# Patient Record
Sex: MALE | Race: BLACK OR AFRICAN AMERICAN | NOT HISPANIC OR LATINO | Employment: FULL TIME | URBAN - METROPOLITAN AREA
[De-identification: names, ages, dates, MRNs, and addresses within clinical notes are randomized per-mention and may not be internally consistent; named-entity substitution may affect disease eponyms.]

---

## 2018-08-30 ENCOUNTER — HOSPITAL ENCOUNTER (OUTPATIENT)
Dept: MEDSURG UNIT | Facility: HOSPITAL | Age: 63
End: 2018-09-04
Attending: SURGERY | Admitting: SURGERY

## 2018-08-30 LAB
ABS NEUT (OLG): 13.57 X10(3)/MCL (ref 2.1–9.2)
ALBUMIN SERPL-MCNC: 3.8 GM/DL (ref 3.4–5)
ALBUMIN/GLOB SERPL: 1.1 RATIO (ref 1.1–2)
ALP SERPL-CCNC: 83 UNIT/L (ref 50–136)
ALT SERPL-CCNC: 21 UNIT/L (ref 12–78)
APPEARANCE, UA: CLEAR
APTT PPP: 26.2 SECOND(S) (ref 24.8–36.9)
AST SERPL-CCNC: 17 UNIT/L (ref 15–37)
BACTERIA SPEC CULT: ABNORMAL /HPF
BASOPHILS # BLD AUTO: 0 X10(3)/MCL (ref 0–0.2)
BASOPHILS NFR BLD AUTO: 0 %
BILIRUB SERPL-MCNC: 1.1 MG/DL (ref 0.2–1)
BILIRUB UR QL STRIP: NEGATIVE
BILIRUBIN DIRECT+TOT PNL SERPL-MCNC: 0.3 MG/DL (ref 0–0.5)
BILIRUBIN DIRECT+TOT PNL SERPL-MCNC: 0.8 MG/DL (ref 0–0.8)
BUN SERPL-MCNC: 11 MG/DL (ref 7–18)
CALCIUM SERPL-MCNC: 9.1 MG/DL (ref 8.5–10.1)
CHLORIDE SERPL-SCNC: 97 MMOL/L (ref 98–107)
CO2 SERPL-SCNC: 28 MMOL/L (ref 21–32)
COLOR UR: YELLOW
CREAT SERPL-MCNC: 1.21 MG/DL (ref 0.7–1.3)
ERYTHROCYTE [DISTWIDTH] IN BLOOD BY AUTOMATED COUNT: 12.9 % (ref 11.5–17)
GLOBULIN SER-MCNC: 3.4 GM/DL (ref 2.4–3.5)
GLUCOSE (UA): ABNORMAL
GLUCOSE SERPL-MCNC: 172 MG/DL (ref 74–106)
HCT VFR BLD AUTO: 39.6 % (ref 42–52)
HGB BLD-MCNC: 13.3 GM/DL (ref 14–18)
HGB UR QL STRIP: NEGATIVE
INR PPP: 1.2 (ref 0–1.27)
KETONES UR QL STRIP: NEGATIVE
LACTATE SERPL-SCNC: 1.1 MMOL/L (ref 0.4–2)
LACTATE SERPL-SCNC: 3 MMOL/L (ref 0.4–2)
LACTATE SERPL-SCNC: 3.2 MMOL/L (ref 0.4–2)
LEUKOCYTE ESTERASE UR QL STRIP: NEGATIVE
LYMPHOCYTES # BLD AUTO: 1 X10(3)/MCL (ref 0.6–4.6)
LYMPHOCYTES NFR BLD AUTO: 6 %
MCH RBC QN AUTO: 30.6 PG (ref 27–31)
MCHC RBC AUTO-ENTMCNC: 33.6 GM/DL (ref 33–36)
MCV RBC AUTO: 91 FL (ref 80–94)
MONOCYTES # BLD AUTO: 1.5 X10(3)/MCL (ref 0.1–1.3)
MONOCYTES NFR BLD AUTO: 9 %
NEUTROPHILS # BLD AUTO: 13.57 X10(3)/MCL (ref 1.4–7.9)
NEUTROPHILS NFR BLD AUTO: 84 %
NITRITE UR QL STRIP: NEGATIVE
PH UR STRIP: 6 [PH] (ref 5–9)
PLATELET # BLD AUTO: 177 X10(3)/MCL (ref 130–400)
PMV BLD AUTO: 11.8 FL (ref 9.4–12.4)
POTASSIUM SERPL-SCNC: 3.9 MMOL/L (ref 3.5–5.1)
PROT SERPL-MCNC: 7.2 GM/DL (ref 6.4–8.2)
PROT UR QL STRIP: ABNORMAL
PROTHROMBIN TIME: 15.6 SECOND(S) (ref 12.2–14.7)
RBC # BLD AUTO: 4.35 X10(6)/MCL (ref 4.7–6.1)
RBC #/AREA URNS HPF: ABNORMAL /[HPF]
SODIUM SERPL-SCNC: 134 MMOL/L (ref 136–145)
SP GR UR STRIP: 1.02 (ref 1–1.03)
SQUAMOUS EPITHELIAL, UA: ABNORMAL
UROBILINOGEN UR STRIP-ACNC: 1
WBC # SPEC AUTO: 16.2 X10(3)/MCL (ref 4.5–11.5)
WBC #/AREA URNS HPF: ABNORMAL /HPF

## 2018-08-31 LAB
ABS NEUT (OLG): 11.22 X10(3)/MCL (ref 2.1–9.2)
BASOPHILS # BLD AUTO: 0 X10(3)/MCL (ref 0–0.2)
BASOPHILS NFR BLD AUTO: 0 %
BUN SERPL-MCNC: 8 MG/DL (ref 7–18)
CALCIUM SERPL-MCNC: 7.8 MG/DL (ref 8.5–10.1)
CHLORIDE SERPL-SCNC: 106 MMOL/L (ref 98–107)
CO2 SERPL-SCNC: 27 MMOL/L (ref 21–32)
CREAT SERPL-MCNC: 0.93 MG/DL (ref 0.7–1.3)
EOSINOPHIL # BLD AUTO: 0 X10(3)/MCL (ref 0–0.9)
EOSINOPHIL NFR BLD AUTO: 0 %
ERYTHROCYTE [DISTWIDTH] IN BLOOD BY AUTOMATED COUNT: 13 % (ref 11.5–17)
GLUCOSE SERPL-MCNC: 145 MG/DL (ref 74–106)
HCT VFR BLD AUTO: 33.8 % (ref 42–52)
HGB BLD-MCNC: 11.2 GM/DL (ref 14–18)
LYMPHOCYTES # BLD AUTO: 1.1 X10(3)/MCL (ref 0.6–4.6)
LYMPHOCYTES NFR BLD AUTO: 8 %
MCH RBC QN AUTO: 30.4 PG (ref 27–31)
MCHC RBC AUTO-ENTMCNC: 33.1 GM/DL (ref 33–36)
MCV RBC AUTO: 91.6 FL (ref 80–94)
MONOCYTES # BLD AUTO: 1.1 X10(3)/MCL (ref 0.1–1.3)
MONOCYTES NFR BLD AUTO: 8 %
NEUTROPHILS # BLD AUTO: 11.22 X10(3)/MCL (ref 1.4–7.9)
NEUTROPHILS NFR BLD AUTO: 83 %
PLATELET # BLD AUTO: 136 X10(3)/MCL (ref 130–400)
PMV BLD AUTO: 11.4 FL (ref 9.4–12.4)
POTASSIUM SERPL-SCNC: 3.6 MMOL/L (ref 3.5–5.1)
RBC # BLD AUTO: 3.69 X10(6)/MCL (ref 4.7–6.1)
SODIUM SERPL-SCNC: 140 MMOL/L (ref 136–145)
WBC # SPEC AUTO: 13.5 X10(3)/MCL (ref 4.5–11.5)

## 2018-09-01 LAB
ABS NEUT (OLG): 10.49 X10(3)/MCL (ref 2.1–9.2)
BASOPHILS # BLD AUTO: 0 X10(3)/MCL (ref 0–0.2)
BASOPHILS NFR BLD AUTO: 0 %
EOSINOPHIL # BLD AUTO: 0 X10(3)/MCL (ref 0–0.9)
EOSINOPHIL NFR BLD AUTO: 0 %
ERYTHROCYTE [DISTWIDTH] IN BLOOD BY AUTOMATED COUNT: 12.9 % (ref 11.5–17)
HCT VFR BLD AUTO: 31.3 % (ref 42–52)
HGB BLD-MCNC: 10.5 GM/DL (ref 14–18)
LYMPHOCYTES # BLD AUTO: 0.9 X10(3)/MCL (ref 0.6–4.6)
LYMPHOCYTES NFR BLD AUTO: 7 %
MCH RBC QN AUTO: 29.9 PG (ref 27–31)
MCHC RBC AUTO-ENTMCNC: 33.5 GM/DL (ref 33–36)
MCV RBC AUTO: 89.2 FL (ref 80–94)
MONOCYTES # BLD AUTO: 1 X10(3)/MCL (ref 0.1–1.3)
MONOCYTES NFR BLD AUTO: 8 %
NEUTROPHILS # BLD AUTO: 10.49 X10(3)/MCL (ref 1.4–7.9)
NEUTROPHILS NFR BLD AUTO: 84 %
PLATELET # BLD AUTO: 145 X10(3)/MCL (ref 130–400)
PMV BLD AUTO: 11 FL (ref 9.4–12.4)
RBC # BLD AUTO: 3.51 X10(6)/MCL (ref 4.7–6.1)
WBC # SPEC AUTO: 12.6 X10(3)/MCL (ref 4.5–11.5)

## 2018-09-02 LAB
ABS NEUT (OLG): 7.37 X10(3)/MCL (ref 2.1–9.2)
BASOPHILS # BLD AUTO: 0 X10(3)/MCL (ref 0–0.2)
BASOPHILS NFR BLD AUTO: 0 %
BUN SERPL-MCNC: 7 MG/DL (ref 7–18)
CALCIUM SERPL-MCNC: 8.8 MG/DL (ref 8.5–10.1)
CHLORIDE SERPL-SCNC: 105 MMOL/L (ref 98–107)
CO2 SERPL-SCNC: 29 MMOL/L (ref 21–32)
CREAT SERPL-MCNC: 0.92 MG/DL (ref 0.7–1.3)
CREAT/UREA NIT SERPL: 7.6
EOSINOPHIL # BLD AUTO: 0.1 X10(3)/MCL (ref 0–0.9)
EOSINOPHIL NFR BLD AUTO: 2 %
ERYTHROCYTE [DISTWIDTH] IN BLOOD BY AUTOMATED COUNT: 13.1 % (ref 11.5–17)
GLUCOSE SERPL-MCNC: 111 MG/DL (ref 74–106)
HCT VFR BLD AUTO: 34 % (ref 42–52)
HGB BLD-MCNC: 11.4 GM/DL (ref 14–18)
LYMPHOCYTES # BLD AUTO: 0.4 X10(3)/MCL (ref 0.6–4.6)
LYMPHOCYTES NFR BLD AUTO: 4 %
MCH RBC QN AUTO: 30.3 PG (ref 27–31)
MCHC RBC AUTO-ENTMCNC: 33.5 GM/DL (ref 33–36)
MCV RBC AUTO: 90.4 FL (ref 80–94)
MONOCYTES # BLD AUTO: 0.8 X10(3)/MCL (ref 0.1–1.3)
MONOCYTES NFR BLD AUTO: 9 %
NEUTROPHILS # BLD AUTO: 7.37 X10(3)/MCL (ref 1.4–7.9)
NEUTROPHILS NFR BLD AUTO: 84 %
PLATELET # BLD AUTO: 160 X10(3)/MCL (ref 130–400)
PMV BLD AUTO: 11.2 FL (ref 9.4–12.4)
POTASSIUM SERPL-SCNC: 3.7 MMOL/L (ref 3.5–5.1)
RBC # BLD AUTO: 3.76 X10(6)/MCL (ref 4.7–6.1)
SODIUM SERPL-SCNC: 142 MMOL/L (ref 136–145)
WBC # SPEC AUTO: 8.8 X10(3)/MCL (ref 4.5–11.5)

## 2018-09-04 LAB
ABS NEUT (OLG): 4.97 X10(3)/MCL (ref 2.1–9.2)
BASOPHILS # BLD AUTO: 0 X10(3)/MCL (ref 0–0.2)
BASOPHILS NFR BLD AUTO: 0 %
BUN SERPL-MCNC: 6 MG/DL (ref 7–18)
CALCIUM SERPL-MCNC: 8.1 MG/DL (ref 8.5–10.1)
CHLORIDE SERPL-SCNC: 105 MMOL/L (ref 98–107)
CO2 SERPL-SCNC: 29 MMOL/L (ref 21–32)
CREAT SERPL-MCNC: 1.04 MG/DL (ref 0.7–1.3)
CREAT/UREA NIT SERPL: 5.8
EOSINOPHIL # BLD AUTO: 0.2 X10(3)/MCL (ref 0–0.9)
EOSINOPHIL NFR BLD AUTO: 3 %
ERYTHROCYTE [DISTWIDTH] IN BLOOD BY AUTOMATED COUNT: 13.2 % (ref 11.5–17)
GLUCOSE SERPL-MCNC: 141 MG/DL (ref 74–106)
HCT VFR BLD AUTO: 32.2 % (ref 42–52)
HGB BLD-MCNC: 10.7 GM/DL (ref 14–18)
LYMPHOCYTES # BLD AUTO: 0.8 X10(3)/MCL (ref 0.6–4.6)
LYMPHOCYTES NFR BLD AUTO: 12 %
MCH RBC QN AUTO: 30.1 PG (ref 27–31)
MCHC RBC AUTO-ENTMCNC: 33.2 GM/DL (ref 33–36)
MCV RBC AUTO: 90.7 FL (ref 80–94)
MONOCYTES # BLD AUTO: 0.9 X10(3)/MCL (ref 0.1–1.3)
MONOCYTES NFR BLD AUTO: 13 %
NEUTROPHILS # BLD AUTO: 4.97 X10(3)/MCL (ref 1.4–7.9)
NEUTROPHILS NFR BLD AUTO: 71 %
PLATELET # BLD AUTO: 203 X10(3)/MCL (ref 130–400)
PMV BLD AUTO: 11 FL (ref 9.4–12.4)
POTASSIUM SERPL-SCNC: 3.1 MMOL/L (ref 3.5–5.1)
RBC # BLD AUTO: 3.55 X10(6)/MCL (ref 4.7–6.1)
SODIUM SERPL-SCNC: 143 MMOL/L (ref 136–145)
WBC # SPEC AUTO: 7 X10(3)/MCL (ref 4.5–11.5)

## 2018-09-05 LAB — FINAL CULTURE: NORMAL

## 2019-05-10 ENCOUNTER — HISTORICAL (OUTPATIENT)
Dept: CARDIOLOGY | Facility: HOSPITAL | Age: 64
End: 2019-05-10

## 2020-02-29 ENCOUNTER — HOSPITAL ENCOUNTER (INPATIENT)
Facility: HOSPITAL | Age: 65
LOS: 13 days | Discharge: HOME HEALTH | DRG: 268 | End: 2020-03-13
Attending: INTERNAL MEDICINE | Admitting: INTERNAL MEDICINE
Payer: COMMERCIAL

## 2020-02-29 DIAGNOSIS — R57.0 CARDIOGENIC SHOCK: ICD-10-CM

## 2020-02-29 DIAGNOSIS — E83.39 HYPERPHOSPHATEMIA: ICD-10-CM

## 2020-02-29 DIAGNOSIS — E11.9 TYPE 2 DIABETES MELLITUS WITHOUT COMPLICATION, WITHOUT LONG-TERM CURRENT USE OF INSULIN: ICD-10-CM

## 2020-02-29 DIAGNOSIS — I10 HYPERTENSION: ICD-10-CM

## 2020-02-29 DIAGNOSIS — N17.9 AKI (ACUTE KIDNEY INJURY): ICD-10-CM

## 2020-02-29 LAB
ABO + RH BLD: NORMAL
ALBUMIN SERPL BCP-MCNC: 2.5 G/DL (ref 3.5–5.2)
ALLENS TEST: ABNORMAL
ALP SERPL-CCNC: 92 U/L (ref 55–135)
ALT SERPL W/O P-5'-P-CCNC: 43 U/L (ref 10–44)
ANION GAP SERPL CALC-SCNC: 11 MMOL/L (ref 8–16)
ANISOCYTOSIS BLD QL SMEAR: SLIGHT
APTT BLDCRRT: 37.6 SEC (ref 21–32)
AST SERPL-CCNC: 117 U/L (ref 10–40)
BASO STIPL BLD QL SMEAR: ABNORMAL
BASOPHILS # BLD AUTO: 0.03 K/UL (ref 0–0.2)
BASOPHILS NFR BLD: 0.1 % (ref 0–1.9)
BILIRUB SERPL-MCNC: 1.3 MG/DL (ref 0.1–1)
BILIRUB UR QL STRIP: NEGATIVE
BLD GP AB SCN CELLS X3 SERPL QL: NORMAL
BNP SERPL-MCNC: 974 PG/ML (ref 0–99)
BUN SERPL-MCNC: 31 MG/DL (ref 8–23)
CALCIUM SERPL-MCNC: 7.6 MG/DL (ref 8.7–10.5)
CHLORIDE SERPL-SCNC: 101 MMOL/L (ref 95–110)
CLARITY UR REFRACT.AUTO: ABNORMAL
CO2 SERPL-SCNC: 25 MMOL/L (ref 23–29)
COLOR UR AUTO: YELLOW
CREAT SERPL-MCNC: 4.6 MG/DL (ref 0.5–1.4)
DELSYS: ABNORMAL
DIFFERENTIAL METHOD: ABNORMAL
DOHLE BOD BLD QL SMEAR: PRESENT
EOSINOPHIL # BLD AUTO: 0 K/UL (ref 0–0.5)
EOSINOPHIL NFR BLD: 0 % (ref 0–8)
ERYTHROCYTE [DISTWIDTH] IN BLOOD BY AUTOMATED COUNT: 14.7 % (ref 11.5–14.5)
ERYTHROCYTE [SEDIMENTATION RATE] IN BLOOD BY WESTERGREN METHOD: 12 MM/H
EST. GFR  (AFRICAN AMERICAN): 14.6 ML/MIN/1.73 M^2
EST. GFR  (NON AFRICAN AMERICAN): 12.6 ML/MIN/1.73 M^2
FIO2: 60
GLUCOSE SERPL-MCNC: 124 MG/DL (ref 70–110)
GLUCOSE UR QL STRIP: NEGATIVE
GRAN CASTS UR QL COMP ASSIST: 3 /LPF
HCO3 UR-SCNC: 26 MMOL/L (ref 24–28)
HCT VFR BLD AUTO: 33.8 % (ref 40–54)
HGB BLD-MCNC: 11.1 G/DL (ref 14–18)
HGB UR QL STRIP: ABNORMAL
HYPOCHROMIA BLD QL SMEAR: ABNORMAL
IMM GRANULOCYTES # BLD AUTO: 0.15 K/UL (ref 0–0.04)
IMM GRANULOCYTES NFR BLD AUTO: 0.6 % (ref 0–0.5)
INR PPP: 1.3 (ref 0.8–1.2)
KETONES UR QL STRIP: NEGATIVE
LACTATE SERPL-SCNC: 3.3 MMOL/L (ref 0.5–2.2)
LDH SERPL L TO P-CCNC: 1325 U/L (ref 110–260)
LEUKOCYTE ESTERASE UR QL STRIP: ABNORMAL
LYMPHOCYTES # BLD AUTO: 1.2 K/UL (ref 1–4.8)
LYMPHOCYTES NFR BLD: 5.2 % (ref 18–48)
MAGNESIUM SERPL-MCNC: 1.5 MG/DL (ref 1.6–2.6)
MCH RBC QN AUTO: 29.4 PG (ref 27–31)
MCHC RBC AUTO-ENTMCNC: 32.8 G/DL (ref 32–36)
MCV RBC AUTO: 89 FL (ref 82–98)
MICROSCOPIC COMMENT: ABNORMAL
MODE: ABNORMAL
MONOCYTES # BLD AUTO: 1 K/UL (ref 0.3–1)
MONOCYTES NFR BLD: 4 % (ref 4–15)
NEUTROPHILS # BLD AUTO: 21.3 K/UL (ref 1.8–7.7)
NEUTROPHILS NFR BLD: 90.1 % (ref 38–73)
NITRITE UR QL STRIP: NEGATIVE
NRBC BLD-RTO: 0 /100 WBC
OVALOCYTES BLD QL SMEAR: ABNORMAL
PCO2 BLDA: 35.4 MMHG (ref 35–45)
PEEP: 5
PH SMN: 7.47 [PH] (ref 7.35–7.45)
PH UR STRIP: 7 [PH] (ref 5–8)
PHOSPHATE SERPL-MCNC: 4.3 MG/DL (ref 2.7–4.5)
PLATELET # BLD AUTO: 141 K/UL (ref 150–350)
PLATELET BLD QL SMEAR: ABNORMAL
PMV BLD AUTO: 11.4 FL (ref 9.2–12.9)
PO2 BLDA: 89 MMHG (ref 80–100)
POC BE: 2 MMOL/L
POC SATURATED O2: 98 % (ref 95–100)
POC TCO2: 27 MMOL/L (ref 23–27)
POCT GLUCOSE: 104 MG/DL (ref 70–110)
POIKILOCYTOSIS BLD QL SMEAR: SLIGHT
POLYCHROMASIA BLD QL SMEAR: ABNORMAL
POTASSIUM SERPL-SCNC: 5.2 MMOL/L (ref 3.5–5.1)
PROT SERPL-MCNC: 5.4 G/DL (ref 6–8.4)
PROT UR QL STRIP: NEGATIVE
PROTHROMBIN TIME: 12.8 SEC (ref 9–12.5)
RBC # BLD AUTO: 3.78 M/UL (ref 4.6–6.2)
RBC #/AREA URNS AUTO: 4 /HPF (ref 0–4)
SAMPLE: ABNORMAL
SITE: ABNORMAL
SODIUM SERPL-SCNC: 137 MMOL/L (ref 136–145)
SP GR UR STRIP: 1.01 (ref 1–1.03)
SP02: 99
SQUAMOUS #/AREA URNS AUTO: 1 /HPF
URN SPEC COLLECT METH UR: ABNORMAL
VT: 400
WBC # BLD AUTO: 23.67 K/UL (ref 3.9–12.7)
WBC #/AREA URNS AUTO: 26 /HPF (ref 0–5)

## 2020-02-29 PROCEDURE — 83880 ASSAY OF NATRIURETIC PEPTIDE: CPT

## 2020-02-29 PROCEDURE — 84100 ASSAY OF PHOSPHORUS: CPT

## 2020-02-29 PROCEDURE — 99900035 HC TECH TIME PER 15 MIN (STAT)

## 2020-02-29 PROCEDURE — 82803 BLOOD GASES ANY COMBINATION: CPT

## 2020-02-29 PROCEDURE — 27000203 HC IMPELLA ADD'L DAY (CL)

## 2020-02-29 PROCEDURE — 85730 THROMBOPLASTIN TIME PARTIAL: CPT

## 2020-02-29 PROCEDURE — 63600175 PHARM REV CODE 636 W HCPCS: Performed by: STUDENT IN AN ORGANIZED HEALTH CARE EDUCATION/TRAINING PROGRAM

## 2020-02-29 PROCEDURE — 87040 BLOOD CULTURE FOR BACTERIA: CPT

## 2020-02-29 PROCEDURE — 99900026 HC AIRWAY MAINTENANCE (STAT)

## 2020-02-29 PROCEDURE — 83615 LACTATE (LD) (LDH) ENZYME: CPT

## 2020-02-29 PROCEDURE — 84443 ASSAY THYROID STIM HORMONE: CPT

## 2020-02-29 PROCEDURE — 81001 URINALYSIS AUTO W/SCOPE: CPT

## 2020-02-29 PROCEDURE — 94002 VENT MGMT INPAT INIT DAY: CPT

## 2020-02-29 PROCEDURE — 85025 COMPLETE CBC W/AUTO DIFF WBC: CPT

## 2020-02-29 PROCEDURE — 80053 COMPREHEN METABOLIC PANEL: CPT

## 2020-02-29 PROCEDURE — 25000003 PHARM REV CODE 250: Performed by: STUDENT IN AN ORGANIZED HEALTH CARE EDUCATION/TRAINING PROGRAM

## 2020-02-29 PROCEDURE — 20000000 HC ICU ROOM

## 2020-02-29 PROCEDURE — 82800 BLOOD PH: CPT

## 2020-02-29 PROCEDURE — 83735 ASSAY OF MAGNESIUM: CPT

## 2020-02-29 PROCEDURE — 27000426 HC IMPELLA SET UP

## 2020-02-29 PROCEDURE — 85610 PROTHROMBIN TIME: CPT

## 2020-02-29 PROCEDURE — 63600175 PHARM REV CODE 636 W HCPCS: Performed by: INTERNAL MEDICINE

## 2020-02-29 PROCEDURE — 36600 WITHDRAWAL OF ARTERIAL BLOOD: CPT

## 2020-02-29 PROCEDURE — 83605 ASSAY OF LACTIC ACID: CPT

## 2020-02-29 PROCEDURE — 86901 BLOOD TYPING SEROLOGIC RH(D): CPT

## 2020-02-29 PROCEDURE — 87086 URINE CULTURE/COLONY COUNT: CPT

## 2020-02-29 RX ORDER — NAPROXEN SODIUM 220 MG/1
81 TABLET, FILM COATED ORAL DAILY
Status: DISCONTINUED | OUTPATIENT
Start: 2020-03-01 | End: 2020-03-02

## 2020-02-29 RX ORDER — DEXMEDETOMIDINE HYDROCHLORIDE 4 UG/ML
0.2 INJECTION, SOLUTION INTRAVENOUS CONTINUOUS
Status: DISCONTINUED | OUTPATIENT
Start: 2020-02-29 | End: 2020-03-03

## 2020-02-29 RX ORDER — NOREPINEPHRINE BITARTRATE/D5W 4MG/250ML
0.02 PLASTIC BAG, INJECTION (ML) INTRAVENOUS CONTINUOUS
Status: DISCONTINUED | OUTPATIENT
Start: 2020-02-29 | End: 2020-02-29

## 2020-02-29 RX ORDER — DOBUTAMINE HYDROCHLORIDE 400 MG/100ML
5 INJECTION, SOLUTION INTRAVENOUS CONTINUOUS
Status: DISCONTINUED | OUTPATIENT
Start: 2020-02-29 | End: 2020-03-01

## 2020-02-29 RX ORDER — MAGNESIUM SULFATE HEPTAHYDRATE 40 MG/ML
2 INJECTION, SOLUTION INTRAVENOUS ONCE
Status: COMPLETED | OUTPATIENT
Start: 2020-02-29 | End: 2020-03-01

## 2020-02-29 RX ORDER — ATORVASTATIN CALCIUM 10 MG/1
10 TABLET, FILM COATED ORAL NIGHTLY
Status: DISCONTINUED | OUTPATIENT
Start: 2020-02-29 | End: 2020-03-01

## 2020-02-29 RX ORDER — HEPARIN SODIUM,PORCINE/D5W 25000/250
12 INTRAVENOUS SOLUTION INTRAVENOUS CONTINUOUS
Status: DISCONTINUED | OUTPATIENT
Start: 2020-02-29 | End: 2020-03-02

## 2020-02-29 RX ORDER — SODIUM CHLORIDE 0.9 % (FLUSH) 0.9 %
10 SYRINGE (ML) INJECTION
Status: DISCONTINUED | OUTPATIENT
Start: 2020-02-29 | End: 2020-03-13 | Stop reason: HOSPADM

## 2020-02-29 RX ADMIN — ATORVASTATIN CALCIUM 10 MG: 10 TABLET, FILM COATED ORAL at 10:02

## 2020-02-29 RX ADMIN — DEXMEDETOMIDINE HYDROCHLORIDE 1.4 MCG/KG/HR: 100 INJECTION, SOLUTION, CONCENTRATE INTRAVENOUS at 11:02

## 2020-02-29 RX ADMIN — DOBUTAMINE HYDROCHLORIDE 10 MCG/KG/MIN: 200 INJECTION INTRAVENOUS at 09:02

## 2020-02-29 RX ADMIN — MAGNESIUM SULFATE HEPTAHYDRATE 2 G: 40 INJECTION, SOLUTION INTRAVENOUS at 10:02

## 2020-02-29 RX ADMIN — DEXMEDETOMIDINE HYDROCHLORIDE 1 MCG/KG/HR: 100 INJECTION, SOLUTION, CONCENTRATE INTRAVENOUS at 09:02

## 2020-02-29 RX ADMIN — VANCOMYCIN HYDROCHLORIDE 1500 MG: 1.5 INJECTION, POWDER, LYOPHILIZED, FOR SOLUTION INTRAVENOUS at 11:02

## 2020-02-29 RX ADMIN — PIPERACILLIN AND TAZOBACTAM 4.5 G: 4; .5 INJECTION, POWDER, LYOPHILIZED, FOR SOLUTION INTRAVENOUS; PARENTERAL at 11:02

## 2020-02-29 RX ADMIN — HEPARIN SODIUM AND DEXTROSE 12 UNITS/KG/HR: 10000; 5 INJECTION INTRAVENOUS at 09:02

## 2020-02-29 RX ADMIN — NOREPINEPHRINE BITARTRATE 0.07 MCG/KG/MIN: 1 INJECTION, SOLUTION, CONCENTRATE INTRAVENOUS at 10:02

## 2020-02-29 NOTE — Clinical Note
The site was marked. Prepped: groin. Prepped with: Betadine. The site was clipped. The patient was draped.

## 2020-02-29 NOTE — Clinical Note
0 ml injected throughout the case. 100 mL total wasted during the case. 100 mL total used in the case.

## 2020-02-29 NOTE — Clinical Note
patient arrived with existing 14 fr impella sheath in right CFA and existing 7 fr sheath in right CFV

## 2020-02-29 NOTE — Clinical Note
Patient arrived with two existing  PERCLOSE SUT CLSR 6FR. Deployed after sheath removal in Right CFA.

## 2020-03-01 PROBLEM — R57.0 CARDIOGENIC SHOCK: Status: ACTIVE | Noted: 2020-03-01

## 2020-03-01 PROBLEM — I50.43 ACUTE ON CHRONIC COMBINED SYSTOLIC AND DIASTOLIC HEART FAILURE: Status: ACTIVE | Noted: 2020-03-01

## 2020-03-01 PROBLEM — I25.10 CAD (CORONARY ARTERY DISEASE): Status: ACTIVE | Noted: 2020-03-01

## 2020-03-01 PROBLEM — E11.9 DM (DIABETES MELLITUS): Status: ACTIVE | Noted: 2020-03-01

## 2020-03-01 PROBLEM — N17.9 AKI (ACUTE KIDNEY INJURY): Status: ACTIVE | Noted: 2020-03-01

## 2020-03-01 LAB
ALBUMIN SERPL BCP-MCNC: 2.2 G/DL (ref 3.5–5.2)
ALBUMIN SERPL BCP-MCNC: 2.3 G/DL (ref 3.5–5.2)
ALBUMIN SERPL BCP-MCNC: 2.3 G/DL (ref 3.5–5.2)
ALLENS TEST: ABNORMAL
ALP SERPL-CCNC: 76 U/L (ref 55–135)
ALP SERPL-CCNC: 85 U/L (ref 55–135)
ALP SERPL-CCNC: 99 U/L (ref 55–135)
ALT SERPL W/O P-5'-P-CCNC: 33 U/L (ref 10–44)
ALT SERPL W/O P-5'-P-CCNC: 35 U/L (ref 10–44)
ALT SERPL W/O P-5'-P-CCNC: 38 U/L (ref 10–44)
ANION GAP SERPL CALC-SCNC: 11 MMOL/L (ref 8–16)
ANION GAP SERPL CALC-SCNC: 12 MMOL/L (ref 8–16)
ANION GAP SERPL CALC-SCNC: 13 MMOL/L (ref 8–16)
ANISOCYTOSIS BLD QL SMEAR: SLIGHT
ANISOCYTOSIS BLD QL SMEAR: SLIGHT
APTT BLDCRRT: 39.8 SEC (ref 21–32)
APTT BLDCRRT: 63.7 SEC (ref 21–32)
APTT BLDCRRT: 63.7 SEC (ref 21–32)
APTT BLDCRRT: 73.1 SEC (ref 21–32)
AST SERPL-CCNC: 49 U/L (ref 10–40)
AST SERPL-CCNC: 67 U/L (ref 10–40)
AST SERPL-CCNC: 84 U/L (ref 10–40)
BASO STIPL BLD QL SMEAR: ABNORMAL
BASOPHILS # BLD AUTO: 0.02 K/UL (ref 0–0.2)
BASOPHILS # BLD AUTO: 0.03 K/UL (ref 0–0.2)
BASOPHILS # BLD AUTO: 0.04 K/UL (ref 0–0.2)
BASOPHILS NFR BLD: 0.1 % (ref 0–1.9)
BASOPHILS NFR BLD: 0.1 % (ref 0–1.9)
BASOPHILS NFR BLD: 0.2 % (ref 0–1.9)
BILIRUB SERPL-MCNC: 0.7 MG/DL (ref 0.1–1)
BILIRUB SERPL-MCNC: 1.1 MG/DL (ref 0.1–1)
BILIRUB SERPL-MCNC: 1.2 MG/DL (ref 0.1–1)
BUN SERPL-MCNC: 37 MG/DL (ref 8–23)
BUN SERPL-MCNC: 43 MG/DL (ref 8–23)
BUN SERPL-MCNC: 54 MG/DL (ref 8–23)
CALCIUM SERPL-MCNC: 7.2 MG/DL (ref 8.7–10.5)
CALCIUM SERPL-MCNC: 7.3 MG/DL (ref 8.7–10.5)
CALCIUM SERPL-MCNC: 7.4 MG/DL (ref 8.7–10.5)
CHLORIDE SERPL-SCNC: 100 MMOL/L (ref 95–110)
CHLORIDE SERPL-SCNC: 100 MMOL/L (ref 95–110)
CHLORIDE SERPL-SCNC: 99 MMOL/L (ref 95–110)
CHOLEST SERPL-MCNC: 131 MG/DL (ref 120–199)
CHOLEST/HDLC SERPL: 4 {RATIO} (ref 2–5)
CO2 SERPL-SCNC: 22 MMOL/L (ref 23–29)
CO2 SERPL-SCNC: 23 MMOL/L (ref 23–29)
CO2 SERPL-SCNC: 24 MMOL/L (ref 23–29)
CREAT SERPL-MCNC: 5.2 MG/DL (ref 0.5–1.4)
CREAT SERPL-MCNC: 5.9 MG/DL (ref 0.5–1.4)
CREAT SERPL-MCNC: 6.7 MG/DL (ref 0.5–1.4)
DELSYS: ABNORMAL
DIFFERENTIAL METHOD: ABNORMAL
EOSINOPHIL # BLD AUTO: 0 K/UL (ref 0–0.5)
EOSINOPHIL NFR BLD: 0 % (ref 0–8)
EOSINOPHIL NFR BLD: 0 % (ref 0–8)
EOSINOPHIL NFR BLD: 0.2 % (ref 0–8)
ERYTHROCYTE [DISTWIDTH] IN BLOOD BY AUTOMATED COUNT: 14.8 % (ref 11.5–14.5)
ERYTHROCYTE [DISTWIDTH] IN BLOOD BY AUTOMATED COUNT: 15.2 % (ref 11.5–14.5)
ERYTHROCYTE [DISTWIDTH] IN BLOOD BY AUTOMATED COUNT: 15.8 % (ref 11.5–14.5)
ERYTHROCYTE [SEDIMENTATION RATE] IN BLOOD BY WESTERGREN METHOD: 12 MM/H
ERYTHROCYTE [SEDIMENTATION RATE] IN BLOOD BY WESTERGREN METHOD: 12 MM/H
ERYTHROCYTE [SEDIMENTATION RATE] IN BLOOD BY WESTERGREN METHOD: 18 MM/H
ERYTHROCYTE [SEDIMENTATION RATE] IN BLOOD BY WESTERGREN METHOD: 18 MM/H
EST. GFR  (AFRICAN AMERICAN): 10.8 ML/MIN/1.73 M^2
EST. GFR  (AFRICAN AMERICAN): 12.6 ML/MIN/1.73 M^2
EST. GFR  (AFRICAN AMERICAN): 9.2 ML/MIN/1.73 M^2
EST. GFR  (NON AFRICAN AMERICAN): 10.9 ML/MIN/1.73 M^2
EST. GFR  (NON AFRICAN AMERICAN): 8 ML/MIN/1.73 M^2
EST. GFR  (NON AFRICAN AMERICAN): 9.3 ML/MIN/1.73 M^2
ESTIMATED AVG GLUCOSE: 154 MG/DL (ref 68–131)
FIO2: 50
FIO2: 60
FLOW: 50
GLUCOSE SERPL-MCNC: 155 MG/DL (ref 70–110)
GLUCOSE SERPL-MCNC: 159 MG/DL (ref 70–110)
GLUCOSE SERPL-MCNC: 177 MG/DL (ref 70–110)
HBA1C MFR BLD HPLC: 7 % (ref 4–5.6)
HCO3 UR-SCNC: 24.3 MMOL/L (ref 24–28)
HCO3 UR-SCNC: 24.4 MMOL/L (ref 24–28)
HCO3 UR-SCNC: 25.2 MMOL/L (ref 24–28)
HCO3 UR-SCNC: 25.3 MMOL/L (ref 24–28)
HCT VFR BLD AUTO: 25.2 % (ref 40–54)
HCT VFR BLD AUTO: 26.9 % (ref 40–54)
HCT VFR BLD AUTO: 29.3 % (ref 40–54)
HDLC SERPL-MCNC: 33 MG/DL (ref 40–75)
HDLC SERPL: 25.2 % (ref 20–50)
HGB BLD-MCNC: 8.3 G/DL (ref 14–18)
HGB BLD-MCNC: 9 G/DL (ref 14–18)
HGB BLD-MCNC: 9.6 G/DL (ref 14–18)
HYPOCHROMIA BLD QL SMEAR: ABNORMAL
HYPOCHROMIA BLD QL SMEAR: ABNORMAL
IMM GRANULOCYTES # BLD AUTO: 0.26 K/UL (ref 0–0.04)
IMM GRANULOCYTES # BLD AUTO: 0.26 K/UL (ref 0–0.04)
IMM GRANULOCYTES # BLD AUTO: 0.42 K/UL (ref 0–0.04)
IMM GRANULOCYTES NFR BLD AUTO: 1.1 % (ref 0–0.5)
IMM GRANULOCYTES NFR BLD AUTO: 1.2 % (ref 0–0.5)
IMM GRANULOCYTES NFR BLD AUTO: 1.8 % (ref 0–0.5)
INR PPP: 1.2 (ref 0.8–1.2)
LACTATE SERPL-SCNC: 1.3 MMOL/L (ref 0.5–2.2)
LACTATE SERPL-SCNC: 2.3 MMOL/L (ref 0.5–2.2)
LDLC SERPL CALC-MCNC: 78.6 MG/DL (ref 63–159)
LYMPHOCYTES # BLD AUTO: 1 K/UL (ref 1–4.8)
LYMPHOCYTES # BLD AUTO: 1.2 K/UL (ref 1–4.8)
LYMPHOCYTES # BLD AUTO: 1.3 K/UL (ref 1–4.8)
LYMPHOCYTES NFR BLD: 4.3 % (ref 18–48)
LYMPHOCYTES NFR BLD: 5.3 % (ref 18–48)
LYMPHOCYTES NFR BLD: 6.1 % (ref 18–48)
MAGNESIUM SERPL-MCNC: 2.1 MG/DL (ref 1.6–2.6)
MAGNESIUM SERPL-MCNC: 2.5 MG/DL (ref 1.6–2.6)
MCH RBC QN AUTO: 29.4 PG (ref 27–31)
MCH RBC QN AUTO: 29.8 PG (ref 27–31)
MCH RBC QN AUTO: 30.1 PG (ref 27–31)
MCHC RBC AUTO-ENTMCNC: 32.8 G/DL (ref 32–36)
MCHC RBC AUTO-ENTMCNC: 32.9 G/DL (ref 32–36)
MCHC RBC AUTO-ENTMCNC: 33.5 G/DL (ref 32–36)
MCV RBC AUTO: 89 FL (ref 82–98)
MCV RBC AUTO: 90 FL (ref 82–98)
MCV RBC AUTO: 91 FL (ref 82–98)
MIN VOL: 17.3
MIN VOL: 9
MODE: ABNORMAL
MONOCYTES # BLD AUTO: 1.1 K/UL (ref 0.3–1)
MONOCYTES # BLD AUTO: 1.2 K/UL (ref 0.3–1)
MONOCYTES # BLD AUTO: 1.4 K/UL (ref 0.3–1)
MONOCYTES NFR BLD: 5.2 % (ref 4–15)
MONOCYTES NFR BLD: 5.2 % (ref 4–15)
MONOCYTES NFR BLD: 6 % (ref 4–15)
NEUTROPHILS # BLD AUTO: 18.6 K/UL (ref 1.8–7.7)
NEUTROPHILS # BLD AUTO: 20 K/UL (ref 1.8–7.7)
NEUTROPHILS # BLD AUTO: 20.2 K/UL (ref 1.8–7.7)
NEUTROPHILS NFR BLD: 87.1 % (ref 38–73)
NEUTROPHILS NFR BLD: 87.8 % (ref 38–73)
NEUTROPHILS NFR BLD: 88.3 % (ref 38–73)
NONHDLC SERPL-MCNC: 98 MG/DL
NRBC BLD-RTO: 0 /100 WBC
OVALOCYTES BLD QL SMEAR: ABNORMAL
OVALOCYTES BLD QL SMEAR: ABNORMAL
PCO2 BLDA: 38.4 MMHG (ref 35–45)
PCO2 BLDA: 40.1 MMHG (ref 35–45)
PCO2 BLDA: 40.5 MMHG (ref 35–45)
PCO2 BLDA: 43.7 MMHG (ref 35–45)
PEEP: 5
PH SMN: 7.37 [PH] (ref 7.35–7.45)
PH SMN: 7.39 [PH] (ref 7.35–7.45)
PH SMN: 7.41 [PH] (ref 7.35–7.45)
PH SMN: 7.41 [PH] (ref 7.35–7.45)
PHOSPHATE SERPL-MCNC: 5.7 MG/DL (ref 2.7–4.5)
PIP: 16
PIP: 21
PLATELET # BLD AUTO: 115 K/UL (ref 150–350)
PLATELET # BLD AUTO: 124 K/UL (ref 150–350)
PLATELET # BLD AUTO: 95 K/UL (ref 150–350)
PLATELET BLD QL SMEAR: ABNORMAL
PLATELET BLD QL SMEAR: ABNORMAL
PMV BLD AUTO: 12 FL (ref 9.2–12.9)
PMV BLD AUTO: 12.2 FL (ref 9.2–12.9)
PMV BLD AUTO: 12.3 FL (ref 9.2–12.9)
PO2 BLDA: 36 MMHG (ref 40–60)
PO2 BLDA: 43 MMHG (ref 40–60)
PO2 BLDA: 44 MMHG (ref 40–60)
PO2 BLDA: 45 MMHG (ref 40–60)
POC BE: -1 MMOL/L
POC BE: 0 MMOL/L
POC BE: 0 MMOL/L
POC BE: 1 MMOL/L
POC SATURATED O2: 70 % (ref 95–100)
POC SATURATED O2: 77 % (ref 95–100)
POC SATURATED O2: 79 % (ref 95–100)
POC SATURATED O2: 81 % (ref 95–100)
POC TCO2: 25 MMOL/L (ref 24–29)
POC TCO2: 26 MMOL/L (ref 24–29)
POC TCO2: 26 MMOL/L (ref 24–29)
POC TCO2: 27 MMOL/L (ref 24–29)
POCT GLUCOSE: 143 MG/DL (ref 70–110)
POCT GLUCOSE: 171 MG/DL (ref 70–110)
POIKILOCYTOSIS BLD QL SMEAR: SLIGHT
POIKILOCYTOSIS BLD QL SMEAR: SLIGHT
POLYCHROMASIA BLD QL SMEAR: ABNORMAL
POLYCHROMASIA BLD QL SMEAR: ABNORMAL
POTASSIUM SERPL-SCNC: 4.3 MMOL/L (ref 3.5–5.1)
POTASSIUM SERPL-SCNC: 4.7 MMOL/L (ref 3.5–5.1)
POTASSIUM SERPL-SCNC: 5.1 MMOL/L (ref 3.5–5.1)
PROT SERPL-MCNC: 5 G/DL (ref 6–8.4)
PROT SERPL-MCNC: 5 G/DL (ref 6–8.4)
PROT SERPL-MCNC: 5.1 G/DL (ref 6–8.4)
PROTHROMBIN TIME: 12 SEC (ref 9–12.5)
PS: 15
PS: 15
RBC # BLD AUTO: 2.76 M/UL (ref 4.6–6.2)
RBC # BLD AUTO: 3.02 M/UL (ref 4.6–6.2)
RBC # BLD AUTO: 3.27 M/UL (ref 4.6–6.2)
SAMPLE: ABNORMAL
SITE: ABNORMAL
SODIUM SERPL-SCNC: 134 MMOL/L (ref 136–145)
SODIUM SERPL-SCNC: 135 MMOL/L (ref 136–145)
SODIUM SERPL-SCNC: 135 MMOL/L (ref 136–145)
SP02: 100
SP02: 100
SP02: 94
SP02: 99
TRIGL SERPL-MCNC: 97 MG/DL (ref 30–150)
TROPONIN I SERPL DL<=0.01 NG/ML-MCNC: 4 NG/ML (ref 0–0.03)
TSH SERPL DL<=0.005 MIU/L-ACNC: 0.6 UIU/ML (ref 0.4–4)
VANCOMYCIN SERPL-MCNC: 25.3 UG/ML
VT: 400
WBC # BLD AUTO: 21.29 K/UL (ref 3.9–12.7)
WBC # BLD AUTO: 22.64 K/UL (ref 3.9–12.7)
WBC # BLD AUTO: 23.01 K/UL (ref 3.9–12.7)

## 2020-03-01 PROCEDURE — 63600175 PHARM REV CODE 636 W HCPCS: Performed by: STUDENT IN AN ORGANIZED HEALTH CARE EDUCATION/TRAINING PROGRAM

## 2020-03-01 PROCEDURE — 83735 ASSAY OF MAGNESIUM: CPT

## 2020-03-01 PROCEDURE — 20000000 HC ICU ROOM

## 2020-03-01 PROCEDURE — 99223 PR INITIAL HOSPITAL CARE,LEVL III: ICD-10-PCS | Mod: ,,, | Performed by: INTERNAL MEDICINE

## 2020-03-01 PROCEDURE — 80053 COMPREHEN METABOLIC PANEL: CPT

## 2020-03-01 PROCEDURE — 85730 THROMBOPLASTIN TIME PARTIAL: CPT

## 2020-03-01 PROCEDURE — 83735 ASSAY OF MAGNESIUM: CPT | Mod: 91

## 2020-03-01 PROCEDURE — 27000203 HC IMPELLA ADD'L DAY (CL)

## 2020-03-01 PROCEDURE — 25000003 PHARM REV CODE 250: Performed by: STUDENT IN AN ORGANIZED HEALTH CARE EDUCATION/TRAINING PROGRAM

## 2020-03-01 PROCEDURE — 85730 THROMBOPLASTIN TIME PARTIAL: CPT | Mod: 91

## 2020-03-01 PROCEDURE — 80053 COMPREHEN METABOLIC PANEL: CPT | Mod: 91

## 2020-03-01 PROCEDURE — 63600175 PHARM REV CODE 636 W HCPCS: Performed by: INTERNAL MEDICINE

## 2020-03-01 PROCEDURE — 83605 ASSAY OF LACTIC ACID: CPT

## 2020-03-01 PROCEDURE — 99223 1ST HOSP IP/OBS HIGH 75: CPT | Mod: ,,, | Performed by: INTERNAL MEDICINE

## 2020-03-01 PROCEDURE — 80202 ASSAY OF VANCOMYCIN: CPT

## 2020-03-01 PROCEDURE — 84100 ASSAY OF PHOSPHORUS: CPT

## 2020-03-01 PROCEDURE — 99291 CRITICAL CARE FIRST HOUR: CPT | Mod: ,,, | Performed by: INTERNAL MEDICINE

## 2020-03-01 PROCEDURE — 82800 BLOOD PH: CPT

## 2020-03-01 PROCEDURE — 80061 LIPID PANEL: CPT

## 2020-03-01 PROCEDURE — 99291 PR CRITICAL CARE, E/M 30-74 MINUTES: ICD-10-PCS | Mod: ,,, | Performed by: INTERNAL MEDICINE

## 2020-03-01 PROCEDURE — 83605 ASSAY OF LACTIC ACID: CPT | Mod: 91

## 2020-03-01 PROCEDURE — 82803 BLOOD GASES ANY COMBINATION: CPT

## 2020-03-01 PROCEDURE — 85610 PROTHROMBIN TIME: CPT

## 2020-03-01 PROCEDURE — 85025 COMPLETE CBC W/AUTO DIFF WBC: CPT

## 2020-03-01 PROCEDURE — 99900035 HC TECH TIME PER 15 MIN (STAT)

## 2020-03-01 PROCEDURE — 99900026 HC AIRWAY MAINTENANCE (STAT)

## 2020-03-01 PROCEDURE — 84484 ASSAY OF TROPONIN QUANT: CPT

## 2020-03-01 PROCEDURE — 87040 BLOOD CULTURE FOR BACTERIA: CPT

## 2020-03-01 PROCEDURE — 83036 HEMOGLOBIN GLYCOSYLATED A1C: CPT

## 2020-03-01 PROCEDURE — 94761 N-INVAS EAR/PLS OXIMETRY MLT: CPT

## 2020-03-01 PROCEDURE — 27000221 HC OXYGEN, UP TO 24 HOURS

## 2020-03-01 PROCEDURE — 94003 VENT MGMT INPAT SUBQ DAY: CPT

## 2020-03-01 PROCEDURE — 37799 UNLISTED PX VASCULAR SURGERY: CPT

## 2020-03-01 RX ORDER — INSULIN ASPART 100 [IU]/ML
0-5 INJECTION, SOLUTION INTRAVENOUS; SUBCUTANEOUS EVERY 6 HOURS PRN
Status: DISCONTINUED | OUTPATIENT
Start: 2020-03-01 | End: 2020-03-06

## 2020-03-01 RX ORDER — PROPOFOL 10 MG/ML
5 INJECTION, EMULSION INTRAVENOUS CONTINUOUS
Status: DISCONTINUED | OUTPATIENT
Start: 2020-03-01 | End: 2020-03-05

## 2020-03-01 RX ORDER — GLUCAGON 1 MG
1 KIT INJECTION
Status: DISCONTINUED | OUTPATIENT
Start: 2020-03-01 | End: 2020-03-06

## 2020-03-01 RX ORDER — DOBUTAMINE HYDROCHLORIDE 400 MG/100ML
2.5 INJECTION, SOLUTION INTRAVENOUS CONTINUOUS
Status: DISCONTINUED | OUTPATIENT
Start: 2020-03-01 | End: 2020-03-03

## 2020-03-01 RX ORDER — ATORVASTATIN CALCIUM 20 MG/1
80 TABLET, FILM COATED ORAL DAILY
Status: DISCONTINUED | OUTPATIENT
Start: 2020-03-01 | End: 2020-03-02

## 2020-03-01 RX ADMIN — SODIUM CHLORIDE 500 ML: 0.9 INJECTION, SOLUTION INTRAVENOUS at 05:03

## 2020-03-01 RX ADMIN — HEPARIN SODIUM: 1000 INJECTION, SOLUTION INTRAVENOUS; SUBCUTANEOUS at 03:03

## 2020-03-01 RX ADMIN — DEXMEDETOMIDINE HYDROCHLORIDE 1.4 MCG/KG/HR: 100 INJECTION, SOLUTION, CONCENTRATE INTRAVENOUS at 12:03

## 2020-03-01 RX ADMIN — ASPIRIN 81 MG CHEWABLE TABLET 81 MG: 81 TABLET CHEWABLE at 08:03

## 2020-03-01 RX ADMIN — PROPOFOL 10 MCG/KG/MIN: 10 INJECTION, EMULSION INTRAVENOUS at 11:03

## 2020-03-01 RX ADMIN — DEXMEDETOMIDINE HYDROCHLORIDE 1.4 MCG/KG/HR: 100 INJECTION, SOLUTION, CONCENTRATE INTRAVENOUS at 03:03

## 2020-03-01 RX ADMIN — DOBUTAMINE HYDROCHLORIDE 5 MCG/KG/MIN: 200 INJECTION INTRAVENOUS at 01:03

## 2020-03-01 RX ADMIN — DEXMEDETOMIDINE HYDROCHLORIDE 1.4 MCG/KG/HR: 100 INJECTION, SOLUTION, CONCENTRATE INTRAVENOUS at 07:03

## 2020-03-01 RX ADMIN — DEXMEDETOMIDINE HYDROCHLORIDE 1.4 MCG/KG/HR: 100 INJECTION, SOLUTION, CONCENTRATE INTRAVENOUS at 11:03

## 2020-03-01 RX ADMIN — ATORVASTATIN CALCIUM 80 MG: 20 TABLET, FILM COATED ORAL at 03:03

## 2020-03-01 RX ADMIN — DEXMEDETOMIDINE HYDROCHLORIDE 1.4 MCG/KG/HR: 100 INJECTION, SOLUTION, CONCENTRATE INTRAVENOUS at 08:03

## 2020-03-01 RX ADMIN — PROPOFOL 20 MCG/KG/MIN: 10 INJECTION, EMULSION INTRAVENOUS at 09:03

## 2020-03-01 NOTE — EICU
Rounding (Video Assessment):  No    Intervention Initiated From:  Bedside    Sarah Communicated with Bedside Nurse regarding:  Time-Out    Nurse Notified:  Yes    Doctor Notified:  Yes    Comments: elert noted for time out for l ij tlc per Dr Mac per sterile technique. cxray ordered. Patient tolerated well

## 2020-03-01 NOTE — PROGRESS NOTES
03/01/2020  Real James    Current provider:  Adilson Darden Jr.,*      I, Real James, rounded on John aJvier to ensure all mechanical assist device settings (IABP or VAD) were appropriate and all parameters were within limits.  I was able to ensure all back up equipment was present, the staff had no issues, and the Perfusion Department daily rounding was complete.    8:24 AM

## 2020-03-01 NOTE — HPI
65 y/o M hx of CAD (50-60% LCx, OM disease on [facility] 2/29), ICM EF 10-15% s/p AICD (unknown baseline but on GDMT as OP), HTN, DM2, HLD, obesity, CKD, transferred from OSH on 2/29/2020 for higher level of care.  Patient presented to outside hospital with shortness of breath, fatigued, found to be cardiogenic shock.  Per OSH transfer record, chart and brother, patient's labs showed sCr 1.8 on 2/28/2020 19:27, increased to 2.7 on 2/29/2020 0438, troponins 12, underwent LHC underwent LHC with evidence of 50-60% LCx, OM disease; no PCI performed, and RHC with LVEDP 35. CO 3, CI 1.8, tte w LVEF 10-15%.  Patient underwent Impella placement at outside hospital and transferred to [facility] on 2/29.  Noted also in chart sCr 1.1 back in 9/2019.  His sCr at [facility] up to 5.1 on 3/1/2020.    Nephrology consulted for evaluation of Naveen.

## 2020-03-01 NOTE — RESPIRATORY THERAPY
Pt received from [employer] with 8.0 secured 23@teeth and placed on documented vent settings without incident.

## 2020-03-01 NOTE — CONSULTS
[facility]  Nephrology  Consult Note    Patient Name: John Javier  MRN: [id number]  Admission Date: 2/29/2020  Hospital Length of Stay: 1 days  Attending Provider: Adilson Darden Jr.,*   Primary Care Physician: To Obtain Unable  Principal Problem:Cardiogenic shock    Inpatient consult to Nephrology  Consult performed by: Lars Cano MD  Consult ordered by: Dk Mac MD        Subjective:     HPI: 63 y/o M hx of CAD (50-60% LCx, OM disease on [facility] 2/29), ICM EF 10-15% s/p AICD (unknown baseline but on GDMT as OP), HTN, DM2, HLD, obesity, CKD, transferred from OSH on 2/29/2020 for higher level of care.  Patient presented to outside hospital with shortness of breath, fatigued, found to be cardiogenic shock.  Per OSH transfer record, chart and brother, patient's labs showed sCr 1.8 on 2/28/2020 19:27, increased to 2.7 on 2/29/2020 0438, troponins 12, underwent LHC underwent LHC with evidence of 50-60% LCx, OM disease; no PCI performed, and RHC with LVEDP 35. CO 3, CI 1.8, tte w LVEF 10-15%.  Patient underwent Impella placement at outside hospital and transferred to [facility] on 2/29.  Noted also in chart sCr 1.1 back in 9/2019.  His sCr at [facility] up to 5.1 on 3/1/2020.    Nephrology consulted for evaluation of Naveen.       No past medical history on file.    No past surgical history on file.    Review of patient's allergies indicates:  No Known Allergies  Current Facility-Administered Medications   Medication Frequency    aspirin chewable tablet 81 mg Daily    atorvastatin tablet 80 mg Daily    dexmedetomidine (PRECEDEX) 400mcg/100mL 0.9% NaCL infusion Continuous    dextrose 10% (D10W) Bolus PRN    DOBUTamine 500mg in D5W 250mL infusion (premix) (NON-TITRATING) Continuous    glucagon (human recombinant) injection 1 mg PRN    heparin (porcine) 12,500 Units in dextrose 5 % 500 mL infusion Continuous    heparin 25,000 units in dextrose 5% (100 units/ml) IV bolus from bag -  ADDITIONAL PRN BOLUS - 30 units/kg PRN    heparin 25,000 units in dextrose 5% (100 units/ml) IV bolus from bag - ADDITIONAL PRN BOLUS - 60 units/kg PRN    heparin 25,000 units in dextrose 5% (100 units/ml) IV bolus from bag INITIAL BOLUS Once    heparin 25,000 units in dextrose 5% 250 mL (100 units/mL) infusion LOW INTENSITY nomogram - [facility] Continuous    insulin aspart U-100 pen 0-5 Units Q6H PRN    norepinephrine 16 mg in dextrose 5 % 250 mL infusion Continuous    sodium chloride 0.9% bolus 500 mL Once    sodium chloride 0.9% flush 10 mL PRN     Family History     None        Tobacco Use    Smoking status: Not on file   Substance and Sexual Activity    Alcohol use: Not on file    Drug use: Not on file    Sexual activity: Not on file     Review of Systems   Unable to perform ROS: Intubated     Objective:     Vital Signs (Most Recent):  Temp: 99 °F (37.2 °C) (03/01/20 0600)  Pulse: 97 (03/01/20 0600)  Resp: 19 (03/01/20 0600)  BP: 109/67 (03/01/20 0600)  SpO2: 96 % (03/01/20 0600)  O2 Device (Oxygen Therapy): ventilator (03/01/20 0542) Vital Signs (24h Range):  Temp:  [98.4 °F (36.9 °C)-100.6 °F (38.1 °C)] 99 °F (37.2 °C)  Pulse:  [] 97  Resp:  [13-43] 19  SpO2:  [89 %-100 %] 96 %  BP: (105-132)/(61-86) 109/67     Weight: 75.8 kg (167 lb 1.7 oz) (02/29/20 2100)  Body mass index is 26.97 kg/m².  Body surface area is 1.88 meters squared.    No intake/output data recorded.    Physical Exam   Constitutional: He is oriented to person, place, and time.   Critically ill   HENT:   Head: Normocephalic and atraumatic.   Eyes: Pupils are equal, round, and reactive to light. EOM are normal.   Neck: No JVD present.   Cardiovascular: Normal rate and regular rhythm.   impella in place, pulsatile; tachycardic, regular   Pulmonary/Chest: No respiratory distress. He has no wheezes. He has rales.   Vent transmitted breath sounds   Abdominal: Soft. Bowel sounds are normal. He exhibits no distension. There is no  tenderness.   Musculoskeletal: Normal range of motion.   Neurological: He is oriented to person, place, and time. No cranial nerve deficit.   intubated   Skin: Skin is warm and dry. Capillary refill takes less than 2 seconds.   Psychiatric: He has a normal mood and affect. His behavior is normal.   Nursing note and vitals reviewed.      Significant Labs:  CBC:   Recent Labs   Lab 03/01/20  0303   WBC 22.64*   RBC 3.27*   HGB 9.6*   HCT 29.3*   *   MCV 90   MCH 29.4   MCHC 32.8     CMP:   Recent Labs   Lab 03/01/20  0303   *   CALCIUM 7.2*   ALBUMIN 2.3*   PROT 5.0*   *   K 5.1   CO2 23   CL 99   BUN 37*   CREATININE 5.2*   ALKPHOS 85   ALT 38   AST 84*   BILITOT 1.2*     All labs within the past 24 hours have been reviewed.    Significant Imaging:  CXR personally reviewed.    Assessment/Plan:     * Cardiogenic shock  Per cardiology  On impella    ANGEL (acute kidney injury)  Likely iATN from cardiogenic shock.    Plan:  - Patient with significant urine output 50-70 mL/hr despite sCr elevated to 5.1 this morning from apparent baseline 1.1 as of 9/2019  - Electrolytes stable at this moment  - No RRt needed at this time, but low threshold if urine output falls and/or electrolyte derangements warrant clearance  - Strict I/Os and chart  - Renal function panel q 8 hrs  - Renal US  - U/A, UPCr  - Will follow closely        Thank you for your consult. I will follow-up with patient. Please contact us if you have any additional questions.    Lars Cano MD  Nephrology  [facility]

## 2020-03-01 NOTE — PROCEDURES
"John Javier is a 64 y.o. male patient.    Temp: 98.8 °F (37.1 °C) (03/01/20 0300)  Pulse: 100 (03/01/20 0300)  Resp: (!) 26 (03/01/20 0300)  BP: 108/61 (03/01/20 0300)  SpO2: 96 % (03/01/20 0300)  Weight: 75.8 kg (167 lb 1.7 oz) (02/29/20 2100)  Height: 5' 6" (167.6 cm) (02/29/20 2100)    Central Line  Date/Time: 3/1/2020 5:37 AM  Performed by: Dk Mac MD  Consent Done: Yes  Site marked: the operative site was marked  Time out: Immediately prior to procedure a "time out" was called to verify the correct patient, procedure, equipment, support staff and site/side marked as required.  Indications: diagnostic evaluation    Anesthesia:  Local Anesthetic: lidocaine 1% with epinephrine  Preparation: skin prepped with ChloraPrep  Location details: left internal jugular  Catheter type: triple lumen  Ultrasound guidance: yes  Number of attempts: 3  Assessment: placement verified by x-ray,  no pneumothorax on x-ray,  verified by fluoroscopy,  tip termination and successful placement  Complications: none  Post-procedure: line sutured,  chlorhexidine patch,  sterile dressing applied and blood return through all ports          No flowsheet data found.    Dk Mac  3/1/2020  "

## 2020-03-01 NOTE — PROGRESS NOTES
Update to H & P done this am. CVP: 6, SVO2: 70, CO: 7.93, CI: 4.22 and SVR: 766.  Impella placed at P2 due to concerns of malposition.  Interventional Cardiology re positioned impella and we increased it back to P4.  Nephrology consulted; appreciate their recommendations.  Blood cultures negative thus far.  Patient received 1.5g Vanc.  Will hold on further abx as he seemed to improve in terms of hemodynamic requirements without them.  Will continue Impella and ET tube support today and monitor response.

## 2020-03-01 NOTE — NURSING
Dr. Darden at bedside for rounds. Impella site assessed, MD to assess that impella was locked into position. Impella now at 80 cm. Placement sig/motor current showing no significant events. SvO2 drawn and resulted at 81. VO received from Dr. Darden to decrease  to 2.5 and decrease Impella to P3 from P4. TM

## 2020-03-01 NOTE — SUBJECTIVE & OBJECTIVE
No past medical history on file.    No past surgical history on file.    Review of patient's allergies indicates:  No Known Allergies  Current Facility-Administered Medications   Medication Frequency    aspirin chewable tablet 81 mg Daily    atorvastatin tablet 80 mg Daily    dexmedetomidine (PRECEDEX) 400mcg/100mL 0.9% NaCL infusion Continuous    dextrose 10% (D10W) Bolus PRN    DOBUTamine 500mg in D5W 250mL infusion (premix) (NON-TITRATING) Continuous    glucagon (human recombinant) injection 1 mg PRN    heparin (porcine) 12,500 Units in dextrose 5 % 500 mL infusion Continuous    heparin 25,000 units in dextrose 5% (100 units/ml) IV bolus from bag - ADDITIONAL PRN BOLUS - 30 units/kg PRN    heparin 25,000 units in dextrose 5% (100 units/ml) IV bolus from bag - ADDITIONAL PRN BOLUS - 60 units/kg PRN    heparin 25,000 units in dextrose 5% (100 units/ml) IV bolus from bag INITIAL BOLUS Once    heparin 25,000 units in dextrose 5% 250 mL (100 units/mL) infusion LOW INTENSITY nomogram - [facility] Continuous    insulin aspart U-100 pen 0-5 Units Q6H PRN    norepinephrine 16 mg in dextrose 5 % 250 mL infusion Continuous    sodium chloride 0.9% bolus 500 mL Once    sodium chloride 0.9% flush 10 mL PRN     Family History     None        Tobacco Use    Smoking status: Not on file   Substance and Sexual Activity    Alcohol use: Not on file    Drug use: Not on file    Sexual activity: Not on file     Review of Systems   Unable to perform ROS: Intubated     Objective:     Vital Signs (Most Recent):  Temp: 99 °F (37.2 °C) (03/01/20 0600)  Pulse: 97 (03/01/20 0600)  Resp: 19 (03/01/20 0600)  BP: 109/67 (03/01/20 0600)  SpO2: 96 % (03/01/20 0600)  O2 Device (Oxygen Therapy): ventilator (03/01/20 0542) Vital Signs (24h Range):  Temp:  [98.4 °F (36.9 °C)-100.6 °F (38.1 °C)] 99 °F (37.2 °C)  Pulse:  [] 97  Resp:  [13-43] 19  SpO2:  [89 %-100 %] 96 %  BP: (105-132)/(61-86) 109/67     Weight: 75.8 kg (167 lb 1.7 oz)  (02/29/20 2100)  Body mass index is 26.97 kg/m².  Body surface area is 1.88 meters squared.    No intake/output data recorded.    Physical Exam   Constitutional: He is oriented to person, place, and time.   Critically ill   HENT:   Head: Normocephalic and atraumatic.   Eyes: Pupils are equal, round, and reactive to light. EOM are normal.   Neck: No JVD present.   Cardiovascular: Normal rate and regular rhythm.   impella in place, pulsatile; tachycardic, regular   Pulmonary/Chest: No respiratory distress. He has no wheezes. He has rales.   Vent transmitted breath sounds   Abdominal: Soft. Bowel sounds are normal. He exhibits no distension. There is no tenderness.   Musculoskeletal: Normal range of motion.   Neurological: He is oriented to person, place, and time. No cranial nerve deficit.   intubated   Skin: Skin is warm and dry. Capillary refill takes less than 2 seconds.   Psychiatric: He has a normal mood and affect. His behavior is normal.   Nursing note and vitals reviewed.      Significant Labs:  CBC:   Recent Labs   Lab 03/01/20  0303   WBC 22.64*   RBC 3.27*   HGB 9.6*   HCT 29.3*   *   MCV 90   MCH 29.4   MCHC 32.8     CMP:   Recent Labs   Lab 03/01/20  0303   *   CALCIUM 7.2*   ALBUMIN 2.3*   PROT 5.0*   *   K 5.1   CO2 23   CL 99   BUN 37*   CREATININE 5.2*   ALKPHOS 85   ALT 38   AST 84*   BILITOT 1.2*     All labs within the past 24 hours have been reviewed.    Significant Imaging:  CXR personally reviewed.

## 2020-03-01 NOTE — PROGRESS NOTES
Pharmacokinetic Initial Assessment: IV Vancomycin    Assessment/Plan:    Initiate intravenous vancomycin with loading dose of 1500 mg once with subsequent doses when random concentrations are less than 20 mcg/mL.  Desired empiric serum trough concentration is 15 to 20 mcg/mL.  Draw vancomycin random level on 3/1/20 at 1200.  Pharmacy will continue to follow and monitor vancomycin.      Please contact pharmacy at extension [phone] with any questions regarding this assessment.     Thank you for the consult,   Morena Joya     Patient brief summary:  John Javier is a 64 y.o. male initiated on antimicrobial therapy with IV Vancomycin for treatment of suspected bacteremia.    Drug Allergies:   Review of patient's allergies indicates:  No Known Allergies    Actual Body Weight:   75.8 kg    Renal Function:   Estimated Creatinine Clearance: 14.8 mL/min (A) (based on SCr of 4.6 mg/dL (H)).,     CBC (last 72 hours):  Recent Labs   Lab Result Units 02/29/20  2122   WBC K/uL 23.67*   Hemoglobin g/dL 11.1*   Hematocrit % 33.8*   Platelets K/uL 141*   Gran% % 90.1*   Lymph% % 5.2*   Mono% % 4.0   Eosinophil% % 0.0   Basophil% % 0.1   Differential Method  Automated       Metabolic Panel (last 72 hours):  Recent Labs   Lab Result Units 02/29/20  2122   Sodium mmol/L 137   Potassium mmol/L 5.2*   Chloride mmol/L 101   CO2 mmol/L 25   Glucose mg/dL 124*   BUN, Bld mg/dL 31*   Creatinine mg/dL 4.6*   Albumin g/dL 2.5*   Total Bilirubin mg/dL 1.3*   Alkaline Phosphatase U/L 92   AST U/L 117*   ALT U/L 43   Magnesium mg/dL 1.5*   Phosphorus mg/dL 4.3       Drug levels (last 3 results):  No results for input(s): VANCOMYCINRA, VANCOMYCINPE, VANCOMYCINTR in the last 72 hours.    Microbiologic Results:  Microbiology Results (last 7 days)     Procedure Component Value Units Date/Time    Blood culture [[id number]]     Order Status:  No result Specimen:  Blood     Blood culture [[id number]]     Order Status:  No result Specimen:  Blood

## 2020-03-01 NOTE — SUBJECTIVE & OBJECTIVE
No past medical history on file.    No past surgical history on file.    Review of patient's allergies indicates:  No Known Allergies    Current Facility-Administered Medications   Medication    aspirin chewable tablet 81 mg    atorvastatin tablet 80 mg    dexmedetomidine (PRECEDEX) 400mcg/100mL 0.9% NaCL infusion    DOBUTamine 500mg in D5W 250mL infusion (premix) (NON-TITRATING)    heparin (porcine) 12,500 Units in dextrose 5 % 500 mL infusion    heparin 25,000 units in dextrose 5% (100 units/ml) IV bolus from bag - ADDITIONAL PRN BOLUS - 30 units/kg    heparin 25,000 units in dextrose 5% (100 units/ml) IV bolus from bag - ADDITIONAL PRN BOLUS - 60 units/kg    heparin 25,000 units in dextrose 5% (100 units/ml) IV bolus from bag INITIAL BOLUS    heparin 25,000 units in dextrose 5% 250 mL (100 units/mL) infusion LOW INTENSITY nomogram - [facility]    norepinephrine 16 mg in dextrose 5 % 250 mL infusion    sodium chloride 0.9% bolus 500 mL    sodium chloride 0.9% flush 10 mL     Family History     None        Tobacco Use    Smoking status: Not on file   Substance and Sexual Activity    Alcohol use: Not on file    Drug use: Not on file    Sexual activity: Not on file     Review of Systems   Unable to perform ROS: Intubated     Objective:     Vital Signs (Most Recent):  Temp: 98.8 °F (37.1 °C) (03/01/20 0300)  Pulse: 100 (03/01/20 0300)  Resp: (!) 26 (03/01/20 0300)  BP: 108/61 (03/01/20 0300)  SpO2: 96 % (03/01/20 0300) Vital Signs (24h Range):  Temp:  [98.4 °F (36.9 °C)-100.6 °F (38.1 °C)] 98.8 °F (37.1 °C)  Pulse:  [] 100  Resp:  [13-26] 26  SpO2:  [89 %-100 %] 96 %  BP: (105-132)/(61-84) 108/61     Patient Vitals for the past 72 hrs (Last 3 readings):   Weight   02/29/20 2100 75.8 kg (167 lb 1.7 oz)     Body mass index is 26.97 kg/m².      Intake/Output Summary (Last 24 hours) at 3/1/2020 0502  Last data filed at 3/1/2020 0333  Gross per 24 hour   Intake 1210.06 ml   Output 600 ml   Net 610.06 ml        Physical Exam   Constitutional: He is oriented to person, place, and time. He appears well-developed and well-nourished.   HENT:   Head: Normocephalic and atraumatic.   Eyes: Pupils are equal, round, and reactive to light. EOM are normal.   Neck: No JVD present.   Cardiovascular: Normal rate, regular rhythm, normal heart sounds and intact distal pulses. Exam reveals no gallop and no friction rub.   No murmur heard.  impella in place, pulsatile; tachycardic, regular   Pulmonary/Chest: Effort normal and breath sounds normal. No respiratory distress. He has no wheezes. He has no rales.   Abdominal: Soft. Bowel sounds are normal. He exhibits no distension. There is no tenderness.   Musculoskeletal: Normal range of motion.   Neurological: He is oriented to person, place, and time. No cranial nerve deficit.   intubated   Skin: Skin is warm and dry. Capillary refill takes less than 2 seconds.   Psychiatric: He has a normal mood and affect. His behavior is normal.       Significant Labs:  CBC:  Recent Labs   Lab 02/29/20 2122 03/01/20 0303   WBC 23.67* 22.64*   RBC 3.78* 3.27*   HGB 11.1* 9.6*   HCT 33.8* 29.3*   * 124*   MCV 89 90   MCH 29.4 29.4   MCHC 32.8 32.8     BNP:  Recent Labs   Lab 02/29/20 2122   *     CMP:  Recent Labs   Lab 02/29/20 2122 03/01/20 0303   * 177*   CALCIUM 7.6* 7.2*   ALBUMIN 2.5* 2.3*   PROT 5.4* 5.0*    135*   K 5.2* 5.1   CO2 25 23    99   BUN 31* 37*   CREATININE 4.6* 5.2*   ALKPHOS 92 85   ALT 43 38   * 84*   BILITOT 1.3* 1.2*      Coagulation:   Recent Labs   Lab 02/29/20 2122 03/01/20 0303   INR 1.3*  --    APTT 37.6* 39.8*     LDH:  Recent Labs   Lab 02/29/20 2122   LDH 1,325*     Microbiology:  Microbiology Results (last 7 days)     Procedure Component Value Units Date/Time    Blood culture [[id number]] Collected:  03/01/20 0254    Order Status:  Sent Specimen:  Blood from Peripheral, Upper Arm, Left Updated:  03/01/20 0300    Blood  culture [[id number]] Collected:  02/29/20 2305    Order Status:  Sent Specimen:  Blood from Peripheral, Forearm, Right Updated:  02/29/20 2316    Urine culture [[id number]] Collected:  02/29/20 2240    Order Status:  No result Specimen:  Urine Updated:  02/29/20 2306          ABGs:   Recent Labs   Lab 03/01/20  0244   PH 7.388   PCO2 40.5   HCO3 24.4   POCSATURATED 79*   BE -1     BMP:   Recent Labs   Lab 03/01/20  0303   *   *   K 5.1   CL 99   CO2 23   BUN 37*   CREATININE 5.2*   CALCIUM 7.2*   MG 2.1     Cardiac Markers: No results for input(s): CKMB, TROPONINT, MYOGLOBIN in the last 72 hours.  Coagulation:   Recent Labs   Lab 02/29/20 2122 03/01/20 0303   INR 1.3*  --    APTT 37.6* 39.8*     Microbiology Results (last 7 days)     Procedure Component Value Units Date/Time    Blood culture [[id number]] Collected:  03/01/20 0254    Order Status:  Sent Specimen:  Blood from Peripheral, Upper Arm, Left Updated:  03/01/20 0300    Blood culture [[id number]] Collected:  02/29/20 2305    Order Status:  Sent Specimen:  Blood from Peripheral, Forearm, Right Updated:  02/29/20 2316    Urine culture [[id number]] Collected:  02/29/20 2240    Order Status:  No result Specimen:  Urine Updated:  02/29/20 2306          Diagnostic Results:  ECG: nsr, LAD, TWI anterolateral leads    TTE pending

## 2020-03-01 NOTE — ASSESSMENT & PLAN NOTE
Likely iATN from cardiogenic shock.    Plan:  - Patient with significant urine output 50-70 mL/hr despite sCr elevated to 5.1 this morning from apparent baseline 1.1 as of 9/2019  - Electrolytes stable at this moment  - No RRt needed at this time, but low threshold if urine output falls and/or electrolyte derangements warrant clearance  - Strict I/Os and chart  - Renal function panel q 8 hrs  - Renal US  - U/A, UPCr  - Will follow closely

## 2020-03-01 NOTE — H&P
"[facility]  Heart Transplant  H&P    Patient Name: John Javier  MRN: [id number]  Admission Date: 2/29/2020  Attending Physician: Adilson Darden Jr.,*  Primary Care Provider: To Obtain Unable  Principal Problem:Cardiogenic shock    Subjective:     History of Present Illness:  Patient intubated w limited collateral from family; further OSH records pending    63 y/o M hx of CAD (50-60% LCx, OM disease on C 2/29), ICM EF 10-15% s/p AICD (unknown baseline but on GDMT as OP), HTN, DM2, HLD, obesity, CKD, presenting from OSH intubated with Impella in setting of cardiogenic shock. Per pt's brother he was taken by EMS to OSH in setting of feeling short of breath, nearly passing out. There found to be in shock, lactate to 12, ANGEL w Cr 2.5, trop 12; unclear if dynamic ECG changes, max trop 10; underwent LHC with evidence of 50-60% LCx, OM disease; no PCI performed. RHC with LVEDP 35. CO 3, CI 1.8, tte w LVEF 10-15%. Underwent Impella placement without additional central line placement (per brother there was a "complication" but no evidence of pneumothorax).    On arrival patient -having intermittent suction alarms, improved w P6->P4. MAPs stable 75-85. CVC placed in LIJ given neck position, CO 9.7, CI 5.35, svr 535. WBC 26k, patient cultured, given single dose of vanc/zosyn. UOP 60-80cc/hr off diuretics. Further collateral pending from OSH. Continued on heparin gtt for impella, weaned to dobutamine 5, gave 500cc bolus in setting of CVP 10 and suction alarms.    No past medical history on file.    No past surgical history on file.    Review of patient's allergies indicates:  No Known Allergies    Current Facility-Administered Medications   Medication    aspirin chewable tablet 81 mg    atorvastatin tablet 80 mg    dexmedetomidine (PRECEDEX) 400mcg/100mL 0.9% NaCL infusion    DOBUTamine 500mg in D5W 250mL infusion (premix) (NON-TITRATING)    heparin (porcine) 12,500 Units in dextrose 5 % 500 mL " infusion    heparin 25,000 units in dextrose 5% (100 units/ml) IV bolus from bag - ADDITIONAL PRN BOLUS - 30 units/kg    heparin 25,000 units in dextrose 5% (100 units/ml) IV bolus from bag - ADDITIONAL PRN BOLUS - 60 units/kg    heparin 25,000 units in dextrose 5% (100 units/ml) IV bolus from bag INITIAL BOLUS    heparin 25,000 units in dextrose 5% 250 mL (100 units/mL) infusion LOW INTENSITY nomogram - [facility]    norepinephrine 16 mg in dextrose 5 % 250 mL infusion    sodium chloride 0.9% bolus 500 mL    sodium chloride 0.9% flush 10 mL     Family History     None        Tobacco Use    Smoking status: Not on file   Substance and Sexual Activity    Alcohol use: Not on file    Drug use: Not on file    Sexual activity: Not on file     Review of Systems   Unable to perform ROS: Intubated     Objective:     Vital Signs (Most Recent):  Temp: 98.8 °F (37.1 °C) (03/01/20 0300)  Pulse: 100 (03/01/20 0300)  Resp: (!) 26 (03/01/20 0300)  BP: 108/61 (03/01/20 0300)  SpO2: 96 % (03/01/20 0300) Vital Signs (24h Range):  Temp:  [98.4 °F (36.9 °C)-100.6 °F (38.1 °C)] 98.8 °F (37.1 °C)  Pulse:  [] 100  Resp:  [13-26] 26  SpO2:  [89 %-100 %] 96 %  BP: (105-132)/(61-84) 108/61     Patient Vitals for the past 72 hrs (Last 3 readings):   Weight   02/29/20 2100 75.8 kg (167 lb 1.7 oz)     Body mass index is 26.97 kg/m².      Intake/Output Summary (Last 24 hours) at 3/1/2020 0502  Last data filed at 3/1/2020 0333  Gross per 24 hour   Intake 1210.06 ml   Output 600 ml   Net 610.06 ml       Physical Exam   Constitutional: He is oriented to person, place, and time. He appears well-developed and well-nourished.   HENT:   Head: Normocephalic and atraumatic.   Eyes: Pupils are equal, round, and reactive to light. EOM are normal.   Neck: No JVD present.   Cardiovascular: Normal rate, regular rhythm, normal heart sounds and intact distal pulses. Exam reveals no gallop and no friction rub.   No murmur heard.  impella in place,  pulsatile; tachycardic, regular   Pulmonary/Chest: Effort normal and breath sounds normal. No respiratory distress. He has no wheezes. He has no rales.   Abdominal: Soft. Bowel sounds are normal. He exhibits no distension. There is no tenderness.   Musculoskeletal: Normal range of motion.   Neurological: He is oriented to person, place, and time. No cranial nerve deficit.   intubated   Skin: Skin is warm and dry. Capillary refill takes less than 2 seconds.   Psychiatric: He has a normal mood and affect. His behavior is normal.       Significant Labs:  CBC:  Recent Labs   Lab 02/29/20 2122 03/01/20 0303   WBC 23.67* 22.64*   RBC 3.78* 3.27*   HGB 11.1* 9.6*   HCT 33.8* 29.3*   * 124*   MCV 89 90   MCH 29.4 29.4   MCHC 32.8 32.8     BNP:  Recent Labs   Lab 02/29/20 2122   *     CMP:  Recent Labs   Lab 02/29/20 2122 03/01/20 0303   * 177*   CALCIUM 7.6* 7.2*   ALBUMIN 2.5* 2.3*   PROT 5.4* 5.0*    135*   K 5.2* 5.1   CO2 25 23    99   BUN 31* 37*   CREATININE 4.6* 5.2*   ALKPHOS 92 85   ALT 43 38   * 84*   BILITOT 1.3* 1.2*      Coagulation:   Recent Labs   Lab 02/29/20 2122 03/01/20 0303   INR 1.3*  --    APTT 37.6* 39.8*     LDH:  Recent Labs   Lab 02/29/20 2122   LDH 1,325*     Microbiology:  Microbiology Results (last 7 days)     Procedure Component Value Units Date/Time    Blood culture [[id number]] Collected:  03/01/20 0254    Order Status:  Sent Specimen:  Blood from Peripheral, Upper Arm, Left Updated:  03/01/20 0300    Blood culture [[id number]] Collected:  02/29/20 2305    Order Status:  Sent Specimen:  Blood from Peripheral, Forearm, Right Updated:  02/29/20 2316    Urine culture [[id number]] Collected:  02/29/20 2240    Order Status:  No result Specimen:  Urine Updated:  02/29/20 2306          ABGs:   Recent Labs   Lab 03/01/20  0244   PH 7.388   PCO2 40.5   HCO3 24.4   POCSATURATED 79*   BE -1     BMP:   Recent Labs   Lab 03/01/20  0303   *   *   K  5.1   CL 99   CO2 23   BUN 37*   CREATININE 5.2*   CALCIUM 7.2*   MG 2.1     Cardiac Markers: No results for input(s): CKMB, TROPONINT, MYOGLOBIN in the last 72 hours.  Coagulation:   Recent Labs   Lab 02/29/20 2122 03/01/20  0303   INR 1.3*  --    APTT 37.6* 39.8*     Microbiology Results (last 7 days)     Procedure Component Value Units Date/Time    Blood culture [[id number]] Collected:  03/01/20 0254    Order Status:  Sent Specimen:  Blood from Peripheral, Upper Arm, Left Updated:  03/01/20 0300    Blood culture [[id number]] Collected:  02/29/20 2305    Order Status:  Sent Specimen:  Blood from Peripheral, Forearm, Right Updated:  02/29/20 2316    Urine culture [[id number]] Collected:  02/29/20 2240    Order Status:  No result Specimen:  Urine Updated:  02/29/20 2306          Diagnostic Results:  ECG: nsr, LAD, TWI anterolateral leads    TTE pending    Assessment/Plan:     * Cardiogenic shock  63 y/o M hx of CAD (50-60% LCx, OM disease on [facility] 2/29), ICM EF 10-15% s/p AICD (unknown baseline but on GDMT as OP), HTN, DM2, HLD, obesity, CKD, presenting from OSH intubated with Impella in setting of cardiogenic shock.    - Continue Impella, wean as tolerated, appreciate interventional assistance  - Bedside TTE to assess position  - Giving gentle fluids given CVP 10, suction alarms  - weaning dobutamine to 5 given CO 9.7, CI 5.4  - Lower suspicion for sepsis (afebrile no immediate source, recent procedure), but pan cultured and s/p 1 dose Vanc/zosyn, may have to redose.  - frequent VBGs, lactates, trend Cr (see ANGEL)    Acute on chronic combined systolic and diastolic heart failure  - Continued CVP monitoring w diuresis as needed  - Hold all GDMT in setting of shock (was on Entresto as OP)  - Follow up formal TTE    ANGEL (acute kidney injury)  Likely ATN in setting of shock  - Monitor Cr closely, uptrending  - consult renal for possible HD planning, lyte mgmt (impending hyperkalemia)  - strict I/O  - avoid nephrotoxic  meds    DM (diabetes mellitus)  Unclear if hx DM vs preDM  - follow up A1c   - SSI while NPO    CAD (coronary artery disease)  - continue ASA, high intensity statin  - holding on plavix while on AC for impella (Of note pt was on plavix as OP but it is unclear to me when last PCI was)        Case discussed with attending, Dr. Darden with final attestation to follow.      Dk Mac MD  Heart Transplant  [facility]

## 2020-03-01 NOTE — ASSESSMENT & PLAN NOTE
- Continued CVP monitoring w diuresis as needed  - Hold all GDMT in setting of shock (was on Entresto as OP)  - Follow up formal TTE

## 2020-03-01 NOTE — HPI
"Patient intubated w limited collateral from family; further OSH records pending    63 y/o M hx of CAD (50-60% LCx, OM disease on [facility] 2/29), ICM EF 10-15% s/p AICD (unknown baseline but on GDMT as OP), HTN, DM2, HLD, obesity, CKD, presenting from OSH intubated with Impella in setting of cardiogenic shock. Per pt's brother he was taken by EMS to OSH in setting of feeling short of breath, nearly passing out. There found to be in shock, lactate to 12, ANGEL w Cr 2.5, trop 12; unclear if dynamic ECG changes, max trop 10; underwent LHC with evidence of 50-60% LCx, OM disease; no PCI performed. RHC with LVEDP 35. CO 3, CI 1.8, tte w LVEF 10-15%. Underwent Impella placement without additional central line placement (per brother there was a "complication" but no evidence of pneumothorax).    On arrival patient -having intermittent suction alarms, improved w P6->P4. MAPs stable 75-85. CVC placed in LIJ given neck position, CO 9.7, CI 5.35, svr 535. WBC 26k, patient cultured, given single dose of vanc/zosyn. UOP 60-80cc/hr off diuretics. Further collateral pending from OSH. Continued on heparin gtt for impella, weaned to dobutamine 5, gave 500cc bolus in setting of CVP 10 and suction alarms.  "

## 2020-03-01 NOTE — ASSESSMENT & PLAN NOTE
- continue ASA, high intensity statin  - holding on plavix while on AC for impella (Of note pt was on plavix as OP but it is unclear to me when last PCI was)

## 2020-03-01 NOTE — PROGRESS NOTES
02/29/2020  Real James    Current provider:  Adilson Darden Jr.,*      I, Real James, rounded on John Javier to ensure all mechanical assist device settings (IABP or VAD) were appropriate and all parameters were within limits.  I was able to ensure all back up equipment was present, the staff had no issues, and the Perfusion Department daily rounding was complete.    9:06 PM

## 2020-03-01 NOTE — ASSESSMENT & PLAN NOTE
Likely ATN in setting of shock  - Monitor Cr closely, uptrending  - consult renal for possible HD planning, lyte mgmt (impending hyperkalemia)  - strict I/O  - avoid nephrotoxic meds

## 2020-03-01 NOTE — ASSESSMENT & PLAN NOTE
63 y/o M hx of CAD (50-60% LCx, OM disease on C 2/29), ICM EF 10-15% s/p AICD (unknown baseline but on GDMT as OP), HTN, DM2, HLD, obesity, CKD, presenting from OSH intubated with Impella in setting of cardiogenic shock.    - Continue Impella, wean as tolerated, appreciate interventional assistance  - Bedside TTE to assess position  - Giving gentle fluids given CVP 10, suction alarms  - weaning dobutamine to 5 given CO 9.7, CI 5.4  - Lower suspicion for sepsis (afebrile no immediate source, recent procedure), but pan cultured and s/p 1 dose Vanc/zosyn, may have to redose.  - frequent VBGs, lactates, trend Cr (see ANGEL)

## 2020-03-02 PROBLEM — I47.20 VT (VENTRICULAR TACHYCARDIA): Status: ACTIVE | Noted: 2020-03-02

## 2020-03-02 PROBLEM — J96.01 ACUTE HYPOXEMIC RESPIRATORY FAILURE: Status: ACTIVE | Noted: 2020-03-02

## 2020-03-02 LAB
ALBUMIN SERPL BCP-MCNC: 2.2 G/DL (ref 3.5–5.2)
ALBUMIN SERPL BCP-MCNC: 2.2 G/DL (ref 3.5–5.2)
ALLENS TEST: ABNORMAL
ALLENS TEST: ABNORMAL
ALP SERPL-CCNC: 69 U/L (ref 55–135)
ALP SERPL-CCNC: 71 U/L (ref 55–135)
ALT SERPL W/O P-5'-P-CCNC: 25 U/L (ref 10–44)
ALT SERPL W/O P-5'-P-CCNC: 31 U/L (ref 10–44)
ANION GAP SERPL CALC-SCNC: 10 MMOL/L (ref 8–16)
ANION GAP SERPL CALC-SCNC: 15 MMOL/L (ref 8–16)
APTT BLDCRRT: 48.1 SEC (ref 21–32)
APTT BLDCRRT: 61.2 SEC (ref 21–32)
ASCENDING AORTA: 2.7 CM
AST SERPL-CCNC: 30 U/L (ref 10–40)
AST SERPL-CCNC: 41 U/L (ref 10–40)
AV INDEX (PROSTH): 0.36
AV MEAN GRADIENT: 4 MMHG
AV PEAK GRADIENT: 7 MMHG
AV VALVE AREA: 1.17 CM2
AV VELOCITY RATIO: 0.45
BACTERIA UR CULT: NORMAL
BASOPHILS # BLD AUTO: 0.02 K/UL (ref 0–0.2)
BASOPHILS NFR BLD: 0.1 % (ref 0–1.9)
BILIRUB SERPL-MCNC: 0.5 MG/DL (ref 0.1–1)
BILIRUB SERPL-MCNC: 0.7 MG/DL (ref 0.1–1)
BSA FOR ECHO PROCEDURE: 1.88 M2
BUN SERPL-MCNC: 58 MG/DL (ref 8–23)
BUN SERPL-MCNC: 67 MG/DL (ref 8–23)
CALCIUM SERPL-MCNC: 7.5 MG/DL (ref 8.7–10.5)
CALCIUM SERPL-MCNC: 7.8 MG/DL (ref 8.7–10.5)
CHLORIDE SERPL-SCNC: 100 MMOL/L (ref 95–110)
CHLORIDE SERPL-SCNC: 103 MMOL/L (ref 95–110)
CO2 SERPL-SCNC: 20 MMOL/L (ref 23–29)
CO2 SERPL-SCNC: 25 MMOL/L (ref 23–29)
CREAT SERPL-MCNC: 6.9 MG/DL (ref 0.5–1.4)
CREAT SERPL-MCNC: 7 MG/DL (ref 0.5–1.4)
CREAT UR-MCNC: 38 MG/DL (ref 23–375)
CV ECHO LV RWT: 0.27 CM
DELSYS: ABNORMAL
DELSYS: ABNORMAL
DIFFERENTIAL METHOD: ABNORMAL
DOP CALC AO PEAK VEL: 1.28 M/S
DOP CALC AO VTI: 25.28 CM
DOP CALC LVOT AREA: 3.2 CM2
DOP CALC LVOT DIAMETER: 2.03 CM
DOP CALC LVOT PEAK VEL: 0.58 M/S
DOP CALC LVOT STROKE VOLUME: 29.5 CM3
DOP CALCLVOT PEAK VEL VTI: 9.12 CM
E WAVE DECELERATION TIME: 124.77 MSEC
E/A RATIO: 2.39
E/E' RATIO: 18.2 M/S
ECHO LV POSTERIOR WALL: 0.8 CM (ref 0.6–1.1)
EOSINOPHIL # BLD AUTO: 0.1 K/UL (ref 0–0.5)
EOSINOPHIL NFR BLD: 0.5 % (ref 0–8)
ERYTHROCYTE [DISTWIDTH] IN BLOOD BY AUTOMATED COUNT: 15.9 % (ref 11.5–14.5)
ERYTHROCYTE [SEDIMENTATION RATE] IN BLOOD BY WESTERGREN METHOD: 12 MM/H
ERYTHROCYTE [SEDIMENTATION RATE] IN BLOOD BY WESTERGREN METHOD: 12 MM/H
EST. GFR  (AFRICAN AMERICAN): 8.8 ML/MIN/1.73 M^2
EST. GFR  (AFRICAN AMERICAN): 8.9 ML/MIN/1.73 M^2
EST. GFR  (NON AFRICAN AMERICAN): 7.6 ML/MIN/1.73 M^2
EST. GFR  (NON AFRICAN AMERICAN): 7.7 ML/MIN/1.73 M^2
FIO2: 50
FIO2: 50
FRACTIONAL SHORTENING: 10 % (ref 28–44)
GLUCOSE SERPL-MCNC: 142 MG/DL (ref 70–110)
GLUCOSE SERPL-MCNC: 158 MG/DL (ref 70–110)
HCO3 UR-SCNC: 24.2 MMOL/L (ref 24–28)
HCO3 UR-SCNC: 24.8 MMOL/L (ref 24–28)
HCT VFR BLD AUTO: 23.9 % (ref 40–54)
HGB BLD-MCNC: 8.1 G/DL (ref 14–18)
IMM GRANULOCYTES # BLD AUTO: 0.23 K/UL (ref 0–0.04)
IMM GRANULOCYTES NFR BLD AUTO: 1.2 % (ref 0–0.5)
INTERVENTRICULAR SEPTUM: 1.12 CM (ref 0.6–1.1)
LA MAJOR: 6.58 CM
LA MINOR: 5.87 CM
LA WIDTH: 4.69 CM
LDH SERPL L TO P-CCNC: 911 U/L (ref 110–260)
LEFT ATRIUM SIZE: 4.16 CM
LEFT ATRIUM VOLUME INDEX: 55.4 ML/M2
LEFT ATRIUM VOLUME: 102.9 CM3
LEFT INTERNAL DIMENSION IN SYSTOLE: 5.4 CM (ref 2.1–4)
LEFT VENTRICLE DIASTOLIC VOLUME INDEX: 65.54 ML/M2
LEFT VENTRICLE DIASTOLIC VOLUME: 121.72 ML
LEFT VENTRICLE MASS INDEX: 126 G/M2
LEFT VENTRICLE SYSTOLIC VOLUME INDEX: 54.5 ML/M2
LEFT VENTRICLE SYSTOLIC VOLUME: 101.19 ML
LEFT VENTRICULAR INTERNAL DIMENSION IN DIASTOLE: 6 CM (ref 3.5–6)
LEFT VENTRICULAR MASS: 234.22 G
LV LATERAL E/E' RATIO: 13 M/S
LV SEPTAL E/E' RATIO: 30.33 M/S
LYMPHOCYTES # BLD AUTO: 1.6 K/UL (ref 1–4.8)
LYMPHOCYTES NFR BLD: 7.9 % (ref 18–48)
MAGNESIUM SERPL-MCNC: 2.6 MG/DL (ref 1.6–2.6)
MCH RBC QN AUTO: 30.3 PG (ref 27–31)
MCHC RBC AUTO-ENTMCNC: 33.9 G/DL (ref 32–36)
MCV RBC AUTO: 90 FL (ref 82–98)
MIN VOL: 7.4
MODE: ABNORMAL
MODE: ABNORMAL
MONOCYTES # BLD AUTO: 0.9 K/UL (ref 0.3–1)
MONOCYTES NFR BLD: 4.8 % (ref 4–15)
MV PEAK A VEL: 0.38 M/S
MV PEAK E VEL: 0.91 M/S
NEUTROPHILS # BLD AUTO: 16.7 K/UL (ref 1.8–7.7)
NEUTROPHILS NFR BLD: 85.5 % (ref 38–73)
NRBC BLD-RTO: 0 /100 WBC
PCO2 BLDA: 43.7 MMHG (ref 35–45)
PCO2 BLDA: 43.9 MMHG (ref 35–45)
PEEP: 5
PEEP: 5
PH SMN: 7.35 [PH] (ref 7.35–7.45)
PH SMN: 7.36 [PH] (ref 7.35–7.45)
PIP: 26
PISA TR MAX VEL: 3.69 M/S
PLATELET # BLD AUTO: 97 K/UL (ref 150–350)
PMV BLD AUTO: 11.6 FL (ref 9.2–12.9)
PO2 BLDA: 37 MMHG (ref 40–60)
PO2 BLDA: 42 MMHG (ref 40–60)
POC BE: -1 MMOL/L
POC BE: -1 MMOL/L
POC SATURATED O2: 68 % (ref 95–100)
POC SATURATED O2: 76 % (ref 95–100)
POC TCO2: 25 MMOL/L (ref 24–29)
POC TCO2: 26 MMOL/L (ref 24–29)
POCT GLUCOSE: 140 MG/DL (ref 70–110)
POCT GLUCOSE: 144 MG/DL (ref 70–110)
POCT GLUCOSE: 166 MG/DL (ref 70–110)
POTASSIUM SERPL-SCNC: 4.2 MMOL/L (ref 3.5–5.1)
POTASSIUM SERPL-SCNC: 4.3 MMOL/L (ref 3.5–5.1)
PROT SERPL-MCNC: 5.3 G/DL (ref 6–8.4)
PROT SERPL-MCNC: 5.5 G/DL (ref 6–8.4)
PS: 15
RA MAJOR: 5.58 CM
RA WIDTH: 4.35 CM
RBC # BLD AUTO: 2.67 M/UL (ref 4.6–6.2)
RIGHT VENTRICULAR END-DIASTOLIC DIMENSION: 4.02 CM
SAMPLE: ABNORMAL
SAMPLE: ABNORMAL
SINUS: 2.72 CM
SITE: ABNORMAL
SITE: ABNORMAL
SODIUM SERPL-SCNC: 135 MMOL/L (ref 136–145)
SODIUM SERPL-SCNC: 138 MMOL/L (ref 136–145)
SODIUM UR-SCNC: 36 MMOL/L (ref 20–250)
SP02: 97
SP02: 98
STJ: 2.49 CM
TDI LATERAL: 0.07 M/S
TDI SEPTAL: 0.03 M/S
TDI: 0.05 M/S
TR MAX PG: 54 MMHG
TRICUSPID ANNULAR PLANE SYSTOLIC EXCURSION: 1.98 CM
VT: 400
VT: 400
WBC # BLD AUTO: 19.57 K/UL (ref 3.9–12.7)

## 2020-03-02 PROCEDURE — 80053 COMPREHEN METABOLIC PANEL: CPT

## 2020-03-02 PROCEDURE — 80053 COMPREHEN METABOLIC PANEL: CPT | Mod: 91

## 2020-03-02 PROCEDURE — 85730 THROMBOPLASTIN TIME PARTIAL: CPT

## 2020-03-02 PROCEDURE — 51701 INSERT BLADDER CATHETER: CPT

## 2020-03-02 PROCEDURE — 99152 PR MOD CONSCIOUS SEDATION, SAME PHYS, 5+ YRS, FIRST 15 MIN: ICD-10-PCS | Mod: ,,, | Performed by: INTERNAL MEDICINE

## 2020-03-02 PROCEDURE — 82570 ASSAY OF URINE CREATININE: CPT

## 2020-03-02 PROCEDURE — 99291 CRITICAL CARE FIRST HOUR: CPT | Mod: ,,, | Performed by: INTERNAL MEDICINE

## 2020-03-02 PROCEDURE — 63600175 PHARM REV CODE 636 W HCPCS: Performed by: INTERNAL MEDICINE

## 2020-03-02 PROCEDURE — 63600175 PHARM REV CODE 636 W HCPCS: Performed by: STUDENT IN AN ORGANIZED HEALTH CARE EDUCATION/TRAINING PROGRAM

## 2020-03-02 PROCEDURE — 27200188 HC TRANSDUCER, ART ADULT/PEDS

## 2020-03-02 PROCEDURE — 27000221 HC OXYGEN, UP TO 24 HOURS

## 2020-03-02 PROCEDURE — 99152 MOD SED SAME PHYS/QHP 5/>YRS: CPT | Performed by: INTERNAL MEDICINE

## 2020-03-02 PROCEDURE — 99900035 HC TECH TIME PER 15 MIN (STAT)

## 2020-03-02 PROCEDURE — 82800 BLOOD PH: CPT

## 2020-03-02 PROCEDURE — 25000003 PHARM REV CODE 250: Performed by: INTERNAL MEDICINE

## 2020-03-02 PROCEDURE — 51702 INSERT TEMP BLADDER CATH: CPT

## 2020-03-02 PROCEDURE — 99152 MOD SED SAME PHYS/QHP 5/>YRS: CPT | Mod: ,,, | Performed by: INTERNAL MEDICINE

## 2020-03-02 PROCEDURE — 85730 THROMBOPLASTIN TIME PARTIAL: CPT | Mod: 91

## 2020-03-02 PROCEDURE — 99292 PR CRITICAL CARE, ADDL 30 MIN: ICD-10-PCS | Mod: ,,, | Performed by: NURSE PRACTITIONER

## 2020-03-02 PROCEDURE — 27000203 HC IMPELLA ADD'L DAY (CL)

## 2020-03-02 PROCEDURE — C1751 CATH, INF, PER/CENT/MIDLINE: HCPCS

## 2020-03-02 PROCEDURE — 82803 BLOOD GASES ANY COMBINATION: CPT

## 2020-03-02 PROCEDURE — 25000003 PHARM REV CODE 250: Performed by: STUDENT IN AN ORGANIZED HEALTH CARE EDUCATION/TRAINING PROGRAM

## 2020-03-02 PROCEDURE — 84300 ASSAY OF URINE SODIUM: CPT

## 2020-03-02 PROCEDURE — 31500 INSERT EMERGENCY AIRWAY: CPT

## 2020-03-02 PROCEDURE — 99233 SBSQ HOSP IP/OBS HIGH 50: CPT | Mod: ,,, | Performed by: INTERNAL MEDICINE

## 2020-03-02 PROCEDURE — 94761 N-INVAS EAR/PLS OXIMETRY MLT: CPT

## 2020-03-02 PROCEDURE — 83615 LACTATE (LD) (LDH) ENZYME: CPT

## 2020-03-02 PROCEDURE — 20000000 HC ICU ROOM

## 2020-03-02 PROCEDURE — 36556 INSERT NON-TUNNEL CV CATH: CPT

## 2020-03-02 PROCEDURE — 33992 PR REMOVAL, VAD, PERCUT, LT HEART, ART/VENOUS CANNUL: ICD-10-PCS | Mod: ,,, | Performed by: INTERNAL MEDICINE

## 2020-03-02 PROCEDURE — 94003 VENT MGMT INPAT SUBQ DAY: CPT

## 2020-03-02 PROCEDURE — 33992 RMVL PERQ LEFT HEART VAD: CPT | Performed by: INTERNAL MEDICINE

## 2020-03-02 PROCEDURE — 99233 PR SUBSEQUENT HOSPITAL CARE,LEVL III: ICD-10-PCS | Mod: ,,, | Performed by: INTERNAL MEDICINE

## 2020-03-02 PROCEDURE — 99292 CRITICAL CARE ADDL 30 MIN: CPT | Mod: ,,, | Performed by: NURSE PRACTITIONER

## 2020-03-02 PROCEDURE — 36620 INSERTION CATHETER ARTERY: CPT

## 2020-03-02 PROCEDURE — 43752 NASAL/OROGASTRIC W/TUBE PLMT: CPT

## 2020-03-02 PROCEDURE — 99900026 HC AIRWAY MAINTENANCE (STAT)

## 2020-03-02 PROCEDURE — 99291 PR CRITICAL CARE, E/M 30-74 MINUTES: ICD-10-PCS | Mod: ,,, | Performed by: INTERNAL MEDICINE

## 2020-03-02 PROCEDURE — C1769 GUIDE WIRE: HCPCS | Performed by: INTERNAL MEDICINE

## 2020-03-02 PROCEDURE — 33992 RMVL PERQ LEFT HEART VAD: CPT | Mod: ,,, | Performed by: INTERNAL MEDICINE

## 2020-03-02 PROCEDURE — 25000003 PHARM REV CODE 250: Performed by: NURSE PRACTITIONER

## 2020-03-02 PROCEDURE — 83735 ASSAY OF MAGNESIUM: CPT

## 2020-03-02 PROCEDURE — 85025 COMPLETE CBC W/AUTO DIFF WBC: CPT

## 2020-03-02 RX ORDER — ATORVASTATIN CALCIUM 20 MG/1
80 TABLET, FILM COATED ORAL DAILY
Status: DISCONTINUED | OUTPATIENT
Start: 2020-03-03 | End: 2020-03-02

## 2020-03-02 RX ORDER — FENTANYL CITRATE 50 UG/ML
INJECTION, SOLUTION INTRAMUSCULAR; INTRAVENOUS
Status: DISCONTINUED | OUTPATIENT
Start: 2020-03-02 | End: 2020-03-02 | Stop reason: HOSPADM

## 2020-03-02 RX ORDER — SODIUM CHLORIDE 9 MG/ML
INJECTION, SOLUTION INTRAVENOUS ONCE
Status: DISCONTINUED | OUTPATIENT
Start: 2020-03-02 | End: 2020-03-02 | Stop reason: HOSPADM

## 2020-03-02 RX ORDER — CEFAZOLIN SODIUM 1 G/3ML
INJECTION, POWDER, FOR SOLUTION INTRAMUSCULAR; INTRAVENOUS
Status: DISCONTINUED | OUTPATIENT
Start: 2020-03-02 | End: 2020-03-02 | Stop reason: HOSPADM

## 2020-03-02 RX ORDER — SODIUM CHLORIDE 0.9 % (FLUSH) 0.9 %
10 SYRINGE (ML) INJECTION
Status: DISCONTINUED | OUTPATIENT
Start: 2020-03-02 | End: 2020-03-13 | Stop reason: HOSPADM

## 2020-03-02 RX ORDER — LIDOCAINE HYDROCHLORIDE 20 MG/ML
INJECTION, SOLUTION INFILTRATION; PERINEURAL
Status: DISCONTINUED | OUTPATIENT
Start: 2020-03-02 | End: 2020-03-02 | Stop reason: HOSPADM

## 2020-03-02 RX ORDER — CHLORHEXIDINE GLUCONATE ORAL RINSE 1.2 MG/ML
15 SOLUTION DENTAL 2 TIMES DAILY
Status: DISCONTINUED | OUTPATIENT
Start: 2020-03-02 | End: 2020-03-05

## 2020-03-02 RX ORDER — NAPROXEN SODIUM 220 MG/1
81 TABLET, FILM COATED ORAL DAILY
Status: DISCONTINUED | OUTPATIENT
Start: 2020-03-03 | End: 2020-03-02

## 2020-03-02 RX ORDER — AMIODARONE HYDROCHLORIDE 200 MG/1
400 TABLET ORAL 2 TIMES DAILY
Status: DISCONTINUED | OUTPATIENT
Start: 2020-03-02 | End: 2020-03-04

## 2020-03-02 RX ORDER — HEPARIN SODIUM 200 [USP'U]/100ML
INJECTION, SOLUTION INTRAVENOUS
Status: DISCONTINUED | OUTPATIENT
Start: 2020-03-02 | End: 2020-03-03

## 2020-03-02 RX ORDER — NAPROXEN SODIUM 220 MG/1
81 TABLET, FILM COATED ORAL DAILY
Status: DISCONTINUED | OUTPATIENT
Start: 2020-03-02 | End: 2020-03-05

## 2020-03-02 RX ORDER — ATORVASTATIN CALCIUM 20 MG/1
80 TABLET, FILM COATED ORAL DAILY
Status: DISCONTINUED | OUTPATIENT
Start: 2020-03-02 | End: 2020-03-05

## 2020-03-02 RX ORDER — DIPHENHYDRAMINE HCL 25 MG
25 CAPSULE ORAL EVERY 6 HOURS PRN
Status: DISCONTINUED | OUTPATIENT
Start: 2020-03-02 | End: 2020-03-06

## 2020-03-02 RX ADMIN — HUMAN ALBUMIN MICROSPHERES AND PERFLUTREN 0.66 MG: 10; .22 INJECTION, SOLUTION INTRAVENOUS at 03:03

## 2020-03-02 RX ADMIN — ATORVASTATIN CALCIUM 80 MG: 20 TABLET, FILM COATED ORAL at 10:03

## 2020-03-02 RX ADMIN — DIPHENHYDRAMINE HYDROCHLORIDE 25 MG: 25 CAPSULE ORAL at 01:03

## 2020-03-02 RX ADMIN — PROPOFOL 30 MCG/KG/MIN: 10 INJECTION, EMULSION INTRAVENOUS at 09:03

## 2020-03-02 RX ADMIN — AMIODARONE HYDROCHLORIDE 400 MG: 200 TABLET ORAL at 11:03

## 2020-03-02 RX ADMIN — PROPOFOL 20 MCG/KG/MIN: 10 INJECTION, EMULSION INTRAVENOUS at 04:03

## 2020-03-02 RX ADMIN — ASPIRIN 81 MG CHEWABLE TABLET 81 MG: 81 TABLET CHEWABLE at 10:03

## 2020-03-02 RX ADMIN — HEPARIN SODIUM: 1000 INJECTION, SOLUTION INTRAVENOUS; SUBCUTANEOUS at 04:03

## 2020-03-02 RX ADMIN — PROPOFOL 35 MCG/KG/MIN: 10 INJECTION, EMULSION INTRAVENOUS at 11:03

## 2020-03-02 RX ADMIN — HEPARIN SODIUM AND DEXTROSE 10 UNITS/KG/HR: 10000; 5 INJECTION INTRAVENOUS at 04:03

## 2020-03-02 RX ADMIN — DEXMEDETOMIDINE HYDROCHLORIDE 1.4 MCG/KG/HR: 100 INJECTION, SOLUTION, CONCENTRATE INTRAVENOUS at 11:03

## 2020-03-02 RX ADMIN — PROPOFOL 40 MCG/KG/MIN: 10 INJECTION, EMULSION INTRAVENOUS at 04:03

## 2020-03-02 RX ADMIN — DEXMEDETOMIDINE HYDROCHLORIDE 1.3 MCG/KG/HR: 100 INJECTION, SOLUTION, CONCENTRATE INTRAVENOUS at 07:03

## 2020-03-02 RX ADMIN — CHLORHEXIDINE GLUCONATE 0.12% ORAL RINSE 15 ML: 1.2 LIQUID ORAL at 10:03

## 2020-03-02 RX ADMIN — AMIODARONE HYDROCHLORIDE 400 MG: 200 TABLET ORAL at 10:03

## 2020-03-02 RX ADMIN — DEXMEDETOMIDINE HYDROCHLORIDE 1.4 MCG/KG/HR: 100 INJECTION, SOLUTION, CONCENTRATE INTRAVENOUS at 03:03

## 2020-03-02 NOTE — PROGRESS NOTES
03/02/2020  Dk Cota    Current provider:  Shannon Fuller MD      I, Dk Cota, rounded on Edcooper Javier to ensure all mechanical assist device settings (IABP or VAD) were appropriate and all parameters were within limits.  I was able to ensure all back up equipment was present, the staff had no issues, and the Perfusion Department daily rounding was complete.    9:53 AM

## 2020-03-02 NOTE — SUBJECTIVE & OBJECTIVE
Interval History: Patient seen and examined at bedside. Scr on increasing trend. Adequate UOP reported in the past 24 hrs ~1.4L.     Review of patient's allergies indicates:  No Known Allergies  Current Facility-Administered Medications   Medication Frequency    0.9%  NaCl infusion Once    amiodarone tablet 400 mg BID    aspirin chewable tablet 81 mg Daily    atorvastatin tablet 80 mg Daily    dexmedetomidine (PRECEDEX) 400mcg/100mL 0.9% NaCL infusion Continuous    dextrose 10% (D10W) Bolus PRN    diphenhydrAMINE capsule 25 mg Q6H PRN    DOBUTamine 500mg in D5W 250mL infusion (premix) (NON-TITRATING) Continuous    glucagon (human recombinant) injection 1 mg PRN    heparin (porcine) 12,500 Units in dextrose 5 % 500 mL infusion Continuous    heparin 25,000 units in dextrose 5% (100 units/ml) IV bolus from bag - ADDITIONAL PRN BOLUS - 30 units/kg PRN    heparin 25,000 units in dextrose 5% (100 units/ml) IV bolus from bag - ADDITIONAL PRN BOLUS - 60 units/kg PRN    heparin 25,000 units in dextrose 5% (100 units/ml) IV bolus from bag INITIAL BOLUS Once    heparin 25,000 units in dextrose 5% 250 mL (100 units/mL) infusion LOW INTENSITY nomogram - [facility] Continuous    insulin aspart U-100 pen 0-5 Units Q6H PRN    propofol (DIPRIVAN) 10 mg/mL infusion Continuous    sodium chloride 0.9% flush 10 mL PRN    sodium chloride 0.9% flush 10 mL PRN       Objective:     Vital Signs (Most Recent):  Temp: 98.9 °F (37.2 °C) (03/02/20 1100)  Pulse: 86 (03/02/20 1330)  Resp: 10 (03/02/20 1330)  BP: 125/77 (03/02/20 1300)  SpO2: 97 % (03/02/20 1330)  O2 Device (Oxygen Therapy): ventilator (03/02/20 1138) Vital Signs (24h Range):  Temp:  [97.5 °F (36.4 °C)-98.9 °F (37.2 °C)] 98.9 °F (37.2 °C)  Pulse:  [81-94] 86  Resp:  [9-23] 10  SpO2:  [95 %-100 %] 97 %  BP: (100-131)/(62-81) 125/77     Weight: 76.5 kg (168 lb 10.4 oz) (03/02/20 1300)  Body mass index is 27.22 kg/m².  Body surface area is 1.89 meters squared.    I/O last 3  completed shifts:  In: 3471.7 [I.V.:2571.7; NG/GT:50; IV Piggyback:850]  Out: 2181 [Urine:2181]    Physical Exam   Constitutional: He appears well-developed and well-nourished. He is sedated and intubated.   Cardiovascular:   Murmur heard.  Pulmonary/Chest: Effort normal. He is intubated. He has no wheezes.   Transmitted ventilator sounds   Abdominal: Soft. Bowel sounds are normal.   Musculoskeletal: He exhibits no edema.   R fem impella   Skin: Skin is warm.       Significant Labs:  CBC:   Recent Labs   Lab 03/02/20  0259   WBC 19.57*   RBC 2.67*   HGB 8.1*   HCT 23.9*   PLT 97*   MCV 90   MCH 30.3   MCHC 33.9     CMP:   Recent Labs   Lab 03/02/20  0259   *   CALCIUM 7.5*   ALBUMIN 2.2*   PROT 5.3*   *   K 4.3   CO2 25      BUN 58*   CREATININE 6.9*   ALKPHOS 71   ALT 31   AST 41*   BILITOT 0.7     All labs within the past 24 hours have been reviewed.

## 2020-03-02 NOTE — HPI
65 y/o M hx of CAD (50-60% LCx, OM disease on LHC 2/29), ICM EF 10-15% s/p AICD (unknown baseline but on GDMT as OP), HTN, DM2, HLD, obesity, CKD, presenting from OSH intubated with Impella in setting of cardiogenic shock. Per pt's brother he was taken by EMS to OSH in setting of feeling short of breath, nearly passing out. There found to be in shock, lactate to 12, ANGEL w Cr 2.5, trop 12; unclear if dynamic ECG changes, max trop 10; underwent LHC with evidence of 50-60% LCx, OM disease; no PCI performed. RHC with LVEDP 35. CO 3, CI 1.8, tte w LVEF 10-15%.  On arrival patient -having intermittent suction alarms, improved w P6->P4. MAPs stable 75-85. CVC placed in LIJ given neck position, CO 9.7, CI 5.35, svr 535. WBC 26k, patient cultured, given single dose of vanc/zosyn. UOP 60-80cc/hr off diuretics.

## 2020-03-02 NOTE — PLAN OF CARE
No acute events overnight. Impella to right groin measuring at 80cm on P3. Site is clean, dry and intact. Bilateral DP and PT pulses are dopplerable. CVP 9,9,11. SVO2-68. UOP ~50cc/hr. Patient opens eyes to voice, moves all extremities, and follows commands. Plan to wean Impella in the morning. VSS and NADN. Will continue to monitor patient.

## 2020-03-02 NOTE — SUBJECTIVE & OBJECTIVE
Interval History: Intubated but follows commands.     Continuous Infusions:   dexmedetomidine (PRECEDEX) infusion 1.3 mcg/kg/hr (03/02/20 0738)    DOBUTamine 2.5 mcg/kg/min (03/02/20 0605)    heparin (porcine) in dextrose 5% 500 mL (IMPELLA)      heparin (porcine) in D5W 10 Units/kg/hr (03/02/20 0605)    propofol 35 mcg/kg/min (03/02/20 0957)     Scheduled Meds:   aspirin  81 mg Per NG tube Daily    atorvastatin  80 mg Per NG tube Daily    heparin (PORCINE)  60 Units/kg (Adjusted) Intravenous Once     PRN Meds:Dextrose 10% Bolus, glucagon (human recombinant), heparin (PORCINE), heparin (PORCINE), insulin aspart U-100, sodium chloride 0.9%    Review of patient's allergies indicates:  No Known Allergies  Objective:     Vital Signs (Most Recent):  Temp: 98.5 °F (36.9 °C) (03/02/20 0700)  Pulse: 86 (03/02/20 0920)  Resp: 19 (03/02/20 0920)  BP: 124/81 (03/02/20 0900)  SpO2: 98 % (03/02/20 0920) Vital Signs (24h Range):  Temp:  [97.2 °F (36.2 °C)-98.5 °F (36.9 °C)] 98.5 °F (36.9 °C)  Pulse:  [] 86  Resp:  [12-24] 19  SpO2:  [96 %-100 %] 98 %  BP: (100-124)/(58-81) 124/81     Patient Vitals for the past 72 hrs (Last 3 readings):   Weight   03/02/20 0305 76.5 kg (168 lb 10.4 oz)   02/29/20 2100 75.8 kg (167 lb 1.7 oz)     Body mass index is 27.22 kg/m².      Intake/Output Summary (Last 24 hours) at 3/2/2020 1001  Last data filed at 3/2/2020 0900  Gross per 24 hour   Intake 1561.34 ml   Output 1306 ml   Net 255.34 ml        Telemetry: NSR 80s    Physical Exam   Constitutional: He appears well-developed and well-nourished. He is sedated and intubated.   HENT:   Head: Normocephalic.   Eyes: Pupils are equal, round, and reactive to light.   Neck: Normal range of motion. Neck supple.   RIJ TLC placed 3/1/20.   Cardiovascular: Normal rate and regular rhythm.   Murmur heard.  Pulmonary/Chest: Effort normal and breath sounds normal. He is intubated.   Abdominal: Soft. Bowel sounds are normal.   Musculoskeletal:  Normal range of motion.   R fem Impella   Skin: Skin is warm and dry.   Nursing note and vitals reviewed.    Significant Labs:  CBC:  Recent Labs   Lab 03/01/20 1046 03/01/20 2055 03/02/20 0259   WBC 23.01* 21.29* 19.57*   RBC 3.02* 2.76* 2.67*   HGB 9.0* 8.3* 8.1*   HCT 26.9* 25.2* 23.9*   * 95* 97*   MCV 89 91 90   MCH 29.8 30.1 30.3   MCHC 33.5 32.9 33.9     BNP:  Recent Labs   Lab 02/29/20 2122   *     CMP:  Recent Labs   Lab 03/01/20 1046 03/01/20 2132 03/02/20 0259   * 155* 158*   CALCIUM 7.3* 7.4* 7.5*   ALBUMIN 2.3* 2.2* 2.2*   PROT 5.0* 5.1* 5.3*   * 135* 135*   K 4.7 4.3 4.3   CO2 22* 24 25    100 100   BUN 43* 54* 58*   CREATININE 5.9* 6.7* 6.9*   ALKPHOS 76 99 71   ALT 35 33 31   AST 67* 49* 41*   BILITOT 1.1* 0.7 0.7      Coagulation:   Recent Labs   Lab 02/29/20 2122 03/01/20 2132 03/02/20 0259 03/02/20  0804   INR 1.3*  --  1.2  --   --    APTT 37.6*   < > 63.7*  63.7* 61.2* 48.1*    < > = values in this interval not displayed.     LDH:  Recent Labs   Lab 02/29/20 2122 03/02/20  0003   LDH 1,325* 911*     Microbiology:  Microbiology Results (last 7 days)     Procedure Component Value Units Date/Time    Urine culture [[id number]] Collected:  02/29/20 2240    Order Status:  Completed Specimen:  Urine Updated:  03/02/20 0739     Urine Culture, Routine No significant growth    Narrative:       Preferred Collection Type->Urine, Clean Catch    Blood culture [[id number]] Collected:  03/01/20 0254    Order Status:  Completed Specimen:  Blood from Peripheral, Upper Arm, Left Updated:  03/02/20 0613     Blood Culture, Routine No Growth to date      No Growth to date    Blood culture [[id number]] Collected:  02/29/20 2305    Order Status:  Completed Specimen:  Blood from Peripheral, Forearm, Right Updated:  03/02/20 0613     Blood Culture, Routine No Growth to date      No Growth to date        I have reviewed all pertinent labs within the past 24 hours.    Estimated  Creatinine Clearance: 9.9 mL/min (A) (based on SCr of 6.9 mg/dL (H)).    Diagnostic Results:  I have reviewed all pertinent imaging results/findings within the past 24 hours.

## 2020-03-02 NOTE — ASSESSMENT & PLAN NOTE
-Likely ATN in setting of shock, Hemolysis, plus IV contrast at OSH.  - Monitor Cr closely, uptrending.  - Appreciate renal consult.  - strict I/O.  - avoid nephrotoxic meds.

## 2020-03-02 NOTE — ASSESSMENT & PLAN NOTE
- [facility] 2/29: 50-60% LCx, OM disease.  - continue ASA, high intensity statin.  - holding on plavix while on AC for impella.

## 2020-03-02 NOTE — PROCEDURES
"John Javier is a 64 y.o. male patient.    Temp: 97.7 °F (36.5 °C) (03/02/20 1513)  Pulse: 80 (03/02/20 1652)  Resp: 16 (03/02/20 1645)  BP: 125/88 (03/02/20 1645)  SpO2: 96 % (03/02/20 1652)  Weight: 76.2 kg (168 lb) (03/02/20 1515)  Height: 5' 6" (167.6 cm) (03/02/20 1515)    Arterial Line  Date/Time: 3/2/2020 4:53 PM  Performed by: Anant Hammer MD  Authorized by: Anant Hammer MD   Consent Done: Yes  Risks and benefits: risks, benefits and alternatives were discussed  Consent given by: spouse  Site marked: the operative site was marked  Time out: Immediately prior to procedure a "time out" was called to verify the correct patient, procedure, equipment, support staff and site/side marked as required.  Indications: hemodynamic monitoring  Location: left radial    Anesthesia:  Local anesthesia used: yes  Number of attempts: 2  Post-procedure CMS: normal          No flowsheet data found.    Anant Hammer  3/2/2020    "

## 2020-03-02 NOTE — SUBJECTIVE & OBJECTIVE
No past medical history on file.    No past surgical history on file.    Review of patient's allergies indicates:  No Known Allergies    No medications prior to admission.     Family History     None        Tobacco Use    Smoking status: Not on file   Substance and Sexual Activity    Alcohol use: Not on file    Drug use: Not on file    Sexual activity: Not on file     Review of Systems   Unable to perform ROS: intubated     Objective:     Vital Signs (Most Recent):  Temp: 97.6 °F (36.4 °C) (03/01/20 1905)  Pulse: 86 (03/01/20 2205)  Resp: 19 (03/01/20 2205)  BP: 107/68 (03/01/20 2205)  SpO2: 100 % (03/01/20 2205) Vital Signs (24h Range):  Temp:  [97.2 °F (36.2 °C)-99 °F (37.2 °C)] 97.6 °F (36.4 °C)  Pulse:  [] 86  Resp:  [12-43] 19  SpO2:  [92 %-100 %] 100 %  BP: (100-121)/(58-86) 107/68     Weight: 75.8 kg (167 lb 1.7 oz)  Body mass index is 26.97 kg/m².    SpO2: 100 %  O2 Device (Oxygen Therapy): ventilator      Intake/Output Summary (Last 24 hours) at 3/1/2020 2245  Last data filed at 3/1/2020 2205  Gross per 24 hour   Intake 2843.99 ml   Output 1361 ml   Net 1482.99 ml       Lines/Drains/Airways     Central Venous Catheter Line            Percutaneous Central Line Insertion/Assessment - Triple Lumen  03/01/20 0140 left internal jugular less than 1 day          Drain                 Sheath Right -- days         NG/OG Tube 02/29/20 2055 nasogastric 18 Fr. Right nostril 1 day         Urethral Catheter 02/29/20 2056 16 Fr. 1 day          Airway                 Airway - Non-Surgical 02/28/20 2056 Endotracheal Tube 2 days          Peripheral Intravenous Line                 Peripheral IV - Single Lumen 02/29/20 2053 20 G Left Antecubital 1 day         Peripheral IV - Single Lumen 02/29/20 2055 20 G Right Antecubital 1 day                Physical Exam   Constitutional: He is oriented to person, place, and time. He appears well-developed and well-nourished. No distress.   Eyes: Pupils are equal, round, and  reactive to light. Conjunctivae are normal.   Neck: No tracheal deviation present. No thyromegaly present.   Cardiovascular: Normal rate, regular rhythm, normal heart sounds and intact distal pulses. Exam reveals no gallop and no friction rub.   No murmur heard.  Pulses:       Radial pulses are 2+ on the right side, and 2+ on the left side.        Femoral pulses are 2+ on the right side, and 2+ on the left side.  Impella in R CFA   Pulmonary/Chest: Effort normal and breath sounds normal. No respiratory distress. He has no wheezes. He has no rales.   Abdominal: Soft. Bowel sounds are normal. He exhibits no distension. There is no tenderness.   Musculoskeletal: He exhibits no edema or deformity.   Neurological: He is alert and oriented to person, place, and time. No cranial nerve deficit. Coordination normal.   Skin: Skin is warm and dry. He is not diaphoretic.   Psychiatric: He has a normal mood and affect. His behavior is normal.       Significant Labs:   BMP:   Recent Labs   Lab 02/29/20  2122 03/01/20  0303 03/01/20  1046 03/01/20 2055 03/01/20  2132   * 177* 159*  --  155*    135* 134*  --  135*   K 5.2* 5.1 4.7  --  4.3    99 100  --  100   CO2 25 23 22*  --  24   BUN 31* 37* 43*  --  54*   CREATININE 4.6* 5.2* 5.9*  --  6.7*   CALCIUM 7.6* 7.2* 7.3*  --  7.4*   MG 1.5* 2.1  --  2.5  --        Significant Imaging: Echocardiogram: 2D echo with color flow doppler: No results found for this or any previous visit.

## 2020-03-02 NOTE — ASSESSMENT & PLAN NOTE
* Non-oliguric iATN from cardiogenic shock.     Plan/ Rec:  - Patient with an adequate UOP, which may be sign of the diuretic phase of iATN. Nevertheless, his sCR and BUN has risen which will tend to decrease if this phase is really happening, so repeat BMP 12 hrs for monitoring trend. Needs stable hemodynamics (MAP >65) during this phase and close monitoring of UOP because it can generate an excessive amount of UOP that may lead to volume depletion and hypoperfusion to the kidneys.   - Urine microscopy done today: abundant muddy brown casts (leaning towards ATN)   - No need for RRT   - Renal panel q 12 hrs  - Strict I/O and chart  - Avoid nephrotoxic medications, NSAIDs, IV contrast, etc.  - Medication doses adjusted to GFR  - Hb > 7 gm/dL  - Will follow closely

## 2020-03-02 NOTE — EICU
Rounding (Video Assessment):  Yes    Intervention Initiated From:  Bedside    Sarah Communicated with Bedside Nurse regarding:  Time-Out    Comments: elert received for time out prior to arterial line placement.  Dr Hammer @ bs for insertion.  L radial arterial line inserted.    Procedure end:  1630

## 2020-03-02 NOTE — PROCEDURES
POST CATH NOTE  Procedure: Impella Removal  Referring MD:  LISA Fuller   Indication:  Cardiogenic shock   Access:  R CFA    Findings:  N/A    Intervention:  Perclose    Post Cath Exam:  Vitals:    03/02/20 1400   BP:    Pulse: 88   Resp: 20   Temp:        Patient tolerated the procedure well, no complications  No unusual pain, hematoma or thrill at vascular access site.  Distal pulse present without signs of ischemia.  Post-procedure orders as per [facility]    Recommendation:  - Resume care by primary team   - Q1 Doppler check bilateral DP PT    Jeramie Green MD (Edy)  Cardiology Fellow  PGY 5  [facility] -

## 2020-03-02 NOTE — CONSULTS
"  [facility]  Adult Nutrition  Consult Note    SUMMARY     Recommendations    1. TF recommendations: Novasource @ 40 mL/hr to provide 1920 kcals, 87 g of protein, 688 mL fluid.   2. RD to monitor & follow-up.    Goals: Meet % EEN, EPN by RD f/u date  Nutrition Goal Status: new  Communication of RD Recs: reviewed with RN    Reason for Assessment    Reason For Assessment: consult  Diagnosis: other (see comments)(Cardiogenic shock)  Relevant Medical History: HF, HLD, DM  Interdisciplinary Rounds: did not attend    General Information Comments: Pt intubated, no family at bedside. Impella present. Unsure of PO intake PTA/UBW. No weight history in chart. TFs scheduled to start today. NFPE complete - pt w/ mild muscle & fat wasting, however unable to fully assess for malnutrition. Will monitor.  Nutrition Discharge Planning: Unable to determine    Nutrition/Diet History    Factors Affecting Nutritional Intake: NPO, on mechanical ventilation    Anthropometrics    Temp: 98.9 °F (37.2 °C)  Height Method: Estimated  Height: 5' 6" (167.6 cm)  Height (inches): 66 in  Weight Method: Bed Scale  Weight: 76.5 kg (168 lb 10.4 oz)  Weight (lb): 168.65 lb  Ideal Body Weight (IBW), Male: 142 lb  % Ideal Body Weight, Male (lb): 118.77 %  BMI (Calculated): 27.2  BMI Grade: 25 - 29.9 - overweight  Usual Body Weight (UBW), kg: (DONNA)    Lab/Procedures/Meds    Pertinent Labs Reviewed: reviewed  Pertinent Labs Comments: Na 135, BUN 58, Creat 6.9, GFR 8.9, A1C 7  Pertinent Medications Reviewed: reviewed  Pertinent Medications Comments: Precedex, Dobutamine, Heparin    Estimated/Assessed Needs    Weight Used For Calorie Calculations: 76.5 kg (168 lb 10.4 oz)     Energy Calorie Requirements (kcal): 1953 kcal/d  Energy Need Method: [facility]     Protein Requirements:  g/d (1.2-1.5 g/kg)  Weight Used For Protein Calculations: 76.5 kg (168 lb 10.4 oz)     Estimated Fluid Requirement Method: other (see comments)(Per " MD or 1 mL/kcal)    Nutrition Prescription Ordered    Current Diet Order: NPO  Current Nutrition Support Formula Ordered: Novasource Renal    Evaluation of Received Nutrient/Fluid Intake    Comments: LBM: 2/29    Nutrition Risk    Level of Risk/Frequency of Follow-up: (2x/week)     Assessment and Plan    Nutrition Problem  Inadequate energy intake    Related to (etiology):   Inability to consume sufficient energy    Signs and Symptoms (as evidenced by):   NPO    Interventions(treatment strategy):  Collaboration of nutrition care w/ other providers    Nutrition Diagnosis Status:   New     Monitor and Evaluation    Food and Nutrient Intake: energy intake, food and beverage intake, enteral nutrition intake  Food and Nutrient Adminstration: diet order, enteral and parenteral nutrition administration  Physical Activity and Function: nutrition-related ADLs and IADLs  Anthropometric Measurements: weight, weight change  Biochemical Data, Medical Tests and Procedures: inflammatory profile, lipid profile, glucose/endocrine profile, gastrointestinal profile, electrolyte and renal panel  Nutrition-Focused Physical Findings: overall appearance     Malnutrition Assessment    Weight Loss (Malnutrition): other (see comments)(DONNA)  Energy Intake (Malnutrition): other (see comments)(DONNA)  Subcutaneous Fat (Malnutrition): mild depletion  Muscle Mass (Malnutrition): mild depletion   Orbital Region (Subcutaneous Fat Loss): mild depletion  Upper Arm Region (Subcutaneous Fat Loss): mild depletion   [place]aines Region (Muscle Loss): mild depletion  Clavicle Bone Region (Muscle Loss): mild depletion  Anterior Thigh Region (Muscle Loss): (DONNA)  Posterior Calf Region (Muscle Loss): (DONNA)     Nutrition Follow-Up    RD Follow-up?: Yes

## 2020-03-02 NOTE — PLAN OF CARE
CMICU DAILY GOALS       A: Awake    RASS: Goal -    Actual - RASS (Horton Agitation-Sedation Scale): -1-->drowsy   Restraint necessity: Clinical Justification: Removing medical devices  B: Breath   SBT: Pass   C: Coordinate A & B, analgesics/sedatives   Pain: managed    SAT: Pass  D: Delirium   CAM-ICU: Overall CAM-ICU: Negative  E: Early Mobility   MOVE Screen: Fail   Activity: Activity Management: bedrest maintained per order  FAS: Feeding/Nutrition   Diet order: Diet/Nutrition Received: NPO,   Fluid restriction:    T: Thrombus   DVT prophylaxis: VTE Required Core Measure: Pharmacological prophylaxis initiated/maintained  H: HOB Elevation   Head of Bed (HOB): HOB at 15 degrees  U: Ulcer Prophylaxis   GI: no  G: Glucose control   managed Glycemic Management: blood glucose monitoring  S: Skin   Bundle compliance: yes   Bathing/Skin Care: bath, complete, incontinence care, linen changed Date: 03/01/20 PM  B: Bowel Function   no issues   I: Indwelling Catheters   Lamb necessity:      Urethral Catheter 02/29/20 2056 16 Fr.-Reason for Continuing Urinary Catheterization: Critically ill in ICU requiring intensive monitoring   CVC necessity: Yes   IPAD offered: No  D: De-escalation Antibx   No  Plan for the day   CVPs 6/5/7, Impella remains at 80cm at P3, latest SvO2 resulted 77. Dobutamine weaned to 2.5. Pt remains at 1.4 mcg/kg/hr on Precedex, still restless while on vent, propofol added and running at 15 mcg/kg/min. Pt super therapeutic while on heparin, currently at 10U/kg/hr, adjusting per heparin nomogram. Plan for extubation and Impella removal tomorrow.     Family/Goals of care/Code Status   Code Status: Full Code     No acute events throughout day, VS and assessment per flow sheet, patient progressing towards goals as tolerated, plan of care reviewed with John Javier and family, all concerns addressed, will continue to monitor.

## 2020-03-02 NOTE — ASSESSMENT & PLAN NOTE
- Patient seen and examined  - Interventional cardiology consulted for Impella CP management  - Recommend full TTE study and confirm correct impella position  - Patient currently on P3, 2.1 L of Impella flow,  1.6, CO 10  - By Ficks similar numbers: 9.5/5.0/638 CVP 9  -  2.5, UOP 50 cc/hr  - Seems like cardiogenic shock without new acute coronary obstruction on cath review  - Will discuss with staff and decide optimal time for impella removal

## 2020-03-02 NOTE — PROGRESS NOTES
1615: keep heparin gtt off post Impella removal per Edy Coronel MD. readback x2  1800: No Novasource renal TF to start post Impella removal, no answer from formula room; Charge RN updated, Alexa, House supervisor updated

## 2020-03-02 NOTE — PROGRESS NOTES
[facility]  Nephrology  Progress Note    Patient Name: John Javier  MRN: [id number]  Admission Date: 2/29/2020  Hospital Length of Stay: 2 days  Attending Provider: Shannon Fuller MD   Primary Care Physician: To Obtain Unable  Principal Problem:Cardiogenic shock    Subjective:     HPI: 65 y/o M hx of CAD (50-60% LCx, OM disease on [facility] 2/29), ICM EF 10-15% s/p AICD (unknown baseline but on GDMT as OP), HTN, DM2, HLD, obesity, CKD, transferred from OSH on 2/29/2020 for higher level of care.  Patient presented to outside hospital with shortness of breath, fatigued, found to be cardiogenic shock.  Per OSH transfer record, chart and brother, patient's labs showed sCr 1.8 on 2/28/2020 19:27, increased to 2.7 on 2/29/2020 0438, troponins 12, underwent LHC underwent LHC with evidence of 50-60% LCx, OM disease; no PCI performed, and RHC with LVEDP 35. CO 3, CI 1.8, tte w LVEF 10-15%.  Patient underwent Impella placement at outside hospital and transferred to [facility] on 2/29.  Noted also in chart sCr 1.1 back in 9/2019.  His sCr at [facility] up to 5.1 on 3/1/2020.    Nephrology consulted for evaluation of Naveen.       Interval History: Patient seen and examined at bedside. Scr on increasing trend. Adequate UOP reported in the past 24 hrs ~1.4L.     Review of patient's allergies indicates:  No Known Allergies  Current Facility-Administered Medications   Medication Frequency    0.9%  NaCl infusion Once    amiodarone tablet 400 mg BID    aspirin chewable tablet 81 mg Daily    atorvastatin tablet 80 mg Daily    dexmedetomidine (PRECEDEX) 400mcg/100mL 0.9% NaCL infusion Continuous    dextrose 10% (D10W) Bolus PRN    diphenhydrAMINE capsule 25 mg Q6H PRN    DOBUTamine 500mg in D5W 250mL infusion (premix) (NON-TITRATING) Continuous    glucagon (human recombinant) injection 1 mg PRN    heparin (porcine) 12,500 Units in dextrose 5 % 500 mL infusion Continuous    heparin 25,000 units in dextrose 5% (100 units/ml)  IV bolus from bag - ADDITIONAL PRN BOLUS - 30 units/kg PRN    heparin 25,000 units in dextrose 5% (100 units/ml) IV bolus from bag - ADDITIONAL PRN BOLUS - 60 units/kg PRN    heparin 25,000 units in dextrose 5% (100 units/ml) IV bolus from bag INITIAL BOLUS Once    heparin 25,000 units in dextrose 5% 250 mL (100 units/mL) infusion LOW INTENSITY nomogram - [facility] Continuous    insulin aspart U-100 pen 0-5 Units Q6H PRN    propofol (DIPRIVAN) 10 mg/mL infusion Continuous    sodium chloride 0.9% flush 10 mL PRN    sodium chloride 0.9% flush 10 mL PRN       Objective:     Vital Signs (Most Recent):  Temp: 98.9 °F (37.2 °C) (03/02/20 1100)  Pulse: 86 (03/02/20 1330)  Resp: 10 (03/02/20 1330)  BP: 125/77 (03/02/20 1300)  SpO2: 97 % (03/02/20 1330)  O2 Device (Oxygen Therapy): ventilator (03/02/20 1138) Vital Signs (24h Range):  Temp:  [97.5 °F (36.4 °C)-98.9 °F (37.2 °C)] 98.9 °F (37.2 °C)  Pulse:  [81-94] 86  Resp:  [9-23] 10  SpO2:  [95 %-100 %] 97 %  BP: (100-131)/(62-81) 125/77     Weight: 76.5 kg (168 lb 10.4 oz) (03/02/20 1300)  Body mass index is 27.22 kg/m².  Body surface area is 1.89 meters squared.    I/O last 3 completed shifts:  In: 3471.7 [I.V.:2571.7; NG/GT:50; IV Piggyback:850]  Out: 2181 [Urine:2181]    Physical Exam   Constitutional: He appears well-developed and well-nourished. He is sedated and intubated.   Cardiovascular:   Murmur heard.  Pulmonary/Chest: Effort normal. He is intubated. He has no wheezes.   Transmitted ventilator sounds   Abdominal: Soft. Bowel sounds are normal.   Musculoskeletal: He exhibits no edema.   R fem impella   Skin: Skin is warm.       Significant Labs:  CBC:   Recent Labs   Lab 03/02/20  0259   WBC 19.57*   RBC 2.67*   HGB 8.1*   HCT 23.9*   PLT 97*   MCV 90   MCH 30.3   MCHC 33.9     CMP:   Recent Labs   Lab 03/02/20 0259   *   CALCIUM 7.5*   ALBUMIN 2.2*   PROT 5.3*   *   K 4.3   CO2 25      BUN 58*   CREATININE 6.9*   ALKPHOS 71   ALT 31   AST 41*    BILITOT 0.7     All labs within the past 24 hours have been reviewed.         Assessment/Plan:     * Cardiogenic shock  - Managed by primary.   - On Impella, weaning off as tolerated. Probable removal today.     ANGEL (acute kidney injury)  * Non-oliguric iATN from cardiogenic shock.     Plan/ Rec:  - Patient with an adequate UOP, which may be sign of the diuretic phase of iATN. Nevertheless, his sCR and BUN has risen which will tend to decrease if this phase is really happening, so repeat BMP 12 hrs for monitoring trend. Needs stable hemodynamics (MAP >65) during this phase and close monitoring of UOP because it can generate an excessive amount of UOP that may lead to volume depletion and hypoperfusion to the kidneys.   - Urine microscopy done today: abundant muddy brown casts (leaning towards ATN)   - No need for RRT   - Renal panel q 12 hrs  - Strict I/O and chart  - Avoid nephrotoxic medications, NSAIDs, IV contrast, etc.  - Medication doses adjusted to GFR  - Hb > 7 gm/dL  - Will follow closely        Thank you for your consult. I will follow-up with patient. Please contact us if you have any additional questions.    Denny Pichardo MD  Nephrology  [facility]

## 2020-03-02 NOTE — PROGRESS NOTES
[facility]  Heart Transplant  Progress Note    Patient Name: John Javier  MRN: [id number]  Admission Date: 2/29/2020  Hospital Length of Stay: 2 days  Attending Physician: Shannon Fuller MD  Primary Care Provider: To Obtain Unable  Principal Problem:Cardiogenic shock    Subjective:     Interval History: Intubated but follows commands.     Continuous Infusions:   dexmedetomidine (PRECEDEX) infusion 1.3 mcg/kg/hr (03/02/20 0738)    DOBUTamine 2.5 mcg/kg/min (03/02/20 0605)    heparin (porcine) in dextrose 5% 500 mL (IMPELLA)      heparin (porcine) in D5W 10 Units/kg/hr (03/02/20 0605)    propofol 35 mcg/kg/min (03/02/20 0957)     Scheduled Meds:   aspirin  81 mg Per NG tube Daily    atorvastatin  80 mg Per NG tube Daily    heparin (PORCINE)  60 Units/kg (Adjusted) Intravenous Once     PRN Meds:Dextrose 10% Bolus, glucagon (human recombinant), heparin (PORCINE), heparin (PORCINE), insulin aspart U-100, sodium chloride 0.9%    Review of patient's allergies indicates:  No Known Allergies  Objective:     Vital Signs (Most Recent):  Temp: 98.5 °F (36.9 °C) (03/02/20 0700)  Pulse: 86 (03/02/20 0920)  Resp: 19 (03/02/20 0920)  BP: 124/81 (03/02/20 0900)  SpO2: 98 % (03/02/20 0920) Vital Signs (24h Range):  Temp:  [97.2 °F (36.2 °C)-98.5 °F (36.9 °C)] 98.5 °F (36.9 °C)  Pulse:  [] 86  Resp:  [12-24] 19  SpO2:  [96 %-100 %] 98 %  BP: (100-124)/(58-81) 124/81     Patient Vitals for the past 72 hrs (Last 3 readings):   Weight   03/02/20 0305 76.5 kg (168 lb 10.4 oz)   02/29/20 2100 75.8 kg (167 lb 1.7 oz)     Body mass index is 27.22 kg/m².      Intake/Output Summary (Last 24 hours) at 3/2/2020 1001  Last data filed at 3/2/2020 0900  Gross per 24 hour   Intake 1561.34 ml   Output 1306 ml   Net 255.34 ml        Telemetry: NSR 80s    Physical Exam   Constitutional: He appears well-developed and well-nourished. He is sedated and intubated.   HENT:   Head: Normocephalic.   Eyes: Pupils are  equal, round, and reactive to light.   Neck: Normal range of motion. Neck supple.   RIJ TLC placed 3/1/20.   Cardiovascular: Normal rate and regular rhythm.   Murmur heard.  Pulmonary/Chest: Effort normal and breath sounds normal. He is intubated.   Abdominal: Soft. Bowel sounds are normal.   Musculoskeletal: Normal range of motion.   R fem Impella   Skin: Skin is warm and dry.   Nursing note and vitals reviewed.    Significant Labs:  CBC:  Recent Labs   Lab 03/01/20 1046 03/01/20 2055 03/02/20 0259   WBC 23.01* 21.29* 19.57*   RBC 3.02* 2.76* 2.67*   HGB 9.0* 8.3* 8.1*   HCT 26.9* 25.2* 23.9*   * 95* 97*   MCV 89 91 90   MCH 29.8 30.1 30.3   MCHC 33.5 32.9 33.9     BNP:  Recent Labs   Lab 02/29/20 2122   *     CMP:  Recent Labs   Lab 03/01/20 1046 03/01/20 2132 03/02/20 0259   * 155* 158*   CALCIUM 7.3* 7.4* 7.5*   ALBUMIN 2.3* 2.2* 2.2*   PROT 5.0* 5.1* 5.3*   * 135* 135*   K 4.7 4.3 4.3   CO2 22* 24 25    100 100   BUN 43* 54* 58*   CREATININE 5.9* 6.7* 6.9*   ALKPHOS 76 99 71   ALT 35 33 31   AST 67* 49* 41*   BILITOT 1.1* 0.7 0.7      Coagulation:   Recent Labs   Lab 02/29/20 2122 03/01/20 2132 03/02/20 0259 03/02/20  0804   INR 1.3*  --  1.2  --   --    APTT 37.6*   < > 63.7*  63.7* 61.2* 48.1*    < > = values in this interval not displayed.     LDH:  Recent Labs   Lab 02/29/20 2122 03/02/20  0003   LDH 1,325* 911*     Microbiology:  Microbiology Results (last 7 days)     Procedure Component Value Units Date/Time    Urine culture [[id number]] Collected:  02/29/20 2240    Order Status:  Completed Specimen:  Urine Updated:  03/02/20 0739     Urine Culture, Routine No significant growth    Narrative:       Preferred Collection Type->Urine, Clean Catch    Blood culture [[id number]] Collected:  03/01/20 0254    Order Status:  Completed Specimen:  Blood from Peripheral, Upper Arm, Left Updated:  03/02/20 0613     Blood Culture, Routine No Growth to date      No Growth to  "date    Blood culture [[id number]] Collected:  02/29/20 4168    Order Status:  Completed Specimen:  Blood from Peripheral, Forearm, Right Updated:  03/02/20 0641     Blood Culture, Routine No Growth to date      No Growth to date        I have reviewed all pertinent labs within the past 24 hours.    Estimated Creatinine Clearance: 9.9 mL/min (A) (based on SCr of 6.9 mg/dL (H)).    Diagnostic Results:  I have reviewed all pertinent imaging results/findings within the past 24 hours.    Assessment and Plan:     Patient intubated w limited collateral from family; further OSH records pending    65 y/o M hx of CAD (50-60% LCx, OM disease on [facility] 2/29), ICM EF 10-15% s/p AICD (unknown baseline but on GDMT as OP), HTN, DM2, HLD, obesity, CKD, presenting from OSH intubated with Impella in setting of cardiogenic shock. Per pt's brother he was taken by EMS to OSH in setting of feeling short of breath, nearly passing out. There found to be in shock, lactate to 12, ANGEL w Cr 2.5, trop 12; unclear if dynamic ECG changes, max trop 10; underwent LHC with evidence of 50-60% LCx, OM disease; no PCI performed. RHC with LVEDP 35. CO 3, CI 1.8, tte w LVEF 10-15%. Underwent Impella placement without additional central line placement (per brother there was a "complication" but no evidence of pneumothorax).    On arrival patient -having intermittent suction alarms, improved w P6->P4. MAPs stable 75-85. CVC placed in LIJ given neck position, CO 9.7, CI 5.35, svr 535. WBC 26k, patient cultured, given single dose of vanc/zosyn. UOP 60-80cc/hr off diuretics. Further collateral pending from OSH. Continued on heparin gtt for impella, weaned to dobutamine 5, gave 500cc bolus in setting of CVP 10 and suction alarms.    * Cardiogenic shock  65 y/o M hx of CAD (50-60% LCx, OM disease on [facility] 2/29), ICM EF 10-15% s/p AICD (unknown baseline but on GDMT as OP), HTN, DM2, HLD, obesity, CKD, presenting from OSH intubated with Impella in setting of " cardiogenic shock.    -CVP 5, ScVO2 68. Calculated CO/CI 7/3.7. SVR ~950.  -Continue  2.5mcg/kg/mn.  -Impella @ P3, will wean to P2 as tolerated, appreciate interventional assistance. Will likely remove today.   - Bedside TTE to assess position  - Lower suspicion for sepsis (afebrile no immediate source, recent procedure), but pan cultured and s/p 1 dose Vanc/zosyn, may have to redose.    ANGEL (acute kidney injury)  -Likely ATN in setting of shock, Hemolysis, plus IV contrast at OSH.  - Monitor Cr closely, uptrending.  - Appreciate renal consult.  - strict I/O.  - avoid nephrotoxic meds.    Acute hypoxemic respiratory failure  -Continue current vent setting.  -Will plan for SBT once impella removed/bedrest complete.     VT (ventricular tachycardia)  -S/P ICD shocks x 5 per OSH.  -Continue PO amio.     Acute on chronic combined systolic and diastolic heart failure  - See Cardiogenic shock.    DM (diabetes mellitus)  Unclear if hx DM vs preDM  - follow up A1c   - SSI while NPO    CAD (coronary artery disease)  - [facility] 2/29: 50-60% LCx, OM disease.  - continue ASA, high intensity statin.  - holding on plavix while on AC for impella.    Uninterrupted Critical Care/Counseling Time (not including procedures): 60mn  Alejo Frankel, NP  Heart Transplant  [facility]

## 2020-03-02 NOTE — PLAN OF CARE
Recommendations     1. TF recommendations: Novasource @ 40 mL/hr to provide 1920 kcals, 87 g of protein, 688 mL fluid.   2. RD to monitor & follow-up.     Goals: Meet % EEN, EPN by RD f/u date  Nutrition Goal Status: new  Communication of RD Recs: reviewed with RN

## 2020-03-02 NOTE — PROGRESS NOTES
Per jyotsna guerrero rdrebecca, optison given ivp via left a/c sl for imaging. Denies transfusion rxn. Tolerated well. Sl flushed before and after with 10 cc ns.

## 2020-03-02 NOTE — PROGRESS NOTES
to see pt in order to assess needs given admission. Pt is not able to communicated with  at this time due to current medical status. Currently no family is in attendance.  Worker spoke with the pt's nurse who reports family visited over the weekend and are keeping in contact via phone.    Transplant  will continue to follow.

## 2020-03-02 NOTE — ASSESSMENT & PLAN NOTE
63 y/o M hx of CAD (50-60% LCx, OM disease on [facility] 2/29), ICM EF 10-15% s/p AICD (unknown baseline but on GDMT as OP), HTN, DM2, HLD, obesity, CKD, presenting from OSH intubated with Impella in setting of cardiogenic shock.    -CVP 5, ScVO2 68. Calculated CO/CI 7/3.7. SVR ~950.  -Continue  2.5mcg/kg/mn.  -Impella @ P3, will wean to P2 as tolerated, appreciate interventional assistance. Will likely remove today.   - Bedside TTE to assess position  - Lower suspicion for sepsis (afebrile no immediate source, recent procedure), but pan cultured and s/p 1 dose Vanc/zosyn, may have to redose.

## 2020-03-02 NOTE — CONSULTS
[facility]  Interventional Cardiology  Consult Note    Patient Name: John Javier  MRN: [id number]  Admission Date: 2/29/2020  Hospital Length of Stay: 1 days  Code Status: Full Code   Attending Provider: Adilson Darden Jr.,*  Consulting Provider: Jeramie Coronel MD  Primary Care Physician: To Obtain Unable  Principal Problem:Cardiogenic shock    Patient information was obtained from patient and ER records.     Inpatient consult to Interventional Cardiology  Consult performed by: Jeramie Coronel MD  Consult ordered by: Dk Mac MD        Subjective:     Chief Complaint:  Cardiogenic Shock     HPI:  65 y/o M hx of CAD (50-60% LCx, OM disease on [facility] 2/29), ICM EF 10-15% s/p AICD (unknown baseline but on GDMT as OP), HTN, DM2, HLD, obesity, CKD, presenting from OSH intubated with Impella in setting of cardiogenic shock. Per pt's brother he was taken by EMS to OSH in setting of feeling short of breath, nearly passing out. There found to be in shock, lactate to 12, ANGEL w Cr 2.5, trop 12; unclear if dynamic ECG changes, max trop 10; underwent LHC with evidence of 50-60% LCx, OM disease; no PCI performed. RHC with LVEDP 35. CO 3, CI 1.8, tte w LVEF 10-15%.  On arrival patient -having intermittent suction alarms, improved w P6->P4. MAPs stable 75-85. CVC placed in LIJ given neck position, CO 9.7, CI 5.35, svr 535. WBC 26k, patient cultured, given single dose of vanc/zosyn. UOP 60-80cc/hr off diuretics.      No past medical history on file.    No past surgical history on file.    Review of patient's allergies indicates:  No Known Allergies    No medications prior to admission.     Family History     None        Tobacco Use    Smoking status: Not on file   Substance and Sexual Activity    Alcohol use: Not on file    Drug use: Not on file    Sexual activity: Not on file     Review of Systems   Unable to perform ROS: intubated     Objective:     Vital Signs (Most  Recent):  Temp: 97.6 °F (36.4 °C) (03/01/20 1905)  Pulse: 86 (03/01/20 2205)  Resp: 19 (03/01/20 2205)  BP: 107/68 (03/01/20 2205)  SpO2: 100 % (03/01/20 2205) Vital Signs (24h Range):  Temp:  [97.2 °F (36.2 °C)-99 °F (37.2 °C)] 97.6 °F (36.4 °C)  Pulse:  [] 86  Resp:  [12-43] 19  SpO2:  [92 %-100 %] 100 %  BP: (100-121)/(58-86) 107/68     Weight: 75.8 kg (167 lb 1.7 oz)  Body mass index is 26.97 kg/m².    SpO2: 100 %  O2 Device (Oxygen Therapy): ventilator      Intake/Output Summary (Last 24 hours) at 3/1/2020 2245  Last data filed at 3/1/2020 2205  Gross per 24 hour   Intake 2843.99 ml   Output 1361 ml   Net 1482.99 ml       Lines/Drains/Airways     Central Venous Catheter Line            Percutaneous Central Line Insertion/Assessment - Triple Lumen  03/01/20 0140 left internal jugular less than 1 day          Drain                 Sheath Right -- days         NG/OG Tube 02/29/20 2055 nasogastric 18 Fr. Right nostril 1 day         Urethral Catheter 02/29/20 2056 16 Fr. 1 day          Airway                 Airway - Non-Surgical 02/28/20 2056 Endotracheal Tube 2 days          Peripheral Intravenous Line                 Peripheral IV - Single Lumen 02/29/20 2053 20 G Left Antecubital 1 day         Peripheral IV - Single Lumen 02/29/20 2055 20 G Right Antecubital 1 day                Physical Exam   Constitutional: He is oriented to person, place, and time. He appears well-developed and well-nourished. No distress.   Eyes: Pupils are equal, round, and reactive to light. Conjunctivae are normal.   Neck: No tracheal deviation present. No thyromegaly present.   Cardiovascular: Normal rate, regular rhythm, normal heart sounds and intact distal pulses. Exam reveals no gallop and no friction rub.   No murmur heard.  Pulses:       Radial pulses are 2+ on the right side, and 2+ on the left side.        Femoral pulses are 2+ on the right side, and 2+ on the left side.  Impella in R CFA   Pulmonary/Chest: Effort normal  and breath sounds normal. No respiratory distress. He has no wheezes. He has no rales.   Abdominal: Soft. Bowel sounds are normal. He exhibits no distension. There is no tenderness.   Musculoskeletal: He exhibits no edema or deformity.   Neurological: He is alert and oriented to person, place, and time. No cranial nerve deficit. Coordination normal.   Skin: Skin is warm and dry. He is not diaphoretic.   Psychiatric: He has a normal mood and affect. His behavior is normal.       Significant Labs:   BMP:   Recent Labs   Lab 02/29/20 2122 03/01/20  0303 03/01/20  1046 03/01/20 2055 03/01/20  2132   * 177* 159*  --  155*    135* 134*  --  135*   K 5.2* 5.1 4.7  --  4.3    99 100  --  100   CO2 25 23 22*  --  24   BUN 31* 37* 43*  --  54*   CREATININE 4.6* 5.2* 5.9*  --  6.7*   CALCIUM 7.6* 7.2* 7.3*  --  7.4*   MG 1.5* 2.1  --  2.5  --        Significant Imaging: Echocardiogram: 2D echo with color flow doppler: No results found for this or any previous visit.    Assessment and Plan:     * Cardiogenic shock  - Patient seen and examined  - Interventional cardiology consulted for Impella CP management  - Recommend full TTE study and confirm correct impella position  - Patient currently on P3, 2.1 L of Impella flow,  1.6, CO 10  - By Ficks similar numbers: 9.5/5.0/638 CVP 9  -  2.5, UOP 50 cc/hr  - Seems like cardiogenic shock without new acute coronary obstruction on cath review  - Will discuss with staff and decide optimal time for impella removal            VTE Risk Mitigation (From admission, onward)         Ordered     heparin (porcine) 12,500 Units in dextrose 5 % 500 mL infusion  Continuous      03/01/20 0315     heparin 25,000 units in dextrose 5% (100 units/ml) IV bolus from bag INITIAL BOLUS  Once     Question:  Heparin Infusion Adjustment (DO NOT MODIFY ANSWER)  Answer:  [email or web address]\Images\Pharmacy\HeparinInfusions\heparin LOW INTENSITY nomogram for [facility] [id number]    02/29/20  2122     heparin 25,000 units in dextrose 5% 250 mL (100 units/mL) infusion LOW INTENSITY nomogram - [facility]  Continuous     Question:  Heparin Infusion Adjustment (DO NOT MODIFY ANSWER)  Answer:  [email or web address]\Images\Pharmacy\HeparinInfusions\heparin LOW INTENSITY nomogram for [facility] [id number]    02/29/20 2122     heparin 25,000 units in dextrose 5% (100 units/ml) IV bolus from bag - ADDITIONAL PRN BOLUS - 60 units/kg  As needed (PRN)     Question:  Heparin Infusion Adjustment (DO NOT MODIFY ANSWER)  Answer:  [email or web address]\Images\Pharmacy\HeparinInfusions\heparin LOW INTENSITY nomogram for [facility] [id number]    02/29/20 2122     heparin 25,000 units in dextrose 5% (100 units/ml) IV bolus from bag - ADDITIONAL PRN BOLUS - 30 units/kg  As needed (PRN)     Question:  Heparin Infusion Adjustment (DO NOT MODIFY ANSWER)  Answer:  [email or web address]\Images\Pharmacy\HeparinInfusions\heparin LOW INTENSITY nomogram for [facility] [id number]    02/29/20 2122     IP VTE LOW RISK PATIENT  Once      02/29/20 2122                Thank you for your consult. I will follow-up with patient. Please contact us if you have any additional questions.    Jeramie Coronel MD  Interventional Cardiology   [facility]

## 2020-03-03 PROBLEM — E44.0 MALNUTRITION OF MODERATE DEGREE: Status: ACTIVE | Noted: 2020-03-03

## 2020-03-03 PROBLEM — I48.0 PAF (PAROXYSMAL ATRIAL FIBRILLATION): Status: ACTIVE | Noted: 2020-03-03

## 2020-03-03 LAB
ALBUMIN SERPL BCP-MCNC: 2.3 G/DL (ref 3.5–5.2)
ALBUMIN SERPL BCP-MCNC: 2.3 G/DL (ref 3.5–5.2)
ALBUMIN SERPL BCP-MCNC: 2.4 G/DL (ref 3.5–5.2)
ALLENS TEST: ABNORMAL
ALP SERPL-CCNC: 74 U/L (ref 55–135)
ALP SERPL-CCNC: 81 U/L (ref 55–135)
ALP SERPL-CCNC: 81 U/L (ref 55–135)
ALT SERPL W/O P-5'-P-CCNC: 20 U/L (ref 10–44)
ALT SERPL W/O P-5'-P-CCNC: 21 U/L (ref 10–44)
ALT SERPL W/O P-5'-P-CCNC: 22 U/L (ref 10–44)
ANION GAP SERPL CALC-SCNC: 13 MMOL/L (ref 8–16)
ANION GAP SERPL CALC-SCNC: 16 MMOL/L (ref 8–16)
ANION GAP SERPL CALC-SCNC: 17 MMOL/L (ref 8–16)
ANION GAP SERPL CALC-SCNC: 18 MMOL/L (ref 8–16)
APTT BLDCRRT: 27.9 SEC (ref 21–32)
APTT BLDCRRT: 28.8 SEC (ref 21–32)
APTT BLDCRRT: 36.1 SEC (ref 21–32)
AST SERPL-CCNC: 28 U/L (ref 10–40)
AST SERPL-CCNC: 30 U/L (ref 10–40)
AST SERPL-CCNC: 31 U/L (ref 10–40)
BASOPHILS # BLD AUTO: 0.02 K/UL (ref 0–0.2)
BASOPHILS # BLD AUTO: 0.02 K/UL (ref 0–0.2)
BASOPHILS NFR BLD: 0.1 % (ref 0–1.9)
BASOPHILS NFR BLD: 0.2 % (ref 0–1.9)
BILIRUB SERPL-MCNC: 0.6 MG/DL (ref 0.1–1)
BILIRUB SERPL-MCNC: 0.7 MG/DL (ref 0.1–1)
BILIRUB SERPL-MCNC: 0.7 MG/DL (ref 0.1–1)
BUN SERPL-MCNC: 69 MG/DL (ref 8–23)
BUN SERPL-MCNC: 72 MG/DL (ref 8–23)
BUN SERPL-MCNC: 76 MG/DL (ref 8–23)
BUN SERPL-MCNC: 78 MG/DL (ref 8–23)
CALCIUM SERPL-MCNC: 7.9 MG/DL (ref 8.7–10.5)
CALCIUM SERPL-MCNC: 7.9 MG/DL (ref 8.7–10.5)
CALCIUM SERPL-MCNC: 8 MG/DL (ref 8.7–10.5)
CALCIUM SERPL-MCNC: 8.1 MG/DL (ref 8.7–10.5)
CHLORIDE SERPL-SCNC: 104 MMOL/L (ref 95–110)
CO2 SERPL-SCNC: 19 MMOL/L (ref 23–29)
CO2 SERPL-SCNC: 20 MMOL/L (ref 23–29)
CO2 SERPL-SCNC: 20 MMOL/L (ref 23–29)
CO2 SERPL-SCNC: 21 MMOL/L (ref 23–29)
CREAT SERPL-MCNC: 7.2 MG/DL (ref 0.5–1.4)
CREAT SERPL-MCNC: 7.3 MG/DL (ref 0.5–1.4)
CREAT SERPL-MCNC: 7.6 MG/DL (ref 0.5–1.4)
CREAT SERPL-MCNC: 7.7 MG/DL (ref 0.5–1.4)
DELSYS: ABNORMAL
DIFFERENTIAL METHOD: ABNORMAL
DIFFERENTIAL METHOD: ABNORMAL
EOSINOPHIL # BLD AUTO: 0.1 K/UL (ref 0–0.5)
EOSINOPHIL # BLD AUTO: 0.1 K/UL (ref 0–0.5)
EOSINOPHIL NFR BLD: 0.9 % (ref 0–8)
EOSINOPHIL NFR BLD: 1.1 % (ref 0–8)
ERYTHROCYTE [DISTWIDTH] IN BLOOD BY AUTOMATED COUNT: 15.9 % (ref 11.5–14.5)
ERYTHROCYTE [DISTWIDTH] IN BLOOD BY AUTOMATED COUNT: 16 % (ref 11.5–14.5)
ERYTHROCYTE [SEDIMENTATION RATE] IN BLOOD BY WESTERGREN METHOD: 12 MM/H
EST. GFR  (AFRICAN AMERICAN): 7.8 ML/MIN/1.73 M^2
EST. GFR  (AFRICAN AMERICAN): 7.9 ML/MIN/1.73 M^2
EST. GFR  (AFRICAN AMERICAN): 8.3 ML/MIN/1.73 M^2
EST. GFR  (AFRICAN AMERICAN): 8.5 ML/MIN/1.73 M^2
EST. GFR  (NON AFRICAN AMERICAN): 6.8 ML/MIN/1.73 M^2
EST. GFR  (NON AFRICAN AMERICAN): 6.9 ML/MIN/1.73 M^2
EST. GFR  (NON AFRICAN AMERICAN): 7.2 ML/MIN/1.73 M^2
EST. GFR  (NON AFRICAN AMERICAN): 7.3 ML/MIN/1.73 M^2
FIO2: 40
GLUCOSE SERPL-MCNC: 109 MG/DL (ref 70–110)
GLUCOSE SERPL-MCNC: 111 MG/DL (ref 70–110)
GLUCOSE SERPL-MCNC: 116 MG/DL (ref 70–110)
GLUCOSE SERPL-MCNC: 122 MG/DL (ref 70–110)
HCO3 UR-SCNC: 17.5 MMOL/L (ref 24–28)
HCO3 UR-SCNC: 21.8 MMOL/L (ref 24–28)
HCO3 UR-SCNC: 21.9 MMOL/L (ref 24–28)
HCT VFR BLD AUTO: 24.1 % (ref 40–54)
HCT VFR BLD AUTO: 26.1 % (ref 40–54)
HGB BLD-MCNC: 7.8 G/DL (ref 14–18)
HGB BLD-MCNC: 8.2 G/DL (ref 14–18)
IMM GRANULOCYTES # BLD AUTO: 0.08 K/UL (ref 0–0.04)
IMM GRANULOCYTES # BLD AUTO: 0.1 K/UL (ref 0–0.04)
IMM GRANULOCYTES NFR BLD AUTO: 0.6 % (ref 0–0.5)
IMM GRANULOCYTES NFR BLD AUTO: 0.8 % (ref 0–0.5)
LDH SERPL L TO P-CCNC: 584 U/L (ref 110–260)
LYMPHOCYTES # BLD AUTO: 0.8 K/UL (ref 1–4.8)
LYMPHOCYTES # BLD AUTO: 0.8 K/UL (ref 1–4.8)
LYMPHOCYTES NFR BLD: 5.9 % (ref 18–48)
LYMPHOCYTES NFR BLD: 6.9 % (ref 18–48)
MAGNESIUM SERPL-MCNC: 2.9 MG/DL (ref 1.6–2.6)
MAGNESIUM SERPL-MCNC: 2.9 MG/DL (ref 1.6–2.6)
MCH RBC QN AUTO: 29.4 PG (ref 27–31)
MCH RBC QN AUTO: 29.8 PG (ref 27–31)
MCHC RBC AUTO-ENTMCNC: 31.4 G/DL (ref 32–36)
MCHC RBC AUTO-ENTMCNC: 32.4 G/DL (ref 32–36)
MCV RBC AUTO: 92 FL (ref 82–98)
MCV RBC AUTO: 94 FL (ref 82–98)
MIN VOL: 8.04
MODE: ABNORMAL
MONOCYTES # BLD AUTO: 0.8 K/UL (ref 0.3–1)
MONOCYTES # BLD AUTO: 0.9 K/UL (ref 0.3–1)
MONOCYTES NFR BLD: 6.3 % (ref 4–15)
MONOCYTES NFR BLD: 6.4 % (ref 4–15)
NEUTROPHILS # BLD AUTO: 10.3 K/UL (ref 1.8–7.7)
NEUTROPHILS # BLD AUTO: 11.9 K/UL (ref 1.8–7.7)
NEUTROPHILS NFR BLD: 84.7 % (ref 38–73)
NEUTROPHILS NFR BLD: 86.1 % (ref 38–73)
NRBC BLD-RTO: 0 /100 WBC
NRBC BLD-RTO: 0 /100 WBC
PCO2 BLDA: 31.1 MMHG (ref 35–45)
PCO2 BLDA: 37.3 MMHG (ref 35–45)
PCO2 BLDA: 42.2 MMHG (ref 35–45)
PEEP: 5
PH SMN: 7.32 [PH] (ref 7.35–7.45)
PH SMN: 7.36 [PH] (ref 7.35–7.45)
PH SMN: 7.37 [PH] (ref 7.35–7.45)
PIP: 18
PLATELET # BLD AUTO: 106 K/UL (ref 150–350)
PLATELET # BLD AUTO: 91 K/UL (ref 150–350)
PMV BLD AUTO: 12.4 FL (ref 9.2–12.9)
PMV BLD AUTO: 12.5 FL (ref 9.2–12.9)
PO2 BLDA: 45 MMHG (ref 40–60)
PO2 BLDA: 86 MMHG (ref 40–60)
PO2 BLDA: 88 MMHG (ref 40–60)
POC BE: -3 MMOL/L
POC BE: -4 MMOL/L
POC BE: -8 MMOL/L
POC SATURATED O2: 77 % (ref 95–100)
POC SATURATED O2: 96 % (ref 95–100)
POC SATURATED O2: 97 % (ref 95–100)
POC TCO2: 18 MMOL/L (ref 24–29)
POC TCO2: 23 MMOL/L (ref 24–29)
POC TCO2: 23 MMOL/L (ref 24–29)
POCT GLUCOSE: 101 MG/DL (ref 70–110)
POCT GLUCOSE: 111 MG/DL (ref 70–110)
POCT GLUCOSE: 92 MG/DL (ref 70–110)
POTASSIUM SERPL-SCNC: 4.1 MMOL/L (ref 3.5–5.1)
POTASSIUM SERPL-SCNC: 4.2 MMOL/L (ref 3.5–5.1)
POTASSIUM SERPL-SCNC: 4.2 MMOL/L (ref 3.5–5.1)
POTASSIUM SERPL-SCNC: 4.3 MMOL/L (ref 3.5–5.1)
PROT SERPL-MCNC: 6 G/DL (ref 6–8.4)
PROT SERPL-MCNC: 6 G/DL (ref 6–8.4)
PROT SERPL-MCNC: 6.1 G/DL (ref 6–8.4)
RBC # BLD AUTO: 2.62 M/UL (ref 4.6–6.2)
RBC # BLD AUTO: 2.79 M/UL (ref 4.6–6.2)
SAMPLE: ABNORMAL
SITE: ABNORMAL
SODIUM SERPL-SCNC: 138 MMOL/L (ref 136–145)
SODIUM SERPL-SCNC: 140 MMOL/L (ref 136–145)
SODIUM SERPL-SCNC: 140 MMOL/L (ref 136–145)
SODIUM SERPL-SCNC: 142 MMOL/L (ref 136–145)
SP02: 97
VT: 400
WBC # BLD AUTO: 12.14 K/UL (ref 3.9–12.7)
WBC # BLD AUTO: 13.8 K/UL (ref 3.9–12.7)

## 2020-03-03 PROCEDURE — 99291 CRITICAL CARE FIRST HOUR: CPT | Mod: ,,, | Performed by: INTERNAL MEDICINE

## 2020-03-03 PROCEDURE — 99900026 HC AIRWAY MAINTENANCE (STAT)

## 2020-03-03 PROCEDURE — 80048 BASIC METABOLIC PNL TOTAL CA: CPT

## 2020-03-03 PROCEDURE — 20000000 HC ICU ROOM

## 2020-03-03 PROCEDURE — 83615 LACTATE (LD) (LDH) ENZYME: CPT

## 2020-03-03 PROCEDURE — 99291 PR CRITICAL CARE, E/M 30-74 MINUTES: ICD-10-PCS | Mod: ,,, | Performed by: INTERNAL MEDICINE

## 2020-03-03 PROCEDURE — 80053 COMPREHEN METABOLIC PANEL: CPT | Mod: 91

## 2020-03-03 PROCEDURE — 27000221 HC OXYGEN, UP TO 24 HOURS

## 2020-03-03 PROCEDURE — 82803 BLOOD GASES ANY COMBINATION: CPT

## 2020-03-03 PROCEDURE — 85730 THROMBOPLASTIN TIME PARTIAL: CPT

## 2020-03-03 PROCEDURE — 94003 VENT MGMT INPAT SUBQ DAY: CPT

## 2020-03-03 PROCEDURE — S0028 INJECTION, FAMOTIDINE, 20 MG: HCPCS | Performed by: NURSE PRACTITIONER

## 2020-03-03 PROCEDURE — 63600175 PHARM REV CODE 636 W HCPCS: Performed by: STUDENT IN AN ORGANIZED HEALTH CARE EDUCATION/TRAINING PROGRAM

## 2020-03-03 PROCEDURE — 94761 N-INVAS EAR/PLS OXIMETRY MLT: CPT

## 2020-03-03 PROCEDURE — 37799 UNLISTED PX VASCULAR SURGERY: CPT

## 2020-03-03 PROCEDURE — 25000003 PHARM REV CODE 250: Performed by: NURSE PRACTITIONER

## 2020-03-03 PROCEDURE — 85730 THROMBOPLASTIN TIME PARTIAL: CPT | Mod: 91

## 2020-03-03 PROCEDURE — 80053 COMPREHEN METABOLIC PANEL: CPT

## 2020-03-03 PROCEDURE — 85025 COMPLETE CBC W/AUTO DIFF WBC: CPT | Mod: 91

## 2020-03-03 PROCEDURE — 99233 SBSQ HOSP IP/OBS HIGH 50: CPT | Mod: ,,, | Performed by: INTERNAL MEDICINE

## 2020-03-03 PROCEDURE — 99900035 HC TECH TIME PER 15 MIN (STAT)

## 2020-03-03 PROCEDURE — 63600175 PHARM REV CODE 636 W HCPCS: Performed by: NURSE PRACTITIONER

## 2020-03-03 PROCEDURE — 25000003 PHARM REV CODE 250: Performed by: STUDENT IN AN ORGANIZED HEALTH CARE EDUCATION/TRAINING PROGRAM

## 2020-03-03 PROCEDURE — 83735 ASSAY OF MAGNESIUM: CPT

## 2020-03-03 PROCEDURE — 99233 PR SUBSEQUENT HOSPITAL CARE,LEVL III: ICD-10-PCS | Mod: ,,, | Performed by: INTERNAL MEDICINE

## 2020-03-03 PROCEDURE — 83735 ASSAY OF MAGNESIUM: CPT | Mod: 91

## 2020-03-03 RX ORDER — HEPARIN SODIUM 5000 [USP'U]/ML
5000 INJECTION, SOLUTION INTRAVENOUS; SUBCUTANEOUS EVERY 12 HOURS
Status: DISCONTINUED | OUTPATIENT
Start: 2020-03-03 | End: 2020-03-03

## 2020-03-03 RX ORDER — HEPARIN SODIUM,PORCINE/D5W 25000/250
12 INTRAVENOUS SOLUTION INTRAVENOUS CONTINUOUS
Status: DISCONTINUED | OUTPATIENT
Start: 2020-03-03 | End: 2020-03-06

## 2020-03-03 RX ORDER — FENTANYL CITRATE-0.9 % NACL/PF 10 MCG/ML
PLASTIC BAG, INJECTION (ML) INTRAVENOUS CONTINUOUS
Status: DISCONTINUED | OUTPATIENT
Start: 2020-03-03 | End: 2020-03-05

## 2020-03-03 RX ORDER — FUROSEMIDE 10 MG/ML
80 INJECTION INTRAMUSCULAR; INTRAVENOUS ONCE
Status: COMPLETED | OUTPATIENT
Start: 2020-03-03 | End: 2020-03-03

## 2020-03-03 RX ORDER — FUROSEMIDE 10 MG/ML
80 INJECTION INTRAMUSCULAR; INTRAVENOUS
Status: DISCONTINUED | OUTPATIENT
Start: 2020-03-03 | End: 2020-03-03

## 2020-03-03 RX ORDER — PANTOPRAZOLE SODIUM 40 MG/10ML
40 INJECTION, POWDER, LYOPHILIZED, FOR SOLUTION INTRAVENOUS DAILY
Status: DISCONTINUED | OUTPATIENT
Start: 2020-03-03 | End: 2020-03-03

## 2020-03-03 RX ORDER — FUROSEMIDE 10 MG/ML
40 INJECTION INTRAMUSCULAR; INTRAVENOUS
Status: DISCONTINUED | OUTPATIENT
Start: 2020-03-03 | End: 2020-03-04

## 2020-03-03 RX ORDER — ISOSORBIDE DINITRATE 10 MG/1
10 TABLET ORAL EVERY 8 HOURS
Status: DISCONTINUED | OUTPATIENT
Start: 2020-03-03 | End: 2020-03-04

## 2020-03-03 RX ORDER — FUROSEMIDE 10 MG/ML
80 INJECTION INTRAMUSCULAR; INTRAVENOUS 3 TIMES DAILY
Status: DISCONTINUED | OUTPATIENT
Start: 2020-03-03 | End: 2020-03-03

## 2020-03-03 RX ORDER — FENTANYL CITRATE 50 UG/ML
INJECTION, SOLUTION INTRAMUSCULAR; INTRAVENOUS
Status: COMPLETED
Start: 2020-03-03 | End: 2020-03-03

## 2020-03-03 RX ORDER — FAMOTIDINE 10 MG/ML
20 INJECTION INTRAVENOUS DAILY
Status: DISCONTINUED | OUTPATIENT
Start: 2020-03-03 | End: 2020-03-05

## 2020-03-03 RX ORDER — FENTANYL CITRATE 50 UG/ML
25 INJECTION, SOLUTION INTRAMUSCULAR; INTRAVENOUS ONCE
Status: DISCONTINUED | OUTPATIENT
Start: 2020-03-03 | End: 2020-03-05

## 2020-03-03 RX ORDER — HYDRALAZINE HYDROCHLORIDE 10 MG/1
10 TABLET, FILM COATED ORAL EVERY 8 HOURS
Status: DISCONTINUED | OUTPATIENT
Start: 2020-03-03 | End: 2020-03-04

## 2020-03-03 RX ORDER — FENTANYL CITRATE 50 UG/ML
25 INJECTION, SOLUTION INTRAMUSCULAR; INTRAVENOUS
Status: DISCONTINUED | OUTPATIENT
Start: 2020-03-03 | End: 2020-03-06

## 2020-03-03 RX ORDER — HYDRALAZINE HYDROCHLORIDE 20 MG/ML
20 INJECTION INTRAMUSCULAR; INTRAVENOUS ONCE
Status: COMPLETED | OUTPATIENT
Start: 2020-03-03 | End: 2020-03-03

## 2020-03-03 RX ADMIN — HYDRALAZINE HYDROCHLORIDE 20 MG: 20 INJECTION INTRAMUSCULAR; INTRAVENOUS at 08:03

## 2020-03-03 RX ADMIN — FUROSEMIDE 40 MG: 10 INJECTION, SOLUTION INTRAMUSCULAR; INTRAVENOUS at 09:03

## 2020-03-03 RX ADMIN — HEPARIN SODIUM AND DEXTROSE 12 UNITS/KG/HR: 10000; 5 INJECTION INTRAVENOUS at 11:03

## 2020-03-03 RX ADMIN — FENTANYL CITRATE 50 MCG: 50 INJECTION INTRAMUSCULAR; INTRAVENOUS at 08:03

## 2020-03-03 RX ADMIN — FAMOTIDINE 20 MG: 10 INJECTION INTRAVENOUS at 12:03

## 2020-03-03 RX ADMIN — CHLORHEXIDINE GLUCONATE 0.12% ORAL RINSE 15 ML: 1.2 LIQUID ORAL at 09:03

## 2020-03-03 RX ADMIN — ASPIRIN 81 MG CHEWABLE TABLET 81 MG: 81 TABLET CHEWABLE at 03:03

## 2020-03-03 RX ADMIN — PROPOFOL 50 MCG/KG/MIN: 10 INJECTION, EMULSION INTRAVENOUS at 09:03

## 2020-03-03 RX ADMIN — CHLORHEXIDINE GLUCONATE 0.12% ORAL RINSE 15 ML: 1.2 LIQUID ORAL at 12:03

## 2020-03-03 RX ADMIN — HYDRALAZINE HYDROCHLORIDE 10 MG: 10 TABLET, FILM COATED ORAL at 09:03

## 2020-03-03 RX ADMIN — DEXMEDETOMIDINE HYDROCHLORIDE 1 MCG/KG/HR: 100 INJECTION, SOLUTION, CONCENTRATE INTRAVENOUS at 05:03

## 2020-03-03 RX ADMIN — AMIODARONE HYDROCHLORIDE 400 MG: 200 TABLET ORAL at 03:03

## 2020-03-03 RX ADMIN — Medication 50 MCG/HR: at 09:03

## 2020-03-03 RX ADMIN — PROPOFOL 50 MCG/KG/MIN: 10 INJECTION, EMULSION INTRAVENOUS at 11:03

## 2020-03-03 RX ADMIN — ATORVASTATIN CALCIUM 80 MG: 20 TABLET, FILM COATED ORAL at 03:03

## 2020-03-03 RX ADMIN — PROPOFOL 50 MCG/KG/MIN: 10 INJECTION, EMULSION INTRAVENOUS at 06:03

## 2020-03-03 RX ADMIN — ISOSORBIDE DINITRATE 10 MG: 10 TABLET ORAL at 09:03

## 2020-03-03 RX ADMIN — FUROSEMIDE 80 MG: 10 INJECTION, SOLUTION INTRAMUSCULAR; INTRAVENOUS at 08:03

## 2020-03-03 RX ADMIN — PROPOFOL 50 MCG/KG/MIN: 10 INJECTION, EMULSION INTRAVENOUS at 03:03

## 2020-03-03 RX ADMIN — AMIODARONE HYDROCHLORIDE 400 MG: 200 TABLET ORAL at 09:03

## 2020-03-03 NOTE — PROGRESS NOTES
03/03/20 0755 03/03/20 0800 03/03/20 0805   Vital Signs   Pulse (!) 122 (!) 112 103   Resp Rate Total 34 br/min 31 br/min 31 br/min   SpO2 99 % 100 % 96 %   Pulse Oximetry Type  --   --   --    Oxygen Concentration (%) 40 40 40   O2 Device (Oxygen Therapy)  --   --   --    BP  --  (!) 179/92  --    MAP (mmHg)  --  127  --    Art Line   Arterial Line /94 216/91 156/69   Arterial Line MAP (mmHg) 128 mmHg 125 mmHg 94 mmHg      03/03/20 0809 03/03/20 0810 03/03/20 0815   Vital Signs   Pulse 102 101 100   Resp Rate Total 27 br/min 28 br/min 22 br/min   SpO2 97 % 97 % 100 %   Pulse Oximetry Type Continuous  --   --    Oxygen Concentration (%) 40 40 40   O2 Device (Oxygen Therapy) ventilator  --   --    BP  --  127/67  --    MAP (mmHg)  --  89  --    Art Line   Arterial Line BP  --  139/58 144/61   Arterial Line MAP (mmHg)  --  88 mmHg 84 mmHg      03/03/20 0820 03/03/20 0825   Vital Signs   Pulse (!) 114 (!) 155   Resp Rate Total 40 br/min 42 br/min   SpO2 97 % 98 %   Pulse Oximetry Type  --   --    Oxygen Concentration (%) 40 40   O2 Device (Oxygen Therapy)  --   --    BP  --   --    MAP (mmHg)  --   --    Art Line   Arterial Line /70 203/82   Arterial Line MAP (mmHg) 102 mmHg 117 mmHg   RASS +3, agitated, HR up to 150's, SBP up to 200s. Patient bucking mechanical ventilator, Jostin, NP at bedside verbal order 50mcg Fentanyl IVP, Propofol & Precedex gtt increased to max (see MAR)

## 2020-03-03 NOTE — ASSESSMENT & PLAN NOTE
63 y/o M hx of CAD (50-60% LCx, OM disease on [facility] 2/29), ICM EF 10-15% s/p AICD (unknown baseline but on GDMT as OP), HTN, DM2, HLD, obesity, CKD, presenting from OSH intubated with Impella in setting of cardiogenic shock/VT(Pt stopped his medications).    -Impella removed 3/2/20.   -CVP 9, ScVO2 80. Calculated CO/CI 12/6.3(In setting of severe agitation this morning). Will re assess throughout the day.   - 2.5mcg/kg/mn. Will stop in light of elevated output and hypertension/afib.  -GDMT- Will start hydralazine/isosorbide today for afterload reduction.   -TTE 3/2 EF 10%, LVIDD 6, TAPSE 1.98, Mod MR, Mod TR.  -Not working up for advanced options due to medical instability/renal failure at this time. Will re assess if condition improves.

## 2020-03-03 NOTE — ASSESSMENT & PLAN NOTE
-Episode of afib RVR this morning during severe agitation.  -Converted to NSR.  -Will start heparin for anticoag.

## 2020-03-03 NOTE — PROGRESS NOTES
[facility]  Nephrology  Progress Note    Patient Name: John Javier  MRN: [id number]  Admission Date: 2/29/2020  Hospital Length of Stay: 3 days  Attending Provider: Shannon Fuller MD   Primary Care Physician: To Obtain Unable  Principal Problem:Cardiogenic shock    Subjective:     Interval History:   Kidney function continues to worsen, Scr p to 7.2 from 6.9, other electrolytes stable.  UOP of 2.2L/24h, net negative ~ 891 ml.  Impella removed yesterday.  CXR with pulmonary edema.    Review of patient's allergies indicates:  No Known Allergies  Current Facility-Administered Medications   Medication Frequency    amiodarone tablet 400 mg BID    aspirin chewable tablet 81 mg Daily    atorvastatin tablet 80 mg Daily    chlorhexidine 0.12 % solution 15 mL BID    dextrose 10% (D10W) Bolus PRN    diphenhydrAMINE capsule 25 mg Q6H PRN    famotidine (PF) injection 20 mg BID    fentaNYL 2500 mcg in 0.9% sodium chloride 250 mL infusion premix (titrating) Continuous    fentaNYL injection 25 mcg Q2H PRN    fentaNYL injection 25 mcg Once    glucagon (human recombinant) injection 1 mg PRN    heparin (porcine) injection 5,000 Units Q12H    insulin aspart U-100 pen 0-5 Units Q6H PRN    propofol (DIPRIVAN) 10 mg/mL infusion Continuous    sodium chloride 0.9% flush 10 mL PRN    sodium chloride 0.9% flush 10 mL PRN       Objective:     Vital Signs (Most Recent):  Temp: 97.6 °F (36.4 °C) (03/03/20 0700)  Pulse: 99 (03/03/20 0950)  Resp: 16 (03/02/20 1645)  BP: (!) 137/57 (03/03/20 0845)  SpO2: 95 % (03/03/20 0950)  O2 Device (Oxygen Therapy): ventilator (03/03/20 0929) Vital Signs (24h Range):  Temp:  [97.5 °F (36.4 °C)-98.9 °F (37.2 °C)] 97.6 °F (36.4 °C)  Pulse:  [] 99  Resp:  [9-22] 16  SpO2:  [93 %-100 %] 95 %  BP: (116-179)/(57-92) 137/57  Arterial Line BP: (106-223)/(49-94) 124/55     Weight: 76.2 kg (168 lb) (03/02/20 1515)  Body mass index is 27.12 kg/m².  Body surface area is 1.88  meters squared.    I/O last 3 completed shifts:  In: 2159 [I.V.:2089; NG/GT:70]  Out: 2995 [Urine:2995]    Physical Exam   Constitutional: He appears well-developed and well-nourished. He is sedated and intubated.   Cardiovascular:   Murmur heard.  Pulmonary/Chest: Effort normal. He is intubated. He has no wheezes. He has rales.   Abdominal: Soft. Bowel sounds are normal.   Musculoskeletal: He exhibits no edema.   Skin: Skin is warm.       Significant Labs:  ABGs:   Recent Labs   Lab 03/03/20  0552   PH 7.360   PCO2 31.1*   HCO3 17.5*   POCSATURATED 96   BE -8     CBC:   Recent Labs   Lab 03/03/20  0407   WBC 13.80*   RBC 2.79*   HGB 8.2*   HCT 26.1*   *   MCV 94   MCH 29.4   MCHC 31.4*     CMP:   Recent Labs   Lab 03/03/20  0809      CALCIUM 7.9*   ALBUMIN 2.3*   PROT 6.0      K 4.1   CO2 21*      BUN 72*   CREATININE 7.6*   ALKPHOS 74   ALT 22   AST 28   BILITOT 0.7            Assessment/Plan:     ANGEL (acute kidney injury)  * Non-oliguric iATN from cardiogenic shock.     3/2: -urine microscopy:  abundant muddy brown casts (leaning towards ATN)     Plan/ Rec:  -UOP decreased overnight, administered 80 mg of IV lasix per primary team with improvement in UOP.  -based on CXR findings, agree with diuresis, can schedule BID if needed based on UOP.  -recommend a goal of net negative 1-2L/24h.   - No need for RRT   - Renal panel q 12 hrs  - Strict I/O and chart  - Will follow closely  -will discuss plan with Dr. Deniz Ramsay, NP  Nephrology  [facility]

## 2020-03-03 NOTE — PROGRESS NOTES
Admit Note     Spoke with daughter via phone to assess patients needs due to pt being intubated/sedated. Pt's daughter is at home, working and caring for her children.  Patient is a 64 y.o.  male, admitted for heart transplant. Per report pt stopped taking two of his medications two weeks ago due to the cost, dtr stated pt told her it was around $500.    Patient admitted from an outside hospital on 2020 .  At this time, patient presents as intubated and sedated.  At this time, patients caregiver presents as alert and oriented x 4 and communicative.      Household/Family Systems (as reported by patients caregiver)     Patient resides with patient's daughter, at:     [address] [place] [address].      Pt cell: [phone]  nitesh Delcid dtr, c: [phone]  nitesh Cortez, c: [phone]    Support system includes daughter and brother. Pt's parents are , he has a sister in [place].  Patient does not have dependents that are need of being cared for.     Patients primary caregiver is self. Confirmed patients contact information is [phone] (home);   No relevant phone numbers on file.     During admission, patient's caregiver plans to stay at home and visiting on the weekends.  Confirmed patient and patients caregivers do have access to reliable transportation.    Cognitive Status/Learning     Patients caregiver reports patients reading ability as college and states patient does not have difficulty with N/A, pt's daughter believes he does have some hearing loss. Patients caregiver reports patient learns best by multiple forms.   Needed: No.   Highest education level: Associate/Bachelor Degree    Vocation/Disability (as reported by patients caregiver)    Working for Income: yes  If yes, working activity level: Working Full Time  Patient is employed by [employer], per report he has been there for over 30yrs.    Adherence     Patients caregiver reports  patient has a high level of adherence to patients health care regimen, until stopped his meds two weeks ago.  Adherence counseling and education provided.  Patient's caregiver verbalizes understanding.    Substance Use    Patients caregiver reports patients substance usage as the following:    Tobacco: none, patient denies any use.  Alcohol: none, patient denies any use.  Illicit Drugs/Non-prescribed Medications: none, patient denies any use.  Patients caregiver states clear understanding of the potential impact of substance use.  Substance abstinence/cessation counseling, education and resources provided and reviewed.     Services Utilizing/ADLS (as reported by patients caregiver)    Infusion Service: Prior to admission, patient utilizing? no  Home Health: Prior to admission, patient utilizing? no  DME: Prior to admission, no  Pulmonary/Cardiac Rehab: Prior to admission, no  Dialysis:  Prior to admission, no  Transplant Specialty Pharmacy:  Prior to admission, no.    Prior to admission, patients caregiver reports patient was independent with ADLS and was driving.  Patients caregiver reports patient is not able to care for self at this time due to compromised medical condition (as documented in medical record) and physical weakness.  Patients caregiver reports patient indicates a willingness to care for self once medically cleared to do so.    Insurance/Medications    Insured by   Payor/Plan Subscr  Sex Relation Sub. Ins. ID Effective Group Num   1. [facility]* GIFTY LAWSON 1955 Male  [id number] 20 [id number]                                   [address] [address]      Primary Insurance (for UNOS reporting): Private Insurance  Secondary Insurance (for UNOS reporting): None    Patients caregiver reports patient is not able to obtain and afford medications at this time and at time of discharge.    Living Will/Healthcare Power of     Patients caregiver reports patient does not have a LW  and/or HCPA.   provided education regarding LW and HCPA and the completion of forms.    Coping/Mental Health (as reported by patients caregiver)    Patient is coping adequately with the aid of  family members.  Patients caregiver is coping adequately with the aid of  family members and friends.      Discharge Planning (as reported by patients caregiver)    At time of discharge, patient plans to return to patient's home under the care of self and family.  Patients daughter or brother will transport patient.  Per rounds today, expected discharge date has not been medically determined at this time. Patients caretaker verbalizes understanding and is involved in treatment planning and discharge process.    Additional Concerns    Patient's caretaker denies additional needs and/or concerns at this time. Patient is being followed for needs, education, resources, information, emotional support, supportive counseling, and for supportive and skilled discharge plan of care.  providing ongoing psychosocial support, education, resources and d/c planning as needed.  SW remains available.  remains available.

## 2020-03-03 NOTE — PROGRESS NOTES
[facility]  Heart Transplant  Progress Note    Patient Name: John Javier  MRN: [id number]  Admission Date: 2/29/2020  Hospital Length of Stay: 3 days  Attending Physician: Shannon Fuller MD  Primary Care Provider: To Obtain Unable  Principal Problem:Cardiogenic shock    Subjective:     Interval History: Intubated but follows commands. Became very agitated this morning after coughing spell/suctioning and BP jodi to ~220s systolic with HR(sinus rhythmn) increasing to ~110 from 80s. Pt then converted to afib RVR with HR reaching 160s at times. Sedation increased and hydralazine 20mg IVP given which improved BP to 120s systolic. HR came down to 120s(afib) and he eventually converted to NSR with rate ~90.     Continuous Infusions:   dexmedetomidine (PRECEDEX) infusion 0.7 mcg/kg/hr (03/03/20 0616)    DOBUTamine 2.5 mcg/kg/min (03/03/20 0600)    heparin (porcine)      propofol 25 mcg/kg/min (03/03/20 0727)     Scheduled Meds:   amiodarone  400 mg Per NG tube BID    aspirin  81 mg Per NG tube Daily    atorvastatin  80 mg Per NG tube Daily    chlorhexidine  15 mL Mouth/Throat BID    furosemide (LASIX) IV  80 mg Intravenous Once     PRN Meds:Dextrose 10% Bolus, diphenhydrAMINE, glucagon (human recombinant), heparin (porcine), insulin aspart U-100, sodium chloride 0.9%, sodium chloride 0.9%    Review of patient's allergies indicates:  No Known Allergies  Objective:     Vital Signs (Most Recent):  Temp: 97.6 °F (36.4 °C) (03/03/20 0700)  Pulse: 102 (03/03/20 0809)  Resp: 16 (03/02/20 1645)  BP: (!) 179/92 (03/03/20 0800)  SpO2: 97 % (03/03/20 0809) Vital Signs (24h Range):  Temp:  [97.5 °F (36.4 °C)-98.9 °F (37.2 °C)] 97.6 °F (36.4 °C)  Pulse:  [] 102  Resp:  [9-23] 16  SpO2:  [94 %-100 %] 97 %  BP: (116-179)/(72-92) 179/92  Arterial Line BP: (106-223)/(61-94) 156/69     Patient Vitals for the past 72 hrs (Last 3 readings):   Weight   03/02/20 1515 76.2 kg (168 lb)   03/02/20 1300 76.5 kg  (168 lb 10.4 oz)   03/02/20 0305 76.5 kg (168 lb 10.4 oz)     Body mass index is 27.12 kg/m².      Intake/Output Summary (Last 24 hours) at 3/3/2020 0814  Last data filed at 3/3/2020 0800  Gross per 24 hour   Intake 1254.62 ml   Output 2220 ml   Net -965.38 ml        Telemetry: NSR 80s    Physical Exam   Constitutional: He appears well-developed and well-nourished. He is sedated and intubated.   HENT:   Head: Normocephalic.   Eyes: Pupils are equal, round, and reactive to light.   Neck: Normal range of motion. Neck supple.   RIJ TLC placed 3/1/20.   Cardiovascular: Normal rate and regular rhythm.   Murmur heard.  Pulmonary/Chest: Effort normal and breath sounds normal. He is intubated.   Abdominal: Soft. Bowel sounds are normal.   Musculoskeletal: Normal range of motion.   Skin: Skin is warm and dry.   Nursing note and vitals reviewed.    Significant Labs:  CBC:  Recent Labs   Lab 03/02/20  0259 03/03/20  0040 03/03/20  0407   WBC 19.57* 12.14 13.80*   RBC 2.67* 2.62* 2.79*   HGB 8.1* 7.8* 8.2*   HCT 23.9* 24.1* 26.1*   PLT 97* 91* 106*   MCV 90 92 94   MCH 30.3 29.8 29.4   MCHC 33.9 32.4 31.4*     BNP:  Recent Labs   Lab 02/29/20  2122   *     CMP:  Recent Labs   Lab 03/01/20  2132 03/02/20  0259 03/02/20  1701 03/03/20  0436   * 158* 142* 111*   CALCIUM 7.4* 7.5* 7.8* 8.0*   ALBUMIN 2.2* 2.2* 2.2*  --    PROT 5.1* 5.3* 5.5*  --    * 135* 138 140   K 4.3 4.3 4.2 4.2   CO2 24 25 20* 19*    100 103 104   BUN 54* 58* 67* 69*   CREATININE 6.7* 6.9* 7.0* 7.2*   ALKPHOS 99 71 69  --    ALT 33 31 25  --    AST 49* 41* 30  --    BILITOT 0.7 0.7 0.5  --       Coagulation:   Recent Labs   Lab 02/29/20 2122 03/01/20 2132 03/02/20 0259 03/02/20 0804 03/03/20 0407   INR 1.3*  --  1.2  --   --   --    APTT 37.6*   < > 63.7*  63.7* 61.2* 48.1* 27.9    < > = values in this interval not displayed.     LDH:  Recent Labs   Lab 02/29/20 2122 03/02/20  0003 03/03/20  0407   LDH 1,325* 911* 584*  "    Microbiology:  Microbiology Results (last 7 days)     Procedure Component Value Units Date/Time    Blood culture [[id number]] Collected:  02/29/20 2305    Order Status:  Completed Specimen:  Blood from Peripheral, Forearm, Right Updated:  03/03/20 0612     Blood Culture, Routine No Growth to date      No Growth to date      No Growth to date    Blood culture [[id number]] Collected:  03/01/20 0254    Order Status:  Completed Specimen:  Blood from Peripheral, Upper Arm, Left Updated:  03/03/20 0612     Blood Culture, Routine No Growth to date      No Growth to date      No Growth to date    Urine culture [[id number]] Collected:  02/29/20 2240    Order Status:  Completed Specimen:  Urine Updated:  03/02/20 0739     Urine Culture, Routine No significant growth    Narrative:       Preferred Collection Type->Urine, Clean Catch        I have reviewed all pertinent labs within the past 24 hours.    Estimated Creatinine Clearance: 9.5 mL/min (A) (based on SCr of 7.2 mg/dL (H)).    Diagnostic Results:  I have reviewed all pertinent imaging results/findings within the past 24 hours.    Assessment and Plan:     Patient intubated w limited collateral from family; further OSH records pending    65 y/o M hx of CAD (50-60% LCx, OM disease on [facility] 2/29), ICM EF 10-15% s/p AICD (unknown baseline but on GDMT as OP), HTN, DM2, HLD, obesity, CKD, presenting from OSH intubated with Impella in setting of cardiogenic shock. Per pt's brother he was taken by EMS to OSH in setting of feeling short of breath, nearly passing out. There found to be in shock, lactate to 12, ANGEL w Cr 2.5, trop 12; unclear if dynamic ECG changes, max trop 10; underwent LHC with evidence of 50-60% LCx, OM disease; no PCI performed. RHC with LVEDP 35. CO 3, CI 1.8, tte w LVEF 10-15%. Underwent Impella placement without additional central line placement (per brother there was a "complication" but no evidence of pneumothorax).    On arrival patient -having intermittent " suction alarms, improved w P6->P4. MAPs stable 75-85. CVC placed in LIJ given neck position, CO 9.7, CI 5.35, svr 535. WBC 26k, patient cultured, given single dose of vanc/zosyn. UOP 60-80cc/hr off diuretics. Further collateral pending from OSH. Continued on heparin gtt for impella, weaned to dobutamine 5, gave 500cc bolus in setting of CVP 10 and suction alarms.    * Cardiogenic shock  63 y/o M hx of CAD (50-60% LCx, OM disease on [facility] 2/29), ICM EF 10-15% s/p AICD (unknown baseline but on GDMT as OP), HTN, DM2, HLD, obesity, CKD, presenting from OSH intubated with Impella in setting of cardiogenic shock/VT(Pt stopped his medications).    -Impella removed 3/2/20.   -CVP 9, ScVO2 80. Calculated CO/CI 12/6.3(In setting of severe agitation this morning). Will re assess throughout the day.   - 2.5mcg/kg/mn. Will stop in light of elevated output and hypertension/afib.  -GDMT- Will start hydralazine/isosorbide today for afterload reduction.   -TTE 3/2 EF 10%, LVIDD 6, TAPSE 1.98, Mod MR, Mod TR.  -Not working up for advanced options due to medical instability/renal failure at this time. Will re assess if condition improves.     ANGEL (acute kidney injury)  - Likely ATN in setting of shock, Hemolysis, plus IV contrast at OSH.  - Monitor Cr closely, uptrending.  - Appreciate renal consult.  - strict I/O.  - avoid nephrotoxic meds.  -Will give dose of IVP Lasix this am and follow as CXR is worsening.     Acute hypoxemic respiratory failure  -Continue current vent setting(changed to AC this am).  -Stopped precedex and started Fentanyl gtt in addition to propofol.  -CXR worsening today. Will push diuresis as above.  -SBT once CXR improved and pt calmer.     VT (ventricular tachycardia)  -S/P ICD shocks x 5 per OSH.  -Continue PO amio.     Malnutrition of moderate degree  -Dietary Cx.  -Stiven lstart TFs.     PAF (paroxysmal atrial fibrillation)  -Episode of afib RVR this morning during severe agitation.  -Converted to  NSR.  -Will start heparin for anticoag.     Acute on chronic combined systolic and diastolic heart failure  - See Cardiogenic shock.    DM (diabetes mellitus)  Unclear if hx DM vs preDM  - follow up A1c   - SSI while NPO    CAD (coronary artery disease)  -Hx of RCA stent in 2013.   -[facility] 2/29: 50-60% LCx, OM disease.  -continue ASA, high intensity statin.  -Starting heparin as above.       Uninterrupted Critical Care/Counseling Time (not including procedures): 60mn  Alejo Frankel, NP  Heart Transplant  [facility]

## 2020-03-03 NOTE — SUBJECTIVE & OBJECTIVE
Interval History:   Kidney function continues to worsen, Scr p to 7.2 from 6.9, other electrolytes stable.  UOP of 2.2L/24h, net negative ~ 891 ml.  Impella removed yesterday.  CXR with pulmonary edema.    Review of patient's allergies indicates:  No Known Allergies  Current Facility-Administered Medications   Medication Frequency    amiodarone tablet 400 mg BID    aspirin chewable tablet 81 mg Daily    atorvastatin tablet 80 mg Daily    chlorhexidine 0.12 % solution 15 mL BID    dextrose 10% (D10W) Bolus PRN    diphenhydrAMINE capsule 25 mg Q6H PRN    famotidine (PF) injection 20 mg BID    fentaNYL 2500 mcg in 0.9% sodium chloride 250 mL infusion premix (titrating) Continuous    fentaNYL injection 25 mcg Q2H PRN    fentaNYL injection 25 mcg Once    glucagon (human recombinant) injection 1 mg PRN    heparin (porcine) injection 5,000 Units Q12H    insulin aspart U-100 pen 0-5 Units Q6H PRN    propofol (DIPRIVAN) 10 mg/mL infusion Continuous    sodium chloride 0.9% flush 10 mL PRN    sodium chloride 0.9% flush 10 mL PRN       Objective:     Vital Signs (Most Recent):  Temp: 97.6 °F (36.4 °C) (03/03/20 0700)  Pulse: 99 (03/03/20 0950)  Resp: 16 (03/02/20 1645)  BP: (!) 137/57 (03/03/20 0845)  SpO2: 95 % (03/03/20 0950)  O2 Device (Oxygen Therapy): ventilator (03/03/20 0929) Vital Signs (24h Range):  Temp:  [97.5 °F (36.4 °C)-98.9 °F (37.2 °C)] 97.6 °F (36.4 °C)  Pulse:  [] 99  Resp:  [9-22] 16  SpO2:  [93 %-100 %] 95 %  BP: (116-179)/(57-92) 137/57  Arterial Line BP: (106-223)/(49-94) 124/55     Weight: 76.2 kg (168 lb) (03/02/20 1515)  Body mass index is 27.12 kg/m².  Body surface area is 1.88 meters squared.    I/O last 3 completed shifts:  In: 2159 [I.V.:2089; NG/GT:70]  Out: 2995 [Urine:2995]    Physical Exam   Constitutional: He appears well-developed and well-nourished. He is sedated and intubated.   Cardiovascular:   Murmur heard.  Pulmonary/Chest: Effort normal. He is intubated. He has no  wheezes. He has rales.   Abdominal: Soft. Bowel sounds are normal.   Musculoskeletal: He exhibits no edema.   Skin: Skin is warm.       Significant Labs:  ABGs:   Recent Labs   Lab 03/03/20  0552   PH 7.360   PCO2 31.1*   HCO3 17.5*   POCSATURATED 96   BE -8     CBC:   Recent Labs   Lab 03/03/20  0407   WBC 13.80*   RBC 2.79*   HGB 8.2*   HCT 26.1*   *   MCV 94   MCH 29.4   MCHC 31.4*     CMP:   Recent Labs   Lab 03/03/20  0809      CALCIUM 7.9*   ALBUMIN 2.3*   PROT 6.0      K 4.1   CO2 21*      BUN 72*   CREATININE 7.6*   ALKPHOS 74   ALT 22   AST 28   BILITOT 0.7

## 2020-03-03 NOTE — PLAN OF CARE
CMICU DAILY GOALS       A: Awake    RASS: Goal - RASS Goal: -1-->drowsy  Actual - RASS (Horton Agitation-Sedation Scale): -1-->drowsy   Restraint necessity: Clinical Justification: Treatment Interference, Removing medical devices  B: Breath   SBT: Fail   C: Coordinate A & B, analgesics/sedatives   Pain: managed    SAT: Fail  D: Delirium   CAM-ICU: Overall CAM-ICU: Positive  E: Early Mobility   MOVE Screen: Fail   Activity: Activity Management: activity adjusted per tolerance  FAS: Feeding/Nutrition   Diet order: Diet/Nutrition Received: NPO,   Fluid restriction:    T: Thrombus   DVT prophylaxis: VTE Required Core Measure: Pharmacological prophylaxis initiated/maintained  H: HOB Elevation   Head of Bed (HOB): HOB at 60 degrees  U: Ulcer Prophylaxis   GI: yes  G: Glucose control   managed Glycemic Management: blood glucose monitoring  S: Skin   Bundle compliance: yes   Bathing/Skin Care: bath, complete, linen changed Date: 03/02 per pm RN  B: Bowel Function   no issues   I: Indwelling Catheters   Lamb necessity:      Urethral Catheter 02/29/20 2056 16 Fr.-Reason for Continuing Urinary Catheterization: Critically ill in ICU requiring intensive monitoring   CVC necessity: Yes   IPAD offered: Not appropriate  D: De-escalation Antibx   N/a   Plan for the day   SBT 03/03/20  Family/Goals of care/Code Status   Code Status: Full Code     No acute events throughout day, VS and assessment per flow sheet, patient progressing towards goals as tolerated, plan of care reviewed with John Javier and family, all concerns addressed, will continue to monitor.

## 2020-03-03 NOTE — ASSESSMENT & PLAN NOTE
* Non-oliguric iATN from cardiogenic shock.     3/2: -urine microscopy:  abundant muddy brown casts (leaning towards ATN)     Plan/ Rec:  -UOP decreased overnight, administered 80 mg of IV lasix per primary team with improvement in UOP.  -based on CXR findings, agree with diuresis, can schedule BID if needed based on UOP.  -recommend a goal of net negative 1-2L/24h.   - No need for RRT   - Renal panel q 12 hrs  - Strict I/O and chart  - Will follow closely  -will discuss plan with Dr. Guaman

## 2020-03-03 NOTE — SUBJECTIVE & OBJECTIVE
Interval History: Intubated but follows commands. Became very agitated this morning after coughing spell and BP jodi to ~200s systolic with HR(sinus rhythmn) increasing to ~110 from 80s. Pt then converted to afib RVR with rates initially 160s but decreased to ~130 with increasing sedation. Pt then converted back to NSR with HR in 90s. BP improved to 130s systolic after increasing sedation and giving 20mg Hydralazine IVP.    Continuous Infusions:   dexmedetomidine (PRECEDEX) infusion 0.7 mcg/kg/hr (03/03/20 0616)    DOBUTamine 2.5 mcg/kg/min (03/03/20 0600)    heparin (porcine)      propofol 25 mcg/kg/min (03/03/20 0727)     Scheduled Meds:   amiodarone  400 mg Per NG tube BID    aspirin  81 mg Per NG tube Daily    atorvastatin  80 mg Per NG tube Daily    chlorhexidine  15 mL Mouth/Throat BID    furosemide (LASIX) IV  80 mg Intravenous Once     PRN Meds:Dextrose 10% Bolus, diphenhydrAMINE, glucagon (human recombinant), heparin (porcine), insulin aspart U-100, sodium chloride 0.9%, sodium chloride 0.9%    Review of patient's allergies indicates:  No Known Allergies  Objective:     Vital Signs (Most Recent):  Temp: 97.6 °F (36.4 °C) (03/03/20 0700)  Pulse: 102 (03/03/20 0809)  Resp: 16 (03/02/20 1645)  BP: (!) 179/92 (03/03/20 0800)  SpO2: 97 % (03/03/20 0809) Vital Signs (24h Range):  Temp:  [97.5 °F (36.4 °C)-98.9 °F (37.2 °C)] 97.6 °F (36.4 °C)  Pulse:  [] 102  Resp:  [9-23] 16  SpO2:  [94 %-100 %] 97 %  BP: (116-179)/(72-92) 179/92  Arterial Line BP: (106-223)/(61-94) 156/69     Patient Vitals for the past 72 hrs (Last 3 readings):   Weight   03/02/20 1515 76.2 kg (168 lb)   03/02/20 1300 76.5 kg (168 lb 10.4 oz)   03/02/20 0305 76.5 kg (168 lb 10.4 oz)     Body mass index is 27.12 kg/m².      Intake/Output Summary (Last 24 hours) at 3/3/2020 0814  Last data filed at 3/3/2020 0800  Gross per 24 hour   Intake 1254.62 ml   Output 2220 ml   Net -965.38 ml        Telemetry: NSR 80s    Physical Exam    Constitutional: He appears well-developed and well-nourished. He is sedated and intubated.   HENT:   Head: Normocephalic.   Eyes: Pupils are equal, round, and reactive to light.   Neck: Normal range of motion. Neck supple.   LIJ TLC placed 3/1/20.   Cardiovascular: Normal rate and regular rhythm.   Murmur heard.  Pulmonary/Chest: Effort normal. He is intubated. He has rales.   Abdominal: Soft. Bowel sounds are normal.   Musculoskeletal: Normal range of motion.   Skin: Skin is warm and dry.   Nursing note and vitals reviewed.    Significant Labs:  CBC:  Recent Labs   Lab 03/02/20 0259 03/03/20  0040 03/03/20  0407   WBC 19.57* 12.14 13.80*   RBC 2.67* 2.62* 2.79*   HGB 8.1* 7.8* 8.2*   HCT 23.9* 24.1* 26.1*   PLT 97* 91* 106*   MCV 90 92 94   MCH 30.3 29.8 29.4   MCHC 33.9 32.4 31.4*     BNP:  Recent Labs   Lab 02/29/20 2122   *     CMP:  Recent Labs   Lab 03/01/20 2132 03/02/20 0259 03/02/20  1701 03/03/20  0436   * 158* 142* 111*   CALCIUM 7.4* 7.5* 7.8* 8.0*   ALBUMIN 2.2* 2.2* 2.2*  --    PROT 5.1* 5.3* 5.5*  --    * 135* 138 140   K 4.3 4.3 4.2 4.2   CO2 24 25 20* 19*    100 103 104   BUN 54* 58* 67* 69*   CREATININE 6.7* 6.9* 7.0* 7.2*   ALKPHOS 99 71 69  --    ALT 33 31 25  --    AST 49* 41* 30  --    BILITOT 0.7 0.7 0.5  --       Coagulation:   Recent Labs   Lab 02/29/20 2122 03/01/20 2132 03/02/20 0259 03/02/20  0804 03/03/20  0407   INR 1.3*  --  1.2  --   --   --    APTT 37.6*   < > 63.7*  63.7* 61.2* 48.1* 27.9    < > = values in this interval not displayed.     LDH:  Recent Labs   Lab 02/29/20 2122 03/02/20  0003 03/03/20  0407   LDH 1,325* 911* 584*     Microbiology:  Microbiology Results (last 7 days)     Procedure Component Value Units Date/Time    Blood culture [[id number]] Collected:  02/29/20 2305    Order Status:  Completed Specimen:  Blood from Peripheral, Forearm, Right Updated:  03/03/20 0612     Blood Culture, Routine No Growth to date      No Growth to  date      No Growth to date    Blood culture [[id number]] Collected:  03/01/20 0254    Order Status:  Completed Specimen:  Blood from Peripheral, Upper Arm, Left Updated:  03/03/20 0612     Blood Culture, Routine No Growth to date      No Growth to date      No Growth to date    Urine culture [[id number]] Collected:  02/29/20 2240    Order Status:  Completed Specimen:  Urine Updated:  03/02/20 0739     Urine Culture, Routine No significant growth    Narrative:       Preferred Collection Type->Urine, Clean Catch        I have reviewed all pertinent labs within the past 24 hours.    Estimated Creatinine Clearance: 9.5 mL/min (A) (based on SCr of 7.2 mg/dL (H)).    Diagnostic Results:  I have reviewed all pertinent imaging results/findings within the past 24 hours.

## 2020-03-03 NOTE — ASSESSMENT & PLAN NOTE
- Likely ATN in setting of shock, Hemolysis, plus IV contrast at OSH.  - Monitor Cr closely, uptrending.  - Appreciate renal consult.  - strict I/O.  - avoid nephrotoxic meds.  -Will give dose of IVP Lasix this am and follow as CXR is worsening.

## 2020-03-03 NOTE — ASSESSMENT & PLAN NOTE
-Hx of RCA stent in 2013.   -[facility] 2/29: 50-60% LCx, OM disease.  -continue ASA, high intensity statin.  -Starting heparin as above.

## 2020-03-03 NOTE — ASSESSMENT & PLAN NOTE
-Continue current vent setting(changed to AC this am).  -Stopped precedex and started Fentanyl gtt in addition to propofol.  -CXR worsening today. Will push diuresis as above.  -SBT once CXR improved and pt calmer.

## 2020-03-04 ENCOUNTER — TELEPHONE (OUTPATIENT)
Dept: TRANSPLANT | Facility: CLINIC | Age: 65
End: 2020-03-04

## 2020-03-04 LAB
ALBUMIN SERPL BCP-MCNC: 2.6 G/DL (ref 3.5–5.2)
ALBUMIN SERPL BCP-MCNC: 2.6 G/DL (ref 3.5–5.2)
ALLENS TEST: ABNORMAL
ALP SERPL-CCNC: 86 U/L (ref 55–135)
ALP SERPL-CCNC: 86 U/L (ref 55–135)
ALT SERPL W/O P-5'-P-CCNC: 15 U/L (ref 10–44)
ALT SERPL W/O P-5'-P-CCNC: 16 U/L (ref 10–44)
ANION GAP SERPL CALC-SCNC: 16 MMOL/L (ref 8–16)
APTT BLDCRRT: 49.7 SEC (ref 21–32)
APTT BLDCRRT: 55.2 SEC (ref 21–32)
AST SERPL-CCNC: 29 U/L (ref 10–40)
AST SERPL-CCNC: 30 U/L (ref 10–40)
BASOPHILS # BLD AUTO: 0.01 K/UL (ref 0–0.2)
BASOPHILS NFR BLD: 0.1 % (ref 0–1.9)
BILIRUB SERPL-MCNC: 0.4 MG/DL (ref 0.1–1)
BILIRUB SERPL-MCNC: 0.4 MG/DL (ref 0.1–1)
BUN SERPL-MCNC: 84 MG/DL (ref 8–23)
BUN SERPL-MCNC: 88 MG/DL (ref 8–23)
BUN SERPL-MCNC: 89 MG/DL (ref 8–23)
CALCIUM SERPL-MCNC: 8 MG/DL (ref 8.7–10.5)
CALCIUM SERPL-MCNC: 8.1 MG/DL (ref 8.7–10.5)
CALCIUM SERPL-MCNC: 8.4 MG/DL (ref 8.7–10.5)
CHLORIDE SERPL-SCNC: 102 MMOL/L (ref 95–110)
CHLORIDE SERPL-SCNC: 104 MMOL/L (ref 95–110)
CHLORIDE SERPL-SCNC: 104 MMOL/L (ref 95–110)
CO2 SERPL-SCNC: 22 MMOL/L (ref 23–29)
CO2 SERPL-SCNC: 23 MMOL/L (ref 23–29)
CO2 SERPL-SCNC: 23 MMOL/L (ref 23–29)
CREAT SERPL-MCNC: 7.4 MG/DL (ref 0.5–1.4)
CREAT SERPL-MCNC: 7.5 MG/DL (ref 0.5–1.4)
CREAT SERPL-MCNC: 7.8 MG/DL (ref 0.5–1.4)
DIFFERENTIAL METHOD: ABNORMAL
EOSINOPHIL # BLD AUTO: 0.1 K/UL (ref 0–0.5)
EOSINOPHIL NFR BLD: 0.4 % (ref 0–8)
ERYTHROCYTE [DISTWIDTH] IN BLOOD BY AUTOMATED COUNT: 16.2 % (ref 11.5–14.5)
EST. GFR  (AFRICAN AMERICAN): 7.7 ML/MIN/1.73 M^2
EST. GFR  (AFRICAN AMERICAN): 8.1 ML/MIN/1.73 M^2
EST. GFR  (AFRICAN AMERICAN): 8.2 ML/MIN/1.73 M^2
EST. GFR  (NON AFRICAN AMERICAN): 6.7 ML/MIN/1.73 M^2
EST. GFR  (NON AFRICAN AMERICAN): 7 ML/MIN/1.73 M^2
EST. GFR  (NON AFRICAN AMERICAN): 7.1 ML/MIN/1.73 M^2
GLUCOSE SERPL-MCNC: 150 MG/DL (ref 70–110)
GLUCOSE SERPL-MCNC: 170 MG/DL (ref 70–110)
GLUCOSE SERPL-MCNC: 203 MG/DL (ref 70–110)
HCO3 UR-SCNC: 24.4 MMOL/L (ref 24–28)
HCO3 UR-SCNC: 24.6 MMOL/L (ref 24–28)
HCO3 UR-SCNC: 25.4 MMOL/L (ref 24–28)
HCT VFR BLD AUTO: 24.2 % (ref 40–54)
HGB BLD-MCNC: 7.8 G/DL (ref 14–18)
IMM GRANULOCYTES # BLD AUTO: 0.07 K/UL (ref 0–0.04)
IMM GRANULOCYTES NFR BLD AUTO: 0.6 % (ref 0–0.5)
LYMPHOCYTES # BLD AUTO: 0.8 K/UL (ref 1–4.8)
LYMPHOCYTES NFR BLD: 7.1 % (ref 18–48)
MAGNESIUM SERPL-MCNC: 3.2 MG/DL (ref 1.6–2.6)
MCH RBC QN AUTO: 29.7 PG (ref 27–31)
MCHC RBC AUTO-ENTMCNC: 32.2 G/DL (ref 32–36)
MCV RBC AUTO: 92 FL (ref 82–98)
MONOCYTES # BLD AUTO: 1 K/UL (ref 0.3–1)
MONOCYTES NFR BLD: 8.3 % (ref 4–15)
NEUTROPHILS # BLD AUTO: 9.6 K/UL (ref 1.8–7.7)
NEUTROPHILS NFR BLD: 83.5 % (ref 38–73)
NRBC BLD-RTO: 0 /100 WBC
PCO2 BLDA: 45.8 MMHG (ref 35–45)
PCO2 BLDA: 46.9 MMHG (ref 35–45)
PCO2 BLDA: 50 MMHG (ref 35–45)
PH SMN: 7.31 [PH] (ref 7.35–7.45)
PH SMN: 7.32 [PH] (ref 7.35–7.45)
PH SMN: 7.34 [PH] (ref 7.35–7.45)
PHOSPHATE SERPL-MCNC: 8 MG/DL (ref 2.7–4.5)
PLATELET # BLD AUTO: 115 K/UL (ref 150–350)
PMV BLD AUTO: 12.1 FL (ref 9.2–12.9)
PO2 BLDA: 109 MMHG (ref 80–100)
PO2 BLDA: 50 MMHG (ref 40–60)
PO2 BLDA: 51 MMHG (ref 40–60)
POC BE: -1 MMOL/L
POC BE: -1 MMOL/L
POC BE: -2 MMOL/L
POC SATURATED O2: 82 % (ref 95–100)
POC SATURATED O2: 82 % (ref 95–100)
POC SATURATED O2: 98 % (ref 95–100)
POC TCO2: 26 MMOL/L (ref 23–27)
POC TCO2: 26 MMOL/L (ref 24–29)
POC TCO2: 27 MMOL/L (ref 24–29)
POCT GLUCOSE: 179 MG/DL (ref 70–110)
POCT GLUCOSE: 192 MG/DL (ref 70–110)
POCT GLUCOSE: 194 MG/DL (ref 70–110)
POCT GLUCOSE: 207 MG/DL (ref 70–110)
POTASSIUM SERPL-SCNC: 4.3 MMOL/L (ref 3.5–5.1)
POTASSIUM SERPL-SCNC: 4.5 MMOL/L (ref 3.5–5.1)
POTASSIUM SERPL-SCNC: 4.5 MMOL/L (ref 3.5–5.1)
PREALB SERPL-MCNC: <2.5 MG/DL (ref 20–43)
PROT SERPL-MCNC: 6.5 G/DL (ref 6–8.4)
PROT SERPL-MCNC: 6.5 G/DL (ref 6–8.4)
RBC # BLD AUTO: 2.63 M/UL (ref 4.6–6.2)
SAMPLE: ABNORMAL
SITE: ABNORMAL
SODIUM SERPL-SCNC: 141 MMOL/L (ref 136–145)
SODIUM SERPL-SCNC: 142 MMOL/L (ref 136–145)
SODIUM SERPL-SCNC: 143 MMOL/L (ref 136–145)
WBC # BLD AUTO: 11.5 K/UL (ref 3.9–12.7)

## 2020-03-04 PROCEDURE — 94003 VENT MGMT INPAT SUBQ DAY: CPT

## 2020-03-04 PROCEDURE — 94761 N-INVAS EAR/PLS OXIMETRY MLT: CPT

## 2020-03-04 PROCEDURE — 99291 CRITICAL CARE FIRST HOUR: CPT | Mod: ,,, | Performed by: PHYSICIAN ASSISTANT

## 2020-03-04 PROCEDURE — 63600175 PHARM REV CODE 636 W HCPCS: Performed by: STUDENT IN AN ORGANIZED HEALTH CARE EDUCATION/TRAINING PROGRAM

## 2020-03-04 PROCEDURE — 25000003 PHARM REV CODE 250: Performed by: NURSE PRACTITIONER

## 2020-03-04 PROCEDURE — 25000003 PHARM REV CODE 250: Performed by: STUDENT IN AN ORGANIZED HEALTH CARE EDUCATION/TRAINING PROGRAM

## 2020-03-04 PROCEDURE — 99233 PR SUBSEQUENT HOSPITAL CARE,LEVL III: ICD-10-PCS | Mod: ,,, | Performed by: NURSE PRACTITIONER

## 2020-03-04 PROCEDURE — 27000221 HC OXYGEN, UP TO 24 HOURS

## 2020-03-04 PROCEDURE — 84134 ASSAY OF PREALBUMIN: CPT

## 2020-03-04 PROCEDURE — 63600175 PHARM REV CODE 636 W HCPCS: Performed by: NURSE PRACTITIONER

## 2020-03-04 PROCEDURE — 83735 ASSAY OF MAGNESIUM: CPT | Mod: 91

## 2020-03-04 PROCEDURE — 99900035 HC TECH TIME PER 15 MIN (STAT)

## 2020-03-04 PROCEDURE — 85730 THROMBOPLASTIN TIME PARTIAL: CPT

## 2020-03-04 PROCEDURE — 94150 VITAL CAPACITY TEST: CPT

## 2020-03-04 PROCEDURE — 80048 BASIC METABOLIC PNL TOTAL CA: CPT

## 2020-03-04 PROCEDURE — 99233 SBSQ HOSP IP/OBS HIGH 50: CPT | Mod: ,,, | Performed by: NURSE PRACTITIONER

## 2020-03-04 PROCEDURE — 20000000 HC ICU ROOM

## 2020-03-04 PROCEDURE — 94010 BREATHING CAPACITY TEST: CPT

## 2020-03-04 PROCEDURE — 25000003 PHARM REV CODE 250: Performed by: PHYSICIAN ASSISTANT

## 2020-03-04 PROCEDURE — S0028 INJECTION, FAMOTIDINE, 20 MG: HCPCS | Performed by: NURSE PRACTITIONER

## 2020-03-04 PROCEDURE — 85025 COMPLETE CBC W/AUTO DIFF WBC: CPT

## 2020-03-04 PROCEDURE — 99291 PR CRITICAL CARE, E/M 30-74 MINUTES: ICD-10-PCS | Mod: ,,, | Performed by: PHYSICIAN ASSISTANT

## 2020-03-04 PROCEDURE — 80053 COMPREHEN METABOLIC PANEL: CPT | Mod: 91

## 2020-03-04 PROCEDURE — 37799 UNLISTED PX VASCULAR SURGERY: CPT

## 2020-03-04 PROCEDURE — 80053 COMPREHEN METABOLIC PANEL: CPT

## 2020-03-04 PROCEDURE — 63600175 PHARM REV CODE 636 W HCPCS: Performed by: PHYSICIAN ASSISTANT

## 2020-03-04 PROCEDURE — 82803 BLOOD GASES ANY COMBINATION: CPT

## 2020-03-04 PROCEDURE — 99900026 HC AIRWAY MAINTENANCE (STAT)

## 2020-03-04 PROCEDURE — 84100 ASSAY OF PHOSPHORUS: CPT

## 2020-03-04 PROCEDURE — 83735 ASSAY OF MAGNESIUM: CPT

## 2020-03-04 RX ORDER — CLOPIDOGREL BISULFATE 75 MG/1
75 TABLET ORAL DAILY
Status: ON HOLD | COMMUNITY
End: 2020-03-12 | Stop reason: HOSPADM

## 2020-03-04 RX ORDER — HYDRALAZINE HYDROCHLORIDE 20 MG/ML
10 INJECTION INTRAMUSCULAR; INTRAVENOUS ONCE
Status: COMPLETED | OUTPATIENT
Start: 2020-03-04 | End: 2020-03-04

## 2020-03-04 RX ORDER — NITROGLYCERIN 20 MG/100ML
5 INJECTION INTRAVENOUS CONTINUOUS
Status: DISCONTINUED | OUTPATIENT
Start: 2020-03-04 | End: 2020-03-04

## 2020-03-04 RX ORDER — NITROGLYCERIN 20 MG/100ML
5 INJECTION INTRAVENOUS CONTINUOUS
Status: DISCONTINUED | OUTPATIENT
Start: 2020-03-04 | End: 2020-03-06

## 2020-03-04 RX ORDER — METFORMIN HYDROCHLORIDE 500 MG/1
500 TABLET ORAL 2 TIMES DAILY WITH MEALS
Status: ON HOLD | COMMUNITY
End: 2020-03-12 | Stop reason: HOSPADM

## 2020-03-04 RX ORDER — SPIRONOLACTONE 25 MG/1
12.5 TABLET ORAL DAILY
Status: ON HOLD | COMMUNITY
End: 2020-03-12 | Stop reason: HOSPADM

## 2020-03-04 RX ORDER — FUROSEMIDE 20 MG/1
20 TABLET ORAL DAILY PRN
Status: ON HOLD | COMMUNITY
End: 2020-03-12 | Stop reason: SDUPTHER

## 2020-03-04 RX ORDER — METOPROLOL TARTRATE 1 MG/ML
5 INJECTION, SOLUTION INTRAVENOUS ONCE AS NEEDED
Status: COMPLETED | OUTPATIENT
Start: 2020-03-04 | End: 2020-03-04

## 2020-03-04 RX ORDER — FUROSEMIDE 10 MG/ML
40 INJECTION INTRAMUSCULAR; INTRAVENOUS EVERY 24 HOURS
Status: DISCONTINUED | OUTPATIENT
Start: 2020-03-05 | End: 2020-03-05

## 2020-03-04 RX ORDER — METOPROLOL TARTRATE 1 MG/ML
5 INJECTION, SOLUTION INTRAVENOUS ONCE
Status: DISCONTINUED | OUTPATIENT
Start: 2020-03-04 | End: 2020-03-05

## 2020-03-04 RX ORDER — SEVELAMER CARBONATE FOR ORAL SUSPENSION 800 MG/1
1.6 POWDER, FOR SUSPENSION ORAL
Status: DISCONTINUED | OUTPATIENT
Start: 2020-03-04 | End: 2020-03-06

## 2020-03-04 RX ORDER — PANTOPRAZOLE SODIUM 40 MG/1
40 TABLET, DELAYED RELEASE ORAL DAILY
COMMUNITY
End: 2020-07-01 | Stop reason: SDUPTHER

## 2020-03-04 RX ORDER — AMOXICILLIN 250 MG
1 CAPSULE ORAL 2 TIMES DAILY PRN
Status: DISCONTINUED | OUTPATIENT
Start: 2020-03-04 | End: 2020-03-13 | Stop reason: HOSPADM

## 2020-03-04 RX ORDER — METOPROLOL SUCCINATE 100 MG/1
150 TABLET, EXTENDED RELEASE ORAL DAILY
Status: ON HOLD | COMMUNITY
End: 2020-03-12 | Stop reason: HOSPADM

## 2020-03-04 RX ADMIN — HEPARIN SODIUM AND DEXTROSE 14 UNITS/KG/HR: 10000; 5 INJECTION INTRAVENOUS at 10:03

## 2020-03-04 RX ADMIN — SENNOSIDES AND DOCUSATE SODIUM 1 TABLET: 8.6; 5 TABLET ORAL at 06:03

## 2020-03-04 RX ADMIN — SEVELAMER CARBONATE 1.6 G: 0.8 POWDER, FOR SUSPENSION ORAL at 06:03

## 2020-03-04 RX ADMIN — HYDRALAZINE HYDROCHLORIDE 10 MG: 10 TABLET, FILM COATED ORAL at 05:03

## 2020-03-04 RX ADMIN — ASPIRIN 81 MG CHEWABLE TABLET 81 MG: 81 TABLET CHEWABLE at 10:03

## 2020-03-04 RX ADMIN — ISOSORBIDE DINITRATE 10 MG: 10 TABLET ORAL at 05:03

## 2020-03-04 RX ADMIN — AMIODARONE HYDROCHLORIDE 150 MG: 1.5 INJECTION, SOLUTION INTRAVENOUS at 09:03

## 2020-03-04 RX ADMIN — AMIODARONE HYDROCHLORIDE 400 MG: 200 TABLET ORAL at 10:03

## 2020-03-04 RX ADMIN — FAMOTIDINE 20 MG: 10 INJECTION INTRAVENOUS at 10:03

## 2020-03-04 RX ADMIN — ATORVASTATIN CALCIUM 80 MG: 20 TABLET, FILM COATED ORAL at 10:03

## 2020-03-04 RX ADMIN — METOPROLOL TARTRATE 5 MG: 5 INJECTION INTRAVENOUS at 09:03

## 2020-03-04 RX ADMIN — NITROGLYCERIN 50 MCG/MIN: 20 INJECTION INTRAVENOUS at 01:03

## 2020-03-04 RX ADMIN — AMIODARONE HYDROCHLORIDE 1 MG/MIN: 1.8 INJECTION, SOLUTION INTRAVENOUS at 09:03

## 2020-03-04 RX ADMIN — CHLORHEXIDINE GLUCONATE 0.12% ORAL RINSE 15 ML: 1.2 LIQUID ORAL at 10:03

## 2020-03-04 RX ADMIN — HYDRALAZINE HYDROCHLORIDE 10 MG: 20 INJECTION INTRAMUSCULAR; INTRAVENOUS at 02:03

## 2020-03-04 RX ADMIN — PROPOFOL 50 MCG/KG/MIN: 10 INJECTION, EMULSION INTRAVENOUS at 02:03

## 2020-03-04 RX ADMIN — PROPOFOL 50 MCG/KG/MIN: 10 INJECTION, EMULSION INTRAVENOUS at 05:03

## 2020-03-04 RX ADMIN — NITROGLYCERIN 5 MCG/MIN: 20 INJECTION INTRAVENOUS at 10:03

## 2020-03-04 NOTE — TELEPHONE ENCOUNTER
Case reviewed in HTS rounds.  Per team, pt to be followed by pre heart transplant at this time, although it is expected that he will transfer to CHF section at discharge as he is currently not a candidate for advanced options secondary to creat and compliance.

## 2020-03-04 NOTE — ASSESSMENT & PLAN NOTE
-Hx of RCA stent in 2013.   -[facility] 2/29: 50-60% LCx, OM disease.  -continue ASA, high intensity statin.  -Continue heparin

## 2020-03-04 NOTE — ASSESSMENT & PLAN NOTE
-Episode of afib RVR 3/3/2020 in the setting of severe agitation with SBT  -Converted to NSR  -Started on heparin gtt for anticoagulation

## 2020-03-04 NOTE — SUBJECTIVE & OBJECTIVE
Interval History:   Responded well to IV lasix, 4.5L/24h, net negative 3.2L/24h.  CXR improved.  Slight improvement in SCr to 7.4 from 7.7, BUN increased to 84.     Review of patient's allergies indicates:  No Known Allergies  Current Facility-Administered Medications   Medication Frequency    amiodarone tablet 400 mg BID    aspirin chewable tablet 81 mg Daily    atorvastatin tablet 80 mg Daily    chlorhexidine 0.12 % solution 15 mL BID    dextrose 10% (D10W) Bolus PRN    diphenhydrAMINE capsule 25 mg Q6H PRN    famotidine (PF) injection 20 mg Daily    fentaNYL 2500 mcg in 0.9% sodium chloride 250 mL infusion premix (titrating) Continuous    fentaNYL injection 25 mcg Q2H PRN    fentaNYL injection 25 mcg Once    [START ON 3/5/2020] furosemide injection 40 mg Daily    glucagon (human recombinant) injection 1 mg PRN    heparin 25,000 units in dextrose 5% 250 mL (100 units/mL) infusion LOW INTENSITY nomogram - [facility] Continuous    hydrALAZINE tablet 10 mg Q8H    insulin aspart U-100 pen 0-5 Units Q6H PRN    nitroGLYCERIN 50 mg in dextrose 5 % 250 mL infusion (TITRATING) Continuous    propofol (DIPRIVAN) 10 mg/mL infusion Continuous    sodium chloride 0.9% flush 10 mL PRN    sodium chloride 0.9% flush 10 mL PRN       Objective:     Vital Signs (Most Recent):  Temp: 98.5 °F (36.9 °C) (03/04/20 0700)  Pulse: 109 (03/04/20 1226)  Resp: 16 (03/03/20 1507)  BP: (!) 158/72 (03/04/20 0700)  SpO2: 98 % (03/04/20 1226)  O2 Device (Oxygen Therapy): ventilator (03/04/20 1159) Vital Signs (24h Range):  Temp:  [98.2 °F (36.8 °C)-99.2 °F (37.3 °C)] 98.5 °F (36.9 °C)  Pulse:  [] 109  Resp:  [16] 16  SpO2:  [95 %-99 %] 98 %  BP: (113-158)/(57-72) 158/72  Arterial Line BP: (109-155)/(52-70) 155/68     Weight: 76.2 kg (168 lb) (03/02/20 1515)  Body mass index is 27.12 kg/m².  Body surface area is 1.88 meters squared.    I/O last 3 completed shifts:  In: 1829.2 [I.V.:1489.2; NG/GT:340]  Out: 6377  [Urine:5485]    Physical Exam   Constitutional: He appears well-developed and well-nourished. He is sedated and intubated.   Cardiovascular:   Murmur heard.  Pulmonary/Chest: Effort normal. He is intubated. He has no wheezes. He has rhonchi. He has no rales.   Abdominal: Soft. Bowel sounds are normal.   Musculoskeletal: He exhibits no edema.   Neurological: He is alert.   Skin: Skin is warm.       Significant Labs:  CBC:   Recent Labs   Lab 03/04/20  0303   WBC 11.50   RBC 2.63*   HGB 7.8*   HCT 24.2*   *   MCV 92   MCH 29.7   MCHC 32.2     CMP:   Recent Labs   Lab 03/04/20  1103   *   CALCIUM 8.1*   ALBUMIN 2.6*   PROT 6.5      K 4.5   CO2 23      BUN 88*   CREATININE 7.5*   ALKPHOS 86   ALT 16   AST 30   BILITOT 0.4

## 2020-03-04 NOTE — PROGRESS NOTES
0730: TF turned off for SBT  - team updated last BM charted 02/29/20. Stated will place bowel regimen orders.  1220: SBT started  1330: extubated to 4L NC, VSS, no c/o SOB, no increase WOB, family at bedside. wctm  1900: patient vomiting at bedside post 2 bites of dinner (passed JAVID). Qtc> 500. MD Kenna updated, stated will place orders.

## 2020-03-04 NOTE — ASSESSMENT & PLAN NOTE
-Stopped precedex and started Fentanyl gtt in addition to propofol.  -CXR improved with diuresis  -SBT failed yesterday due to acute hypertension/agitation  -Will start nitroglycerin gtt per staff for HTN in effort to extubate

## 2020-03-04 NOTE — ASSESSMENT & PLAN NOTE
- Likely ATN in setting of shock, Hemolysis, plus IV contrast at OSH.  - Monitor Cr closely, downtrending today  - Appreciate renal consult.  -Strict I/O.  -Avoid nephrotoxic meds.

## 2020-03-04 NOTE — PROGRESS NOTES
[facility]  Heart Transplant  Progress Note    Patient Name: John Javier  MRN: [id number]  Admission Date: 2/29/2020  Hospital Length of Stay: 4 days  Attending Physician: Shannon Fuller MD  Primary Care Provider: To Obtain Unable  Principal Problem:Cardiogenic shock    Subjective:     Interval History: Intubated and sedated this morning but able to follow commands and nod his head to questions. Appears comfortable. Net neg 3 L over last 24 hours.    Brother at bedside with his wife, who better informed me of history prior to presentation. 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 He was transported to hospital and impella was placed. Prior to this he had been working without complaints to his family. Recently moved in his with daughter and her  and 1 1/2 year old child. 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 Brother believes due to patient's prideful nature, he did not want to ask for additional money to obtain his medications (as he was already depending on his family for a lot financially as his job change was a decrease in pay), and that he likely has not had them since January. Answered all their questions and discussed the plan for sedation wean + SBT + antihypertensives in effort to successfully extubate.    Continuous Infusions:   fentanyl 5 mL/hr at 03/04/20 0600    heparin (porcine) in D5W 14 Units/kg/hr (03/04/20 1027)    nitroGLYCERIN 10 mcg/min (03/04/20 1100)    propofol 20 mcg/kg/min (03/04/20 0955)     Scheduled Meds:   amiodarone  400 mg Per NG tube BID    aspirin  81 mg Per NG tube Daily    atorvastatin  80 mg Per NG tube Daily    chlorhexidine  15 mL Mouth/Throat BID    famotidine (PF)  20 mg Intravenous Daily    fentaNYL  25 mcg Intravenous Once    [START  ON 3/5/2020] furosemide (LASIX) IV  40 mg Intravenous Daily    hydrALAZINE  10 mg Oral Q8H     PRN Meds:Dextrose 10% Bolus, diphenhydrAMINE, fentaNYL, glucagon (human recombinant), insulin aspart U-100, sodium chloride 0.9%, sodium chloride 0.9%    Review of patient's allergies indicates:  No Known Allergies  Objective:     Vital Signs (Most Recent):  Temp: 98.5 °F (36.9 °C) (03/04/20 0700)  Pulse: 106 (03/04/20 0950)  Resp: 16 (03/03/20 1507)  BP: (!) 158/72 (03/04/20 0700)  SpO2: 97 % (03/04/20 0950) Vital Signs (24h Range):  Temp:  [98.2 °F (36.8 °C)-99.2 °F (37.3 °C)] 98.5 °F (36.9 °C)  Pulse:  [] 106  Resp:  [16] 16  SpO2:  [95 %-100 %] 97 %  BP: (103-158)/(57-72) 158/72  Arterial Line BP: (102-138)/(50-61) 131/61     Patient Vitals for the past 72 hrs (Last 3 readings):   Weight   03/02/20 1515 76.2 kg (168 lb)   03/02/20 1300 76.5 kg (168 lb 10.4 oz)   03/02/20 0305 76.5 kg (168 lb 10.4 oz)     Body mass index is 27.12 kg/m².      Intake/Output Summary (Last 24 hours) at 3/4/2020 1101  Last data filed at 3/4/2020 1000  Gross per 24 hour   Intake 1174.09 ml   Output 4255 ml   Net -3080.91 ml        Telemetry: reviewed, NAEO    Physical Exam   Constitutional: He appears well-developed and well-nourished. He is sedated and intubated.   HENT:   Head: Normocephalic and atraumatic.   Eyes: Pupils are equal, round, and reactive to light.   Neck: Normal range of motion. Neck supple.   LIJ TLC placed 3/1/20. Difficult to assess JVD while on vent   Cardiovascular: Normal rate and regular rhythm.   Murmur heard.  Pulmonary/Chest: Effort normal. He is intubated. No respiratory distress. He has no rales.   ventilated   Abdominal: Soft. He exhibits no distension. Bowel sounds are decreased. There is no tenderness.   Musculoskeletal: Normal range of motion. He exhibits no edema.   Neurological:   arousable   Skin: Skin is warm and dry. Capillary refill takes 2 to 3 seconds.   Psychiatric:   sedated   Nursing note and  vitals reviewed.    Significant Labs:  CBC:  Recent Labs   Lab 03/03/20  0040 03/03/20  0407 03/04/20  0303   WBC 12.14 13.80* 11.50   RBC 2.62* 2.79* 2.63*   HGB 7.8* 8.2* 7.8*   HCT 24.1* 26.1* 24.2*   PLT 91* 106* 115*   MCV 92 94 92   MCH 29.8 29.4 29.7   MCHC 32.4 31.4* 32.2     BNP:  Recent Labs   Lab 02/29/20 2122   *     CMP:  Recent Labs   Lab 03/03/20  0809 03/03/20  1532 03/03/20  1714 03/04/20  0331    116* 122* 170*   CALCIUM 7.9* 7.9* 8.1* 8.0*   ALBUMIN 2.3* 2.3* 2.4*  --    PROT 6.0 6.0 6.1  --     142 140 141   K 4.1 4.3 4.2 4.3   CO2 21* 20* 20* 23    104 104 102   BUN 72* 78* 76* 84*   CREATININE 7.6* 7.3* 7.7* 7.4*   ALKPHOS 74 81 81  --    ALT 22 21 20  --    AST 28 30 31  --    BILITOT 0.7 0.6 0.7  --       Coagulation:   Recent Labs   Lab 02/29/20 2122 03/01/20  2132  03/03/20  1714 03/03/20  2356 03/04/20  0549   INR 1.3*  --  1.2  --   --   --   --    APTT 37.6*   < > 63.7*  63.7*   < > 36.1* 55.2* 49.7*    < > = values in this interval not displayed.     LDH:  Recent Labs   Lab 03/02/20  0003 03/03/20  0407   * 584*     Microbiology:  Microbiology Results (last 7 days)     Procedure Component Value Units Date/Time    Blood culture [[id number]] Collected:  03/01/20 0254    Order Status:  Completed Specimen:  Blood from Peripheral, Upper Arm, Left Updated:  03/04/20 0612     Blood Culture, Routine No Growth to date      No Growth to date      No Growth to date      No Growth to date    Blood culture [[id number]] Collected:  02/29/20 2305    Order Status:  Completed Specimen:  Blood from Peripheral, Forearm, Right Updated:  03/04/20 0612     Blood Culture, Routine No Growth to date      No Growth to date      No Growth to date      No Growth to date    Urine culture [[id number]] Collected:  02/29/20 2240    Order Status:  Completed Specimen:  Urine Updated:  03/02/20 0739     Urine Culture, Routine No significant growth    Narrative:       Preferred Collection  "Type->Urine, Clean Catch        I have reviewed all pertinent labs within the past 24 hours.    Estimated Creatinine Clearance: 9.2 mL/min (A) (based on SCr of 7.4 mg/dL (H)).    Diagnostic Results:  I have reviewed all pertinent imaging results/findings within the past 24 hours.    Assessment and Plan:     Patient intubated w limited collateral from family; further OSH records pending    63 y/o M hx of CAD (50-60% LCx, OM disease on LHC 2/29), ICM EF 10-15% s/p AICD (unknown baseline but on GDMT as OP), HTN, DM2, HLD, obesity, CKD, presenting from OSH intubated with Impella in setting of cardiogenic shock. Per pt's brother he was taken by EMS to OSH in setting of feeling short of breath, nearly passing out. There found to be in shock, lactate to 12, ANGEL w Cr 2.5, trop 12; unclear if dynamic ECG changes, max trop 10; underwent LHC with evidence of 50-60% LCx, OM disease; no PCI performed. RHC with LVEDP 35. CO 3, CI 1.8, tte w LVEF 10-15%. Underwent Impella placement without additional central line placement (per brother there was a "complication" but no evidence of pneumothorax).    On arrival patient -having intermittent suction alarms, improved w P6->P4. MAPs stable 75-85. CVC placed in LIJ given neck position, CO 9.7, CI 5.35, svr 535. WBC 26k, patient cultured, given single dose of vanc/zosyn. UOP 60-80cc/hr off diuretics. Further collateral pending from OSH. Continued on heparin gtt for impella, weaned to dobutamine 5, gave 500cc bolus in setting of CVP 10 and suction alarms.    * Cardiogenic shock  -Presented with impella placed at OSH with position issues and LD>1000  -Impella removed 3/2/20.   -CVP 6-7 this am, SVO2 82% with a calculated CO 15, CI 7.97,   -Diuresed well with 2 doses of IVP lasix, net neg 3 L over last 24 hours. CXR improved  -Discussed with nephrology, will pull back today and monitor with 1 dose of IVP lasix this am  - d/c'ed due to high output and hypertension/afib episode on " 3/3  -TTE 3/2 EF 10%, LVIDD 6, TAPSE 1.98, Mod MR, Mod TR.  -Started on hydralazine/isordil, will hold isordil until nitro gtt d/c'ed   -Not working up for advanced options due to medical instability/renal failure at this time. Will continue to reassess candidacy if condition improves.     ANGEL (acute kidney injury)  - Likely ATN in setting of shock, Hemolysis, plus IV contrast at OSH.  - Monitor Cr closely, downtrending today  - Appreciate renal consult.  -Strict I/O.  -Avoid nephrotoxic meds.    Malnutrition of moderate degree  -Dietary Cx.  -Tube feeds started but on hold this am in anticipation of SBT    PAF (paroxysmal atrial fibrillation)  -Episode of afib RVR 3/3/2020 in the setting of severe agitation with SBT  -Converted to NSR  -Started on heparin gtt for anticoagulation    VT (ventricular tachycardia)  -S/P ICD shocks x 5 per OSH.  -Continue PO amio load    Acute hypoxemic respiratory failure  -Stopped precedex and started Fentanyl gtt in addition to propofol.  -CXR improved with diuresis  -SBT failed yesterday due to acute hypertension/agitation  -Will start nitroglycerin gtt per staff for HTN in effort to extubate        Acute on chronic combined systolic and diastolic heart failure  - See Cardiogenic shock.    DM (diabetes mellitus)  -A1C 7.0 on admit  -SSI, accuchecks    CAD (coronary artery disease)  -Hx of RCA stent in 2013.   -[facility] 2/29: 50-60% LCx, OM disease.  -continue ASA, high intensity statin.  -Continue heparin        Uninterrupted Critical Care/Counseling Time (not including procedures): 45 minutes    Patricia Simon PA-C  Heart Transplant  [facility]

## 2020-03-04 NOTE — PROGRESS NOTES
[facility]  Nephrology  Progress Note    Patient Name: John Javier  MRN: [id number]  Admission Date: 2/29/2020  Hospital Length of Stay: 4 days  Attending Provider: Shannon Fuller MD   Primary Care Physician: To Obtain Unable  Principal Problem:Cardiogenic shock    Subjective:     Interval History:   Responded well to IV lasix, 4.5L/24h, net negative 3.2L/24h.  CXR improved.  Slight improvement in SCr to 7.4 from 7.7, BUN increased to 84.     Review of patient's allergies indicates:  No Known Allergies  Current Facility-Administered Medications   Medication Frequency    amiodarone tablet 400 mg BID    aspirin chewable tablet 81 mg Daily    atorvastatin tablet 80 mg Daily    chlorhexidine 0.12 % solution 15 mL BID    dextrose 10% (D10W) Bolus PRN    diphenhydrAMINE capsule 25 mg Q6H PRN    famotidine (PF) injection 20 mg Daily    fentaNYL 2500 mcg in 0.9% sodium chloride 250 mL infusion premix (titrating) Continuous    fentaNYL injection 25 mcg Q2H PRN    fentaNYL injection 25 mcg Once    [START ON 3/5/2020] furosemide injection 40 mg Daily    glucagon (human recombinant) injection 1 mg PRN    heparin 25,000 units in dextrose 5% 250 mL (100 units/mL) infusion LOW INTENSITY nomogram - [facility] Continuous    hydrALAZINE tablet 10 mg Q8H    insulin aspart U-100 pen 0-5 Units Q6H PRN    nitroGLYCERIN 50 mg in dextrose 5 % 250 mL infusion (TITRATING) Continuous    propofol (DIPRIVAN) 10 mg/mL infusion Continuous    sodium chloride 0.9% flush 10 mL PRN    sodium chloride 0.9% flush 10 mL PRN       Objective:     Vital Signs (Most Recent):  Temp: 98.5 °F (36.9 °C) (03/04/20 0700)  Pulse: 109 (03/04/20 1226)  Resp: 16 (03/03/20 1507)  BP: (!) 158/72 (03/04/20 0700)  SpO2: 98 % (03/04/20 1226)  O2 Device (Oxygen Therapy): ventilator (03/04/20 1159) Vital Signs (24h Range):  Temp:  [98.2 °F (36.8 °C)-99.2 °F (37.3 °C)] 98.5 °F (36.9 °C)  Pulse:  [] 109  Resp:  [16] 16  SpO2:  [95  %-99 %] 98 %  BP: (113-158)/(57-72) 158/72  Arterial Line BP: (109-155)/(52-70) 155/68     Weight: 76.2 kg (168 lb) (03/02/20 1515)  Body mass index is 27.12 kg/m².  Body surface area is 1.88 meters squared.    I/O last 3 completed shifts:  In: 1829.2 [I.V.:1489.2; NG/GT:340]  Out: 5485 [Urine:5485]    Physical Exam   Constitutional: He appears well-developed and well-nourished. He is sedated and intubated.   Cardiovascular:   Murmur heard.  Pulmonary/Chest: Effort normal. He is intubated. He has no wheezes. He has rhonchi. He has no rales.   Abdominal: Soft. Bowel sounds are normal.   Musculoskeletal: He exhibits no edema.   Neurological: He is alert.   Skin: Skin is warm.       Significant Labs:  CBC:   Recent Labs   Lab 03/04/20  0303   WBC 11.50   RBC 2.63*   HGB 7.8*   HCT 24.2*   *   MCV 92   MCH 29.7   MCHC 32.2     CMP:   Recent Labs   Lab 03/04/20  1103   *   CALCIUM 8.1*   ALBUMIN 2.6*   PROT 6.5      K 4.5   CO2 23      BUN 88*   CREATININE 7.5*   ALKPHOS 86   ALT 16   AST 30   BILITOT 0.4            Assessment/Plan:     ANGEL (acute kidney injury)  * Non-oliguric iATN from cardiogenic shock.     3/2: -urine microscopy:  abundant muddy brown casts (leaning towards ATN)     Plan/ Rec:  -responded well to IV lasix, currently averaging between 150-200 cc/hr  -improvement in CXR  -recommend holding further diuretics today  -close monitoring on UOP.  - No need for RRT   - Renal panel q 12 hrs  -phos elevated, currently on renal tube feeds.  -will start Renvela 1.6g via OG  - Strict I/O and chart  - Will follow closely  -will discuss plan with Dr. Courtney Ramsay, NP  Nephrology  [facility]

## 2020-03-04 NOTE — SUBJECTIVE & OBJECTIVE
Interval History: Intubated and sedated this morning but able to follow commands and nod his head to questions. Appears comfortable. Net neg 3 L over last 24 hours.    Brother at bedside with his wife, who better informed me of history prior to presentation. 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 He was transported to hospital and impella was placed. Prior to this he had been working without complaints to his family. Recently moved in his with daughter and her  and 1 1/2 year old child. 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 Brother believes due to patient's prideful nature, he did not want to ask for additional money to obtain his medications (as he was already depending on his family for a lot financially as his job change was a decrease in pay), and that he likely has not had them since January. Answered all their questions and discussed the plan for sedation wean + SBT + antihypertensives in effort to successfully extubate.    Continuous Infusions:   fentanyl 5 mL/hr at 03/04/20 0600    heparin (porcine) in D5W 14 Units/kg/hr (03/04/20 1027)    nitroGLYCERIN 10 mcg/min (03/04/20 1100)    propofol 20 mcg/kg/min (03/04/20 0955)     Scheduled Meds:   amiodarone  400 mg Per NG tube BID    aspirin  81 mg Per NG tube Daily    atorvastatin  80 mg Per NG tube Daily    chlorhexidine  15 mL Mouth/Throat BID    famotidine (PF)  20 mg Intravenous Daily    fentaNYL  25 mcg Intravenous Once    [START ON 3/5/2020] furosemide (LASIX) IV  40 mg Intravenous Daily    hydrALAZINE  10 mg Oral Q8H     PRN Meds:Dextrose 10% Bolus, diphenhydrAMINE, fentaNYL, glucagon (human recombinant), insulin aspart U-100, sodium chloride 0.9%, sodium chloride 0.9%    Review of patient's allergies indicates:  No Known  Allergies  Objective:     Vital Signs (Most Recent):  Temp: 98.5 °F (36.9 °C) (03/04/20 0700)  Pulse: 106 (03/04/20 0950)  Resp: 16 (03/03/20 1507)  BP: (!) 158/72 (03/04/20 0700)  SpO2: 97 % (03/04/20 0950) Vital Signs (24h Range):  Temp:  [98.2 °F (36.8 °C)-99.2 °F (37.3 °C)] 98.5 °F (36.9 °C)  Pulse:  [] 106  Resp:  [16] 16  SpO2:  [95 %-100 %] 97 %  BP: (103-158)/(57-72) 158/72  Arterial Line BP: (102-138)/(50-61) 131/61     Patient Vitals for the past 72 hrs (Last 3 readings):   Weight   03/02/20 1515 76.2 kg (168 lb)   03/02/20 1300 76.5 kg (168 lb 10.4 oz)   03/02/20 0305 76.5 kg (168 lb 10.4 oz)     Body mass index is 27.12 kg/m².      Intake/Output Summary (Last 24 hours) at 3/4/2020 1101  Last data filed at 3/4/2020 1000  Gross per 24 hour   Intake 1174.09 ml   Output 4255 ml   Net -3080.91 ml        Telemetry: reviewed, NAEO    Physical Exam   Constitutional: He appears well-developed and well-nourished. He is sedated and intubated.   HENT:   Head: Normocephalic and atraumatic.   Eyes: Pupils are equal, round, and reactive to light.   Neck: Normal range of motion. Neck supple.   LIJ TLC placed 3/1/20. Difficult to assess JVD while on vent   Cardiovascular: Normal rate and regular rhythm.   Murmur heard.  Pulmonary/Chest: Effort normal. He is intubated. No respiratory distress. He has no rales.   ventilated   Abdominal: Soft. He exhibits no distension. Bowel sounds are decreased. There is no tenderness.   Musculoskeletal: Normal range of motion. He exhibits no edema.   Neurological:   arousable   Skin: Skin is warm and dry. Capillary refill takes 2 to 3 seconds.   Psychiatric:   sedated   Nursing note and vitals reviewed.    Significant Labs:  CBC:  Recent Labs   Lab 03/03/20  0040 03/03/20  0407 03/04/20  0303   WBC 12.14 13.80* 11.50   RBC 2.62* 2.79* 2.63*   HGB 7.8* 8.2* 7.8*   HCT 24.1* 26.1* 24.2*   PLT 91* 106* 115*   MCV 92 94 92   MCH 29.8 29.4 29.7   MCHC 32.4 31.4* 32.2     BNP:  Recent  Labs   Lab 02/29/20 2122   *     CMP:  Recent Labs   Lab 03/03/20  0809 03/03/20  1532 03/03/20  1714 03/04/20  0331    116* 122* 170*   CALCIUM 7.9* 7.9* 8.1* 8.0*   ALBUMIN 2.3* 2.3* 2.4*  --    PROT 6.0 6.0 6.1  --     142 140 141   K 4.1 4.3 4.2 4.3   CO2 21* 20* 20* 23    104 104 102   BUN 72* 78* 76* 84*   CREATININE 7.6* 7.3* 7.7* 7.4*   ALKPHOS 74 81 81  --    ALT 22 21 20  --    AST 28 30 31  --    BILITOT 0.7 0.6 0.7  --       Coagulation:   Recent Labs   Lab 02/29/20 2122 03/01/20  2132  03/03/20  1714 03/03/20  2356 03/04/20  0549   INR 1.3*  --  1.2  --   --   --   --    APTT 37.6*   < > 63.7*  63.7*   < > 36.1* 55.2* 49.7*    < > = values in this interval not displayed.     LDH:  Recent Labs   Lab 03/02/20  0003 03/03/20  0407   * 584*     Microbiology:  Microbiology Results (last 7 days)     Procedure Component Value Units Date/Time    Blood culture [[id number]] Collected:  03/01/20 0254    Order Status:  Completed Specimen:  Blood from Peripheral, Upper Arm, Left Updated:  03/04/20 0612     Blood Culture, Routine No Growth to date      No Growth to date      No Growth to date      No Growth to date    Blood culture [[id number]] Collected:  02/29/20 2305    Order Status:  Completed Specimen:  Blood from Peripheral, Forearm, Right Updated:  03/04/20 0612     Blood Culture, Routine No Growth to date      No Growth to date      No Growth to date      No Growth to date    Urine culture [[id number]] Collected:  02/29/20 2240    Order Status:  Completed Specimen:  Urine Updated:  03/02/20 0739     Urine Culture, Routine No significant growth    Narrative:       Preferred Collection Type->Urine, Clean Catch        I have reviewed all pertinent labs within the past 24 hours.    Estimated Creatinine Clearance: 9.2 mL/min (A) (based on SCr of 7.4 mg/dL (H)).    Diagnostic Results:  I have reviewed all pertinent imaging results/findings within the past 24 hours.

## 2020-03-04 NOTE — ASSESSMENT & PLAN NOTE
-Presented with impella placed at OSH with position issues and LD>1000  -Impella removed 3/2/20.   -CVP 6-7 this am, SVO2 82% with a calculated CO 15, CI 7.97,   -Diuresed well with 2 doses of IVP lasix, net neg 3 L over last 24 hours. CXR improved  -Discussed with nephrology, will pull back today and monitor with 1 dose of IVP lasix this am  - d/c'ed due to high output and hypertension/afib episode on 3/3  -TTE 3/2 EF 10%, LVIDD 6, TAPSE 1.98, Mod MR, Mod TR.  -Started on hydralazine/isordil, will hold isordil until nitro gtt d/c'ed   -Not working up for advanced options due to medical instability/renal failure at this time. Will continue to reassess candidacy if condition improves.

## 2020-03-04 NOTE — ASSESSMENT & PLAN NOTE
* Non-oliguric iATN from cardiogenic shock.     3/2: -urine microscopy:  abundant muddy brown casts (leaning towards ATN)     Plan/ Rec:  -responded well to IV lasix, currently averaging between 150-200 cc/hr  -improvement in CXR  -recommend holding further diuretics today  -close monitoring on UOP.  - No need for RRT   - Renal panel q 12 hrs  - Strict I/O and chart  - Will follow closely  -will discuss plan with Dr. Valdez

## 2020-03-04 NOTE — PROGRESS NOTES
1800: MD Paul updated on ptt results & no prn bolus orders (per nommogram ptt indicates ptt), stated will assess orders. readback x2

## 2020-03-04 NOTE — PLAN OF CARE
CMICU DAILY GOALS       A: Awake    RASS: Goal - RASS Goal: -1-->drowsy  Actual - RASS (Horton Agitation-Sedation Scale): -1-->drowsy   Restraint necessity: Clinical Justification: Treatment Interference, Removing medical devices  B: Breath   SBT: Not attempted   C: Coordinate A & B, analgesics/sedatives   Pain: managed    SAT: Not attempted  D: Delirium   CAM-ICU: Overall CAM-ICU: Positive  E: Early Mobility   MOVE Screen: Fail   Activity: Activity Management: activity clustered for rest period, activity adjusted per tolerance  FAS: Feeding/Nutrition   Diet order: Diet/Nutrition Received: tube feeding,   Fluid restriction:    T: Thrombus   DVT prophylaxis: VTE Required Core Measure: Pharmacological prophylaxis initiated/maintained  H: HOB Elevation   Head of Bed (HOB): HOB at 30-45 degrees  U: Ulcer Prophylaxis   GI: yes  G: Glucose control   managed Glycemic Management: blood glucose monitoring  S: Skin   Bundle compliance: yes   Bathing/Skin Care: bath, chlorhexidine, bath, complete, dressed/undressed, linen changed Date: [unfilled]  B: Bowel Function   constipation   I: Indwelling Catheters   Lamb necessity:      Urethral Catheter 02/29/20 2056 16 Fr.-Reason for Continuing Urinary Catheterization: Critically ill in ICU requiring intensive monitoring   CVC necessity: Yes   IPAD offered: No  D: De-escalation Antibx   Yes  Plan for the day   SBT. Continue to monitor CVPs  Family/Goals of care/Code Status   Code Status: Full Code     No acute events throughout day, VS and assessment per flow sheet, patient progressing towards goals as tolerated, plan of care reviewed with John Javier and family, all concerns addressed, will continue to monitor.

## 2020-03-05 PROBLEM — D50.9 IRON DEFICIENCY ANEMIA: Status: ACTIVE | Noted: 2020-03-05

## 2020-03-05 LAB
ABO + RH BLD: NORMAL
ALBUMIN SERPL BCP-MCNC: 2.4 G/DL (ref 3.5–5.2)
ALBUMIN SERPL BCP-MCNC: 2.6 G/DL (ref 3.5–5.2)
ALBUMIN SERPL BCP-MCNC: 2.6 G/DL (ref 3.5–5.2)
ALBUMIN SERPL BCP-MCNC: 2.9 G/DL (ref 3.5–5.2)
ALLENS TEST: ABNORMAL
ALP SERPL-CCNC: 80 U/L (ref 55–135)
ALP SERPL-CCNC: 84 U/L (ref 55–135)
ALP SERPL-CCNC: 93 U/L (ref 55–135)
ALT SERPL W/O P-5'-P-CCNC: 14 U/L (ref 10–44)
ALT SERPL W/O P-5'-P-CCNC: 16 U/L (ref 10–44)
ALT SERPL W/O P-5'-P-CCNC: 17 U/L (ref 10–44)
ANION GAP SERPL CALC-SCNC: 14 MMOL/L (ref 8–16)
ANION GAP SERPL CALC-SCNC: 15 MMOL/L (ref 8–16)
ANION GAP SERPL CALC-SCNC: 18 MMOL/L (ref 8–16)
ANION GAP SERPL CALC-SCNC: 18 MMOL/L (ref 8–16)
APTT BLDCRRT: 49.3 SEC (ref 21–32)
AST SERPL-CCNC: 29 U/L (ref 10–40)
AST SERPL-CCNC: 34 U/L (ref 10–40)
AST SERPL-CCNC: 35 U/L (ref 10–40)
BASOPHILS # BLD AUTO: 0.01 K/UL (ref 0–0.2)
BASOPHILS # BLD AUTO: 0.01 K/UL (ref 0–0.2)
BASOPHILS # BLD AUTO: 0.02 K/UL (ref 0–0.2)
BASOPHILS NFR BLD: 0.1 % (ref 0–1.9)
BASOPHILS NFR BLD: 0.1 % (ref 0–1.9)
BASOPHILS NFR BLD: 0.2 % (ref 0–1.9)
BILIRUB SERPL-MCNC: 0.4 MG/DL (ref 0.1–1)
BILIRUB SERPL-MCNC: 0.5 MG/DL (ref 0.1–1)
BILIRUB SERPL-MCNC: 0.5 MG/DL (ref 0.1–1)
BLD GP AB SCN CELLS X3 SERPL QL: NORMAL
BLD PROD TYP BPU: NORMAL
BLD PROD TYP BPU: NORMAL
BLOOD UNIT EXPIRATION DATE: NORMAL
BLOOD UNIT EXPIRATION DATE: NORMAL
BLOOD UNIT TYPE CODE: 5100
BLOOD UNIT TYPE CODE: 5100
BLOOD UNIT TYPE: NORMAL
BLOOD UNIT TYPE: NORMAL
BUN SERPL-MCNC: 88 MG/DL (ref 8–23)
BUN SERPL-MCNC: 90 MG/DL (ref 8–23)
BUN SERPL-MCNC: 91 MG/DL (ref 8–23)
BUN SERPL-MCNC: 91 MG/DL (ref 8–23)
CALCIUM SERPL-MCNC: 8.1 MG/DL (ref 8.7–10.5)
CALCIUM SERPL-MCNC: 8.3 MG/DL (ref 8.7–10.5)
CALCIUM SERPL-MCNC: 8.3 MG/DL (ref 8.7–10.5)
CALCIUM SERPL-MCNC: 8.5 MG/DL (ref 8.7–10.5)
CHLORIDE SERPL-SCNC: 102 MMOL/L (ref 95–110)
CHLORIDE SERPL-SCNC: 103 MMOL/L (ref 95–110)
CHLORIDE SERPL-SCNC: 103 MMOL/L (ref 95–110)
CHLORIDE SERPL-SCNC: 98 MMOL/L (ref 95–110)
CO2 SERPL-SCNC: 22 MMOL/L (ref 23–29)
CO2 SERPL-SCNC: 22 MMOL/L (ref 23–29)
CO2 SERPL-SCNC: 24 MMOL/L (ref 23–29)
CO2 SERPL-SCNC: 25 MMOL/L (ref 23–29)
CODING SYSTEM: NORMAL
CODING SYSTEM: NORMAL
CREAT SERPL-MCNC: 7.2 MG/DL (ref 0.5–1.4)
CREAT SERPL-MCNC: 7.4 MG/DL (ref 0.5–1.4)
CREAT SERPL-MCNC: 7.4 MG/DL (ref 0.5–1.4)
CREAT SERPL-MCNC: 7.5 MG/DL (ref 0.5–1.4)
DELSYS: ABNORMAL
DIFFERENTIAL METHOD: ABNORMAL
DISPENSE STATUS: NORMAL
DISPENSE STATUS: NORMAL
EOSINOPHIL # BLD AUTO: 0 K/UL (ref 0–0.5)
EOSINOPHIL NFR BLD: 0.1 % (ref 0–8)
EOSINOPHIL NFR BLD: 0.2 % (ref 0–8)
EOSINOPHIL NFR BLD: 0.2 % (ref 0–8)
ERYTHROCYTE [DISTWIDTH] IN BLOOD BY AUTOMATED COUNT: 15.3 % (ref 11.5–14.5)
ERYTHROCYTE [DISTWIDTH] IN BLOOD BY AUTOMATED COUNT: 15.8 % (ref 11.5–14.5)
ERYTHROCYTE [DISTWIDTH] IN BLOOD BY AUTOMATED COUNT: 15.8 % (ref 11.5–14.5)
EST. GFR  (AFRICAN AMERICAN): 8.1 ML/MIN/1.73 M^2
EST. GFR  (AFRICAN AMERICAN): 8.2 ML/MIN/1.73 M^2
EST. GFR  (AFRICAN AMERICAN): 8.2 ML/MIN/1.73 M^2
EST. GFR  (AFRICAN AMERICAN): 8.5 ML/MIN/1.73 M^2
EST. GFR  (NON AFRICAN AMERICAN): 7 ML/MIN/1.73 M^2
EST. GFR  (NON AFRICAN AMERICAN): 7.1 ML/MIN/1.73 M^2
EST. GFR  (NON AFRICAN AMERICAN): 7.1 ML/MIN/1.73 M^2
EST. GFR  (NON AFRICAN AMERICAN): 7.3 ML/MIN/1.73 M^2
GLUCOSE SERPL-MCNC: 148 MG/DL (ref 70–110)
GLUCOSE SERPL-MCNC: 148 MG/DL (ref 70–110)
GLUCOSE SERPL-MCNC: 195 MG/DL (ref 70–110)
GLUCOSE SERPL-MCNC: 226 MG/DL (ref 70–110)
HCO3 UR-SCNC: 24.7 MMOL/L (ref 24–28)
HCT VFR BLD AUTO: 21.6 % (ref 40–54)
HCT VFR BLD AUTO: 21.8 % (ref 40–54)
HCT VFR BLD AUTO: 28.8 % (ref 40–54)
HGB BLD-MCNC: 6.9 G/DL (ref 14–18)
HGB BLD-MCNC: 7 G/DL (ref 14–18)
HGB BLD-MCNC: 9.6 G/DL (ref 14–18)
IMM GRANULOCYTES # BLD AUTO: 0.14 K/UL (ref 0–0.04)
IMM GRANULOCYTES # BLD AUTO: 0.15 K/UL (ref 0–0.04)
IMM GRANULOCYTES # BLD AUTO: 0.26 K/UL (ref 0–0.04)
IMM GRANULOCYTES NFR BLD AUTO: 1 % (ref 0–0.5)
IMM GRANULOCYTES NFR BLD AUTO: 1.1 % (ref 0–0.5)
IMM GRANULOCYTES NFR BLD AUTO: 1.5 % (ref 0–0.5)
LYMPHOCYTES # BLD AUTO: 0.9 K/UL (ref 1–4.8)
LYMPHOCYTES # BLD AUTO: 1.1 K/UL (ref 1–4.8)
LYMPHOCYTES # BLD AUTO: 1.2 K/UL (ref 1–4.8)
LYMPHOCYTES NFR BLD: 5.5 % (ref 18–48)
LYMPHOCYTES NFR BLD: 7.8 % (ref 18–48)
LYMPHOCYTES NFR BLD: 9.1 % (ref 18–48)
MAGNESIUM SERPL-MCNC: 3.1 MG/DL (ref 1.6–2.6)
MAGNESIUM SERPL-MCNC: 3.1 MG/DL (ref 1.6–2.6)
MCH RBC QN AUTO: 29 PG (ref 27–31)
MCH RBC QN AUTO: 29.2 PG (ref 27–31)
MCH RBC QN AUTO: 30 PG (ref 27–31)
MCHC RBC AUTO-ENTMCNC: 31.9 G/DL (ref 32–36)
MCHC RBC AUTO-ENTMCNC: 32.1 G/DL (ref 32–36)
MCHC RBC AUTO-ENTMCNC: 33.3 G/DL (ref 32–36)
MCV RBC AUTO: 90 FL (ref 82–98)
MCV RBC AUTO: 91 FL (ref 82–98)
MCV RBC AUTO: 92 FL (ref 82–98)
MONOCYTES # BLD AUTO: 1.3 K/UL (ref 0.3–1)
MONOCYTES # BLD AUTO: 1.3 K/UL (ref 0.3–1)
MONOCYTES # BLD AUTO: 1.4 K/UL (ref 0.3–1)
MONOCYTES NFR BLD: 10.2 % (ref 4–15)
MONOCYTES NFR BLD: 7.7 % (ref 4–15)
MONOCYTES NFR BLD: 9.9 % (ref 4–15)
NEUTROPHILS # BLD AUTO: 10.5 K/UL (ref 1.8–7.7)
NEUTROPHILS # BLD AUTO: 10.9 K/UL (ref 1.8–7.7)
NEUTROPHILS # BLD AUTO: 14.4 K/UL (ref 1.8–7.7)
NEUTROPHILS NFR BLD: 79.2 % (ref 38–73)
NEUTROPHILS NFR BLD: 81.1 % (ref 38–73)
NEUTROPHILS NFR BLD: 85 % (ref 38–73)
NRBC BLD-RTO: 0 /100 WBC
PCO2 BLDA: 44.1 MMHG (ref 35–45)
PH SMN: 7.36 [PH] (ref 7.35–7.45)
PHOSPHATE SERPL-MCNC: 8.2 MG/DL (ref 2.7–4.5)
PHOSPHATE SERPL-MCNC: 8.9 MG/DL (ref 2.7–4.5)
PLATELET # BLD AUTO: 152 K/UL (ref 150–350)
PLATELET # BLD AUTO: 162 K/UL (ref 150–350)
PLATELET # BLD AUTO: 186 K/UL (ref 150–350)
PMV BLD AUTO: 11.9 FL (ref 9.2–12.9)
PMV BLD AUTO: 12 FL (ref 9.2–12.9)
PMV BLD AUTO: 12.1 FL (ref 9.2–12.9)
PO2 BLDA: 34 MMHG (ref 40–60)
POC BE: -1 MMOL/L
POC SATURATED O2: 62 % (ref 95–100)
POC TCO2: 26 MMOL/L (ref 24–29)
POCT GLUCOSE: 147 MG/DL (ref 70–110)
POCT GLUCOSE: 187 MG/DL (ref 70–110)
POCT GLUCOSE: 235 MG/DL (ref 70–110)
POTASSIUM SERPL-SCNC: 4.1 MMOL/L (ref 3.5–5.1)
POTASSIUM SERPL-SCNC: 4.3 MMOL/L (ref 3.5–5.1)
POTASSIUM SERPL-SCNC: 4.5 MMOL/L (ref 3.5–5.1)
POTASSIUM SERPL-SCNC: 4.5 MMOL/L (ref 3.5–5.1)
PROT SERPL-MCNC: 6.1 G/DL (ref 6–8.4)
PROT SERPL-MCNC: 6.5 G/DL (ref 6–8.4)
PROT SERPL-MCNC: 7.1 G/DL (ref 6–8.4)
RBC # BLD AUTO: 2.36 M/UL (ref 4.6–6.2)
RBC # BLD AUTO: 2.41 M/UL (ref 4.6–6.2)
RBC # BLD AUTO: 3.2 M/UL (ref 4.6–6.2)
SAMPLE: ABNORMAL
SITE: ABNORMAL
SODIUM SERPL-SCNC: 138 MMOL/L (ref 136–145)
SODIUM SERPL-SCNC: 140 MMOL/L (ref 136–145)
SODIUM SERPL-SCNC: 143 MMOL/L (ref 136–145)
SODIUM SERPL-SCNC: 143 MMOL/L (ref 136–145)
TRANS ERYTHROCYTES VOL PATIENT: NORMAL ML
TRANS ERYTHROCYTES VOL PATIENT: NORMAL ML
WBC # BLD AUTO: 13.3 K/UL (ref 3.9–12.7)
WBC # BLD AUTO: 13.47 K/UL (ref 3.9–12.7)
WBC # BLD AUTO: 16.86 K/UL (ref 3.9–12.7)

## 2020-03-05 PROCEDURE — 63600175 PHARM REV CODE 636 W HCPCS: Performed by: STUDENT IN AN ORGANIZED HEALTH CARE EDUCATION/TRAINING PROGRAM

## 2020-03-05 PROCEDURE — 86901 BLOOD TYPING SEROLOGIC RH(D): CPT

## 2020-03-05 PROCEDURE — 25000003 PHARM REV CODE 250: Performed by: PHYSICIAN ASSISTANT

## 2020-03-05 PROCEDURE — P9021 RED BLOOD CELLS UNIT: HCPCS

## 2020-03-05 PROCEDURE — 99222 PR INITIAL HOSPITAL CARE,LEVL II: ICD-10-PCS | Mod: ,,, | Performed by: INTERNAL MEDICINE

## 2020-03-05 PROCEDURE — 94761 N-INVAS EAR/PLS OXIMETRY MLT: CPT

## 2020-03-05 PROCEDURE — 99291 CRITICAL CARE FIRST HOUR: CPT | Mod: ,,, | Performed by: NURSE PRACTITIONER

## 2020-03-05 PROCEDURE — 85730 THROMBOPLASTIN TIME PARTIAL: CPT

## 2020-03-05 PROCEDURE — 83735 ASSAY OF MAGNESIUM: CPT

## 2020-03-05 PROCEDURE — 20000000 HC ICU ROOM

## 2020-03-05 PROCEDURE — 84100 ASSAY OF PHOSPHORUS: CPT

## 2020-03-05 PROCEDURE — 25000003 PHARM REV CODE 250: Performed by: NURSE PRACTITIONER

## 2020-03-05 PROCEDURE — 99232 PR SUBSEQUENT HOSPITAL CARE,LEVL II: ICD-10-PCS | Mod: ,,, | Performed by: NURSE PRACTITIONER

## 2020-03-05 PROCEDURE — 86644 CMV ANTIBODY: CPT

## 2020-03-05 PROCEDURE — 99291 PR CRITICAL CARE, E/M 30-74 MINUTES: ICD-10-PCS | Mod: ,,, | Performed by: NURSE PRACTITIONER

## 2020-03-05 PROCEDURE — 86920 COMPATIBILITY TEST SPIN: CPT

## 2020-03-05 PROCEDURE — 85025 COMPLETE CBC W/AUTO DIFF WBC: CPT

## 2020-03-05 PROCEDURE — 63600175 PHARM REV CODE 636 W HCPCS: Performed by: NURSE PRACTITIONER

## 2020-03-05 PROCEDURE — 82803 BLOOD GASES ANY COMBINATION: CPT

## 2020-03-05 PROCEDURE — 99900035 HC TECH TIME PER 15 MIN (STAT)

## 2020-03-05 PROCEDURE — S0028 INJECTION, FAMOTIDINE, 20 MG: HCPCS | Performed by: NURSE PRACTITIONER

## 2020-03-05 PROCEDURE — 36430 TRANSFUSION BLD/BLD COMPNT: CPT

## 2020-03-05 PROCEDURE — 27000221 HC OXYGEN, UP TO 24 HOURS

## 2020-03-05 PROCEDURE — 99232 SBSQ HOSP IP/OBS MODERATE 35: CPT | Mod: ,,, | Performed by: NURSE PRACTITIONER

## 2020-03-05 PROCEDURE — 99222 1ST HOSP IP/OBS MODERATE 55: CPT | Mod: ,,, | Performed by: INTERNAL MEDICINE

## 2020-03-05 PROCEDURE — 80053 COMPREHEN METABOLIC PANEL: CPT

## 2020-03-05 RX ORDER — HYDRALAZINE HYDROCHLORIDE 50 MG/1
50 TABLET, FILM COATED ORAL EVERY 8 HOURS
Status: DISCONTINUED | OUTPATIENT
Start: 2020-03-05 | End: 2020-03-06

## 2020-03-05 RX ORDER — FUROSEMIDE 10 MG/ML
80 INJECTION INTRAMUSCULAR; INTRAVENOUS EVERY 24 HOURS
Status: DISCONTINUED | OUTPATIENT
Start: 2020-03-05 | End: 2020-03-05

## 2020-03-05 RX ORDER — NAPROXEN SODIUM 220 MG/1
81 TABLET, FILM COATED ORAL DAILY
Status: DISCONTINUED | OUTPATIENT
Start: 2020-03-05 | End: 2020-03-13 | Stop reason: HOSPADM

## 2020-03-05 RX ORDER — HYDRALAZINE HYDROCHLORIDE 20 MG/ML
20 INJECTION INTRAMUSCULAR; INTRAVENOUS ONCE
Status: COMPLETED | OUTPATIENT
Start: 2020-03-05 | End: 2020-03-05

## 2020-03-05 RX ORDER — FAMOTIDINE 20 MG/1
20 TABLET, FILM COATED ORAL DAILY
Status: DISCONTINUED | OUTPATIENT
Start: 2020-03-06 | End: 2020-03-05

## 2020-03-05 RX ORDER — CARVEDILOL 6.25 MG/1
6.25 TABLET ORAL 2 TIMES DAILY
Status: DISCONTINUED | OUTPATIENT
Start: 2020-03-05 | End: 2020-03-06

## 2020-03-05 RX ORDER — FUROSEMIDE 10 MG/ML
80 INJECTION INTRAMUSCULAR; INTRAVENOUS EVERY 8 HOURS
Status: DISCONTINUED | OUTPATIENT
Start: 2020-03-05 | End: 2020-03-07

## 2020-03-05 RX ORDER — ATORVASTATIN CALCIUM 20 MG/1
80 TABLET, FILM COATED ORAL DAILY
Status: DISCONTINUED | OUTPATIENT
Start: 2020-03-05 | End: 2020-03-13 | Stop reason: HOSPADM

## 2020-03-05 RX ORDER — HYDROCODONE BITARTRATE AND ACETAMINOPHEN 500; 5 MG/1; MG/1
TABLET ORAL
Status: DISCONTINUED | OUTPATIENT
Start: 2020-03-05 | End: 2020-03-13 | Stop reason: HOSPADM

## 2020-03-05 RX ORDER — HYDRALAZINE HYDROCHLORIDE 25 MG/1
25 TABLET, FILM COATED ORAL EVERY 8 HOURS
Status: DISCONTINUED | OUTPATIENT
Start: 2020-03-05 | End: 2020-03-05

## 2020-03-05 RX ORDER — NICARDIPINE HYDROCHLORIDE 0.2 MG/ML
5 INJECTION INTRAVENOUS CONTINUOUS
Status: DISCONTINUED | OUTPATIENT
Start: 2020-03-05 | End: 2020-03-10

## 2020-03-05 RX ADMIN — ASPIRIN 81 MG CHEWABLE TABLET 81 MG: 81 TABLET CHEWABLE at 08:03

## 2020-03-05 RX ADMIN — ATORVASTATIN CALCIUM 80 MG: 20 TABLET, FILM COATED ORAL at 08:03

## 2020-03-05 RX ADMIN — AMIODARONE HYDROCHLORIDE 0.5 MG/MIN: 1.8 INJECTION, SOLUTION INTRAVENOUS at 03:03

## 2020-03-05 RX ADMIN — NICARDIPINE HYDROCHLORIDE 2.5 MG/HR: 0.2 INJECTION, SOLUTION INTRAVENOUS at 04:03

## 2020-03-05 RX ADMIN — AMIODARONE HYDROCHLORIDE 0.5 MG/MIN: 1.8 INJECTION, SOLUTION INTRAVENOUS at 01:03

## 2020-03-05 RX ADMIN — INSULIN ASPART 2 UNITS: 100 INJECTION, SOLUTION INTRAVENOUS; SUBCUTANEOUS at 06:03

## 2020-03-05 RX ADMIN — HYDRALAZINE HYDROCHLORIDE 25 MG: 25 TABLET ORAL at 08:03

## 2020-03-05 RX ADMIN — CARVEDILOL 6.25 MG: 6.25 TABLET, FILM COATED ORAL at 08:03

## 2020-03-05 RX ADMIN — SEVELAMER CARBONATE 1.6 G: 0.8 POWDER, FOR SUSPENSION ORAL at 12:03

## 2020-03-05 RX ADMIN — FUROSEMIDE 80 MG: 10 INJECTION, SOLUTION INTRAMUSCULAR; INTRAVENOUS at 02:03

## 2020-03-05 RX ADMIN — CARVEDILOL 6.25 MG: 6.25 TABLET, FILM COATED ORAL at 09:03

## 2020-03-05 RX ADMIN — HYDRALAZINE HYDROCHLORIDE 50 MG: 50 TABLET, FILM COATED ORAL at 10:03

## 2020-03-05 RX ADMIN — FENTANYL CITRATE 25 MCG: 50 INJECTION INTRAMUSCULAR; INTRAVENOUS at 10:03

## 2020-03-05 RX ADMIN — FUROSEMIDE 80 MG: 10 INJECTION, SOLUTION INTRAMUSCULAR; INTRAVENOUS at 08:03

## 2020-03-05 RX ADMIN — FUROSEMIDE 80 MG: 10 INJECTION, SOLUTION INTRAMUSCULAR; INTRAVENOUS at 10:03

## 2020-03-05 RX ADMIN — HYDRALAZINE HYDROCHLORIDE 20 MG: 20 INJECTION INTRAMUSCULAR; INTRAVENOUS at 11:03

## 2020-03-05 RX ADMIN — SEVELAMER CARBONATE 1.6 G: 0.8 POWDER, FOR SUSPENSION ORAL at 04:03

## 2020-03-05 RX ADMIN — SEVELAMER CARBONATE 1.6 G: 0.8 POWDER, FOR SUSPENSION ORAL at 08:03

## 2020-03-05 RX ADMIN — FAMOTIDINE 20 MG: 10 INJECTION INTRAVENOUS at 08:03

## 2020-03-05 RX ADMIN — HEPARIN SODIUM AND DEXTROSE 14 UNITS/KG/HR: 10000; 5 INJECTION INTRAVENOUS at 05:03

## 2020-03-05 RX ADMIN — NITROGLYCERIN 100 MCG/MIN: 20 INJECTION INTRAVENOUS at 02:03

## 2020-03-05 RX ADMIN — HYDRALAZINE HYDROCHLORIDE 50 MG: 50 TABLET, FILM COATED ORAL at 01:03

## 2020-03-05 RX ADMIN — NITROGLYCERIN 130 MCG/MIN: 20 INJECTION INTRAVENOUS at 05:03

## 2020-03-05 NOTE — PROGRESS NOTES
[facility]  Nephrology  Progress Note    Patient Name: John Javier  MRN: [id number]  Admission Date: 2/29/2020  Hospital Length of Stay: 5 days  Attending Provider: Shannon Fuller MD   Primary Care Physician: To Obtain Unable  Principal Problem:Cardiogenic shock      Interval History: Patient seen and examined at bedside. On lasix IV 80mg q day. 2.2L UOP/past 24hrs. Slight improvement in sCr, down to 7.4 from 7.5. On 2L nasal canula, resting comfortably and in no acute distress.     Review of patient's allergies indicates:  No Known Allergies  Current Facility-Administered Medications   Medication Frequency    0.9%  NaCl infusion (for blood administration) Q24H PRN    amiodarone 360 mg/200 mL (1.8 mg/mL) infusion Continuous    aspirin chewable tablet 81 mg Daily    atorvastatin tablet 80 mg Daily    carvediloL tablet 6.25 mg BID    dextrose 10% (D10W) Bolus PRN    diphenhydrAMINE capsule 25 mg Q6H PRN    fentaNYL injection 25 mcg Q2H PRN    furosemide injection 80 mg Daily    glucagon (human recombinant) injection 1 mg PRN    heparin 25,000 units in dextrose 5% 250 mL (100 units/mL) infusion LOW INTENSITY nomogram - [facility] Continuous    hydrALAZINE tablet 50 mg Q8H    insulin aspart U-100 pen 0-5 Units Q6H PRN    nitroGLYCERIN 50 mg in dextrose 5 % 250 mL infusion (TITRATING) Continuous    senna-docusate 8.6-50 mg per tablet 1 tablet BID PRN    sevelamer carbonate pwpk 1.6 g TID WM    sodium chloride 0.9% flush 10 mL PRN    sodium chloride 0.9% flush 10 mL PRN       Objective:     Vital Signs (Most Recent):  Temp: 98.5 °F (36.9 °C) (03/05/20 1232)  Pulse: 110 (03/05/20 1232)  Resp: 18 (03/05/20 1232)  BP: 128/68 (03/05/20 1232)  SpO2: (!) 92 % (03/05/20 1232)  O2 Device (Oxygen Therapy): nasal cannula (03/05/20 1200) Vital Signs (24h Range):  Temp:  [97.9 °F (36.6 °C)-99.3 °F (37.4 °C)] 98.5 °F (36.9 °C)  Pulse:  [] 110  Resp:  [12-27] 18  SpO2:  [88 %-97 %] 92 %  BP:  (105-184)/(55-92) 128/68  Arterial Line BP: (123-187)/(58-88) 163/77     Weight: 76.2 kg (168 lb) (03/02/20 1515)  Body mass index is 27.12 kg/m².  Body surface area is 1.88 meters squared.    I/O last 3 completed shifts:  In: 2630.4 [P.O.:480; I.V.:1740.4; NG/GT:310; IV Piggyback:100]  Out: 4675 [Urine:4675]    Physical Exam   Constitutional: He appears well-developed and well-nourished. He is sedated and intubated.   Cardiovascular:   Murmur heard.  Pulmonary/Chest: Effort normal. He is intubated. He has no wheezes. He has rhonchi. He has no rales.   Abdominal: Soft. Bowel sounds are normal.   Musculoskeletal: He exhibits no edema.   Neurological: He is alert.   Skin: Skin is warm.       Significant Labs:  CBC:   Recent Labs   Lab 03/05/20  0534   WBC 13.30*   RBC 2.41*   HGB 7.0*   HCT 21.8*      MCV 91   MCH 29.0   MCHC 32.1     CMP:   Recent Labs   Lab 03/05/20  0856   *  148*   CALCIUM 8.3*  8.3*   ALBUMIN 2.6*  2.6*   PROT 6.5     143   K 4.5  4.5   CO2 22*  22*     103   BUN 91*  91*   CREATININE 7.4*  7.4*   ALKPHOS 84   ALT 16   AST 34   BILITOT 0.5            Assessment/Plan:     ANGEL (acute kidney injury)  * Non-oliguric iATN from cardiogenic shock.     3/2: -urine microscopy:  abundant muddy brown casts (leaning towards ATN)     Plan/ Rec:    -No urgent indication for RRT  -Can continue current dose of lasix   -Continue renvela  -Renal diet, if not NPO   -Renal function panels q 12hr   -Strict I/O and chart  -Will follow closely  -Plan discussed with Dr. Valdez        Thank you for your consult. I will follow-up with patient. Please contact us if you have any additional questions.    Kash Menjivar NP  Nephrology  [facility]

## 2020-03-05 NOTE — NURSING
"2121: Patients HR now 150s-160s, 12-lead EKG reading "SVT." BP stable. Dr. Valdivia notified, RN to give 5mg metoprolol IV push and IV Amiodarone (Loading Dose & Continuous). Patient denies any new symptoms, reports he "feels fine, just tired"    2155: Metoprolol given, continuous Amio infusing. 12-lead EKG now reading "Aflutter," rate 110s-120s. Dr. Pierson aware, placing order to make patient NPO at midnight for possible cardioversion tomorrow. No other new orders at this time. WCTM.    2230: Patient now in SR, rate 80s-90s.    2318: Patient experienced 19-beat run of monomorphic Vtach. Patient endorses feeling of chest fluttering during episode that quickly went away. Dr. Pierson notified, RN to draw CMP, Mg, Phos. WCTM.   "

## 2020-03-05 NOTE — SUBJECTIVE & OBJECTIVE
No past medical history on file.    No past surgical history on file.    Review of patient's allergies indicates:  No Known Allergies    No current facility-administered medications on file prior to encounter.      Current Outpatient Medications on File Prior to Encounter   Medication Sig    clopidogreL (PLAVIX) 75 mg tablet Take 75 mg by mouth once daily.    furosemide (LASIX) 20 MG tablet Take 20 mg by mouth daily as needed.    metFORMIN (GLUCOPHAGE) 500 MG tablet Take 500 mg by mouth 2 (two) times daily with meals.    metoprolol succinate (TOPROL-XL) 100 MG 24 hr tablet Take 150 mg by mouth once daily.    pantoprazole (PROTONIX) 40 MG tablet Take 40 mg by mouth once daily.    sacubitril-valsartan (ENTRESTO) 49-51 mg per tablet Take 1 tablet by mouth 2 (two) times daily.    spironolactone (ALDACTONE) 25 MG tablet Take 12.5 mg by mouth once daily.     Family History     None        Tobacco Use    Smoking status: Not on file   Substance and Sexual Activity    Alcohol use: Not on file    Drug use: Not on file    Sexual activity: Not on file     Review of Systems   Constitution: Negative for chills and fever.   Cardiovascular: Negative for chest pain, dyspnea on exertion, irregular heartbeat, near-syncope and syncope.   Respiratory: Positive for cough and shortness of breath (at rest).    Gastrointestinal: Negative for nausea.   Neurological: Negative for headaches and weakness.   Psychiatric/Behavioral: Negative for altered mental status.     Objective:     Vital Signs (Most Recent):  Temp: 98.8 °F (37.1 °C) (03/05/20 1520)  Pulse: (!) 115 (03/05/20 1520)  Resp: 19 (03/05/20 1520)  BP: 130/76 (03/05/20 1500)  SpO2: (!) 94 % (03/05/20 1520) Vital Signs (24h Range):  Temp:  [97.9 °F (36.6 °C)-99.3 °F (37.4 °C)] 98.8 °F (37.1 °C)  Pulse:  [] 115  Resp:  [12-27] 19  SpO2:  [88 %-96 %] 94 %  BP: (105-184)/(55-92) 130/76  Arterial Line BP: (123-192)/(58-88) 158/71       Weight: 76.2 kg (168 lb)  Body mass  index is 27.12 kg/m².    SpO2: (!) 94 %  O2 Device (Oxygen Therapy): nasal cannula    Physical Exam   Constitutional: He is oriented to person, place, and time. He appears well-developed and well-nourished.   HENT:   Head: Normocephalic and atraumatic.   Eyes: Pupils are equal, round, and reactive to light.   Neck: No JVD present.   Cardiovascular: Normal rate and regular rhythm.   No murmur heard.  Pulmonary/Chest: Effort normal. No respiratory distress. He has rales.   Abdominal: Soft. Bowel sounds are normal. He exhibits no distension. There is no tenderness.   Musculoskeletal: He exhibits no edema.   Neurological: He is alert and oriented to person, place, and time.   Skin: Skin is warm and dry. No erythema.   Psychiatric: His behavior is normal. Judgment and thought content normal.   Vitals reviewed.      Significant Labs:   CMP:   Recent Labs   Lab 03/04/20  1832 03/04/20  2332 03/05/20  0856    140 143  143   K 4.5 4.3 4.5  4.5    102 103  103   CO2 22* 24 22*  22*   * 195* 148*  148*   BUN 89* 88* 91*  91*   CREATININE 7.8* 7.5* 7.4*  7.4*   CALCIUM 8.4* 8.1* 8.3*  8.3*   PROT 6.5 6.1 6.5   ALBUMIN 2.6* 2.4* 2.6*  2.6*   BILITOT 0.4 0.4 0.5   ALKPHOS 86 80 84   AST 29 29 34   ALT 15 14 16   ANIONGAP 16 14 18*  18*   ESTGFRAFRICA 7.7* 8.1* 8.2*  8.2*   EGFRNONAA 6.7* 7.0* 7.1*  7.1*    and CBC:   Recent Labs   Lab 03/04/20  0303 03/05/20  0410 03/05/20  0534   WBC 11.50 13.47* 13.30*   HGB 7.8* 6.9* 7.0*   HCT 24.2* 21.6* 21.8*   * 152 162       Significant Imaging: All pertinent imaging reviewed in the last 24 hours.

## 2020-03-05 NOTE — PROGRESS NOTES
03/04/20 1800   OTHER   Was oral hygiene performed prior to JAVID Screen? Yes   Indirect Swallowing Test/Preliminary Investigation   Vigilance 1   Cough and/or Throat Clearing 1   Saliva Swallowing Successful 1   Saliva Drooling 1   Saliva Voice Change 1   Score 5   Direct Swallowing Test Semisolid   Deglutition 2   Cough (involuntary) 1   Drooling 1   Voice Change 1   Score 5   Direct Swallowing Test Liquid   Deglutition 2   Cough (involuntary) 1   Drooling 1   Voice Change 1   Score 5   Direct Swallowing Test Solid   Deglutition 2   Cough (involuntary) 1   Drooling 1   Voice Change 1   Score 5   JAVID Summary   Indirect Swallowing Test Total Score 5   Direct Swallowing Test Total Score 15   JAVID Total Score 20

## 2020-03-05 NOTE — SUBJECTIVE & OBJECTIVE
Interval History: AAO x 3. Feels hungry this am. Went into afib/flutter last night and converted to NSR after metoprolol and IV amio. He went back into afib early this am at ~345am and continues in this rhythm with HR ~120s. Only complaint is being hungry otherwise feels well.     Continuous Infusions:   amiodarone in dextrose 5% 0.5 mg/min (03/05/20 0800)    heparin (porcine) in D5W 14.042 Units/kg/hr (03/05/20 0800)    nitroGLYCERIN 85 mcg/min (03/05/20 0820)     Scheduled Meds:   aspirin  81 mg Oral Daily    atorvastatin  80 mg Oral Daily    famotidine (PF)  20 mg Intravenous Daily    fentaNYL  25 mcg Intravenous Once    furosemide (LASIX) IV  80 mg Intravenous Daily    hydrALAZINE  25 mg Oral Q8H    metoprolol  5 mg Intravenous Once    sevelamer carbonate  1.6 g Oral TID WM     PRN Meds:sodium chloride, Dextrose 10% Bolus, diphenhydrAMINE, fentaNYL, glucagon (human recombinant), insulin aspart U-100, senna-docusate 8.6-50 mg, sodium chloride 0.9%, sodium chloride 0.9%    Review of patient's allergies indicates:  No Known Allergies  Objective:     Vital Signs (Most Recent):  Temp: 98.5 °F (36.9 °C) (03/05/20 0701)  Pulse: (!) 114 (03/05/20 0800)  Resp: 19 (03/05/20 0800)  BP: 130/81 (03/05/20 0800)  SpO2: (!) 94 % (03/05/20 0800) Vital Signs (24h Range):  Temp:  [98.4 °F (36.9 °C)-99.3 °F (37.4 °C)] 98.5 °F (36.9 °C)  Pulse:  [] 114  Resp:  [12-22] 19  SpO2:  [93 %-98 %] 94 %  BP: (105-178)/(55-82) 130/81  Arterial Line BP: (123-176)/(58-76) 145/71     Patient Vitals for the past 72 hrs (Last 3 readings):   Weight   03/02/20 1515 76.2 kg (168 lb)   03/02/20 1300 76.5 kg (168 lb 10.4 oz)     Body mass index is 27.12 kg/m².      Intake/Output Summary (Last 24 hours) at 3/5/2020 0830  Last data filed at 3/5/2020 0800  Gross per 24 hour   Intake 1863.09 ml   Output 2300 ml   Net -436.91 ml        Telemetry: NSR 80s    Physical Exam   Constitutional: He is oriented to person, place, and time. He  appears well-developed and well-nourished.   HENT:   Head: Normocephalic.   Eyes: Pupils are equal, round, and reactive to light.   Neck: Normal range of motion. Neck supple.   LIJ TLC placed 3/1/20.   Cardiovascular: Normal rate and regular rhythm.   Murmur heard.  Pulmonary/Chest: Effort normal.   Abdominal: Soft. Bowel sounds are normal.   Musculoskeletal: Normal range of motion.   Neurological: He is alert and oriented to person, place, and time.   Skin: Skin is warm and dry.   Nursing note and vitals reviewed.    Significant Labs:  CBC:  Recent Labs   Lab 03/04/20  0303 03/05/20  0410 03/05/20  0534   WBC 11.50 13.47* 13.30*   RBC 2.63* 2.36* 2.41*   HGB 7.8* 6.9* 7.0*   HCT 24.2* 21.6* 21.8*   * 152 162   MCV 92 92 91   MCH 29.7 29.2 29.0   MCHC 32.2 31.9* 32.1     BNP:  Recent Labs   Lab 02/29/20 2122   *     CMP:  Recent Labs   Lab 03/04/20  1103 03/04/20  1832 03/04/20  2332   * 203* 195*   CALCIUM 8.1* 8.4* 8.1*   ALBUMIN 2.6* 2.6* 2.4*   PROT 6.5 6.5 6.1    142 140   K 4.5 4.5 4.3   CO2 23 22* 24    104 102   BUN 88* 89* 88*   CREATININE 7.5* 7.8* 7.5*   ALKPHOS 86 86 80   ALT 16 15 14   AST 30 29 29   BILITOT 0.4 0.4 0.4      Coagulation:   Recent Labs   Lab 02/29/20 2122 03/01/20  2132  03/03/20  2356 03/04/20  0549 03/05/20  0410   INR 1.3*  --  1.2  --   --   --   --    APTT 37.6*   < > 63.7*  63.7*   < > 55.2* 49.7* 49.3*    < > = values in this interval not displayed.     LDH:  Recent Labs   Lab 03/03/20  0407   *     Microbiology:  Microbiology Results (last 7 days)     Procedure Component Value Units Date/Time    Blood culture [[id number]] Collected:  03/01/20 0254    Order Status:  Completed Specimen:  Blood from Peripheral, Upper Arm, Left Updated:  03/05/20 0612     Blood Culture, Routine No Growth to date      No Growth to date      No Growth to date      No Growth to date      No Growth to date    Blood culture [[id number]] Collected:  02/29/20 8484     Order Status:  Completed Specimen:  Blood from Peripheral, Forearm, Right Updated:  03/05/20 0612     Blood Culture, Routine No Growth to date      No Growth to date      No Growth to date      No Growth to date      No Growth to date    Urine culture [[id number]] Collected:  02/29/20 2240    Order Status:  Completed Specimen:  Urine Updated:  03/02/20 0739     Urine Culture, Routine No significant growth    Narrative:       Preferred Collection Type->Urine, Clean Catch        I have reviewed all pertinent labs within the past 24 hours.    Estimated Creatinine Clearance: 9.1 mL/min (A) (based on SCr of 7.5 mg/dL (H)).    Diagnostic Results:  I have reviewed all pertinent imaging results/findings within the past 24 hours.

## 2020-03-05 NOTE — ASSESSMENT & PLAN NOTE
-No obvious bleeding.   -R groin impella site stable/no abdominal or back pain.  -Will transfuse 2u prbc today.

## 2020-03-05 NOTE — ASSESSMENT & PLAN NOTE
-Presented with impella placed at OSH with position issues and LD>1000.  -Impella removed 3/2/20.   -CVP 10 this am, SVO2 62% with a calculated CO 7.36, CI 3.91,   -Continue IVP Lasix. Will increase as he is getting transfused this am.   - d/c'ed due to high output and hypertension/afib.  -TTE 3/2 EF 10%, LVIDD 6, TAPSE 1.98, Mod MR, Mod TR.  -Started on nitro gtt for BP. Will restart PO hydralazine and add low dose carvedilol. Will wean nitro as tolerated.   -Not working up for advanced options due to medical instability/renal failure at this time. Will continue to reassess candidacy if condition improves.

## 2020-03-05 NOTE — NURSING
Notified Dr. Pierson of H/H 7/21.8, type and screen collected, defer decision to infuse to day team. [facility].

## 2020-03-05 NOTE — CONSULTS
[facility]  Cardiac Electrophysiology  Consult Note    Admission Date: 2/29/2020  Code Status: Full Code   Attending Provider: Shannon Fuller MD  Consulting Provider: Kelsy Man MD  Principal Problem:Cardiogenic shock    Inpatient consult to Electrophysiology  Consult performed by: Kelsy Man MD  Consult ordered by: Alejo Frankel NP        Subjective:     Chief Complaint:  Paroxysmal atrial fibrillation     HPI:   John Javier is a 65 y/o M hx of ICM (EF 10-15%), ICD in place, CAD (50-60% LCx, OM disease on [facility] 2/29), HTN, DM2, HLD, obesity, CKD who was transferred from OSH in cardiogenic shock with Impella in place. Impella was removed 3/2 and patient was extubated 3/4. Since 3/3 patient has been having intermittent episode of atrial fibrillation. EP has been consulted for further evaluation.     Of note, Hgb 7.0 this AM. Though pt is getting 1U RBC, no source of active bleeding identified. Pt reports history of prior atrial fibrillation but is unable to clarify if he has had an ablation or cardioversion in the past. He does have a single lead ICD in place for secondary prevention VT. He had 6 shocks from it on Feb 28th prior to admission at OSH.     Bedside interrogation:   [employer] Secura VR ICD  Implanted 10/11/2013 by Dr. Gaitan for Sustained VT, EF 30%  Followed by Dr. García at [facility]  RV lead sensitivity: 0.3 mV, threshold: 0.6 V, impedance 589 ohms,   Mode: VVI (bipolar)  Lower rate: 40 bpm    Tachytherapy  VT Detect 183 bpm; Therapy: burst, 35J, 35J, 35J, 35J  FVT Detect OFF  VF Detect 222 bpm; Therapy: 35J, 35J, 35J, 35J, 35J, 35J     The patient had 2 treated episodes since Feb 28th, 2020    Episodes #1 2/28/20 17:33:48 duration 21 min. VT Defib x6: Defsuccessful.   Episode #2 2/28/20 18:01:15 duration 1.4 min. VT Rx1: Defib successful  EGMs will be uploaded into Media    No past medical history on file.    No past surgical history on file.    Review of  patient's allergies indicates:  No Known Allergies    No current facility-administered medications on file prior to encounter.      Current Outpatient Medications on File Prior to Encounter   Medication Sig    clopidogreL (PLAVIX) 75 mg tablet Take 75 mg by mouth once daily.    furosemide (LASIX) 20 MG tablet Take 20 mg by mouth daily as needed.    metFORMIN (GLUCOPHAGE) 500 MG tablet Take 500 mg by mouth 2 (two) times daily with meals.    metoprolol succinate (TOPROL-XL) 100 MG 24 hr tablet Take 150 mg by mouth once daily.    pantoprazole (PROTONIX) 40 MG tablet Take 40 mg by mouth once daily.    sacubitril-valsartan (ENTRESTO) 49-51 mg per tablet Take 1 tablet by mouth 2 (two) times daily.    spironolactone (ALDACTONE) 25 MG tablet Take 12.5 mg by mouth once daily.     Family History     None        Tobacco Use    Smoking status: Not on file   Substance and Sexual Activity    Alcohol use: Not on file    Drug use: Not on file    Sexual activity: Not on file     Review of Systems   Constitution: Negative for chills and fever.   Cardiovascular: Negative for chest pain, dyspnea on exertion, irregular heartbeat, near-syncope and syncope.   Respiratory: Positive for cough and shortness of breath (at rest).    Gastrointestinal: Negative for nausea.   Neurological: Negative for headaches and weakness.   Psychiatric/Behavioral: Negative for altered mental status.     Objective:     Vital Signs (Most Recent):  Temp: 98.8 °F (37.1 °C) (03/05/20 1520)  Pulse: (!) 115 (03/05/20 1520)  Resp: 19 (03/05/20 1520)  BP: 130/76 (03/05/20 1500)  SpO2: (!) 94 % (03/05/20 1520) Vital Signs (24h Range):  Temp:  [97.9 °F (36.6 °C)-99.3 °F (37.4 °C)] 98.8 °F (37.1 °C)  Pulse:  [] 115  Resp:  [12-27] 19  SpO2:  [88 %-96 %] 94 %  BP: (105-184)/(55-92) 130/76  Arterial Line BP: (123-192)/(58-88) 158/71       Weight: 76.2 kg (168 lb)  Body mass index is 27.12 kg/m².    SpO2: (!) 94 %  O2 Device (Oxygen Therapy): nasal  cannula    Physical Exam   Constitutional: He is oriented to person, place, and time. He appears well-developed and well-nourished.   HENT:   Head: Normocephalic and atraumatic.   Eyes: Pupils are equal, round, and reactive to light.   Neck: No JVD present.   Cardiovascular: Normal rate and regular rhythm.   No murmur heard.  Pulmonary/Chest: Effort normal. No respiratory distress. He has rales.   Abdominal: Soft. Bowel sounds are normal. He exhibits no distension. There is no tenderness.   Musculoskeletal: He exhibits no edema.   Neurological: He is alert and oriented to person, place, and time.   Skin: Skin is warm and dry. No erythema.   Psychiatric: His behavior is normal. Judgment and thought content normal.   Vitals reviewed.      Significant Labs:   CMP:   Recent Labs   Lab 03/04/20  1832 03/04/20  2332 03/05/20  0856    140 143  143   K 4.5 4.3 4.5  4.5    102 103  103   CO2 22* 24 22*  22*   * 195* 148*  148*   BUN 89* 88* 91*  91*   CREATININE 7.8* 7.5* 7.4*  7.4*   CALCIUM 8.4* 8.1* 8.3*  8.3*   PROT 6.5 6.1 6.5   ALBUMIN 2.6* 2.4* 2.6*  2.6*   BILITOT 0.4 0.4 0.5   ALKPHOS 86 80 84   AST 29 29 34   ALT 15 14 16   ANIONGAP 16 14 18*  18*   ESTGFRAFRICA 7.7* 8.1* 8.2*  8.2*   EGFRNONAA 6.7* 7.0* 7.1*  7.1*    and CBC:   Recent Labs   Lab 03/04/20  0303 03/05/20  0410 03/05/20  0534   WBC 11.50 13.47* 13.30*   HGB 7.8* 6.9* 7.0*   HCT 24.2* 21.6* 21.8*   * 152 162       Significant Imaging: All pertinent imaging reviewed in the last 24 hours.              Assessment and Plan:     PAF (paroxysmal atrial fibrillation)  John Javier is a 63 y/o M hx of ICM (EF 10-15%), ICD in place, CAD (50-60% LCx, OM disease on [facility] 2/29), HTN, DM2, HLD, obesity, CKD who was transferred from OSH in cardiogenic shock with Impella in place. Impella was removed 3/2 and patient was extubated 3/4. Since 3/3 patient has been having intermittent episode of atrial fibrillation. EP has been  consulted for further evaluation.   Pt reports history of prior atrial fibrillation but is unable to clarify if he has had an ablation or cardioversion in the past. He does have a single lead ICD in place for secondary prevention VT. He had 6 shocks from it on Feb 28th prior to admission at OSH.     - noted intermittently since 3/4/20; on tele mostly atrial fib but also intermittent atrial flutter  - CHADsVASC 4 suggestive of 4.8% annual stoke risk; recommend anticoagulation with heparin  - s/p amio load on 3/5. Leave on IV amiodarone while still on IV diuretics. Once he is off IV diuretics, will plan to continue load PO. Ultimately, his maintenance dose will need to be PO 400mg daily given history of VT  - continue rate control with carvedilol 6.25mg BID  - continue diuresis per primary team until patient is euvolemic or unable to diurese further. Replace electrolytes to goal K > 4, Mg > 2  - will tentatively plan for RAISA + DCCV on Monday 3/9/20  - continue tele monitoring    Thank you for your consult. Will be available as needed. Please let us know when ready for cardioversion.     Patient seen and plan of care discussed with Dr. Leyva.      Kelsy Man MD  Cardiac Electrophysiology  [facility]

## 2020-03-05 NOTE — PROGRESS NOTES
[facility]  Heart Transplant  Progress Note    Patient Name: John Javier  MRN: [id number]  Admission Date: 2/29/2020  Hospital Length of Stay: 5 days  Attending Physician: Shannon Fuller MD  Primary Care Provider: To Obtain Unable  Principal Problem:Cardiogenic shock    Subjective:     Interval History: AAO x 3. Feels hungry this am. Went into afib/flutter last night and converted to NSR after metoprolol and IV amio. He went back into afib early this am at ~345am and continues in this rhythm with HR ~120s. Only complaint is being hungry otherwise feels well.     Continuous Infusions:   amiodarone in dextrose 5% 0.5 mg/min (03/05/20 0800)    heparin (porcine) in D5W 14.042 Units/kg/hr (03/05/20 0800)    nitroGLYCERIN 85 mcg/min (03/05/20 0820)     Scheduled Meds:   aspirin  81 mg Oral Daily    atorvastatin  80 mg Oral Daily    famotidine (PF)  20 mg Intravenous Daily    fentaNYL  25 mcg Intravenous Once    furosemide (LASIX) IV  80 mg Intravenous Daily    hydrALAZINE  25 mg Oral Q8H    metoprolol  5 mg Intravenous Once    sevelamer carbonate  1.6 g Oral TID WM     PRN Meds:sodium chloride, Dextrose 10% Bolus, diphenhydrAMINE, fentaNYL, glucagon (human recombinant), insulin aspart U-100, senna-docusate 8.6-50 mg, sodium chloride 0.9%, sodium chloride 0.9%    Review of patient's allergies indicates:  No Known Allergies  Objective:     Vital Signs (Most Recent):  Temp: 98.5 °F (36.9 °C) (03/05/20 0701)  Pulse: (!) 114 (03/05/20 0800)  Resp: 19 (03/05/20 0800)  BP: 130/81 (03/05/20 0800)  SpO2: (!) 94 % (03/05/20 0800) Vital Signs (24h Range):  Temp:  [98.4 °F (36.9 °C)-99.3 °F (37.4 °C)] 98.5 °F (36.9 °C)  Pulse:  [] 114  Resp:  [12-22] 19  SpO2:  [93 %-98 %] 94 %  BP: (105-178)/(55-82) 130/81  Arterial Line BP: (123-176)/(58-76) 145/71     Patient Vitals for the past 72 hrs (Last 3 readings):   Weight   03/02/20 1515 76.2 kg (168 lb)   03/02/20 1300 76.5 kg (168 lb 10.4 oz)      Body mass index is 27.12 kg/m².      Intake/Output Summary (Last 24 hours) at 3/5/2020 0830  Last data filed at 3/5/2020 0800  Gross per 24 hour   Intake 1863.09 ml   Output 2300 ml   Net -436.91 ml        Telemetry: NSR 80s    Physical Exam   Constitutional: He is oriented to person, place, and time. He appears well-developed and well-nourished.   HENT:   Head: Normocephalic.   Eyes: Pupils are equal, round, and reactive to light.   Neck: Normal range of motion. Neck supple.   LIJ TLC placed 3/1/20.   Cardiovascular: Normal rate and regular rhythm.   Murmur heard.  Pulmonary/Chest: Effort normal.   Abdominal: Soft. Bowel sounds are normal.   Musculoskeletal: Normal range of motion.   Neurological: He is alert and oriented to person, place, and time.   Skin: Skin is warm and dry.   Nursing note and vitals reviewed.    Significant Labs:  CBC:  Recent Labs   Lab 03/04/20  0303 03/05/20  0410 03/05/20  0534   WBC 11.50 13.47* 13.30*   RBC 2.63* 2.36* 2.41*   HGB 7.8* 6.9* 7.0*   HCT 24.2* 21.6* 21.8*   * 152 162   MCV 92 92 91   MCH 29.7 29.2 29.0   MCHC 32.2 31.9* 32.1     BNP:  Recent Labs   Lab 02/29/20 2122   *     CMP:  Recent Labs   Lab 03/04/20  1103 03/04/20  1832 03/04/20  2332   * 203* 195*   CALCIUM 8.1* 8.4* 8.1*   ALBUMIN 2.6* 2.6* 2.4*   PROT 6.5 6.5 6.1    142 140   K 4.5 4.5 4.3   CO2 23 22* 24    104 102   BUN 88* 89* 88*   CREATININE 7.5* 7.8* 7.5*   ALKPHOS 86 86 80   ALT 16 15 14   AST 30 29 29   BILITOT 0.4 0.4 0.4      Coagulation:   Recent Labs   Lab 02/29/20 2122  03/01/20  2132  03/03/20  2356 03/04/20  0549 03/05/20  0410   INR 1.3*  --  1.2  --   --   --   --    APTT 37.6*   < > 63.7*  63.7*   < > 55.2* 49.7* 49.3*    < > = values in this interval not displayed.     LDH:  Recent Labs   Lab 03/03/20  0407   *     Microbiology:  Microbiology Results (last 7 days)     Procedure Component Value Units Date/Time    Blood culture [[id number]] Collected:   "03/01/20 0254    Order Status:  Completed Specimen:  Blood from Peripheral, Upper Arm, Left Updated:  03/05/20 0612     Blood Culture, Routine No Growth to date      No Growth to date      No Growth to date      No Growth to date      No Growth to date    Blood culture [[id number]] Collected:  02/29/20 2305    Order Status:  Completed Specimen:  Blood from Peripheral, Forearm, Right Updated:  03/05/20 0612     Blood Culture, Routine No Growth to date      No Growth to date      No Growth to date      No Growth to date      No Growth to date    Urine culture [[id number]] Collected:  02/29/20 2240    Order Status:  Completed Specimen:  Urine Updated:  03/02/20 0739     Urine Culture, Routine No significant growth    Narrative:       Preferred Collection Type->Urine, Clean Catch        I have reviewed all pertinent labs within the past 24 hours.    Estimated Creatinine Clearance: 9.1 mL/min (A) (based on SCr of 7.5 mg/dL (H)).    Diagnostic Results:  I have reviewed all pertinent imaging results/findings within the past 24 hours.    Assessment and Plan:     Patient intubated w limited collateral from family; further OSH records pending    65 y/o M hx of CAD (50-60% LCx, OM disease on LHC 2/29), ICM EF 10-15% s/p AICD (unknown baseline but on GDMT as OP), HTN, DM2, HLD, obesity, CKD, presenting from OSH intubated with Impella in setting of cardiogenic shock. Per pt's brother he was taken by EMS to OSH in setting of feeling short of breath, nearly passing out. There found to be in shock, lactate to 12, ANGEL w Cr 2.5, trop 12; unclear if dynamic ECG changes, max trop 10; underwent LHC with evidence of 50-60% LCx, OM disease; no PCI performed. RHC with LVEDP 35. CO 3, CI 1.8, tte w LVEF 10-15%. Underwent Impella placement without additional central line placement (per brother there was a "complication" but no evidence of pneumothorax).    On arrival patient -having intermittent suction alarms, improved w P6->P4. MAPs stable " 75-85. CVC placed in LIJ given neck position, CO 9.7, CI 5.35, svr 535. WBC 26k, patient cultured, given single dose of vanc/zosyn. UOP 60-80cc/hr off diuretics. Further collateral pending from OSH. Continued on heparin gtt for impella, weaned to dobutamine 5, gave 500cc bolus in setting of CVP 10 and suction alarms.    * Cardiogenic shock  -Presented with impella placed at OSH with position issues and LD>1000.  -Impella removed 3/2/20.   -CVP 10 this am, SVO2 62% with a calculated CO 7.36, CI 3.91,   -Continue IVP Lasix. Will increase as he is getting transfused this am.   - d/c'ed due to high output and hypertension/afib.  -TTE 3/2 EF 10%, LVIDD 6, TAPSE 1.98, Mod MR, Mod TR.  -Started on nitro gtt for BP. Will restart PO hydralazine and add low dose carvedilol. Will wean nitro as tolerated.   -Not working up for advanced options due to medical instability/renal failure at this time. Will continue to reassess candidacy if condition improves.     ANGEL (acute kidney injury)  - Likely ATN in setting of shock, Hemolysis, plus IV contrast at OSH.  - Monitor Cr closely, downtrending today  - Appreciate renal consult.  -Strict I/O.  -Avoid nephrotoxic meds.    Acute hypoxemic respiratory failure  -Extubated 3/4.        PAF (paroxysmal atrial fibrillation)  -Episode of afib RVR 3/3/2020 in the setting of severe agitation with SBT  -Converted to NSR  -Continue heparin gtt for anticoagulation  -Went back into afib last night. Started on IV Amio.   -Will get EP cx to see if DCCV an option    VT (ventricular tachycardia)  -S/P ICD shocks x 5 per OSH.  -Continue PO amio load    Malnutrition of moderate degree  -Appreciate Dietary Cx.  -Will advance diet today.    Acute on chronic combined systolic and diastolic heart failure  - See Cardiogenic shock.    DM (diabetes mellitus)  -A1C 7.0 on admit  -SSI, accuchecks    CAD (coronary artery disease)  -Hx of RCA stent in 2013.   -C 2/29: 50-60% LCx, OM disease.  -continue  ASA, high intensity statin.  -Continue heparin    Anemia       -No obvious bleeding.        -R groin impella site stable/no abdominal or back pain.       -Will transfuse 2u prbc today.     Uninterrupted Critical Care/Counseling Time (not including procedures): 60mn  Alejo Frankel, NP  Heart Transplant  [facility]

## 2020-03-05 NOTE — NURSING
"1910: Patient attempted to eat dinner and began vomiting after 2 spoonfuls. HR 150s, SBP 170s. 12-lead obtained to determine qTc, which is 515. Dr. Valdivia notified, MD to order anti-emetic    1935: Patient now in Afib, HR 110s-120s. 12-lead obtained. BP stable. When asked if patient was experiencing any chest fluttering, pain, or shortness of breath, he stated "I felt some fluttering earlier, but now its gone." Dr. Valdivia notified. No new orders at this time, WCTM.     2100: Patient's HR now 130s-low 140s. BP stable. Notified Dr. Valdivia, who advised to give patient's pm Amio dosage, and see if that offers any relief before prescribing other medications. WCTM.   "

## 2020-03-05 NOTE — SUBJECTIVE & OBJECTIVE
Interval History: Patient seen and examined at bedside. On lasix IV 80mg q day. 2.2L UOP/past 24hrs. Slight improvement in sCr, down to 7.4 from 7.5. On 2L nasal canula, resting comfortably and in no acute distress.     Review of patient's allergies indicates:  No Known Allergies  Current Facility-Administered Medications   Medication Frequency    0.9%  NaCl infusion (for blood administration) Q24H PRN    amiodarone 360 mg/200 mL (1.8 mg/mL) infusion Continuous    aspirin chewable tablet 81 mg Daily    atorvastatin tablet 80 mg Daily    carvediloL tablet 6.25 mg BID    dextrose 10% (D10W) Bolus PRN    diphenhydrAMINE capsule 25 mg Q6H PRN    fentaNYL injection 25 mcg Q2H PRN    furosemide injection 80 mg Daily    glucagon (human recombinant) injection 1 mg PRN    heparin 25,000 units in dextrose 5% 250 mL (100 units/mL) infusion LOW INTENSITY nomogram - [facility] Continuous    hydrALAZINE tablet 50 mg Q8H    insulin aspart U-100 pen 0-5 Units Q6H PRN    nitroGLYCERIN 50 mg in dextrose 5 % 250 mL infusion (TITRATING) Continuous    senna-docusate 8.6-50 mg per tablet 1 tablet BID PRN    sevelamer carbonate pwpk 1.6 g TID WM    sodium chloride 0.9% flush 10 mL PRN    sodium chloride 0.9% flush 10 mL PRN       Objective:     Vital Signs (Most Recent):  Temp: 98.5 °F (36.9 °C) (03/05/20 1232)  Pulse: 110 (03/05/20 1232)  Resp: 18 (03/05/20 1232)  BP: 128/68 (03/05/20 1232)  SpO2: (!) 92 % (03/05/20 1232)  O2 Device (Oxygen Therapy): nasal cannula (03/05/20 1200) Vital Signs (24h Range):  Temp:  [97.9 °F (36.6 °C)-99.3 °F (37.4 °C)] 98.5 °F (36.9 °C)  Pulse:  [] 110  Resp:  [12-27] 18  SpO2:  [88 %-97 %] 92 %  BP: (105-184)/(55-92) 128/68  Arterial Line BP: (123-187)/(58-88) 163/77     Weight: 76.2 kg (168 lb) (03/02/20 1515)  Body mass index is 27.12 kg/m².  Body surface area is 1.88 meters squared.    I/O last 3 completed shifts:  In: 2630.4 [P.O.:480; I.V.:1740.4; NG/GT:310; IV Piggyback:100]  Out:  4675 [Urine:4675]    Physical Exam   Constitutional: He appears well-developed and well-nourished. He is sedated and intubated.   Cardiovascular:   Murmur heard.  Pulmonary/Chest: Effort normal. He is intubated. He has no wheezes. He has rhonchi. He has no rales.   Abdominal: Soft. Bowel sounds are normal.   Musculoskeletal: He exhibits no edema.   Neurological: He is alert.   Skin: Skin is warm.       Significant Labs:  CBC:   Recent Labs   Lab 03/05/20  0534   WBC 13.30*   RBC 2.41*   HGB 7.0*   HCT 21.8*      MCV 91   MCH 29.0   MCHC 32.1     CMP:   Recent Labs   Lab 03/05/20  0856   *  148*   CALCIUM 8.3*  8.3*   ALBUMIN 2.6*  2.6*   PROT 6.5     143   K 4.5  4.5   CO2 22*  22*     103   BUN 91*  91*   CREATININE 7.4*  7.4*   ALKPHOS 84   ALT 16   AST 34   BILITOT 0.5

## 2020-03-05 NOTE — PROGRESS NOTES
03/04/20 1800        Incision/Site 03/02/20 1800 Right Other (see comments) anterior Laparoscopic Puncture   Date First Assessed/Time First Assessed: 03/02/20 1800   Side: Right  Location: (c) Other (see comments)  Orientation: anterior  Incision Type: Laparoscopic Puncture   Incision WDL ex   Dressing Appearance Dried drainage   Drainage Amount Scant   Drainage Characteristics/Odor   (dried blood)   Dressing Changed   MD Kenna updated. priscila sized hematoma noted to right groin where Impella was removed. no new orders, WCTM

## 2020-03-05 NOTE — ASSESSMENT & PLAN NOTE
John Javier is a 65 y/o M hx of ICM (EF 10-15%), ICD in place, CAD (50-60% LCx, OM disease on [facility] 2/29), HTN, DM2, HLD, obesity, CKD who was transferred from OSH in cardiogenic shock with Impella in place. Impella was removed 3/2 and patient was extubated 3/4. Since 3/3 patient has been having intermittent episode of atrial fibrillation. EP has been consulted for further evaluation.   Pt reports history of prior atrial fibrillation but is unable to clarify if he has had an ablation or cardioversion in the past. He does have a single lead ICD in place for secondary prevention VT. He had 6 shocks from it on Feb 28th prior to admission at OSH.

## 2020-03-05 NOTE — HPI
John Javier is a 63 y/o M hx of ICM (EF 10-15%), ICD in place, CAD (50-60% LCx, OM disease on [facility] 2/29), HTN, DM2, HLD, obesity, CKD who was transferred from OSH in cardiogenic shock with Impella in place. Impella was removed 3/2 and patient was extubated 3/4. Since 3/3 patient has been having intermittent episode of atrial fibrillation. EP has been consulted for further evaluation.   Pt reports history of prior atrial fibrillation but is unable to clarify if he has had an ablation or cardioversion in the past. He does have a single lead ICD in place for secondary prevention VT. He had 6 shocks from it on Feb 28th prior to admission at OSH.

## 2020-03-05 NOTE — ASSESSMENT & PLAN NOTE
-Episode of afib RVR 3/3/2020 in the setting of severe agitation with SBT  -Converted to NSR  -Continue heparin gtt for anticoagulation  -Went back into afib last night. Started on IV Amio.   -Will get EP cx to see if DCCV an option

## 2020-03-05 NOTE — CARE UPDATE
HTS Update:    Notified of patient in afib. HR 140s at one point. Appears transitioned to aflutter. Was already on amiodarone and have reloaded and converted to IV and made NPO @ midnight in case of possible cardioversion. Already on heparin gtt from previous episode of paroxysmal afib. Given metoprolol 5 IV x 1 for rate control with improvement to low 100s. BP remained stable throughout.     Discussed with Dr. Fuller.

## 2020-03-05 NOTE — NURSING
Patient becoming SOB, increased 02 from 2 to 3L, raised Hob. CVP elevated to 12, crackles ausculted in lower lung fields. Blood stopped. Alejo w/ HTS notified. Order to stop RBCs, administer lasix 80 mg IVP. No further improvement with patient's BP. Awaiting lila gtt order as well. WCTM.

## 2020-03-05 NOTE — PLAN OF CARE
CMICU DAILY GOALS       A: Awake    RASS: Goal - RASS Goal: 0-->alert and calm  Actual - RASS (Horton Agitation-Sedation Scale): 0-->alert and calm   Restraint necessity: Clinical Justification: Treatment Interference, Removing medical devices  B: Breath   SBT: Not intubated   C: Coordinate A & B, analgesics/sedatives   Pain: managed    SAT: Not intubated  D: Delirium   CAM-ICU: Overall CAM-ICU: Negative  E: Early Mobility   MOVE Screen: Pass   Activity: Activity Management: activity clustered for rest period, activity adjusted per tolerance  FAS: Feeding/Nutrition   Diet order: Diet/Nutrition Received: regular,   Fluid restriction:    T: Thrombus   DVT prophylaxis: VTE Required Core Measure: Pharmacological prophylaxis initiated/maintained  H: HOB Elevation   Head of Bed (HOB): HOB at 30-45 degrees  U: Ulcer Prophylaxis   GI: yes  G: Glucose control   managed Glycemic Management: blood glucose monitoring  S: Skin   Bundle compliance: yes   Bathing/Skin Care: bath, chlorhexidine, bath, complete, dressed/undressed, linen changed Date: [unfilled]  B: Bowel Function   constipation   I: Indwelling Catheters   Lamb necessity:      Urethral Catheter 02/29/20 2056 16 Fr.-Reason for Continuing Urinary Catheterization: Critically ill in ICU requiring intensive monitoring   CVC necessity: Yes   IPAD offered: No  D: De-escalation Antibx   Yes  Plan for the day   Wean NTG gtt, restart po anti-hypertensives. Possible RAISA  Family/Goals of care/Code Status   Code Status: Full Code      VS and assessment per flow sheet, patient progressing towards goals as tolerated, plan of care reviewed with John Javier and family, all concerns addressed, will continue to monitor.

## 2020-03-05 NOTE — ASSESSMENT & PLAN NOTE
* Non-oliguric iATN from cardiogenic shock.     3/2: -urine microscopy:  abundant muddy brown casts (leaning towards ATN)     Plan/ Rec:    -No urgent indication for RRT  -Can continue current dose of lasix   -Continue renvela  -Renal diet, if not NPO   -Renal function panels q 12hr   -Strict I/O and chart  -Will follow closely  -Plan discussed with Dr. Valdez

## 2020-03-06 PROBLEM — R14.0 ABDOMINAL DISTENTION: Status: ACTIVE | Noted: 2020-03-06

## 2020-03-06 LAB
ALBUMIN SERPL BCP-MCNC: 2.9 G/DL (ref 3.5–5.2)
ALBUMIN SERPL BCP-MCNC: 2.9 G/DL (ref 3.5–5.2)
ALLENS TEST: ABNORMAL
ALP SERPL-CCNC: 94 U/L (ref 55–135)
ALP SERPL-CCNC: 98 U/L (ref 55–135)
ALT SERPL W/O P-5'-P-CCNC: 15 U/L (ref 10–44)
ALT SERPL W/O P-5'-P-CCNC: 17 U/L (ref 10–44)
ANION GAP SERPL CALC-SCNC: 16 MMOL/L (ref 8–16)
ANION GAP SERPL CALC-SCNC: 18 MMOL/L (ref 8–16)
APTT BLDCRRT: 38.7 SEC (ref 21–32)
APTT BLDCRRT: 43.5 SEC (ref 21–32)
APTT BLDCRRT: 45 SEC (ref 21–32)
AST SERPL-CCNC: 28 U/L (ref 10–40)
AST SERPL-CCNC: 30 U/L (ref 10–40)
BACTERIA #/AREA URNS AUTO: ABNORMAL /HPF
BACTERIA BLD CULT: NORMAL
BACTERIA BLD CULT: NORMAL
BASOPHILS # BLD AUTO: 0.02 K/UL (ref 0–0.2)
BASOPHILS NFR BLD: 0.1 % (ref 0–1.9)
BILIRUB SERPL-MCNC: 0.5 MG/DL (ref 0.1–1)
BILIRUB SERPL-MCNC: 0.7 MG/DL (ref 0.1–1)
BILIRUB UR QL STRIP: NEGATIVE
BUN SERPL-MCNC: 108 MG/DL (ref 8–23)
BUN SERPL-MCNC: 97 MG/DL (ref 8–23)
CALCIUM SERPL-MCNC: 8.8 MG/DL (ref 8.7–10.5)
CALCIUM SERPL-MCNC: 9.1 MG/DL (ref 8.7–10.5)
CHLORIDE SERPL-SCNC: 97 MMOL/L (ref 95–110)
CHLORIDE SERPL-SCNC: 97 MMOL/L (ref 95–110)
CLARITY UR REFRACT.AUTO: ABNORMAL
CO2 SERPL-SCNC: 27 MMOL/L (ref 23–29)
CO2 SERPL-SCNC: 27 MMOL/L (ref 23–29)
COLOR UR AUTO: YELLOW
CREAT SERPL-MCNC: 6.6 MG/DL (ref 0.5–1.4)
CREAT SERPL-MCNC: 7.1 MG/DL (ref 0.5–1.4)
DELSYS: ABNORMAL
DELSYS: ABNORMAL
DIFFERENTIAL METHOD: ABNORMAL
EOSINOPHIL # BLD AUTO: 0.1 K/UL (ref 0–0.5)
EOSINOPHIL NFR BLD: 0.4 % (ref 0–8)
ERYTHROCYTE [DISTWIDTH] IN BLOOD BY AUTOMATED COUNT: 14.9 % (ref 11.5–14.5)
EST. GFR  (AFRICAN AMERICAN): 8.6 ML/MIN/1.73 M^2
EST. GFR  (AFRICAN AMERICAN): 9.4 ML/MIN/1.73 M^2
EST. GFR  (NON AFRICAN AMERICAN): 7.5 ML/MIN/1.73 M^2
EST. GFR  (NON AFRICAN AMERICAN): 8.1 ML/MIN/1.73 M^2
FLOW: 3
FLOW: 3
GLUCOSE SERPL-MCNC: 195 MG/DL (ref 70–110)
GLUCOSE SERPL-MCNC: 266 MG/DL (ref 70–110)
GLUCOSE UR QL STRIP: NEGATIVE
HCO3 UR-SCNC: 27.6 MMOL/L (ref 24–28)
HCO3 UR-SCNC: 29.7 MMOL/L (ref 24–28)
HCO3 UR-SCNC: 29.9 MMOL/L (ref 24–28)
HCT VFR BLD AUTO: 28.4 % (ref 40–54)
HGB BLD-MCNC: 9.6 G/DL (ref 14–18)
HGB UR QL STRIP: ABNORMAL
HYALINE CASTS UR QL AUTO: 0 /LPF
IMM GRANULOCYTES # BLD AUTO: 0.25 K/UL (ref 0–0.04)
IMM GRANULOCYTES NFR BLD AUTO: 1.6 % (ref 0–0.5)
KETONES UR QL STRIP: NEGATIVE
LEUKOCYTE ESTERASE UR QL STRIP: ABNORMAL
LYMPHOCYTES # BLD AUTO: 1 K/UL (ref 1–4.8)
LYMPHOCYTES NFR BLD: 6.4 % (ref 18–48)
MAGNESIUM SERPL-MCNC: 2.7 MG/DL (ref 1.6–2.6)
MCH RBC QN AUTO: 30.2 PG (ref 27–31)
MCHC RBC AUTO-ENTMCNC: 33.8 G/DL (ref 32–36)
MCV RBC AUTO: 89 FL (ref 82–98)
MICROSCOPIC COMMENT: ABNORMAL
MODE: ABNORMAL
MODE: ABNORMAL
MONOCYTES # BLD AUTO: 1.3 K/UL (ref 0.3–1)
MONOCYTES NFR BLD: 8 % (ref 4–15)
NEUTROPHILS # BLD AUTO: 13.4 K/UL (ref 1.8–7.7)
NEUTROPHILS NFR BLD: 83.5 % (ref 38–73)
NITRITE UR QL STRIP: NEGATIVE
NRBC BLD-RTO: 0 /100 WBC
PCO2 BLDA: 37.5 MMHG (ref 35–45)
PCO2 BLDA: 38.9 MMHG (ref 35–45)
PCO2 BLDA: 42.5 MMHG (ref 35–45)
PH SMN: 7.46 [PH] (ref 7.35–7.45)
PH SMN: 7.46 [PH] (ref 7.35–7.45)
PH SMN: 7.51 [PH] (ref 7.35–7.45)
PH UR STRIP: 6 [PH] (ref 5–8)
PLATELET # BLD AUTO: 190 K/UL (ref 150–350)
PMV BLD AUTO: 11.4 FL (ref 9.2–12.9)
PO2 BLDA: 38 MMHG (ref 40–60)
PO2 BLDA: 38 MMHG (ref 40–60)
PO2 BLDA: 67 MMHG (ref 80–100)
POC BE: 4 MMOL/L
POC BE: 6 MMOL/L
POC BE: 7 MMOL/L
POC SATURATED O2: 75 % (ref 95–100)
POC SATURATED O2: 75 % (ref 95–100)
POC SATURATED O2: 95 % (ref 95–100)
POC TCO2: 29 MMOL/L (ref 24–29)
POC TCO2: 31 MMOL/L (ref 23–27)
POC TCO2: 31 MMOL/L (ref 24–29)
POCT GLUCOSE: 183 MG/DL (ref 70–110)
POCT GLUCOSE: 228 MG/DL (ref 70–110)
POCT GLUCOSE: 230 MG/DL (ref 70–110)
POCT GLUCOSE: 252 MG/DL (ref 70–110)
POTASSIUM SERPL-SCNC: 3.6 MMOL/L (ref 3.5–5.1)
POTASSIUM SERPL-SCNC: 3.6 MMOL/L (ref 3.5–5.1)
PROCALCITONIN SERPL IA-MCNC: 1.92 NG/ML
PROT SERPL-MCNC: 7.3 G/DL (ref 6–8.4)
PROT SERPL-MCNC: 7.3 G/DL (ref 6–8.4)
PROT UR QL STRIP: ABNORMAL
RBC # BLD AUTO: 3.18 M/UL (ref 4.6–6.2)
RBC #/AREA URNS AUTO: 43 /HPF (ref 0–4)
SAMPLE: ABNORMAL
SITE: ABNORMAL
SODIUM SERPL-SCNC: 140 MMOL/L (ref 136–145)
SODIUM SERPL-SCNC: 142 MMOL/L (ref 136–145)
SP GR UR STRIP: 1.01 (ref 1–1.03)
SQUAMOUS #/AREA URNS AUTO: 0 /HPF
URN SPEC COLLECT METH UR: ABNORMAL
WBC # BLD AUTO: 16.06 K/UL (ref 3.9–12.7)
WBC #/AREA URNS AUTO: 9 /HPF (ref 0–5)

## 2020-03-06 PROCEDURE — 37799 UNLISTED PX VASCULAR SURGERY: CPT

## 2020-03-06 PROCEDURE — 87040 BLOOD CULTURE FOR BACTERIA: CPT | Mod: 59

## 2020-03-06 PROCEDURE — 99291 CRITICAL CARE FIRST HOUR: CPT | Mod: ,,, | Performed by: PHYSICIAN ASSISTANT

## 2020-03-06 PROCEDURE — 99900035 HC TECH TIME PER 15 MIN (STAT)

## 2020-03-06 PROCEDURE — 97165 OT EVAL LOW COMPLEX 30 MIN: CPT

## 2020-03-06 PROCEDURE — 81001 URINALYSIS AUTO W/SCOPE: CPT

## 2020-03-06 PROCEDURE — 25000003 PHARM REV CODE 250: Performed by: STUDENT IN AN ORGANIZED HEALTH CARE EDUCATION/TRAINING PROGRAM

## 2020-03-06 PROCEDURE — 99291 PR CRITICAL CARE, E/M 30-74 MINUTES: ICD-10-PCS | Mod: ,,, | Performed by: PHYSICIAN ASSISTANT

## 2020-03-06 PROCEDURE — 25000003 PHARM REV CODE 250: Performed by: NURSE PRACTITIONER

## 2020-03-06 PROCEDURE — 94761 N-INVAS EAR/PLS OXIMETRY MLT: CPT

## 2020-03-06 PROCEDURE — 25000003 PHARM REV CODE 250: Performed by: PHYSICIAN ASSISTANT

## 2020-03-06 PROCEDURE — 83735 ASSAY OF MAGNESIUM: CPT

## 2020-03-06 PROCEDURE — 43752 NASAL/OROGASTRIC W/TUBE PLMT: CPT

## 2020-03-06 PROCEDURE — 99222 PR INITIAL HOSPITAL CARE,LEVL II: ICD-10-PCS | Mod: ,,, | Performed by: NURSE PRACTITIONER

## 2020-03-06 PROCEDURE — 63600175 PHARM REV CODE 636 W HCPCS: Performed by: NURSE PRACTITIONER

## 2020-03-06 PROCEDURE — 99233 SBSQ HOSP IP/OBS HIGH 50: CPT | Mod: ,,, | Performed by: NURSE PRACTITIONER

## 2020-03-06 PROCEDURE — 87070 CULTURE OTHR SPECIMN AEROBIC: CPT

## 2020-03-06 PROCEDURE — 63600175 PHARM REV CODE 636 W HCPCS: Performed by: STUDENT IN AN ORGANIZED HEALTH CARE EDUCATION/TRAINING PROGRAM

## 2020-03-06 PROCEDURE — 99222 1ST HOSP IP/OBS MODERATE 55: CPT | Mod: ,,, | Performed by: NURSE PRACTITIONER

## 2020-03-06 PROCEDURE — 97110 THERAPEUTIC EXERCISES: CPT

## 2020-03-06 PROCEDURE — 85025 COMPLETE CBC W/AUTO DIFF WBC: CPT

## 2020-03-06 PROCEDURE — 85730 THROMBOPLASTIN TIME PARTIAL: CPT

## 2020-03-06 PROCEDURE — 97530 THERAPEUTIC ACTIVITIES: CPT

## 2020-03-06 PROCEDURE — 97163 PT EVAL HIGH COMPLEX 45 MIN: CPT

## 2020-03-06 PROCEDURE — 82803 BLOOD GASES ANY COMBINATION: CPT

## 2020-03-06 PROCEDURE — 94660 CPAP INITIATION&MGMT: CPT

## 2020-03-06 PROCEDURE — 80053 COMPREHEN METABOLIC PANEL: CPT | Mod: 91

## 2020-03-06 PROCEDURE — 99233 PR SUBSEQUENT HOSPITAL CARE,LEVL III: ICD-10-PCS | Mod: ,,, | Performed by: NURSE PRACTITIONER

## 2020-03-06 PROCEDURE — 87205 SMEAR GRAM STAIN: CPT

## 2020-03-06 PROCEDURE — 85730 THROMBOPLASTIN TIME PARTIAL: CPT | Mod: 91

## 2020-03-06 PROCEDURE — 84145 PROCALCITONIN (PCT): CPT

## 2020-03-06 PROCEDURE — 20000000 HC ICU ROOM

## 2020-03-06 PROCEDURE — 27000221 HC OXYGEN, UP TO 24 HOURS

## 2020-03-06 RX ORDER — INSULIN ASPART 100 [IU]/ML
0-4 INJECTION, SOLUTION INTRAVENOUS; SUBCUTANEOUS
Status: DISCONTINUED | OUTPATIENT
Start: 2020-03-06 | End: 2020-03-12

## 2020-03-06 RX ORDER — IBUPROFEN 200 MG
24 TABLET ORAL
Status: DISCONTINUED | OUTPATIENT
Start: 2020-03-06 | End: 2020-03-12

## 2020-03-06 RX ORDER — METOPROLOL TARTRATE 1 MG/ML
5 INJECTION, SOLUTION INTRAVENOUS ONCE
Status: COMPLETED | OUTPATIENT
Start: 2020-03-06 | End: 2020-03-06

## 2020-03-06 RX ORDER — GLUCAGON 1 MG
1 KIT INJECTION
Status: DISCONTINUED | OUTPATIENT
Start: 2020-03-06 | End: 2020-03-12

## 2020-03-06 RX ORDER — IBUPROFEN 200 MG
16 TABLET ORAL
Status: DISCONTINUED | OUTPATIENT
Start: 2020-03-06 | End: 2020-03-12

## 2020-03-06 RX ORDER — ONDANSETRON 2 MG/ML
4 INJECTION INTRAMUSCULAR; INTRAVENOUS EVERY 8 HOURS PRN
Status: DISCONTINUED | OUTPATIENT
Start: 2020-03-06 | End: 2020-03-13 | Stop reason: HOSPADM

## 2020-03-06 RX ORDER — GLYCERIN 1 G/1
1 SUPPOSITORY RECTAL ONCE
Status: COMPLETED | OUTPATIENT
Start: 2020-03-06 | End: 2020-03-06

## 2020-03-06 RX ORDER — HYDRALAZINE HYDROCHLORIDE 20 MG/ML
10 INJECTION INTRAMUSCULAR; INTRAVENOUS EVERY 8 HOURS
Status: DISCONTINUED | OUTPATIENT
Start: 2020-03-06 | End: 2020-03-09

## 2020-03-06 RX ORDER — HEPARIN SODIUM,PORCINE/D5W 25000/250
12 INTRAVENOUS SOLUTION INTRAVENOUS CONTINUOUS
Status: DISCONTINUED | OUTPATIENT
Start: 2020-03-06 | End: 2020-03-12

## 2020-03-06 RX ADMIN — INSULIN ASPART 1 UNITS: 100 INJECTION, SOLUTION INTRAVENOUS; SUBCUTANEOUS at 06:03

## 2020-03-06 RX ADMIN — ASPIRIN 81 MG CHEWABLE TABLET 81 MG: 81 TABLET CHEWABLE at 08:03

## 2020-03-06 RX ADMIN — AMIODARONE HYDROCHLORIDE 0.5 MG/MIN: 1.8 INJECTION, SOLUTION INTRAVENOUS at 09:03

## 2020-03-06 RX ADMIN — SEVELAMER CARBONATE 1.6 G: 0.8 POWDER, FOR SUSPENSION ORAL at 07:03

## 2020-03-06 RX ADMIN — FUROSEMIDE 80 MG: 10 INJECTION, SOLUTION INTRAMUSCULAR; INTRAVENOUS at 01:03

## 2020-03-06 RX ADMIN — ATORVASTATIN CALCIUM 80 MG: 20 TABLET, FILM COATED ORAL at 08:03

## 2020-03-06 RX ADMIN — HEPARIN SODIUM 16 UNITS/KG/HR: 10000 INJECTION, SOLUTION INTRAVENOUS at 08:03

## 2020-03-06 RX ADMIN — INSULIN ASPART 2 UNITS: 100 INJECTION, SOLUTION INTRAVENOUS; SUBCUTANEOUS at 01:03

## 2020-03-06 RX ADMIN — FUROSEMIDE 80 MG: 10 INJECTION, SOLUTION INTRAMUSCULAR; INTRAVENOUS at 10:03

## 2020-03-06 RX ADMIN — HEPARIN SODIUM 16 UNITS/KG/HR: 10000 INJECTION, SOLUTION INTRAVENOUS at 06:03

## 2020-03-06 RX ADMIN — CARVEDILOL 6.25 MG: 6.25 TABLET, FILM COATED ORAL at 08:03

## 2020-03-06 RX ADMIN — HYDRALAZINE HYDROCHLORIDE 50 MG: 50 TABLET, FILM COATED ORAL at 05:03

## 2020-03-06 RX ADMIN — INSULIN ASPART 3 UNITS: 100 INJECTION, SOLUTION INTRAVENOUS; SUBCUTANEOUS at 05:03

## 2020-03-06 RX ADMIN — SODIUM CHLORIDE 0.4 UNITS/HR: 9 INJECTION, SOLUTION INTRAVENOUS at 07:03

## 2020-03-06 RX ADMIN — NICARDIPINE HYDROCHLORIDE 5 MG/HR: 0.2 INJECTION, SOLUTION INTRAVENOUS at 07:03

## 2020-03-06 RX ADMIN — AMIODARONE HYDROCHLORIDE 0.5 MG/MIN: 1.8 INJECTION, SOLUTION INTRAVENOUS at 12:03

## 2020-03-06 RX ADMIN — NICARDIPINE HYDROCHLORIDE 5 MG/HR: 0.2 INJECTION, SOLUTION INTRAVENOUS at 04:03

## 2020-03-06 RX ADMIN — NICARDIPINE HYDROCHLORIDE 5 MG/HR: 0.2 INJECTION, SOLUTION INTRAVENOUS at 12:03

## 2020-03-06 RX ADMIN — SODIUM CHLORIDE 0.4 UNITS/HR: 9 INJECTION, SOLUTION INTRAVENOUS at 06:03

## 2020-03-06 RX ADMIN — AMIODARONE HYDROCHLORIDE 0.5 MG/MIN: 1.8 INJECTION, SOLUTION INTRAVENOUS at 07:03

## 2020-03-06 RX ADMIN — FUROSEMIDE 80 MG: 10 INJECTION, SOLUTION INTRAMUSCULAR; INTRAVENOUS at 05:03

## 2020-03-06 RX ADMIN — HYDRALAZINE HYDROCHLORIDE 10 MG: 20 INJECTION INTRAMUSCULAR; INTRAVENOUS at 10:03

## 2020-03-06 RX ADMIN — HYDRALAZINE HYDROCHLORIDE 50 MG: 50 TABLET, FILM COATED ORAL at 01:03

## 2020-03-06 RX ADMIN — NICARDIPINE HYDROCHLORIDE 7.5 MG/HR: 0.2 INJECTION, SOLUTION INTRAVENOUS at 09:03

## 2020-03-06 RX ADMIN — INSULIN ASPART 1 UNITS: 100 INJECTION, SOLUTION INTRAVENOUS; SUBCUTANEOUS at 01:03

## 2020-03-06 RX ADMIN — GLYCERIN 1 SUPPOSITORY: 2 SUPPOSITORY RECTAL at 08:03

## 2020-03-06 RX ADMIN — METOPROLOL TARTRATE 5 MG: 5 INJECTION INTRAVENOUS at 11:03

## 2020-03-06 RX ADMIN — SEVELAMER CARBONATE 1.6 G: 0.8 POWDER, FOR SUSPENSION ORAL at 12:03

## 2020-03-06 NOTE — CONSULTS
[facility]  Endocrinology  Diabetes Consult Note    Consult Requested by: Shannon Fuller MD   Reason for admit: Cardiogenic shock    HISTORY OF PRESENT ILLNESS:  Reason for Consult: Management of T2DM, Hyperglycemia     Surgical Procedure and Date: N/A    Diabetes diagnosis year: 2019    Lab Results   Component Value Date    HGBA1C 7.0 (H) 03/01/2020       Home Diabetes Medications:  Metformin 500 mg BID    How often checking glucose at home? Once daily  BG readings on regimen:   Hypoglycemia on the regimen? No  Missed doses on regimen? No    Diabetes Complications include:     none    Complicating diabetes co morbidities:   CHF and CKD      HPI:   Patient is a 64 y.o. male with a diagnosis of DM2, ANGEL, afib, CAD, CHF, and cardiogenic shock.  Patient admitted to [facility] from outside facility in cardiogenic shock, intubated, and with an impella in place.  Diagnosed with DM2 last year and place on Metformin, managed per his PCP.  Endocrinology consulted for DM/BG management.        Interval HPI:   Overnight events: Remains in CMICU.  BG above goal on correction scale insulin only.  Noted creatinine of 6.6.    Eating:   NPO  Nausea: Yes  Hypoglycemia and intervention: No  Fever: No  TPN and/or TF: No    PMH, PSH, FH, SH reviewed     Review of Systems    Constitutional: Negative for weight changes.  Eyes: Negative for visual disturbance.  Respiratory: Negative for cough.   Cardiovascular: Negative for chest pain.  Gastrointestinal: Negative for nausea.  Endocrine: Negative for polyuria, polydipsia.  Musculoskeletal: Negative for back pain.  Skin: Negative for rash.  Neurological: Negative for syncope.  Psychiatric/Behavioral: Negative for depression.    Current Medications and/or Treatments Impacting Glycemic Control  Immunotherapy:    Immunosuppressants     None        Steroids:   Hormones (From admission, onward)    None        Pressors:    Autonomic Drugs (From admission, onward)    None         Hyperglycemia/Diabetes Medications:   Antihyperglycemics (From admission, onward)    Start     Stop Route Frequency Ordered    03/01/20 0611  insulin aspart U-100 pen 0-5 Units      -- SubQ Every 6 hours PRN 03/01/20 0511             PHYSICAL EXAMINATION:  Vitals:    03/06/20 1515   BP:    Pulse: 88   Resp: (!) 27   Temp:      Body mass index is 27.11 kg/m².    Physical Exam     Constitutional: Well developed, well nourished, NAD.  ENT: External ears no masses with nose patent; normal hearing.  NGT in place to suction.  Neck: Supple; trachea midline.  Cardiovascular: Muffled heart sounds, no LE edema. DP +1 bilaterally.  Lungs: Normal effort; lungs anterior bilaterally clear to auscultation.  Abdomen: Soft, no masses, no hernias.  MS: No clubbing or cyanosis of nails noted; unable to assess gait.  Skin: No rashes, lesions, or ulcers; no nodules. Injection sites are ok. No lipo hypertropthy or atrophy.  Psychiatric: Good judgement and insight; normal mood and affect.  Neurological: Cranial nerves are grossly intact. Normal vibration sense in the bilateral lower extremities.  Foot: Nails in good condition, no amputations noted.      Labs Reviewed and Include   Recent Labs   Lab 03/06/20  1510   *   CALCIUM 8.8   ALBUMIN 2.9*   PROT 7.3      K 3.6   CO2 27   CL 97   *   CREATININE 6.6*   ALKPHOS 94   ALT 15   AST 28   BILITOT 0.7     Lab Results   Component Value Date    WBC 16.06 (H) 03/06/2020    HGB 9.6 (L) 03/06/2020    HCT 28.4 (L) 03/06/2020    MCV 89 03/06/2020     03/06/2020     Recent Labs   Lab 02/29/20  2122   TSH 0.604     Lab Results   Component Value Date    HGBA1C 7.0 (H) 03/01/2020       Nutritional status:   Body mass index is 27.11 kg/m².  Lab Results   Component Value Date    ALBUMIN 2.9 (L) 03/06/2020    ALBUMIN 2.9 (L) 03/06/2020    ALBUMIN 2.9 (L) 03/05/2020     Lab Results   Component Value Date    PREALBUMIN <2.5 (L) 03/04/2020       Estimated Creatinine Clearance:  10.3 mL/min (A) (based on SCr of 6.6 mg/dL (H)).    Accu-Checks  Recent Labs     03/04/20  0008 03/04/20  0548 03/04/20  1837 03/04/20  2328 03/05/20  0542 03/05/20  1233 03/05/20  1827 03/06/20  0147 03/06/20  0544 03/06/20  1335   POCTGLUCOSE 179* 194* 207* 192* 147* 187* 235* 230* 252* 228*        ASSESSMENT and PLAN    * Cardiogenic shock  Managed per primary team  Avoid hypoglycemia        Type 2 diabetes mellitus, without long-term current use of insulin  BG goal 140 - 180     Start transition IV insulin infusion at 0.4 u/hr  Low dose correction scale - due to decreased renal function  BG monitoring every 4 hours while NPO    Discharge planning: TBD        ANGLE (acute kidney injury)  Titrate insulin slowly to avoid hypoglycemia as the risk of hypoglycemia increases with decreased creatinine clearance.  Caution with insulin stacking  Estimated Creatinine Clearance: 10.3 mL/min (A) (based on SCr of 6.6 mg/dL (H)).            Plan discussed with patient and RN at bedside.     Barry Chawla, IVY  Endocrinology  [facility]

## 2020-03-06 NOTE — PLAN OF CARE
Goals and POC established this date.   Problem: Occupational Therapy Goal  Goal: Occupational Therapy Goal  Description  Goals to be met by: 7 days 3/13/2020     Patient will increase functional independence with ADLs by performing:    Pt to complete standing g/h skills with SBA  Pt to complete toileting with supervision.  Pt to complete UE dressing with set-up  Pt to complete LE dressing with SBA  Pt to complete BSC with SBA  Pt to tolerated OOB to chair x 3-4 hours daily to increase endurance for functional activity.      Outcome: Ongoing, Progressing

## 2020-03-06 NOTE — ASSESSMENT & PLAN NOTE
Titrate insulin slowly to avoid hypoglycemia as the risk of hypoglycemia increases with decreased creatinine clearance.  Caution with insulin stacking  Estimated Creatinine Clearance: 10.3 mL/min (A) (based on SCr of 6.6 mg/dL (H)).

## 2020-03-06 NOTE — PLAN OF CARE
Problem: Physical Therapy Goal  Goal: Physical Therapy Goal  Description  Goals to be met by: 3/16/2020     Patient will increase functional independence with mobility by performin. Supine to sit with Contact Guard Assistance  2. Sit to supine with Contact Guard Assistance  3. Sit to stand transfer with Stand By Assistance  4. Gait  x 150 feet with Contact Guard Assistance using no or LRAD.   5. Ascend/descend 5 stair with left Handrails Contact Guard Assistance using no or LRAD.   6. Lower extremity exercise program x 20 reps per handout, with independence     Outcome: Ongoing, Progressing    PT evaluation completed. See note for details. Goals established and POC initiated.     GUERLINE Warren  3/6/2020

## 2020-03-06 NOTE — SUBJECTIVE & OBJECTIVE
Interval HPI:   Overnight events: Remains in CMICU.  BG above goal on correction scale insulin only.  Noted creatinine of 6.6.    Eating:   NPO  Nausea: Yes  Hypoglycemia and intervention: No  Fever: No  TPN and/or TF: No    PMH, PSH, FH, SH reviewed     Review of Systems    Constitutional: Negative for weight changes.  Eyes: Negative for visual disturbance.  Respiratory: Negative for cough.   Cardiovascular: Negative for chest pain.  Gastrointestinal: Negative for nausea.  Endocrine: Negative for polyuria, polydipsia.  Musculoskeletal: Negative for back pain.  Skin: Negative for rash.  Neurological: Negative for syncope.  Psychiatric/Behavioral: Negative for depression.    Current Medications and/or Treatments Impacting Glycemic Control  Immunotherapy:    Immunosuppressants     None        Steroids:   Hormones (From admission, onward)    None        Pressors:    Autonomic Drugs (From admission, onward)    None        Hyperglycemia/Diabetes Medications:   Antihyperglycemics (From admission, onward)    Start     Stop Route Frequency Ordered    03/01/20 0611  insulin aspart U-100 pen 0-5 Units      -- SubQ Every 6 hours PRN 03/01/20 0511             PHYSICAL EXAMINATION:  Vitals:    03/06/20 1515   BP:    Pulse: 88   Resp: (!) 27   Temp:      Body mass index is 27.11 kg/m².    Physical Exam     Constitutional: Well developed, well nourished, NAD.  ENT: External ears no masses with nose patent; normal hearing.  NGT in place to suction.  Neck: Supple; trachea midline.  Cardiovascular: Muffled heart sounds, no LE edema. DP +1 bilaterally.  Lungs: Normal effort; lungs anterior bilaterally clear to auscultation.  Abdomen: Soft, no masses, no hernias.  MS: No clubbing or cyanosis of nails noted; unable to assess gait.  Skin: No rashes, lesions, or ulcers; no nodules. Injection sites are ok. No lipo hypertropthy or atrophy.  Psychiatric: Good judgement and insight; normal mood and affect.  Neurological: Cranial nerves are  grossly intact. Normal vibration sense in the bilateral lower extremities.  Foot: Nails in good condition, no amputations noted.

## 2020-03-06 NOTE — PT/OT/SLP EVAL
"Physical Therapy Evaluation    Patient Name:  John Javier  MRN: [id number]    Recommendations:     Discharge Recommendations: Skilled Nursing Facility   Equipment recommendations: TBD (pending pt progress)  Barriers to discharge: Increased level of skilled assistance required and Fall risk     Assessment:     John Javier is a 64 y.o. male admitted to [facility] on 2020 with medical diagnosis of Cardiogenic shock. Pt presents with weakness, impaired balance, decreased endurance, impaired cardiopulmonary response to activity, altered gait mechanics, impaired functional mobility  and gait instability. John Javier would benefit from acute PT intervention to address listed functional deficits, provide patient/caregiver education, reduce fall risk, and maximize (I) and safety with functional mobility. Once medically stable, recommending pt discharge skilled nursing facility.     Rehab Prognosis: Good; patient would benefit from acute skilled PT services to address these deficits and reach maximum level of function.      Plan:     During this hospitalization, patient to be seen 3 x/week to address the identified rehab impairments via gait training, therapeutic activities, therapeutic exercises, neuromuscular re-education and progress towards stated goals.     Plan of Care Expires:  20  Plan of Care reviewed with: patient    This plan of care has been discussed with the patient/caregiver, who was included in its development and is in agreement with the identified goals and treatment plan.     Subjective     Communicated with RN prior to session.  Patient agreeable to participate. No family/caregiver support present at bedside upon PT entrance into room.    Patient comments/goals: "I don't have pain, but I feel really bloated today."    Pain/Comfort:  · Location: n/a  · Pain Ratin/10   · Pain Rating Post-Intervention: 0/10     Patients cultural, spiritual, Druze conflicts given the current situation: " No cultural, spiritual, or educational needs identified.    Patient History: information obtained from patient     Living Environment: Pt lives with his daughter and son-in-law in [facility] with 3 SANIYA. Bathroom set-up: walk-in shower and step over tub  Prior Level of Function: independent with mobility and ADLs  DME owned: none  Caregiver Assistance: Pt will have assistance family upon discharge when they are home.    Objective:     Patient found supine with HOB with: telemetry, pulse ox (continuous), peripheral IV, central line, arterial line, blood pressure cuff, medina catheter, oxygen    Recent Surgery: Procedure(s) (LRB):  Impella, Removal (Left) 4 Days Post-Op  General Precautions: Standard, fall   Orthopedic Precautions:N/A   Braces: N/A   Body mass index is 27.11 kg/m².  Oxygen Device: nasal cannula      Exams:     Cognition:  o Pt is alert and oriented   o Pt's ability to follow single step commands: Pt was able to follow commands and appropriately complete tasks     Sensation:   o Light touch sensation: grossly intact BLEs     Postural examination/scapula alignment: Rounded shoulder and Head forward   o Required cueing to attain upright posture     Lower Extremity Range of Motion:  o Right Lower Extremity: WFL  o Left Lower Extremity: WFL     Lower Extremity Strength:  o Right Lower Extremity: WFL  o Left Lower Extremity: WFL    Functional Mobility:    Bed Mobility:  · Supine > Sit: Moderate Assistance   · Assistance required at trunk  · Sit > Supine: Moderate Assistance   · Pt requires assistance to bring LE onto bed    Transfers:   · Sit <> Stand Transfer: Minimal Assistance x 1 trial and Moderate Assistance x 1 trial from EOB with no AD   · ~4 lateral steps to reposition in bed with Minimal Assistance   · Differed bed to chair transfer due to pt fatigue and increased SOB and RR. SOB and RR decreased when pt was returned to supine     Balance:  · Static Sit: Contact-Guard Assist at EOB x ~5  minutes  · Static Stand: Min Assist with no AD    Outcome Measure: AM-PAC 6 CLICK MOBILITY  Total Score:9     Patient/Caregiver Education:     Therapist educated pt/caregiver regarding:    PT POC and goals for therapy    Safety with mobility and fall risk    Instruction on use of call button and importance of calling nursing staff for assistance with mobility     Patient/caregiver able to verbalize understanding; will follow-up with pt/caregiver during current admit for additional questions/concerns within scope of practice.     White board updated.     Patient left supine with HOB elevated with all lines intact, call button in reach, bed alarm on and RN notified.    GOALS:   Multidisciplinary Problems     Physical Therapy Goals        Problem: Physical Therapy Goal    Goal Priority Disciplines Outcome Goal Variances Interventions   Physical Therapy Goal     PT, PT/OT Ongoing, Progressing     Description:  Goals to be met by: 3/16/2020     Patient will increase functional independence with mobility by performin. Supine to sit with Contact Guard Assistance  2. Sit to supine with Contact Guard Assistance  3. Sit to stand transfer with Stand By Assistance  4. Gait  x 150 feet with Contact Guard Assistance using no or LRAD.   5. Ascend/descend 5 stair with left Handrails Contact Guard Assistance using no or LRAD.   6. Lower extremity exercise program x 20 reps per handout, with independence                        History:     No past medical history on file.    No past surgical history on file.    Time Tracking:     PT Received On: 20  PT Start Time: 856     PT Stop Time: 923  PT Total Time (min): 27 min     Billable Minutes: Evaluation 10 minutes and Therapeutic Exercise 17 minutes    GUERLINE Warren  2020

## 2020-03-06 NOTE — SUBJECTIVE & OBJECTIVE
"Interval History:   Slowly improving kidney function daily, SCr down to 7.1 from 7.4 on AM labs. IN the past 24 hours, patient developed acute episode of SOB with transfusion of PRBC.  Lasix dose increased to TID with UOP of 5.3L/24h, net negative 2.8L for the day.  He reports feeling "bloated" on exam today, CXR with evidence of pulmonary edema.    Review of patient's allergies indicates:  No Known Allergies  Current Facility-Administered Medications   Medication Frequency    0.9%  NaCl infusion (for blood administration) Q24H PRN    amiodarone 360 mg/200 mL (1.8 mg/mL) infusion Continuous    aspirin chewable tablet 81 mg Daily    atorvastatin tablet 80 mg Daily    carvediloL tablet 6.25 mg BID    dextrose 10% (D10W) Bolus PRN    furosemide injection 80 mg Q8H    glucagon (human recombinant) injection 1 mg PRN    heparin 25,000 units in dextrose 5% (100 units/ml) IV bolus from bag - ADDITIONAL PRN BOLUS - 30 units/kg PRN    heparin 25,000 units in dextrose 5% (100 units/ml) IV bolus from bag - ADDITIONAL PRN BOLUS - 60 units/kg PRN    heparin 25,000 units in dextrose 5% 250 mL (100 units/mL) infusion LOW INTENSITY nomogram - [facility] Continuous    hydrALAZINE tablet 50 mg Q8H    insulin aspart U-100 pen 0-5 Units Q6H PRN    niCARdipine 40 mg/200 mL infusion Continuous    senna-docusate 8.6-50 mg per tablet 1 tablet BID PRN    sevelamer carbonate pwpk 1.6 g TID WM    sodium chloride 0.9% flush 10 mL PRN    sodium chloride 0.9% flush 10 mL PRN       Objective:     Vital Signs (Most Recent):  Temp: 98.7 °F (37.1 °C) (03/06/20 1100)  Pulse: 96 (03/06/20 1300)  Resp: 11 (03/06/20 1300)  BP: 121/61 (03/06/20 1300)  SpO2: (!) 91 % (03/06/20 1300)  O2 Device (Oxygen Therapy): nasal cannula (03/06/20 1300) Vital Signs (24h Range):  Temp:  [97.8 °F (36.6 °C)-98.8 °F (37.1 °C)] 98.7 °F (37.1 °C)  Pulse:  [] 96  Resp:  [11-38] 11  SpO2:  [90 %-95 %] 91 %  BP: (101-149)/(56-78) 121/61  Arterial Line BP: " (122-166)/(56-77) 149/65     Weight: 76.2 kg (167 lb 15.9 oz) (03/06/20 1000)  Body mass index is 27.11 kg/m².  Body surface area is 1.88 meters squared.    I/O last 3 completed shifts:  In: 3630.1 [P.O.:690; I.V.:2302.1; Blood:538; IV Piggyback:100]  Out: 6350 [Urine:6350]    Physical Exam   Constitutional: He is oriented to person, place, and time. He appears well-developed and well-nourished.   HENT:   Head: Normocephalic and atraumatic.   Eyes: Pupils are equal, round, and reactive to light. EOM are normal.   Neck: Normal range of motion. Neck supple.   Cardiovascular: Normal rate and regular rhythm.   Murmur heard.  Pulmonary/Chest: Effort normal. No stridor. No respiratory distress. He has rales.   Abdominal: Soft. Bowel sounds are normal. He exhibits distension. There is no tenderness.   Musculoskeletal: Normal range of motion. He exhibits no edema.   Neurological: He is alert and oriented to person, place, and time.   Skin: Skin is warm and dry.   Nursing note and vitals reviewed.      Significant Labs:  CBC:   Recent Labs   Lab 03/06/20  0237   WBC 16.06*   RBC 3.18*   HGB 9.6*   HCT 28.4*      MCV 89   MCH 30.2   MCHC 33.8     CMP:   Recent Labs   Lab 03/06/20  0745   *   CALCIUM 9.1   ALBUMIN 2.9*   PROT 7.3      K 3.6   CO2 27   CL 97   BUN 97*   CREATININE 7.1*   ALKPHOS 98   ALT 17   AST 30   BILITOT 0.5

## 2020-03-06 NOTE — NURSING
Dr Pierson was made aware that heparin bolus cant be given due to missing PRN heparin bolus order. Awaiting for order to be placed before the administration.

## 2020-03-06 NOTE — PLAN OF CARE
No acute events during the day. AAOx4. Generalized weakness. A-flutter 100s-110. Home meds and cardene gtt.added for BP control. On 3L NC. Poor appetite. No Bm. Lamb intact. Good UO. 2 Units RBCs given. Lasix IVP givenx2. Gtts; Nitro. Heparin. Cardene. Amio. POC for possible EP intervention discussed with patient. All concerns addressed. WCTM.         CMICU DAILY GOALS       A: Awake    RASS: Goal - RASS Goal: 0-->alert and calm  Actual - RASS (Horton Agitation-Sedation Scale): 0-->alert and calm   Restraint necessity: Clinical Justification: Treatment Interference, Removing medical devices  B: Breath   SBT: Not intubated   C: Coordinate A & B, analgesics/sedatives   Pain: managed    SAT: Not intubated  D: Delirium   CAM-ICU: Overall CAM-ICU: Negative  E: Early Mobility   MOVE Screen: Pass   Activity: Activity Management: activity clustered for rest period  FAS: Feeding/Nutrition   Diet order: Diet/Nutrition Received: regular, Specialty Diet/Nutrition Received: renal diet Fluid restriction:    T: Thrombus   DVT prophylaxis: VTE Required Core Measure: Pharmacological prophylaxis initiated/maintained  H: HOB Elevation   Head of Bed (HOB): HOB at 45 degrees  U: Ulcer Prophylaxis   GI: yes  G: Glucose control   managed Glycemic Management: blood glucose monitoring  S: Skin   Bundle compliance: yes   Bathing/Skin Care: bath, chlorhexidine, bath, complete, dressed/undressed, linen changed Date:   B: Bowel Function   constipation   I: Indwelling Catheters   Lamb necessity:      Urethral Catheter 02/29/20 2056 16 Fr.-Reason for Continuing Urinary Catheterization: Critically ill in ICU requiring intensive monitoring   CVC necessity: Yes   IPAD offered: Yes  D: De-escalation Antibx     Plan for the day  EP consult/intervention  Family/Goals of care/Code Status   Code Status: Full Code     No acute events throughout day, VS and assessment per flow sheet, patient progressing towards goals as tolerated, plan of care reviewed  with John Javier and family, all concerns addressed, will continue to monitor.

## 2020-03-06 NOTE — PT/OT/SLP EVAL
"Occupational Therapy   Evaluation    Name: John Javier  MRN: [id number]  Admitting Diagnosis:  Cardiogenic shock 4 Days Post-Op  Pt with ICM with EF 10-15 %. Pt was t/f to [facility] with cardiogenic shock with Impella. Impella removed 3/2/2020 and pt extubated 3/4/2020    Recommendations:     Discharge Recommendations:  Home with family     Assessment:     John Javier is a 64 y.o. male with a medical diagnosis of Cardiogenic shock. Performance deficits affecting function: weakness, impaired balance, impaired endurance, impaired self care skills, impaired functional mobilty, gait instability, impaired cardiopulmonary response to activity.  Pt tolerated session fairly well with limited endurance for functional activity. OT anticipates pt will progress enough to return home but OT will continued to assess d/c needs and further therapy services.     Rehab Prognosis: Good; patient would benefit from acute skilled OT services to address these deficits and reach maximum level of function.       Plan:     Patient to be seen 3 x/week to address the above listed problems via self-care/home management, therapeutic activities, therapeutic exercises  · Plan of Care Expires:    · Plan of Care Reviewed with: patient    Subjective     Pt agreeable to therapy   Pt reports distended abdomen and feeling "bloated"    Occupational Profile:  Pt lives with daughter and son-n-law in one story house with 3 SANIYA with L HR. Pt reports he was independent PTA including driving and working for [employer] store. Pt reports his daughter and son-n-law work out of town so he is home alone daily.     Pain/Comfort:  · Pain Rating 1: 0/10    Patients cultural, spiritual, Mandaeism conflicts given the current situation: no    Objective:     Communicated with:  Pt found supine in bed.   Pt with 3 LPM oxygen in place with saturation 90% with activity. Pt reports SOB with activity.     General Precautions: Standard, fall     Occupational " Performance:    Bed Mobility:    Supine>sit with MOD A   Sit>supine with MOD A     Functional Mobility/Transfers:  Sit>stand MOD A for first trial and MIN A for second trial     Activities of Daily Living:  G/H: MAX A supported sitting basic g/h  UE/LE dressing: TOTAL A for all dressing.    Cognitive/Visual Perceptual  Pt is awake, alert and following commands. Pt with flat affect with slow response at times.     Physical Exam:  Pt is right hand dominant. Pt demo poor functional use of B UE. Pt with poor B , 3/5 elbow and shoulder strength. Minimal edema noted with left more swollen than right.     AMPAC 6 Click ADL:  AMPAC Total Score: 7    Treatment & Education:  Pt tolerated sitting EOB x 10 min with Poor+ to Fair sitting balance. Pt with increased WOB and saturation remaining 90% during activity. Poor functional use of UE's for ADL's noted.   Pt able to stand and take few small side steps to HOB.   Education provided re: OT POC and safety with functional mobility/ADL skills.   Education:    Patient left supine with all lines intact, call button in reach and nsg notified    GOALS:   Multidisciplinary Problems     Occupational Therapy Goals        Problem: Occupational Therapy Goal    Goal Priority Disciplines Outcome Interventions   Occupational Therapy Goal     OT, PT/OT Ongoing, Progressing    Description:  Goals to be met by: 7 days 3/13/2020     Patient will increase functional independence with ADLs by performing:    Pt to complete standing g/h skills with SBA  Pt to complete toileting with supervision.  Pt to complete UE dressing with set-up  Pt to complete LE dressing with SBA  Pt to complete BSC with SBA  Pt to tolerated OOB to chair x 3-4 hours daily to increase endurance for functional activity.                       History:     No past medical history on file.    No past surgical history on file.    Time Tracking:     OT Date of Treatment: 03/06/20  OT Start Time: 0858  OT Stop Time: 0923  OT  Total Time (min): 25 min    Billable Minutes:Evaluation 15  Therapeutic Activity 10    MONY Lipscomb  3/6/2020

## 2020-03-06 NOTE — SUBJECTIVE & OBJECTIVE
Interval History: Patient converted into NSR this morning. Patient not tolerating food reports nausea. He is also complaining of bloating. CVP 8. SVO2 75%. Net neg 2.8 L    Continuous Infusions:   amiodarone in dextrose 5% 0.5 mg/min (03/06/20 1300)    heparin (porcine) in D5W 15.988 Units/kg/hr (03/06/20 1300)    nicardipine 2.5 mg/hr (03/06/20 1300)     Scheduled Meds:   aspirin  81 mg Oral Daily    atorvastatin  80 mg Oral Daily    carvediloL  6.25 mg Oral BID    furosemide (LASIX) IV  80 mg Intravenous Q8H    hydrALAZINE  50 mg Oral Q8H    sevelamer carbonate  1.6 g Oral TID WM     PRN Meds:sodium chloride, Dextrose 10% Bolus, glucagon (human recombinant), heparin (PORCINE), heparin (PORCINE), insulin aspart U-100, senna-docusate 8.6-50 mg, sodium chloride 0.9%, sodium chloride 0.9%    Review of patient's allergies indicates:  No Known Allergies  Objective:     Vital Signs (Most Recent):  Temp: 98.7 °F (37.1 °C) (03/06/20 1100)  Pulse: 96 (03/06/20 1300)  Resp: 11 (03/06/20 1300)  BP: 121/61 (03/06/20 1300)  SpO2: (!) 91 % (03/06/20 1300) Vital Signs (24h Range):  Temp:  [97.8 °F (36.6 °C)-98.8 °F (37.1 °C)] 98.7 °F (37.1 °C)  Pulse:  [] 96  Resp:  [11-38] 11  SpO2:  [90 %-95 %] 91 %  BP: (101-149)/(56-78) 121/61  Arterial Line BP: (122-166)/(56-77) 149/65     Patient Vitals for the past 72 hrs (Last 3 readings):   Weight   03/06/20 1000 76.2 kg (167 lb 15.9 oz)     Body mass index is 27.11 kg/m².      Intake/Output Summary (Last 24 hours) at 3/6/2020 1325  Last data filed at 3/6/2020 1300  Gross per 24 hour   Intake 2217.96 ml   Output 5120 ml   Net -2902.04 ml        Telemetry: NSR 90s    Physical Exam   Constitutional: He is oriented to person, place, and time. He appears well-developed and well-nourished.   HENT:   Head: Normocephalic and atraumatic.   Eyes: Pupils are equal, round, and reactive to light. EOM are normal.   Neck: Normal range of motion. Neck supple.   LIJ TLC placed 3/1/20.    Cardiovascular: Normal rate and regular rhythm.   Murmur heard.  Pulmonary/Chest: Effort normal and breath sounds normal. No stridor. No respiratory distress.   Abdominal: Soft. Bowel sounds are normal. He exhibits distension (bloating, tympanic). There is no tenderness.   Musculoskeletal: Normal range of motion. He exhibits no edema.   Neurological: He is alert and oriented to person, place, and time.   Skin: Skin is warm and dry. Capillary refill takes 2 to 3 seconds.   Nursing note and vitals reviewed.    Significant Labs:  CBC:  Recent Labs   Lab 03/05/20  0534 03/05/20  1830 03/06/20  0237   WBC 13.30* 16.86* 16.06*   RBC 2.41* 3.20* 3.18*   HGB 7.0* 9.6* 9.6*   HCT 21.8* 28.8* 28.4*    186 190   MCV 91 90 89   MCH 29.0 30.0 30.2   MCHC 32.1 33.3 33.8     BNP:  Recent Labs   Lab 02/29/20 2122   *     CMP:  Recent Labs   Lab 03/05/20  0856 03/05/20  1800 03/06/20  0745   *  148* 226* 266*   CALCIUM 8.3*  8.3* 8.5* 9.1   ALBUMIN 2.6*  2.6* 2.9* 2.9*   PROT 6.5 7.1 7.3     143 138 140   K 4.5  4.5 4.1 3.6   CO2 22*  22* 25 27     103 98 97   BUN 91*  91* 90* 97*   CREATININE 7.4*  7.4* 7.2* 7.1*   ALKPHOS 84 93 98   ALT 16 17 17   AST 34 35 30   BILITOT 0.5 0.5 0.5      Coagulation:   Recent Labs   Lab 02/29/20 2122 03/01/20  2132  03/04/20  0549 03/05/20  0410 03/06/20  0237   INR 1.3*  --  1.2  --   --   --   --    APTT 37.6*   < > 63.7*  63.7*   < > 49.7* 49.3* 38.7*    < > = values in this interval not displayed.     LDH:  No results for input(s): LDH in the last 72 hours.  Microbiology:  Microbiology Results (last 7 days)     Procedure Component Value Units Date/Time    Culture, Respiratory with Gram Stain [[id number]] Collected:  03/06/20 0846    Order Status:  Completed Specimen:  Respiratory from Sputum, Expectorated Updated:  03/06/20 1140     Gram Stain (Respiratory) <10 epithelial cells per low power field.     Gram Stain (Respiratory) Few WBC's     Gram  Stain (Respiratory) Few Gram positive cocci     Gram Stain (Respiratory) Few Gram positive rods     Gram Stain (Respiratory) Few Gram negative rods    Blood culture [[id number]] Collected:  03/01/20 0254    Order Status:  Completed Specimen:  Blood from Peripheral, Upper Arm, Left Updated:  03/06/20 0612     Blood Culture, Routine No growth after 5 days.    Blood culture [[id number]] Collected:  02/29/20 2305    Order Status:  Completed Specimen:  Blood from Peripheral, Forearm, Right Updated:  03/06/20 0612     Blood Culture, Routine No growth after 5 days.    Urine culture [[id number]] Collected:  02/29/20 2240    Order Status:  Completed Specimen:  Urine Updated:  03/02/20 0739     Urine Culture, Routine No significant growth    Narrative:       Preferred Collection Type->Urine, Clean Catch        I have reviewed all pertinent labs within the past 24 hours.    Estimated Creatinine Clearance: 9.6 mL/min (A) (based on SCr of 7.1 mg/dL (H)).    Diagnostic Results:  I have reviewed all pertinent imaging results/findings within the past 24 hours.

## 2020-03-06 NOTE — PLAN OF CARE
CMICU DAILY GOALS       A: Awake    RASS: Goal - RASS Goal: 0-->alert and calm  Actual - RASS ([place]orton Agitation-Sedation Scale): 0-->alert and calm   Restraint necessity: NA  B: Breath   SBT: Not intubated   C: Coordinate A & B, analgesics/sedatives   Pain: managed    SAT: Not intubated  D: Delirium   CAM-ICU: Overall CAM-ICU: Negative  E: Early Mobility   MOVE Screen: Pass   Activity: Activity Management: activity clustered for rest period  FAS: Feeding/Nutrition   Diet order: Diet/Nutrition Received: regular, Specialty Diet/Nutrition Received: renal diet Fluid restriction:    T: Thrombus   DVT prophylaxis: VTE Required Core Measure: Pharmacological prophylaxis initiated/maintained  H: HOB Elevation   Head of Bed (HOB): HOB at 30-45 degrees  U: Ulcer Prophylaxis   GI: no  G: Glucose control   managed Glycemic Management: blood glucose monitoring  S: Skin   Bundle compliance: yes   Bathing/Skin Care: bath, chlorhexidine, bath, complete, linen changed, dressed/undressed   B: Bowel Function   constipation   I: Indwelling Catheters   Lamb necessity:      Urethral Catheter 02/29/20 2056 16 Fr.-Reason for Continuing Urinary Catheterization: Critically ill in ICU requiring intensive monitoring   CVC necessity: Yes   IPAD offered: No  D: De-escalation Antibx   No  Plan for the day   Continue present management. Watch out for recurrence of blood pressure increase.  Family/Goals of care/Code Status   Code Status: Full Code     No acute events throughout day, VS and assessment per flow sheet, patient progressing towards goals as tolerated, plan of care reviewed with John Javier and family, all concerns addressed, will continue to monitor.

## 2020-03-06 NOTE — ASSESSMENT & PLAN NOTE
-Presented with impella placed at OSH with position issues and LD>1000.  -Impella removed 3/2/20.   -CVP 10 this am, SVO2 75% with a calculated CO 7.36, CI 3.91,   -Continue IVP Lasix 80 IV Q8H   - d/c'ed due to high output and hypertension/afib.  -TTE 3/2 EF 10%, LVIDD 6, TAPSE 1.98, Mod MR, Mod TR.  -Started on nitro gtt for BP and weaned off. Now on cardene gtt. Started on PO hydralazine/coreg but now not tolerating PO.    -Not working up for advanced options due to medical instability/renal failure at this time. Wtill continue to reassess candidacy if condition improves.

## 2020-03-06 NOTE — PROGRESS NOTES
"[facility]  Nephrology  Progress Note    Patient Name: John Javier  MRN: [id number]  Admission Date: 2/29/2020  Hospital Length of Stay: 6 days  Attending Provider: Shannon Fuller MD   Primary Care Physician: To Obtain Unable  Principal Problem:Cardiogenic shock    Subjective:     Interval History:   Slowly improving kidney function daily, SCr down to 7.1 from 7.4 on AM labs. IN the past 24 hours, patient developed acute episode of SOB with transfusion of PRBC.  Lasix dose increased to TID with UOP of 5.3L/24h, net negative 2.8L for the day.  He reports feeling "bloated" on exam today, CXR with evidence of pulmonary edema.    Review of patient's allergies indicates:  No Known Allergies  Current Facility-Administered Medications   Medication Frequency    0.9%  NaCl infusion (for blood administration) Q24H PRN    amiodarone 360 mg/200 mL (1.8 mg/mL) infusion Continuous    aspirin chewable tablet 81 mg Daily    atorvastatin tablet 80 mg Daily    carvediloL tablet 6.25 mg BID    dextrose 10% (D10W) Bolus PRN    furosemide injection 80 mg Q8H    glucagon (human recombinant) injection 1 mg PRN    heparin 25,000 units in dextrose 5% (100 units/ml) IV bolus from bag - ADDITIONAL PRN BOLUS - 30 units/kg PRN    heparin 25,000 units in dextrose 5% (100 units/ml) IV bolus from bag - ADDITIONAL PRN BOLUS - 60 units/kg PRN    heparin 25,000 units in dextrose 5% 250 mL (100 units/mL) infusion LOW INTENSITY nomogram - [facility] Continuous    hydrALAZINE tablet 50 mg Q8H    insulin aspart U-100 pen 0-5 Units Q6H PRN    niCARdipine 40 mg/200 mL infusion Continuous    senna-docusate 8.6-50 mg per tablet 1 tablet BID PRN    sevelamer carbonate pwpk 1.6 g TID WM    sodium chloride 0.9% flush 10 mL PRN    sodium chloride 0.9% flush 10 mL PRN       Objective:     Vital Signs (Most Recent):  Temp: 98.7 °F (37.1 °C) (03/06/20 1100)  Pulse: 96 (03/06/20 1300)  Resp: 11 (03/06/20 1300)  BP: 121/61 (03/06/20 " 1300)  SpO2: (!) 91 % (03/06/20 1300)  O2 Device (Oxygen Therapy): nasal cannula (03/06/20 1300) Vital Signs (24h Range):  Temp:  [97.8 °F (36.6 °C)-98.8 °F (37.1 °C)] 98.7 °F (37.1 °C)  Pulse:  [] 96  Resp:  [11-38] 11  SpO2:  [90 %-95 %] 91 %  BP: (101-149)/(56-78) 121/61  Arterial Line BP: (122-166)/(56-77) 149/65     Weight: 76.2 kg (167 lb 15.9 oz) (03/06/20 1000)  Body mass index is 27.11 kg/m².  Body surface area is 1.88 meters squared.    I/O last 3 completed shifts:  In: 3630.1 [P.O.:690; I.V.:2302.1; Blood:538; IV Piggyback:100]  Out: 6350 [Urine:6350]    Physical Exam   Constitutional: He is oriented to person, place, and time. He appears well-developed and well-nourished.   HENT:   Head: Normocephalic and atraumatic.   Eyes: Pupils are equal, round, and reactive to light. EOM are normal.   Neck: Normal range of motion. Neck supple.   Cardiovascular: Normal rate and regular rhythm.   Murmur heard.  Pulmonary/Chest: Effort normal. No stridor. No respiratory distress. He has rales.   Abdominal: Soft. Bowel sounds are normal. He exhibits distension. There is no tenderness.   Musculoskeletal: Normal range of motion. He exhibits no edema.   Neurological: He is alert and oriented to person, place, and time.   Skin: Skin is warm and dry.   Nursing note and vitals reviewed.      Significant Labs:  CBC:   Recent Labs   Lab 03/06/20  0237   WBC 16.06*   RBC 3.18*   HGB 9.6*   HCT 28.4*      MCV 89   MCH 30.2   MCHC 33.8     CMP:   Recent Labs   Lab 03/06/20  0745   *   CALCIUM 9.1   ALBUMIN 2.9*   PROT 7.3      K 3.6   CO2 27   CL 97   BUN 97*   CREATININE 7.1*   ALKPHOS 98   ALT 17   AST 30   BILITOT 0.5            Assessment/Plan:     ANGEL (acute kidney injury)  * Non-oliguric iATN from cardiogenic shock.     3/2: -urine microscopy:  abundant muddy brown casts (leaning towards ATN)     Plan/ Rec:  -responded well to IV lasix, 5.3L/24h.  -Would aim for goal to be net negative ~ 2L/24h.   Recommend decreasing lasix dose if UOP exceeds 200 cc/hr.    -check phos level daily  -currently NPO, will discontinue Sevelamer.  -Renal function panels q 12hr   -Strict I/O and chart  -Will follow closely  -Plan discussed with Dr. Courtney Ramsay, NP  Nephrology  [facility]

## 2020-03-06 NOTE — ASSESSMENT & PLAN NOTE
BG goal 140 - 180     Start transition IV insulin infusion at 0.4 u/hr  Low dose correction scale - due to decreased renal function  BG monitoring every 4 hours while NPO    Discharge planning: RAMO

## 2020-03-06 NOTE — ASSESSMENT & PLAN NOTE
-Episode of afib RVR 3/3/2020 in the setting of severe agitation with SBT  -Converted to NSR  -Continue heparin gtt for anticoagulation  -Went back into afib 3/4/20. Started on IV Amio.   -EP consulted, continue IV amio while on IV diuretics. Appreciate their input.

## 2020-03-06 NOTE — ASSESSMENT & PLAN NOTE
-Hx of RCA stent in 2013.   -[facility] 2/29: 50-60% LCx, OM disease.  -continue ASA, high intensity statin.  -Continue heparin

## 2020-03-06 NOTE — PROGRESS NOTES
[facility]  Heart Transplant  Progress Note    Patient Name: John Javier  MRN: [id number]  Admission Date: 2/29/2020  Hospital Length of Stay: 6 days  Attending Physician: Shannon Fuller MD  Primary Care Provider: To Obtain Unable  Principal Problem:Cardiogenic shock    Subjective:     Interval History: Patient converted into NSR this morning. Patient not tolerating food reports nausea. He is also complaining of bloating. CVP 8. SVO2 75%. Net neg 2.8 L    Continuous Infusions:   amiodarone in dextrose 5% 0.5 mg/min (03/06/20 1300)    heparin (porcine) in D5W 15.988 Units/kg/hr (03/06/20 1300)    nicardipine 2.5 mg/hr (03/06/20 1300)     Scheduled Meds:   aspirin  81 mg Oral Daily    atorvastatin  80 mg Oral Daily    carvediloL  6.25 mg Oral BID    furosemide (LASIX) IV  80 mg Intravenous Q8H    hydrALAZINE  50 mg Oral Q8H    sevelamer carbonate  1.6 g Oral TID WM     PRN Meds:sodium chloride, Dextrose 10% Bolus, glucagon (human recombinant), heparin (PORCINE), heparin (PORCINE), insulin aspart U-100, senna-docusate 8.6-50 mg, sodium chloride 0.9%, sodium chloride 0.9%    Review of patient's allergies indicates:  No Known Allergies  Objective:     Vital Signs (Most Recent):  Temp: 98.7 °F (37.1 °C) (03/06/20 1100)  Pulse: 96 (03/06/20 1300)  Resp: 11 (03/06/20 1300)  BP: 121/61 (03/06/20 1300)  SpO2: (!) 91 % (03/06/20 1300) Vital Signs (24h Range):  Temp:  [97.8 °F (36.6 °C)-98.8 °F (37.1 °C)] 98.7 °F (37.1 °C)  Pulse:  [] 96  Resp:  [11-38] 11  SpO2:  [90 %-95 %] 91 %  BP: (101-149)/(56-78) 121/61  Arterial Line BP: (122-166)/(56-77) 149/65     Patient Vitals for the past 72 hrs (Last 3 readings):   Weight   03/06/20 1000 76.2 kg (167 lb 15.9 oz)     Body mass index is 27.11 kg/m².      Intake/Output Summary (Last 24 hours) at 3/6/2020 1325  Last data filed at 3/6/2020 1300  Gross per 24 hour   Intake 2217.96 ml   Output 5120 ml   Net -2902.04 ml        Telemetry: NSR  90s    Physical Exam   Constitutional: He is oriented to person, place, and time. He appears well-developed and well-nourished.   HENT:   Head: Normocephalic and atraumatic.   Eyes: Pupils are equal, round, and reactive to light. EOM are normal.   Neck: Normal range of motion. Neck supple.   LIJ TLC placed 3/1/20.   Cardiovascular: Normal rate and regular rhythm.   Murmur heard.  Pulmonary/Chest: Effort normal and breath sounds normal. No stridor. No respiratory distress.   Abdominal: Soft. Bowel sounds are normal. He exhibits distension (bloating, tympanic). There is no tenderness.   Musculoskeletal: Normal range of motion. He exhibits no edema.   Neurological: He is alert and oriented to person, place, and time.   Skin: Skin is warm and dry. Capillary refill takes 2 to 3 seconds.   Nursing note and vitals reviewed.    Significant Labs:  CBC:  Recent Labs   Lab 03/05/20  0534 03/05/20  1830 03/06/20  0237   WBC 13.30* 16.86* 16.06*   RBC 2.41* 3.20* 3.18*   HGB 7.0* 9.6* 9.6*   HCT 21.8* 28.8* 28.4*    186 190   MCV 91 90 89   MCH 29.0 30.0 30.2   MCHC 32.1 33.3 33.8     BNP:  Recent Labs   Lab 02/29/20 2122   *     CMP:  Recent Labs   Lab 03/05/20  0856 03/05/20  1800 03/06/20  0745   *  148* 226* 266*   CALCIUM 8.3*  8.3* 8.5* 9.1   ALBUMIN 2.6*  2.6* 2.9* 2.9*   PROT 6.5 7.1 7.3     143 138 140   K 4.5  4.5 4.1 3.6   CO2 22*  22* 25 27     103 98 97   BUN 91*  91* 90* 97*   CREATININE 7.4*  7.4* 7.2* 7.1*   ALKPHOS 84 93 98   ALT 16 17 17   AST 34 35 30   BILITOT 0.5 0.5 0.5      Coagulation:   Recent Labs   Lab 02/29/20 2122 03/01/20 2132 03/04/20  0549 03/05/20  0410 03/06/20  0237   INR 1.3*  --  1.2  --   --   --   --    APTT 37.6*   < > 63.7*  63.7*   < > 49.7* 49.3* 38.7*    < > = values in this interval not displayed.     LDH:  No results for input(s): LDH in the last 72 hours.  Microbiology:  Microbiology Results (last 7 days)     Procedure Component Value  Units Date/Time    Culture, Respiratory with Gram Stain [[id number]] Collected:  03/06/20 0846    Order Status:  Completed Specimen:  Respiratory from Sputum, Expectorated Updated:  03/06/20 1140     Gram Stain (Respiratory) <10 epithelial cells per low power field.     Gram Stain (Respiratory) Few WBC's     Gram Stain (Respiratory) Few Gram positive cocci     Gram Stain (Respiratory) Few Gram positive rods     Gram Stain (Respiratory) Few Gram negative rods    Blood culture [[id number]] Collected:  03/01/20 0254    Order Status:  Completed Specimen:  Blood from Peripheral, Upper Arm, Left Updated:  03/06/20 0612     Blood Culture, Routine No growth after 5 days.    Blood culture [[id number]] Collected:  02/29/20 2305    Order Status:  Completed Specimen:  Blood from Peripheral, Forearm, Right Updated:  03/06/20 0612     Blood Culture, Routine No growth after 5 days.    Urine culture [[id number]] Collected:  02/29/20 2240    Order Status:  Completed Specimen:  Urine Updated:  03/02/20 0739     Urine Culture, Routine No significant growth    Narrative:       Preferred Collection Type->Urine, Clean Catch        I have reviewed all pertinent labs within the past 24 hours.    Estimated Creatinine Clearance: 9.6 mL/min (A) (based on SCr of 7.1 mg/dL (H)).    Diagnostic Results:  I have reviewed all pertinent imaging results/findings within the past 24 hours.    Assessment and Plan:     Patient intubated w limited collateral from family; further OSH records pending    63 y/o M hx of CAD (50-60% LCx, OM disease on [facility] 2/29), ICM EF 10-15% s/p AICD (unknown baseline but on GDMT as OP), HTN, DM2, HLD, obesity, CKD, presenting from OSH intubated with Impella in setting of cardiogenic shock. Per pt's brother he was taken by EMS to OSH in setting of feeling short of breath, nearly passing out. There found to be in shock, lactate to 12, ANGEL w Cr 2.5, trop 12; unclear if dynamic ECG changes, max trop 10; underwent LHC with evidence  "of 50-60% LCx, OM disease; no PCI performed. RHC with LVEDP 35. CO 3, CI 1.8, tte w LVEF 10-15%. Underwent Impella placement without additional central line placement (per brother there was a "complication" but no evidence of pneumothorax).    On arrival patient -having intermittent suction alarms, improved w P6->P4. MAPs stable 75-85. CVC placed in LIJ given neck position, CO 9.7, CI 5.35, svr 535. WBC 26k, patient cultured, given single dose of vanc/zosyn. UOP 60-80cc/hr off diuretics. Further collateral pending from OSH. Continued on heparin gtt for impella, weaned to dobutamine 5, gave 500cc bolus in setting of CVP 10 and suction alarms.    * Cardiogenic shock  -Presented with impella placed at OSH with position issues and LD>1000.  -Impella removed 3/2/20.   -CVP 10 this am, SVO2 75% with a calculated CO 7.36, CI 3.91,   -Continue IVP Lasix 80 IV Q8H   - d/c'ed due to high output and hypertension/afib.  -TTE 3/2 EF 10%, LVIDD 6, TAPSE 1.98, Mod MR, Mod TR.  -Started on nitro gtt for BP and weaned off. Now on cardene gtt. Started on PO hydralazine/coreg but now not tolerating PO.    -Not working up for advanced options due to medical instability/renal failure at this time. Wtill continue to reassess candidacy if condition improves.     ANGEL (acute kidney injury)  - Likely ATN in setting of shock, Hemolysis, plus IV contrast at OSH.  - Monitor Cr closely  - Appreciate renal consult.  - Strict I/O.  - Avoid nephrotoxic meds.  - Continues to make good urine    Abdominal distention  -Will place NGT given concern for ileus  -Lots of stool in colon on KUB- will order enema     Iron deficiency anemia  -No obvious bleeding.   -R groin impella site stable/no abdominal or back pain.  -Will transfuse 2u prbc today.     Malnutrition of moderate degree  -Appreciate Dietary Cx.    PAF (paroxysmal atrial fibrillation)  -Episode of afib RVR 3/3/2020 in the setting of severe agitation with SBT  -Converted to " NSR  -Continue heparin gtt for anticoagulation  -Went back into afib 3/4/20. Started on IV Amio.   -EP consulted, continue IV amio while on IV diuretics. Appreciate their input.     VT (ventricular tachycardia)  -S/P ICD shocks x 5 per OSH.  -Continue IV amio per EP recs for afib    Acute hypoxemic respiratory failure  -Extubated 3/4.        Acute on chronic combined systolic and diastolic heart failure  - See Cardiogenic shock.    DM (diabetes mellitus)  -A1C 7.0 on admit  -SSI, accuchecks    CAD (coronary artery disease)  -Hx of RCA stent in 2013.   -[facility] 2/29: 50-60% LCx, OM disease.  -continue ASA, high intensity statin.  -Continue heparin        Uninterrupted Critical Care/Counseling Time (not including procedures): 45 minutes      Patricia Simon PA-C  Heart Transplant  [facility]

## 2020-03-06 NOTE — ASSESSMENT & PLAN NOTE
* Non-oliguric iATN from cardiogenic shock.     3/2: -urine microscopy:  abundant muddy brown casts (leaning towards ATN)     Plan/ Rec:  -responded well to IV lasix, 5.3L/24h.  -Would aim for goal to be net negative ~ 2L/24h.  Recommend decreasing lasix dose if UOP exceeds 200 cc/hr.    -check phos level daily  -currently NPO, will discontinue Sevelamer.  -Renal function panels q 12hr   -Strict I/O and chart  -Will follow closely  -Plan discussed with Dr. Valdez

## 2020-03-06 NOTE — HPI
Reason for Consult: Management of T2DM, Hyperglycemia     Surgical Procedure and Date: N/A    Diabetes diagnosis year: 2019    Lab Results   Component Value Date    HGBA1C 7.0 (H) 03/01/2020       Home Diabetes Medications:  Metformin 500 mg BID    How often checking glucose at home? Once daily  BG readings on regimen:   Hypoglycemia on the regimen? No  Missed doses on regimen? No    Diabetes Complications include:     none    Complicating diabetes co morbidities:   CHF and CKD      HPI:   Patient is a 64 y.o. male with a diagnosis of DM2, ANGEL, afib, CAD, CHF, and cardiogenic shock.  Patient admitted to [facility] from outside facility in cardiogenic shock, intubated, and with an impella in place.  Diagnosed with DM2 last year and place on Metformin, managed per his PCP.  Endocrinology consulted for DM/BG management.

## 2020-03-06 NOTE — ASSESSMENT & PLAN NOTE
- Likely ATN in setting of shock, Hemolysis, plus IV contrast at OSH.  - Monitor Cr closely  - Appreciate renal consult.  - Strict I/O.  - Avoid nephrotoxic meds.  - Continues to make good urine

## 2020-03-06 NOTE — PROGRESS NOTES
"  [facility]  Adult Nutrition  Consult Note    SUMMARY     Recommendations    1. When medically feasible, resume Renal diet (fluid restriction per MD). Add Novasource ONS to aid in caloric intake.   2. RD to monitor & follow-up.    Goals: Meet % EEN, EPN by RD f/u date  Nutrition Goal Status: progressing towards goal  Communication of RD Recs: reviewed with RN    Reason for Assessment    Reason For Assessment: RD follow-up  Diagnosis: other (see comments)(Cardiogenic shock)  Relevant Medical History: HF, HLD, DM  Interdisciplinary Rounds: did not attend    General Information Comments: Pt extubated. Pt was on a renal diet at time of visit this AM w/ poor PO intake (2/2 nausea), diet now changed to NPO. Pt willing to try ONS. Reports good appetite, stable wt PTA (UBW: 165#). NFPE complete 3/2 - pt doesn't meet malnutrition criteria. TFs discontinued.  Nutrition Discharge Planning: Adequate PO intake    Nutrition/Diet History    Patient Reported Diet/Restrictions/Preferences: general  Factors Affecting Nutritional Intake: NPO, decreased appetite, nausea/vomiting    Anthropometrics    Temp: 98.6 °F (37 °C)  Height Method: Estimated  Height: 5' 6" (167.6 cm)  Height (inches): 66 in  Weight Method: Bed Scale  Weight: 76.2 kg (167 lb 15.9 oz)  Weight (lb): 167.99 lb  Ideal Body Weight (IBW), Male: 142 lb  % Ideal Body Weight, Male (lb): 118.3 %  BMI (Calculated): 27.1  BMI Grade: 25 - 29.9 - overweight  Usual Body Weight (UBW), kg: (DONNA)    Lab/Procedures/Meds    Pertinent Labs Reviewed: reviewed  Pertinent Labs Comments: BUN 90, Creat 7.2, GFR 7.3, Gluc 226  Pertinent Medications Reviewed: reviewed  Pertinent Medications Comments: Amiodarone, Heparin, Nicardipine    Estimated/Assessed Needs    Weight Used For Calorie Calculations: 76.2 kg (167 lb 15.9 oz)     Energy Calorie Requirements (kcal): 1875 kcal/d  Energy Need Method: [place]-St Jeor(1.25 PAL)     Protein Requirements: 76-92 g/d (1-1.2 " g/kg)  Weight Used For Protein Calculations: 76.2 kg (167 lb 15.9 oz)     Estimated Fluid Requirement Method: other (see comments)(Per MD or 1 mL/kcal)    Nutrition Prescription Ordered    Current Diet Order: NPO; was on Renal diet  Current Nutrition Support Formula Ordered: Other (Comment)(Discontinued)    Evaluation of Received Nutrient/Fluid Intake    Comments: LBM: 2/29    Nutrition Risk    Level of Risk/Frequency of Follow-up: (2x/week)     Assessment and Plan    Nutrition Problem  Inadequate energy intake     Related to (etiology):   Inability to consume sufficient energy     Signs and Symptoms (as evidenced by):   NPO     Interventions(treatment strategy):  Collaboration of nutrition care w/ other providers     Nutrition Diagnosis Status:   Continues     Monitor and Evaluation    Food and Nutrient Intake: energy intake, food and beverage intake, enteral nutrition intake  Food and Nutrient Adminstration: diet order, enteral and parenteral nutrition administration  Physical Activity and Function: nutrition-related ADLs and IADLs  Anthropometric Measurements: weight, weight change  Biochemical Data, Medical Tests and Procedures: inflammatory profile, lipid profile, glucose/endocrine profile, gastrointestinal profile, electrolyte and renal panel  Nutrition-Focused Physical Findings: overall appearance     Malnutrition Assessment    Subcutaneous Fat (Malnutrition): mild depletion  Muscle Mass (Malnutrition): mild depletion   Orbital Region (Subcutaneous Fat Loss): mild depletion  Upper Arm Region (Subcutaneous Fat Loss): mild depletion   [place] Region (Muscle Loss): mild depletion  Clavicle Bone Region (Muscle Loss): mild depletion  Anterior Thigh Region (Muscle Loss): (DONNA)  Posterior Calf Region (Muscle Loss): (DONNA)     Nutrition Follow-Up    RD Follow-up?: Yes

## 2020-03-07 LAB
ALBUMIN SERPL BCP-MCNC: 3.1 G/DL (ref 3.5–5.2)
ALLENS TEST: ABNORMAL
ALP SERPL-CCNC: 92 U/L (ref 55–135)
ALT SERPL W/O P-5'-P-CCNC: 15 U/L (ref 10–44)
ANION GAP SERPL CALC-SCNC: 20 MMOL/L (ref 8–16)
ANION GAP SERPL CALC-SCNC: 20 MMOL/L (ref 8–16)
ANION GAP SERPL CALC-SCNC: 21 MMOL/L (ref 8–16)
APTT BLDCRRT: 40.2 SEC (ref 21–32)
AST SERPL-CCNC: 24 U/L (ref 10–40)
BASOPHILS # BLD AUTO: 0.01 K/UL (ref 0–0.2)
BASOPHILS NFR BLD: 0.1 % (ref 0–1.9)
BILIRUB SERPL-MCNC: 0.8 MG/DL (ref 0.1–1)
BUN SERPL-MCNC: 113 MG/DL (ref 8–23)
BUN SERPL-MCNC: 117 MG/DL (ref 8–23)
BUN SERPL-MCNC: 118 MG/DL (ref 8–23)
CALCIUM SERPL-MCNC: 10 MG/DL (ref 8.7–10.5)
CALCIUM SERPL-MCNC: 9.4 MG/DL (ref 8.7–10.5)
CALCIUM SERPL-MCNC: 9.6 MG/DL (ref 8.7–10.5)
CHLORIDE SERPL-SCNC: 93 MMOL/L (ref 95–110)
CHLORIDE SERPL-SCNC: 96 MMOL/L (ref 95–110)
CHLORIDE SERPL-SCNC: 96 MMOL/L (ref 95–110)
CO2 SERPL-SCNC: 30 MMOL/L (ref 23–29)
CO2 SERPL-SCNC: 34 MMOL/L (ref 23–29)
CO2 SERPL-SCNC: 34 MMOL/L (ref 23–29)
CREAT SERPL-MCNC: 6.7 MG/DL (ref 0.5–1.4)
CREAT SERPL-MCNC: 6.8 MG/DL (ref 0.5–1.4)
CREAT SERPL-MCNC: 6.8 MG/DL (ref 0.5–1.4)
DELSYS: ABNORMAL
DIFFERENTIAL METHOD: ABNORMAL
EOSINOPHIL # BLD AUTO: 0.1 K/UL (ref 0–0.5)
EOSINOPHIL NFR BLD: 0.4 % (ref 0–8)
ERYTHROCYTE [DISTWIDTH] IN BLOOD BY AUTOMATED COUNT: 14.8 % (ref 11.5–14.5)
EST. GFR  (AFRICAN AMERICAN): 9.1 ML/MIN/1.73 M^2
EST. GFR  (AFRICAN AMERICAN): 9.1 ML/MIN/1.73 M^2
EST. GFR  (AFRICAN AMERICAN): 9.2 ML/MIN/1.73 M^2
EST. GFR  (NON AFRICAN AMERICAN): 7.9 ML/MIN/1.73 M^2
EST. GFR  (NON AFRICAN AMERICAN): 7.9 ML/MIN/1.73 M^2
EST. GFR  (NON AFRICAN AMERICAN): 8 ML/MIN/1.73 M^2
FLOW: 3
GLUCOSE SERPL-MCNC: 181 MG/DL (ref 70–110)
GLUCOSE SERPL-MCNC: 204 MG/DL (ref 70–110)
GLUCOSE SERPL-MCNC: 208 MG/DL (ref 70–110)
HCO3 UR-SCNC: 33.1 MMOL/L (ref 24–28)
HCT VFR BLD AUTO: 31.6 % (ref 40–54)
HGB BLD-MCNC: 10.5 G/DL (ref 14–18)
IMM GRANULOCYTES # BLD AUTO: 0.2 K/UL (ref 0–0.04)
IMM GRANULOCYTES NFR BLD AUTO: 1.5 % (ref 0–0.5)
LYMPHOCYTES # BLD AUTO: 1.1 K/UL (ref 1–4.8)
LYMPHOCYTES NFR BLD: 8.2 % (ref 18–48)
MAGNESIUM SERPL-MCNC: 2.4 MG/DL (ref 1.6–2.6)
MAGNESIUM SERPL-MCNC: 2.4 MG/DL (ref 1.6–2.6)
MAGNESIUM SERPL-MCNC: 2.5 MG/DL (ref 1.6–2.6)
MAGNESIUM SERPL-MCNC: 2.5 MG/DL (ref 1.6–2.6)
MCH RBC QN AUTO: 29.4 PG (ref 27–31)
MCHC RBC AUTO-ENTMCNC: 33.2 G/DL (ref 32–36)
MCV RBC AUTO: 89 FL (ref 82–98)
MODE: ABNORMAL
MONOCYTES # BLD AUTO: 1.7 K/UL (ref 0.3–1)
MONOCYTES NFR BLD: 12.3 % (ref 4–15)
NEUTROPHILS # BLD AUTO: 10.6 K/UL (ref 1.8–7.7)
NEUTROPHILS NFR BLD: 77.5 % (ref 38–73)
NRBC BLD-RTO: 0 /100 WBC
PCO2 BLDA: 38.6 MMHG (ref 35–45)
PH SMN: 7.54 [PH] (ref 7.35–7.45)
PLATELET # BLD AUTO: 276 K/UL (ref 150–350)
PMV BLD AUTO: 12 FL (ref 9.2–12.9)
PO2 BLDA: 36 MMHG (ref 40–60)
POC BE: 11 MMOL/L
POC SATURATED O2: 77 % (ref 95–100)
POC TCO2: 34 MMOL/L (ref 24–29)
POCT GLUCOSE: 168 MG/DL (ref 70–110)
POCT GLUCOSE: 170 MG/DL (ref 70–110)
POCT GLUCOSE: 172 MG/DL (ref 70–110)
POCT GLUCOSE: 202 MG/DL (ref 70–110)
POCT GLUCOSE: 218 MG/DL (ref 70–110)
POTASSIUM SERPL-SCNC: 3.4 MMOL/L (ref 3.5–5.1)
POTASSIUM SERPL-SCNC: 3.5 MMOL/L (ref 3.5–5.1)
POTASSIUM SERPL-SCNC: 3.5 MMOL/L (ref 3.5–5.1)
PROT SERPL-MCNC: 7.5 G/DL (ref 6–8.4)
RBC # BLD AUTO: 3.57 M/UL (ref 4.6–6.2)
SAMPLE: ABNORMAL
SITE: ABNORMAL
SODIUM SERPL-SCNC: 146 MMOL/L (ref 136–145)
SODIUM SERPL-SCNC: 147 MMOL/L (ref 136–145)
SODIUM SERPL-SCNC: 151 MMOL/L (ref 136–145)
SP02: 100
WBC # BLD AUTO: 13.71 K/UL (ref 3.9–12.7)

## 2020-03-07 PROCEDURE — 85025 COMPLETE CBC W/AUTO DIFF WBC: CPT

## 2020-03-07 PROCEDURE — 99232 PR SUBSEQUENT HOSPITAL CARE,LEVL II: ICD-10-PCS | Mod: ,,, | Performed by: INTERNAL MEDICINE

## 2020-03-07 PROCEDURE — 83735 ASSAY OF MAGNESIUM: CPT | Mod: 91

## 2020-03-07 PROCEDURE — 80053 COMPREHEN METABOLIC PANEL: CPT

## 2020-03-07 PROCEDURE — 63600175 PHARM REV CODE 636 W HCPCS: Performed by: STUDENT IN AN ORGANIZED HEALTH CARE EDUCATION/TRAINING PROGRAM

## 2020-03-07 PROCEDURE — 99291 PR CRITICAL CARE, E/M 30-74 MINUTES: ICD-10-PCS | Mod: ,,, | Performed by: PHYSICIAN ASSISTANT

## 2020-03-07 PROCEDURE — 25000003 PHARM REV CODE 250: Performed by: NURSE PRACTITIONER

## 2020-03-07 PROCEDURE — 99232 PR SUBSEQUENT HOSPITAL CARE,LEVL II: ICD-10-PCS | Mod: ,,, | Performed by: NURSE PRACTITIONER

## 2020-03-07 PROCEDURE — 80048 BASIC METABOLIC PNL TOTAL CA: CPT

## 2020-03-07 PROCEDURE — 83735 ASSAY OF MAGNESIUM: CPT

## 2020-03-07 PROCEDURE — 99291 CRITICAL CARE FIRST HOUR: CPT | Mod: ,,, | Performed by: PHYSICIAN ASSISTANT

## 2020-03-07 PROCEDURE — 63600175 PHARM REV CODE 636 W HCPCS: Performed by: NURSE PRACTITIONER

## 2020-03-07 PROCEDURE — 85730 THROMBOPLASTIN TIME PARTIAL: CPT

## 2020-03-07 PROCEDURE — 99232 SBSQ HOSP IP/OBS MODERATE 35: CPT | Mod: ,,, | Performed by: INTERNAL MEDICINE

## 2020-03-07 PROCEDURE — 94761 N-INVAS EAR/PLS OXIMETRY MLT: CPT

## 2020-03-07 PROCEDURE — 99232 SBSQ HOSP IP/OBS MODERATE 35: CPT | Mod: ,,, | Performed by: NURSE PRACTITIONER

## 2020-03-07 PROCEDURE — 99900035 HC TECH TIME PER 15 MIN (STAT)

## 2020-03-07 PROCEDURE — 63600175 PHARM REV CODE 636 W HCPCS: Performed by: PHYSICIAN ASSISTANT

## 2020-03-07 PROCEDURE — 20000000 HC ICU ROOM

## 2020-03-07 PROCEDURE — 25000003 PHARM REV CODE 250: Performed by: PHYSICIAN ASSISTANT

## 2020-03-07 RX ORDER — POTASSIUM CHLORIDE 14.9 MG/ML
20 INJECTION INTRAVENOUS ONCE
Status: COMPLETED | OUTPATIENT
Start: 2020-03-07 | End: 2020-03-07

## 2020-03-07 RX ORDER — FUROSEMIDE 10 MG/ML
80 INJECTION INTRAMUSCULAR; INTRAVENOUS
Status: DISCONTINUED | OUTPATIENT
Start: 2020-03-07 | End: 2020-03-07

## 2020-03-07 RX ORDER — POTASSIUM CHLORIDE 29.8 MG/ML
20 INJECTION INTRAVENOUS ONCE
Status: DISCONTINUED | OUTPATIENT
Start: 2020-03-07 | End: 2020-03-07

## 2020-03-07 RX ORDER — DOCUSATE SODIUM 283 MG/5ML
1 LIQUID RECTAL DAILY
Status: DISCONTINUED | OUTPATIENT
Start: 2020-03-07 | End: 2020-03-07

## 2020-03-07 RX ADMIN — ASPIRIN 81 MG CHEWABLE TABLET 81 MG: 81 TABLET CHEWABLE at 08:03

## 2020-03-07 RX ADMIN — AMIODARONE HYDROCHLORIDE 0.5 MG/MIN: 1.8 INJECTION, SOLUTION INTRAVENOUS at 10:03

## 2020-03-07 RX ADMIN — AMIODARONE HYDROCHLORIDE 0.5 MG/MIN: 1.8 INJECTION, SOLUTION INTRAVENOUS at 08:03

## 2020-03-07 RX ADMIN — POTASSIUM CHLORIDE 20 MEQ: 200 INJECTION, SOLUTION INTRAVENOUS at 08:03

## 2020-03-07 RX ADMIN — NICARDIPINE HYDROCHLORIDE 10 MG/HR: 0.2 INJECTION, SOLUTION INTRAVENOUS at 10:03

## 2020-03-07 RX ADMIN — NICARDIPINE HYDROCHLORIDE 10 MG/HR: 0.2 INJECTION, SOLUTION INTRAVENOUS at 02:03

## 2020-03-07 RX ADMIN — HEPARIN SODIUM 16 UNITS/KG/HR: 10000 INJECTION, SOLUTION INTRAVENOUS at 03:03

## 2020-03-07 RX ADMIN — HYDRALAZINE HYDROCHLORIDE 10 MG: 20 INJECTION INTRAMUSCULAR; INTRAVENOUS at 10:03

## 2020-03-07 RX ADMIN — HYDRALAZINE HYDROCHLORIDE 10 MG: 20 INJECTION INTRAMUSCULAR; INTRAVENOUS at 01:03

## 2020-03-07 RX ADMIN — POTASSIUM CHLORIDE 20 MEQ: 200 INJECTION, SOLUTION INTRAVENOUS at 01:03

## 2020-03-07 RX ADMIN — DOCUSATE SODIUM 1 ENEMA: 283 LIQUID RECTAL at 11:03

## 2020-03-07 RX ADMIN — SODIUM CHLORIDE 250 ML: 0.9 INJECTION, SOLUTION INTRAVENOUS at 07:03

## 2020-03-07 RX ADMIN — INSULIN ASPART 1 UNITS: 100 INJECTION, SOLUTION INTRAVENOUS; SUBCUTANEOUS at 04:03

## 2020-03-07 RX ADMIN — ATORVASTATIN CALCIUM 80 MG: 20 TABLET, FILM COATED ORAL at 08:03

## 2020-03-07 RX ADMIN — POTASSIUM CHLORIDE 20 MEQ: 200 INJECTION, SOLUTION INTRAVENOUS at 06:03

## 2020-03-07 RX ADMIN — INSULIN ASPART 1 UNITS: 100 INJECTION, SOLUTION INTRAVENOUS; SUBCUTANEOUS at 11:03

## 2020-03-07 RX ADMIN — HYDRALAZINE HYDROCHLORIDE 10 MG: 20 INJECTION INTRAMUSCULAR; INTRAVENOUS at 06:03

## 2020-03-07 RX ADMIN — NICARDIPINE HYDROCHLORIDE 5 MG/HR: 0.2 INJECTION, SOLUTION INTRAVENOUS at 03:03

## 2020-03-07 RX ADMIN — FUROSEMIDE 80 MG: 10 INJECTION, SOLUTION INTRAMUSCULAR; INTRAVENOUS at 06:03

## 2020-03-07 NOTE — PLAN OF CARE
See flowsheets for today's progress and updates. Increasingly confused over the day. Disoriented to place and situation. Hallucinating. Team aware. NSR 90-A- flutter 100s. Last CVP 12. SVO2 75%. Pan cultured. Nausea present. Bilateral mitts intact. Pt pulled out Ngt. Replaced currently @ WS. Ileus noted on abd xray. No Bm. Lamb intact. Good UO. Gtts; Heparin. Cardene. Amio. Insulin. KvO. POC discussed with patient. All concerns addressed. WCTM.            CMICU DAILY GOALS         A: Awake               RASS: Goal - RASS Goal: 0-->alert and calm  Actual - RASS (Horton Agitation-Sedation Scale): 0-->alert and calm              Restraint necessity:  yes Clinical Justification: Treatment Interference, Removing medical devices  B: Breath              SBT: Not intubated   C: Coordinate A & B, analgesics/sedatives              Pain: managed               SAT: Not intubated  D: Delirium              CAM-ICU: Overall CAM-ICU: Negative  E: Early Mobility              MOVE Screen: Pass              Activity: Activity Management: activity clustered for rest period  FAS: Feeding/Nutrition              Diet order: NPO    T: Thrombus              DVT prophylaxis: VTE Required Core Measure: Pharmacological prophylaxis initiated/maintained  H: HOB Elevation              Head of Bed (HOB): HOB at 45 degrees  U: Ulcer Prophylaxis              GI: yes  G: Glucose control              managed Glycemic Management: blood glucose monitoring  S: Skin              Bundle compliance: yes   Bathing/Skin Care: bath, chlorhexidine, bath, complete, dressed/undressed, linen changed Date:   B: Bowel Function              constipation   I: Indwelling Catheters              Lamb necessity:      Urethral Catheter 03/06/2020 16 Fr.-Reason for Continuing Urinary Catheterization: Critically ill in ICU requiring intensive monitoring              CVC necessity: Yes              IPAD offered: Yes  D: De-escalation Antibx                Plan for the  day    Family/Goals of care/Code Status              Code Status: Full Code                No acute events throughout day, VS and assessment per flow sheet, patient progressing towards goals as tolerated, plan of care reviewed with John Javier and family, all concerns addressed, will continue to monitor.

## 2020-03-07 NOTE — PROGRESS NOTES
"[facility]  Endocrinology  Progress Note    Admit Date: 2/29/2020     Reason for Consult: Management of T2DM, Hyperglycemia     Surgical Procedure and Date: N/A    Diabetes diagnosis year: 2019    Lab Results   Component Value Date    HGBA1C 7.0 (H) 03/01/2020       Home Diabetes Medications:  Metformin 500 mg BID    How often checking glucose at home? Once daily  BG readings on regimen:   Hypoglycemia on the regimen? No  Missed doses on regimen? No    Diabetes Complications include:     none    Complicating diabetes co morbidities:   CHF and CKD      HPI:   Patient is a 64 y.o. male with a diagnosis of DM2, ANGEL, afib, CAD, CHF, and cardiogenic shock.  Patient admitted to [facility] from outside facility in cardiogenic shock, intubated, and with an impella in place.  Diagnosed with DM2 last year and place on Metformin, managed per his PCP.  Endocrinology consulted for DM/BG management.        Interval HPI:   Overnight events: Remains in CMICU, a flutter noted overnight.  BG  well controlled overnight on transition IV insulin infusion.  Noted creatinine of 6.7.    Eating:   NPO  Nausea: No  Hypoglycemia and intervention: No  Fever: No  TPN and/or TF: No    /65 (BP Location: Right arm, Patient Position: Lying)   Pulse 99   Temp 98.9 °F (37.2 °C) (Axillary)   Resp (!) 28   Ht 5' 6" (1.676 m)   Wt 67.8 kg (149 lb 7.6 oz)   SpO2 100%   BMI 24.13 kg/m²      Labs Reviewed and Include    Recent Labs   Lab 03/07/20  0252   *   CALCIUM 9.4   ALBUMIN 3.1*   PROT 7.5   *   K 3.4*   CO2 30*   CL 96   *   CREATININE 6.7*   ALKPHOS 92   ALT 15   AST 24   BILITOT 0.8     Lab Results   Component Value Date    WBC 13.71 (H) 03/07/2020    HGB 10.5 (L) 03/07/2020    HCT 31.6 (L) 03/07/2020    MCV 89 03/07/2020     03/07/2020     Recent Labs   Lab 02/29/20  2122   TSH 0.604     Lab Results   Component Value Date    HGBA1C 7.0 (H) 03/01/2020       Nutritional status:   Body mass " index is 24.13 kg/m².  Lab Results   Component Value Date    ALBUMIN 3.1 (L) 03/07/2020    ALBUMIN 2.9 (L) 03/06/2020    ALBUMIN 2.9 (L) 03/06/2020     Lab Results   Component Value Date    PREALBUMIN <2.5 (L) 03/04/2020       Estimated Creatinine Clearance: 10.2 mL/min (A) (based on SCr of 6.7 mg/dL (H)).    Accu-Checks  Recent Labs     03/05/20  0542 03/05/20  1233 03/05/20  1827 03/06/20  0147 03/06/20  0544 03/06/20  1335 03/06/20  1826 03/07/20  0030 03/07/20  0411 03/07/20  0838   POCTGLUCOSE 147* 187* 235* 230* 252* 228* 183* 172* 168* 170*       Current Medications and/or Treatments Impacting Glycemic Control  Immunotherapy:    Immunosuppressants     None        Steroids:   Hormones (From admission, onward)    None        Pressors:    Autonomic Drugs (From admission, onward)    None        Hyperglycemia/Diabetes Medications:   Antihyperglycemics (From admission, onward)    Start     Stop Route Frequency Ordered    03/06/20 1815  insulin regular 100 Units in sodium chloride 0.9% 100 mL infusion      -- IV Continuous 03/06/20 1701    03/06/20 1801  insulin aspart U-100 pen 0-4 Units      -- SubQ As needed (PRN) 03/06/20 1701          ASSESSMENT and PLAN    * Cardiogenic shock  Managed per primary team  Avoid hypoglycemia        Type 2 diabetes mellitus, without long-term current use of insulin  BG goal 140 - 180     Continue transition IV insulin infusion at 0.4 u/hr with stepdown parameters  Low dose correction scale - due to decreased renal function  BG monitoring every 4 hours while NPO    Discharge planning: TBD        ANGEL (acute kidney injury)  Titrate insulin slowly to avoid hypoglycemia as the risk of hypoglycemia increases with decreased creatinine clearance.  Caution with insulin stacking  Estimated Creatinine Clearance: 10.2 mL/min (A) (based on SCr of 6.7 mg/dL (H)).            Barry Chawla, NP  Endocrinology  [facility]

## 2020-03-07 NOTE — ASSESSMENT & PLAN NOTE
BG goal 140 - 180     Continue transition IV insulin infusion at 0.4 u/hr with stepdown parameters  Low dose correction scale - due to decreased renal function  BG monitoring every 4 hours while NPO    Discharge planning: RAMO

## 2020-03-07 NOTE — ASSESSMENT & PLAN NOTE
* Non-oliguric iATN from cardiogenic shock.     3/2: -urine microscopy:  abundant muddy brown casts (leaning towards ATN)     Plan/ Rec:  -responded well to IV lasix 80 mg TID, 5.3L/24h 2 days ago, and UO ~3L today.  - Renal function improving overall, today Cr stable at 6.7.   - Na today up to 146, Sbicarb 30, and VBG pH 7.54. Consider decreasing lasix dose in setting of mild hypernatremia and alkalosis. CXR today being repeated. Pt reports SOB improving  -check phos level daily  -currently NPO, will discontinue Sevelamer.  -Renal function panels q 12hr   -Strict I/O and chart  -Will follow closely  -Plan discussed with Dr. Valdez

## 2020-03-07 NOTE — PLAN OF CARE
EP Plan of Care Note    John Javier is a 63 y/o M hx of ICM (EF 10-15%), ICD in place, CAD (50-60% LCx, OM disease on [facility] 2/29), HTN, DM2, HLD, obesity, CKD who was transferred from OSH in cardiogenic shock with Impella in place. Impella was removed 3/2 and patient was extubated 3/4. Since 3/3 patient has been having intermittent episode of atrial fibrillation. EP was consulted for further evaluation.      - s/p amio load on 3/5. Now in sinus rhythm  - Leave on IV amiodarone while still on IV diuretics. Once he is euvolemic on PO diuretics and anticoagulation, can transition frmo IV amiodarone to PO amiodarone 400mg BID x 14 days followed by 400 mg daily given history of VT  - continue rate control with carvedilol 6.25mg BID  - goal K > 4, Mg > 2  - continue tele monitoring    Thank you for the consult. Please don't hesitate to call with questions.     Patient seen and plan of care discussed with Dr. Ram.    Kelsy Man MD  Cardiology, PGY IV  Pager: [phone]

## 2020-03-07 NOTE — ASSESSMENT & PLAN NOTE
Titrate insulin slowly to avoid hypoglycemia as the risk of hypoglycemia increases with decreased creatinine clearance.  Caution with insulin stacking  Estimated Creatinine Clearance: 10.2 mL/min (A) (based on SCr of 6.7 mg/dL (H)).

## 2020-03-07 NOTE — NURSING
Discussed with  FRANCISCO J Simon MD and Dr. Delgado regarding patient's mental status and possible ICU delirium. Pt pulled out NGT. Replaced and patient starting vomited blood tinged content. Placed on LIWS. Order to give suppository or enama. Continue heparin for now and monitor for signs of bleeding. Patient family @ bedside. TM

## 2020-03-07 NOTE — NURSING
Dr. Pierson was mad aware that patient's HR is currently 140-160's. Atrial Flutter noted on EKG. Patient is asymptomatic. Current SBP: 130's. Awaiting for orders. Will continue to monitor.

## 2020-03-07 NOTE — NURSING
Pt heart rate 150s sustained, SBP 140s pt asymptomatic. EKG shows Sinus tach. HTS MD Alfred called. No new orders at this time. WCTM

## 2020-03-07 NOTE — ASSESSMENT & PLAN NOTE
-Episode of afib RVR 3/3/2020 in the setting of severe agitation with SBT  -Converted to NSR  -Continue heparin gtt for anticoagulation  -Went back into afib 3/4/20. Started on IV Amio.   -EP consulted, continue IV amio while on IV diuretics. Appreciate their input.   -paroxysmal flutter ntoed also

## 2020-03-07 NOTE — ASSESSMENT & PLAN NOTE
-Presented with impella placed at OSH with position issues and LD>1000.  -Impella removed 3/2/20.   -CVP 6 this am, 6-8 overnight, SVO2 77%   -Hold lasix today given low CVP and high output from NGT  - d/c'ed due to high cardiac output and hypertension/afib.  -TTE 3/2 EF 10%, LVIDD 6, TAPSE 1.98, Mod MR, Mod TR.  -Started on nitro gtt for BP and weaned off. Now on cardene gtt. Started on PO hydralazine/coreg but now not tolerating PO.    -Not working up for advanced options due to medical instability/renal failure at this time. Wtill continue to reassess candidacy if condition improves.

## 2020-03-07 NOTE — NURSING
Atrial flutter conversion to sinus rhythm noted. Dr. Pierson was made aware. Will continue to monitor.

## 2020-03-07 NOTE — SUBJECTIVE & OBJECTIVE
"Interval HPI:   Overnight events: Remains in CMICU, a flutter noted overnight.  BG well controlled overnight on transition IV insulin infusion.  Noted creatinine of 6.7.    Eating:   NPO  Nausea: No  Hypoglycemia and intervention: No  Fever: No  TPN and/or TF: No    /65 (BP Location: Right arm, Patient Position: Lying)   Pulse 99   Temp 98.9 °F (37.2 °C) (Axillary)   Resp (!) 28   Ht 5' 6" (1.676 m)   Wt 67.8 kg (149 lb 7.6 oz)   SpO2 100%   BMI 24.13 kg/m²     Labs Reviewed and Include    Recent Labs   Lab 03/07/20  0252   *   CALCIUM 9.4   ALBUMIN 3.1*   PROT 7.5   *   K 3.4*   CO2 30*   CL 96   *   CREATININE 6.7*   ALKPHOS 92   ALT 15   AST 24   BILITOT 0.8     Lab Results   Component Value Date    WBC 13.71 (H) 03/07/2020    HGB 10.5 (L) 03/07/2020    HCT 31.6 (L) 03/07/2020    MCV 89 03/07/2020     03/07/2020     Recent Labs   Lab 02/29/20  2122   TSH 0.604     Lab Results   Component Value Date    HGBA1C 7.0 (H) 03/01/2020       Nutritional status:   Body mass index is 24.13 kg/m².  Lab Results   Component Value Date    ALBUMIN 3.1 (L) 03/07/2020    ALBUMIN 2.9 (L) 03/06/2020    ALBUMIN 2.9 (L) 03/06/2020     Lab Results   Component Value Date    PREALBUMIN <2.5 (L) 03/04/2020       Estimated Creatinine Clearance: 10.2 mL/min (A) (based on SCr of 6.7 mg/dL (H)).    Accu-Checks  Recent Labs     03/05/20  0542 03/05/20  1233 03/05/20  1827 03/06/20  0147 03/06/20  0544 03/06/20  1335 03/06/20  1826 03/07/20  0030 03/07/20  0411 03/07/20  0838   POCTGLUCOSE 147* 187* 235* 230* 252* 228* 183* 172* 168* 170*       Current Medications and/or Treatments Impacting Glycemic Control  Immunotherapy:    Immunosuppressants     None        Steroids:   Hormones (From admission, onward)    None        Pressors:    Autonomic Drugs (From admission, onward)    None        Hyperglycemia/Diabetes Medications:   Antihyperglycemics (From admission, onward)    Start     Stop Route Frequency " Ordered    03/06/20 1815  insulin regular 100 Units in sodium chloride 0.9% 100 mL infusion      -- IV Continuous 03/06/20 1701    03/06/20 1801  insulin aspart U-100 pen 0-4 Units      -- SubQ As needed (PRN) 03/06/20 1701

## 2020-03-07 NOTE — ASSESSMENT & PLAN NOTE
-NGT placed for decompression with 900 cc over first 24 hours of placement, now 900 cc more this shift.   -Very hypoactive bowel sounds this am. Suppository given yesterday with no BM.   -Consult general surgery for additional recs  -Sodium trending up, will monitor closely as patient may require maintenance fluids for high NGT output. Stop lasix

## 2020-03-07 NOTE — PROGRESS NOTES
[facility]  Heart Transplant  Progress Note    Patient Name: John Javier  MRN: [id number]  Admission Date: 2/29/2020  Hospital Length of Stay: 7 days  Attending Physician: Shannon Fuller MD  Primary Care Provider: To Obtain Unable  Principal Problem:Cardiogenic shock    Subjective:     Interval History: Patient intermittently in a. Flutter overnight with conversion to NSR spontaneously x 1 and given metoprolol x 1. Suppository x 1 given with no BM. Patient denies flatus. Large amount of NGT output over last 24 hours.     Continuous Infusions:   amiodarone in dextrose 5% 0.5 mg/min (03/07/20 1016)    heparin (porcine) in D5W 16 Units/kg/hr (03/07/20 1000)    insulin (HUMAN R) infusion (adults) 0.4 Units/hr (03/07/20 1000)    nicardipine 10 mg/hr (03/07/20 1016)     Scheduled Meds:   aspirin  81 mg Oral Daily    atorvastatin  80 mg Oral Daily    docusate sodium  1 enema Rectal Daily    furosemide (LASIX) IV  80 mg Intravenous Q12H    hydrALAZINE  10 mg Intravenous Q8H     PRN Meds:sodium chloride, Dextrose 10% Bolus, Dextrose 10% Bolus, glucagon (human recombinant), glucose, glucose, heparin (PORCINE), heparin (PORCINE), insulin aspart U-100, ondansetron, senna-docusate 8.6-50 mg, sodium chloride 0.9%, sodium chloride 0.9%    Review of patient's allergies indicates:  No Known Allergies  Objective:     Vital Signs (Most Recent):  Temp: 98.6 °F (37 °C) (03/07/20 1100)  Pulse: 97 (03/07/20 1000)  Resp: (!) 29 (03/07/20 1000)  BP: 118/67 (03/07/20 1000)  SpO2: 95 % (03/07/20 1000) Vital Signs (24h Range):  Temp:  [98.2 °F (36.8 °C)-98.9 °F (37.2 °C)] 98.6 °F (37 °C)  Pulse:  [] 97  Resp:  [11-30] 29  SpO2:  [90 %-100 %] 95 %  BP: (111-141)/(55-78) 118/67  Arterial Line BP: (108-167)/(53-72) 140/61     Patient Vitals for the past 72 hrs (Last 3 readings):   Weight   03/07/20 0701 67.8 kg (149 lb 7.6 oz)   03/07/20 0641 67.9 kg (149 lb 11.1 oz)   03/06/20 1000 76.2 kg (167 lb 15.9 oz)      Body mass index is 24.13 kg/m².      Intake/Output Summary (Last 24 hours) at 3/7/2020 1111  Last data filed at 3/7/2020 1100  Gross per 24 hour   Intake 1423.29 ml   Output 5405 ml   Net -3981.71 ml        Telemetry: reviewed    Physical Exam   Constitutional: He is oriented to person, place, and time. He appears well-developed and well-nourished.   HENT:   Head: Normocephalic and atraumatic.   Eyes: Pupils are equal, round, and reactive to light. EOM are normal.   Neck: Normal range of motion. Neck supple.   LIJ TLC placed 3/1/20.   Cardiovascular: Normal rate and regular rhythm.   Murmur heard.  Pulmonary/Chest: Effort normal and breath sounds normal. No stridor. No respiratory distress.   Abdominal: Soft. He exhibits no distension. Bowel sounds are decreased. There is no tenderness.   NGT   Musculoskeletal: Normal range of motion. He exhibits no edema.   Neurological: He is alert and oriented to person, place, and time.   Skin: Skin is warm and dry. Capillary refill takes 2 to 3 seconds.   Nursing note and vitals reviewed.    Significant Labs:  CBC:  Recent Labs   Lab 03/05/20  1830 03/06/20  0237 03/07/20  0252   WBC 16.86* 16.06* 13.71*   RBC 3.20* 3.18* 3.57*   HGB 9.6* 9.6* 10.5*   HCT 28.8* 28.4* 31.6*    190 276   MCV 90 89 89   MCH 30.0 30.2 29.4   MCHC 33.3 33.8 33.2     BNP:  Recent Labs   Lab 02/29/20  2122   *     CMP:  Recent Labs   Lab 03/06/20  0745 03/06/20  1510 03/07/20  0252   * 195* 181*   CALCIUM 9.1 8.8 9.4   ALBUMIN 2.9* 2.9* 3.1*   PROT 7.3 7.3 7.5    142 146*   K 3.6 3.6 3.4*   CO2 27 27 30*   CL 97 97 96   BUN 97* 108* 113*   CREATININE 7.1* 6.6* 6.7*   ALKPHOS 98 94 92   ALT 17 15 15   AST 30 28 24   BILITOT 0.5 0.7 0.8      Coagulation:   Recent Labs   Lab 02/29/20 2122 03/01/20  2132  03/06/20  1327 03/06/20  1827 03/07/20  0252   INR 1.3*  --  1.2  --   --   --   --    APTT 37.6*   < > 63.7*  63.7*   < > 43.5* 45.0* 40.2*    < > = values in this  interval not displayed.     LDH:  No results for input(s): LDH in the last 72 hours.  Microbiology:  Microbiology Results (last 7 days)     Procedure Component Value Units Date/Time    Culture, Respiratory with Gram Stain [[id number]] Collected:  03/06/20 0846    Order Status:  Completed Specimen:  Respiratory from Sputum, Expectorated Updated:  03/07/20 0810     Respiratory Culture Normal respiratory ana maría     Gram Stain (Respiratory) <10 epithelial cells per low power field.     Gram Stain (Respiratory) Few WBC's     Gram Stain (Respiratory) Few Gram positive cocci     Gram Stain (Respiratory) Few Gram positive rods     Gram Stain (Respiratory) Few Gram negative rods    Blood culture [[id number]] Collected:  03/06/20 1604    Order Status:  Completed Specimen:  Blood from Peripheral, Forearm, Right Updated:  03/07/20 0115     Blood Culture, Routine No Growth to date    Blood culture [[id number]] Collected:  03/06/20 1618    Order Status:  Completed Specimen:  Blood from Wrist, Right Updated:  03/07/20 0115     Blood Culture, Routine No Growth to date    Blood culture [[id number]] Collected:  03/01/20 0254    Order Status:  Completed Specimen:  Blood from Peripheral, Upper Arm, Left Updated:  03/06/20 0612     Blood Culture, Routine No growth after 5 days.    Blood culture [[id number]] Collected:  02/29/20 2305    Order Status:  Completed Specimen:  Blood from Peripheral, Forearm, Right Updated:  03/06/20 0612     Blood Culture, Routine No growth after 5 days.    Urine culture [[id number]] Collected:  02/29/20 2240    Order Status:  Completed Specimen:  Urine Updated:  03/02/20 0739     Urine Culture, Routine No significant growth    Narrative:       Preferred Collection Type->Urine, Clean Catch        I have reviewed all pertinent labs within the past 24 hours.    Estimated Creatinine Clearance: 10.2 mL/min (A) (based on SCr of 6.7 mg/dL (H)).    Diagnostic Results:  I have reviewed all pertinent imaging results/findings  "within the past 24 hours.    Assessment and Plan:     Patient intubated w limited collateral from family; further OSH records pending    65 y/o M hx of CAD (50-60% LCx, OM disease on [facility] 2/29), ICM EF 10-15% s/p AICD (unknown baseline but on GDMT as OP), HTN, DM2, HLD, obesity, CKD, presenting from OSH intubated with Impella in setting of cardiogenic shock. Per pt's brother he was taken by EMS to OSH in setting of feeling short of breath, nearly passing out. There found to be in shock, lactate to 12, ANGEL w Cr 2.5, trop 12; unclear if dynamic ECG changes, max trop 10; underwent LHC with evidence of 50-60% LCx, OM disease; no PCI performed. RHC with LVEDP 35. CO 3, CI 1.8, tte w LVEF 10-15%. Underwent Impella placement without additional central line placement (per brother there was a "complication" but no evidence of pneumothorax).    On arrival patient -having intermittent suction alarms, improved w P6->P4. MAPs stable 75-85. CVC placed in LIJ given neck position, CO 9.7, CI 5.35, svr 535. WBC 26k, patient cultured, given single dose of vanc/zosyn. UOP 60-80cc/hr off diuretics. Further collateral pending from OSH. Continued on heparin gtt for impella, weaned to dobutamine 5, gave 500cc bolus in setting of CVP 10 and suction alarms.    * Cardiogenic shock  -Presented with impella placed at OSH with position issues and LD>1000.  -Impella removed 3/2/20.   -CVP 6 this am, 6-8 overnight, SVO2 77%   -Hold lasix today given low CVP and high output from NGT  - d/c'ed due to high cardiac output and hypertension/afib.  -TTE 3/2 EF 10%, LVIDD 6, TAPSE 1.98, Mod MR, Mod TR.  -Started on nitro gtt for BP and weaned off. Now on cardene gtt. Started on PO hydralazine/coreg but now not tolerating PO.    -Not working up for advanced options due to medical instability/renal failure at this time. Wtill continue to reassess candidacy if condition improves.     ANGEL (acute kidney injury)  - Likely ATN in setting of shock, Hemolysis, " plus IV contrast at OSH.  - Monitor Cr closely  - Appreciate renal consult.  - Strict I/O.  - Avoid nephrotoxic meds.  - Continues to make good urine    Abdominal distention  -NGT placed for decompression with 900 cc over first 24 hours of placement, now 900 cc more this shift.   -Very hypoactive bowel sounds this am. Suppository given yesterday with no BM.   -Consult general surgery for additional recs  -Sodium trending up, will monitor closely as patient may require maintenance fluids for high NGT output. Stop lasix    Iron deficiency anemia  -No obvious bleeding.   -R groin impella site stable/no abdominal or back pain.  -s/p 2 U PRBC 3/5/20    Malnutrition of moderate degree  -Appreciate Dietary Cx.    PAF (paroxysmal atrial fibrillation)  -Episode of afib RVR 3/3/2020 in the setting of severe agitation with SBT  -Converted to NSR  -Continue heparin gtt for anticoagulation  -Went back into afib 3/4/20. Started on IV Amio.   -EP consulted, continue IV amio while on IV diuretics. Appreciate their input.   -paroxysmal flutter ntoed also    VT (ventricular tachycardia)  -S/P ICD shocks x 5 per OSH.  -Continue IV amio per EP recs for afib    Acute hypoxemic respiratory failure  -Extubated 3/4.        Acute on chronic combined systolic and diastolic heart failure  - See Cardiogenic shock.    Type 2 diabetes mellitus, without long-term current use of insulin  -A1C 7.0 on admit  -SSI, accuchecks    CAD (coronary artery disease)  -Hx of RCA stent in 2013.   -[facility] 2/29: 50-60% LCx, OM disease.  -continue ASA, high intensity statin.  -Continue heparin        Uninterrupted Critical Care/Counseling Time (not including procedures): 60 minutes    Patricia Simon PA-C  Heart Transplant  [facility]

## 2020-03-07 NOTE — PLAN OF CARE
CMICU DAILY GOALS       A: Awake    RASS: Goal - RASS Goal: 0-->alert and calm  Actual - RASS (Horton Agitation-Sedation Scale): 0-->alert and calm   Restraint necessity: Clinical Justification: Removing medical devices  B: Breath   SBT: Not intubated   C: Coordinate A & B, analgesics/sedatives   Pain: managed    SAT: Not intubated  D: Delirium   CAM-ICU: Overall CAM-ICU: Negative  E: Early Mobility   MOVE Screen: Pass   Activity: Activity Management: bedrest maintained per order  FAS: Feeding/Nutrition   Diet order: Diet/Nutrition Received: NPO, Specialty Diet/Nutrition Received: renal diet Fluid restriction:    T: Thrombus   DVT prophylaxis: VTE Required Core Measure: Pharmacological prophylaxis initiated/maintained  H: HOB Elevation   Head of Bed (HOB): HOB at 30-45 degrees  U: Ulcer Prophylaxis   GI: no  G: Glucose control   managed Glycemic Management: blood glucose monitoring  S: Skin   Bundle compliance: yes   Bathing/Skin Care: bath, chlorhexidine, bath, complete, linen changed, dressed/undressed   B: Bowel Function   constipation   I: Indwelling Catheters   Lamb necessity: [REMOVED]      Urethral Catheter 02/29/20 2056 16 Fr.-Reason for Continuing Urinary Catheterization: Critically ill in ICU requiring intensive monitoring   CVC necessity: Yes   IPAD offered: No  D: De-escalation Antibx   No  Plan for the day   Continue Nicardipine, heparin, and insulin drips. Keep NGT on low intermittent suction. Watch out for recurrence of tachycardia and hypertension.  Family/Goals of care/Code Status   Code Status: Full Code     VS and assessment per flow sheet, plan of care reviewed with John Javier and family, all concerns addressed, will continue to monitor.

## 2020-03-07 NOTE — SUBJECTIVE & OBJECTIVE
Interval History:   Renal function improving overall, today Cr stable at 6.7. Patient with 3 L of UO recorded yesterday, and 5 L UO the day before. Yesterday pt has SOB and  Lasix dose was increased to TID. Na today up to 146, Sbicarb 30, and VBG pH 7.54. CXR today being repeated.     Review of patient's allergies indicates:  No Known Allergies  Current Facility-Administered Medications   Medication Frequency    0.9%  NaCl infusion (for blood administration) Q24H PRN    amiodarone 360 mg/200 mL (1.8 mg/mL) infusion Continuous    aspirin chewable tablet 81 mg Daily    atorvastatin tablet 80 mg Daily    dextrose 10% (D10W) Bolus PRN    dextrose 10% (D10W) Bolus PRN    furosemide injection 80 mg Q8H    glucagon (human recombinant) injection 1 mg PRN    glucose chewable tablet 16 g PRN    glucose chewable tablet 24 g PRN    heparin 25,000 units in dextrose 5% (100 units/ml) IV bolus from bag - ADDITIONAL PRN BOLUS - 30 units/kg PRN    heparin 25,000 units in dextrose 5% (100 units/ml) IV bolus from bag - ADDITIONAL PRN BOLUS - 60 units/kg PRN    heparin 25,000 units in dextrose 5% 250 mL (100 units/mL) infusion LOW INTENSITY nomogram - [facility] Continuous    hydrALAZINE injection 10 mg Q8H    insulin aspart U-100 pen 0-4 Units PRN    insulin regular 100 Units in sodium chloride 0.9% 100 mL infusion Continuous    niCARdipine 40 mg/200 mL infusion Continuous    ondansetron injection 4 mg Q8H PRN    senna-docusate 8.6-50 mg per tablet 1 tablet BID PRN    sodium chloride 0.9% flush 10 mL PRN    sodium chloride 0.9% flush 10 mL PRN       Objective:     Vital Signs (Most Recent):  Temp: 98.9 °F (37.2 °C) (03/07/20 0701)  Pulse: (!) 152 (03/07/20 0920)  Resp: (!) 30 (03/07/20 0900)  BP: 124/61 (03/07/20 0900)  SpO2: 100 % (03/07/20 0900)  O2 Device (Oxygen Therapy): nasal cannula (03/07/20 0900) Vital Signs (24h Range):  Temp:  [98.2 °F (36.8 °C)-98.9 °F (37.2 °C)] 98.9 °F (37.2 °C)  Pulse:  [] 152  Resp:   [11-30] 30  SpO2:  [90 %-100 %] 100 %  BP: (111-149)/(55-78) 124/61  Arterial Line BP: (108-167)/(53-72) 145/61     Weight: 67.8 kg (149 lb 7.6 oz) (03/07/20 0701)  Body mass index is 24.13 kg/m².  Body surface area is 1.78 meters squared.    I/O last 3 completed shifts:  In: 2097.1 [P.O.:180; I.V.:1917.1]  Out: 6440 [Urine:5540; Drains:900]    Physical Exam   Constitutional: He is oriented to person, place, and time. He appears well-developed and well-nourished.   HENT:   Head: Normocephalic and atraumatic.   Eyes: Pupils are equal, round, and reactive to light. EOM are normal.   Neck: Normal range of motion. Neck supple.   Cardiovascular: Normal rate and regular rhythm.   Murmur heard.  Pulmonary/Chest: Effort normal. No stridor. No respiratory distress. He has rales.   Abdominal: Soft. Bowel sounds are normal. He exhibits distension. There is no tenderness.   Musculoskeletal: Normal range of motion. He exhibits no edema.   Neurological: He is alert and oriented to person, place, and time.   Skin: Skin is warm and dry.   Nursing note and vitals reviewed.      Significant Labs:  CBC:   Recent Labs   Lab 03/07/20 0252   WBC 13.71*   RBC 3.57*   HGB 10.5*   HCT 31.6*      MCV 89   MCH 29.4   MCHC 33.2     CMP:   Recent Labs   Lab 03/07/20 0252   *   CALCIUM 9.4   ALBUMIN 3.1*   PROT 7.5   *   K 3.4*   CO2 30*   CL 96   *   CREATININE 6.7*   ALKPHOS 92   ALT 15   AST 24   BILITOT 0.8

## 2020-03-07 NOTE — PLAN OF CARE
[facility] DAILY GOALS       A: Awake    RASS: Goal - RASS Goal: 0-->alert and calm  Actual - RASS (Horton Agitation-Sedation Scale): 0-->alert and calm   Restraint necessity: Clinical Justification: Removing medical devices  B: Breath   SBT: NA   C: Coordinate A & B, analgesics/sedatives   Pain: managed    SAT: Not intubated  D: Delirium   CAM-ICU: Overall CAM-ICU: Negative  E: Early Mobility   MOVE Screen: Pass   Activity: Activity Management: activity adjusted per tolerance, activity clustered for rest period  FAS: Feeding/Nutrition   Diet order: Diet/Nutrition Received: NPO, Specialty Diet/Nutrition Received: renal diet Fluid restriction:    T: Thrombus   DVT prophylaxis: VTE Required Core Measure: Pharmacological prophylaxis initiated/maintained  H: HOB Elevation   Head of Bed (HOB): HOB at 30-45 degrees  U: Ulcer Prophylaxis   GI: yes  G: Glucose control   managed Glycemic Management: blood glucose monitoring  S: Skin   Bundle compliance: yes   Bathing/Skin Care: bath, chlorhexidine, bath, complete, linen changed, dressed/undressed Date: 3/6/2020  B: Bowel Function   constipation   I: Indwelling Catheters   Lamb necessity:      Urethral Catheter 03/06/20 1642-Reason for Continuing Urinary Catheterization: Critically ill in ICU requiring intensive monitoring  [REMOVED]      Urethral Catheter 02/29/20 2056 16 Fr.-Reason for Continuing Urinary Catheterization: Critically ill in ICU requiring intensive monitoring   CVC necessity: Yes   IPAD offered: No  D: De-escalation Antibx   No  Plan for the day   CVP 7,6,4. Monitor NGT output. CT abdomen pelvis completed. Pt had small mucoid BM this shift. Monitor HR&BP. Cardene, Amio, Heparin, Insulin gtts. Potassium replaced per MAR.   Family/Goals of care/Code Status   Code Status: Full Code     No acute events throughout day, VS and assessment per flow sheet, patient progressing towards goals as tolerated, plan of care reviewed with John Javier and family, all concerns  addressed, will continue to monitor.

## 2020-03-07 NOTE — SUBJECTIVE & OBJECTIVE
Interval History: Patient intermittently in a. Flutter overnight with conversion to NSR spontaneously x 1 and given metoprolol x 1. Suppository x 1 given with no BM. Patient denies flatus. Large amount of NGT output over last 24 hours.     Continuous Infusions:   amiodarone in dextrose 5% 0.5 mg/min (03/07/20 1016)    heparin (porcine) in D5W 16 Units/kg/hr (03/07/20 1000)    insulin (HUMAN R) infusion (adults) 0.4 Units/hr (03/07/20 1000)    nicardipine 10 mg/hr (03/07/20 1016)     Scheduled Meds:   aspirin  81 mg Oral Daily    atorvastatin  80 mg Oral Daily    docusate sodium  1 enema Rectal Daily    furosemide (LASIX) IV  80 mg Intravenous Q12H    hydrALAZINE  10 mg Intravenous Q8H     PRN Meds:sodium chloride, Dextrose 10% Bolus, Dextrose 10% Bolus, glucagon (human recombinant), glucose, glucose, heparin (PORCINE), heparin (PORCINE), insulin aspart U-100, ondansetron, senna-docusate 8.6-50 mg, sodium chloride 0.9%, sodium chloride 0.9%    Review of patient's allergies indicates:  No Known Allergies  Objective:     Vital Signs (Most Recent):  Temp: 98.6 °F (37 °C) (03/07/20 1100)  Pulse: 97 (03/07/20 1000)  Resp: (!) 29 (03/07/20 1000)  BP: 118/67 (03/07/20 1000)  SpO2: 95 % (03/07/20 1000) Vital Signs (24h Range):  Temp:  [98.2 °F (36.8 °C)-98.9 °F (37.2 °C)] 98.6 °F (37 °C)  Pulse:  [] 97  Resp:  [11-30] 29  SpO2:  [90 %-100 %] 95 %  BP: (111-141)/(55-78) 118/67  Arterial Line BP: (108-167)/(53-72) 140/61     Patient Vitals for the past 72 hrs (Last 3 readings):   Weight   03/07/20 0701 67.8 kg (149 lb 7.6 oz)   03/07/20 0641 67.9 kg (149 lb 11.1 oz)   03/06/20 1000 76.2 kg (167 lb 15.9 oz)     Body mass index is 24.13 kg/m².      Intake/Output Summary (Last 24 hours) at 3/7/2020 1111  Last data filed at 3/7/2020 1100  Gross per 24 hour   Intake 1423.29 ml   Output 5405 ml   Net -3981.71 ml        Telemetry: reviewed    Physical Exam   Constitutional: He is oriented to person, place, and time. He  appears well-developed and well-nourished.   HENT:   Head: Normocephalic and atraumatic.   Eyes: Pupils are equal, round, and reactive to light. EOM are normal.   Neck: Normal range of motion. Neck supple.   LIJ TLC placed 3/1/20.   Cardiovascular: Normal rate and regular rhythm.   Murmur heard.  Pulmonary/Chest: Effort normal and breath sounds normal. No stridor. No respiratory distress.   Abdominal: Soft. He exhibits no distension. Bowel sounds are decreased. There is no tenderness.   NGT   Musculoskeletal: Normal range of motion. He exhibits no edema.   Neurological: He is alert and oriented to person, place, and time.   Skin: Skin is warm and dry. Capillary refill takes 2 to 3 seconds.   Nursing note and vitals reviewed.    Significant Labs:  CBC:  Recent Labs   Lab 03/05/20  1830 03/06/20  0237 03/07/20  0252   WBC 16.86* 16.06* 13.71*   RBC 3.20* 3.18* 3.57*   HGB 9.6* 9.6* 10.5*   HCT 28.8* 28.4* 31.6*    190 276   MCV 90 89 89   MCH 30.0 30.2 29.4   MCHC 33.3 33.8 33.2     BNP:  Recent Labs   Lab 02/29/20 2122   *     CMP:  Recent Labs   Lab 03/06/20  0745 03/06/20  1510 03/07/20  0252   * 195* 181*   CALCIUM 9.1 8.8 9.4   ALBUMIN 2.9* 2.9* 3.1*   PROT 7.3 7.3 7.5    142 146*   K 3.6 3.6 3.4*   CO2 27 27 30*   CL 97 97 96   BUN 97* 108* 113*   CREATININE 7.1* 6.6* 6.7*   ALKPHOS 98 94 92   ALT 17 15 15   AST 30 28 24   BILITOT 0.5 0.7 0.8      Coagulation:   Recent Labs   Lab 02/29/20 2122 03/01/20 2132 03/06/20  1327 03/06/20  1827 03/07/20  0252   INR 1.3*  --  1.2  --   --   --   --    APTT 37.6*   < > 63.7*  63.7*   < > 43.5* 45.0* 40.2*    < > = values in this interval not displayed.     LDH:  No results for input(s): LDH in the last 72 hours.  Microbiology:  Microbiology Results (last 7 days)     Procedure Component Value Units Date/Time    Culture, Respiratory with Gram Stain [[id number]] Collected:  03/06/20 0846    Order Status:  Completed Specimen:  Respiratory from  Sputum, Expectorated Updated:  03/07/20 0810     Respiratory Culture Normal respiratory ana maría     Gram Stain (Respiratory) <10 epithelial cells per low power field.     Gram Stain (Respiratory) Few WBC's     Gram Stain (Respiratory) Few Gram positive cocci     Gram Stain (Respiratory) Few Gram positive rods     Gram Stain (Respiratory) Few Gram negative rods    Blood culture [[id number]] Collected:  03/06/20 1604    Order Status:  Completed Specimen:  Blood from Peripheral, Forearm, Right Updated:  03/07/20 0115     Blood Culture, Routine No Growth to date    Blood culture [[id number]] Collected:  03/06/20 1618    Order Status:  Completed Specimen:  Blood from Wrist, Right Updated:  03/07/20 0115     Blood Culture, Routine No Growth to date    Blood culture [[id number]] Collected:  03/01/20 0254    Order Status:  Completed Specimen:  Blood from Peripheral, Upper Arm, Left Updated:  03/06/20 0612     Blood Culture, Routine No growth after 5 days.    Blood culture [[id number]] Collected:  02/29/20 2305    Order Status:  Completed Specimen:  Blood from Peripheral, Forearm, Right Updated:  03/06/20 0612     Blood Culture, Routine No growth after 5 days.    Urine culture [[id number]] Collected:  02/29/20 2240    Order Status:  Completed Specimen:  Urine Updated:  03/02/20 0739     Urine Culture, Routine No significant growth    Narrative:       Preferred Collection Type->Urine, Clean Catch        I have reviewed all pertinent labs within the past 24 hours.    Estimated Creatinine Clearance: 10.2 mL/min (A) (based on SCr of 6.7 mg/dL (H)).    Diagnostic Results:  I have reviewed all pertinent imaging results/findings within the past 24 hours.

## 2020-03-07 NOTE — PROGRESS NOTES
[facility]  Nephrology  Progress Note    Patient Name: John Javier  MRN: [id number]  Admission Date: 2/29/2020  Hospital Length of Stay: 7 days  Attending Provider: Shannon Fuller MD   Primary Care Physician: To Obtain Unable  Principal Problem:Cardiogenic shock    Subjective:     HPI: 63 y/o M hx of CAD (50-60% LCx, OM disease on [facility] 2/29), ICM EF 10-15% s/p AICD (unknown baseline but on GDMT as OP), HTN, DM2, HLD, obesity, CKD, transferred from OSH on 2/29/2020 for higher level of care.  Patient presented to outside hospital with shortness of breath, fatigued, found to be cardiogenic shock.  Per OSH transfer record, chart and brother, patient's labs showed sCr 1.8 on 2/28/2020 19:27, increased to 2.7 on 2/29/2020 0438, troponins 12, underwent LHC underwent LHC with evidence of 50-60% LCx, OM disease; no PCI performed, and RHC with LVEDP 35. CO 3, CI 1.8, tte w LVEF 10-15%.  Patient underwent Impella placement at outside hospital and transferred to [facility] on 2/29.  Noted also in chart sCr 1.1 back in 9/2019.  His sCr at [facility] [place] up to 5.1 on 3/1/2020.    Nephrology consulted for evaluation of Naveen.       Interval History:   Renal function improving overall, today Cr stable at 6.7. Patient with 3 L of UO recorded yesterday, and 5 L UO the day before. Yesterday pt has SOB and  Lasix dose was increased to TID. Na today up to 146, Sbicarb 30, and VBG pH 7.54. CXR today being repeated.     Review of patient's allergies indicates:  No Known Allergies  Current Facility-Administered Medications   Medication Frequency    0.9%  NaCl infusion (for blood administration) Q24H PRN    amiodarone 360 mg/200 mL (1.8 mg/mL) infusion Continuous    aspirin chewable tablet 81 mg Daily    atorvastatin tablet 80 mg Daily    dextrose 10% (D10W) Bolus PRN    dextrose 10% (D10W) Bolus PRN    furosemide injection 80 mg Q8H    glucagon (human recombinant) injection 1 mg PRN    glucose chewable tablet 16 g PRN     glucose chewable tablet 24 g PRN    heparin 25,000 units in dextrose 5% (100 units/ml) IV bolus from bag - ADDITIONAL PRN BOLUS - 30 units/kg PRN    heparin 25,000 units in dextrose 5% (100 units/ml) IV bolus from bag - ADDITIONAL PRN BOLUS - 60 units/kg PRN    heparin 25,000 units in dextrose 5% 250 mL (100 units/mL) infusion LOW INTENSITY nomogram - [facility] Continuous    hydrALAZINE injection 10 mg Q8H    insulin aspart U-100 pen 0-4 Units PRN    insulin regular 100 Units in sodium chloride 0.9% 100 mL infusion Continuous    niCARdipine 40 mg/200 mL infusion Continuous    ondansetron injection 4 mg Q8H PRN    senna-docusate 8.6-50 mg per tablet 1 tablet BID PRN    sodium chloride 0.9% flush 10 mL PRN    sodium chloride 0.9% flush 10 mL PRN       Objective:     Vital Signs (Most Recent):  Temp: 98.9 °F (37.2 °C) (03/07/20 0701)  Pulse: (!) 152 (03/07/20 0920)  Resp: (!) 30 (03/07/20 0900)  BP: 124/61 (03/07/20 0900)  SpO2: 100 % (03/07/20 0900)  O2 Device (Oxygen Therapy): nasal cannula (03/07/20 0900) Vital Signs (24h Range):  Temp:  [98.2 °F (36.8 °C)-98.9 °F (37.2 °C)] 98.9 °F (37.2 °C)  Pulse:  [] 152  Resp:  [11-30] 30  SpO2:  [90 %-100 %] 100 %  BP: (111-149)/(55-78) 124/61  Arterial Line BP: (108-167)/(53-72) 145/61     Weight: 67.8 kg (149 lb 7.6 oz) (03/07/20 0701)  Body mass index is 24.13 kg/m².  Body surface area is 1.78 meters squared.    I/O last 3 completed shifts:  In: 2097.1 [P.O.:180; I.V.:1917.1]  Out: 6440 [Urine:5540; Drains:900]    Physical Exam   Constitutional: He is oriented to person, place, and time. He appears well-developed and well-nourished.   HENT:   Head: Normocephalic and atraumatic.   Eyes: Pupils are equal, round, and reactive to light. EOM are normal.   Neck: Normal range of motion. Neck supple.   Cardiovascular: Normal rate and regular rhythm.   Murmur heard.  Pulmonary/Chest: Effort normal. No stridor. No respiratory distress. He has rales.   Abdominal: Soft.  Bowel sounds are normal. He exhibits distension. There is no tenderness.   Musculoskeletal: Normal range of motion. He exhibits no edema.   Neurological: He is alert and oriented to person, place, and time.   Skin: Skin is warm and dry.   Nursing note and vitals reviewed.      Significant Labs:  CBC:   Recent Labs   Lab 03/07/20  0252   WBC 13.71*   RBC 3.57*   HGB 10.5*   HCT 31.6*      MCV 89   MCH 29.4   MCHC 33.2     CMP:   Recent Labs   Lab 03/07/20 0252   *   CALCIUM 9.4   ALBUMIN 3.1*   PROT 7.5   *   K 3.4*   CO2 30*   CL 96   *   CREATININE 6.7*   ALKPHOS 92   ALT 15   AST 24   BILITOT 0.8            Assessment/Plan:     ANGEL (acute kidney injury)  * Non-oliguric iATN from cardiogenic shock.     3/2: -urine microscopy:  abundant muddy brown casts (leaning towards ATN)     Plan/ Rec:  -responded well to IV lasix 80 mg TID, 5.3L/24h 2 days ago, and UO ~3L today.  - Renal function improving overall, today Cr stable at 6.7.   - Na today up to 146, Sbicarb 30, and VBG pH 7.54. CXR today being repeated. Pt reports SOB improving. Recommend holding lasix today, and consider restarting lasix tomorrow at 60 mg IV BID  -check phos level daily  -currently NPO, will discontinue Sevelamer.  -Renal function panels q 12hr   -Strict I/O and chart  -Will follow closely  -Plan discussed with Dr. Valdez        Thank you for your consult. I will follow-up with patient. Please contact us if you have any additional questions.    Kasie Crawford MD  Nephrology  [facility]    ATTENDING PHYSICIAN ATTESTATION  I have personally verified the history and examined the patient. I thoroughly reviewed the demographic, clinical, laboratorial and imaging information available in medical records. I agree with the assessment and recommendations provided by the subspecialty resident who was under my supervision.

## 2020-03-07 NOTE — NURSING
Dr. Pierson was made aware that HR was noted consistently on 140 to 160's. Dr Pierson ordered to give a dose of metoprolol IV. Will continue to monitor.

## 2020-03-07 NOTE — ASSESSMENT & PLAN NOTE
-No obvious bleeding.   -R groin impella site stable/no abdominal or back pain.  -s/p 2 U PRBC 3/5/20

## 2020-03-08 PROBLEM — T14.8XXA HEMATOMA: Status: ACTIVE | Noted: 2020-03-08

## 2020-03-08 LAB
ALBUMIN SERPL BCP-MCNC: 3.1 G/DL (ref 3.5–5.2)
ALP SERPL-CCNC: 91 U/L (ref 55–135)
ALT SERPL W/O P-5'-P-CCNC: 15 U/L (ref 10–44)
ANION GAP SERPL CALC-SCNC: 17 MMOL/L (ref 8–16)
ANION GAP SERPL CALC-SCNC: 18 MMOL/L (ref 8–16)
ANION GAP SERPL CALC-SCNC: 18 MMOL/L (ref 8–16)
ANION GAP SERPL CALC-SCNC: 19 MMOL/L (ref 8–16)
ANION GAP SERPL CALC-SCNC: 21 MMOL/L (ref 8–16)
APTT BLDCRRT: 37 SEC (ref 21–32)
APTT BLDCRRT: 45.5 SEC (ref 21–32)
APTT BLDCRRT: 49.6 SEC (ref 21–32)
AST SERPL-CCNC: 21 U/L (ref 10–40)
BASOPHILS # BLD AUTO: 0.02 K/UL (ref 0–0.2)
BASOPHILS NFR BLD: 0.2 % (ref 0–1.9)
BILIRUB SERPL-MCNC: 0.7 MG/DL (ref 0.1–1)
BUN SERPL-MCNC: 115 MG/DL (ref 8–23)
BUN SERPL-MCNC: 116 MG/DL (ref 8–23)
BUN SERPL-MCNC: 116 MG/DL (ref 8–23)
BUN SERPL-MCNC: 118 MG/DL (ref 8–23)
BUN SERPL-MCNC: 119 MG/DL (ref 8–23)
CALCIUM SERPL-MCNC: 10 MG/DL (ref 8.7–10.5)
CALCIUM SERPL-MCNC: 10 MG/DL (ref 8.7–10.5)
CALCIUM SERPL-MCNC: 9.6 MG/DL (ref 8.7–10.5)
CALCIUM SERPL-MCNC: 9.7 MG/DL (ref 8.7–10.5)
CALCIUM SERPL-MCNC: 9.8 MG/DL (ref 8.7–10.5)
CHLORIDE SERPL-SCNC: 100 MMOL/L (ref 95–110)
CHLORIDE SERPL-SCNC: 102 MMOL/L (ref 95–110)
CHLORIDE SERPL-SCNC: 98 MMOL/L (ref 95–110)
CHLORIDE SERPL-SCNC: 98 MMOL/L (ref 95–110)
CHLORIDE SERPL-SCNC: 99 MMOL/L (ref 95–110)
CO2 SERPL-SCNC: 33 MMOL/L (ref 23–29)
CO2 SERPL-SCNC: 33 MMOL/L (ref 23–29)
CO2 SERPL-SCNC: 34 MMOL/L (ref 23–29)
CO2 SERPL-SCNC: 35 MMOL/L (ref 23–29)
CO2 SERPL-SCNC: 35 MMOL/L (ref 23–29)
CREAT SERPL-MCNC: 5.9 MG/DL (ref 0.5–1.4)
CREAT SERPL-MCNC: 6 MG/DL (ref 0.5–1.4)
CREAT SERPL-MCNC: 6 MG/DL (ref 0.5–1.4)
CREAT SERPL-MCNC: 6.4 MG/DL (ref 0.5–1.4)
CREAT SERPL-MCNC: 6.5 MG/DL (ref 0.5–1.4)
DIFFERENTIAL METHOD: ABNORMAL
EOSINOPHIL # BLD AUTO: 0 K/UL (ref 0–0.5)
EOSINOPHIL NFR BLD: 0.3 % (ref 0–8)
ERYTHROCYTE [DISTWIDTH] IN BLOOD BY AUTOMATED COUNT: 14.6 % (ref 11.5–14.5)
EST. GFR  (AFRICAN AMERICAN): 10.6 ML/MIN/1.73 M^2
EST. GFR  (AFRICAN AMERICAN): 10.6 ML/MIN/1.73 M^2
EST. GFR  (AFRICAN AMERICAN): 10.8 ML/MIN/1.73 M^2
EST. GFR  (AFRICAN AMERICAN): 9.6 ML/MIN/1.73 M^2
EST. GFR  (AFRICAN AMERICAN): 9.8 ML/MIN/1.73 M^2
EST. GFR  (NON AFRICAN AMERICAN): 8.3 ML/MIN/1.73 M^2
EST. GFR  (NON AFRICAN AMERICAN): 8.5 ML/MIN/1.73 M^2
EST. GFR  (NON AFRICAN AMERICAN): 9.1 ML/MIN/1.73 M^2
EST. GFR  (NON AFRICAN AMERICAN): 9.1 ML/MIN/1.73 M^2
EST. GFR  (NON AFRICAN AMERICAN): 9.3 ML/MIN/1.73 M^2
GLUCOSE SERPL-MCNC: 170 MG/DL (ref 70–110)
GLUCOSE SERPL-MCNC: 183 MG/DL (ref 70–110)
GLUCOSE SERPL-MCNC: 188 MG/DL (ref 70–110)
GLUCOSE SERPL-MCNC: 188 MG/DL (ref 70–110)
GLUCOSE SERPL-MCNC: 196 MG/DL (ref 70–110)
HCT VFR BLD AUTO: 33.7 % (ref 40–54)
HGB BLD-MCNC: 10.7 G/DL (ref 14–18)
IMM GRANULOCYTES # BLD AUTO: 0.25 K/UL (ref 0–0.04)
IMM GRANULOCYTES NFR BLD AUTO: 2.2 % (ref 0–0.5)
LIPASE SERPL-CCNC: >1000 U/L (ref 4–60)
LYMPHOCYTES # BLD AUTO: 1.2 K/UL (ref 1–4.8)
LYMPHOCYTES NFR BLD: 10.2 % (ref 18–48)
MAGNESIUM SERPL-MCNC: 2.4 MG/DL (ref 1.6–2.6)
MAGNESIUM SERPL-MCNC: 2.5 MG/DL (ref 1.6–2.6)
MAGNESIUM SERPL-MCNC: 2.6 MG/DL (ref 1.6–2.6)
MCH RBC QN AUTO: 28.7 PG (ref 27–31)
MCHC RBC AUTO-ENTMCNC: 31.8 G/DL (ref 32–36)
MCV RBC AUTO: 90 FL (ref 82–98)
MONOCYTES # BLD AUTO: 1.7 K/UL (ref 0.3–1)
MONOCYTES NFR BLD: 14.9 % (ref 4–15)
NEUTROPHILS # BLD AUTO: 8.4 K/UL (ref 1.8–7.7)
NEUTROPHILS NFR BLD: 72.2 % (ref 38–73)
NRBC BLD-RTO: 0 /100 WBC
PLATELET # BLD AUTO: 356 K/UL (ref 150–350)
PMV BLD AUTO: 11.5 FL (ref 9.2–12.9)
POCT GLUCOSE: 152 MG/DL (ref 70–110)
POCT GLUCOSE: 166 MG/DL (ref 70–110)
POCT GLUCOSE: 180 MG/DL (ref 70–110)
POCT GLUCOSE: 185 MG/DL (ref 70–110)
POTASSIUM SERPL-SCNC: 3.6 MMOL/L (ref 3.5–5.1)
POTASSIUM SERPL-SCNC: 3.7 MMOL/L (ref 3.5–5.1)
POTASSIUM SERPL-SCNC: 3.7 MMOL/L (ref 3.5–5.1)
POTASSIUM SERPL-SCNC: 3.8 MMOL/L (ref 3.5–5.1)
POTASSIUM SERPL-SCNC: 3.9 MMOL/L (ref 3.5–5.1)
PROT SERPL-MCNC: 7.7 G/DL (ref 6–8.4)
RBC # BLD AUTO: 3.73 M/UL (ref 4.6–6.2)
SODIUM SERPL-SCNC: 149 MMOL/L (ref 136–145)
SODIUM SERPL-SCNC: 149 MMOL/L (ref 136–145)
SODIUM SERPL-SCNC: 153 MMOL/L (ref 136–145)
SODIUM SERPL-SCNC: 154 MMOL/L (ref 136–145)
SODIUM SERPL-SCNC: 155 MMOL/L (ref 136–145)
WBC # BLD AUTO: 11.59 K/UL (ref 3.9–12.7)

## 2020-03-08 PROCEDURE — 80048 BASIC METABOLIC PNL TOTAL CA: CPT | Mod: 91

## 2020-03-08 PROCEDURE — 85730 THROMBOPLASTIN TIME PARTIAL: CPT

## 2020-03-08 PROCEDURE — 25000003 PHARM REV CODE 250: Performed by: PHYSICIAN ASSISTANT

## 2020-03-08 PROCEDURE — 63600175 PHARM REV CODE 636 W HCPCS: Performed by: PHYSICIAN ASSISTANT

## 2020-03-08 PROCEDURE — 99223 PR INITIAL HOSPITAL CARE,LEVL III: ICD-10-PCS | Mod: ,,, | Performed by: SURGERY

## 2020-03-08 PROCEDURE — 99900035 HC TECH TIME PER 15 MIN (STAT)

## 2020-03-08 PROCEDURE — 27000221 HC OXYGEN, UP TO 24 HOURS

## 2020-03-08 PROCEDURE — 85025 COMPLETE CBC W/AUTO DIFF WBC: CPT

## 2020-03-08 PROCEDURE — 63600175 PHARM REV CODE 636 W HCPCS: Performed by: STUDENT IN AN ORGANIZED HEALTH CARE EDUCATION/TRAINING PROGRAM

## 2020-03-08 PROCEDURE — 83690 ASSAY OF LIPASE: CPT

## 2020-03-08 PROCEDURE — 25000003 PHARM REV CODE 250: Performed by: NURSE PRACTITIONER

## 2020-03-08 PROCEDURE — 63600175 PHARM REV CODE 636 W HCPCS: Performed by: INTERNAL MEDICINE

## 2020-03-08 PROCEDURE — 20000000 HC ICU ROOM

## 2020-03-08 PROCEDURE — S0166 INJ OLANZAPINE 2.5MG: HCPCS | Performed by: INTERNAL MEDICINE

## 2020-03-08 PROCEDURE — 25000003 PHARM REV CODE 250: Performed by: INTERNAL MEDICINE

## 2020-03-08 PROCEDURE — 99232 SBSQ HOSP IP/OBS MODERATE 35: CPT | Mod: ,,, | Performed by: NURSE PRACTITIONER

## 2020-03-08 PROCEDURE — 85730 THROMBOPLASTIN TIME PARTIAL: CPT | Mod: 91

## 2020-03-08 PROCEDURE — S0166 INJ OLANZAPINE 2.5MG: HCPCS | Performed by: STUDENT IN AN ORGANIZED HEALTH CARE EDUCATION/TRAINING PROGRAM

## 2020-03-08 PROCEDURE — 83735 ASSAY OF MAGNESIUM: CPT

## 2020-03-08 PROCEDURE — 99232 PR SUBSEQUENT HOSPITAL CARE,LEVL II: ICD-10-PCS | Mod: ,,, | Performed by: NURSE PRACTITIONER

## 2020-03-08 PROCEDURE — 99223 1ST HOSP IP/OBS HIGH 75: CPT | Mod: ,,, | Performed by: SURGERY

## 2020-03-08 PROCEDURE — 25000003 PHARM REV CODE 250: Performed by: STUDENT IN AN ORGANIZED HEALTH CARE EDUCATION/TRAINING PROGRAM

## 2020-03-08 PROCEDURE — 80053 COMPREHEN METABOLIC PANEL: CPT

## 2020-03-08 RX ORDER — POTASSIUM CHLORIDE 14.9 MG/ML
20 INJECTION INTRAVENOUS ONCE
Status: COMPLETED | OUTPATIENT
Start: 2020-03-08 | End: 2020-03-08

## 2020-03-08 RX ORDER — OLANZAPINE 10 MG/2ML
5 INJECTION, POWDER, FOR SOLUTION INTRAMUSCULAR ONCE
Status: COMPLETED | OUTPATIENT
Start: 2020-03-08 | End: 2020-03-08

## 2020-03-08 RX ORDER — TALC
6 POWDER (GRAM) TOPICAL NIGHTLY PRN
Status: DISCONTINUED | OUTPATIENT
Start: 2020-03-08 | End: 2020-03-08

## 2020-03-08 RX ORDER — PSEUDOEPHEDRINE/ACETAMINOPHEN 30MG-500MG
100 TABLET ORAL
Status: COMPLETED | OUTPATIENT
Start: 2020-03-08 | End: 2020-03-08

## 2020-03-08 RX ORDER — SYRING-NEEDL,DISP,INSUL,0.3 ML 29 G X1/2"
296 SYRINGE, EMPTY DISPOSABLE MISCELLANEOUS
Status: COMPLETED | OUTPATIENT
Start: 2020-03-08 | End: 2020-03-08

## 2020-03-08 RX ORDER — POTASSIUM CHLORIDE 29.8 MG/ML
40 INJECTION INTRAVENOUS ONCE
Status: COMPLETED | OUTPATIENT
Start: 2020-03-08 | End: 2020-03-08

## 2020-03-08 RX ORDER — SODIUM CHLORIDE 450 MG/100ML
INJECTION, SOLUTION INTRAVENOUS CONTINUOUS
Status: DISCONTINUED | OUTPATIENT
Start: 2020-03-08 | End: 2020-03-09

## 2020-03-08 RX ADMIN — NICARDIPINE HYDROCHLORIDE 5 MG/HR: 0.2 INJECTION, SOLUTION INTRAVENOUS at 10:03

## 2020-03-08 RX ADMIN — POTASSIUM CHLORIDE 20 MEQ: 200 INJECTION, SOLUTION INTRAVENOUS at 03:03

## 2020-03-08 RX ADMIN — HYDRALAZINE HYDROCHLORIDE 10 MG: 20 INJECTION INTRAMUSCULAR; INTRAVENOUS at 05:03

## 2020-03-08 RX ADMIN — Medication 100 ML: at 11:03

## 2020-03-08 RX ADMIN — HYDRALAZINE HYDROCHLORIDE 10 MG: 20 INJECTION INTRAMUSCULAR; INTRAVENOUS at 03:03

## 2020-03-08 RX ADMIN — OLANZAPINE 5 MG: 10 INJECTION, POWDER, LYOPHILIZED, FOR SOLUTION INTRAMUSCULAR at 04:03

## 2020-03-08 RX ADMIN — AMIODARONE HYDROCHLORIDE 0.5 MG/MIN: 1.8 INJECTION, SOLUTION INTRAVENOUS at 08:03

## 2020-03-08 RX ADMIN — SODIUM CHLORIDE 500 ML: 0.9 INJECTION, SOLUTION INTRAVENOUS at 10:03

## 2020-03-08 RX ADMIN — HYDRALAZINE HYDROCHLORIDE 10 MG: 20 INJECTION INTRAMUSCULAR; INTRAVENOUS at 09:03

## 2020-03-08 RX ADMIN — OLANZAPINE 5 MG: 10 INJECTION, POWDER, FOR SOLUTION INTRAMUSCULAR at 11:03

## 2020-03-08 RX ADMIN — NICARDIPINE HYDROCHLORIDE 5 MG/HR: 0.2 INJECTION, SOLUTION INTRAVENOUS at 02:03

## 2020-03-08 RX ADMIN — POTASSIUM CHLORIDE 40 MEQ: 29.8 INJECTION, SOLUTION INTRAVENOUS at 03:03

## 2020-03-08 RX ADMIN — SODIUM CHLORIDE: 0.45 INJECTION, SOLUTION INTRAVENOUS at 06:03

## 2020-03-08 RX ADMIN — SODIUM CHLORIDE: 0.45 INJECTION, SOLUTION INTRAVENOUS at 08:03

## 2020-03-08 RX ADMIN — INSULIN ASPART 1 UNITS: 100 INJECTION, SOLUTION INTRAVENOUS; SUBCUTANEOUS at 12:03

## 2020-03-08 RX ADMIN — MAGNESIUM CITRATE 296 ML: 1.75 LIQUID ORAL at 11:03

## 2020-03-08 RX ADMIN — AMIODARONE HYDROCHLORIDE 0.5 MG/MIN: 1.8 INJECTION, SOLUTION INTRAVENOUS at 06:03

## 2020-03-08 RX ADMIN — HEPARIN SODIUM 18 UNITS/KG/HR: 10000 INJECTION, SOLUTION INTRAVENOUS at 10:03

## 2020-03-08 RX ADMIN — INSULIN ASPART 1 UNITS: 100 INJECTION, SOLUTION INTRAVENOUS; SUBCUTANEOUS at 03:03

## 2020-03-08 RX ADMIN — HEPARIN SODIUM 16 UNITS/KG/HR: 10000 INJECTION, SOLUTION INTRAVENOUS at 12:03

## 2020-03-08 NOTE — ASSESSMENT & PLAN NOTE
BG goal 140 - 180     Increase transition IV insulin infusion to 0.5 u/hr with stepdown parameters  Low dose correction scale - due to decreased renal function  BG monitoring every 4 hours while NPO    Discharge planning: [facility]

## 2020-03-08 NOTE — CARE UPDATE
Care Update     Worsening Hypernatremia and very low CVP in the setting of high NG output and NPO state. Will provide him with low bolus NaCl and monitor his CVP and repeat BMP.

## 2020-03-08 NOTE — PROGRESS NOTES
[facility]  Heart Transplant  Progress Note    Patient Name: John Javier  MRN: [id number]  Admission Date: 2/29/2020  Hospital Length of Stay: 8 days  Attending Physician: Shannon Fuller MD  Primary Care Provider: To Obtain Unable  Principal Problem:Cardiogenic shock    Subjective:     Interval History: Passing flatus. No BM, will give brown bomb today. 2.5 L from NGT over last 24 hours. Sodium trending up. Surgery following.     Continuous Infusions:   sodium chloride 0.45% 100 mL/hr at 03/08/20 0847    amiodarone in dextrose 5% 0.5 mg/min (03/08/20 0828)    heparin (porcine) in D5W 18.023 Units/kg/hr (03/08/20 0600)    insulin (HUMAN R) infusion (adults) 0.4 Units/hr (03/08/20 0600)    nicardipine 5 mg/hr (03/08/20 0743)     Scheduled Meds:   aspirin  81 mg Oral Daily    atorvastatin  80 mg Oral Daily    hydrALAZINE  10 mg Intravenous Q8H     PRN Meds:sodium chloride, Dextrose 10% Bolus, Dextrose 10% Bolus, glucagon (human recombinant), glucose, glucose, heparin (PORCINE), heparin (PORCINE), insulin aspart U-100, ondansetron, senna-docusate 8.6-50 mg, sodium chloride 0.9%, sodium chloride 0.9%    Review of patient's allergies indicates:  No Known Allergies  Objective:     Vital Signs (Most Recent):  Temp: 98.9 °F (37.2 °C) (03/08/20 0700)  Pulse: 96 (03/08/20 0915)  Resp: (!) 28 (03/08/20 0915)  BP: 121/60 (03/08/20 0700)  SpO2: (!) 94 % (03/08/20 0915) Vital Signs (24h Range):  Temp:  [97.8 °F (36.6 °C)-98.9 °F (37.2 °C)] 98.9 °F (37.2 °C)  Pulse:  [] 96  Resp:  [14-65] 28  SpO2:  [90 %-99 %] 94 %  BP: (105-128)/(54-76) 121/60  Arterial Line BP: (111-147)/(46-67) 129/57     Patient Vitals for the past 72 hrs (Last 3 readings):   Weight   03/07/20 0701 67.8 kg (149 lb 7.6 oz)   03/07/20 0641 67.9 kg (149 lb 11.1 oz)   03/06/20 1000 76.2 kg (167 lb 15.9 oz)     Body mass index is 24.13 kg/m².      Intake/Output Summary (Last 24 hours) at 3/8/2020 0998  Last data filed at  3/8/2020 0900  Gross per 24 hour   Intake 2173.36 ml   Output 4285 ml   Net -2111.64 ml        Telemetry: reviewed    Physical Exam   Constitutional: He is oriented to person, place, and time. He appears well-developed and well-nourished.   HENT:   Head: Normocephalic and atraumatic.   Eyes: Pupils are equal, round, and reactive to light. EOM are normal.   Neck: Normal range of motion. Neck supple.   LIJ TLC placed 3/1/20.   Cardiovascular: Normal rate and regular rhythm.   Murmur heard.  Pulmonary/Chest: Effort normal and breath sounds normal. No stridor. No respiratory distress.   Abdominal: Soft. Normal appearance. He exhibits no distension. Bowel sounds are increased. There is no tenderness.   NGT   Musculoskeletal: Normal range of motion. He exhibits no edema.   Neurological: He is alert and oriented to person, place, and time.   Skin: Skin is warm and dry. Capillary refill takes 2 to 3 seconds.   Nursing note and vitals reviewed.    Significant Labs:  CBC:  Recent Labs   Lab 03/06/20  0237 03/07/20  0252 03/08/20  0304   WBC 16.06* 13.71* 11.59   RBC 3.18* 3.57* 3.73*   HGB 9.6* 10.5* 10.7*   HCT 28.4* 31.6* 33.7*    276 356*   MCV 89 89 90   MCH 30.2 29.4 28.7   MCHC 33.8 33.2 31.8*     BNP:  No results for input(s): BNP in the last 168 hours.    Invalid input(s): BNPTRIAGELBLO  CMP:  Recent Labs   Lab 03/06/20  1510 03/07/20  0252  03/07/20  2358 03/08/20  0304 03/08/20  0613   * 181*   < > 196* 183* 188*   CALCIUM 8.8 9.4   < > 9.7 10.0 9.8   ALBUMIN 2.9* 3.1*  --   --  3.1*  --    PROT 7.3 7.5  --   --  7.7  --     146*   < > 149* 153* 149*   K 3.6 3.4*   < > 3.6 3.8 3.7   CO2 27 30*   < > 33* 33* 34*   CL 97 96   < > 98 99 98   * 113*   < > 116* 118* 119*   CREATININE 6.6* 6.7*   < > 6.5* 6.4* 6.0*   ALKPHOS 94 92  --   --  91  --    ALT 15 15  --   --  15  --    AST 28 24  --   --  21  --    BILITOT 0.7 0.8  --   --  0.7  --     < > = values in this interval not displayed.       Coagulation:   Recent Labs   Lab 03/01/20  2132  03/06/20  1827 03/07/20  0252 03/08/20  0304   INR 1.2  --   --   --   --    APTT 63.7*  63.7*   < > 45.0* 40.2* 37.0*    < > = values in this interval not displayed.     LDH:  No results for input(s): LDH in the last 72 hours.  Microbiology:  Microbiology Results (last 7 days)     Procedure Component Value Units Date/Time    Blood culture [[id number]] Collected:  03/06/20 1604    Order Status:  Completed Specimen:  Blood from Peripheral, Forearm, Right Updated:  03/07/20 2012     Blood Culture, Routine No Growth to date      No Growth to date    Blood culture [[id number]] Collected:  03/06/20 1618    Order Status:  Completed Specimen:  Blood from Wrist, Right Updated:  03/07/20 2012     Blood Culture, Routine No Growth to date      No Growth to date    Culture, Respiratory with Gram Stain [[id number]] Collected:  03/06/20 0846    Order Status:  Completed Specimen:  Respiratory from Sputum, Expectorated Updated:  03/07/20 0810     Respiratory Culture Normal respiratory ana maría     Gram Stain (Respiratory) <10 epithelial cells per low power field.     Gram Stain (Respiratory) Few WBC's     Gram Stain (Respiratory) Few Gram positive cocci     Gram Stain (Respiratory) Few Gram positive rods     Gram Stain (Respiratory) Few Gram negative rods    Blood culture [[id number]] Collected:  03/01/20 0254    Order Status:  Completed Specimen:  Blood from Peripheral, Upper Arm, Left Updated:  03/06/20 0612     Blood Culture, Routine No growth after 5 days.    Blood culture [[id number]] Collected:  02/29/20 2305    Order Status:  Completed Specimen:  Blood from Peripheral, Forearm, Right Updated:  03/06/20 0612     Blood Culture, Routine No growth after 5 days.    Urine culture [[id number]] Collected:  02/29/20 2240    Order Status:  Completed Specimen:  Urine Updated:  03/02/20 0739     Urine Culture, Routine No significant growth    Narrative:       Preferred Collection Type->Urine,  "Clean Catch        I have reviewed all pertinent labs within the past 24 hours.    Estimated Creatinine Clearance: 11.4 mL/min (A) (based on SCr of 6 mg/dL (H)).    Diagnostic Results:  I have reviewed all pertinent imaging results/findings within the past 24 hours.    Assessment and Plan:     Patient intubated w limited collateral from family; further OSH records pending    63 y/o M hx of CAD (50-60% LCx, OM disease on [facility] 2/29), ICM EF 10-15% s/p AICD (unknown baseline but on GDMT as OP), HTN, DM2, HLD, obesity, CKD, presenting from OSH intubated with Impella in setting of cardiogenic shock. Per pt's brother he was taken by EMS to OSH in setting of feeling short of breath, nearly passing out. There found to be in shock, lactate to 12, ANGEL w Cr 2.5, trop 12; unclear if dynamic ECG changes, max trop 10; underwent LHC with evidence of 50-60% LCx, OM disease; no PCI performed. RHC with LVEDP 35. CO 3, CI 1.8, tte w LVEF 10-15%. Underwent Impella placement without additional central line placement (per brother there was a "complication" but no evidence of pneumothorax).    On arrival patient -having intermittent suction alarms, improved w P6->P4. MAPs stable 75-85. CVC placed in LIJ given neck position, CO 9.7, CI 5.35, svr 535. WBC 26k, patient cultured, given single dose of vanc/zosyn. UOP 60-80cc/hr off diuretics. Further collateral pending from OSH. Continued on heparin gtt for impella, weaned to dobutamine 5, gave 500cc bolus in setting of CVP 10 and suction alarms.    * Cardiogenic shock  -Presented with impella placed at OSH with position issues and LD>1000.  -Impella removed 3/2/20.   -CVP 4 this am, 4-6 overnight, SVO2 not drawn this am but has been running high  -Continue to hold lasix due to low CVP and high output from NGT. Free water deficit ~3.8 L. Will start maintenance fluids 1/2 NS and trend BMP and CVP. If CVP>10, stop IVF and call CTS.  - d/c'ed due to high cardiac output and " hypertension/afib.  -TTE 3/2 EF 10%, LVIDD 6, TAPSE 1.98, Mod MR, Mod TR.  -Started on nitro gtt for BP and weaned off. Now on cardene gtt. Started on PO hydralazine/coreg but now not tolerating PO.    -Not working up for advanced options due to medical instability/renal failure at this time. Wtill continue to reassess candidacy if condition improves.     ANGEL (acute kidney injury)  - Likely ATN in setting of shock, Hemolysis, plus IV contrast at OSH.  - Monitor Cr closely, trending down. BUN trending up/stable  - Appreciate renal consult.  - Strict I/O.  - Avoid nephrotoxic meds.  - Continues to make good urine    Hematoma  -CT 3/7/20: Right anterior abdominal wall hematoma, abuts the distal most aspect of the rectus sheath.   -Bedside nurse to trace hematoma today with marker for monitoring  -Secondary to impella which was removed  -Continue to monitor closely    Abdominal distention  -NGT placed for decompression with high output over last 24 hours  -General surgery consulted who recommended CT and brown bomb.  -CT with concern for possible pancreatitis.  -Brown bomb ordered today for high stool burden  -Bowel sounds improved today, +flatus overnight  -Sodium trending up, start maintenance fluids  -Lipase ordered per radiology recommendations, >1000. Spoke with surgery. As patient is not having pain, they would not . If patient starts having abdominal pain, repeat CT abdomen/pelvis    Iron deficiency anemia  -No obvious bleeding.   -R groin impella site with hematoma that is stable/no abdominal or back pain.  -s/p 2 U PRBC 3/5/20    Malnutrition of moderate degree  -Appreciate Dietary Cx.    PAF (paroxysmal atrial fibrillation)  -Episode of afib RVR 3/3/2020 in the setting of severe agitation with SBT  -Converted to NSR  -Continue heparin gtt for anticoagulation  -Went back into afib 3/4/20. Started on IV Amio.   -EP consulted, continue IV amio while on IV diuretics. Appreciate their input.    -paroxysmal flutter ntoed also    VT (ventricular tachycardia)  -S/P ICD shocks x 5 per OSH.  -Continue IV amio per EP recs for afib    Acute hypoxemic respiratory failure  -Extubated 3/4.  -Monitor O2 sats closely today        Acute on chronic combined systolic and diastolic heart failure  - See Cardiogenic shock.    Type 2 diabetes mellitus, without long-term current use of insulin  -A1C 7.0 on admit  -Insulin gtt management per endo. Greatly appreciate their assistance    CAD (coronary artery disease)  -Hx of RCA stent in 2013.   -[facility] 2/29: 50-60% LCx, OM disease.  -continue ASA, high intensity statin.  -Continue heparin        Uninterrupted Critical Care/Counseling Time (not including procedures): 50 minutes    Patricia Simon PA-C  Heart Transplant  [facility]

## 2020-03-08 NOTE — ASSESSMENT & PLAN NOTE
-Hx of RCA stent in 2013.   -[facility] 2/29: 50-60% LCx, OM disease.  -continue ASA, high intensity statin.  -Continue heparin

## 2020-03-08 NOTE — PLAN OF CARE
EP Plan of Care Note     John Javier is a 65 y/o M hx of ICM (EF 10-15%), ICD in place, CAD (50-60% LCx, OM disease on [facility] 2/29), HTN, DM2, HLD, obesity, CKD who was transferred from OSH in cardiogenic shock with Impella in place. Impella was removed 3/2 and patient was extubated 3/4. Since 3/3 patient has been having intermittent episode of atrial fibrillation. EP was consulted for further evaluation.       - s/p amio load on 3/5. Now in sinus rhythm  - Leave on IV amiodarone while still on IV diuretics. Once he is euvolemic on PO diuretics and anticoagulation, can transition frmo IV amiodarone to PO amiodarone 400mg BID x 14 days followed by 400 mg daily given history of VT  - continue rate control with carvedilol 6.25mg BID  - goal K > 4, Mg > 2  - continue tele monitoring     Thank you for the consult. We will sign off now. Please don't hesitate to call with questions.      Patient seen and plan of care discussed with Dr. Ram.    Kelsy Man MD  Cardiology, PGY IV  Pager: [phone]

## 2020-03-08 NOTE — PLAN OF CARE
CMICU DAILY GOALS       A: Awake    RASS: Goal - RASS Goal: 0-->alert and calm  Actual - RASS (Horton Agitation-Sedation Scale): 0-->alert and calm   Restraint necessity: Clinical Justification: Removing medical devices  B: Breath   SBT: Not intubated   C: Coordinate A & B, analgesics/sedatives   Pain: no c/o pain     SAT: Not attempted  D: Delirium   CAM-ICU: Overall CAM-ICU: Negative  E: Early Mobility   MOVE Screen: Pass   Activity: Activity Management: activity encouraged  FAS: Feeding/Nutrition   Diet order: Diet/Nutrition Received: NPO, Specialty Diet/Nutrition Received: renal diet Fluid restriction:    T: Thrombus   DVT prophylaxis: VTE Required Core Measure: Pharmacological prophylaxis initiated/maintained  H: HOB Elevation   Head of Bed (HOB): HOB at 45 degrees  U: Ulcer Prophylaxis   GI: no  G: Glucose control   managed Glycemic Management: blood glucose monitoring  S: Skin   Bundle compliance: yes   Bathing/Skin Care: bath, chlorhexidine, bath, complete, dressed/undressed, incontinence care, linen changed Date:03/08/20  B: Bowel Function  One large BM today, abdomen no longer distended, bowl movements more active, patient stating feels relief  I: Indwelling Catheters   Lamb necessity:      Urethral Catheter 03/06/20 1642-Reason for Continuing Urinary Catheterization: Critically ill in ICU requiring intensive monitoring  [REMOVED]      Urethral Catheter 02/29/20 2056 16 Fr.-Reason for Continuing Urinary Catheterization: Critically ill in ICU requiring intensive monitoring   CVC necessity: Yes   IPAD offered: No  D: De-escalation Antibx   N/a   Plan for the day   Continue to monitor  Family/Goals of care/Code Status   Code Status: Full Code     No acute events throughout day, VS and assessment per flow sheet, patient progressing towards goals as tolerated, plan of care reviewed with John Javier and family, all concerns addressed, will continue to monitor.

## 2020-03-08 NOTE — ASSESSMENT & PLAN NOTE
Titrate insulin slowly to avoid hypoglycemia as the risk of hypoglycemia increases with decreased creatinine clearance.  Caution with insulin stacking  Estimated Creatinine Clearance: 11.4 mL/min (A) (based on SCr of 6 mg/dL (H)).

## 2020-03-08 NOTE — SUBJECTIVE & OBJECTIVE
Interval History: Passing flatus. No BM, will give brown bomb today. 2.5 L from NGT over last 24 hours. Sodium trending up. Surgery following.     Continuous Infusions:   sodium chloride 0.45% 100 mL/hr at 03/08/20 0847    amiodarone in dextrose 5% 0.5 mg/min (03/08/20 0828)    heparin (porcine) in D5W 18.023 Units/kg/hr (03/08/20 0600)    insulin (HUMAN R) infusion (adults) 0.4 Units/hr (03/08/20 0600)    nicardipine 5 mg/hr (03/08/20 0743)     Scheduled Meds:   aspirin  81 mg Oral Daily    atorvastatin  80 mg Oral Daily    hydrALAZINE  10 mg Intravenous Q8H     PRN Meds:sodium chloride, Dextrose 10% Bolus, Dextrose 10% Bolus, glucagon (human recombinant), glucose, glucose, heparin (PORCINE), heparin (PORCINE), insulin aspart U-100, ondansetron, senna-docusate 8.6-50 mg, sodium chloride 0.9%, sodium chloride 0.9%    Review of patient's allergies indicates:  No Known Allergies  Objective:     Vital Signs (Most Recent):  Temp: 98.9 °F (37.2 °C) (03/08/20 0700)  Pulse: 96 (03/08/20 0915)  Resp: (!) 28 (03/08/20 0915)  BP: 121/60 (03/08/20 0700)  SpO2: (!) 94 % (03/08/20 0915) Vital Signs (24h Range):  Temp:  [97.8 °F (36.6 °C)-98.9 °F (37.2 °C)] 98.9 °F (37.2 °C)  Pulse:  [] 96  Resp:  [14-65] 28  SpO2:  [90 %-99 %] 94 %  BP: (105-128)/(54-76) 121/60  Arterial Line BP: (111-147)/(46-67) 129/57     Patient Vitals for the past 72 hrs (Last 3 readings):   Weight   03/07/20 0701 67.8 kg (149 lb 7.6 oz)   03/07/20 0641 67.9 kg (149 lb 11.1 oz)   03/06/20 1000 76.2 kg (167 lb 15.9 oz)     Body mass index is 24.13 kg/m².      Intake/Output Summary (Last 24 hours) at 3/8/2020 0935  Last data filed at 3/8/2020 0900  Gross per 24 hour   Intake 2173.36 ml   Output 4285 ml   Net -2111.64 ml        Telemetry: reviewed    Physical Exam   Constitutional: He is oriented to person, place, and time. He appears well-developed and well-nourished.   HENT:   Head: Normocephalic and atraumatic.   Eyes: Pupils are equal, round,  and reactive to light. EOM are normal.   Neck: Normal range of motion. Neck supple.   LIJ TLC placed 3/1/20.   Cardiovascular: Normal rate and regular rhythm.   Murmur heard.  Pulmonary/Chest: Effort normal and breath sounds normal. No stridor. No respiratory distress.   Abdominal: Soft. Normal appearance. He exhibits no distension. Bowel sounds are increased. There is no tenderness.   NGT   Musculoskeletal: Normal range of motion. He exhibits no edema.   Neurological: He is alert and oriented to person, place, and time.   Skin: Skin is warm and dry. Capillary refill takes 2 to 3 seconds.   Nursing note and vitals reviewed.    Significant Labs:  CBC:  Recent Labs   Lab 03/06/20  0237 03/07/20  0252 03/08/20  0304   WBC 16.06* 13.71* 11.59   RBC 3.18* 3.57* 3.73*   HGB 9.6* 10.5* 10.7*   HCT 28.4* 31.6* 33.7*    276 356*   MCV 89 89 90   MCH 30.2 29.4 28.7   MCHC 33.8 33.2 31.8*     BNP:  No results for input(s): BNP in the last 168 hours.    Invalid input(s): BNPTRIAGELBLO  CMP:  Recent Labs   Lab 03/06/20  1510 03/07/20  0252  03/07/20  2358 03/08/20  0304 03/08/20  0613   * 181*   < > 196* 183* 188*   CALCIUM 8.8 9.4   < > 9.7 10.0 9.8   ALBUMIN 2.9* 3.1*  --   --  3.1*  --    PROT 7.3 7.5  --   --  7.7  --     146*   < > 149* 153* 149*   K 3.6 3.4*   < > 3.6 3.8 3.7   CO2 27 30*   < > 33* 33* 34*   CL 97 96   < > 98 99 98   * 113*   < > 116* 118* 119*   CREATININE 6.6* 6.7*   < > 6.5* 6.4* 6.0*   ALKPHOS 94 92  --   --  91  --    ALT 15 15  --   --  15  --    AST 28 24  --   --  21  --    BILITOT 0.7 0.8  --   --  0.7  --     < > = values in this interval not displayed.      Coagulation:   Recent Labs   Lab 03/01/20  2132  03/06/20  1827 03/07/20  0252 03/08/20  0304   INR 1.2  --   --   --   --    APTT 63.7*  63.7*   < > 45.0* 40.2* 37.0*    < > = values in this interval not displayed.     LDH:  No results for input(s): LDH in the last 72 hours.  Microbiology:  Microbiology Results  (last 7 days)     Procedure Component Value Units Date/Time    Blood culture [[id number]] Collected:  03/06/20 1604    Order Status:  Completed Specimen:  Blood from Peripheral, Forearm, Right Updated:  03/07/20 2012     Blood Culture, Routine No Growth to date      No Growth to date    Blood culture [[id number]] Collected:  03/06/20 1618    Order Status:  Completed Specimen:  Blood from Wrist, Right Updated:  03/07/20 2012     Blood Culture, Routine No Growth to date      No Growth to date    Culture, Respiratory with Gram Stain [[id number]] Collected:  03/06/20 0846    Order Status:  Completed Specimen:  Respiratory from Sputum, Expectorated Updated:  03/07/20 0810     Respiratory Culture Normal respiratory ana maría     Gram Stain (Respiratory) <10 epithelial cells per low power field.     Gram Stain (Respiratory) Few WBC's     Gram Stain (Respiratory) Few Gram positive cocci     Gram Stain (Respiratory) Few Gram positive rods     Gram Stain (Respiratory) Few Gram negative rods    Blood culture [[id number]] Collected:  03/01/20 0254    Order Status:  Completed Specimen:  Blood from Peripheral, Upper Arm, Left Updated:  03/06/20 0612     Blood Culture, Routine No growth after 5 days.    Blood culture [[id number]] Collected:  02/29/20 2305    Order Status:  Completed Specimen:  Blood from Peripheral, Forearm, Right Updated:  03/06/20 0612     Blood Culture, Routine No growth after 5 days.    Urine culture [[id number]] Collected:  02/29/20 2240    Order Status:  Completed Specimen:  Urine Updated:  03/02/20 0739     Urine Culture, Routine No significant growth    Narrative:       Preferred Collection Type->Urine, Clean Catch        I have reviewed all pertinent labs within the past 24 hours.    Estimated Creatinine Clearance: 11.4 mL/min (A) (based on SCr of 6 mg/dL (H)).    Diagnostic Results:  I have reviewed all pertinent imaging results/findings within the past 24 hours.

## 2020-03-08 NOTE — ASSESSMENT & PLAN NOTE
-CT 3/7/20: Right anterior abdominal wall hematoma, abuts the distal most aspect of the rectus sheath.   -Bedside nurse to trace hematoma today with marker for monitoring  -Secondary to impella which was removed  -Continue to monitor closely

## 2020-03-08 NOTE — ASSESSMENT & PLAN NOTE
- Likely ATN in setting of shock, Hemolysis, plus IV contrast at OSH.  - Monitor Cr closely, trending down. BUN trending up/stable  - Appreciate renal consult.  - Strict I/O.  - Avoid nephrotoxic meds.  - Continues to make good urine

## 2020-03-08 NOTE — ASSESSMENT & PLAN NOTE
-No obvious bleeding.   -R groin impella site with hematoma that is stable/no abdominal or back pain.  -s/p 2 U PRBC 3/5/20

## 2020-03-08 NOTE — PROGRESS NOTES
[facility]  General Surgery  Progress Note    Subjective:     History of Present Illness:  No notes on file    Post-Op Info:  Procedure(s) (LRB):  Impella, Removal (Left)   6 Days Post-Op     Interval History: No acute events overnight, afebrile, vital signs stable. Denies abdominal pain today. NGT placed yesterday with 2.4L bilious output since placement. Patient reports + flatus and BM but none recorded in chart.     Medications:  Continuous Infusions:   amiodarone in dextrose 5% 0.5 mg/min (03/08/20 0600)    heparin (porcine) in D5W 18.023 Units/kg/hr (03/08/20 0600)    insulin (HUMAN R) infusion (adults) 0.4 Units/hr (03/08/20 0600)    nicardipine 5 mg/hr (03/08/20 0600)     Scheduled Meds:   aspirin  81 mg Oral Daily    atorvastatin  80 mg Oral Daily    hydrALAZINE  10 mg Intravenous Q8H     PRN Meds:sodium chloride, Dextrose 10% Bolus, Dextrose 10% Bolus, glucagon (human recombinant), glucose, glucose, heparin (PORCINE), heparin (PORCINE), insulin aspart U-100, ondansetron, senna-docusate 8.6-50 mg, sodium chloride 0.9%, sodium chloride 0.9%     Review of patient's allergies indicates:  No Known Allergies  Objective:     Vital Signs (Most Recent):  Temp: 97.8 °F (36.6 °C) (03/08/20 0300)  Pulse: 93 (03/08/20 0600)  Resp: (!) 27 (03/08/20 0600)  BP: 115/61 (03/08/20 0300)  SpO2: (!) 90 % (03/08/20 0600) Vital Signs (24h Range):  Temp:  [97.8 °F (36.6 °C)-98.7 °F (37.1 °C)] 97.8 °F (36.6 °C)  Pulse:  [] 93  Resp:  [14-65] 27  SpO2:  [90 %-100 %] 90 %  BP: (105-128)/(54-76) 115/61  Arterial Line BP: (111-159)/(46-67) 131/62     Weight: 67.8 kg (149 lb 7.6 oz)  Body mass index is 24.13 kg/m².    Intake/Output - Last 3 Shifts       03/06 0700 - 03/07 0659 03/07 0700 - 03/08 0659 03/08 0700 - 03/09 0659    P.O. 180 240     I.V. (mL/kg) 1186.8 (17.5) 1625.8 (24)     Blood       IV Piggyback  550     Total Intake(mL/kg) 1366.8 (20.1) 2415.8 (35.6)     Urine (mL/kg/hr) 3630 (2.2) 2750  (1.7)     Emesis/NG output 0      Drains 900 2450     Total Output 4530 5200     Net -3163.2 -2784.2            Emesis Occurrence 1 x            Physical Exam   Constitutional: He is oriented to person, place, and time. He appears well-developed and well-nourished.   HENT:   Head: Normocephalic and atraumatic.   Eyes: Pupils are equal, round, and reactive to light. EOM are normal.   Neck: Normal range of motion. Neck supple.   LIJ TLC placed 3/1/20.   Cardiovascular: Normal rate and regular rhythm.   Murmur heard.  Pulmonary/Chest: Effort normal and breath sounds normal. No stridor. No respiratory distress.   Abdominal: Soft. He exhibits no distension. There is no tenderness.   NGT   Musculoskeletal: Normal range of motion. He exhibits no edema.   Neurological: He is alert and oriented to person, place, and time.   Skin: Skin is warm and dry. Capillary refill takes 2 to 3 seconds.   Nursing note and vitals reviewed.      Significant Labs:  CBC:   Recent Labs   Lab 03/08/20  0304   WBC 11.59   RBC 3.73*   HGB 10.7*   HCT 33.7*   *   MCV 90   MCH 28.7   MCHC 31.8*     BMP:   Recent Labs   Lab 03/08/20  0613   *   *   K 3.7   CL 98   CO2 34*   *   CREATININE 6.0*   CALCIUM 9.8   MG 2.4       Significant Diagnostics:  I have reviewed all pertinent imaging results/findings within the past 24 hours.    Assessment/Plan:     Abdominal distention  65 yo male with acute on chronic heart failure with constipation and high NG tube output     - Low concern for SBO given CT scan and reported bowel function but he continues to have high NG output  - Replace high NG output based on hemodynamic status in setting of heart failure  - Aggressive electrolyte replacement  - Continue NG tube for now  - Terrible surgical candidate although I doubt he has a surgical issue, will follow along peripherally           Keri Anderson MD  General Surgery  [facility]

## 2020-03-08 NOTE — SUBJECTIVE & OBJECTIVE
"Interval HPI:   Overnight events: Remains in CMICU, NAEON.  BG at or slightly above goal overnight on transition IV insulin infusion.  Noted creatinine of 6.0.    Eating:   NPO  Nausea: No  Hypoglycemia and intervention: No  Fever: No  TPN and/or TF: No    /60 (BP Location: Right arm, Patient Position: Lying)   Pulse 93   Temp 98.9 °F (37.2 °C) (Oral)   Resp (!) 26   Ht 5' 6" (1.676 m)   Wt 67.8 kg (149 lb 7.6 oz)   SpO2 (!) 92%   BMI 24.13 kg/m²     Labs Reviewed and Include    Recent Labs   Lab 03/08/20  0304 03/08/20  0613   * 188*   CALCIUM 10.0 9.8   ALBUMIN 3.1*  --    PROT 7.7  --    * 149*   K 3.8 3.7   CO2 33* 34*   CL 99 98   * 119*   CREATININE 6.4* 6.0*   ALKPHOS 91  --    ALT 15  --    AST 21  --    BILITOT 0.7  --      Lab Results   Component Value Date    WBC 11.59 03/08/2020    HGB 10.7 (L) 03/08/2020    HCT 33.7 (L) 03/08/2020    MCV 90 03/08/2020     (H) 03/08/2020     No results for input(s): TSH, FREET4 in the last 168 hours.  Lab Results   Component Value Date    HGBA1C 7.0 (H) 03/01/2020       Nutritional status:   Body mass index is 24.13 kg/m².  Lab Results   Component Value Date    ALBUMIN 3.1 (L) 03/08/2020    ALBUMIN 3.1 (L) 03/07/2020    ALBUMIN 2.9 (L) 03/06/2020     Lab Results   Component Value Date    PREALBUMIN <2.5 (L) 03/04/2020       Estimated Creatinine Clearance: 11.4 mL/min (A) (based on SCr of 6 mg/dL (H)).    Accu-Checks  Recent Labs     03/06/20  0147 03/06/20  0544 03/06/20  1335 03/06/20  1826 03/07/20  0030 03/07/20  0411 03/07/20  0838 03/07/20  1133 03/07/20  1606 03/08/20  0620   POCTGLUCOSE 230* 252* 228* 183* 172* 168* 170* 218* 202* 166*       Current Medications and/or Treatments Impacting Glycemic Control  Immunotherapy:    Immunosuppressants     None        Steroids:   Hormones (From admission, onward)    None        Pressors:    Autonomic Drugs (From admission, onward)    None        Hyperglycemia/Diabetes Medications: "   Antihyperglycemics (From admission, onward)    Start     Stop Route Frequency Ordered    03/06/20 1815  insulin regular 100 Units in sodium chloride 0.9% 100 mL infusion      -- IV Continuous 03/06/20 1701    03/06/20 1801  insulin aspart U-100 pen 0-4 Units      -- SubQ As needed (PRN) 03/06/20 1701

## 2020-03-08 NOTE — PLAN OF CARE
CMICU DAILY GOALS       A: Awake    RASS: Goal - RASS Goal: 0-->alert and calm  Actual - RASS (Horton Agitation-Sedation Scale): 0-->alert and calm   Restraint necessity: N/A  B: Breath   SBT: Not intubated   C: Coordinate A & B, analgesics/sedatives   Pain: managed    SAT: Not intubated  D: Delirium   CAM-ICU: Overall CAM-ICU: Negative  E: Early Mobility   MOVE Screen: Fail   Activity: Activity Management: activity adjusted per tolerance, activity clustered for rest period  FAS: Feeding/Nutrition   Diet order: Diet/Nutrition Received: NPO, Specialty Diet/Nutrition Received: renal diet Fluid restriction:    T: Thrombus   DVT prophylaxis: VTE Required Core Measure: Pharmacological prophylaxis initiated/maintained  H: HOB Elevation   Head of Bed (HOB): HOB at 20-30 degrees  U: Ulcer Prophylaxis   GI: yes  G: Glucose control   managed Glycemic Management: blood glucose monitoring  S: Skin   Bundle compliance: yes   Bathing/Skin Care: bath, chlorhexidine, bath, complete, linen changed Date: [unfilled]  B: Bowel Function   constipation   I: Indwelling Catheters   Lamb necessity:      Urethral Catheter 03/06/20 1642-Reason for Continuing Urinary Catheterization: Critically ill in ICU requiring intensive monitoring  [REMOVED]      Urethral Catheter 02/29/20 2056 16 Fr.-Reason for Continuing Urinary Catheterization: Critically ill in ICU requiring intensive monitoring   CVC necessity: Yes   IPAD offered: Not appropriate  D: De-escalation Antibx   Yes  Plan for the day   Trend CVPs. Cardene for pressure control. Wean insulin.  Family/Goals of care/Code Status   Code Status: Full Code     No acute events throughout day, VS and assessment per flow sheet, patient progressing towards goals as tolerated, plan of care reviewed with John Javier and family, all concerns addressed, will continue to monitor.

## 2020-03-08 NOTE — CONSULTS
"[facility]  General Surgery  Consult Note    Inpatient consult to General Surgery  Consult performed by: Kacie Pierce MD  Consult ordered by: Patricia Simon PA-C  Reason for consult: possible SBO  Assessment/Recommendations: 63 yo male with acute on chronic heart failure with constipation and high NG tube output.    Low concern for SBO but he continues to have high NG output  Replace high NG output based on hemodynamic status in setting of heart failure  Aggressive electrolyte replacement  Continue NG tube for now  Terrible surgical candidate although I doubt he has a surgical issue  May try gastrograffin down the NG tube with serial KUBs starting tomorrow as he is now decompressed to rule out a more distal obstruction not obvious on CT, this will likely also hopefully have a therapeutic effect   Recommend brown bomb enema  If pt physically able, get him to bedside commode        Subjective:     Chief Complaint/Reason for Admission:     History of Present Illness: Mr. Javier is a 63 yo male with hx of CAD, CHF EF 10-15% s/p AICD, HTN, DM2, HLD, obesity, CKD, presenting from OSH intubated with Impella in setting of cardiogenic shock. He was extubated a few days ago and remains critically ill in the MICU. Gen surg consulted for "possible SBO" as pt reportedly has a distended abdomen and hasn't had a BM since admission. NG tube was placed yesterday and since that time has drained about 2.5L brown output with coffee grounds (was described as feculent appearing.)     A CT today showed decompression of the entire small bowel with no sign of obstruction as well as significant stool burden throughout the colon.    I met the patient in his room today and he had several family members in the room. He states that he needs to have a bowel movement and feels like he can, but he is unable to get up to sit on the toilet and he doesn't want to use the bedpan or a bedside commode because the " "camera in the room is "looking at him." Per bedside nurse, the patient is "too weak to get on the commode and PT isn't here today."    On amio and cardene infusions currently.    No current facility-administered medications on file prior to encounter.      Current Outpatient Medications on File Prior to Encounter   Medication Sig    clopidogreL (PLAVIX) 75 mg tablet Take 75 mg by mouth once daily.    furosemide (LASIX) 20 MG tablet Take 20 mg by mouth daily as needed.    metFORMIN (GLUCOPHAGE) 500 MG tablet Take 500 mg by mouth 2 (two) times daily with meals.    metoprolol succinate (TOPROL-XL) 100 MG 24 hr tablet Take 150 mg by mouth once daily.    pantoprazole (PROTONIX) 40 MG tablet Take 40 mg by mouth once daily.    sacubitril-valsartan (ENTRESTO) 49-51 mg per tablet Take 1 tablet by mouth 2 (two) times daily.    spironolactone (ALDACTONE) 25 MG tablet Take 12.5 mg by mouth once daily.       Review of patient's allergies indicates:  No Known Allergies    No past medical history on file.  No past surgical history on file.  Family History     None        Tobacco Use    Smoking status: Not on file   Substance and Sexual Activity    Alcohol use: Not on file    Drug use: Not on file    Sexual activity: Not on file     Review of Systems   Constitutional: Positive for activity change and appetite change. Negative for chills, diaphoresis, fatigue, fever and unexpected weight change.   Respiratory: Positive for shortness of breath.    Cardiovascular: Positive for chest pain and palpitations. Negative for leg swelling.   Gastrointestinal: Positive for abdominal distention and constipation. Negative for abdominal pain, blood in stool, diarrhea and nausea.   Genitourinary: Negative.    Musculoskeletal: Positive for arthralgias.   Skin: Negative.    Neurological: Positive for weakness.   Hematological: Negative.    Psychiatric/Behavioral: Negative.      Objective:     Vital Signs (Most Recent):  Temp: 98.7 °F " (37.1 °C) (03/07/20 1500)  Pulse: 105 (03/07/20 1800)  Resp: (!) 27 (03/07/20 1800)  BP: 120/71 (03/07/20 1800)  SpO2: (!) 91 % (03/07/20 1800) Vital Signs (24h Range):  Temp:  [98.2 °F (36.8 °C)-98.9 °F (37.2 °C)] 98.7 °F (37.1 °C)  Pulse:  [] 105  Resp:  [14-31] 27  SpO2:  [90 %-100 %] 91 %  BP: (105-133)/(54-71) 120/71  Arterial Line BP: (108-159)/(46-72) 111/46     Weight: 67.8 kg (149 lb 7.6 oz)  Body mass index is 24.13 kg/m².      Intake/Output Summary (Last 24 hours) at 3/7/2020 1937  Last data filed at 3/7/2020 1800  Gross per 24 hour   Intake 1816.49 ml   Output 5175 ml   Net -3358.51 ml       Physical Exam   Constitutional: He is oriented to person, place, and time. He appears well-developed.   Very weak appearing   HENT:   Head: Atraumatic.   NG tube in place with brown output with coffee ground   Cardiovascular: Normal rate.   Pulmonary/Chest: Effort normal.   Abdominal: Soft. He exhibits no distension. There is no tenderness. There is no guarding.   Neurological: He is alert and oriented to person, place, and time.   Skin: Skin is warm.   Psychiatric: He has a normal mood and affect.   Nursing note and vitals reviewed.      Significant Labs:  CBC:   Recent Labs   Lab 03/07/20 0252   WBC 13.71*   RBC 3.57*   HGB 10.5*   HCT 31.6*      MCV 89   MCH 29.4   MCHC 33.2     CMP:   Recent Labs   Lab 03/07/20 0252 03/07/20  1813   *   < > 204*   CALCIUM 9.4   < > 10.0   ALBUMIN 3.1*  --   --    PROT 7.5  --   --    *   < > 151*   K 3.4*   < > 3.5   CO2 30*   < > 34*   CL 96   < > 96   *   < > 118*   CREATININE 6.7*   < > 6.8*   ALKPHOS 92  --   --    ALT 15  --   --    AST 24  --   --    BILITOT 0.8  --   --     < > = values in this interval not displayed.       Significant Diagnostics:  CT: I have reviewed all pertinent results/findings within the past 24 hours. constipation    Assessment/Plan:     Active Diagnoses:    Diagnosis Date Noted POA    PRINCIPAL PROBLEM:   Cardiogenic shock [R57.0] 03/01/2020 Yes    Abdominal distention [R14.0] 03/06/2020 No    Iron deficiency anemia [D50.9] 03/05/2020 Yes    Hyperphosphatemia [E83.39]  No    PAF (paroxysmal atrial fibrillation) [I48.0] 03/03/2020 Yes    Malnutrition of moderate degree [E44.0] 03/03/2020 Yes    Acute hypoxemic respiratory failure [J96.01] 03/02/2020 Yes    VT (ventricular tachycardia) [I47.2] 03/02/2020 Yes    ANGEL (acute kidney injury) [N17.9] 03/01/2020 Yes    CAD (coronary artery disease) [I25.10] 03/01/2020 Yes    Type 2 diabetes mellitus, without long-term current use of insulin [E11.9] 03/01/2020 Yes    Acute on chronic combined systolic and diastolic heart failure [I50.43] 03/01/2020 Yes      Problems Resolved During this Admission:       Jhoana Pierce MD  General Surgery  [facility]        I have personally performed a detailed history and physical examination on this patient. My findings are summarized in the resident's note included in the record.  I dont see an SBO on CT  Would focus GI care on relieving constipation

## 2020-03-08 NOTE — ASSESSMENT & PLAN NOTE
63 yo male with acute on chronic heart failure with constipation and high NG tube output     - Low concern for SBO given CT scan and reported bowel function but he continues to have high NG output  - Replace high NG output based on hemodynamic status in setting of heart failure  - Aggressive electrolyte replacement  - Continue NG tube for now  - Terrible surgical candidate although I doubt he has a surgical issue, will follow along peripherally

## 2020-03-08 NOTE — NURSING
Pt K 3.5. CVP 4. Called [facility] with [facility]. Verbal orders for 20 mEq potassium IV and 250cc NaCl bolus. WCTM.

## 2020-03-08 NOTE — SUBJECTIVE & OBJECTIVE
Interval History: No acute events overnight, afebrile, vital signs stable. Denies abdominal pain today. NGT placed yesterday with 2.4L bilious output since placement. Patient reports + flatus and BM but none recorded in chart.     Medications:  Continuous Infusions:   amiodarone in dextrose 5% 0.5 mg/min (03/08/20 0600)    heparin (porcine) in D5W 18.023 Units/kg/hr (03/08/20 0600)    insulin (HUMAN R) infusion (adults) 0.4 Units/hr (03/08/20 0600)    nicardipine 5 mg/hr (03/08/20 0600)     Scheduled Meds:   aspirin  81 mg Oral Daily    atorvastatin  80 mg Oral Daily    hydrALAZINE  10 mg Intravenous Q8H     PRN Meds:sodium chloride, Dextrose 10% Bolus, Dextrose 10% Bolus, glucagon (human recombinant), glucose, glucose, heparin (PORCINE), heparin (PORCINE), insulin aspart U-100, ondansetron, senna-docusate 8.6-50 mg, sodium chloride 0.9%, sodium chloride 0.9%     Review of patient's allergies indicates:  No Known Allergies  Objective:     Vital Signs (Most Recent):  Temp: 97.8 °F (36.6 °C) (03/08/20 0300)  Pulse: 93 (03/08/20 0600)  Resp: (!) 27 (03/08/20 0600)  BP: 115/61 (03/08/20 0300)  SpO2: (!) 90 % (03/08/20 0600) Vital Signs (24h Range):  Temp:  [97.8 °F (36.6 °C)-98.7 °F (37.1 °C)] 97.8 °F (36.6 °C)  Pulse:  [] 93  Resp:  [14-65] 27  SpO2:  [90 %-100 %] 90 %  BP: (105-128)/(54-76) 115/61  Arterial Line BP: (111-159)/(46-67) 131/62     Weight: 67.8 kg (149 lb 7.6 oz)  Body mass index is 24.13 kg/m².    Intake/Output - Last 3 Shifts       03/06 0700 - 03/07 0659 03/07 0700 - 03/08 0659 03/08 0700 - 03/09 0659    P.O. 180 240     I.V. (mL/kg) 1186.8 (17.5) 1625.8 (24)     Blood       IV Piggyback  550     Total Intake(mL/kg) 1366.8 (20.1) 2415.8 (35.6)     Urine (mL/kg/hr) 3630 (2.2) 2750 (1.7)     Emesis/NG output 0      Drains 900 2450     Total Output 4530 5200     Net -3163.2 -2784.2            Emesis Occurrence 1 x            Physical Exam   Constitutional: He is oriented to person, place, and  time. He appears well-developed and well-nourished.   HENT:   Head: Normocephalic and atraumatic.   Eyes: Pupils are equal, round, and reactive to light. EOM are normal.   Neck: Normal range of motion. Neck supple.   LIJ TLC placed 3/1/20.   Cardiovascular: Normal rate and regular rhythm.   Murmur heard.  Pulmonary/Chest: Effort normal and breath sounds normal. No stridor. No respiratory distress.   Abdominal: Soft. He exhibits no distension. There is no tenderness.   NGT   Musculoskeletal: Normal range of motion. He exhibits no edema.   Neurological: He is alert and oriented to person, place, and time.   Skin: Skin is warm and dry. Capillary refill takes 2 to 3 seconds.   Nursing note and vitals reviewed.      Significant Labs:  CBC:   Recent Labs   Lab 03/08/20  0304   WBC 11.59   RBC 3.73*   HGB 10.7*   HCT 33.7*   *   MCV 90   MCH 28.7   MCHC 31.8*     BMP:   Recent Labs   Lab 03/08/20  0613   *   *   K 3.7   CL 98   CO2 34*   *   CREATININE 6.0*   CALCIUM 9.8   MG 2.4       Significant Diagnostics:  I have reviewed all pertinent imaging results/findings within the past 24 hours.

## 2020-03-08 NOTE — ASSESSMENT & PLAN NOTE
-NGT placed for decompression with high output over last 24 hours  -General surgery consulted who recommended CT and brown bomb.  -CT with concern for possible pancreatitis.  -Brown bomb ordered today for high stool burden  -Bowel sounds improved today, +flatus overnight  -Sodium trending up, start maintenance fluids  -Lipase ordered per radiology recommendations, >1000. Spoke with surgery. As patient is not having pain, they would not . If patient starts having abdominal pain, repeat CT abdomen/pelvis

## 2020-03-08 NOTE — ASSESSMENT & PLAN NOTE
-Presented with impella placed at OSH with position issues and LD>1000.  -Impella removed 3/2/20.   -CVP 4 this am, 4-6 overnight, SVO2 not drawn this am but has been running high  -Continue to hold lasix due to low CVP and high output from NGT. Free water deficit ~3.8 L. Will start maintenance fluids 1/2 NS and trend BMP and CVP. If CVP>10, stop IVF and call CTS.  - d/c'ed due to high cardiac output and hypertension/afib.  -TTE 3/2 EF 10%, LVIDD 6, TAPSE 1.98, Mod MR, Mod TR.  -Started on nitro gtt for BP and weaned off. Now on cardene gtt. Started on PO hydralazine/coreg but now not tolerating PO.    -Not working up for advanced options due to medical instability/renal failure at this time. Wtill continue to reassess candidacy if condition improves.

## 2020-03-09 PROBLEM — R78.81 POSITIVE BLOOD CULTURES: Status: ACTIVE | Noted: 2020-03-09

## 2020-03-09 PROBLEM — J96.01 ACUTE HYPOXEMIC RESPIRATORY FAILURE: Status: RESOLVED | Noted: 2020-03-02 | Resolved: 2020-03-09

## 2020-03-09 LAB
ALBUMIN SERPL BCP-MCNC: 3 G/DL (ref 3.5–5.2)
ALLENS TEST: ABNORMAL
ALP SERPL-CCNC: 86 U/L (ref 55–135)
ALT SERPL W/O P-5'-P-CCNC: 16 U/L (ref 10–44)
ANION GAP SERPL CALC-SCNC: 13 MMOL/L (ref 8–16)
ANION GAP SERPL CALC-SCNC: 14 MMOL/L (ref 8–16)
ANION GAP SERPL CALC-SCNC: 15 MMOL/L (ref 8–16)
APTT BLDCRRT: 52.1 SEC (ref 21–32)
AST SERPL-CCNC: 18 U/L (ref 10–40)
BACTERIA SPEC AEROBE CULT: NORMAL
BACTERIA SPEC AEROBE CULT: NORMAL
BASOPHILS # BLD AUTO: 0.04 K/UL (ref 0–0.2)
BASOPHILS NFR BLD: 0.3 % (ref 0–1.9)
BILIRUB SERPL-MCNC: 0.5 MG/DL (ref 0.1–1)
BUN SERPL-MCNC: 105 MG/DL (ref 8–23)
BUN SERPL-MCNC: 109 MG/DL (ref 8–23)
BUN SERPL-MCNC: 109 MG/DL (ref 8–23)
BUN SERPL-MCNC: 88 MG/DL (ref 8–23)
BUN SERPL-MCNC: 98 MG/DL (ref 8–23)
CALCIUM SERPL-MCNC: 8.5 MG/DL (ref 8.7–10.5)
CALCIUM SERPL-MCNC: 8.7 MG/DL (ref 8.7–10.5)
CALCIUM SERPL-MCNC: 9.2 MG/DL (ref 8.7–10.5)
CHLORIDE SERPL-SCNC: 104 MMOL/L (ref 95–110)
CHLORIDE SERPL-SCNC: 105 MMOL/L (ref 95–110)
CHLORIDE SERPL-SCNC: 105 MMOL/L (ref 95–110)
CHLORIDE SERPL-SCNC: 106 MMOL/L (ref 95–110)
CHLORIDE SERPL-SCNC: 108 MMOL/L (ref 95–110)
CO2 SERPL-SCNC: 27 MMOL/L (ref 23–29)
CO2 SERPL-SCNC: 29 MMOL/L (ref 23–29)
CO2 SERPL-SCNC: 31 MMOL/L (ref 23–29)
CO2 SERPL-SCNC: 32 MMOL/L (ref 23–29)
CO2 SERPL-SCNC: 32 MMOL/L (ref 23–29)
CREAT SERPL-MCNC: 4.2 MG/DL (ref 0.5–1.4)
CREAT SERPL-MCNC: 4.5 MG/DL (ref 0.5–1.4)
CREAT SERPL-MCNC: 5 MG/DL (ref 0.5–1.4)
CREAT SERPL-MCNC: 5.1 MG/DL (ref 0.5–1.4)
CREAT SERPL-MCNC: 5.1 MG/DL (ref 0.5–1.4)
DELSYS: ABNORMAL
DIFFERENTIAL METHOD: ABNORMAL
EOSINOPHIL # BLD AUTO: 0.1 K/UL (ref 0–0.5)
EOSINOPHIL NFR BLD: 0.6 % (ref 0–8)
ERYTHROCYTE [DISTWIDTH] IN BLOOD BY AUTOMATED COUNT: 14.7 % (ref 11.5–14.5)
EST. GFR  (AFRICAN AMERICAN): 12.9 ML/MIN/1.73 M^2
EST. GFR  (AFRICAN AMERICAN): 12.9 ML/MIN/1.73 M^2
EST. GFR  (AFRICAN AMERICAN): 13.2 ML/MIN/1.73 M^2
EST. GFR  (AFRICAN AMERICAN): 15 ML/MIN/1.73 M^2
EST. GFR  (AFRICAN AMERICAN): 16.3 ML/MIN/1.73 M^2
EST. GFR  (NON AFRICAN AMERICAN): 11.1 ML/MIN/1.73 M^2
EST. GFR  (NON AFRICAN AMERICAN): 11.1 ML/MIN/1.73 M^2
EST. GFR  (NON AFRICAN AMERICAN): 11.4 ML/MIN/1.73 M^2
EST. GFR  (NON AFRICAN AMERICAN): 12.9 ML/MIN/1.73 M^2
EST. GFR  (NON AFRICAN AMERICAN): 14.1 ML/MIN/1.73 M^2
GLUCOSE SERPL-MCNC: 132 MG/DL (ref 70–110)
GLUCOSE SERPL-MCNC: 148 MG/DL (ref 70–110)
GLUCOSE SERPL-MCNC: 161 MG/DL (ref 70–110)
GLUCOSE SERPL-MCNC: 161 MG/DL (ref 70–110)
GLUCOSE SERPL-MCNC: 249 MG/DL (ref 70–110)
GRAM STN SPEC: NORMAL
HCO3 UR-SCNC: 33.4 MMOL/L (ref 24–28)
HCT VFR BLD AUTO: 35 % (ref 40–54)
HGB BLD-MCNC: 10.8 G/DL (ref 14–18)
IMM GRANULOCYTES # BLD AUTO: 0.18 K/UL (ref 0–0.04)
IMM GRANULOCYTES NFR BLD AUTO: 1.3 % (ref 0–0.5)
LIPASE SERPL-CCNC: 968 U/L (ref 4–60)
LYMPHOCYTES # BLD AUTO: 1.6 K/UL (ref 1–4.8)
LYMPHOCYTES NFR BLD: 11.3 % (ref 18–48)
MAGNESIUM SERPL-MCNC: 1.8 MG/DL (ref 1.6–2.6)
MAGNESIUM SERPL-MCNC: 2.1 MG/DL (ref 1.6–2.6)
MAGNESIUM SERPL-MCNC: 2.3 MG/DL (ref 1.6–2.6)
MAGNESIUM SERPL-MCNC: 2.4 MG/DL (ref 1.6–2.6)
MCH RBC QN AUTO: 28.8 PG (ref 27–31)
MCHC RBC AUTO-ENTMCNC: 30.9 G/DL (ref 32–36)
MCV RBC AUTO: 93 FL (ref 82–98)
MONOCYTES # BLD AUTO: 1.7 K/UL (ref 0.3–1)
MONOCYTES NFR BLD: 12.2 % (ref 4–15)
NEUTROPHILS # BLD AUTO: 10.5 K/UL (ref 1.8–7.7)
NEUTROPHILS NFR BLD: 74.3 % (ref 38–73)
NRBC BLD-RTO: 0 /100 WBC
PCO2 BLDA: 42.4 MMHG (ref 35–45)
PH SMN: 7.5 [PH] (ref 7.35–7.45)
PLATELET # BLD AUTO: 466 K/UL (ref 150–350)
PMV BLD AUTO: 11.2 FL (ref 9.2–12.9)
PO2 BLDA: 52 MMHG (ref 40–60)
POC BE: 10 MMOL/L
POC SATURATED O2: 89 % (ref 95–100)
POC TCO2: 35 MMOL/L (ref 24–29)
POCT GLUCOSE: 160 MG/DL (ref 70–110)
POCT GLUCOSE: 227 MG/DL (ref 70–110)
POTASSIUM SERPL-SCNC: 3.7 MMOL/L (ref 3.5–5.1)
POTASSIUM SERPL-SCNC: 3.9 MMOL/L (ref 3.5–5.1)
POTASSIUM SERPL-SCNC: 3.9 MMOL/L (ref 3.5–5.1)
POTASSIUM SERPL-SCNC: 4.1 MMOL/L (ref 3.5–5.1)
POTASSIUM SERPL-SCNC: 4.1 MMOL/L (ref 3.5–5.1)
PROT SERPL-MCNC: 7.3 G/DL (ref 6–8.4)
RBC # BLD AUTO: 3.75 M/UL (ref 4.6–6.2)
SAMPLE: ABNORMAL
SITE: ABNORMAL
SODIUM SERPL-SCNC: 145 MMOL/L (ref 136–145)
SODIUM SERPL-SCNC: 148 MMOL/L (ref 136–145)
SODIUM SERPL-SCNC: 151 MMOL/L (ref 136–145)
SODIUM SERPL-SCNC: 151 MMOL/L (ref 136–145)
SODIUM SERPL-SCNC: 154 MMOL/L (ref 136–145)
WBC # BLD AUTO: 14.12 K/UL (ref 3.9–12.7)

## 2020-03-09 PROCEDURE — 97110 THERAPEUTIC EXERCISES: CPT

## 2020-03-09 PROCEDURE — 97116 GAIT TRAINING THERAPY: CPT

## 2020-03-09 PROCEDURE — 80053 COMPREHEN METABOLIC PANEL: CPT

## 2020-03-09 PROCEDURE — 99292 CRITICAL CARE ADDL 30 MIN: CPT | Mod: ,,, | Performed by: INTERNAL MEDICINE

## 2020-03-09 PROCEDURE — 85730 THROMBOPLASTIN TIME PARTIAL: CPT

## 2020-03-09 PROCEDURE — 83690 ASSAY OF LIPASE: CPT

## 2020-03-09 PROCEDURE — 82803 BLOOD GASES ANY COMBINATION: CPT

## 2020-03-09 PROCEDURE — 87040 BLOOD CULTURE FOR BACTERIA: CPT | Mod: 59

## 2020-03-09 PROCEDURE — 99292 PR CRITICAL CARE, ADDL 30 MIN: ICD-10-PCS | Mod: ,,, | Performed by: INTERNAL MEDICINE

## 2020-03-09 PROCEDURE — 20000000 HC ICU ROOM

## 2020-03-09 PROCEDURE — 83735 ASSAY OF MAGNESIUM: CPT | Mod: 91

## 2020-03-09 PROCEDURE — 63600175 PHARM REV CODE 636 W HCPCS: Performed by: STUDENT IN AN ORGANIZED HEALTH CARE EDUCATION/TRAINING PROGRAM

## 2020-03-09 PROCEDURE — 27000221 HC OXYGEN, UP TO 24 HOURS

## 2020-03-09 PROCEDURE — 85025 COMPLETE CBC W/AUTO DIFF WBC: CPT

## 2020-03-09 PROCEDURE — 25000003 PHARM REV CODE 250: Performed by: NURSE PRACTITIONER

## 2020-03-09 PROCEDURE — 99232 PR SUBSEQUENT HOSPITAL CARE,LEVL II: ICD-10-PCS | Mod: ,,, | Performed by: NURSE PRACTITIONER

## 2020-03-09 PROCEDURE — 99233 PR SUBSEQUENT HOSPITAL CARE,LEVL III: ICD-10-PCS | Mod: ,,, | Performed by: NURSE PRACTITIONER

## 2020-03-09 PROCEDURE — 99900035 HC TECH TIME PER 15 MIN (STAT)

## 2020-03-09 PROCEDURE — 80048 BASIC METABOLIC PNL TOTAL CA: CPT

## 2020-03-09 PROCEDURE — 99233 SBSQ HOSP IP/OBS HIGH 50: CPT | Mod: ,,, | Performed by: NURSE PRACTITIONER

## 2020-03-09 PROCEDURE — S0028 INJECTION, FAMOTIDINE, 20 MG: HCPCS | Performed by: NURSE PRACTITIONER

## 2020-03-09 PROCEDURE — 99291 CRITICAL CARE FIRST HOUR: CPT | Mod: ,,, | Performed by: NURSE PRACTITIONER

## 2020-03-09 PROCEDURE — 63600175 PHARM REV CODE 636 W HCPCS: Performed by: NURSE PRACTITIONER

## 2020-03-09 PROCEDURE — 99232 SBSQ HOSP IP/OBS MODERATE 35: CPT | Mod: ,,, | Performed by: NURSE PRACTITIONER

## 2020-03-09 PROCEDURE — 83735 ASSAY OF MAGNESIUM: CPT

## 2020-03-09 PROCEDURE — 97535 SELF CARE MNGMENT TRAINING: CPT

## 2020-03-09 PROCEDURE — 99291 PR CRITICAL CARE, E/M 30-74 MINUTES: ICD-10-PCS | Mod: ,,, | Performed by: NURSE PRACTITIONER

## 2020-03-09 RX ORDER — HYDRALAZINE HYDROCHLORIDE 25 MG/1
25 TABLET, FILM COATED ORAL EVERY 8 HOURS
Status: DISCONTINUED | OUTPATIENT
Start: 2020-03-09 | End: 2020-03-10

## 2020-03-09 RX ORDER — FAMOTIDINE 10 MG/ML
20 INJECTION INTRAVENOUS DAILY
Status: DISCONTINUED | OUTPATIENT
Start: 2020-03-09 | End: 2020-03-10

## 2020-03-09 RX ORDER — ISOSORBIDE DINITRATE 10 MG/1
20 TABLET ORAL EVERY 8 HOURS
Status: DISCONTINUED | OUTPATIENT
Start: 2020-03-09 | End: 2020-03-10

## 2020-03-09 RX ORDER — AMOXICILLIN 250 MG
2 CAPSULE ORAL 2 TIMES DAILY
Status: DISCONTINUED | OUTPATIENT
Start: 2020-03-09 | End: 2020-03-13 | Stop reason: HOSPADM

## 2020-03-09 RX ORDER — DEXTROSE MONOHYDRATE 50 MG/ML
INJECTION, SOLUTION INTRAVENOUS CONTINUOUS
Status: DISCONTINUED | OUTPATIENT
Start: 2020-03-09 | End: 2020-03-10

## 2020-03-09 RX ORDER — HYDRALAZINE HYDROCHLORIDE 20 MG/ML
20 INJECTION INTRAMUSCULAR; INTRAVENOUS EVERY 8 HOURS
Status: DISCONTINUED | OUTPATIENT
Start: 2020-03-09 | End: 2020-03-09

## 2020-03-09 RX ADMIN — NICARDIPINE HYDROCHLORIDE 7.5 MG/HR: 0.2 INJECTION, SOLUTION INTRAVENOUS at 10:03

## 2020-03-09 RX ADMIN — HYDRALAZINE HYDROCHLORIDE 25 MG: 25 TABLET, FILM COATED ORAL at 01:03

## 2020-03-09 RX ADMIN — ISOSORBIDE DINITRATE 20 MG: 10 TABLET ORAL at 09:03

## 2020-03-09 RX ADMIN — AMIODARONE HYDROCHLORIDE 0.5 MG/MIN: 1.8 INJECTION, SOLUTION INTRAVENOUS at 11:03

## 2020-03-09 RX ADMIN — NICARDIPINE HYDROCHLORIDE 5 MG/HR: 0.2 INJECTION, SOLUTION INTRAVENOUS at 10:03

## 2020-03-09 RX ADMIN — DEXTROSE: 5 SOLUTION INTRAVENOUS at 03:03

## 2020-03-09 RX ADMIN — HYDRALAZINE HYDROCHLORIDE 25 MG: 25 TABLET, FILM COATED ORAL at 09:03

## 2020-03-09 RX ADMIN — HEPARIN SODIUM 18 UNITS/KG/HR: 10000 INJECTION, SOLUTION INTRAVENOUS at 09:03

## 2020-03-09 RX ADMIN — INSULIN ASPART 1 UNITS: 100 INJECTION, SOLUTION INTRAVENOUS; SUBCUTANEOUS at 11:03

## 2020-03-09 RX ADMIN — ASPIRIN 81 MG CHEWABLE TABLET 81 MG: 81 TABLET CHEWABLE at 10:03

## 2020-03-09 RX ADMIN — DEXTROSE: 5 SOLUTION INTRAVENOUS at 11:03

## 2020-03-09 RX ADMIN — NICARDIPINE HYDROCHLORIDE 7.5 MG/HR: 0.2 INJECTION, SOLUTION INTRAVENOUS at 08:03

## 2020-03-09 RX ADMIN — ISOSORBIDE DINITRATE 20 MG: 10 TABLET ORAL at 01:03

## 2020-03-09 RX ADMIN — VANCOMYCIN HYDROCHLORIDE 1250 MG: 1.25 INJECTION, POWDER, LYOPHILIZED, FOR SOLUTION INTRAVENOUS at 01:03

## 2020-03-09 RX ADMIN — FAMOTIDINE 20 MG: 10 INJECTION INTRAVENOUS at 09:03

## 2020-03-09 RX ADMIN — HYDRALAZINE HYDROCHLORIDE 10 MG: 20 INJECTION INTRAMUSCULAR; INTRAVENOUS at 06:03

## 2020-03-09 RX ADMIN — ATORVASTATIN CALCIUM 80 MG: 20 TABLET, FILM COATED ORAL at 10:03

## 2020-03-09 RX ADMIN — DOCUSATE SODIUM - SENNOSIDES 2 TABLET: 50; 8.6 TABLET, FILM COATED ORAL at 09:03

## 2020-03-09 RX ADMIN — AMIODARONE HYDROCHLORIDE 0.5 MG/MIN: 1.8 INJECTION, SOLUTION INTRAVENOUS at 10:03

## 2020-03-09 NOTE — PROGRESS NOTES
[facility]  Nephrology  Progress Note    Patient Name: John Javier  MRN: [id number]  Admission Date: 2/29/2020  Hospital Length of Stay: 9 days  Attending Provider: Lizzy Cavanaugh MD   Primary Care Physician: To Obtain Unable  Principal Problem:Cardiogenic shock    Subjective:     Interval History: NAEON. Patient with 3.1 L of urine output over the last 24 hrs. sCr improved to 5.0 from 6.4. On 2 L via NC, no distress on AM assessment. Patient on Cardene drip.   Na 154 this AM, on 1/2NS at 100 ml/hr since 3/7  CVP 3  PH 7.50, CO2 31, patient increased GI losses over the last 2 days.   CXR w/ no acute changes     Review of patient's allergies indicates:  No Known Allergies  Current Facility-Administered Medications   Medication Frequency    0.9%  NaCl infusion (for blood administration) Q24H PRN    amiodarone 360 mg/200 mL (1.8 mg/mL) infusion Continuous    aspirin chewable tablet 81 mg Daily    atorvastatin tablet 80 mg Daily    dextrose 10% (D10W) Bolus PRN    dextrose 10% (D10W) Bolus PRN    dextrose 5 % infusion Continuous    famotidine (PF) injection 20 mg Daily    glucagon (human recombinant) injection 1 mg PRN    glucose chewable tablet 16 g PRN    glucose chewable tablet 24 g PRN    heparin 25,000 units in dextrose 5% (100 units/ml) IV bolus from bag - ADDITIONAL PRN BOLUS - 30 units/kg PRN    heparin 25,000 units in dextrose 5% (100 units/ml) IV bolus from bag - ADDITIONAL PRN BOLUS - 60 units/kg PRN    heparin 25,000 units in dextrose 5% 250 mL (100 units/mL) infusion LOW INTENSITY nomogram - [facility] Continuous    hydrALAZINE tablet 25 mg Q8H    insulin aspart U-100 pen 0-4 Units PRN    insulin regular 100 Units in sodium chloride 0.9% 100 mL infusion Continuous    isosorbide dinitrate tablet 20 mg Q8H    niCARdipine 40 mg/200 mL infusion Continuous    ondansetron injection 4 mg Q8H PRN    senna-docusate 8.6-50 mg per tablet 1 tablet BID PRN    senna-docusate 8.6-50  mg per tablet 2 tablet BID    sodium chloride 0.9% flush 10 mL PRN    sodium chloride 0.9% flush 10 mL PRN    vancomycin 1.25 g in dextrose 5% 250 mL IVPB (ready to mix) Q24H       Objective:     Vital Signs (Most Recent):  Temp: 98.9 °F (37.2 °C) (03/09/20 0700)  Pulse: 89 (03/09/20 1100)  Resp: (!) 23 (03/09/20 1100)  BP: 128/60 (03/09/20 1030)  SpO2: 98 % (03/09/20 1100)  O2 Device (Oxygen Therapy): nasal cannula (03/09/20 1100) Vital Signs (24h Range):  Temp:  [97.6 °F (36.4 °C)-99.3 °F (37.4 °C)] 98.9 °F (37.2 °C)  Pulse:  [] 89  Resp:  [16-35] 23  SpO2:  [92 %-100 %] 98 %  BP: (128-134)/(60-69) 128/60  Arterial Line BP: (117-155)/(52-72) 149/72     Weight: 67.8 kg (149 lb 7.6 oz) (03/07/20 0701)  Body mass index is 24.13 kg/m².  Body surface area is 1.78 meters squared.    I/O last 3 completed shifts:  In: 5233.4 [I.V.:4183.4; IV Piggyback:1050]  Out: 5405 [Urine:4055; Drains:1350]    Physical Exam   Constitutional: He is oriented to person, place, and time. He appears well-developed and well-nourished. He is cooperative. No distress. Nasal cannula in place.   HENT:   Head: Normocephalic and atraumatic.   Eyes: Right eye exhibits no discharge. Left eye exhibits no discharge.   Neck: Normal range of motion. Neck supple.   Cardiovascular: Normal rate and regular rhythm.   Murmur heard.  Pulmonary/Chest: Effort normal. No stridor. No respiratory distress. He has rales.   Abdominal: Soft. Bowel sounds are normal. He exhibits distension. There is no tenderness.   Musculoskeletal: Normal range of motion. He exhibits no edema.   Neurological: He is alert and oriented to person, place, and time.   Skin: Skin is warm and dry.   Psychiatric: He has a normal mood and affect. His behavior is normal.   Nursing note and vitals reviewed.      Significant Labs:  CBC:   Recent Labs   Lab 03/09/20  0229   WBC 14.12*   RBC 3.75*   HGB 10.8*   HCT 35.0*   *   MCV 93   MCH 28.8   MCHC 30.9*     CMP:   Recent Labs    Lab 03/09/20  0229 03/09/20  0608   *  161* 148*   CALCIUM 9.2  9.2 9.2   ALBUMIN 3.0*  --    PROT 7.3  --    *  151* 154*   K 4.1  4.1 3.9   CO2 32*  32* 31*     105 108   *  109* 105*   CREATININE 5.1*  5.1* 5.0*   ALKPHOS 86  --    ALT 16  --    AST 18  --    BILITOT 0.5  --      All labs within the past 24 hours have been reviewed.         Assessment/Plan:     * Cardiogenic shock  - Managed by primary.     ANGEL (acute kidney injury)  * Non-oliguric iATN from cardiogenic shock.     3/2: -urine microscopy:  abundant muddy brown casts (leaning towards ATN)     Plan/ Rec:  -no urgent need for RRT. sCr continues to improve, 5.0 today from 6.0 on yesterday. UOP >3 L/24 hrs. Lasix last given 3/7.  - Renal function improving overall  - Na today up to 154, recommend discontinuing 1/5NS at 100 ml/hr  - Recommend starting D5W at 125 ml/hr, patient with a 4.1 L free water deficit.   - Patient with a metabolic alkalosis (VBG 7.50, CO2 31), patient increased GI losses over the last 2 days NGT removed this AM, will follow    - check phos level daily  - Renal function panels q 12hr   - Strict I/O and chart  - Will follow closely  - Plan discussed with Dr. Valdez        Thank you for your consult. I will follow-up with patient. Please contact us if you have any additional questions.    Marsha Rahman DNP, FNP-C  Nephrology  [facility]

## 2020-03-09 NOTE — ASSESSMENT & PLAN NOTE
BG goal 140 - 180     Continue transition IV insulin infusion at 0.5 u/hr with stepdown parameters  Low dose correction scale - due to decreased renal function  BG monitoring every 4 hours while NPO    ADDENDUM: Change BG monitoring to /HS/0200 since diet advanced    Discharge planning: TBD

## 2020-03-09 NOTE — SUBJECTIVE & OBJECTIVE
Interval History: NAEON. Patient with 3.1 L of urine output over the last 24 hrs. sCr improved to 5.0 from 6.4. On 2 L via NC, no distress on AM assessment. Patient on Cardene drip.   Na 154 this AM, on 1/2NS at 100 ml/hr since 3/7  CVP 3  PH 7.50, CO2 31, patient increased GI losses over the last 2 days.   CXR w/ no acute changes     Review of patient's allergies indicates:  No Known Allergies  Current Facility-Administered Medications   Medication Frequency    0.9%  NaCl infusion (for blood administration) Q24H PRN    amiodarone 360 mg/200 mL (1.8 mg/mL) infusion Continuous    aspirin chewable tablet 81 mg Daily    atorvastatin tablet 80 mg Daily    dextrose 10% (D10W) Bolus PRN    dextrose 10% (D10W) Bolus PRN    dextrose 5 % infusion Continuous    famotidine (PF) injection 20 mg Daily    glucagon (human recombinant) injection 1 mg PRN    glucose chewable tablet 16 g PRN    glucose chewable tablet 24 g PRN    heparin 25,000 units in dextrose 5% (100 units/ml) IV bolus from bag - ADDITIONAL PRN BOLUS - 30 units/kg PRN    heparin 25,000 units in dextrose 5% (100 units/ml) IV bolus from bag - ADDITIONAL PRN BOLUS - 60 units/kg PRN    heparin 25,000 units in dextrose 5% 250 mL (100 units/mL) infusion LOW INTENSITY nomogram - [facility] Continuous    hydrALAZINE tablet 25 mg Q8H    insulin aspart U-100 pen 0-4 Units PRN    insulin regular 100 Units in sodium chloride 0.9% 100 mL infusion Continuous    isosorbide dinitrate tablet 20 mg Q8H    niCARdipine 40 mg/200 mL infusion Continuous    ondansetron injection 4 mg Q8H PRN    senna-docusate 8.6-50 mg per tablet 1 tablet BID PRN    senna-docusate 8.6-50 mg per tablet 2 tablet BID    sodium chloride 0.9% flush 10 mL PRN    sodium chloride 0.9% flush 10 mL PRN    vancomycin 1.25 g in dextrose 5% 250 mL IVPB (ready to mix) Q24H       Objective:     Vital Signs (Most Recent):  Temp: 98.9 °F (37.2 °C) (03/09/20 0700)  Pulse: 89 (03/09/20 1100)  Resp: (!)  23 (03/09/20 1100)  BP: 128/60 (03/09/20 1030)  SpO2: 98 % (03/09/20 1100)  O2 Device (Oxygen Therapy): nasal cannula (03/09/20 1100) Vital Signs (24h Range):  Temp:  [97.6 °F (36.4 °C)-99.3 °F (37.4 °C)] 98.9 °F (37.2 °C)  Pulse:  [] 89  Resp:  [16-35] 23  SpO2:  [92 %-100 %] 98 %  BP: (128-134)/(60-69) 128/60  Arterial Line BP: (117-155)/(52-72) 149/72     Weight: 67.8 kg (149 lb 7.6 oz) (03/07/20 0701)  Body mass index is 24.13 kg/m².  Body surface area is 1.78 meters squared.    I/O last 3 completed shifts:  In: 5233.4 [I.V.:4183.4; IV Piggyback:1050]  Out: 5405 [Urine:4055; Drains:1350]    Physical Exam   Constitutional: He is oriented to person, place, and time. He appears well-developed and well-nourished. He is cooperative. No distress. Nasal cannula in place.   HENT:   Head: Normocephalic and atraumatic.   Eyes: Right eye exhibits no discharge. Left eye exhibits no discharge.   Neck: Normal range of motion. Neck supple.   Cardiovascular: Normal rate and regular rhythm.   Murmur heard.  Pulmonary/Chest: Effort normal. No stridor. No respiratory distress. He has rales.   Abdominal: Soft. Bowel sounds are normal. He exhibits distension. There is no tenderness.   Musculoskeletal: Normal range of motion. He exhibits no edema.   Neurological: He is alert and oriented to person, place, and time.   Skin: Skin is warm and dry.   Psychiatric: He has a normal mood and affect. His behavior is normal.   Nursing note and vitals reviewed.      Significant Labs:  CBC:   Recent Labs   Lab 03/09/20 0229   WBC 14.12*   RBC 3.75*   HGB 10.8*   HCT 35.0*   *   MCV 93   MCH 28.8   MCHC 30.9*     CMP:   Recent Labs   Lab 03/09/20 0229 03/09/20  0608   *  161* 148*   CALCIUM 9.2  9.2 9.2   ALBUMIN 3.0*  --    PROT 7.3  --    *  151* 154*   K 4.1  4.1 3.9   CO2 32*  32* 31*     105 108   *  109* 105*   CREATININE 5.1*  5.1* 5.0*   ALKPHOS 86  --    ALT 16  --    AST 18  --    BILITOT  0.5  --      All labs within the past 24 hours have been reviewed.

## 2020-03-09 NOTE — SUBJECTIVE & OBJECTIVE
Interval History: No issues overnight. NGT removed this am.     Continuous Infusions:   sodium chloride 0.45% 100 mL/hr at 03/09/20 0600    amiodarone in dextrose 5% 0.5 mg/min (03/09/20 0600)    heparin (porcine) in D5W 18.023 Units/kg/hr (03/09/20 0600)    insulin (HUMAN R) infusion (adults) 0.5 Units/hr (03/09/20 0600)    nicardipine 5 mg/hr (03/09/20 1031)     Scheduled Meds:   aspirin  81 mg Oral Daily    atorvastatin  80 mg Oral Daily    famotidine (PF)  20 mg Intravenous Daily    hydrALAZINE  25 mg Oral Q8H    isosorbide dinitrate  20 mg Oral Q8H    senna-docusate 8.6-50 mg  2 tablet Oral BID     PRN Meds:sodium chloride, Dextrose 10% Bolus, Dextrose 10% Bolus, glucagon (human recombinant), glucose, glucose, heparin (PORCINE), heparin (PORCINE), insulin aspart U-100, ondansetron, senna-docusate 8.6-50 mg, sodium chloride 0.9%, sodium chloride 0.9%    Review of patient's allergies indicates:  No Known Allergies  Objective:     Vital Signs (Most Recent):  Temp: 98.9 °F (37.2 °C) (03/09/20 0700)  Pulse: 87 (03/09/20 1055)  Resp: (!) 32 (03/09/20 1055)  BP: 128/60 (03/09/20 1030)  SpO2: 97 % (03/09/20 1055) Vital Signs (24h Range):  Temp:  [97.6 °F (36.4 °C)-99.3 °F (37.4 °C)] 98.9 °F (37.2 °C)  Pulse:  [] 87  Resp:  [16-35] 32  SpO2:  [92 %-100 %] 97 %  BP: (128-134)/(60-69) 128/60  Arterial Line BP: (117-155)/(52-72) 146/67     Patient Vitals for the past 72 hrs (Last 3 readings):   Weight   03/07/20 0701 67.8 kg (149 lb 7.6 oz)   03/07/20 0641 67.9 kg (149 lb 11.1 oz)     Body mass index is 24.13 kg/m².      Intake/Output Summary (Last 24 hours) at 3/9/2020 1104  Last data filed at 3/9/2020 1000  Gross per 24 hour   Intake 3580.8 ml   Output 3020 ml   Net 560.8 ml        Telemetry: reviewed    Physical Exam   Constitutional: He is oriented to person, place, and time. He appears well-developed and well-nourished.   HENT:   Head: Normocephalic and atraumatic.   Eyes: Pupils are equal, round, and  reactive to light. EOM are normal.   Neck: Normal range of motion. Neck supple.   LIJ TLC placed 3/1/20.   Cardiovascular: Normal rate and regular rhythm.   Murmur heard.  Pulmonary/Chest: Effort normal and breath sounds normal. No stridor. No respiratory distress.   Abdominal: Soft. He exhibits no distension. Bowel sounds are decreased. There is no tenderness.   Musculoskeletal: Normal range of motion. He exhibits no edema.   Neurological: He is alert and oriented to person, place, and time.   Skin: Skin is warm and dry. Capillary refill takes 2 to 3 seconds.   Nursing note and vitals reviewed.    Significant Labs:  CBC:  Recent Labs   Lab 03/07/20  0252 03/08/20  0304 03/09/20  0229   WBC 13.71* 11.59 14.12*   RBC 3.57* 3.73* 3.75*   HGB 10.5* 10.7* 10.8*   HCT 31.6* 33.7* 35.0*    356* 466*   MCV 89 90 93   MCH 29.4 28.7 28.8   MCHC 33.2 31.8* 30.9*     BNP:  No results for input(s): BNP in the last 168 hours.    Invalid input(s): BNPTRIAGELBLO  CMP:  Recent Labs   Lab 03/07/20  0252  03/08/20  0304  03/08/20  1723 03/09/20  0229 03/09/20  0608   *   < > 183*   < > 170* 161*  161* 148*   CALCIUM 9.4   < > 10.0   < > 10.0 9.2  9.2 9.2   ALBUMIN 3.1*  --  3.1*  --   --  3.0*  --    PROT 7.5  --  7.7  --   --  7.3  --    *   < > 153*   < > 155* 151*  151* 154*   K 3.4*   < > 3.8   < > 3.9 4.1  4.1 3.9   CO2 30*   < > 33*   < > 35* 32*  32* 31*   CL 96   < > 99   < > 102 105  105 108   *   < > 118*   < > 115* 109*  109* 105*   CREATININE 6.7*   < > 6.4*   < > 5.9* 5.1*  5.1* 5.0*   ALKPHOS 92  --  91  --   --  86  --    ALT 15  --  15  --   --  16  --    AST 24  --  21  --   --  18  --    BILITOT 0.8  --  0.7  --   --  0.5  --     < > = values in this interval not displayed.      Coagulation:   Recent Labs   Lab 03/08/20  1105 03/08/20  1723 03/09/20  0229   APTT 45.5* 49.6* 52.1*     LDH:  No results for input(s): LDH in the last 72 hours.  Microbiology:  Microbiology Results (last  7 days)     Procedure Component Value Units Date/Time    Culture, Respiratory with Gram Stain [[id number]] Collected:  03/06/20 0846    Order Status:  Completed Specimen:  Respiratory from Sputum, Expectorated Updated:  03/09/20 0842     Respiratory Culture Normal respiratory ana maría      No S aureus or Pseudomonas isolated.     Gram Stain (Respiratory) <10 epithelial cells per low power field.     Gram Stain (Respiratory) Few WBC's     Gram Stain (Respiratory) Few Gram positive cocci     Gram Stain (Respiratory) Few Gram positive rods     Gram Stain (Respiratory) Few Gram negative rods    Blood culture [[id number]]     Order Status:  No result Specimen:  Blood     Blood culture [[id number]]     Order Status:  No result Specimen:  Blood     Blood culture [[id number]] Collected:  03/06/20 1618    Order Status:  Completed Specimen:  Blood from Wrist, Right Updated:  03/09/20 0829     Blood Culture, Routine Gram stain peds bottle: Gram positive cocci in clusters resembling Staph       Results called to and read back by:Leonel Patel RN  03/09/2020  08:28    Blood culture [[id number]] Collected:  03/06/20 1604    Order Status:  Completed Specimen:  Blood from Peripheral, Forearm, Right Updated:  03/08/20 2012     Blood Culture, Routine No Growth to date      No Growth to date      No Growth to date    Blood culture [[id number]] Collected:  03/01/20 0254    Order Status:  Completed Specimen:  Blood from Peripheral, Upper Arm, Left Updated:  03/06/20 0612     Blood Culture, Routine No growth after 5 days.    Blood culture [[id number]] Collected:  02/29/20 2305    Order Status:  Completed Specimen:  Blood from Peripheral, Forearm, Right Updated:  03/06/20 0612     Blood Culture, Routine No growth after 5 days.        I have reviewed all pertinent labs within the past 24 hours.    Estimated Creatinine Clearance: 13.6 mL/min (A) (based on SCr of 5 mg/dL (H)).    Diagnostic Results:  I have reviewed all pertinent imaging  results/findings within the past 24 hours.

## 2020-03-09 NOTE — CONSULTS
Wound care consulted for a skin tear to the posterior right knee  PMH:   CAD, ICM EF 10-15%, HTN, DM2, HLD, obesity, CKD.   Assessment:  The patient has a aquacel foam dressing in place over the wound.  According to unit nurse Leonel, the patient has partial thickness shearing to the back of the right knee.    Recommendations:  Follow standing orders for skin tears  Nursing to continue care, wound care signing off  B. Karen Lombardo RN, CN  [id number]

## 2020-03-09 NOTE — PROCEDURES
"John Javier is a 64 y.o. male patient.    Temp: 98.6 °F (37 °C) (03/09/20 1500)  Pulse: 81 (03/09/20 1700)  Resp: (!) 24 (03/09/20 1700)  BP: 128/60 (03/09/20 1030)  SpO2: 97 % (03/09/20 1700)  Weight: 67.8 kg (149 lb 7.6 oz) (03/07/20 0701)  Height: 5' 6" (167.6 cm) (03/07/20 0701)    Central Line  Date/Time: 3/9/2020 6:22 PM  Performed by: Stuart Viera MD  Consent Done: Yes  Site marked: the operative site was marked  Time out: Immediately prior to procedure a "time out" was called to verify the correct patient, procedure, equipment, support staff and site/side marked as required.  Indications: diagnostic evaluation and med administration  Anesthesia: local infiltration    Anesthesia:  Local anesthesia used: yes  Local Anesthetic: lidocaine 1% without epinephrine  Preparation: skin prepped with ChloraPrep  Location details: right internal jugular  Catheter size: 7.5 Fr  Ultrasound guidance: yes  Vessel Caliber: patent, compressibility normal  Needle advanced into vessel with real time Ultrasound guidance.  Guidewire confirmed in vessel.  Image recorded and saved.  Sterile sheath used.  Manometry: Yes  Number of attempts: 1  Assessment: placement verified by x-ray  Complications: none  Estimated blood loss (mL): 1  Post-procedure: line sutured,  chlorhexidine patch,  sterile dressing applied and blood return through all ports          No flowsheet data found.    Stuart Viera  3/9/2020  "

## 2020-03-09 NOTE — SUBJECTIVE & OBJECTIVE
"Interval HPI:   Overnight events: Remains in CMICU, NAEON.  BG within goal overnight on transition IV insulin infusion.  Noted creatinine of 5.0.  Diet advanced per primary team.   Eatin% - clears  Nausea: No  Hypoglycemia and intervention: No  Fever: No  TPN and/or TF: No    /69 (BP Location: Right arm, Patient Position: Lying)   Pulse 89   Temp 98.9 °F (37.2 °C) (Oral)   Resp (!) 24   Ht 5' 6" (1.676 m)   Wt 67.8 kg (149 lb 7.6 oz)   SpO2 98%   BMI 24.13 kg/m²     Labs Reviewed and Include    Recent Labs   Lab 20  0229 20  0608   *  161* 148*   CALCIUM 9.2  9.2 9.2   ALBUMIN 3.0*  --    PROT 7.3  --    *  151* 154*   K 4.1  4.1 3.9   CO2 32*  32* 31*     105 108   *  109* 105*   CREATININE 5.1*  5.1* 5.0*   ALKPHOS 86  --    ALT 16  --    AST 18  --    BILITOT 0.5  --      Lab Results   Component Value Date    WBC 14.12 (H) 2020    HGB 10.8 (L) 2020    HCT 35.0 (L) 2020    MCV 93 2020     (H) 2020     No results for input(s): TSH, FREET4 in the last 168 hours.  Lab Results   Component Value Date    HGBA1C 7.0 (H) 2020       Nutritional status:   Body mass index is 24.13 kg/m².  Lab Results   Component Value Date    ALBUMIN 3.0 (L) 2020    ALBUMIN 3.1 (L) 2020    ALBUMIN 3.1 (L) 2020     Lab Results   Component Value Date    PREALBUMIN <2.5 (L) 2020       Estimated Creatinine Clearance: 13.6 mL/min (A) (based on SCr of 5 mg/dL (H)).    Accu-Checks  Recent Labs     20  1826 20  0030 20  0411 20  0838 20  1133 20  1606 20  0620 20  1026 20  1509 20  1946   POCTGLUCOSE 183* 172* 168* 170* 218* 202* 166* 180* 185* 152*       Current Medications and/or Treatments Impacting Glycemic Control  Immunotherapy:    Immunosuppressants     None        Steroids:   Hormones (From admission, onward)    None        Pressors:    Autonomic Drugs " (From admission, onward)    None        Hyperglycemia/Diabetes Medications:   Antihyperglycemics (From admission, onward)    Start     Stop Route Frequency Ordered    03/06/20 1815  insulin regular 100 Units in sodium chloride 0.9% 100 mL infusion      -- IV Continuous 03/06/20 1701    03/06/20 1801  insulin aspart U-100 pen 0-4 Units      -- SubQ As needed (PRN) 03/06/20 1701

## 2020-03-09 NOTE — PROGRESS NOTES
"[facility]  Endocrinology  Progress Note    Admit Date: 2020     Reason for Consult: Management of T2DM, Hyperglycemia     Surgical Procedure and Date: N/A    Diabetes diagnosis year:     Lab Results   Component Value Date    HGBA1C 7.0 (H) 2020       Home Diabetes Medications:  Metformin 500 mg BID    How often checking glucose at home? Once daily  BG readings on regimen:   Hypoglycemia on the regimen? No  Missed doses on regimen? No    Diabetes Complications include:     none    Complicating diabetes co morbidities:   CHF and CKD      HPI:   Patient is a 64 y.o. male with a diagnosis of DM2, ANGEL, afib, CAD, CHF, and cardiogenic shock.  Patient admitted to [facility] from outside facility in cardiogenic shock, intubated, and with an impella in place.  Diagnosed with DM2 last year and place on Metformin, managed per his PCP.  Endocrinology consulted for DM/BG management.        Interval HPI:   Overnight events: Remains in CMICU, NAEON.  BG within goal overnight on transition IV insulin infusion.  Noted creatinine of 5.0.  Diet advanced per primary team.   Eatin% - clears  Nausea: No  Hypoglycemia and intervention: No  Fever: No  TPN and/or TF: No    /69 (BP Location: Right arm, Patient Position: Lying)   Pulse 89   Temp 98.9 °F (37.2 °C) (Oral)   Resp (!) 24   Ht 5' 6" (1.676 m)   Wt 67.8 kg (149 lb 7.6 oz)   SpO2 98%   BMI 24.13 kg/m²      Labs Reviewed and Include    Recent Labs   Lab 20  0229 20  0608   *  161* 148*   CALCIUM 9.2  9.2 9.2   ALBUMIN 3.0*  --    PROT 7.3  --    *  151* 154*   K 4.1  4.1 3.9   CO2 32*  32* 31*     105 108   *  109* 105*   CREATININE 5.1*  5.1* 5.0*   ALKPHOS 86  --    ALT 16  --    AST 18  --    BILITOT 0.5  --      Lab Results   Component Value Date    WBC 14.12 (H) 2020    HGB 10.8 (L) 2020    HCT 35.0 (L) 2020    MCV 93 2020     (H) 2020     No " results for input(s): TSH, FREET4 in the last 168 hours.  Lab Results   Component Value Date    HGBA1C 7.0 (H) 03/01/2020       Nutritional status:   Body mass index is 24.13 kg/m².  Lab Results   Component Value Date    ALBUMIN 3.0 (L) 03/09/2020    ALBUMIN 3.1 (L) 03/08/2020    ALBUMIN 3.1 (L) 03/07/2020     Lab Results   Component Value Date    PREALBUMIN <2.5 (L) 03/04/2020       Estimated Creatinine Clearance: 13.6 mL/min (A) (based on SCr of 5 mg/dL (H)).    Accu-Checks  Recent Labs     03/06/20  1826 03/07/20  0030 03/07/20  0411 03/07/20  0838 03/07/20  1133 03/07/20  1606 03/08/20  0620 03/08/20  1026 03/08/20  1509 03/08/20  1946   POCTGLUCOSE 183* 172* 168* 170* 218* 202* 166* 180* 185* 152*       Current Medications and/or Treatments Impacting Glycemic Control  Immunotherapy:    Immunosuppressants     None        Steroids:   Hormones (From admission, onward)    None        Pressors:    Autonomic Drugs (From admission, onward)    None        Hyperglycemia/Diabetes Medications:   Antihyperglycemics (From admission, onward)    Start     Stop Route Frequency Ordered    03/06/20 1815  insulin regular 100 Units in sodium chloride 0.9% 100 mL infusion      -- IV Continuous 03/06/20 1701    03/06/20 1801  insulin aspart U-100 pen 0-4 Units      -- SubQ As needed (PRN) 03/06/20 1701          ASSESSMENT and PLAN    * Cardiogenic shock  Managed per primary team  Avoid hypoglycemia        Type 2 diabetes mellitus, without long-term current use of insulin  BG goal 140 - 180     Continue transition IV insulin infusion at 0.5 u/hr with stepdown parameters  Low dose correction scale - due to decreased renal function  BG monitoring every 4 hours while NPO    ADDENDUM: Change BG monitoring to AC/HS/0200 since diet advanced    Discharge planning: TBD        ANGEL (acute kidney injury)  Titrate insulin slowly to avoid hypoglycemia as the risk of hypoglycemia increases with decreased creatinine clearance.  Caution with insulin  stacking  Estimated Creatinine Clearance: 15.2 mL/min (A) (based on SCr of 4.5 mg/dL (H)).            Barry Chawla NP  Endocrinology  [facility]

## 2020-03-09 NOTE — ASSESSMENT & PLAN NOTE
* Non-oliguric iATN from cardiogenic shock.     3/2: -urine microscopy:  abundant muddy brown casts (leaning towards ATN)     Plan/ Rec:  -no urgent need for RRT. sCr continues to improve, 5.0 today from 6.0 on yesterday. UOP >3 L/24 hrs. Lasix last given 3/7.  - Renal function improving overall  - Na today up to 154, recommend discontinuing 1/5NS at 100 ml/hr  - Recommend starting D5W at 125 ml/hr, patient with a 4.1 L free water deficit.   - Patient with a metabolic alkalosis (VBG 7.50, CO2 31), patient increased GI losses over the last 2 days NGT removed this AM, will follow    - check phos level daily  - Renal function panels q 12hr   - Strict I/O and chart  - Will follow closely  - Plan discussed with Dr. Valdez

## 2020-03-09 NOTE — PT/OT/SLP PROGRESS
Occupational Therapy   Treatment    Name: John Javier  MRN: [id number]  Admitting Diagnosis:  Cardiogenic shock  7 Days Post-Op    Recommendations:     Discharge Recommendations: home with home health      Assessment:     John Javier is a 64 y.o. male with a medical diagnosis of Cardiogenic shock.  . Performance deficits affecting function are weakness, impaired self care skills, impaired balance, impaired functional mobilty, impaired endurance, gait instability.   Pt tolerated session well with good effort and performance. Pt progressing well at this time and continues to benefit from further inpatient therapy services. Pt also demo improved functional use of UE's compared to eval.     Rehab Prognosis:  Good; patient would benefit from acute skilled OT services to address these deficits and reach maximum level of function.       Plan:     Patient to be seen 3 x/week to address the above listed problems via self-care/home management, therapeutic activities, therapeutic exercises  · Plan of Care Expires:    · Plan of Care Reviewed with: patient    Subjective     Pain/Comfort:  · Pain Rating 1: 0/10    Objective:     Communicated with: nsg prior to session.  Pt found supine in bed.     General Precautions: Standard, fall     Occupational Performance:     Bed Mobility:    Supine>sit with MIN A     Functional Mobility/Transfers:  · Sit>stand with CGA from bed  · Sit>stand with SBA from chair.     Activities of Daily Living:  · Feeding: NPO  · G/H: seated and standing with SBA  UE dressing: MIN A   · LE dressing: CGA nabeel footwear seated EOB.       [facility] 6 Click ADL: 16    Treatment & Education:  Pt demo Fair to Fair+ sitting balance. Pt able to t/f to chair with CGA.   Pt performed standing g/h skills approx 3 min in stand with SBA.  Education provided re: OT POC and safety with functional mobility/ADl skills.     Patient left up in chair with all lines intact, call button in reach and nsg  notifiedEducation:      GOALS:   Multidisciplinary Problems     Occupational Therapy Goals        Problem: Occupational Therapy Goal    Goal Priority Disciplines Outcome Interventions   Occupational Therapy Goal     OT, PT/OT Ongoing, Progressing    Description:  Goals to be met by: 7 days 3/13/2020     Patient will increase functional independence with ADLs by performing:    Pt to complete standing g/h skills with SBA  Pt to complete toileting with supervision.  Pt to complete UE dressing with set-up  Pt to complete LE dressing with SBA  Pt to complete BSC with SBA  Pt to tolerated OOB to chair x 3-4 hours daily to increase endurance for functional activity.                       Time Tracking:     OT Date of Treatment: 03/09/20  OT Start Time: 0816  OT Stop Time: 0850  OT Total Time (min): 34 min    Billable Minutes:Self Care/Home Management 30    MONY Lipscomb  3/9/2020

## 2020-03-09 NOTE — PLAN OF CARE
Problem: Physical Therapy Goal  Goal: Physical Therapy Goal  Description  Goals to be met by: 3/16/2020     Patient will increase functional independence with mobility by performin. Supine to sit with Contact Guard Assistance  2. Sit to supine with Contact Guard Assistance  3. Sit to stand transfer with Stand By Assistance- met 3/9/2020  Sit to stand transfer with supervision   4. Gait  x 150 feet with Contact Guard Assistance using no or LRAD.   5. Ascend/descend 5 stair with left Handrails Contact Guard Assistance using no or LRAD.   6. Lower extremity exercise program x 20 reps per handout, with independence      Outcome: Ongoing, Progressing     Pt is progressing toward goals. All goals remain appropriate.    Karma Moreno, PT, DPT  3/9/2020  [phone],s

## 2020-03-09 NOTE — ASSESSMENT & PLAN NOTE
-NGT removed today.   -Appreciate GEN JAYCEE Cx.   -CT with concern for possible pancreatitis. No pain. WCTM.   -Lipase ~1000. Spoke with surgery. As patient is not having pain, they would not . If patient starts having abdominal pain, repeat CT abdomen/pelvis.

## 2020-03-09 NOTE — PROGRESS NOTES
Patient seen and examined. Only 500 out of NG overnight. Very large BM after enema yesterday with improvement of distention. Hungry.     PE:  WD. WN  NG removed at bedside  On NC, tachypneic.   Abdomen: soft, ND. No abdominal pain      Xray this Am: shows no small bowel dilatation     A/p: NG placed for ileus and constipation, asymptomatic pancreatitis on CT scan   - removed this morning  Recommend bowel regimen  - okay to advance diet  - some fat stranding in pancreatic tail with elevated lipase but patient asymptomatic.   - if patient develops abdominal pain, would get repeat lipase and call us back. Do not recommend trending lipases or getting repeat CT scans if patient continues to be asymptomatic.   - care per primary   - surgery will sign off. Please call with questions.

## 2020-03-09 NOTE — ASSESSMENT & PLAN NOTE
-Presented with impella placed at OSH with position issues and LD>1000.  -Impella removed 3/2/20.  -CVP 3 this am. ScVO2 89.   -Free water deficit ~4 L. Will  change to D5W @125/hr per neph recs.   - d/c'ed due to high cardiac output and hypertension/afib.  -TTE 3/2 EF 10%, LVIDD 6, TAPSE 1.98, Mod MR, Mod TR.  -Continue cardene gtt. Will restart PO hydralazine/isordil now that he is tolerating PO.    -Not working up for advanced options due to medical instability/renal failure at this time. Will continue to reassess candidacy if condition improves.

## 2020-03-09 NOTE — PLAN OF CARE
CMICU DAILY GOALS       A: Awake    RASS: Goal - RASS Goal: 0-->alert and calm  Actual - RASS (Horton Agitation-Sedation Scale): 0-->alert and calm   Restraint necessity: N/A  B: Breath   SBT: Not intubated   C: Coordinate A & B, analgesics/sedatives   Pain: managed    SAT: Not intubated  D: Delirium   CAM-ICU: Overall CAM-ICU: Negative  E: Early Mobility   MOVE Screen: Fail   Activity: Activity Management: activity adjusted per tolerance, activity clustered for rest period  FAS: Feeding/Nutrition   Diet order: Diet/Nutrition Received: NPO, Specialty Diet/Nutrition Received: renal diet Fluid restriction:    T: Thrombus   DVT prophylaxis: VTE Required Core Measure: Pharmacological prophylaxis initiated/maintained  H: HOB Elevation   Head of Bed (HOB): HOB at 20-30 degrees  U: Ulcer Prophylaxis   GI: yes  G: Glucose control   managed Glycemic Management: blood glucose monitoring  S: Skin   Bundle compliance: yes   Bathing/Skin Care: bath, chlorhexidine, bath, complete, dressed/undressed, incontinence care, linen changed Date: [unfilled]  B: Bowel Function   no issues   I: Indwelling Catheters   Lamb necessity:      Urethral Catheter 03/06/20 1642-Reason for Continuing Urinary Catheterization: Critically ill in ICU requiring intensive monitoring  [REMOVED]      Urethral Catheter 02/29/20 2056 16 Fr.-Reason for Continuing Urinary Catheterization: Critically ill in ICU requiring intensive monitoring   CVC necessity: Yes   IPAD offered: Not appropriate  D: De-escalation Antibx   Yes  Plan for the day   Wean cardene.  Family/Goals of care/Code Status   Code Status: Full Code     No acute events throughout day, VS and assessment per flow sheet, patient progressing towards goals as tolerated, plan of care reviewed with John Javier and family, all concerns addressed, will continue to monitor.

## 2020-03-09 NOTE — ASSESSMENT & PLAN NOTE
Titrate insulin slowly to avoid hypoglycemia as the risk of hypoglycemia increases with decreased creatinine clearance.  Caution with insulin stacking  Estimated Creatinine Clearance: 15.2 mL/min (A) (based on SCr of 4.5 mg/dL (H)).

## 2020-03-09 NOTE — PROGRESS NOTES
[facility]  Heart Transplant  Progress Note  Patient Name: John Javier  MRN: [id number]  Admission Date: 2/29/2020  Hospital Length of Stay: 9 days  Attending Physician: Lizzy Cavanaugh MD  Primary Care Provider: To Obtain Unable  Principal Problem:Cardiogenic shock    Subjective:     Interval History: No issues overnight. NGT removed this am.     Continuous Infusions:   sodium chloride 0.45% 100 mL/hr at 03/09/20 0600    amiodarone in dextrose 5% 0.5 mg/min (03/09/20 0600)    heparin (porcine) in D5W 18.023 Units/kg/hr (03/09/20 0600)    insulin (HUMAN R) infusion (adults) 0.5 Units/hr (03/09/20 0600)    nicardipine 5 mg/hr (03/09/20 1031)     Scheduled Meds:   aspirin  81 mg Oral Daily    atorvastatin  80 mg Oral Daily    famotidine (PF)  20 mg Intravenous Daily    hydrALAZINE  25 mg Oral Q8H    isosorbide dinitrate  20 mg Oral Q8H    senna-docusate 8.6-50 mg  2 tablet Oral BID     PRN Meds:sodium chloride, Dextrose 10% Bolus, Dextrose 10% Bolus, glucagon (human recombinant), glucose, glucose, heparin (PORCINE), heparin (PORCINE), insulin aspart U-100, ondansetron, senna-docusate 8.6-50 mg, sodium chloride 0.9%, sodium chloride 0.9%    Review of patient's allergies indicates:  No Known Allergies  Objective:     Vital Signs (Most Recent):  Temp: 98.9 °F (37.2 °C) (03/09/20 0700)  Pulse: 87 (03/09/20 1055)  Resp: (!) 32 (03/09/20 1055)  BP: 128/60 (03/09/20 1030)  SpO2: 97 % (03/09/20 1055) Vital Signs (24h Range):  Temp:  [97.6 °F (36.4 °C)-99.3 °F (37.4 °C)] 98.9 °F (37.2 °C)  Pulse:  [] 87  Resp:  [16-35] 32  SpO2:  [92 %-100 %] 97 %  BP: (128-134)/(60-69) 128/60  Arterial Line BP: (117-155)/(52-72) 146/67     Patient Vitals for the past 72 hrs (Last 3 readings):   Weight   03/07/20 0701 67.8 kg (149 lb 7.6 oz)   03/07/20 0641 67.9 kg (149 lb 11.1 oz)     Body mass index is 24.13 kg/m².      Intake/Output Summary (Last 24 hours) at 3/9/2020 1104  Last data filed at 3/9/2020  1000  Gross per 24 hour   Intake 3580.8 ml   Output 3020 ml   Net 560.8 ml        Telemetry: reviewed    Physical Exam   Constitutional: He is oriented to person, place, and time. He appears well-developed and well-nourished.   HENT:   Head: Normocephalic and atraumatic.   Eyes: Pupils are equal, round, and reactive to light. EOM are normal.   Neck: Normal range of motion. Neck supple.   LIJ TLC placed 3/1/20.   Cardiovascular: Normal rate and regular rhythm.   Murmur heard.  Pulmonary/Chest: Effort normal and breath sounds normal. No stridor. No respiratory distress.   Abdominal: Soft. He exhibits no distension. Bowel sounds are decreased. There is no tenderness.   Musculoskeletal: Normal range of motion. He exhibits no edema.   Neurological: He is alert and oriented to person, place, and time.   Skin: Skin is warm and dry. Capillary refill takes 2 to 3 seconds.   Nursing note and vitals reviewed.    Significant Labs:  CBC:  Recent Labs   Lab 03/07/20  0252 03/08/20 0304 03/09/20 0229   WBC 13.71* 11.59 14.12*   RBC 3.57* 3.73* 3.75*   HGB 10.5* 10.7* 10.8*   HCT 31.6* 33.7* 35.0*    356* 466*   MCV 89 90 93   MCH 29.4 28.7 28.8   MCHC 33.2 31.8* 30.9*     BNP:  No results for input(s): BNP in the last 168 hours.    Invalid input(s): BNPTRIAGELBLO  CMP:  Recent Labs   Lab 03/07/20  0252  03/08/20  0304  03/08/20  1723 03/09/20  0229 03/09/20  0608   *   < > 183*   < > 170* 161*  161* 148*   CALCIUM 9.4   < > 10.0   < > 10.0 9.2  9.2 9.2   ALBUMIN 3.1*  --  3.1*  --   --  3.0*  --    PROT 7.5  --  7.7  --   --  7.3  --    *   < > 153*   < > 155* 151*  151* 154*   K 3.4*   < > 3.8   < > 3.9 4.1  4.1 3.9   CO2 30*   < > 33*   < > 35* 32*  32* 31*   CL 96   < > 99   < > 102 105  105 108   *   < > 118*   < > 115* 109*  109* 105*   CREATININE 6.7*   < > 6.4*   < > 5.9* 5.1*  5.1* 5.0*   ALKPHOS 92  --  91  --   --  86  --    ALT 15  --  15  --   --  16  --    AST 24  --  21  --   --   18  --    BILITOT 0.8  --  0.7  --   --  0.5  --     < > = values in this interval not displayed.      Coagulation:   Recent Labs   Lab 03/08/20  1105 03/08/20  1723 03/09/20  0229   APTT 45.5* 49.6* 52.1*     LDH:  No results for input(s): LDH in the last 72 hours.  Microbiology:  Microbiology Results (last 7 days)     Procedure Component Value Units Date/Time    Culture, Respiratory with Gram Stain [[id number]] Collected:  03/06/20 0846    Order Status:  Completed Specimen:  Respiratory from Sputum, Expectorated Updated:  03/09/20 0842     Respiratory Culture Normal respiratory ana maría      No S aureus or Pseudomonas isolated.     Gram Stain (Respiratory) <10 epithelial cells per low power field.     Gram Stain (Respiratory) Few WBC's     Gram Stain (Respiratory) Few Gram positive cocci     Gram Stain (Respiratory) Few Gram positive rods     Gram Stain (Respiratory) Few Gram negative rods    Blood culture [[id number]]     Order Status:  No result Specimen:  Blood     Blood culture [[id number]]     Order Status:  No result Specimen:  Blood     Blood culture [[id number]] Collected:  03/06/20 1618    Order Status:  Completed Specimen:  Blood from Wrist, Right Updated:  03/09/20 0829     Blood Culture, Routine Gram stain peds bottle: Gram positive cocci in clusters resembling Staph       Results called to and read back by:Leonel Patel RN  03/09/2020  08:28    Blood culture [[id number]] Collected:  03/06/20 1604    Order Status:  Completed Specimen:  Blood from Peripheral, Forearm, Right Updated:  03/08/20 2012     Blood Culture, Routine No Growth to date      No Growth to date      No Growth to date    Blood culture [[id number]] Collected:  03/01/20 0254    Order Status:  Completed Specimen:  Blood from Peripheral, Upper Arm, Left Updated:  03/06/20 0612     Blood Culture, Routine No growth after 5 days.    Blood culture [[id number]] Collected:  02/29/20 2305    Order Status:  Completed Specimen:  Blood from Peripheral,  "Forearm, Right Updated:  03/06/20 0612     Blood Culture, Routine No growth after 5 days.        I have reviewed all pertinent labs within the past 24 hours.    Estimated Creatinine Clearance: 13.6 mL/min (A) (based on SCr of 5 mg/dL (H)).    Diagnostic Results:  I have reviewed all pertinent imaging results/findings within the past 24 hours.    Assessment and Plan:     Patient intubated w limited collateral from family; further OSH records pending    63 y/o M hx of CAD (50-60% LCx, OM disease on LHC 2/29), ICM EF 10-15% s/p AICD (unknown baseline but on GDMT as OP), HTN, DM2, HLD, obesity, CKD, presenting from OSH intubated with Impella in setting of cardiogenic shock. Per pt's brother he was taken by EMS to OSH in setting of feeling short of breath, nearly passing out. There found to be in shock, lactate to 12, ANGEL w Cr 2.5, trop 12; unclear if dynamic ECG changes, max trop 10; underwent LHC with evidence of 50-60% LCx, OM disease; no PCI performed. RHC with LVEDP 35. CO 3, CI 1.8, tte w LVEF 10-15%. Underwent Impella placement without additional central line placement (per brother there was a "complication" but no evidence of pneumothorax).    On arrival patient -having intermittent suction alarms, improved w P6->P4. MAPs stable 75-85. CVC placed in LIJ given neck position, CO 9.7, CI 5.35, svr 535. WBC 26k, patient cultured, given single dose of vanc/zosyn. UOP 60-80cc/hr off diuretics. Further collateral pending from OSH. Continued on heparin gtt for impella, weaned to dobutamine 5, gave 500cc bolus in setting of CVP 10 and suction alarms.    * Cardiogenic shock  -Presented with impella placed at OSH with position issues and LD>1000.  -Impella removed 3/2/20.  -CVP 3 this am. ScVO2 89.   -Free water deficit ~4 L. Will  change to D5W @125/hr per neph recs.   - d/c'ed due to high cardiac output and hypertension/afib.  -TTE 3/2 EF 10%, LVIDD 6, TAPSE 1.98, Mod MR, Mod TR.  -Continue cardene gtt. Will restart PO " hydralazine/isordil now that he is tolerating PO.    -Not working up for advanced options due to medical instability/renal failure at this time. Will continue to reassess candidacy if condition improves.    Abdominal distention  -NGT removed today.   -Appreciate GEN JAYCEE Cx.   -CT with concern for possible pancreatitis. No pain. WCTM.   -Lipase ~1000. Spoke with surgery. As patient is not having pain, they would not . If patient starts having abdominal pain, repeat CT abdomen/pelvis.    Positive blood cultures  -Blood cultures from 3/6 with 1/2 bottles growing Gram positive cocci in clusters resembling Staph.  -Will recheck cultures today and start Vanc/replace line.    PAF (paroxysmal atrial fibrillation)  -Episode of afib RVR 3/3/2020 in the setting of severe agitation with SBT  -Converted to NSR  -Continue heparin gtt for anticoagulation  -Went back into afib 3/4/20. Started on IV Amio.   -EP consulted, continue IV amio while on IV diuretics. Appreciate their input.   -paroxysmal flutter ntoed also    VT (ventricular tachycardia)  -S/P ICD shocks x 5 per OSH.  -Continue IV amio per EP recs for afib    ANGEL (acute kidney injury)  - Likely ATN in setting of shock, Hemolysis, plus IV contrast at OSH.  - Monitor Cr/BUN closely, trending down.  - Appreciate renal consult.  - Strict I/O.  - Avoid nephrotoxic meds.  - Continues to make good urine.    Hematoma  -CT 3/7/20: Right anterior abdominal wall hematoma, abuts the distal most aspect of the rectus sheath.   -Bedside nurse to trace hematoma today with marker for monitoring  -Secondary to impella which was removed  -Continue to monitor closely    Iron deficiency anemia  -No obvious bleeding.   -R groin impella site with hematoma that is stable/no abdominal or back pain.  -s/p 2 U PRBC 3/5/20    Malnutrition of moderate degree  -Appreciate Dietary Cx.    Acute on chronic combined systolic and diastolic heart failure  - See Cardiogenic shock.    Type 2  diabetes mellitus, without long-term current use of insulin  -A1C 7.0 on admit  -Insulin gtt management per endo. Greatly appreciate their assistance    CAD (coronary artery disease)  -Hx of RCA stent in 2013.   -[facility] 2/29: 50-60% LCx, OM disease.  -continue ASA, high intensity statin.  -Continue heparin    Uninterrupted Critical Care/Counseling Time (not including procedures): 60mn  Alejo Frankel, NP  Heart Transplant  [facility]

## 2020-03-09 NOTE — PT/OT/SLP PROGRESS
Physical Therapy Treatment    Patient Name:  John Javier   MRN:  [id number]    *co-treatment with OT   Recommendations:     Discharge Recommendations:  nursing facility, skilled   Discharge Equipment Recommendations: none   Barriers to discharge: Decreased caregiver support    Assessment:     John Javier is a 64 y.o. male admitted with a medical diagnosis of Cardiogenic shock.  He presents with the following impairments/functional limitations:  weakness, impaired endurance, impaired functional mobilty, gait instability, impaired balance, impaired cognition, impaired cardiopulmonary response to activity. Pt tolerated activity with improved mobility reflected by decreased assistance required for bed mobility, transfer to chair, and sit <> stand transfer. Pt quick to fatigue, limiting activity tolerance. Pt is progressing well toward goals. Upon discharge, pt would benefit from continued skilled therapy intervention at [facility] nursing facility to progress toward more independent mobility. At this time, pt would continue to benefit from acute skilled therapy intervention to address deficits and progress toward prior level of function.       Rehab Prognosis: Good; patient would benefit from acute skilled PT services to address these deficits and reach maximum level of function.    Recent Surgery: Procedure(s) (LRB):  Impella, Removal (Left) 7 Days Post-Op    Plan:     During this hospitalization, patient to be seen 4 x/week to address the identified rehab impairments via gait training, therapeutic activities, therapeutic exercises, neuromuscular re-education and progress toward the following goals:    · Plan of Care Expires:  04/05/20    Subjective     Chief Complaint: Pt c/o fatigue, reports disappointment that family did not visit very long this weekend (RN reports family was present all weekend)   Patient/Family Comments/goals: to get better and return home  Pain/Comfort:  · Pain Rating 1: 0/10  · Pain Rating  Post-Intervention 1: 0/10      Objective:     Communicated with RN prior to session.  Patient found HOB elevated with blood pressure cuff, pulse ox (continuous), telemetry, peripheral IV, central line, arterial line, medina catheter, NG tube upon PT entry to room.     General Precautions: Standard, fall   Orthopedic Precautions:N/A   Braces: N/A     Functional Mobility:  · Bed Mobility:     · Supine to Sit: minimum assistance  · Transfers:     · Sit to Stand: 1x from EOB with contact guard assistance with hand-held assist; 2x from bedside chair with stand by assistance with use of bilateral arm rests   · Bed to Chair: contact guard assistance with  hand-held assist  using  Step Transfer  · Gait: Pt ambulated 6 feet from bed to chair with contact guard assistance and HHA. Pt demo'd small step size, decreased foot clearance, no LOB, no SOB, no dizziness.       AM-PAC 6 CLICK MOBILITY  Turning over in bed (including adjusting bedclothes, sheets and blankets)?: 3  Sitting down on and standing up from a chair with arms (e.g., wheelchair, bedside commode, etc.): 3  Moving from lying on back to sitting on the side of the bed?: 3  Moving to and from a bed to a chair (including a wheelchair)?: 3  Need to walk in hospital room?: 3  Climbing 3-5 steps with a railing?: 2  Basic Mobility Total Score: 17       Therapeutic Activities and Exercises:   Pt participated in balance and gait exercises while standing in front of chair. Pt performed 10x B UE alternating targeted reaching in multiple directions. Pt performed targeted reaching within and outside of ALYSSA with stand by assistance. Pt then performed 10x B alternating initial step with return to neutral stance with CGA and HHA. Pt required seated rest between bouts.   Pt stood at bedside table and performed brushing teeth with stand by assistance. Pt with occasional posterior lean, able to correct.   Pt educated on role of PT/POC. Pt verbalized understanding.   Pt encouraged to  only perform OOB mobility with assistance from nursing/therapy. Pt agreeable.   Pt encouraged to ambulate daily with assistance/supervision from nursing/therapy. Pt agreeable.  Pt educated on seated exercises to perform 10x, 3x/day. Exercises included bilateral LAQ, marching, ankle DF/PF, B UE punch up, B elbow flexion/extension       Patient left up in chair with all lines intact, call button in reach and RN notified..    GOALS:   Multidisciplinary Problems     Physical Therapy Goals        Problem: Physical Therapy Goal    Goal Priority Disciplines Outcome Goal Variances Interventions   Physical Therapy Goal     PT, PT/OT Ongoing, Progressing     Description:  Goals to be met by: 3/16/2020     Patient will increase functional independence with mobility by performin. Supine to sit with Contact Guard Assistance  2. Sit to supine with Contact Guard Assistance  3. Sit to stand transfer with Stand By Assistance- met 3/9/2020  Sit to stand transfer with supervision   4. Gait  x 150 feet with Contact Guard Assistance using no or LRAD.   5. Ascend/descend 5 stair with left Handrails Contact Guard Assistance using no or LRAD.   6. Lower extremity exercise program x 20 reps per handout, with independence                       Time Tracking:     PT Received On: 20  PT Start Time: 08     PT Stop Time: 0846  PT Total Time (min): 30 min     Billable Minutes: Gait Training 15 mins  and Therapeutic Exercise 15 mins     Treatment Type: Treatment  PT/PTA: PT     PTA Visit Number: 0     Karma Moreno, PT  2020

## 2020-03-09 NOTE — ASSESSMENT & PLAN NOTE
-Blood cultures from 3/6 with 1/2 bottles growing Gram positive cocci in clusters resembling Staph.  -Will replace line.  -Will recheck cultures today.

## 2020-03-09 NOTE — ASSESSMENT & PLAN NOTE
- Likely ATN in setting of shock, Hemolysis, plus IV contrast at OSH.  - Monitor Cr/BUN closely, trending down.  - Appreciate renal consult.  - Strict I/O.  - Avoid nephrotoxic meds.  - Continues to make good urine.

## 2020-03-09 NOTE — PLAN OF CARE
Goals and POC remain appropriate.   Problem: Occupational Therapy Goal  Goal: Occupational Therapy Goal  Description  Goals to be met by: 7 days 3/13/2020     Patient will increase functional independence with ADLs by performing:    Pt to complete standing g/h skills with SBA  Pt to complete toileting with supervision.  Pt to complete UE dressing with set-up  Pt to complete LE dressing with SBA  Pt to complete BSC with SBA  Pt to tolerated OOB to chair x 3-4 hours daily to increase endurance for functional activity.      Outcome: Ongoing, Progressing

## 2020-03-10 LAB
ALBUMIN SERPL BCP-MCNC: 2.8 G/DL (ref 3.5–5.2)
ALBUMIN SERPL BCP-MCNC: 2.9 G/DL (ref 3.5–5.2)
ALLENS TEST: ABNORMAL
ALP SERPL-CCNC: 74 U/L (ref 55–135)
ALP SERPL-CCNC: 78 U/L (ref 55–135)
ALT SERPL W/O P-5'-P-CCNC: 22 U/L (ref 10–44)
ALT SERPL W/O P-5'-P-CCNC: 23 U/L (ref 10–44)
ANION GAP SERPL CALC-SCNC: 11 MMOL/L (ref 8–16)
ANION GAP SERPL CALC-SCNC: 12 MMOL/L (ref 8–16)
ANION GAP SERPL CALC-SCNC: 13 MMOL/L (ref 8–16)
ANION GAP SERPL CALC-SCNC: 9 MMOL/L (ref 8–16)
APTT BLDCRRT: 57.1 SEC (ref 21–32)
APTT BLDCRRT: 58.5 SEC (ref 21–32)
AST SERPL-CCNC: 25 U/L (ref 10–40)
AST SERPL-CCNC: 28 U/L (ref 10–40)
BASOPHILS # BLD AUTO: 0.02 K/UL (ref 0–0.2)
BASOPHILS NFR BLD: 0.2 % (ref 0–1.9)
BILIRUB SERPL-MCNC: 0.5 MG/DL (ref 0.1–1)
BILIRUB SERPL-MCNC: 0.5 MG/DL (ref 0.1–1)
BUN SERPL-MCNC: 70 MG/DL (ref 8–23)
BUN SERPL-MCNC: 72 MG/DL (ref 8–23)
BUN SERPL-MCNC: 79 MG/DL (ref 8–23)
BUN SERPL-MCNC: 82 MG/DL (ref 8–23)
CALCIUM SERPL-MCNC: 8.2 MG/DL (ref 8.7–10.5)
CALCIUM SERPL-MCNC: 8.5 MG/DL (ref 8.7–10.5)
CALCIUM SERPL-MCNC: 8.6 MG/DL (ref 8.7–10.5)
CALCIUM SERPL-MCNC: 9.3 MG/DL (ref 8.7–10.5)
CHLORIDE SERPL-SCNC: 101 MMOL/L (ref 95–110)
CHLORIDE SERPL-SCNC: 101 MMOL/L (ref 95–110)
CHLORIDE SERPL-SCNC: 103 MMOL/L (ref 95–110)
CHLORIDE SERPL-SCNC: 104 MMOL/L (ref 95–110)
CO2 SERPL-SCNC: 25 MMOL/L (ref 23–29)
CO2 SERPL-SCNC: 25 MMOL/L (ref 23–29)
CO2 SERPL-SCNC: 28 MMOL/L (ref 23–29)
CO2 SERPL-SCNC: 30 MMOL/L (ref 23–29)
CREAT SERPL-MCNC: 3.7 MG/DL (ref 0.5–1.4)
CREAT SERPL-MCNC: 4 MG/DL (ref 0.5–1.4)
CREAT SERPL-MCNC: 4 MG/DL (ref 0.5–1.4)
CREAT SERPL-MCNC: 4.1 MG/DL (ref 0.5–1.4)
DELSYS: ABNORMAL
DIFFERENTIAL METHOD: ABNORMAL
EOSINOPHIL # BLD AUTO: 0.4 K/UL (ref 0–0.5)
EOSINOPHIL NFR BLD: 2.9 % (ref 0–8)
ERYTHROCYTE [DISTWIDTH] IN BLOOD BY AUTOMATED COUNT: 14.5 % (ref 11.5–14.5)
EST. GFR  (AFRICAN AMERICAN): 16.7 ML/MIN/1.73 M^2
EST. GFR  (AFRICAN AMERICAN): 17.2 ML/MIN/1.73 M^2
EST. GFR  (AFRICAN AMERICAN): 17.2 ML/MIN/1.73 M^2
EST. GFR  (AFRICAN AMERICAN): 19 ML/MIN/1.73 M^2
EST. GFR  (NON AFRICAN AMERICAN): 14.5 ML/MIN/1.73 M^2
EST. GFR  (NON AFRICAN AMERICAN): 14.9 ML/MIN/1.73 M^2
EST. GFR  (NON AFRICAN AMERICAN): 14.9 ML/MIN/1.73 M^2
EST. GFR  (NON AFRICAN AMERICAN): 16.4 ML/MIN/1.73 M^2
GLUCOSE SERPL-MCNC: 130 MG/DL (ref 70–110)
GLUCOSE SERPL-MCNC: 143 MG/DL (ref 70–110)
GLUCOSE SERPL-MCNC: 187 MG/DL (ref 70–110)
GLUCOSE SERPL-MCNC: 193 MG/DL (ref 70–110)
HCO3 UR-SCNC: 27.4 MMOL/L (ref 24–28)
HCT VFR BLD AUTO: 28.8 % (ref 40–54)
HGB BLD-MCNC: 9.1 G/DL (ref 14–18)
IMM GRANULOCYTES # BLD AUTO: 0.11 K/UL (ref 0–0.04)
IMM GRANULOCYTES NFR BLD AUTO: 0.9 % (ref 0–0.5)
LYMPHOCYTES # BLD AUTO: 2.2 K/UL (ref 1–4.8)
LYMPHOCYTES NFR BLD: 17.2 % (ref 18–48)
MAGNESIUM SERPL-MCNC: 1.7 MG/DL (ref 1.6–2.6)
MAGNESIUM SERPL-MCNC: 2 MG/DL (ref 1.6–2.6)
MAGNESIUM SERPL-MCNC: 2.2 MG/DL (ref 1.6–2.6)
MCH RBC QN AUTO: 29.4 PG (ref 27–31)
MCHC RBC AUTO-ENTMCNC: 31.6 G/DL (ref 32–36)
MCV RBC AUTO: 93 FL (ref 82–98)
MODE: ABNORMAL
MONOCYTES # BLD AUTO: 1.2 K/UL (ref 0.3–1)
MONOCYTES NFR BLD: 9.8 % (ref 4–15)
NEUTROPHILS # BLD AUTO: 8.7 K/UL (ref 1.8–7.7)
NEUTROPHILS NFR BLD: 69 % (ref 38–73)
NRBC BLD-RTO: 0 /100 WBC
PCO2 BLDA: 41.2 MMHG (ref 35–45)
PH SMN: 7.43 [PH] (ref 7.35–7.45)
PLATELET # BLD AUTO: 417 K/UL (ref 150–350)
PMV BLD AUTO: 11.3 FL (ref 9.2–12.9)
PO2 BLDA: 32 MMHG (ref 40–60)
POC BE: 3 MMOL/L
POC SATURATED O2: 63 % (ref 95–100)
POC TCO2: 29 MMOL/L (ref 24–29)
POCT GLUCOSE: 107 MG/DL (ref 70–110)
POCT GLUCOSE: 123 MG/DL (ref 70–110)
POCT GLUCOSE: 159 MG/DL (ref 70–110)
POCT GLUCOSE: 175 MG/DL (ref 70–110)
POCT GLUCOSE: 199 MG/DL (ref 70–110)
POCT GLUCOSE: 235 MG/DL (ref 70–110)
POTASSIUM SERPL-SCNC: 3.4 MMOL/L (ref 3.5–5.1)
POTASSIUM SERPL-SCNC: 3.5 MMOL/L (ref 3.5–5.1)
POTASSIUM SERPL-SCNC: 3.7 MMOL/L (ref 3.5–5.1)
POTASSIUM SERPL-SCNC: 3.8 MMOL/L (ref 3.5–5.1)
PROT SERPL-MCNC: 6.4 G/DL (ref 6–8.4)
PROT SERPL-MCNC: 6.8 G/DL (ref 6–8.4)
RBC # BLD AUTO: 3.1 M/UL (ref 4.6–6.2)
SAMPLE: ABNORMAL
SITE: ABNORMAL
SODIUM SERPL-SCNC: 140 MMOL/L (ref 136–145)
SODIUM SERPL-SCNC: 142 MMOL/L (ref 136–145)
VANCOMYCIN TROUGH SERPL-MCNC: 13.7 UG/ML (ref 10–22)
WBC # BLD AUTO: 12.55 K/UL (ref 3.9–12.7)

## 2020-03-10 PROCEDURE — 94761 N-INVAS EAR/PLS OXIMETRY MLT: CPT

## 2020-03-10 PROCEDURE — 99292 PR CRITICAL CARE, ADDL 30 MIN: ICD-10-PCS | Mod: ,,, | Performed by: INTERNAL MEDICINE

## 2020-03-10 PROCEDURE — 99291 CRITICAL CARE FIRST HOUR: CPT | Mod: ,,, | Performed by: NURSE PRACTITIONER

## 2020-03-10 PROCEDURE — 99232 SBSQ HOSP IP/OBS MODERATE 35: CPT | Mod: ,,, | Performed by: NURSE PRACTITIONER

## 2020-03-10 PROCEDURE — 99232 PR SUBSEQUENT HOSPITAL CARE,LEVL II: ICD-10-PCS | Mod: ,,, | Performed by: NURSE PRACTITIONER

## 2020-03-10 PROCEDURE — 25000003 PHARM REV CODE 250: Performed by: NURSE PRACTITIONER

## 2020-03-10 PROCEDURE — 80053 COMPREHEN METABOLIC PANEL: CPT | Mod: 91

## 2020-03-10 PROCEDURE — 85730 THROMBOPLASTIN TIME PARTIAL: CPT

## 2020-03-10 PROCEDURE — 83735 ASSAY OF MAGNESIUM: CPT | Mod: 91

## 2020-03-10 PROCEDURE — 99291 PR CRITICAL CARE, E/M 30-74 MINUTES: ICD-10-PCS | Mod: ,,, | Performed by: NURSE PRACTITIONER

## 2020-03-10 PROCEDURE — 80048 BASIC METABOLIC PNL TOTAL CA: CPT

## 2020-03-10 PROCEDURE — 99900035 HC TECH TIME PER 15 MIN (STAT)

## 2020-03-10 PROCEDURE — 20600001 HC STEP DOWN PRIVATE ROOM

## 2020-03-10 PROCEDURE — 63600175 PHARM REV CODE 636 W HCPCS: Performed by: STUDENT IN AN ORGANIZED HEALTH CARE EDUCATION/TRAINING PROGRAM

## 2020-03-10 PROCEDURE — 63600175 PHARM REV CODE 636 W HCPCS: Performed by: INTERNAL MEDICINE

## 2020-03-10 PROCEDURE — 80053 COMPREHEN METABOLIC PANEL: CPT

## 2020-03-10 PROCEDURE — 99292 CRITICAL CARE ADDL 30 MIN: CPT | Mod: ,,, | Performed by: INTERNAL MEDICINE

## 2020-03-10 PROCEDURE — 63600175 PHARM REV CODE 636 W HCPCS: Performed by: NURSE PRACTITIONER

## 2020-03-10 PROCEDURE — 85025 COMPLETE CBC W/AUTO DIFF WBC: CPT

## 2020-03-10 PROCEDURE — 82803 BLOOD GASES ANY COMBINATION: CPT

## 2020-03-10 PROCEDURE — 80048 BASIC METABOLIC PNL TOTAL CA: CPT | Mod: 91

## 2020-03-10 PROCEDURE — 85730 THROMBOPLASTIN TIME PARTIAL: CPT | Mod: 91

## 2020-03-10 PROCEDURE — S0028 INJECTION, FAMOTIDINE, 20 MG: HCPCS | Performed by: NURSE PRACTITIONER

## 2020-03-10 PROCEDURE — 83735 ASSAY OF MAGNESIUM: CPT

## 2020-03-10 PROCEDURE — 94660 CPAP INITIATION&MGMT: CPT

## 2020-03-10 PROCEDURE — 80202 ASSAY OF VANCOMYCIN: CPT

## 2020-03-10 PROCEDURE — 25000003 PHARM REV CODE 250: Performed by: INTERNAL MEDICINE

## 2020-03-10 RX ORDER — AMIODARONE HYDROCHLORIDE 200 MG/1
400 TABLET ORAL 2 TIMES DAILY
Status: DISCONTINUED | OUTPATIENT
Start: 2020-03-10 | End: 2020-03-13 | Stop reason: HOSPADM

## 2020-03-10 RX ORDER — POTASSIUM CHLORIDE 20 MEQ/1
40 TABLET, EXTENDED RELEASE ORAL ONCE
Status: COMPLETED | OUTPATIENT
Start: 2020-03-10 | End: 2020-03-10

## 2020-03-10 RX ORDER — FAMOTIDINE 40 MG/5ML
20 POWDER, FOR SUSPENSION ORAL DAILY
Status: DISCONTINUED | OUTPATIENT
Start: 2020-03-11 | End: 2020-03-13 | Stop reason: HOSPADM

## 2020-03-10 RX ORDER — HYDRALAZINE HYDROCHLORIDE 25 MG/1
75 TABLET, FILM COATED ORAL EVERY 8 HOURS
Status: DISCONTINUED | OUTPATIENT
Start: 2020-03-10 | End: 2020-03-11

## 2020-03-10 RX ORDER — HYDRALAZINE HYDROCHLORIDE 50 MG/1
50 TABLET, FILM COATED ORAL EVERY 8 HOURS
Status: DISCONTINUED | OUTPATIENT
Start: 2020-03-10 | End: 2020-03-10

## 2020-03-10 RX ORDER — ISOSORBIDE DINITRATE 10 MG/1
40 TABLET ORAL EVERY 8 HOURS
Status: DISCONTINUED | OUTPATIENT
Start: 2020-03-10 | End: 2020-03-13 | Stop reason: HOSPADM

## 2020-03-10 RX ORDER — MAGNESIUM SULFATE HEPTAHYDRATE 40 MG/ML
2 INJECTION, SOLUTION INTRAVENOUS ONCE
Status: COMPLETED | OUTPATIENT
Start: 2020-03-10 | End: 2020-03-10

## 2020-03-10 RX ADMIN — AMIODARONE HYDROCHLORIDE 0.5 MG/MIN: 1.8 INJECTION, SOLUTION INTRAVENOUS at 05:03

## 2020-03-10 RX ADMIN — ASPIRIN 81 MG CHEWABLE TABLET 81 MG: 81 TABLET CHEWABLE at 10:03

## 2020-03-10 RX ADMIN — ATORVASTATIN CALCIUM 80 MG: 20 TABLET, FILM COATED ORAL at 10:03

## 2020-03-10 RX ADMIN — HYDRALAZINE HYDROCHLORIDE 25 MG: 25 TABLET, FILM COATED ORAL at 05:03

## 2020-03-10 RX ADMIN — HEPARIN SODIUM 18 UNITS/KG/HR: 10000 INJECTION, SOLUTION INTRAVENOUS at 05:03

## 2020-03-10 RX ADMIN — AMIODARONE HYDROCHLORIDE 400 MG: 200 TABLET ORAL at 09:03

## 2020-03-10 RX ADMIN — INSULIN ASPART 2 UNITS: 100 INJECTION, SOLUTION INTRAVENOUS; SUBCUTANEOUS at 12:03

## 2020-03-10 RX ADMIN — ISOSORBIDE DINITRATE 20 MG: 10 TABLET ORAL at 05:03

## 2020-03-10 RX ADMIN — HYDRALAZINE HYDROCHLORIDE 75 MG: 25 TABLET, FILM COATED ORAL at 09:03

## 2020-03-10 RX ADMIN — AMIODARONE HYDROCHLORIDE 400 MG: 200 TABLET ORAL at 10:03

## 2020-03-10 RX ADMIN — ISOSORBIDE DINITRATE 40 MG: 10 TABLET ORAL at 02:03

## 2020-03-10 RX ADMIN — POTASSIUM CHLORIDE 40 MEQ: 1500 TABLET, EXTENDED RELEASE ORAL at 03:03

## 2020-03-10 RX ADMIN — MAGNESIUM SULFATE HEPTAHYDRATE 2 G: 40 INJECTION, SOLUTION INTRAVENOUS at 03:03

## 2020-03-10 RX ADMIN — SODIUM CHLORIDE 0.4 UNITS/HR: 9 INJECTION, SOLUTION INTRAVENOUS at 05:03

## 2020-03-10 RX ADMIN — ISOSORBIDE DINITRATE 40 MG: 10 TABLET ORAL at 09:03

## 2020-03-10 RX ADMIN — FAMOTIDINE 20 MG: 10 INJECTION INTRAVENOUS at 10:03

## 2020-03-10 RX ADMIN — INSULIN ASPART 1 UNITS: 100 INJECTION, SOLUTION INTRAVENOUS; SUBCUTANEOUS at 09:03

## 2020-03-10 NOTE — SUBJECTIVE & OBJECTIVE
Interval History: renal function continues to improve, sCr down to 4.1 from 5.0. Patient with 1.9 L of urine over the last 24 hrs. No distress on AM assessment. The patient seems to be in good spirits this AM.   Na 142, much improved with D5W infusion.    Review of patient's allergies indicates:  No Known Allergies  Current Facility-Administered Medications   Medication Frequency    0.9%  NaCl infusion (for blood administration) Q24H PRN    amiodarone tablet 400 mg BID    aspirin chewable tablet 81 mg Daily    atorvastatin tablet 80 mg Daily    dextrose 10% (D10W) Bolus PRN    dextrose 10% (D10W) Bolus PRN    famotidine (PF) injection 20 mg Daily    glucagon (human recombinant) injection 1 mg PRN    glucose chewable tablet 16 g PRN    glucose chewable tablet 24 g PRN    heparin 25,000 units in dextrose 5% (100 units/ml) IV bolus from bag - ADDITIONAL PRN BOLUS - 30 units/kg PRN    heparin 25,000 units in dextrose 5% (100 units/ml) IV bolus from bag - ADDITIONAL PRN BOLUS - 60 units/kg PRN    heparin 25,000 units in dextrose 5% 250 mL (100 units/mL) infusion LOW INTENSITY nomogram - O[facility] Continuous    hydrALAZINE tablet 75 mg Q8H    insulin aspart U-100 pen 0-4 Units PRN    insulin regular 100 Units in sodium chloride 0.9% 100 mL infusion Continuous    isosorbide dinitrate tablet 40 mg Q8H    ondansetron injection 4 mg Q8H PRN    senna-docusate 8.6-50 mg per tablet 1 tablet BID PRN    senna-docusate 8.6-50 mg per tablet 2 tablet BID    sodium chloride 0.9% flush 10 mL PRN    sodium chloride 0.9% flush 10 mL PRN    [START ON 3/11/2020] vancomycin 750 mg in dextrose 5 % 250 mL IVPB (ready to mix system) Q48H       Objective:     Vital Signs (Most Recent):  Temp: 98.4 °F (36.9 °C) (03/10/20 0300)  Pulse: 88 (03/10/20 0600)  Resp: (!) 33 (03/10/20 0600)  BP: (!) 142/73 (03/10/20 0600)  SpO2: 97 % (03/10/20 1038)  O2 Device (Oxygen Therapy): room air (03/10/20 1038) Vital Signs (24h Range):  Temp:   [98.4 °F (36.9 °C)-98.6 °F (37 °C)] 98.4 °F (36.9 °C)  Pulse:  [74-94] 88  Resp:  [22-41] 33  SpO2:  [90 %-98 %] 97 %  BP: (128-147)/(60-74) 142/73  Arterial Line BP: (118-159)/(54-75) 142/66     Weight: 67.5 kg (148 lb 13 oz) (03/10/20 0300)  Body mass index is 24.02 kg/m².  Body surface area is 1.77 meters squared.    I/O last 3 completed shifts:  In: 6190.9 [P.O.:730; I.V.:5210.9; IV Piggyback:250]  Out: 3510 [Urine:3410; Drains:100]    Physical Exam   Constitutional: He is oriented to person, place, and time. He appears well-developed and well-nourished. He is cooperative. No distress. Nasal cannula in place.   HENT:   Head: Normocephalic and atraumatic.   Eyes: Right eye exhibits no discharge. Left eye exhibits no discharge.   Neck: Normal range of motion. Neck supple.   Cardiovascular: Normal rate and regular rhythm.   Murmur heard.  Pulmonary/Chest: Effort normal. No stridor. No respiratory distress. He has rales.   Abdominal: Soft. Bowel sounds are normal. He exhibits distension. There is no tenderness.   Musculoskeletal: Normal range of motion. He exhibits no edema.   Neurological: He is alert and oriented to person, place, and time.   Skin: Skin is warm and dry.   Psychiatric: He has a normal mood and affect. His behavior is normal.   Nursing note and vitals reviewed.      Significant Labs:  CBC:   Recent Labs   Lab 03/10/20  0435   WBC 12.55   RBC 3.10*   HGB 9.1*   HCT 28.8*   *   MCV 93   MCH 29.4   MCHC 31.6*     CMP:   Recent Labs   Lab 03/10/20  0945   *   CALCIUM 9.3   ALBUMIN 2.9*   PROT 6.8      K 3.8   CO2 25      BUN 72*   CREATININE 4.0*   ALKPHOS 78   ALT 23   AST 28   BILITOT 0.5     All labs within the past 24 hours have been reviewed.

## 2020-03-10 NOTE — ASSESSMENT & PLAN NOTE
-PAF noted several times since admit.   -Converted to NSR.  -Continue heparin gtt for anticoagulation.  -Transitioned to PO amio.

## 2020-03-10 NOTE — ASSESSMENT & PLAN NOTE
-Presented with impella placed at OSH with position issues and LD>1000.  -Impella removed 3/2/20.  -CVP 7 this am. ScVO2 63.   -Will stop D5W @125/hr.   - d/c'ed due to high cardiac output and hypertension/afib.  -TTE 3/2 EF 10%, LVIDD 6, TAPSE 1.98, Mod MR, Mod TR.  -Cardene weaned off. Continue hydralazine/isordil. Will up titrate as tolerated.    -Not working up for advanced options due to medical instability/renal failure at this time. Will continue to reassess candidacy if condition improves.  -PT/OT.

## 2020-03-10 NOTE — ASSESSMENT & PLAN NOTE
BG goal 140 - 180   BG is reasonably controlled on current IV/SQ insulin regimen.     rewrite transition IV insulin infusion at 0.4 u/hr with stepdown parameters  Low dose correction scale - due to decreased renal function  BG Monitoring AC/HS/0200    ** Please call Endocrine for any BG related issues **  ** Please notify Endocrine for any change and/or advance in diet**    Discharge planning:   TBD

## 2020-03-10 NOTE — PROGRESS NOTES
[facility]  Heart Transplant  Progress Note    Patient Name: John Javier  MRN: [id number]  Admission Date: 2/29/2020  Hospital Length of Stay: 10 days  Attending Physician: Lizzy Cavanaugh MD  Primary Care Provider: To Obtain Unable  Principal Problem:Cardiogenic shock    Subjective:     Interval History: No issues overnight. Feeling much better today.     Continuous Infusions:   heparin (porcine) in D5W 18.023 Units/kg/hr (03/10/20 0600)    insulin (HUMAN R) infusion (adults) 0.4 Units/hr (03/10/20 0600)     Scheduled Meds:   amiodarone  400 mg Oral BID    aspirin  81 mg Oral Daily    atorvastatin  80 mg Oral Daily    famotidine (PF)  20 mg Intravenous Daily    hydrALAZINE  50 mg Oral Q8H    isosorbide dinitrate  20 mg Oral Q8H    senna-docusate 8.6-50 mg  2 tablet Oral BID    [START ON 3/11/2020] vancomycin (VANCOCIN) IVPB  750 mg Intravenous Q48H     PRN Meds:sodium chloride, Dextrose 10% Bolus, Dextrose 10% Bolus, glucagon (human recombinant), glucose, glucose, heparin (PORCINE), heparin (PORCINE), insulin aspart U-100, ondansetron, senna-docusate 8.6-50 mg, sodium chloride 0.9%, sodium chloride 0.9%    Review of patient's allergies indicates:  No Known Allergies  Objective:     Vital Signs (Most Recent):  Temp: 98.4 °F (36.9 °C) (03/10/20 0300)  Pulse: 88 (03/10/20 0600)  Resp: (!) 33 (03/10/20 0600)  BP: (!) 142/73 (03/10/20 0600)  SpO2: 97 % (03/10/20 1038) Vital Signs (24h Range):  Temp:  [98.4 °F (36.9 °C)-98.6 °F (37 °C)] 98.4 °F (36.9 °C)  Pulse:  [74-94] 88  Resp:  [22-41] 33  SpO2:  [90 %-98 %] 97 %  BP: (128-147)/(60-74) 142/73  Arterial Line BP: (118-159)/(54-75) 142/66     Patient Vitals for the past 72 hrs (Last 3 readings):   Weight   03/10/20 0300 67.5 kg (148 lb 13 oz)     Body mass index is 24.02 kg/m².      Intake/Output Summary (Last 24 hours) at 3/10/2020 1055  Last data filed at 3/10/2020 0600  Gross per 24 hour   Intake 3673.59 ml   Output 1555 ml   Net  2118.59 ml        Telemetry: reviewed    Physical Exam   Constitutional: He is oriented to person, place, and time. He appears well-developed and well-nourished.   HENT:   Head: Normocephalic and atraumatic.   Eyes: Pupils are equal, round, and reactive to light. EOM are normal.   Neck: Normal range of motion. Neck supple.   LIJ TLC placed 3/1/20.   Cardiovascular: Normal rate and regular rhythm.   Murmur heard.  Pulmonary/Chest: Effort normal and breath sounds normal. No stridor. No respiratory distress.   Abdominal: Soft. He exhibits no distension. Bowel sounds are decreased. There is no tenderness.   Musculoskeletal: Normal range of motion. He exhibits no edema.   Neurological: He is alert and oriented to person, place, and time.   Skin: Skin is warm and dry. Capillary refill takes 2 to 3 seconds.   Nursing note and vitals reviewed.    Significant Labs:  CBC:  Recent Labs   Lab 03/08/20  0304 03/09/20  0229 03/10/20  0435   WBC 11.59 14.12* 12.55   RBC 3.73* 3.75* 3.10*   HGB 10.7* 10.8* 9.1*   HCT 33.7* 35.0* 28.8*   * 466* 417*   MCV 90 93 93   MCH 28.7 28.8 29.4   MCHC 31.8* 30.9* 31.6*     BNP:  No results for input(s): BNP in the last 168 hours.    Invalid input(s): BNPTRIAGELBLO  CMP:  Recent Labs   Lab 03/08/20  0304  03/09/20 0229 03/09/20  1758 03/10/20  0005 03/10/20  0435   *   < > 161*  161*   < > 132* 193* 130*   CALCIUM 10.0   < > 9.2  9.2   < > 8.5* 8.2* 8.6*   ALBUMIN 3.1*  --  3.0*  --   --   --  2.8*   PROT 7.7  --  7.3  --   --   --  6.4   *   < > 151*  151*   < > 145 140 142   K 3.8   < > 4.1  4.1   < > 3.7 3.5 3.4*   CO2 33*   < > 32*  32*   < > 27 30* 28   CL 99   < > 105  105   < > 104 101 101   *   < > 109*  109*   < > 88* 82* 79*   CREATININE 6.4*   < > 5.1*  5.1*   < > 4.2* 4.0* 4.1*   ALKPHOS 91  --  86  --   --   --  74   ALT 15  --  16  --   --   --  22   AST 21  --  18  --   --   --  25   BILITOT 0.7  --  0.5  --   --   --  0.5    < > = values in  this interval not displayed.      Coagulation:   Recent Labs   Lab 03/09/20  0229 03/10/20  0005 03/10/20  0435   APTT 52.1* 58.5* 57.1*     LDH:  No results for input(s): LDH in the last 72 hours.  Microbiology:  Microbiology Results (last 7 days)     Procedure Component Value Units Date/Time    Blood culture [[id number]]  (Abnormal) Collected:  03/06/20 1618    Order Status:  Completed Specimen:  Blood from Wrist, Right Updated:  03/10/20 0906     Blood Culture, Routine Gram stain peds bottle: Gram positive cocci in clusters resembling Staph       Results called to and read back by:Leonel Patel RN  03/09/2020  08:28      COAGULASE-NEGATIVE STAPHYLOCOCCUS SPECIES  Organism is a probable contaminant      Blood culture [[id number]] Collected:  03/09/20 1200    Order Status:  Completed Specimen:  Blood from Peripheral, Upper Arm, Right Updated:  03/09/20 2115     Blood Culture, Routine No Growth to date    Blood culture [[id number]] Collected:  03/09/20 1052    Order Status:  Completed Specimen:  Blood from Peripheral, Antecubital, Right Updated:  03/09/20 2115     Blood Culture, Routine No Growth to date    Blood culture [[id number]] Collected:  03/06/20 1604    Order Status:  Completed Specimen:  Blood from Peripheral, Forearm, Right Updated:  03/09/20 2012     Blood Culture, Routine No Growth to date      No Growth to date      No Growth to date      No Growth to date    Culture, Respiratory with Gram Stain [[id number]] Collected:  03/06/20 0846    Order Status:  Completed Specimen:  Respiratory from Sputum, Expectorated Updated:  03/09/20 0842     Respiratory Culture Normal respiratory ana maría      No S aureus or Pseudomonas isolated.     Gram Stain (Respiratory) <10 epithelial cells per low power field.     Gram Stain (Respiratory) Few WBC's     Gram Stain (Respiratory) Few Gram positive cocci     Gram Stain (Respiratory) Few Gram positive rods     Gram Stain (Respiratory) Few Gram negative rods    Blood culture  "[[id number]] Collected:  03/01/20 0254    Order Status:  Completed Specimen:  Blood from Peripheral, Upper Arm, Left Updated:  03/06/20 0612     Blood Culture, Routine No growth after 5 days.    Blood culture [[id number]] Collected:  02/29/20 2305    Order Status:  Completed Specimen:  Blood from Peripheral, Forearm, Right Updated:  03/06/20 0612     Blood Culture, Routine No growth after 5 days.        I have reviewed all pertinent labs within the past 24 hours.    Estimated Creatinine Clearance: 16.6 mL/min (A) (based on SCr of 4.1 mg/dL (H)).    Diagnostic Results:  I have reviewed all pertinent imaging results/findings within the past 24 hours.    Assessment and Plan:     Patient intubated w limited collateral from family; further OSH records pending    65 y/o M hx of CAD (50-60% LCx, OM disease on C 2/29), ICM EF 10-15% s/p AICD (unknown baseline but on GDMT as OP), HTN, DM2, HLD, obesity, CKD, presenting from OSH intubated with Impella in setting of cardiogenic shock. Per pt's brother he was taken by EMS to OSH in setting of feeling short of breath, nearly passing out. There found to be in shock, lactate to 12, ANGEL w Cr 2.5, trop 12; unclear if dynamic ECG changes, max trop 10; underwent LHC with evidence of 50-60% LCx, OM disease; no PCI performed. RHC with LVEDP 35. CO 3, CI 1.8, tte w LVEF 10-15%. Underwent Impella placement without additional central line placement (per brother there was a "complication" but no evidence of pneumothorax).    On arrival patient -having intermittent suction alarms, improved w P6->P4. MAPs stable 75-85. CVC placed in LIJ given neck position, CO 9.7, CI 5.35, svr 535. WBC 26k, patient cultured, given single dose of vanc/zosyn. UOP 60-80cc/hr off diuretics. Further collateral pending from OSH. Continued on heparin gtt for impella, weaned to dobutamine 5, gave 500cc bolus in setting of CVP 10 and suction alarms.    * Cardiogenic shock  -Presented with impella placed at OSH with " position issues and LD>1000.  -Impella removed 3/2/20.  -CVP 7 this am. ScVO2 63.   -Will stop D5W @125/hr.   - d/c'ed due to high cardiac output and hypertension/afib.  -TTE 3/2 EF 10%, LVIDD 6, TAPSE 1.98, Mod MR, Mod TR.  -Cardene weaned off. Continue hydralazine/isordil. Will up titrate as tolerated.    -Not working up for advanced options due to medical instability/renal failure at this time. Will continue to reassess candidacy if condition improves.  -PT/OT.     Abdominal distention  -NGT removed 3/9.  -Appreciate GEN JAYCEE Cx.   -CT with concern for possible pancreatitis. No pain. WCTM.   -Lipase ~1000. Spoke with surgery. As patient is not having pain, they would not . If patient starts having abdominal pain, repeat CT abdomen/pelvis.  -Will advance to renal diet today.     Positive blood cultures  -Blood cultures from 3/6 with 1/2 bottles growing Gram positive cocci in clusters resembling Staph.  -Repeat cultures NGTD.   -TLC replaced 3/9. Ivonne/Lamb removed.  -Continue Vanc per pharmacy dosing.     PAF (paroxysmal atrial fibrillation)  -PAF noted several times since admit.   -Converted to NSR.  -Continue heparin gtt for anticoagulation.  -Transitioned to PO amio.     VT (ventricular tachycardia)  -S/P ICD shocks x 5 per OSH.  -Continue PO amio.     ANGEL (acute kidney injury)  - Likely ATN in setting of shock, Hemolysis, plus IV contrast at OSH.  - Monitor Cr/BUN closely, trending down.  - Appreciate renal consult.  - Strict I/O.  - Avoid nephrotoxic meds.  - Continues to make good urine.    Hematoma  -CT 3/7/20: Right anterior abdominal wall hematoma, abuts the distal most aspect of the rectus sheath.   -Bedside nurse to trace hematoma today with marker for monitoring  -Secondary to impella which was removed  -Continue to monitor closely    Iron deficiency anemia  -No obvious bleeding.   -R groin impella site with hematoma that is stable/no abdominal or back pain.  -s/p 2 U PRBC  3/5/20    Malnutrition of moderate degree  -Appreciate Dietary Cx.    Acute on chronic combined systolic and diastolic heart failure  - See Cardiogenic shock.    Type 2 diabetes mellitus, without long-term current use of insulin  -A1C 7.0 on admit  -Insulin gtt management per endo. Greatly appreciate their assistance    CAD (coronary artery disease)  -Hx of RCA stent in 2013.   -[facility] 2/29: 50-60% LCx, OM disease.  -continue ASA, high intensity statin.  -Continue heparin    Uninterrupted Critical Care/Counseling Time (not including procedures): 45mn    Alejo Frankel, NP  Heart Transplant  [facility]

## 2020-03-10 NOTE — NURSING TRANSFER
Nursing Transfer Note      3/10/2020     [facility]    Transfer via bed    Transfer with cardiac monitoring    Transported by VAN Kumar    Medicines sent: Yes    Chart send with patient: Yes    Notified: daughter    Patient reassessed at: 1741 03/10/2020    Upon arrival to floor: cardiac monitor applied, patient oriented to room, call bell in reach and bed in lowest position, wheels locked, Melani RN at bedside to assume care of patient

## 2020-03-10 NOTE — ASSESSMENT & PLAN NOTE
-Blood cultures from 3/6 with 1/2 bottles growing Gram positive cocci in clusters resembling Staph.  -Repeat cultures NGTD.   -TLC replaced 3/9. Ivonne/Zainab removed.  -Continue Vanc per pharmacy dosing.

## 2020-03-10 NOTE — ASSESSMENT & PLAN NOTE
Titrate insulin slowly to avoid hypoglycemia as the risk of hypoglycemia increases with decreased creatinine clearance.  Caution with insulin stacking  Estimated Creatinine Clearance: 18.4 mL/min (A) (based on SCr of 3.7 mg/dL (H)).

## 2020-03-10 NOTE — ASSESSMENT & PLAN NOTE
-NGT removed 3/9.  -Appreciate GEN JAYCEE Cx.   -CT with concern for possible pancreatitis. No pain. WCTM.   -Lipase ~1000. Spoke with surgery. As patient is not having pain, they would not . If patient starts having abdominal pain, repeat CT abdomen/pelvis.  -Will advance to renal diet today.

## 2020-03-10 NOTE — PLAN OF CARE
Plan of care discussed with patient. Pt stepped down from ICU this pm. Patient ambulating independently, fall precautions in place. Patient has no complaints of pain. Discussed medications and care. Heparin gtt still running at 18 units/kg/hr. Insulin gtt stopped d/t BS of 107. Patient has no questions at this time. Will continue to monitor.

## 2020-03-10 NOTE — PROGRESS NOTES
"[facility]  Endocrinology  Progress Note    Admit Date: 2020     Reason for Consult: Management of T2DM, Hyperglycemia     Surgical Procedure and Date: N/A    Diabetes diagnosis year:     Lab Results   Component Value Date    HGBA1C 7.0 (H) 2020       Home Diabetes Medications:  Metformin 500 mg BID    How often checking glucose at home? Once daily  BG readings on regimen:   Hypoglycemia on the regimen? No  Missed doses on regimen? No    Diabetes Complications include:     none    Complicating diabetes co morbidities:   CHF and CKD      HPI:   Patient is a 64 y.o. male with a diagnosis of DM2, ANGEL, afib, CAD, CHF, and cardiogenic shock.  Patient admitted to [facility] from outside facility in cardiogenic shock, intubated, and with an impella in place.  Diagnosed with DM2 last year and place on Metformin, managed per his PCP.  Endocrinology consulted for DM/BG management.        Interval HPI:   Overnight events:    BG is within goal on current IV/SQ insulin regimen. Cr. Trending downward. Expect increase in insulin needs.   Diet renal Fluid - 2000mL  8 Days Post-Op    Eatin% - 100%  Nausea: No  Hypoglycemia and intervention: No  Fever: No  TPN and/or TF: No  If yes, type of TF/TPN and rate: None    /83 (BP Location: Left arm, Patient Position: Sitting)   Pulse 80   Temp 98.6 °F (37 °C) (Oral)   Resp (!) 22   Ht 5' 6" (1.676 m)   Wt 67.5 kg (148 lb 13 oz)   SpO2 98%   BMI 24.02 kg/m²      Labs Reviewed and Include    Recent Labs   Lab 03/10/20  0945 03/10/20  1202   * 143*   CALCIUM 9.3 8.5*   ALBUMIN 2.9*  --    PROT 6.8  --     140   K 3.8 3.7   CO2 25 25    104   BUN 72* 70*   CREATININE 4.0* 3.7*   ALKPHOS 78  --    ALT 23  --    AST 28  --    BILITOT 0.5  --      Lab Results   Component Value Date    WBC 12.55 03/10/2020    HGB 9.1 (L) 03/10/2020    HCT 28.8 (L) 03/10/2020    MCV 93 03/10/2020     (H) 03/10/2020     No results for " input(s): TSH, FREET4 in the last 168 hours.  Lab Results   Component Value Date    HGBA1C 7.0 (H) 03/01/2020       Nutritional status:   Body mass index is 24.02 kg/m².  Lab Results   Component Value Date    ALBUMIN 2.9 (L) 03/10/2020    ALBUMIN 2.8 (L) 03/10/2020    ALBUMIN 3.0 (L) 03/09/2020     Lab Results   Component Value Date    PREALBUMIN <2.5 (L) 03/04/2020       Estimated Creatinine Clearance: 18.4 mL/min (A) (based on SCr of 3.7 mg/dL (H)).    Accu-Checks  Recent Labs     03/08/20  0620 03/08/20  1026 03/08/20  1509 03/08/20  1946 03/09/20  0908 03/09/20  1147 03/09/20  2106 03/10/20  0012 03/10/20  0438 03/10/20  1220   POCTGLUCOSE 166* 180* 185* 152* 160* 227* 175* 235* 123* 159*       Current Medications and/or Treatments Impacting Glycemic Control  Immunotherapy:    Immunosuppressants     None        Steroids:   Hormones (From admission, onward)    None        Pressors:    Autonomic Drugs (From admission, onward)    None        Hyperglycemia/Diabetes Medications:   Antihyperglycemics (From admission, onward)    Start     Stop Route Frequency Ordered    03/06/20 1815  insulin regular 100 Units in sodium chloride 0.9% 100 mL infusion      -- IV Continuous 03/06/20 1701    03/06/20 1801  insulin aspart U-100 pen 0-4 Units      -- SubQ As needed (PRN) 03/06/20 1701          ASSESSMENT and PLAN    * Cardiogenic shock  Managed per primary team  Avoid hypoglycemia        Type 2 diabetes mellitus, without long-term current use of insulin  BG goal 140 - 180   BG is reasonably controlled on current IV/SQ insulin regimen.     rewrite transition IV insulin infusion at 0.4 u/hr with stepdown parameters  Low dose correction scale - due to decreased renal function  BG Monitoring AC/HS/0200    ** Please call Endocrine for any BG related issues **  ** Please notify Endocrine for any change and/or advance in diet**    Discharge planning:   TBD        ANGEL (acute kidney injury)  Titrate insulin slowly to avoid  hypoglycemia as the risk of hypoglycemia increases with decreased creatinine clearance.  Caution with insulin stacking  Estimated Creatinine Clearance: 18.4 mL/min (A) (based on SCr of 3.7 mg/dL (H)).            Gian Newton NP  Endocrinology  [facility]

## 2020-03-10 NOTE — PROGRESS NOTES
[facility]  Nephrology  Progress Note    Patient Name: John Javier  MRN: [id number]  Admission Date: 2/29/2020  Hospital Length of Stay: 10 days  Attending Provider: Lizzy Cavanaugh MD   Primary Care Physician: To Obtain Unable  Principal Problem:Cardiogenic shock    Subjective:     Interval History: renal function continues to improve, sCr down to 4.1 from 5.0. Patient with 1.9 L of urine over the last 24 hrs. No distress on AM assessment. The patient seems to be in good spirits this AM.   Na 142, much improved with D5W infusion.    Review of patient's allergies indicates:  No Known Allergies  Current Facility-Administered Medications   Medication Frequency    0.9%  NaCl infusion (for blood administration) Q24H PRN    amiodarone tablet 400 mg BID    aspirin chewable tablet 81 mg Daily    atorvastatin tablet 80 mg Daily    dextrose 10% (D10W) Bolus PRN    dextrose 10% (D10W) Bolus PRN    famotidine (PF) injection 20 mg Daily    glucagon (human recombinant) injection 1 mg PRN    glucose chewable tablet 16 g PRN    glucose chewable tablet 24 g PRN    heparin 25,000 units in dextrose 5% (100 units/ml) IV bolus from bag - ADDITIONAL PRN BOLUS - 30 units/kg PRN    heparin 25,000 units in dextrose 5% (100 units/ml) IV bolus from bag - ADDITIONAL PRN BOLUS - 60 units/kg PRN    heparin 25,000 units in dextrose 5% 250 mL (100 units/mL) infusion LOW INTENSITY nomogram - [facility] Continuous    hydrALAZINE tablet 75 mg Q8H    insulin aspart U-100 pen 0-4 Units PRN    insulin regular 100 Units in sodium chloride 0.9% 100 mL infusion Continuous    isosorbide dinitrate tablet 40 mg Q8H    ondansetron injection 4 mg Q8H PRN    senna-docusate 8.6-50 mg per tablet 1 tablet BID PRN    senna-docusate 8.6-50 mg per tablet 2 tablet BID    sodium chloride 0.9% flush 10 mL PRN    sodium chloride 0.9% flush 10 mL PRN    [START ON 3/11/2020] vancomycin 750 mg in dextrose 5 % 250 mL IVPB (ready to mix  system) Q48H       Objective:     Vital Signs (Most Recent):  Temp: 98.4 °F (36.9 °C) (03/10/20 0300)  Pulse: 88 (03/10/20 0600)  Resp: (!) 33 (03/10/20 0600)  BP: (!) 142/73 (03/10/20 0600)  SpO2: 97 % (03/10/20 1038)  O2 Device (Oxygen Therapy): room air (03/10/20 1038) Vital Signs (24h Range):  Temp:  [98.4 °F (36.9 °C)-98.6 °F (37 °C)] 98.4 °F (36.9 °C)  Pulse:  [74-94] 88  Resp:  [22-41] 33  SpO2:  [90 %-98 %] 97 %  BP: (128-147)/(60-74) 142/73  Arterial Line BP: (118-159)/(54-75) 142/66     Weight: 67.5 kg (148 lb 13 oz) (03/10/20 0300)  Body mass index is 24.02 kg/m².  Body surface area is 1.77 meters squared.    I/O last 3 completed shifts:  In: 6190.9 [P.O.:730; I.V.:5210.9; IV Piggyback:250]  Out: 3510 [Urine:3410; Drains:100]    Physical Exam   Constitutional: He is oriented to person, place, and time. He appears well-developed and well-nourished. He is cooperative. No distress. Nasal cannula in place.   HENT:   Head: Normocephalic and atraumatic.   Eyes: Right eye exhibits no discharge. Left eye exhibits no discharge.   Neck: Normal range of motion. Neck supple.   Cardiovascular: Normal rate and regular rhythm.   Murmur heard.  Pulmonary/Chest: Effort normal. No stridor. No respiratory distress. He has rales.   Abdominal: Soft. Bowel sounds are normal. He exhibits distension. There is no tenderness.   Musculoskeletal: Normal range of motion. He exhibits no edema.   Neurological: He is alert and oriented to person, place, and time.   Skin: Skin is warm and dry.   Psychiatric: He has a normal mood and affect. His behavior is normal.   Nursing note and vitals reviewed.      Significant Labs:  CBC:   Recent Labs   Lab 03/10/20  0435   WBC 12.55   RBC 3.10*   HGB 9.1*   HCT 28.8*   *   MCV 93   MCH 29.4   MCHC 31.6*     CMP:   Recent Labs   Lab 03/10/20  0945   *   CALCIUM 9.3   ALBUMIN 2.9*   PROT 6.8      K 3.8   CO2 25      BUN 72*   CREATININE 4.0*   ALKPHOS 78   ALT 23   AST 28    BILITOT 0.5     All labs within the past 24 hours have been reviewed.         Assessment/Plan:     * Cardiogenic shock  - Managed by primary.     ANGEL (acute kidney injury)  * Non-oliguric iATN from cardiogenic shock.     3/2: -urine microscopy:  abundant muddy brown casts (leaning towards ATN)     Plan/ Rec:  - Renal function improving overall, sCr down to 4.0 with good urine output  - Na improved to 142 w/ D5W infusion overnight, agree with discontinuing drip   - check phos level daily  - Renal function panels q 12hr   - Strict I/O and chart  - Will follow closely  - Plan discussed with Dr. Valdez        Thank you for your consult. I will follow-up with patient. Please contact us if you have any additional questions.    Marsha Rahman DNP, FNP-C  Nephrology  [facility]

## 2020-03-10 NOTE — ASSESSMENT & PLAN NOTE
* Non-oliguric iATN from cardiogenic shock.     3/2: -urine microscopy:  abundant muddy brown casts (leaning towards ATN)     Plan/ Rec:  - Renal function improving overall, sCr down to 4.0 with good urine output  - Na improved to 142 w/ D5W infusion overnight, agree with discontinuing drip   - check phos level daily  - Renal function panels q 12hr   - Strict I/O and chart  - Will follow closely  - Plan discussed with Dr. Valdez

## 2020-03-10 NOTE — SUBJECTIVE & OBJECTIVE
Interval History: No issues overnight. Feeling much better today.     Continuous Infusions:   heparin (porcine) in D5W 18.023 Units/kg/hr (03/10/20 0600)    insulin (HUMAN R) infusion (adults) 0.4 Units/hr (03/10/20 0600)     Scheduled Meds:   amiodarone  400 mg Oral BID    aspirin  81 mg Oral Daily    atorvastatin  80 mg Oral Daily    famotidine (PF)  20 mg Intravenous Daily    hydrALAZINE  50 mg Oral Q8H    isosorbide dinitrate  20 mg Oral Q8H    senna-docusate 8.6-50 mg  2 tablet Oral BID    [START ON 3/11/2020] vancomycin (VANCOCIN) IVPB  750 mg Intravenous Q48H     PRN Meds:sodium chloride, Dextrose 10% Bolus, Dextrose 10% Bolus, glucagon (human recombinant), glucose, glucose, heparin (PORCINE), heparin (PORCINE), insulin aspart U-100, ondansetron, senna-docusate 8.6-50 mg, sodium chloride 0.9%, sodium chloride 0.9%    Review of patient's allergies indicates:  No Known Allergies  Objective:     Vital Signs (Most Recent):  Temp: 98.4 °F (36.9 °C) (03/10/20 0300)  Pulse: 88 (03/10/20 0600)  Resp: (!) 33 (03/10/20 0600)  BP: (!) 142/73 (03/10/20 0600)  SpO2: 97 % (03/10/20 1038) Vital Signs (24h Range):  Temp:  [98.4 °F (36.9 °C)-98.6 °F (37 °C)] 98.4 °F (36.9 °C)  Pulse:  [74-94] 88  Resp:  [22-41] 33  SpO2:  [90 %-98 %] 97 %  BP: (128-147)/(60-74) 142/73  Arterial Line BP: (118-159)/(54-75) 142/66     Patient Vitals for the past 72 hrs (Last 3 readings):   Weight   03/10/20 0300 67.5 kg (148 lb 13 oz)     Body mass index is 24.02 kg/m².      Intake/Output Summary (Last 24 hours) at 3/10/2020 1055  Last data filed at 3/10/2020 0600  Gross per 24 hour   Intake 3673.59 ml   Output 1555 ml   Net 2118.59 ml        Telemetry: reviewed    Physical Exam   Constitutional: He is oriented to person, place, and time. He appears well-developed and well-nourished.   HENT:   Head: Normocephalic and atraumatic.   Eyes: Pupils are equal, round, and reactive to light. EOM are normal.   Neck: Normal range of motion. Neck  supple.   LIJ TLC placed 3/1/20.   Cardiovascular: Normal rate and regular rhythm.   Murmur heard.  Pulmonary/Chest: Effort normal and breath sounds normal. No stridor. No respiratory distress.   Abdominal: Soft. He exhibits no distension. Bowel sounds are decreased. There is no tenderness.   Musculoskeletal: Normal range of motion. He exhibits no edema.   Neurological: He is alert and oriented to person, place, and time.   Skin: Skin is warm and dry. Capillary refill takes 2 to 3 seconds.   Nursing note and vitals reviewed.    Significant Labs:  CBC:  Recent Labs   Lab 03/08/20  0304 03/09/20  0229 03/10/20  0435   WBC 11.59 14.12* 12.55   RBC 3.73* 3.75* 3.10*   HGB 10.7* 10.8* 9.1*   HCT 33.7* 35.0* 28.8*   * 466* 417*   MCV 90 93 93   MCH 28.7 28.8 29.4   MCHC 31.8* 30.9* 31.6*     BNP:  No results for input(s): BNP in the last 168 hours.    Invalid input(s): BNPTRIAGELBLO  CMP:  Recent Labs   Lab 03/08/20  0304  03/09/20  0229  03/09/20  1758 03/10/20  0005 03/10/20  0435   *   < > 161*  161*   < > 132* 193* 130*   CALCIUM 10.0   < > 9.2  9.2   < > 8.5* 8.2* 8.6*   ALBUMIN 3.1*  --  3.0*  --   --   --  2.8*   PROT 7.7  --  7.3  --   --   --  6.4   *   < > 151*  151*   < > 145 140 142   K 3.8   < > 4.1  4.1   < > 3.7 3.5 3.4*   CO2 33*   < > 32*  32*   < > 27 30* 28   CL 99   < > 105  105   < > 104 101 101   *   < > 109*  109*   < > 88* 82* 79*   CREATININE 6.4*   < > 5.1*  5.1*   < > 4.2* 4.0* 4.1*   ALKPHOS 91  --  86  --   --   --  74   ALT 15  --  16  --   --   --  22   AST 21  --  18  --   --   --  25   BILITOT 0.7  --  0.5  --   --   --  0.5    < > = values in this interval not displayed.      Coagulation:   Recent Labs   Lab 03/09/20  0229 03/10/20  0005 03/10/20  0435   APTT 52.1* 58.5* 57.1*     LDH:  No results for input(s): LDH in the last 72 hours.  Microbiology:  Microbiology Results (last 7 days)     Procedure Component Value Units Date/Time    Blood culture  [[id number]]  (Abnormal) Collected:  03/06/20 1618    Order Status:  Completed Specimen:  Blood from Wrist, Right Updated:  03/10/20 0906     Blood Culture, Routine Gram stain peds bottle: Gram positive cocci in clusters resembling Staph       Results called to and read back by:Leonel Patel RN  03/09/2020  08:28      COAGULASE-NEGATIVE STAPHYLOCOCCUS SPECIES  Organism is a probable contaminant      Blood culture [[id number]] Collected:  03/09/20 1200    Order Status:  Completed Specimen:  Blood from Peripheral, Upper Arm, Right Updated:  03/09/20 2115     Blood Culture, Routine No Growth to date    Blood culture [[id number]] Collected:  03/09/20 1052    Order Status:  Completed Specimen:  Blood from Peripheral, Antecubital, Right Updated:  03/09/20 2115     Blood Culture, Routine No Growth to date    Blood culture [[id number]] Collected:  03/06/20 1604    Order Status:  Completed Specimen:  Blood from Peripheral, Forearm, Right Updated:  03/09/20 2012     Blood Culture, Routine No Growth to date      No Growth to date      No Growth to date      No Growth to date    Culture, Respiratory with Gram Stain [[id number]] Collected:  03/06/20 0846    Order Status:  Completed Specimen:  Respiratory from Sputum, Expectorated Updated:  03/09/20 0842     Respiratory Culture Normal respiratory ana maría      No S aureus or Pseudomonas isolated.     Gram Stain (Respiratory) <10 epithelial cells per low power field.     Gram Stain (Respiratory) Few WBC's     Gram Stain (Respiratory) Few Gram positive cocci     Gram Stain (Respiratory) Few Gram positive rods     Gram Stain (Respiratory) Few Gram negative rods    Blood culture [[id number]] Collected:  03/01/20 0254    Order Status:  Completed Specimen:  Blood from Peripheral, Upper Arm, Left Updated:  03/06/20 0612     Blood Culture, Routine No growth after 5 days.    Blood culture [[id number]] Collected:  02/29/20 2305    Order Status:  Completed Specimen:  Blood from Peripheral, Forearm,  Right Updated:  03/06/20 0612     Blood Culture, Routine No growth after 5 days.        I have reviewed all pertinent labs within the past 24 hours.    Estimated Creatinine Clearance: 16.6 mL/min (A) (based on SCr of 4.1 mg/dL (H)).    Diagnostic Results:  I have reviewed all pertinent imaging results/findings within the past 24 hours.

## 2020-03-10 NOTE — SUBJECTIVE & OBJECTIVE
"Interval HPI:   Overnight events:    BG is within goal on current IV/SQ insulin regimen. Cr. Trending downward. Expect increase in insulin needs.   Diet renal Fluid - 2000mL  8 Days Post-Op    Eatin% - 100%  Nausea: No  Hypoglycemia and intervention: No  Fever: No  TPN and/or TF: No  If yes, type of TF/TPN and rate: None    /83 (BP Location: Left arm, Patient Position: Sitting)   Pulse 80   Temp 98.6 °F (37 °C) (Oral)   Resp (!) 22   Ht 5' 6" (1.676 m)   Wt 67.5 kg (148 lb 13 oz)   SpO2 98%   BMI 24.02 kg/m²     Labs Reviewed and Include    Recent Labs   Lab 03/10/20  0945 03/10/20  1202   * 143*   CALCIUM 9.3 8.5*   ALBUMIN 2.9*  --    PROT 6.8  --     140   K 3.8 3.7   CO2 25 25    104   BUN 72* 70*   CREATININE 4.0* 3.7*   ALKPHOS 78  --    ALT 23  --    AST 28  --    BILITOT 0.5  --      Lab Results   Component Value Date    WBC 12.55 03/10/2020    HGB 9.1 (L) 03/10/2020    HCT 28.8 (L) 03/10/2020    MCV 93 03/10/2020     (H) 03/10/2020     No results for input(s): TSH, FREET4 in the last 168 hours.  Lab Results   Component Value Date    HGBA1C 7.0 (H) 2020       Nutritional status:   Body mass index is 24.02 kg/m².  Lab Results   Component Value Date    ALBUMIN 2.9 (L) 03/10/2020    ALBUMIN 2.8 (L) 03/10/2020    ALBUMIN 3.0 (L) 2020     Lab Results   Component Value Date    PREALBUMIN <2.5 (L) 2020       Estimated Creatinine Clearance: 18.4 mL/min (A) (based on SCr of 3.7 mg/dL (H)).    Accu-Checks  Recent Labs     20  0620 20  1026 20  1509 20  1946 20  0908 20  1147 20  2106 03/10/20  0012 03/10/20  0438 03/10/20  1220   POCTGLUCOSE 166* 180* 185* 152* 160* 227* 175* 235* 123* 159*       Current Medications and/or Treatments Impacting Glycemic Control  Immunotherapy:    Immunosuppressants     None        Steroids:   Hormones (From admission, onward)    None        Pressors:    Autonomic Drugs (From " admission, onward)    None        Hyperglycemia/Diabetes Medications:   Antihyperglycemics (From admission, onward)    Start     Stop Route Frequency Ordered    03/06/20 1815  insulin regular 100 Units in sodium chloride 0.9% 100 mL infusion      -- IV Continuous 03/06/20 1701    03/06/20 1801  insulin aspart U-100 pen 0-4 Units      -- SubQ As needed (PRN) 03/06/20 1701

## 2020-03-11 LAB
ALBUMIN SERPL BCP-MCNC: 2.7 G/DL (ref 3.5–5.2)
ALLENS TEST: ABNORMAL
ALP SERPL-CCNC: 70 U/L (ref 55–135)
ALT SERPL W/O P-5'-P-CCNC: 25 U/L (ref 10–44)
ANION GAP SERPL CALC-SCNC: 13 MMOL/L (ref 8–16)
APTT BLDCRRT: 52.5 SEC (ref 21–32)
AST SERPL-CCNC: 28 U/L (ref 10–40)
BACTERIA BLD CULT: ABNORMAL
BACTERIA BLD CULT: NORMAL
BASOPHILS # BLD AUTO: 0.03 K/UL (ref 0–0.2)
BASOPHILS NFR BLD: 0.3 % (ref 0–1.9)
BILIRUB SERPL-MCNC: 0.5 MG/DL (ref 0.1–1)
BUN SERPL-MCNC: 61 MG/DL (ref 8–23)
CALCIUM SERPL-MCNC: 8.6 MG/DL (ref 8.7–10.5)
CHLORIDE SERPL-SCNC: 106 MMOL/L (ref 95–110)
CO2 SERPL-SCNC: 24 MMOL/L (ref 23–29)
CREAT SERPL-MCNC: 3.2 MG/DL (ref 0.5–1.4)
DELSYS: ABNORMAL
DIFFERENTIAL METHOD: ABNORMAL
EOSINOPHIL # BLD AUTO: 0.5 K/UL (ref 0–0.5)
EOSINOPHIL NFR BLD: 4.2 % (ref 0–8)
ERYTHROCYTE [DISTWIDTH] IN BLOOD BY AUTOMATED COUNT: 13.9 % (ref 11.5–14.5)
EST. GFR  (AFRICAN AMERICAN): 22.6 ML/MIN/1.73 M^2
EST. GFR  (NON AFRICAN AMERICAN): 19.5 ML/MIN/1.73 M^2
GLUCOSE SERPL-MCNC: 112 MG/DL (ref 70–110)
HCO3 UR-SCNC: 26.8 MMOL/L (ref 24–28)
HCT VFR BLD AUTO: 25.7 % (ref 40–54)
HGB BLD-MCNC: 8.2 G/DL (ref 14–18)
IMM GRANULOCYTES # BLD AUTO: 0.1 K/UL (ref 0–0.04)
IMM GRANULOCYTES NFR BLD AUTO: 0.9 % (ref 0–0.5)
LYMPHOCYTES # BLD AUTO: 2.8 K/UL (ref 1–4.8)
LYMPHOCYTES NFR BLD: 24.5 % (ref 18–48)
MAGNESIUM SERPL-MCNC: 1.9 MG/DL (ref 1.6–2.6)
MCH RBC QN AUTO: 29.1 PG (ref 27–31)
MCHC RBC AUTO-ENTMCNC: 31.9 G/DL (ref 32–36)
MCV RBC AUTO: 91 FL (ref 82–98)
MONOCYTES # BLD AUTO: 1 K/UL (ref 0.3–1)
MONOCYTES NFR BLD: 8.7 % (ref 4–15)
NEUTROPHILS # BLD AUTO: 7 K/UL (ref 1.8–7.7)
NEUTROPHILS NFR BLD: 61.4 % (ref 38–73)
NRBC BLD-RTO: 0 /100 WBC
PCO2 BLDA: 41.2 MMHG (ref 35–45)
PH SMN: 7.42 [PH] (ref 7.35–7.45)
PLATELET # BLD AUTO: 401 K/UL (ref 150–350)
PMV BLD AUTO: 11 FL (ref 9.2–12.9)
PO2 BLDA: 33 MMHG (ref 40–60)
POC BE: 2 MMOL/L
POC SATURATED O2: 66 % (ref 95–100)
POC TCO2: 28 MMOL/L (ref 24–29)
POCT GLUCOSE: 114 MG/DL (ref 70–110)
POCT GLUCOSE: 164 MG/DL (ref 70–110)
POCT GLUCOSE: 187 MG/DL (ref 70–110)
POCT GLUCOSE: 212 MG/DL (ref 70–110)
POTASSIUM SERPL-SCNC: 3.5 MMOL/L (ref 3.5–5.1)
PROT SERPL-MCNC: 6.1 G/DL (ref 6–8.4)
RBC # BLD AUTO: 2.82 M/UL (ref 4.6–6.2)
SAMPLE: ABNORMAL
SITE: ABNORMAL
SODIUM SERPL-SCNC: 143 MMOL/L (ref 136–145)
WBC # BLD AUTO: 11.34 K/UL (ref 3.9–12.7)

## 2020-03-11 PROCEDURE — 97530 THERAPEUTIC ACTIVITIES: CPT

## 2020-03-11 PROCEDURE — 82803 BLOOD GASES ANY COMBINATION: CPT

## 2020-03-11 PROCEDURE — 97110 THERAPEUTIC EXERCISES: CPT

## 2020-03-11 PROCEDURE — 63600175 PHARM REV CODE 636 W HCPCS: Performed by: INTERNAL MEDICINE

## 2020-03-11 PROCEDURE — 99233 SBSQ HOSP IP/OBS HIGH 50: CPT | Mod: ,,, | Performed by: NURSE PRACTITIONER

## 2020-03-11 PROCEDURE — 20600001 HC STEP DOWN PRIVATE ROOM

## 2020-03-11 PROCEDURE — 85730 THROMBOPLASTIN TIME PARTIAL: CPT

## 2020-03-11 PROCEDURE — 63600175 PHARM REV CODE 636 W HCPCS: Performed by: STUDENT IN AN ORGANIZED HEALTH CARE EDUCATION/TRAINING PROGRAM

## 2020-03-11 PROCEDURE — 83735 ASSAY OF MAGNESIUM: CPT

## 2020-03-11 PROCEDURE — 25000003 PHARM REV CODE 250: Performed by: NURSE PRACTITIONER

## 2020-03-11 PROCEDURE — 94660 CPAP INITIATION&MGMT: CPT

## 2020-03-11 PROCEDURE — 85025 COMPLETE CBC W/AUTO DIFF WBC: CPT

## 2020-03-11 PROCEDURE — 97535 SELF CARE MNGMENT TRAINING: CPT

## 2020-03-11 PROCEDURE — 80053 COMPREHEN METABOLIC PANEL: CPT

## 2020-03-11 PROCEDURE — 99900035 HC TECH TIME PER 15 MIN (STAT)

## 2020-03-11 PROCEDURE — 99233 PR SUBSEQUENT HOSPITAL CARE,LEVL III: ICD-10-PCS | Mod: ,,, | Performed by: NURSE PRACTITIONER

## 2020-03-11 RX ORDER — HYDRALAZINE HYDROCHLORIDE 25 MG/1
100 TABLET, FILM COATED ORAL EVERY 8 HOURS
Status: DISCONTINUED | OUTPATIENT
Start: 2020-03-11 | End: 2020-03-13 | Stop reason: HOSPADM

## 2020-03-11 RX ORDER — POTASSIUM CHLORIDE 20 MEQ/1
40 TABLET, EXTENDED RELEASE ORAL ONCE
Status: COMPLETED | OUTPATIENT
Start: 2020-03-11 | End: 2020-03-11

## 2020-03-11 RX ADMIN — INSULIN ASPART 1 UNITS: 100 INJECTION, SOLUTION INTRAVENOUS; SUBCUTANEOUS at 11:03

## 2020-03-11 RX ADMIN — HEPARIN SODIUM 18 UNITS/KG/HR: 10000 INJECTION, SOLUTION INTRAVENOUS at 02:03

## 2020-03-11 RX ADMIN — DOCUSATE SODIUM - SENNOSIDES 2 TABLET: 50; 8.6 TABLET, FILM COATED ORAL at 09:03

## 2020-03-11 RX ADMIN — ISOSORBIDE DINITRATE 40 MG: 10 TABLET ORAL at 06:03

## 2020-03-11 RX ADMIN — ASPIRIN 81 MG CHEWABLE TABLET 81 MG: 81 TABLET CHEWABLE at 09:03

## 2020-03-11 RX ADMIN — ISOSORBIDE DINITRATE 40 MG: 10 TABLET ORAL at 02:03

## 2020-03-11 RX ADMIN — HYDRALAZINE HYDROCHLORIDE 100 MG: 25 TABLET ORAL at 02:03

## 2020-03-11 RX ADMIN — HYDRALAZINE HYDROCHLORIDE 75 MG: 25 TABLET, FILM COATED ORAL at 06:03

## 2020-03-11 RX ADMIN — ATORVASTATIN CALCIUM 80 MG: 20 TABLET, FILM COATED ORAL at 09:03

## 2020-03-11 RX ADMIN — POTASSIUM CHLORIDE 40 MEQ: 20 TABLET, EXTENDED RELEASE ORAL at 09:03

## 2020-03-11 RX ADMIN — AMIODARONE HYDROCHLORIDE 400 MG: 200 TABLET ORAL at 09:03

## 2020-03-11 RX ADMIN — VANCOMYCIN HYDROCHLORIDE 750 MG: 750 INJECTION, POWDER, LYOPHILIZED, FOR SOLUTION INTRAVENOUS at 10:03

## 2020-03-11 RX ADMIN — HYDRALAZINE HYDROCHLORIDE 100 MG: 25 TABLET ORAL at 09:03

## 2020-03-11 RX ADMIN — ISOSORBIDE DINITRATE 40 MG: 10 TABLET ORAL at 09:03

## 2020-03-11 NOTE — PLAN OF CARE
Problem: Occupational Therapy Goal  Goal: Occupational Therapy Goal  Description  Goals to be met by: 7 days 3/13/2020     Patient will increase functional independence with ADLs by performing:    Pt to complete standing g/h skills with SBA  Pt to complete toileting with supervision.  Pt to complete UE dressing with set-up  Pt to complete LE dressing with SBA  Pt to complete BSC with SBA  Pt to tolerated OOB to chair x 3-4 hours daily to increase endurance for functional activity.      Outcome: Ongoing, Progressing

## 2020-03-11 NOTE — CARE UPDATE
BG goal 140 - 180   BG is reasonably controlled on current SQ insulin regimen.     Low dose correction scale - due to decreased renal function  BG Monitoring AC/HS/0200  ADA 1500 russell in conjunction with 2000 ml fluid restrictions    ** Please call Endocrine for any BG related issues **  ** Please notify Endocrine for any change and/or advance in diet**    Discharge planning:   TBD

## 2020-03-11 NOTE — PLAN OF CARE
Plan of care discussed with patient. Patient is free of fall/trauma/injury. Denies CP, SOB, or pain/discomfort. VSS and pt remains on tele. Pt worked with PT/OT. K 3.5; replaced. Heparin gtt maintained and IV abx administered per order. Pt is ACHS; BG protocol maintained. All questions addressed. Will continue to monitor

## 2020-03-11 NOTE — PT/OT/SLP PROGRESS
Occupational Therapy   Treatment    Name: John Javier  MRN: [id number]  Admitting Diagnosis:  Cardiogenic shock      9 Days Post-Op  Pre-op Diagnosis: Cardiogenic shock [R57.0]    Procedure(s):  Impella, Removal     Recommendations:     Discharge Recommendations: home with home health  Discharge Equipment Recommendations:  none  Barriers to discharge:  None    Assessment:     John Javier is a 64 y.o. male with a medical diagnosis of Cardiogenic shock. He presents with the following performance deficits affecting function: weakness, impaired endurance, impaired self care skills, impaired cardiopulmonary response to activity, impaired functional mobilty. Patient tolerated session well and was able to ambulate ~150 feet with SBA. At this time, patient will continue to benefit from acute skilled therapy intervention to address deficits/underlying impairments and progress towards prior level of function. After discharge, patient will benefit from receiving home health therapy services to ensure safety and independence in the home environment.    Rehab Prognosis:  Good; patient would benefit from acute skilled OT services to address these deficits and reach maximum level of function.       Plan:     Patient to be seen 3 x/week to address the above listed problems via self-care/home management, therapeutic activities, therapeutic exercises  · Plan of Care Expires:    · Plan of Care Reviewed with: patient    Subjective     Pain/Comfort:  · Pain Rating 1: 0/10    Objective:     Communicated with: RN prior to session. Patient found up in chair with central line, telemetry upon OT entry to room.    General Precautions: Standard, fall   Orthopedic Precautions:N/A   Braces: N/A     Occupational Performance:     Bed Mobility:    · Not assessed; patient up in chair at start of session and returned to chair at end of session.    Functional Mobility/Transfers:  · Patient completed Sit <> Stand Transfer 3x with supervision/SBA  with no assistive device   · 1x from bedside chair, 2x from bedside sofa  · Functional Mobility: Patient ambulated in bedroom, bathroom, and ~150 feet in hallway using no AD with SBA.    Activities of Daily Living:  · Grooming: stand by assistance standing at sink 6 min 35 sec  · Lower Body Dressing: supervision doffing/donning socks  · Toileting: independence using urinal while sitting    AMPAC 6 Click ADL: 19    Treatment & Education:   Therapist provided facilitation and instruction of proper body mechanics, energy conservation, and fall prevention strategies during tasks listed above.   Instructed patient to sit in bedside chair daily to increase OOB/activity tolerance.   Instructed patient to use call light to have nursing staff assist with transfers.    Educated patient on OT POC and answered all questions within OT scope of practice.   Whiteboard updated     Patient left up in chair with all lines intact, call button in reach and RN notifiedEducation:      GOALS:   Multidisciplinary Problems     Occupational Therapy Goals        Problem: Occupational Therapy Goal    Goal Priority Disciplines Outcome Interventions   Occupational Therapy Goal     OT, PT/OT Ongoing, Progressing    Description:  Goals to be met by: 7 days 3/13/2020     Patient will increase functional independence with ADLs by performing:    Pt to complete standing g/h skills with SBA  Pt to complete toileting with supervision.  Pt to complete UE dressing with set-up  Pt to complete LE dressing with SBA  Pt to complete BSC with SBA  Pt to tolerated OOB to chair x 3-4 hours daily to increase endurance for functional activity.                       Time Tracking:     OT Date of Treatment: 03/11/20  OT Start Time: 1245  OT Stop Time: 1316  OT Total Time (min): 31 min    Billable Minutes:Self Care/Home Management 15  Therapeutic Activity 16    Dian Barnard, RICKY  3/11/2020

## 2020-03-11 NOTE — PLAN OF CARE
Patient remained free of falls, trauma, injury, and skin breakdown. VSS; no patient complaints at this time.  Glucose monitored; insulin administered per sliding scale.  Heparin gtt maintained at therapeutic rate; next aptt due with am labs.  Fall precautions maintained; education provided.  Plan of care reviewed; patient verbalized understanding.  All questions and concerns addressed; will continue to monitor.

## 2020-03-11 NOTE — PROGRESS NOTES
RN contacted MD Miller regarding pt's BP medications.  MD states to administer all medications as ordered. No new orders; will continue to monitor.

## 2020-03-11 NOTE — ASSESSMENT & PLAN NOTE
-NGT removed 3/9.  -Appreciate GEN JAYCEE Cx.   -CT with concern for possible pancreatitis. No pain. WCTM.   -Lipase ~1000. Spoke with surgery. As patient is not having pain, they would not . If patient starts having abdominal pain, repeat CT abdomen/pelvis.  -Diet advanced.

## 2020-03-11 NOTE — ASSESSMENT & PLAN NOTE
Titrate insulin slowly to avoid hypoglycemia as the risk of hypoglycemia increases with decreased creatinine clearance.  Caution with insulin stacking  Estimated Creatinine Clearance: 21.3 mL/min (A) (based on SCr of 3.2 mg/dL (H)).

## 2020-03-11 NOTE — ASSESSMENT & PLAN NOTE
BG goal 140 - 180     Low dose correction scale - due to decreased renal function  BG Monitoring AC/HS  ADA 1500 russell in conjunction with 2000 ml fluid restrictions    ** Please call Endocrine for any BG related issues **  ** Please notify Endocrine for any change and/or advance in diet**    Discharge planning:  Discontinue Metformin.  Recommend patient start Tradjenta 5 mg daily. (denies hx of pancreatitis or medullary thyroid cancer)

## 2020-03-11 NOTE — ASSESSMENT & PLAN NOTE
-CT 3/7/20: Right anterior abdominal wall hematoma, abuts the distal most aspect of the rectus sheath.   -Secondary to impella which was removed.  -Continue to monitor closely.

## 2020-03-11 NOTE — PROGRESS NOTES
[facility]  Heart Transplant  Progress Note    Patient Name: John Javier  MRN: [id number]  Admission Date: 2/29/2020  Hospital Length of Stay: 11 days  Attending Physician: Lizzy Cavanaugh MD  Primary Care Provider: To Obtain Unable  Principal Problem:Cardiogenic shock    Subjective:     Interval History: No issues overnight. Continues to feel better everyday.     Continuous Infusions:   heparin (porcine) in D5W 18 Units/kg/hr (03/10/20 1741)    insulin (HUMAN R) infusion (adults) Stopped (03/10/20 1742)     Scheduled Meds:   amiodarone  400 mg Oral BID    aspirin  81 mg Oral Daily    atorvastatin  80 mg Oral Daily    famotidine  20 mg Oral Daily    hydrALAZINE  100 mg Oral Q8H    isosorbide dinitrate  40 mg Oral Q8H    senna-docusate 8.6-50 mg  2 tablet Oral BID     PRN Meds:sodium chloride, Dextrose 10% Bolus, Dextrose 10% Bolus, glucagon (human recombinant), glucose, glucose, heparin (PORCINE), heparin (PORCINE), insulin aspart U-100, ondansetron, senna-docusate 8.6-50 mg, sodium chloride 0.9%, sodium chloride 0.9%    Review of patient's allergies indicates:  No Known Allergies  Objective:     Vital Signs (Most Recent):  Temp: 97.8 °F (36.6 °C) (03/11/20 0951)  Pulse: 88 (03/11/20 0951)  Resp: 16 (03/11/20 0951)  BP: 118/68 (03/11/20 0951)  SpO2: (!) 94 % (03/11/20 0951) Vital Signs (24h Range):  Temp:  [97.2 °F (36.2 °C)-98.6 °F (37 °C)] 97.8 °F (36.6 °C)  Pulse:  [73-97] 88  Resp:  [16-25] 16  SpO2:  [93 %-98 %] 94 %  BP: (116-147)/(68-91) 118/68     Patient Vitals for the past 72 hrs (Last 3 readings):   Weight   03/11/20 0323 67 kg (147 lb 11.3 oz)   03/10/20 0300 67.5 kg (148 lb 13 oz)     Body mass index is 23.84 kg/m².      Intake/Output Summary (Last 24 hours) at 3/11/2020 1055  Last data filed at 3/11/2020 0602  Gross per 24 hour   Intake 1845.2 ml   Output 1025 ml   Net 820.2 ml        Telemetry: reviewed    Physical Exam   Constitutional: He is oriented to person, place,  and time. He appears well-developed and well-nourished.   HENT:   Head: Normocephalic and atraumatic.   Eyes: Pupils are equal, round, and reactive to light. EOM are normal.   Neck: Normal range of motion. Neck supple.   RIJ TLC placed 3/9/20.   Cardiovascular: Normal rate and regular rhythm.   Murmur heard.  Pulmonary/Chest: Effort normal and breath sounds normal. No stridor. No respiratory distress.   Abdominal: Soft. He exhibits no distension. Bowel sounds are decreased. There is no tenderness.   Musculoskeletal: Normal range of motion. He exhibits no edema.   Neurological: He is alert and oriented to person, place, and time.   Skin: Skin is warm and dry. Capillary refill takes 2 to 3 seconds.   Nursing note and vitals reviewed.    Significant Labs:  CBC:  Recent Labs   Lab 03/09/20  0229 03/10/20  0435 03/11/20  0320   WBC 14.12* 12.55 11.34   RBC 3.75* 3.10* 2.82*   HGB 10.8* 9.1* 8.2*   HCT 35.0* 28.8* 25.7*   * 417* 401*   MCV 93 93 91   MCH 28.8 29.4 29.1   MCHC 30.9* 31.6* 31.9*     BNP:  No results for input(s): BNP in the last 168 hours.    Invalid input(s): BNPTRIAGELBLO  CMP:  Recent Labs   Lab 03/10/20  0435 03/10/20  0945 03/10/20  1202 03/11/20  0320   * 187* 143* 112*   CALCIUM 8.6* 9.3 8.5* 8.6*   ALBUMIN 2.8* 2.9*  --  2.7*   PROT 6.4 6.8  --  6.1    140 140 143   K 3.4* 3.8 3.7 3.5   CO2 28 25 25 24    103 104 106   BUN 79* 72* 70* 61*   CREATININE 4.1* 4.0* 3.7* 3.2*   ALKPHOS 74 78  --  70   ALT 22 23  --  25   AST 25 28  --  28   BILITOT 0.5 0.5  --  0.5      Coagulation:   Recent Labs   Lab 03/10/20  0005 03/10/20  0435 03/11/20  0320   APTT 58.5* 57.1* 52.5*     LDH:  No results for input(s): LDH in the last 72 hours.  Microbiology:  Microbiology Results (last 7 days)     Procedure Component Value Units Date/Time    Blood culture [[id number]]  (Abnormal) Collected:  03/06/20 1618    Order Status:  Completed Specimen:  Blood from Wrist, Right Updated:  03/11/20 0935      Blood Culture, Routine Gram stain peds bottle: Gram positive cocci in clusters resembling Staph       Results called to and read back by:Leonel Patel RN  03/09/2020  08:28      COAGULASE-NEGATIVE STAPHYLOCOCCUS SPECIES  Organism is a probable contaminant      Blood culture [[id number]] Collected:  03/06/20 1604    Order Status:  Completed Specimen:  Blood from Peripheral, Forearm, Right Updated:  03/10/20 2012     Blood Culture, Routine No Growth to date      No Growth to date      No Growth to date      No Growth to date      No Growth to date    Blood culture [[id number]] Collected:  03/09/20 1200    Order Status:  Completed Specimen:  Blood from Peripheral, Upper Arm, Right Updated:  03/10/20 1412     Blood Culture, Routine No Growth to date      No Growth to date    Blood culture [[id number]] Collected:  03/09/20 1052    Order Status:  Completed Specimen:  Blood from Peripheral, Antecubital, Right Updated:  03/10/20 1412     Blood Culture, Routine No Growth to date      No Growth to date    Culture, Respiratory with Gram Stain [[id number]] Collected:  03/06/20 0846    Order Status:  Completed Specimen:  Respiratory from Sputum, Expectorated Updated:  03/09/20 0842     Respiratory Culture Normal respiratory ana maría      No S aureus or Pseudomonas isolated.     Gram Stain (Respiratory) <10 epithelial cells per low power field.     Gram Stain (Respiratory) Few WBC's     Gram Stain (Respiratory) Few Gram positive cocci     Gram Stain (Respiratory) Few Gram positive rods     Gram Stain (Respiratory) Few Gram negative rods    Blood culture [[id number]] Collected:  03/01/20 0254    Order Status:  Completed Specimen:  Blood from Peripheral, Upper Arm, Left Updated:  03/06/20 0612     Blood Culture, Routine No growth after 5 days.    Blood culture [[id number]] Collected:  02/29/20 2305    Order Status:  Completed Specimen:  Blood from Peripheral, Forearm, Right Updated:  03/06/20 0612     Blood Culture, Routine No growth  "after 5 days.        I have reviewed all pertinent labs within the past 24 hours.    Estimated Creatinine Clearance: 21.3 mL/min (A) (based on SCr of 3.2 mg/dL (H)).    Diagnostic Results:  I have reviewed all pertinent imaging results/findings within the past 24 hours.    Assessment and Plan:     Patient intubated w limited collateral from family; further OSH records pending    63 y/o M hx of CAD (50-60% LCx, OM disease on LHC 2/29), ICM EF 10-15% s/p AICD (unknown baseline but on GDMT as OP), HTN, DM2, HLD, obesity, CKD, presenting from OSH intubated with Impella in setting of cardiogenic shock. Per pt's brother he was taken by EMS to OSH in setting of feeling short of breath, nearly passing out. There found to be in shock, lactate to 12, ANGEL w Cr 2.5, trop 12; unclear if dynamic ECG changes, max trop 10; underwent LHC with evidence of 50-60% LCx, OM disease; no PCI performed. RHC with LVEDP 35. CO 3, CI 1.8, tte w LVEF 10-15%. Underwent Impella placement without additional central line placement (per brother there was a "complication" but no evidence of pneumothorax).    On arrival patient -having intermittent suction alarms, improved w P6->P4. MAPs stable 75-85. CVC placed in LIJ given neck position, CO 9.7, CI 5.35, svr 535. WBC 26k, patient cultured, given single dose of vanc/zosyn. UOP 60-80cc/hr off diuretics. Further collateral pending from OSH. Continued on heparin gtt for impella, weaned to dobutamine 5, gave 500cc bolus in setting of CVP 10 and suction alarms.    * Cardiogenic shock  -Presented with impella placed at OSH with position issues and LD>1000.  -Impella removed 3/2/20.  -ScVO2 66. CVP pending.   -Continue hydralazine/isordil. Will up titrate as tolerated.    - d/c'ed due to high cardiac output and hypertension/afib.  -TTE 3/2 EF 10%, LVIDD 6, TAPSE 1.98, Mod MR, Mod TR.  -Not working up for advanced options due to medical instability/renal failure at this time. Will continue to reassess " candidacy if condition improves.  -PT/OT.     Abdominal distention  -NGT removed 3/9.  -Appreciate GEN JAYCEE Cx.   -CT with concern for possible pancreatitis. No pain. WCTM.   -Lipase ~1000. Spoke with surgery. As patient is not having pain, they would not . If patient starts having abdominal pain, repeat CT abdomen/pelvis.  -Diet advanced.     Positive blood cultures  -Blood cultures from 3/6 with 1/2 bottles growing Gram positive cocci in clusters resembling Staph.  -Repeat cultures NGTD.   -TLC replaced 3/9. Ivonne/Lamb removed.  -Continue Vanc per pharmacy dosing.     PAF (paroxysmal atrial fibrillation)  -PAF noted several times since admit.   -Converted to NSR.  -Continue heparin gtt for anticoagulation.  -Transitioned to PO amio.     VT (ventricular tachycardia)  -S/P ICD shocks x 5 per OSH.  -Continue PO amio.     ANGEL (acute kidney injury)  - Likely ATN in setting of shock, Hemolysis, plus IV contrast at OSH.  - Monitor Cr/BUN closely, trending down.  - Appreciate renal consult.  - Strict I/O.  - Avoid nephrotoxic meds.  - Continues to make good urine.    Hematoma  -CT 3/7/20: Right anterior abdominal wall hematoma, abuts the distal most aspect of the rectus sheath.   -Secondary to impella which was removed.  -Continue to monitor closely.    Iron deficiency anemia  -No obvious bleeding.   -R groin impella site with hematoma that is stable/no abdominal or back pain.  -s/p 2 U PRBC 3/5/20    Malnutrition of moderate degree  -Appreciate Dietary Cx.    Acute on chronic combined systolic and diastolic heart failure  - See Cardiogenic shock.    Type 2 diabetes mellitus, without long-term current use of insulin  -A1C 7.0 on admit  -Insulin gtt management per endo. Greatly appreciate their assistance    CAD (coronary artery disease)  -Hx of RCA stent in 2013.   -[facility] 2/29: 50-60% LCx, OM disease.  -continue ASA, high intensity statin.  -Continue heparin      Alejo Frankel NP  Heart  Transplant  [facility]

## 2020-03-11 NOTE — PLAN OF CARE
LTGs remain appropriate. Pt will continue PT POC.  Martina Flores, PT  3/11/2020    Problem: Physical Therapy Goal  Goal: Physical Therapy Goal  Description  Goals to be met by: 3/16/2020     Patient will increase functional independence with mobility by performin. Supine to sit with Contact Guard Assistance  2. Sit to supine with Contact Guard Assistance  3. Sit to stand transfer with Stand By Assistance- met 3/9/2020  Sit to stand transfer with supervision   4. Gait  x 150 feet with Contact Guard Assistance using no or LRAD.   5. Ascend/descend 5 stair with left Handrails Contact Guard Assistance using no or LRAD.   6. Lower extremity exercise program x 20 reps per handout, with independence      Outcome: Ongoing, Progressing

## 2020-03-11 NOTE — ASSESSMENT & PLAN NOTE
-Presented with impella placed at OSH with position issues and LD>1000.  -Impella removed 3/2/20.  -ScVO2 66. CVP pending.   -Continue hydralazine/isordil. Will up titrate as tolerated.    - d/c'ed due to high cardiac output and hypertension/afib.  -TTE 3/2 EF 10%, LVIDD 6, TAPSE 1.98, Mod MR, Mod TR.  -Not working up for advanced options due to medical instability/renal failure at this time. Will continue to reassess candidacy if condition improves.  -PT/OT.

## 2020-03-11 NOTE — CARE UPDATE
Renal function continues to improve and UOP is adequate. Nephrology will continue to follow from afar.     GERI Ty, AGNP-C  Nephrology  Pager: [phone]

## 2020-03-11 NOTE — SUBJECTIVE & OBJECTIVE
Interval History: No issues overnight. Continues to feel better everyday.     Continuous Infusions:   heparin (porcine) in D5W 18 Units/kg/hr (03/10/20 1741)    insulin (HUMAN R) infusion (adults) Stopped (03/10/20 1742)     Scheduled Meds:   amiodarone  400 mg Oral BID    aspirin  81 mg Oral Daily    atorvastatin  80 mg Oral Daily    famotidine  20 mg Oral Daily    hydrALAZINE  100 mg Oral Q8H    isosorbide dinitrate  40 mg Oral Q8H    senna-docusate 8.6-50 mg  2 tablet Oral BID     PRN Meds:sodium chloride, Dextrose 10% Bolus, Dextrose 10% Bolus, glucagon (human recombinant), glucose, glucose, heparin (PORCINE), heparin (PORCINE), insulin aspart U-100, ondansetron, senna-docusate 8.6-50 mg, sodium chloride 0.9%, sodium chloride 0.9%    Review of patient's allergies indicates:  No Known Allergies  Objective:     Vital Signs (Most Recent):  Temp: 97.8 °F (36.6 °C) (03/11/20 0951)  Pulse: 88 (03/11/20 0951)  Resp: 16 (03/11/20 0951)  BP: 118/68 (03/11/20 0951)  SpO2: (!) 94 % (03/11/20 0951) Vital Signs (24h Range):  Temp:  [97.2 °F (36.2 °C)-98.6 °F (37 °C)] 97.8 °F (36.6 °C)  Pulse:  [73-97] 88  Resp:  [16-25] 16  SpO2:  [93 %-98 %] 94 %  BP: (116-147)/(68-91) 118/68     Patient Vitals for the past 72 hrs (Last 3 readings):   Weight   03/11/20 0323 67 kg (147 lb 11.3 oz)   03/10/20 0300 67.5 kg (148 lb 13 oz)     Body mass index is 23.84 kg/m².      Intake/Output Summary (Last 24 hours) at 3/11/2020 1055  Last data filed at 3/11/2020 0602  Gross per 24 hour   Intake 1845.2 ml   Output 1025 ml   Net 820.2 ml        Telemetry: reviewed    Physical Exam   Constitutional: He is oriented to person, place, and time. He appears well-developed and well-nourished.   HENT:   Head: Normocephalic and atraumatic.   Eyes: Pupils are equal, round, and reactive to light. EOM are normal.   Neck: Normal range of motion. Neck supple.   RIJ TLC placed 3/9/20.   Cardiovascular: Normal rate and regular rhythm.   Murmur  heard.  Pulmonary/Chest: Effort normal and breath sounds normal. No stridor. No respiratory distress.   Abdominal: Soft. He exhibits no distension. Bowel sounds are decreased. There is no tenderness.   Musculoskeletal: Normal range of motion. He exhibits no edema.   Neurological: He is alert and oriented to person, place, and time.   Skin: Skin is warm and dry. Capillary refill takes 2 to 3 seconds.   Nursing note and vitals reviewed.    Significant Labs:  CBC:  Recent Labs   Lab 03/09/20  0229 03/10/20  0435 03/11/20  0320   WBC 14.12* 12.55 11.34   RBC 3.75* 3.10* 2.82*   HGB 10.8* 9.1* 8.2*   HCT 35.0* 28.8* 25.7*   * 417* 401*   MCV 93 93 91   MCH 28.8 29.4 29.1   MCHC 30.9* 31.6* 31.9*     BNP:  No results for input(s): BNP in the last 168 hours.    Invalid input(s): BNPTRIAGELBLO  CMP:  Recent Labs   Lab 03/10/20  0435 03/10/20  0945 03/10/20  1202 03/11/20  0320   * 187* 143* 112*   CALCIUM 8.6* 9.3 8.5* 8.6*   ALBUMIN 2.8* 2.9*  --  2.7*   PROT 6.4 6.8  --  6.1    140 140 143   K 3.4* 3.8 3.7 3.5   CO2 28 25 25 24    103 104 106   BUN 79* 72* 70* 61*   CREATININE 4.1* 4.0* 3.7* 3.2*   ALKPHOS 74 78  --  70   ALT 22 23  --  25   AST 25 28  --  28   BILITOT 0.5 0.5  --  0.5      Coagulation:   Recent Labs   Lab 03/10/20  0005 03/10/20  0435 03/11/20  0320   APTT 58.5* 57.1* 52.5*     LDH:  No results for input(s): LDH in the last 72 hours.  Microbiology:  Microbiology Results (last 7 days)     Procedure Component Value Units Date/Time    Blood culture [[id number]]  (Abnormal) Collected:  03/06/20 1618    Order Status:  Completed Specimen:  Blood from Wrist, Right Updated:  03/11/20 0935     Blood Culture, Routine Gram stain peds bottle: Gram positive cocci in clusters resembling Staph       Results called to and read back by:Leonel Patel RN  03/09/2020  08:28      COAGULASE-NEGATIVE STAPHYLOCOCCUS SPECIES  Organism is a probable contaminant      Blood culture [[id number]] Collected:   03/06/20 1604    Order Status:  Completed Specimen:  Blood from Peripheral, Forearm, Right Updated:  03/10/20 2012     Blood Culture, Routine No Growth to date      No Growth to date      No Growth to date      No Growth to date      No Growth to date    Blood culture [[id number]] Collected:  03/09/20 1200    Order Status:  Completed Specimen:  Blood from Peripheral, Upper Arm, Right Updated:  03/10/20 1412     Blood Culture, Routine No Growth to date      No Growth to date    Blood culture [[id number]] Collected:  03/09/20 1052    Order Status:  Completed Specimen:  Blood from Peripheral, Antecubital, Right Updated:  03/10/20 1412     Blood Culture, Routine No Growth to date      No Growth to date    Culture, Respiratory with Gram Stain [[id number]] Collected:  03/06/20 0846    Order Status:  Completed Specimen:  Respiratory from Sputum, Expectorated Updated:  03/09/20 0842     Respiratory Culture Normal respiratory ana maría      No S aureus or Pseudomonas isolated.     Gram Stain (Respiratory) <10 epithelial cells per low power field.     Gram Stain (Respiratory) Few WBC's     Gram Stain (Respiratory) Few Gram positive cocci     Gram Stain (Respiratory) Few Gram positive rods     Gram Stain (Respiratory) Few Gram negative rods    Blood culture [[id number]] Collected:  03/01/20 0254    Order Status:  Completed Specimen:  Blood from Peripheral, Upper Arm, Left Updated:  03/06/20 0612     Blood Culture, Routine No growth after 5 days.    Blood culture [[id number]] Collected:  02/29/20 2305    Order Status:  Completed Specimen:  Blood from Peripheral, Forearm, Right Updated:  03/06/20 0612     Blood Culture, Routine No growth after 5 days.        I have reviewed all pertinent labs within the past 24 hours.    Estimated Creatinine Clearance: 21.3 mL/min (A) (based on SCr of 3.2 mg/dL (H)).    Diagnostic Results:  I have reviewed all pertinent imaging results/findings within the past 24 hours.

## 2020-03-11 NOTE — PROGRESS NOTES
Follow up note:     to see pt for follow up. Worker spoke with the pt who appears alert and oriented. The pt reports difficulty affording his medication co-pays due to the need to pay for other bills.  Pt reports his doctor had given him samples of Entresto and at some point he was on an Entresto program, however his current copay amount is 350.00/ $500.00 per month.  Pt reports he hopes his medications will change to ones that are more affordable.   When talking to pt about the  need to take his medications and financial needs related to same the pt did not appear to have a good understanding of the function of each medication.  The pt did report he does not plan to skip his medications in the future.  Pt reports prior to admission he was active with work and has good family support. Pt reports when discharged he plans to return to his own home and his brother will provide transportation.

## 2020-03-12 DIAGNOSIS — I50.43 ACUTE ON CHRONIC COMBINED SYSTOLIC AND DIASTOLIC HEART FAILURE: Primary | ICD-10-CM

## 2020-03-12 LAB
ALBUMIN SERPL BCP-MCNC: 3 G/DL (ref 3.5–5.2)
ALLENS TEST: ABNORMAL
ALP SERPL-CCNC: 74 U/L (ref 55–135)
ALT SERPL W/O P-5'-P-CCNC: 37 U/L (ref 10–44)
ANION GAP SERPL CALC-SCNC: 9 MMOL/L (ref 8–16)
APTT BLDCRRT: 57.4 SEC (ref 21–32)
AST SERPL-CCNC: 33 U/L (ref 10–40)
BASOPHILS # BLD AUTO: 0.02 K/UL (ref 0–0.2)
BASOPHILS NFR BLD: 0.2 % (ref 0–1.9)
BILIRUB SERPL-MCNC: 0.5 MG/DL (ref 0.1–1)
BUN SERPL-MCNC: 51 MG/DL (ref 8–23)
CALCIUM SERPL-MCNC: 8.5 MG/DL (ref 8.7–10.5)
CHLORIDE SERPL-SCNC: 110 MMOL/L (ref 95–110)
CO2 SERPL-SCNC: 24 MMOL/L (ref 23–29)
CREAT SERPL-MCNC: 2.8 MG/DL (ref 0.5–1.4)
DELSYS: ABNORMAL
DIFFERENTIAL METHOD: ABNORMAL
EOSINOPHIL # BLD AUTO: 0.3 K/UL (ref 0–0.5)
EOSINOPHIL NFR BLD: 3.2 % (ref 0–8)
ERYTHROCYTE [DISTWIDTH] IN BLOOD BY AUTOMATED COUNT: 13.8 % (ref 11.5–14.5)
EST. GFR  (AFRICAN AMERICAN): 26.5 ML/MIN/1.73 M^2
EST. GFR  (NON AFRICAN AMERICAN): 23 ML/MIN/1.73 M^2
GLUCOSE SERPL-MCNC: 139 MG/DL (ref 70–110)
HCO3 UR-SCNC: 23.7 MMOL/L (ref 24–28)
HCT VFR BLD AUTO: 26 % (ref 40–54)
HGB BLD-MCNC: 8.3 G/DL (ref 14–18)
IMM GRANULOCYTES # BLD AUTO: 0.07 K/UL (ref 0–0.04)
IMM GRANULOCYTES NFR BLD AUTO: 0.7 % (ref 0–0.5)
LYMPHOCYTES # BLD AUTO: 1.9 K/UL (ref 1–4.8)
LYMPHOCYTES NFR BLD: 18.8 % (ref 18–48)
MAGNESIUM SERPL-MCNC: 1.7 MG/DL (ref 1.6–2.6)
MCH RBC QN AUTO: 29.1 PG (ref 27–31)
MCHC RBC AUTO-ENTMCNC: 31.9 G/DL (ref 32–36)
MCV RBC AUTO: 91 FL (ref 82–98)
MONOCYTES # BLD AUTO: 0.8 K/UL (ref 0.3–1)
MONOCYTES NFR BLD: 7.6 % (ref 4–15)
NEUTROPHILS # BLD AUTO: 7 K/UL (ref 1.8–7.7)
NEUTROPHILS NFR BLD: 69.5 % (ref 38–73)
NRBC BLD-RTO: 0 /100 WBC
PCO2 BLDA: 37.3 MMHG (ref 35–45)
PH SMN: 7.41 [PH] (ref 7.35–7.45)
PLATELET # BLD AUTO: 406 K/UL (ref 150–350)
PMV BLD AUTO: 11.1 FL (ref 9.2–12.9)
PO2 BLDA: 31 MMHG (ref 40–60)
POC BE: -1 MMOL/L
POC SATURATED O2: 61 % (ref 95–100)
POC TCO2: 25 MMOL/L (ref 24–29)
POCT GLUCOSE: 162 MG/DL (ref 70–110)
POCT GLUCOSE: 212 MG/DL (ref 70–110)
POTASSIUM SERPL-SCNC: 3.6 MMOL/L (ref 3.5–5.1)
PROT SERPL-MCNC: 6.4 G/DL (ref 6–8.4)
RBC # BLD AUTO: 2.85 M/UL (ref 4.6–6.2)
SAMPLE: ABNORMAL
SITE: ABNORMAL
SODIUM SERPL-SCNC: 143 MMOL/L (ref 136–145)
WBC # BLD AUTO: 10.05 K/UL (ref 3.9–12.7)

## 2020-03-12 PROCEDURE — 94660 CPAP INITIATION&MGMT: CPT

## 2020-03-12 PROCEDURE — 85347 COAGULATION TIME ACTIVATED: CPT

## 2020-03-12 PROCEDURE — 85730 THROMBOPLASTIN TIME PARTIAL: CPT

## 2020-03-12 PROCEDURE — 85025 COMPLETE CBC W/AUTO DIFF WBC: CPT

## 2020-03-12 PROCEDURE — 82803 BLOOD GASES ANY COMBINATION: CPT

## 2020-03-12 PROCEDURE — 99232 SBSQ HOSP IP/OBS MODERATE 35: CPT | Mod: ,,, | Performed by: NURSE PRACTITIONER

## 2020-03-12 PROCEDURE — 99900035 HC TECH TIME PER 15 MIN (STAT)

## 2020-03-12 PROCEDURE — 25000003 PHARM REV CODE 250: Performed by: INTERNAL MEDICINE

## 2020-03-12 PROCEDURE — 99232 PR SUBSEQUENT HOSPITAL CARE,LEVL II: ICD-10-PCS | Mod: ,,, | Performed by: NURSE PRACTITIONER

## 2020-03-12 PROCEDURE — 94761 N-INVAS EAR/PLS OXIMETRY MLT: CPT

## 2020-03-12 PROCEDURE — 83735 ASSAY OF MAGNESIUM: CPT

## 2020-03-12 PROCEDURE — 63600175 PHARM REV CODE 636 W HCPCS: Performed by: NURSE PRACTITIONER

## 2020-03-12 PROCEDURE — 63600175 PHARM REV CODE 636 W HCPCS: Performed by: STUDENT IN AN ORGANIZED HEALTH CARE EDUCATION/TRAINING PROGRAM

## 2020-03-12 PROCEDURE — 25000003 PHARM REV CODE 250: Performed by: NURSE PRACTITIONER

## 2020-03-12 PROCEDURE — 20600001 HC STEP DOWN PRIVATE ROOM

## 2020-03-12 PROCEDURE — 99233 SBSQ HOSP IP/OBS HIGH 50: CPT | Mod: ,,, | Performed by: INTERNAL MEDICINE

## 2020-03-12 PROCEDURE — 99233 PR SUBSEQUENT HOSPITAL CARE,LEVL III: ICD-10-PCS | Mod: ,,, | Performed by: INTERNAL MEDICINE

## 2020-03-12 PROCEDURE — 80053 COMPREHEN METABOLIC PANEL: CPT

## 2020-03-12 RX ORDER — ATORVASTATIN CALCIUM 80 MG/1
80 TABLET, FILM COATED ORAL DAILY
Qty: 90 TABLET | Refills: 3 | Status: SHIPPED | OUTPATIENT
Start: 2020-03-13 | End: 2020-06-23 | Stop reason: SDUPTHER

## 2020-03-12 RX ORDER — INSULIN ASPART 100 [IU]/ML
0-5 INJECTION, SOLUTION INTRAVENOUS; SUBCUTANEOUS
Status: DISCONTINUED | OUTPATIENT
Start: 2020-03-12 | End: 2020-03-13 | Stop reason: HOSPADM

## 2020-03-12 RX ORDER — ISOSORBIDE DINITRATE 40 MG/1
40 TABLET ORAL EVERY 8 HOURS
Qty: 180 TABLET | Refills: 3 | Status: SHIPPED | OUTPATIENT
Start: 2020-03-12 | End: 2020-05-11 | Stop reason: SDUPTHER

## 2020-03-12 RX ORDER — POTASSIUM CHLORIDE 20 MEQ/1
20 TABLET, EXTENDED RELEASE ORAL DAILY
Qty: 90 TABLET | Refills: 3 | Status: ON HOLD | OUTPATIENT
Start: 2020-03-13 | End: 2020-03-17 | Stop reason: HOSPADM

## 2020-03-12 RX ORDER — FUROSEMIDE 20 MG/1
20 TABLET ORAL EVERY OTHER DAY
Status: DISCONTINUED | OUTPATIENT
Start: 2020-03-14 | End: 2020-03-13 | Stop reason: HOSPADM

## 2020-03-12 RX ORDER — NAPROXEN SODIUM 220 MG/1
81 TABLET, FILM COATED ORAL DAILY
Refills: 0
Start: 2020-03-13 | End: 2020-07-28

## 2020-03-12 RX ORDER — AMIODARONE HYDROCHLORIDE 400 MG/1
TABLET ORAL
Qty: 90 TABLET | Refills: 3 | Status: SHIPPED | OUTPATIENT
Start: 2020-03-12 | End: 2020-03-27 | Stop reason: SDUPTHER

## 2020-03-12 RX ORDER — GLUCAGON 1 MG
1 KIT INJECTION
Status: DISCONTINUED | OUTPATIENT
Start: 2020-03-12 | End: 2020-03-13 | Stop reason: HOSPADM

## 2020-03-12 RX ORDER — IBUPROFEN 200 MG
24 TABLET ORAL
Status: DISCONTINUED | OUTPATIENT
Start: 2020-03-12 | End: 2020-03-13 | Stop reason: HOSPADM

## 2020-03-12 RX ORDER — POTASSIUM CHLORIDE 20 MEQ/1
20 TABLET, EXTENDED RELEASE ORAL DAILY
Status: DISCONTINUED | OUTPATIENT
Start: 2020-03-13 | End: 2020-03-13 | Stop reason: HOSPADM

## 2020-03-12 RX ORDER — FUROSEMIDE 20 MG/1
20 TABLET ORAL DAILY
Status: DISCONTINUED | OUTPATIENT
Start: 2020-03-12 | End: 2020-03-12

## 2020-03-12 RX ORDER — HYDRALAZINE HYDROCHLORIDE 100 MG/1
100 TABLET, FILM COATED ORAL EVERY 8 HOURS
Qty: 180 TABLET | Refills: 3 | Status: SHIPPED | OUTPATIENT
Start: 2020-03-12 | End: 2020-04-29 | Stop reason: SDUPTHER

## 2020-03-12 RX ORDER — FUROSEMIDE 20 MG/1
20 TABLET ORAL EVERY OTHER DAY
Qty: 90 TABLET | Refills: 3 | Status: SHIPPED | OUTPATIENT
Start: 2020-03-12 | End: 2020-03-27 | Stop reason: SDUPTHER

## 2020-03-12 RX ORDER — POTASSIUM CHLORIDE 20 MEQ/1
40 TABLET, EXTENDED RELEASE ORAL DAILY
Status: DISCONTINUED | OUTPATIENT
Start: 2020-03-12 | End: 2020-03-12

## 2020-03-12 RX ORDER — IBUPROFEN 200 MG
16 TABLET ORAL
Status: DISCONTINUED | OUTPATIENT
Start: 2020-03-12 | End: 2020-03-13 | Stop reason: HOSPADM

## 2020-03-12 RX ADMIN — FAMOTIDINE 20 MG: 40 POWDER, FOR SUSPENSION ORAL at 08:03

## 2020-03-12 RX ADMIN — HEPARIN SODIUM 18 UNITS/KG/HR: 10000 INJECTION, SOLUTION INTRAVENOUS at 12:03

## 2020-03-12 RX ADMIN — DOCUSATE SODIUM - SENNOSIDES 2 TABLET: 50; 8.6 TABLET, FILM COATED ORAL at 09:03

## 2020-03-12 RX ADMIN — APIXABAN 5 MG: 2.5 TABLET, FILM COATED ORAL at 09:03

## 2020-03-12 RX ADMIN — POTASSIUM CHLORIDE 40 MEQ: 20 TABLET, EXTENDED RELEASE ORAL at 08:03

## 2020-03-12 RX ADMIN — ISOSORBIDE DINITRATE 40 MG: 10 TABLET ORAL at 04:03

## 2020-03-12 RX ADMIN — HYDRALAZINE HYDROCHLORIDE 100 MG: 25 TABLET ORAL at 09:03

## 2020-03-12 RX ADMIN — ISOSORBIDE DINITRATE 40 MG: 10 TABLET ORAL at 09:03

## 2020-03-12 RX ADMIN — INSULIN ASPART 1 UNITS: 100 INJECTION, SOLUTION INTRAVENOUS; SUBCUTANEOUS at 09:03

## 2020-03-12 RX ADMIN — ISOSORBIDE DINITRATE 40 MG: 10 TABLET ORAL at 03:03

## 2020-03-12 RX ADMIN — ATORVASTATIN CALCIUM 80 MG: 20 TABLET, FILM COATED ORAL at 08:03

## 2020-03-12 RX ADMIN — HYDRALAZINE HYDROCHLORIDE 100 MG: 25 TABLET ORAL at 04:03

## 2020-03-12 RX ADMIN — ASPIRIN 81 MG CHEWABLE TABLET 81 MG: 81 TABLET CHEWABLE at 08:03

## 2020-03-12 RX ADMIN — AMIODARONE HYDROCHLORIDE 400 MG: 200 TABLET ORAL at 09:03

## 2020-03-12 RX ADMIN — FUROSEMIDE 20 MG: 20 TABLET ORAL at 08:03

## 2020-03-12 RX ADMIN — HYDRALAZINE HYDROCHLORIDE 100 MG: 25 TABLET ORAL at 03:03

## 2020-03-12 RX ADMIN — DOCUSATE SODIUM - SENNOSIDES 2 TABLET: 50; 8.6 TABLET, FILM COATED ORAL at 08:03

## 2020-03-12 RX ADMIN — AMIODARONE HYDROCHLORIDE 400 MG: 200 TABLET ORAL at 08:03

## 2020-03-12 NOTE — ASSESSMENT & PLAN NOTE
-Blood cultures from 3/6 with 1/2 bottles growing Gram positive cocci in clusters resembling Staph.  -Repeat cultures NGTD.   -TLC replaced 3/9. Ivonne/Zainab removed.  -Vanc stopped.

## 2020-03-12 NOTE — PLAN OF CARE
[facility]   Heart Transplant Clinic  [address] [place] [address]   [phone] after hours        HOME  HEALTH ORDERS  Admit to Home Health    Diagnosis:   Patient Active Problem List   Diagnosis    Cardiogenic shock    ANGEL (acute kidney injury)    CAD (coronary artery disease)    Type 2 diabetes mellitus, without long-term current use of insulin    Acute on chronic combined systolic and diastolic heart failure    VT (ventricular tachycardia)    PAF (paroxysmal atrial fibrillation)    Malnutrition of moderate degree    Hyperphosphatemia    Iron deficiency anemia    Abdominal distention    Hematoma    Positive blood cultures     Patient is homebound due to:   Diet: Low Sodium  Acitivities: As Tolerated    Nursing:   SN to complete comprehensive assessment including routine vital signs. Instruct on disease process and s/s of complications to report to MD. Review/verify medication list sent home with the patient at time of discharge  and instruct patient/caregiver as needed. Frequency may be adjusted depending on start of care date.    Notify MD if SBP > 160 or < 90; DBP > 90 or < 50; HR > 120 or < 50; Temp > 101; Weight gain >3lbs in 1 day or 5lbs in 1 week.    LABS: SN to perform labs: Weekly bmp, mag, bnp.     CONSULTS:      Physical Therapy to evaluate and treat. Evaluate for home safety and equipment needs; Establish/upgrade home exercise program. Perform / instruct on therapeutic exercises, gait training, transfer training, and Range of Motion.    Occupational Therapy to evaluate and treat. Evaluate home environment for safety and equipment needs. Perform/Instruct on transfers, ADL training, ROM, and therapeutic exercises.    Send initial Home Health orders to [facility] attending physician on call.  Send follow up questions to [phone] or fax:              Pre Transplant:   [phone]

## 2020-03-12 NOTE — SUBJECTIVE & OBJECTIVE
Interval History: No issues overnight. Continues to feel better everyday.     Continuous Infusions:   heparin (porcine) in D5W 18 Units/kg/hr (03/11/20 1446)    insulin (HUMAN R) infusion (adults) Stopped (03/10/20 1742)     Scheduled Meds:   amiodarone  400 mg Oral BID    aspirin  81 mg Oral Daily    atorvastatin  80 mg Oral Daily    famotidine  20 mg Oral Daily    furosemide  20 mg Oral Daily    hydrALAZINE  100 mg Oral Q8H    isosorbide dinitrate  40 mg Oral Q8H    potassium chloride  40 mEq Oral Daily    senna-docusate 8.6-50 mg  2 tablet Oral BID     PRN Meds:sodium chloride, Dextrose 10% Bolus, Dextrose 10% Bolus, glucagon (human recombinant), glucose, glucose, heparin (PORCINE), heparin (PORCINE), insulin aspart U-100, ondansetron, senna-docusate 8.6-50 mg, sodium chloride 0.9%, sodium chloride 0.9%    Review of patient's allergies indicates:  No Known Allergies  Objective:     Vital Signs (Most Recent):  Temp: 98 °F (36.7 °C) (03/12/20 0721)  Pulse: 88 (03/12/20 0728)  Resp: 16 (03/12/20 0721)  BP: 128/71 (03/12/20 0721)  SpO2: 95 % (03/12/20 0721) Vital Signs (24h Range):  Temp:  [98 °F (36.7 °C)-98.8 °F (37.1 °C)] 98 °F (36.7 °C)  Pulse:  [] 88  Resp:  [15-18] 16  SpO2:  [94 %-98 %] 95 %  BP: (124-147)/(59-79) 128/71     Patient Vitals for the past 72 hrs (Last 3 readings):   Weight   03/12/20 0500 66.8 kg (147 lb 4.3 oz)   03/11/20 0323 67 kg (147 lb 11.3 oz)   03/10/20 0300 67.5 kg (148 lb 13 oz)     Body mass index is 23.77 kg/m².      Intake/Output Summary (Last 24 hours) at 3/12/2020 1037  Last data filed at 3/12/2020 0843  Gross per 24 hour   Intake 1210 ml   Output 2175 ml   Net -965 ml        Telemetry: reviewed    Physical Exam   Constitutional: He is oriented to person, place, and time. He appears well-developed and well-nourished.   HENT:   Head: Normocephalic and atraumatic.   Eyes: Pupils are equal, round, and reactive to light. EOM are normal.   Neck: Normal range of motion.  Neck supple.   RIJ TLC placed 3/9/20.   Cardiovascular: Normal rate and regular rhythm.   Murmur heard.  Pulmonary/Chest: Effort normal and breath sounds normal. No stridor. No respiratory distress.   Abdominal: Soft. He exhibits no distension. Bowel sounds are decreased. There is no tenderness.   Musculoskeletal: Normal range of motion. He exhibits no edema.   Neurological: He is alert and oriented to person, place, and time.   Skin: Skin is warm and dry. Capillary refill takes 2 to 3 seconds.   Nursing note and vitals reviewed.    Significant Labs:  CBC:  Recent Labs   Lab 03/10/20  0435 03/11/20 0320 03/12/20 0448   WBC 12.55 11.34 10.05   RBC 3.10* 2.82* 2.85*   HGB 9.1* 8.2* 8.3*   HCT 28.8* 25.7* 26.0*   * 401* 406*   MCV 93 91 91   MCH 29.4 29.1 29.1   MCHC 31.6* 31.9* 31.9*     BNP:  No results for input(s): BNP in the last 168 hours.    Invalid input(s): BNPTRIAGELBLO  CMP:  Recent Labs   Lab 03/10/20  0945 03/10/20  1202 03/11/20 0320 03/12/20 0448   * 143* 112* 139*   CALCIUM 9.3 8.5* 8.6* 8.5*   ALBUMIN 2.9*  --  2.7* 3.0*   PROT 6.8  --  6.1 6.4    140 143 143   K 3.8 3.7 3.5 3.6   CO2 25 25 24 24    104 106 110   BUN 72* 70* 61* 51*   CREATININE 4.0* 3.7* 3.2* 2.8*   ALKPHOS 78  --  70 74   ALT 23  --  25 37   AST 28  --  28 33   BILITOT 0.5  --  0.5 0.5      Coagulation:   Recent Labs   Lab 03/10/20  0435 03/11/20 0320 03/12/20 0448   APTT 57.1* 52.5* 57.4*     LDH:  No results for input(s): LDH in the last 72 hours.  Microbiology:  Microbiology Results (last 7 days)     Procedure Component Value Units Date/Time    Blood culture [[id number]] Collected:  03/06/20 1604    Order Status:  Completed Specimen:  Blood from Peripheral, Forearm, Right Updated:  03/11/20 2012     Blood Culture, Routine No growth after 5 days.    Blood culture [[id number]] Collected:  03/09/20 1200    Order Status:  Completed Specimen:  Blood from Peripheral, Upper Arm, Right Updated:  03/11/20 1412      Blood Culture, Routine No Growth to date      No Growth to date      No Growth to date    Blood culture [[id number]] Collected:  03/09/20 1052    Order Status:  Completed Specimen:  Blood from Peripheral, Antecubital, Right Updated:  03/11/20 1412     Blood Culture, Routine No Growth to date      No Growth to date      No Growth to date    Blood culture [[id number]]  (Abnormal) Collected:  03/06/20 1618    Order Status:  Completed Specimen:  Blood from Wrist, Right Updated:  03/11/20 0935     Blood Culture, Routine Gram stain peds bottle: Gram positive cocci in clusters resembling Staph       Results called to and read back by:Leonel Patel RN  03/09/2020  08:28      COAGULASE-NEGATIVE STAPHYLOCOCCUS SPECIES  Organism is a probable contaminant      Culture, Respiratory with Gram Stain [[id number]] Collected:  03/06/20 0846    Order Status:  Completed Specimen:  Respiratory from Sputum, Expectorated Updated:  03/09/20 0842     Respiratory Culture Normal respiratory ana maría      No S aureus or Pseudomonas isolated.     Gram Stain (Respiratory) <10 epithelial cells per low power field.     Gram Stain (Respiratory) Few WBC's     Gram Stain (Respiratory) Few Gram positive cocci     Gram Stain (Respiratory) Few Gram positive rods     Gram Stain (Respiratory) Few Gram negative rods    Blood culture [[id number]] Collected:  03/01/20 0254    Order Status:  Completed Specimen:  Blood from Peripheral, Upper Arm, Left Updated:  03/06/20 0612     Blood Culture, Routine No growth after 5 days.    Blood culture [[id number]] Collected:  02/29/20 2305    Order Status:  Completed Specimen:  Blood from Peripheral, Forearm, Right Updated:  03/06/20 0612     Blood Culture, Routine No growth after 5 days.        I have reviewed all pertinent labs within the past 24 hours.    Estimated Creatinine Clearance: 24.4 mL/min (A) (based on SCr of 2.8 mg/dL (H)).    Diagnostic Results:  I have reviewed all pertinent imaging results/findings within the  past 24 hours.

## 2020-03-12 NOTE — PROGRESS NOTES
"[facility]  Endocrinology  Progress Note    Admit Date: 2020     Reason for Consult: Management of T2DM, Hyperglycemia     Surgical Procedure and Date: N/A    Diabetes diagnosis year:     Lab Results   Component Value Date    HGBA1C 7.0 (H) 2020       Home Diabetes Medications:  Metformin 500 mg BID    How often checking glucose at home? Once daily  BG readings on regimen:   Hypoglycemia on the regimen? No  Missed doses on regimen? No    Diabetes Complications include:     none    Complicating diabetes co morbidities:   CHF and CKD      HPI:   Patient is a 64 y.o. male with a diagnosis of DM2, ANGEL, afib, CAD, CHF, and cardiogenic shock.  Patient admitted to [facility] from outside facility in cardiogenic shock, intubated, and with an impella in place.  Diagnosed with DM2 last year and place on Metformin, managed per his PCP.  Endocrinology consulted for DM/BG management.        Interval HPI:   Overnight events: BG within goal ranges. IV insulin infusion stopped yesterday afternoon. Creatinine 2.8.  Eatin%  Nausea: No  Hypoglycemia and intervention: No  Fever: No  TPN and/or TF: No  If yes, type of TF/TPN and rate: none    /87 (BP Location: Left arm, Patient Position: Sitting)   Pulse 94   Temp 98.3 °F (36.8 °C) (Oral)   Resp 18   Ht 5' 6" (1.676 m)   Wt 66.8 kg (147 lb 4.3 oz)   SpO2 98%   BMI 23.77 kg/m²      Labs Reviewed and Include    Recent Labs   Lab 20  0448   *   CALCIUM 8.5*   ALBUMIN 3.0*   PROT 6.4      K 3.6   CO2 24      BUN 51*   CREATININE 2.8*   ALKPHOS 74   ALT 37   AST 33   BILITOT 0.5     Lab Results   Component Value Date    WBC 10.05 2020    HGB 8.3 (L) 2020    HCT 26.0 (L) 2020    MCV 91 2020     (H) 2020     No results for input(s): TSH, FREET4 in the last 168 hours.  Lab Results   Component Value Date    HGBA1C 7.0 (H) 2020       Nutritional status:   Body mass index is " 23.77 kg/m².  Lab Results   Component Value Date    ALBUMIN 3.0 (L) 03/12/2020    ALBUMIN 2.7 (L) 03/11/2020    ALBUMIN 2.9 (L) 03/10/2020     Lab Results   Component Value Date    PREALBUMIN <2.5 (L) 03/04/2020       Estimated Creatinine Clearance: 24.4 mL/min (A) (based on SCr of 2.8 mg/dL (H)).    Accu-Checks  Recent Labs     03/10/20  0012 03/10/20  0438 03/10/20  1220 03/10/20  1735 03/10/20  2104 03/11/20  0259 03/11/20  1127 03/11/20  1652 03/11/20  2120 03/12/20  0432   POCTGLUCOSE 235* 123* 159* 107 199* 114* 212* 187* 164* 162*       Current Medications and/or Treatments Impacting Glycemic Control  Immunotherapy:    Immunosuppressants     None        Steroids:   Hormones (From admission, onward)    None        Pressors:    Autonomic Drugs (From admission, onward)    None        Hyperglycemia/Diabetes Medications:   Antihyperglycemics (From admission, onward)    Start     Stop Route Frequency Ordered    03/12/20 1718  insulin aspart U-100 pen 0-5 Units      -- SubQ Before meals & nightly PRN 03/12/20 1618          ASSESSMENT and PLAN    * Cardiogenic shock  Managed per primary team  Avoid hypoglycemia        Type 2 diabetes mellitus, without long-term current use of insulin  BG goal 140 - 180     Low dose correction scale - due to decreased renal function  BG Monitoring AC/HS  ADA 1500 russell in conjunction with 2000 ml fluid restrictions    ** Please call Endocrine for any BG related issues **  ** Please notify Endocrine for any change and/or advance in diet**    Discharge planning:  Discontinue Metformin.  Recommend patient start Tradjenta 5 mg daily. (denies hx of pancreatitis or medullary thyroid cancer)           ANGEL (acute kidney injury)  Titrate insulin slowly to avoid hypoglycemia as the risk of hypoglycemia increases with decreased creatinine clearance.  Caution with insulin stacking  Estimated Creatinine Clearance: 21.3 mL/min (A) (based on SCr of 3.2 mg/dL (H)).            Marjorie Newman,  NP  Endocrinology  [facility]

## 2020-03-12 NOTE — PLAN OF CARE
Pt AAO and VSS. Pt on heparin gtt at 18units/kg/hr. Pt educated on fall risk overnight,pt remained free from falls/trauma/injury. Denies chest pain, SOB, palpitations, dizziness, pain, or discomfort. Plan of care reviewed with pt, all questions answered. Bed locked in lowest position, call bell within reach, no acute distress noted, will continue to monitor.

## 2020-03-12 NOTE — ASSESSMENT & PLAN NOTE
-PAF noted several times since admit.   -Converted to NSR.  -Continue heparin gtt for anticoagulation. Will transition to DOAC at DC.   -Transitioned to PO amio.

## 2020-03-12 NOTE — PROGRESS NOTES
[facility]  Heart Transplant  Progress Note    Patient Name: John Javier  MRN: [id number]  Admission Date: 2/29/2020  Hospital Length of Stay: 12 days  Attending Physician: Lizzy Cavanaugh MD  Primary Care Provider: To Obtain Unable  Principal Problem:Cardiogenic shock    Subjective:     Interval History: No issues overnight. Continues to feel better everyday.     Continuous Infusions:   heparin (porcine) in D5W 18 Units/kg/hr (03/11/20 1446)    insulin (HUMAN R) infusion (adults) Stopped (03/10/20 1742)     Scheduled Meds:   amiodarone  400 mg Oral BID    aspirin  81 mg Oral Daily    atorvastatin  80 mg Oral Daily    famotidine  20 mg Oral Daily    furosemide  20 mg Oral Daily    hydrALAZINE  100 mg Oral Q8H    isosorbide dinitrate  40 mg Oral Q8H    potassium chloride  40 mEq Oral Daily    senna-docusate 8.6-50 mg  2 tablet Oral BID     PRN Meds:sodium chloride, Dextrose 10% Bolus, Dextrose 10% Bolus, glucagon (human recombinant), glucose, glucose, heparin (PORCINE), heparin (PORCINE), insulin aspart U-100, ondansetron, senna-docusate 8.6-50 mg, sodium chloride 0.9%, sodium chloride 0.9%    Review of patient's allergies indicates:  No Known Allergies  Objective:     Vital Signs (Most Recent):  Temp: 98 °F (36.7 °C) (03/12/20 0721)  Pulse: 88 (03/12/20 0728)  Resp: 16 (03/12/20 0721)  BP: 128/71 (03/12/20 0721)  SpO2: 95 % (03/12/20 0721) Vital Signs (24h Range):  Temp:  [98 °F (36.7 °C)-98.8 °F (37.1 °C)] 98 °F (36.7 °C)  Pulse:  [] 88  Resp:  [15-18] 16  SpO2:  [94 %-98 %] 95 %  BP: (124-147)/(59-79) 128/71     Patient Vitals for the past 72 hrs (Last 3 readings):   Weight   03/12/20 0500 66.8 kg (147 lb 4.3 oz)   03/11/20 0323 67 kg (147 lb 11.3 oz)   03/10/20 0300 67.5 kg (148 lb 13 oz)     Body mass index is 23.77 kg/m².      Intake/Output Summary (Last 24 hours) at 3/12/2020 1037  Last data filed at 3/12/2020 0843  Gross per 24 hour   Intake 1210 ml   Output 2175 ml    Net -965 ml        Telemetry: reviewed    Physical Exam   Constitutional: He is oriented to person, place, and time. He appears well-developed and well-nourished.   HENT:   Head: Normocephalic and atraumatic.   Eyes: Pupils are equal, round, and reactive to light. EOM are normal.   Neck: Normal range of motion. Neck supple.   RIJ TLC placed 3/9/20.   Cardiovascular: Normal rate and regular rhythm.   Murmur heard.  Pulmonary/Chest: Effort normal and breath sounds normal. No stridor. No respiratory distress.   Abdominal: Soft. He exhibits no distension. Bowel sounds are decreased. There is no tenderness.   Musculoskeletal: Normal range of motion. He exhibits no edema.   Neurological: He is alert and oriented to person, place, and time.   Skin: Skin is warm and dry. Capillary refill takes 2 to 3 seconds.   Nursing note and vitals reviewed.    Significant Labs:  CBC:  Recent Labs   Lab 03/10/20  0435 03/11/20  0320 03/12/20  0448   WBC 12.55 11.34 10.05   RBC 3.10* 2.82* 2.85*   HGB 9.1* 8.2* 8.3*   HCT 28.8* 25.7* 26.0*   * 401* 406*   MCV 93 91 91   MCH 29.4 29.1 29.1   MCHC 31.6* 31.9* 31.9*     BNP:  No results for input(s): BNP in the last 168 hours.    Invalid input(s): BNPTRIAGELBLO  CMP:  Recent Labs   Lab 03/10/20  0945 03/10/20  1202 03/11/20  0320 03/12/20  0448   * 143* 112* 139*   CALCIUM 9.3 8.5* 8.6* 8.5*   ALBUMIN 2.9*  --  2.7* 3.0*   PROT 6.8  --  6.1 6.4    140 143 143   K 3.8 3.7 3.5 3.6   CO2 25 25 24 24    104 106 110   BUN 72* 70* 61* 51*   CREATININE 4.0* 3.7* 3.2* 2.8*   ALKPHOS 78  --  70 74   ALT 23  --  25 37   AST 28  --  28 33   BILITOT 0.5  --  0.5 0.5      Coagulation:   Recent Labs   Lab 03/10/20  0435 03/11/20  0320 03/12/20  0448   APTT 57.1* 52.5* 57.4*     LDH:  No results for input(s): LDH in the last 72 hours.  Microbiology:  Microbiology Results (last 7 days)     Procedure Component Value Units Date/Time    Blood culture [[id number]] Collected:  03/06/20  1604    Order Status:  Completed Specimen:  Blood from Peripheral, Forearm, Right Updated:  03/11/20 2012     Blood Culture, Routine No growth after 5 days.    Blood culture [[id number]] Collected:  03/09/20 1200    Order Status:  Completed Specimen:  Blood from Peripheral, Upper Arm, Right Updated:  03/11/20 1412     Blood Culture, Routine No Growth to date      No Growth to date      No Growth to date    Blood culture [[id number]] Collected:  03/09/20 1052    Order Status:  Completed Specimen:  Blood from Peripheral, Antecubital, Right Updated:  03/11/20 1412     Blood Culture, Routine No Growth to date      No Growth to date      No Growth to date    Blood culture [[id number]]  (Abnormal) Collected:  03/06/20 1618    Order Status:  Completed Specimen:  Blood from Wrist, Right Updated:  03/11/20 0935     Blood Culture, Routine Gram stain peds bottle: Gram positive cocci in clusters resembling Staph       Results called to and read back by:Leonel Patel RN  03/09/2020  08:28      COAGULASE-NEGATIVE STAPHYLOCOCCUS SPECIES  Organism is a probable contaminant      Culture, Respiratory with Gram Stain [[id number]] Collected:  03/06/20 0846    Order Status:  Completed Specimen:  Respiratory from Sputum, Expectorated Updated:  03/09/20 0842     Respiratory Culture Normal respiratory ana maría      No S aureus or Pseudomonas isolated.     Gram Stain (Respiratory) <10 epithelial cells per low power field.     Gram Stain (Respiratory) Few WBC's     Gram Stain (Respiratory) Few Gram positive cocci     Gram Stain (Respiratory) Few Gram positive rods     Gram Stain (Respiratory) Few Gram negative rods    Blood culture [[id number]] Collected:  03/01/20 0254    Order Status:  Completed Specimen:  Blood from Peripheral, Upper Arm, Left Updated:  03/06/20 0612     Blood Culture, Routine No growth after 5 days.    Blood culture [[id number]] Collected:  02/29/20 2305    Order Status:  Completed Specimen:  Blood from Peripheral, Forearm, Right  "Updated:  03/06/20 0612     Blood Culture, Routine No growth after 5 days.        I have reviewed all pertinent labs within the past 24 hours.    Estimated Creatinine Clearance: 24.4 mL/min (A) (based on SCr of 2.8 mg/dL (H)).    Diagnostic Results:  I have reviewed all pertinent imaging results/findings within the past 24 hours.    Assessment and Plan:     Patient intubated w limited collateral from family; further OSH records pending    65 y/o M hx of CAD (50-60% LCx, OM disease on LHC 2/29), ICM EF 10-15% s/p AICD (unknown baseline but on GDMT as OP), HTN, DM2, HLD, obesity, CKD, presenting from OSH intubated with Impella in setting of cardiogenic shock. Per pt's brother he was taken by EMS to OSH in setting of feeling short of breath, nearly passing out. There found to be in shock, lactate to 12, ANGEL w Cr 2.5, trop 12; unclear if dynamic ECG changes, max trop 10; underwent LHC with evidence of 50-60% LCx, OM disease; no PCI performed. RHC with LVEDP 35. CO 3, CI 1.8, tte w LVEF 10-15%. Underwent Impella placement without additional central line placement (per brother there was a "complication" but no evidence of pneumothorax).    On arrival patient -having intermittent suction alarms, improved w P6->P4. MAPs stable 75-85. CVC placed in LIJ given neck position, CO 9.7, CI 5.35, svr 535. WBC 26k, patient cultured, given single dose of vanc/zosyn. UOP 60-80cc/hr off diuretics. Further collateral pending from OSH. Continued on heparin gtt for impella, weaned to dobutamine 5, gave 500cc bolus in setting of CVP 10 and suction alarms.    * Cardiogenic shock  -Presented with impella placed at OSH with position issues and LD>1000.  -Impella removed 3/2/20.  -ScVO2 61. CVP 4-5.   -Continue hydralazine/isordil. Will up titrate as tolerated.    -Will start QOD Lasix.  - d/c'ed due to high cardiac output and hypertension/afib.  -TTE 3/2 EF 10%, LVIDD 6, TAPSE 1.98, Mod MR, Mod TR.  -Not working up for advanced options due " to medical instability/renal failure at this time. Will continue to reassess candidacy as oupt as renal function improves.   -PT/OT.     Abdominal distention  -NGT removed 3/9.  -Appreciate GEN JAYCEE Cx.   -CT with concern for possible pancreatitis. No pain. WCTM.   -Lipase ~1000. Spoke with surgery. As patient is not having pain, they would not . If patient starts having abdominal pain, repeat CT abdomen/pelvis.  -Diet advanced.     Positive blood cultures  -Blood cultures from 3/6 with 1/2 bottles growing Gram positive cocci in clusters resembling Staph.  -Repeat cultures NGTD.   -TLC replaced 3/9. [place] removed.  -Vanc stopped.     PAF (paroxysmal atrial fibrillation)  -PAF noted several times since admit.   -Converted to NSR.  -Continue heparin gtt for anticoagulation. Will transition to DOAC at DC.   -Transitioned to PO amio.     VT (ventricular tachycardia)  -S/P ICD shocks x 5 per OSH.  -Continue PO amio.     ANGEL (acute kidney injury)  - Likely ATN in setting of shock, Hemolysis, plus IV contrast at OSH.  - Monitor Cr/BUN closely, trending down.  - Appreciate renal consult.  - Strict I/O.  - Avoid nephrotoxic meds.  - Continues to make good urine.    Hematoma  -CT 3/7/20: Right anterior abdominal wall hematoma, abuts the distal most aspect of the rectus sheath.   -Secondary to impella which was removed.  -Continue to monitor closely.    Iron deficiency anemia  -No obvious bleeding.   -R groin impella site with hematoma that is stable/no abdominal or back pain.  -s/p 2 U PRBC 3/5/20    Malnutrition of moderate degree  -Appreciate Dietary Cx.    Acute on chronic combined systolic and diastolic heart failure  - See Cardiogenic shock.    Type 2 diabetes mellitus, without long-term current use of insulin  -A1C 7.0 on admit  -Insulin gtt management per endo. Greatly appreciate their assistance    CAD (coronary artery disease)  -Hx of RCA stent in 2013.   -[place] 2/29: 50-60% LCx, OM  disease.  -continue ASA, high intensity statin.  -Continue heparin      Alejo Frankel, NP  Heart Transplant  [facility]

## 2020-03-12 NOTE — SUBJECTIVE & OBJECTIVE
"Interval HPI:   Overnight events: BG within goal ranges. IV insulin infusion stopped yesterday afternoon. Creatinine 2.8.  Eatin%  Nausea: No  Hypoglycemia and intervention: No  Fever: No  TPN and/or TF: No  If yes, type of TF/TPN and rate: none    /87 (BP Location: Left arm, Patient Position: Sitting)   Pulse 94   Temp 98.3 °F (36.8 °C) (Oral)   Resp 18   Ht 5' 6" (1.676 m)   Wt 66.8 kg (147 lb 4.3 oz)   SpO2 98%   BMI 23.77 kg/m²     Labs Reviewed and Include    Recent Labs   Lab 20  0448   *   CALCIUM 8.5*   ALBUMIN 3.0*   PROT 6.4      K 3.6   CO2 24      BUN 51*   CREATININE 2.8*   ALKPHOS 74   ALT 37   AST 33   BILITOT 0.5     Lab Results   Component Value Date    WBC 10.05 2020    HGB 8.3 (L) 2020    HCT 26.0 (L) 2020    MCV 91 2020     (H) 2020     No results for input(s): TSH, FREET4 in the last 168 hours.  Lab Results   Component Value Date    HGBA1C 7.0 (H) 2020       Nutritional status:   Body mass index is 23.77 kg/m².  Lab Results   Component Value Date    ALBUMIN 3.0 (L) 2020    ALBUMIN 2.7 (L) 2020    ALBUMIN 2.9 (L) 03/10/2020     Lab Results   Component Value Date    PREALBUMIN <2.5 (L) 2020       Estimated Creatinine Clearance: 24.4 mL/min (A) (based on SCr of 2.8 mg/dL (H)).    Accu-Checks  Recent Labs     03/10/20  0012 03/10/20  0438 03/10/20  1220 03/10/20  1735 03/10/20  2104 20  0259 20  1127 20  1652 20  2120 20  0432   POCTGLUCOSE 235* 123* 159* 107 199* 114* 212* 187* 164* 162*       Current Medications and/or Treatments Impacting Glycemic Control  Immunotherapy:    Immunosuppressants     None        Steroids:   Hormones (From admission, onward)    None        Pressors:    Autonomic Drugs (From admission, onward)    None        Hyperglycemia/Diabetes Medications:   Antihyperglycemics (From admission, onward)    Start     Stop Route Frequency Ordered    " 03/12/20 1718  insulin aspart U-100 pen 0-5 Units      -- SubQ Before meals & nightly PRN 03/12/20 1613

## 2020-03-12 NOTE — ASSESSMENT & PLAN NOTE
-Presented with impella placed at OSH with position issues and LD>1000.  -Impella removed 3/2/20.  -ScVO2 61. CVP 4-5.   -Continue hydralazine/isordil. Will up titrate as tolerated.    -Will start QOD Lasix.  - d/c'ed due to high cardiac output and hypertension/afib.  -TTE 3/2 EF 10%, LVIDD 6, TAPSE 1.98, Mod MR, Mod TR.  -Not working up for advanced options due to medical instability/renal failure at this time. Will continue to reassess candidacy as oupt as renal function improves.   -PT/OT.

## 2020-03-12 NOTE — PLAN OF CARE
Plan of care discussed with patient. Patient is free of fall/trauma/injury. Denies CP, SOB, or pain/discomfort. VSS and pt remains on tele.  K 3.6; replaced. Heparin gtt dc'd per order. Central line removed. Pt is ACHS; BG protocol maintained. Lasix 20 mg PO administered x1. POC is for pt to be discharged tomorrow.  All questions addressed. Will continue to monitor

## 2020-03-13 ENCOUNTER — TELEPHONE (OUTPATIENT)
Dept: TRANSPLANT | Facility: CLINIC | Age: 65
End: 2020-03-13

## 2020-03-13 VITALS
BODY MASS INDEX: 23.7 KG/M2 | DIASTOLIC BLOOD PRESSURE: 75 MMHG | WEIGHT: 147.5 LBS | SYSTOLIC BLOOD PRESSURE: 122 MMHG | TEMPERATURE: 98 F | OXYGEN SATURATION: 97 % | HEIGHT: 66 IN | RESPIRATION RATE: 16 BRPM | HEART RATE: 91 BPM

## 2020-03-13 LAB
ALBUMIN SERPL BCP-MCNC: 3.1 G/DL (ref 3.5–5.2)
ALP SERPL-CCNC: 75 U/L (ref 55–135)
ALT SERPL W/O P-5'-P-CCNC: 47 U/L (ref 10–44)
ANION GAP SERPL CALC-SCNC: 10 MMOL/L (ref 8–16)
APTT BLDCRRT: 30 SEC (ref 21–32)
AST SERPL-CCNC: 34 U/L (ref 10–40)
BASOPHILS # BLD AUTO: 0.02 K/UL (ref 0–0.2)
BASOPHILS NFR BLD: 0.2 % (ref 0–1.9)
BILIRUB SERPL-MCNC: 0.5 MG/DL (ref 0.1–1)
BUN SERPL-MCNC: 41 MG/DL (ref 8–23)
CALCIUM SERPL-MCNC: 8.4 MG/DL (ref 8.7–10.5)
CHLORIDE SERPL-SCNC: 109 MMOL/L (ref 95–110)
CO2 SERPL-SCNC: 21 MMOL/L (ref 23–29)
CREAT SERPL-MCNC: 2.5 MG/DL (ref 0.5–1.4)
DIFFERENTIAL METHOD: ABNORMAL
EOSINOPHIL # BLD AUTO: 0.3 K/UL (ref 0–0.5)
EOSINOPHIL NFR BLD: 2.6 % (ref 0–8)
ERYTHROCYTE [DISTWIDTH] IN BLOOD BY AUTOMATED COUNT: 14 % (ref 11.5–14.5)
EST. GFR  (AFRICAN AMERICAN): 30.4 ML/MIN/1.73 M^2
EST. GFR  (NON AFRICAN AMERICAN): 26.3 ML/MIN/1.73 M^2
GLUCOSE SERPL-MCNC: 127 MG/DL (ref 70–110)
HCT VFR BLD AUTO: 24.8 % (ref 40–54)
HGB BLD-MCNC: 7.8 G/DL (ref 14–18)
IMM GRANULOCYTES # BLD AUTO: 0.04 K/UL (ref 0–0.04)
IMM GRANULOCYTES NFR BLD AUTO: 0.4 % (ref 0–0.5)
LYMPHOCYTES # BLD AUTO: 1.6 K/UL (ref 1–4.8)
LYMPHOCYTES NFR BLD: 16 % (ref 18–48)
MAGNESIUM SERPL-MCNC: 1.5 MG/DL (ref 1.6–2.6)
MCH RBC QN AUTO: 28.8 PG (ref 27–31)
MCHC RBC AUTO-ENTMCNC: 31.5 G/DL (ref 32–36)
MCV RBC AUTO: 92 FL (ref 82–98)
MONOCYTES # BLD AUTO: 1 K/UL (ref 0.3–1)
MONOCYTES NFR BLD: 9.7 % (ref 4–15)
NEUTROPHILS # BLD AUTO: 7 K/UL (ref 1.8–7.7)
NEUTROPHILS NFR BLD: 71.1 % (ref 38–73)
NRBC BLD-RTO: 0 /100 WBC
PLATELET # BLD AUTO: 397 K/UL (ref 150–350)
PMV BLD AUTO: 11.8 FL (ref 9.2–12.9)
POCT GLUCOSE: 146 MG/DL (ref 70–110)
POTASSIUM SERPL-SCNC: 4 MMOL/L (ref 3.5–5.1)
PROT SERPL-MCNC: 6.5 G/DL (ref 6–8.4)
RBC # BLD AUTO: 2.71 M/UL (ref 4.6–6.2)
SODIUM SERPL-SCNC: 140 MMOL/L (ref 136–145)
WBC # BLD AUTO: 9.83 K/UL (ref 3.9–12.7)

## 2020-03-13 PROCEDURE — 94761 N-INVAS EAR/PLS OXIMETRY MLT: CPT

## 2020-03-13 PROCEDURE — 25000003 PHARM REV CODE 250: Performed by: INTERNAL MEDICINE

## 2020-03-13 PROCEDURE — 99233 SBSQ HOSP IP/OBS HIGH 50: CPT | Mod: ,,, | Performed by: INTERNAL MEDICINE

## 2020-03-13 PROCEDURE — 83735 ASSAY OF MAGNESIUM: CPT

## 2020-03-13 PROCEDURE — 85730 THROMBOPLASTIN TIME PARTIAL: CPT

## 2020-03-13 PROCEDURE — 99900035 HC TECH TIME PER 15 MIN (STAT)

## 2020-03-13 PROCEDURE — 99233 PR SUBSEQUENT HOSPITAL CARE,LEVL III: ICD-10-PCS | Mod: ,,, | Performed by: INTERNAL MEDICINE

## 2020-03-13 PROCEDURE — 80053 COMPREHEN METABOLIC PANEL: CPT

## 2020-03-13 PROCEDURE — 97116 GAIT TRAINING THERAPY: CPT

## 2020-03-13 PROCEDURE — 25000003 PHARM REV CODE 250: Performed by: NURSE PRACTITIONER

## 2020-03-13 PROCEDURE — 36415 COLL VENOUS BLD VENIPUNCTURE: CPT

## 2020-03-13 PROCEDURE — 85025 COMPLETE CBC W/AUTO DIFF WBC: CPT

## 2020-03-13 RX ADMIN — FAMOTIDINE 20 MG: 40 POWDER, FOR SUSPENSION ORAL at 09:03

## 2020-03-13 RX ADMIN — ATORVASTATIN CALCIUM 80 MG: 20 TABLET, FILM COATED ORAL at 09:03

## 2020-03-13 RX ADMIN — APIXABAN 5 MG: 2.5 TABLET, FILM COATED ORAL at 09:03

## 2020-03-13 RX ADMIN — HYDRALAZINE HYDROCHLORIDE 100 MG: 25 TABLET ORAL at 06:03

## 2020-03-13 RX ADMIN — ASPIRIN 81 MG CHEWABLE TABLET 81 MG: 81 TABLET CHEWABLE at 09:03

## 2020-03-13 RX ADMIN — AMIODARONE HYDROCHLORIDE 400 MG: 200 TABLET ORAL at 09:03

## 2020-03-13 RX ADMIN — ISOSORBIDE DINITRATE 40 MG: 10 TABLET ORAL at 06:03

## 2020-03-13 RX ADMIN — DOCUSATE SODIUM - SENNOSIDES 2 TABLET: 50; 8.6 TABLET, FILM COATED ORAL at 09:03

## 2020-03-13 RX ADMIN — POTASSIUM CHLORIDE 20 MEQ: 20 TABLET, EXTENDED RELEASE ORAL at 09:03

## 2020-03-13 NOTE — SUBJECTIVE & OBJECTIVE
Interval History: did well overnight ready for discharge         Review of patient's allergies indicates:  No Known Allergies  Objective:     Vital Signs (Most Recent):  Temp: 98.1 °F (36.7 °C) (03/13/20 0747)  Pulse: 91 (03/13/20 0857)  Resp: 16 (03/13/20 0747)  BP: 122/75 (03/13/20 0747)  SpO2: 97 % (03/13/20 0747) Vital Signs (24h Range):  Temp:  [98 °F (36.7 °C)-98.7 °F (37.1 °C)] 98.1 °F (36.7 °C)  Pulse:  [81-94] 91  Resp:  [16-18] 16  SpO2:  [94 %-98 %] 97 %  BP: (117-135)/(62-87) 122/75     Patient Vitals for the past 72 hrs (Last 3 readings):   Weight   03/13/20 0500 66.9 kg (147 lb 7.8 oz)   03/12/20 0500 66.8 kg (147 lb 4.3 oz)   03/11/20 0323 67 kg (147 lb 11.3 oz)     Body mass index is 23.81 kg/m².      Intake/Output Summary (Last 24 hours) at 3/13/2020 1446  Last data filed at 3/13/2020 0600  Gross per 24 hour   Intake 600 ml   Output 1550 ml   Net -950 ml       Hemodynamic Parameters:         Physical Exam   Constitutional: He is oriented to person, place, and time. He appears well-developed and well-nourished.   HENT:   Head: Normocephalic and atraumatic.   Eyes: Pupils are equal, round, and reactive to light. EOM are normal.   Neck: Normal range of motion. Neck supple.   RIJ TLC placed 3/9/20.   Cardiovascular: Normal rate and regular rhythm.   Murmur heard.  Pulmonary/Chest: Effort normal and breath sounds normal. No stridor. No respiratory distress.   Abdominal: Soft. He exhibits no distension. Bowel sounds are decreased. There is no tenderness.   Musculoskeletal: Normal range of motion. He exhibits no edema.   Neurological: He is alert and oriented to person, place, and time.   Skin: Skin is warm and dry. Capillary refill takes 2 to 3 seconds.   Nursing note and vitals reviewed.      Significant Labs:  CBC:  Recent Labs   Lab 03/11/20  0320 03/12/20  0448 03/13/20  0446   WBC 11.34 10.05 9.83   RBC 2.82* 2.85* 2.71*   HGB 8.2* 8.3* 7.8*   HCT 25.7* 26.0* 24.8*   * 406* 397*   MCV 91 91 92    MCH 29.1 29.1 28.8   MCHC 31.9* 31.9* 31.5*     BNP:  No results for input(s): BNP in the last 168 hours.    Invalid input(s): BNPTRIAGELBLO  CMP:  Recent Labs   Lab 03/11/20 0320 03/12/20 0448 03/13/20 0446   * 139* 127*   CALCIUM 8.6* 8.5* 8.4*   ALBUMIN 2.7* 3.0* 3.1*   PROT 6.1 6.4 6.5    143 140   K 3.5 3.6 4.0   CO2 24 24 21*    110 109   BUN 61* 51* 41*   CREATININE 3.2* 2.8* 2.5*   ALKPHOS 70 74 75   ALT 25 37 47*   AST 28 33 34   BILITOT 0.5 0.5 0.5      Coagulation:   Recent Labs   Lab 03/11/20 0320 03/12/20 0448 03/13/20 0446   APTT 52.5* 57.4* 30.0     LDH:  No results for input(s): LDH in the last 72 hours.  Microbiology:  Microbiology Results (last 7 days)     Procedure Component Value Units Date/Time    Blood culture [[id number]] Collected:  03/06/20 1604    Order Status:  Completed Specimen:  Blood from Peripheral, Forearm, Right Updated:  03/11/20 2012     Blood Culture, Routine No growth after 5 days.    Blood culture [[id number]]  (Abnormal) Collected:  03/06/20 1618    Order Status:  Completed Specimen:  Blood from Wrist, Right Updated:  03/11/20 0935     Blood Culture, Routine Gram stain peds bottle: Gram positive cocci in clusters resembling Staph       Results called to and read back by:Leonel Patel RN  03/09/2020  08:28      COAGULASE-NEGATIVE STAPHYLOCOCCUS SPECIES  Organism is a probable contaminant      Culture, Respiratory with Gram Stain [[id number]] Collected:  03/06/20 0846    Order Status:  Completed Specimen:  Respiratory from Sputum, Expectorated Updated:  03/09/20 0842     Respiratory Culture Normal respiratory ana maría      No S aureus or Pseudomonas isolated.     Gram Stain (Respiratory) <10 epithelial cells per low power field.     Gram Stain (Respiratory) Few WBC's     Gram Stain (Respiratory) Few Gram positive cocci     Gram Stain (Respiratory) Few Gram positive rods     Gram Stain (Respiratory) Few Gram negative rods          I have reviewed all pertinent  labs within the past 24 hours.    Estimated Creatinine Clearance: 27.3 mL/min (A) (based on SCr of 2.5 mg/dL (H)).    Diagnostic Results:  I have reviewed and interpreted all pertinent imaging results/findings within the past 24 hours.

## 2020-03-13 NOTE — HOSPITAL COURSE
Cardiogenic shock Presented with impella placed at OSH with position issues and LD>1000. Impella removed 3/2/20. He was started on  Hydralazine/isordil and lasix QOD. His  d/c'ed due to high cardiac output and hypertension/afib.  -TTE 3/2 EF 10%, LVIDD 6, TAPSE 1.98, Mod MR, Mod TR.  -Not working up for advanced options due to medical instability/renal failure at this time. Will continue to reassess candidacy as oupt as renal function improves. He had Positive blood cultures from 3/6 with 1/2 bottles growing Gram positive cocci in clusters resembling Staph.  -Repeat cultures NGTD.   He will have labs with HH in one week and RTC in 3 weeks

## 2020-03-13 NOTE — ASSESSMENT & PLAN NOTE
-Hx of RCA stent in 2013.   -[facility] 2/29: 50-60% LCx, OM disease.  -continue ASA, high intensity statin.  -Continue heparin

## 2020-03-13 NOTE — PLAN OF CARE
Pt free of falls/trauma/injuries.  Denies c/o SOB; O2Sats remain stable on room air. Incentive spirometry encouraged throughout shift.  Incisional pain managed with PO analgesics.  Generalized skin remains CDI; no edema noted to BLEs.  Electrolytes replaced as ordered.  PT/OT following; pt able to ambulate in hallway with standby assist.  Plan to continue with post-op care.  Pt tolerating plan of care.

## 2020-03-13 NOTE — ASSESSMENT & PLAN NOTE
-NGT removed 3/9.  -Appreciate GEN JAYCEE Cx.   -CT with concern for possible pancreatitis. No pain. WCTM.   -Lipase ~1000. Spoke with surgery. As patient is not having pain, they would not .   -Diet advanced.

## 2020-03-13 NOTE — DISCHARGE SUMMARY
"[facility]  Heart Transplant  Discharge Summary      Patient Name: John Javier  MRN: [id number]  Admission Date: 2/29/2020  Hospital Length of Stay: 13 days  Discharge Date and Time: 03/13/2020 2:50 PM  Attending Physician: No att. providers found   Discharging Provider: MARISOL Lim  Primary Care Provider: To Obtain Unable     HPI: Patient intubated w limited collateral from family; further OSH records pending    63 y/o M hx of CAD (50-60% LCx, OM disease on LHC 2/29), ICM EF 10-15% s/p AICD (unknown baseline but on GDMT as OP), HTN, DM2, HLD, obesity, CKD, presenting from OSH intubated with Impella in setting of cardiogenic shock. Per pt's brother he was taken by EMS to OSH in setting of feeling short of breath, nearly passing out. There found to be in shock, lactate to 12, ANGEL w Cr 2.5, trop 12; unclear if dynamic ECG changes, max trop 10; underwent LHC with evidence of 50-60% LCx, OM disease; no PCI performed. RHC with LVEDP 35. CO 3, CI 1.8, tte w LVEF 10-15%. Underwent Impella placement without additional central line placement (per brother there was a "complication" but no evidence of pneumothorax).    On arrival patient -having intermittent suction alarms, improved w P6->P4. MAPs stable 75-85. CVC placed in LIJ given neck position, CO 9.7, CI 5.35, svr 535. WBC 26k, patient cultured, given single dose of vanc/zosyn. UOP 60-80cc/hr off diuretics. Further collateral pending from OSH. Continued on heparin gtt for impella, weaned to dobutamine 5, gave 500cc bolus in setting of CVP 10 and suction alarms.    Procedure(s) (LRB):  Impella, Removal (Left)     Hospital Course: Cardiogenic shock Presented with impella placed at OSH with position issues and LD>1000. Impella removed 3/2/20. He was started on  Hydralazine/isordil and lasix QOD. His  d/c'ed due to high cardiac output and hypertension/afib.  -TTE 3/2 EF 10%, LVIDD 6, TAPSE 1.98, Mod MR, Mod TR.  -Not working up for advanced " options due to medical instability/renal failure at this time. Will continue to reassess candidacy as oupt as renal function improves. He had Positive blood cultures from 3/6 with 1/2 bottles growing Gram positive cocci in clusters resembling Staph.  -Repeat cultures NGTD.   He will have labs with HH in one week and RTC in 3 weeks     Consults (From admission, onward)        Status Ordering Provider     Inpatient consult to Electrophysiology  Once     Provider:  (Not yet assigned)    Completed PASCUAL HERNANDEZ     Inpatient consult to Endocrinology  Once     Provider:  (Not yet assigned)    Completed YFN GIBBONS     Inpatient consult to General Surgery  Once     Provider:  (Not yet assigned)    Completed YFN GIBBONS     Inpatient consult to Interventional Cardiology  Once     Provider:  (Not yet assigned)    Completed JERRY VERA     Inpatient consult to Nephrology  Once     Provider:  (Not yet assigned)    Completed JERRY VERA     Inpatient consult to Registered Dietitian/Nutritionist  Once     Provider:  (Not yet assigned)    Completed PASCUAL HERNANDEZ          Significant Diagnostic Studies: Labs:   BMP:   Recent Labs   Lab 03/12/20  0448 03/13/20  0446   * 127*    140   K 3.6 4.0    109   CO2 24 21*   BUN 51* 41*   CREATININE 2.8* 2.5*   CALCIUM 8.5* 8.4*   MG 1.7 1.5*       Pending Diagnostic Studies:     None        Final Active Diagnoses:    Diagnosis Date Noted POA    PRINCIPAL PROBLEM:  Cardiogenic shock [R57.0] 03/01/2020 Yes    Positive blood cultures [R78.81] 03/09/2020 No    Hematoma [T14.8XXA] 03/08/2020 No    Abdominal distention [R14.0] 03/06/2020 No    Iron deficiency anemia [D50.9] 03/05/2020 Yes    Hyperphosphatemia [E83.39]  No    PAF (paroxysmal atrial fibrillation) [I48.0] 03/03/2020 Yes    Malnutrition of moderate degree [E44.0] 03/03/2020 Yes    VT (ventricular tachycardia) [I47.2] 03/02/2020 Yes    ANGEL (acute kidney injury) [N17.9]  03/01/2020 Yes    CAD (coronary artery disease) [I25.10] 03/01/2020 Yes    Type 2 diabetes mellitus, without long-term current use of insulin [E11.9] 03/01/2020 Yes    Acute on chronic combined systolic and diastolic heart failure [I50.43] 03/01/2020 Yes      Problems Resolved During this Admission:    Diagnosis Date Noted Date Resolved POA    Acute hypoxemic respiratory failure [J96.01] 03/02/2020 03/09/2020 Yes      Discharged Condition: good    Disposition: Home or Self Care    Follow Up:  3 weeks    Patient Instructions:   No discharge procedures on file.  Medications:  Reconciled Home Medications:      Medication List      START taking these medications    amiodarone 400 MG tablet  Commonly known as:  PACERONE  Take 1 tablet 400mg twice daily through 3/24/20, then 1 tablet (400mg) by mouth daily.     aspirin 81 MG Chew  Take 1 tablet (81 mg total) by mouth once daily.     atorvastatin 80 MG tablet  Commonly known as:  LIPITOR  Take 1 tablet (80 mg total) by mouth once daily.     ELIQUIS 5 mg Tab  Generic drug:  apixaban  Take 1 tablet (5 mg total) by mouth 2 (two) times daily.     hydrALAZINE 100 MG tablet  Commonly known as:  APRESOLINE  Take 1 tablet (100 mg total) by mouth every 8 (eight) hours.     isosorbide dinitrate 40 MG Tab  Commonly known as:  ISORDIL  Take 1 tablet (40 mg total) by mouth every 8 (eight) hours.     potassium chloride SA 20 MEQ tablet  Commonly known as:  K-DUR,KLOR-CON  Take 1 tablet (20 mEq total) by mouth once daily.        CHANGE how you take these medications    furosemide 20 MG tablet  Commonly known as:  LASIX  Take 1 tablet (20 mg total) by mouth every other day.  What changed:    · when to take this  · reasons to take this        CONTINUE taking these medications    pantoprazole 40 MG tablet  Commonly known as:  PROTONIX  Take 40 mg by mouth once daily.        STOP taking these medications    clopidogreL 75 mg tablet  Commonly known as:  PLAVIX     metFORMIN 500 MG  tablet  Commonly known as:  GLUCOPHAGE     metoprolol succinate 100 MG 24 hr tablet  Commonly known as:  TOPROL-XL     sacubitriL-valsartan 49-51 mg per tablet  Commonly known as:  ENTRESTO     spironolactone 25 MG tablet  Commonly known as:  ALDACTONE            MARISOL Lim  Heart Transplant  [facility]

## 2020-03-13 NOTE — PLAN OF CARE
Problem: Physical Therapy Goal  Goal: Physical Therapy Goal  Description  Goals to be met by: 3/16/2020     Patient will increase functional independence with mobility by performin. Supine to sit with Contact Guard Assistance -Met 3/13  2. Sit to supine with Contact Guard Assistance  -Met 3/13  3. Sit to stand transfer with Stand By Assistance- met 3/9/2020  Sit to stand transfer with supervision  -Met 3/13  4. Gait  x 150 feet with Contact Guard Assistance using no or LRAD.  -Met 3/13   200' with supervision using no or LRAD  5. Ascend/descend 5 stair with left Handrails Contact Guard Assistance using no or LRAD.   6. Lower extremity exercise program x 20 reps per handout, with independence       Outcome: Ongoing, Progressing       Ling Mims, PT, DPT  3/13/2020

## 2020-03-13 NOTE — PROGRESS NOTES
Pt discharged per MD orders.  Tele discontinued and returned to station.  IV discontinued; catheter tip intact x.  Medication list and prescriptions reviewed; prescriptions sent to pt preferred pharmacy and printed prescriptions provided.  Pt verbalizes understanding of all written and verbal discharge instructions.  Pt awaiting family arrival.  Will continue to monitor.

## 2020-03-13 NOTE — TELEPHONE ENCOUNTER
Attempted to reach patient to advise of f/u appt scheduled for next Friday; however, there was no answer at either available phone number.  Appt slip mailed to pt's home.

## 2020-03-13 NOTE — PT/OT/SLP PROGRESS
"Physical Therapy Treatment    Patient Name:  John Javier   MRN:  [id number]    Recommendations:     Discharge Recommendations:  home health PT, home with home health   Discharge Equipment Recommendations: none   Barriers to discharge: None    Assessment:     John Javier is a 64 y.o. male admitted with a medical diagnosis of Cardiogenic shock.  He presents with the following impairments/functional limitations:  impaired balance, gait instability, impaired functional mobilty, impaired self care skills Patient pleasant and motivated to work with therapy. Supervision-SBA for all mobility. Gait 300' with no AD.     Rehab Prognosis: Good; patient would benefit from acute skilled PT services to address these deficits and reach maximum level of function.    Recent Surgery: Procedure(s) (LRB):  Impella, Removal (Left) 11 Days Post-Op    Plan:     During this hospitalization, patient to be seen 3 x/week to address the identified rehab impairments via gait training, therapeutic activities, therapeutic exercises and progress toward the following goals:    · Plan of Care Expires:  04/05/20    Subjective     Chief Complaint: none stated  Patient/Family Comments/goals: "I don't like laying in the bed. I have to stay out of it"  Pain/Comfort:  · Pain Rating 1: 0/10  · Pain Rating Post-Intervention 1: 0/10      Objective:     Communicated with nurse prior to session.  Patient found sidelying on sofa with telemetry upon PT entry to room.     General Precautions: Standard, fall   Orthopedic Precautions:N/A   Braces: N/A     Functional Mobility:  · Bed Mobility:     · Supine to Sit: independence  · Transfers:     · Sit to Stand:  independence with no AD  · Gait: 300', SBA. Increase ALYSSA, increased ML sway, decreased step length, patient with mild lateral gait instability. No SOB. No LOB. No dizziness.       AM-PAC 6 CLICK MOBILITY  Turning over in bed (including adjusting bedclothes, sheets and blankets)?: 4  Sitting down on and " standing up from a chair with arms (e.g., wheelchair, bedside commode, etc.): 4  Moving from lying on back to sitting on the side of the bed?: 4  Moving to and from a bed to a chair (including a wheelchair)?: 3  Need to walk in hospital room?: 3  Climbing 3-5 steps with a railing?: 3  Basic Mobility Total Score: 21       Therapeutic Activities and Exercises:   Pt educated on importance of OOB activity to decrease the risks associated with bed rest.   SLS at counter 30 sec each side x2. Patient instructed to complete at home with supervision to improve balance for increased gait stability and safety.   All questions and concerns addressed within PT scope of practice. Pt educated on plan and goals with physical therapy.     Patient left sitting EOB with call button in reach..    GOALS:   Multidisciplinary Problems     Physical Therapy Goals        Problem: Physical Therapy Goal    Goal Priority Disciplines Outcome Goal Variances Interventions   Physical Therapy Goal     PT, PT/OT Ongoing, Progressing     Description:  Goals to be met by: 3/16/2020     Patient will increase functional independence with mobility by performin. Supine to sit with Contact Guard Assistance -Met 3/13  2. Sit to supine with Contact Guard Assistance  -Met 3/13  3. Sit to stand transfer with Stand By Assistance- met 3/9/2020  Sit to stand transfer with supervision  -Met 3/13  4. Gait  x 150 feet with Contact Guard Assistance using no or LRAD.  -Met 3/13   200' with supervision using no or LRAD  5. Ascend/descend 5 stair with left Handrails Contact Guard Assistance using no or LRAD.   6. Lower extremity exercise program x 20 reps per handout, with independence                        Time Tracking:     PT Received On: 20  PT Start Time: 953     PT Stop Time: 1010  PT Total Time (min): 17 min     Billable Minutes: Gait Training 17    Treatment Type: Treatment  PT/PTA: PT     PTA Visit Number: 0     Ling Mims, PT  2020

## 2020-03-13 NOTE — PLAN OF CARE
Pt free of falls/trauma/injuries.  Denies c/o SOB, CP, or discomfort.  Generalized skin remains CDI; No edema noted.  Pt being diuresed with PO Lasix; diuresing well.   Wt trending down.   Pt d/c in AM.  Pt tolerating plan of care.

## 2020-03-13 NOTE — PROGRESS NOTES
[facility]  Heart Transplant  Progress Note    Patient Name: John Javier  MRN: [id number]  Admission Date: 2/29/2020  Hospital Length of Stay: 13 days  Attending Physician: No att. providers found  Primary Care Provider: To Obtain Unable  Principal Problem:Cardiogenic shock    Subjective:     Interval History: did well overnight ready for discharge         Review of patient's allergies indicates:  No Known Allergies  Objective:     Vital Signs (Most Recent):  Temp: 98.1 °F (36.7 °C) (03/13/20 0747)  Pulse: 91 (03/13/20 0857)  Resp: 16 (03/13/20 0747)  BP: 122/75 (03/13/20 0747)  SpO2: 97 % (03/13/20 0747) Vital Signs (24h Range):  Temp:  [98 °F (36.7 °C)-98.7 °F (37.1 °C)] 98.1 °F (36.7 °C)  Pulse:  [81-94] 91  Resp:  [16-18] 16  SpO2:  [94 %-98 %] 97 %  BP: (117-135)/(62-87) 122/75     Patient Vitals for the past 72 hrs (Last 3 readings):   Weight   03/13/20 0500 66.9 kg (147 lb 7.8 oz)   03/12/20 0500 66.8 kg (147 lb 4.3 oz)   03/11/20 0323 67 kg (147 lb 11.3 oz)     Body mass index is 23.81 kg/m².      Intake/Output Summary (Last 24 hours) at 3/13/2020 1446  Last data filed at 3/13/2020 0600  Gross per 24 hour   Intake 600 ml   Output 1550 ml   Net -950 ml       Hemodynamic Parameters:         Physical Exam   Constitutional: He is oriented to person, place, and time. He appears well-developed and well-nourished.   HENT:   Head: Normocephalic and atraumatic.   Eyes: Pupils are equal, round, and reactive to light. EOM are normal.   Neck: Normal range of motion. Neck supple.   [facility] TLC placed 3/9/20.   Cardiovascular: Normal rate and regular rhythm.   Murmur heard.  Pulmonary/Chest: Effort normal and breath sounds normal. No stridor. No respiratory distress.   Abdominal: Soft. He exhibits no distension. Bowel sounds are decreased. There is no tenderness.   Musculoskeletal: Normal range of motion. He exhibits no edema.   Neurological: He is alert and oriented to person, place, and time.   Skin:  Skin is warm and dry. Capillary refill takes 2 to 3 seconds.   Nursing note and vitals reviewed.      Significant Labs:  CBC:  Recent Labs   Lab 03/11/20 0320 03/12/20 0448 03/13/20 0446   WBC 11.34 10.05 9.83   RBC 2.82* 2.85* 2.71*   HGB 8.2* 8.3* 7.8*   HCT 25.7* 26.0* 24.8*   * 406* 397*   MCV 91 91 92   MCH 29.1 29.1 28.8   MCHC 31.9* 31.9* 31.5*     BNP:  No results for input(s): BNP in the last 168 hours.    Invalid input(s): BNPTRIAGELBLO  CMP:  Recent Labs   Lab 03/11/20 0320 03/12/20 0448 03/13/20 0446   * 139* 127*   CALCIUM 8.6* 8.5* 8.4*   ALBUMIN 2.7* 3.0* 3.1*   PROT 6.1 6.4 6.5    143 140   K 3.5 3.6 4.0   CO2 24 24 21*    110 109   BUN 61* 51* 41*   CREATININE 3.2* 2.8* 2.5*   ALKPHOS 70 74 75   ALT 25 37 47*   AST 28 33 34   BILITOT 0.5 0.5 0.5      Coagulation:   Recent Labs   Lab 03/11/20 0320 03/12/20 0448 03/13/20 0446   APTT 52.5* 57.4* 30.0     LDH:  No results for input(s): LDH in the last 72 hours.  Microbiology:  Microbiology Results (last 7 days)     Procedure Component Value Units Date/Time    Blood culture [[id number]] Collected:  03/06/20 1604    Order Status:  Completed Specimen:  Blood from Peripheral, Forearm, Right Updated:  03/11/20 2012     Blood Culture, Routine No growth after 5 days.    Blood culture [[id number]]  (Abnormal) Collected:  03/06/20 1618    Order Status:  Completed Specimen:  Blood from Wrist, Right Updated:  03/11/20 0935     Blood Culture, Routine Gram stain peds bottle: Gram positive cocci in clusters resembling Staph       Results called to and read back by:Leonel Patel RN  03/09/2020  08:28      COAGULASE-NEGATIVE STAPHYLOCOCCUS SPECIES  Organism is a probable contaminant      Culture, Respiratory with Gram Stain [[id number]] Collected:  03/06/20 0846    Order Status:  Completed Specimen:  Respiratory from Sputum, Expectorated Updated:  03/09/20 0842     Respiratory Culture Normal respiratory ana maría      No S aureus or  "Pseudomonas isolated.     Gram Stain (Respiratory) <10 epithelial cells per low power field.     Gram Stain (Respiratory) Few WBC's     Gram Stain (Respiratory) Few Gram positive cocci     Gram Stain (Respiratory) Few Gram positive rods     Gram Stain (Respiratory) Few Gram negative rods          I have reviewed all pertinent labs within the past 24 hours.    Estimated Creatinine Clearance: 27.3 mL/min (A) (based on SCr of 2.5 mg/dL (H)).    Diagnostic Results:  I have reviewed and interpreted all pertinent imaging results/findings within the past 24 hours.    Assessment and Plan:     Patient intubated w limited collateral from family; further OSH records pending    63 y/o M hx of CAD (50-60% LCx, OM disease on LHC 2/29), ICM EF 10-15% s/p AICD (unknown baseline but on GDMT as OP), HTN, DM2, HLD, obesity, CKD, presenting from OSH intubated with Impella in setting of cardiogenic shock. Per pt's brother he was taken by EMS to OSH in setting of feeling short of breath, nearly passing out. There found to be in shock, lactate to 12, ANGEL w Cr 2.5, trop 12; unclear if dynamic ECG changes, max trop 10; underwent LHC with evidence of 50-60% LCx, OM disease; no PCI performed. RHC with LVEDP 35. CO 3, CI 1.8, tte w LVEF 10-15%. Underwent Impella placement without additional central line placement (per brother there was a "complication" but no evidence of pneumothorax).    On arrival patient -having intermittent suction alarms, improved w P6->P4. MAPs stable 75-85. CVC placed in LIJ given neck position, CO 9.7, CI 5.35, svr 535. WBC 26k, patient cultured, given single dose of vanc/zosyn. UOP 60-80cc/hr off diuretics. Further collateral pending from OSH. Continued on heparin gtt for impella, weaned to dobutamine 5, gave 500cc bolus in setting of CVP 10 and suction alarms.    * Cardiogenic shock  -Presented with impella placed at OSH with position issues and LD>1000.  -Impella removed 3/2/20.  -ScVO2 61. CVP 4-5.   -Continue " hydralazine/isordil. Will up titrate as tolerated.    -Will start QOD Lasix.  - d/c'ed due to high cardiac output and hypertension/afib.  -TTE 3/2 EF 10%, LVIDD 6, TAPSE 1.98, Mod MR, Mod TR.  -Not working up for advanced options due to medical instability/renal failure at this time. Will continue to reassess candidacy as oupt as renal function improves.   -PT/OT.     Positive blood cultures  -Blood cultures from 3/6 with 1/2 bottles growing Gram positive cocci in clusters resembling Staph.  -Repeat cultures NGTD.   -TLC replaced 3/9. Cookson/Lamb removed.  -Vanc stopped.     Hematoma  -CT 3/7/20: Right anterior abdominal wall hematoma, abuts the distal most aspect of the rectus sheath.   -Secondary to impella which was removed.  -Continue to monitor closely.    Abdominal distention  -NGT removed 3/9.  -Appreciate GEN JAYCEE Cx.   -CT with concern for possible pancreatitis. No pain. WCTM.   -Lipase ~1000. Spoke with surgery. As patient is not having pain, they would not .   -Diet advanced.     Iron deficiency anemia  -No obvious bleeding.   -R groin impella site with hematoma that is stable/no abdominal or back pain.  -s/p 2 U PRBC 3/5/20    Malnutrition of moderate degree  -Appreciate Dietary Cx.    PAF (paroxysmal atrial fibrillation)  -PAF noted several times since admit.   -Converted to NSR.  -Continue heparin gtt for anticoagulation. Will transition to DOAC at DC.   -Transitioned to PO amio.     VT (ventricular tachycardia)  -S/P ICD shocks x 5 per OSH.  -Continue PO amio.     Acute on chronic combined systolic and diastolic heart failure  - See Cardiogenic shock.    Type 2 diabetes mellitus, without long-term current use of insulin  -A1C 7.0 on admit  -Insulin gtt management per endo. Greatly appreciate their assistance    CAD (coronary artery disease)  -Hx of RCA stent in 2013.   -[facility] 2/29: 50-60% LCx, OM disease.  -continue ASA, high intensity statin.  -Continue heparin      ANGEL (acute kidney  injury)  - Likely ATN in setting of shock, Hemolysis, plus IV contrast at OSH.  - Monitor Cr/BUN closely, trending down.  - Appreciate renal consult.  - Strict I/O.  - Avoid nephrotoxic meds.  - Continues to make good urine.        MARISOL Lim  Heart Transplant  [facility]

## 2020-03-14 ENCOUNTER — HOSPITAL ENCOUNTER (INPATIENT)
Facility: HOSPITAL | Age: 65
LOS: 3 days | Discharge: HOME | DRG: 291 | End: 2020-03-17
Attending: INTERNAL MEDICINE | Admitting: INTERNAL MEDICINE
Payer: COMMERCIAL

## 2020-03-14 ENCOUNTER — NURSE TRIAGE (OUTPATIENT)
Dept: ADMINISTRATIVE | Facility: CLINIC | Age: 65
End: 2020-03-14

## 2020-03-14 DIAGNOSIS — I50.43 ACUTE ON CHRONIC COMBINED SYSTOLIC AND DIASTOLIC HEART FAILURE: ICD-10-CM

## 2020-03-14 DIAGNOSIS — Z20.822 SUSPECTED COVID-19 VIRUS INFECTION: Primary | ICD-10-CM

## 2020-03-14 DIAGNOSIS — I50.43 ACUTE ON CHRONIC COMBINED SYSTOLIC AND DIASTOLIC CONGESTIVE HEART FAILURE: ICD-10-CM

## 2020-03-14 DIAGNOSIS — I50.9 ACUTE DECOMPENSATED HEART FAILURE: ICD-10-CM

## 2020-03-14 LAB
ADENOVIRUS: NOT DETECTED
ALBUMIN SERPL BCP-MCNC: 3.4 G/DL (ref 3.5–5.2)
ALP SERPL-CCNC: 91 U/L (ref 55–135)
ALT SERPL W/O P-5'-P-CCNC: 56 U/L (ref 10–44)
ANION GAP SERPL CALC-SCNC: 12 MMOL/L (ref 8–16)
AST SERPL-CCNC: 34 U/L (ref 10–40)
BACTERIA BLD CULT: NORMAL
BACTERIA BLD CULT: NORMAL
BASOPHILS # BLD AUTO: 0.05 K/UL (ref 0–0.2)
BASOPHILS NFR BLD: 0.2 % (ref 0–1.9)
BILIRUB SERPL-MCNC: 0.6 MG/DL (ref 0.1–1)
BILIRUB UR QL STRIP: NEGATIVE
BNP SERPL-MCNC: 833 PG/ML (ref 0–99)
BORDETELLA PARAPERTUSSIS (IS1001): NOT DETECTED
BORDETELLA PERTUSSIS (PTXP): NOT DETECTED
BUN SERPL-MCNC: 35 MG/DL (ref 8–23)
CALCIUM SERPL-MCNC: 8.8 MG/DL (ref 8.7–10.5)
CHLAMYDIA PNEUMONIAE: NOT DETECTED
CHLORIDE SERPL-SCNC: 102 MMOL/L (ref 95–110)
CK SERPL-CCNC: 144 U/L (ref 20–200)
CLARITY UR REFRACT.AUTO: CLEAR
CO2 SERPL-SCNC: 23 MMOL/L (ref 23–29)
COLOR UR AUTO: YELLOW
CORONAVIRUS 229E, COMMON COLD VIRUS: NOT DETECTED
CORONAVIRUS HKU1, COMMON COLD VIRUS: NOT DETECTED
CORONAVIRUS NL63, COMMON COLD VIRUS: NOT DETECTED
CORONAVIRUS OC43, COMMON COLD VIRUS: NOT DETECTED
CREAT SERPL-MCNC: 2.8 MG/DL (ref 0.5–1.4)
CRP SERPL-MCNC: 20.6 MG/L (ref 0–3.19)
D DIMER PPP IA.FEU-MCNC: 2.48 MG/L FEU
DIFFERENTIAL METHOD: ABNORMAL
EOSINOPHIL # BLD AUTO: 0 K/UL (ref 0–0.5)
EOSINOPHIL NFR BLD: 0 % (ref 0–8)
ERYTHROCYTE [DISTWIDTH] IN BLOOD BY AUTOMATED COUNT: 14.2 % (ref 11.5–14.5)
EST. GFR  (AFRICAN AMERICAN): 26.5 ML/MIN/1.73 M^2
EST. GFR  (NON AFRICAN AMERICAN): 23 ML/MIN/1.73 M^2
FLUBV RNA NPH QL NAA+NON-PROBE: NOT DETECTED
GLUCOSE SERPL-MCNC: 186 MG/DL (ref 70–110)
GLUCOSE UR QL STRIP: ABNORMAL
HCT VFR BLD AUTO: 27.6 % (ref 40–54)
HGB BLD-MCNC: 8.8 G/DL (ref 14–18)
HGB UR QL STRIP: ABNORMAL
HPIV1 RNA NPH QL NAA+NON-PROBE: NOT DETECTED
HPIV2 RNA NPH QL NAA+NON-PROBE: NOT DETECTED
HPIV3 RNA NPH QL NAA+NON-PROBE: NOT DETECTED
HPIV4 RNA NPH QL NAA+NON-PROBE: NOT DETECTED
HUMAN METAPNEUMOVIRUS: NOT DETECTED
IMM GRANULOCYTES # BLD AUTO: 0.21 K/UL (ref 0–0.04)
IMM GRANULOCYTES NFR BLD AUTO: 0.7 % (ref 0–0.5)
INFLUENZA A (SUBTYPES H1,H1-2009,H3): NOT DETECTED
INFLUENZA A, MOLECULAR: NEGATIVE
INFLUENZA B, MOLECULAR: NEGATIVE
IRON SERPL-MCNC: 12 UG/DL (ref 45–160)
KETONES UR QL STRIP: NEGATIVE
LACTATE SERPL-SCNC: 1.6 MMOL/L (ref 0.5–2.2)
LEUKOCYTE ESTERASE UR QL STRIP: ABNORMAL
LYMPHOCYTES # BLD AUTO: 1 K/UL (ref 1–4.8)
LYMPHOCYTES NFR BLD: 3.4 % (ref 18–48)
MAGNESIUM SERPL-MCNC: 1.2 MG/DL (ref 1.6–2.6)
MCH RBC QN AUTO: 28.7 PG (ref 27–31)
MCHC RBC AUTO-ENTMCNC: 31.9 G/DL (ref 32–36)
MCV RBC AUTO: 90 FL (ref 82–98)
MICROSCOPIC COMMENT: ABNORMAL
MONOCYTES # BLD AUTO: 1.6 K/UL (ref 0.3–1)
MONOCYTES NFR BLD: 5.6 % (ref 4–15)
MYCOPLASMA PNEUMONIAE: NOT DETECTED
NEUTROPHILS # BLD AUTO: 25.7 K/UL (ref 1.8–7.7)
NEUTROPHILS NFR BLD: 90.1 % (ref 38–73)
NITRITE UR QL STRIP: NEGATIVE
NRBC BLD-RTO: 0 /100 WBC
PH UR STRIP: 6 [PH] (ref 5–8)
PLATELET # BLD AUTO: 448 K/UL (ref 150–350)
PMV BLD AUTO: 11.6 FL (ref 9.2–12.9)
POCT GLUCOSE: 191 MG/DL (ref 70–110)
POCT GLUCOSE: 196 MG/DL (ref 70–110)
POTASSIUM SERPL-SCNC: 4.3 MMOL/L (ref 3.5–5.1)
PROCALCITONIN SERPL IA-MCNC: 0.57 NG/ML
PROT SERPL-MCNC: 7.4 G/DL (ref 6–8.4)
PROT UR QL STRIP: NEGATIVE
RBC # BLD AUTO: 3.07 M/UL (ref 4.6–6.2)
RBC #/AREA URNS AUTO: >100 /HPF (ref 0–4)
RESPIRATORY INFECTION PANEL SOURCE: NORMAL
RSV RNA NPH QL NAA+NON-PROBE: NOT DETECTED
RV+EV RNA NPH QL NAA+NON-PROBE: NOT DETECTED
SATURATED IRON: 3 % (ref 20–50)
SODIUM SERPL-SCNC: 137 MMOL/L (ref 136–145)
SP GR UR STRIP: 1.01 (ref 1–1.03)
SPECIMEN SOURCE: NORMAL
SQUAMOUS #/AREA URNS AUTO: 0 /HPF
TOTAL IRON BINDING CAPACITY: 379 UG/DL (ref 250–450)
TRANSFERRIN SERPL-MCNC: 256 MG/DL (ref 200–375)
TROPONIN I SERPL DL<=0.01 NG/ML-MCNC: 0.21 NG/ML (ref 0–0.03)
URN SPEC COLLECT METH UR: ABNORMAL
WBC # BLD AUTO: 28.51 K/UL (ref 3.9–12.7)
WBC #/AREA URNS AUTO: 15 /HPF (ref 0–5)

## 2020-03-14 PROCEDURE — 93005 ELECTROCARDIOGRAM TRACING: CPT | Mod: NTX

## 2020-03-14 PROCEDURE — 25000003 PHARM REV CODE 250: Mod: NTX | Performed by: INTERNAL MEDICINE

## 2020-03-14 PROCEDURE — 82803 BLOOD GASES ANY COMBINATION: CPT | Mod: NTX

## 2020-03-14 PROCEDURE — 86141 C-REACTIVE PROTEIN HS: CPT | Mod: NTX

## 2020-03-14 PROCEDURE — 83880 ASSAY OF NATRIURETIC PEPTIDE: CPT | Mod: NTX

## 2020-03-14 PROCEDURE — U0002 COVID-19 LAB TEST NON-CDC: HCPCS | Mod: NTX

## 2020-03-14 PROCEDURE — 87502 INFLUENZA DNA AMP PROBE: CPT | Mod: NTX

## 2020-03-14 PROCEDURE — 87205 SMEAR GRAM STAIN: CPT | Mod: NTX

## 2020-03-14 PROCEDURE — 20600001 HC STEP DOWN PRIVATE ROOM: Mod: NTX

## 2020-03-14 PROCEDURE — 80053 COMPREHEN METABOLIC PANEL: CPT | Mod: NTX

## 2020-03-14 PROCEDURE — 83540 ASSAY OF IRON: CPT | Mod: NTX

## 2020-03-14 PROCEDURE — 81001 URINALYSIS AUTO W/SCOPE: CPT | Mod: NTX

## 2020-03-14 PROCEDURE — 99223 PR INITIAL HOSPITAL CARE,LEVL III: ICD-10-PCS | Mod: NTX,,, | Performed by: INTERNAL MEDICINE

## 2020-03-14 PROCEDURE — 83605 ASSAY OF LACTIC ACID: CPT | Mod: NTX

## 2020-03-14 PROCEDURE — 85379 FIBRIN DEGRADATION QUANT: CPT | Mod: NTX

## 2020-03-14 PROCEDURE — 85025 COMPLETE CBC W/AUTO DIFF WBC: CPT | Mod: NTX

## 2020-03-14 PROCEDURE — 87633 RESP VIRUS 12-25 TARGETS: CPT | Mod: NTX

## 2020-03-14 PROCEDURE — 84145 PROCALCITONIN (PCT): CPT | Mod: NTX

## 2020-03-14 PROCEDURE — 93010 EKG 12-LEAD: ICD-10-PCS | Mod: NTX,,, | Performed by: INTERNAL MEDICINE

## 2020-03-14 PROCEDURE — 87040 BLOOD CULTURE FOR BACTERIA: CPT | Mod: 59,NTX

## 2020-03-14 PROCEDURE — 87070 CULTURE OTHR SPECIMN AEROBIC: CPT | Mod: NTX

## 2020-03-14 PROCEDURE — 99900035 HC TECH TIME PER 15 MIN (STAT): Mod: NTX

## 2020-03-14 PROCEDURE — 36415 COLL VENOUS BLD VENIPUNCTURE: CPT | Mod: NTX

## 2020-03-14 PROCEDURE — 87086 URINE CULTURE/COLONY COUNT: CPT | Mod: NTX

## 2020-03-14 PROCEDURE — 83735 ASSAY OF MAGNESIUM: CPT | Mod: NTX

## 2020-03-14 PROCEDURE — 82550 ASSAY OF CK (CPK): CPT | Mod: NTX

## 2020-03-14 PROCEDURE — 84484 ASSAY OF TROPONIN QUANT: CPT | Mod: NTX

## 2020-03-14 PROCEDURE — 99223 1ST HOSP IP/OBS HIGH 75: CPT | Mod: NTX,,, | Performed by: INTERNAL MEDICINE

## 2020-03-14 PROCEDURE — 93010 ELECTROCARDIOGRAM REPORT: CPT | Mod: NTX,,, | Performed by: INTERNAL MEDICINE

## 2020-03-14 RX ORDER — PANTOPRAZOLE SODIUM 40 MG/1
40 TABLET, DELAYED RELEASE ORAL DAILY
Status: DISCONTINUED | OUTPATIENT
Start: 2020-03-14 | End: 2020-03-17 | Stop reason: HOSPADM

## 2020-03-14 RX ORDER — LORAZEPAM/0.9% SODIUM CHLORIDE 100MG/0.1L
2 PLASTIC BAG, INJECTION (ML) INTRAVENOUS
Status: COMPLETED | OUTPATIENT
Start: 2020-03-14 | End: 2020-03-15

## 2020-03-14 RX ORDER — AMIODARONE HYDROCHLORIDE 200 MG/1
400 TABLET ORAL 2 TIMES DAILY
Status: DISCONTINUED | OUTPATIENT
Start: 2020-03-14 | End: 2020-03-17 | Stop reason: HOSPADM

## 2020-03-14 RX ORDER — LORAZEPAM/0.9% SODIUM CHLORIDE 100MG/0.1L
2 PLASTIC BAG, INJECTION (ML) INTRAVENOUS ONCE
Status: DISCONTINUED | OUTPATIENT
Start: 2020-03-14 | End: 2020-03-14

## 2020-03-14 RX ORDER — ATORVASTATIN CALCIUM 20 MG/1
80 TABLET, FILM COATED ORAL DAILY
Status: DISCONTINUED | OUTPATIENT
Start: 2020-03-14 | End: 2020-03-17 | Stop reason: HOSPADM

## 2020-03-14 RX ORDER — POTASSIUM CHLORIDE 20 MEQ/1
20 TABLET, EXTENDED RELEASE ORAL DAILY
Status: DISCONTINUED | OUTPATIENT
Start: 2020-03-14 | End: 2020-03-17 | Stop reason: HOSPADM

## 2020-03-14 RX ORDER — LORAZEPAM/0.9% SODIUM CHLORIDE 100MG/0.1L
2 PLASTIC BAG, INJECTION (ML) INTRAVENOUS
Status: DISCONTINUED | OUTPATIENT
Start: 2020-03-15 | End: 2020-03-14

## 2020-03-14 RX ORDER — INSULIN ASPART 100 [IU]/ML
0-5 INJECTION, SOLUTION INTRAVENOUS; SUBCUTANEOUS
Status: DISCONTINUED | OUTPATIENT
Start: 2020-03-14 | End: 2020-03-17 | Stop reason: HOSPADM

## 2020-03-14 RX ORDER — AMOXICILLIN 250 MG
1 CAPSULE ORAL 2 TIMES DAILY
Status: DISCONTINUED | OUTPATIENT
Start: 2020-03-14 | End: 2020-03-17 | Stop reason: HOSPADM

## 2020-03-14 RX ORDER — LORAZEPAM/0.9% SODIUM CHLORIDE 100MG/0.1L
2 PLASTIC BAG, INJECTION (ML) INTRAVENOUS
Status: DISCONTINUED | OUTPATIENT
Start: 2020-03-14 | End: 2020-03-14

## 2020-03-14 RX ORDER — IBUPROFEN 200 MG
16 TABLET ORAL
Status: DISCONTINUED | OUTPATIENT
Start: 2020-03-14 | End: 2020-03-17 | Stop reason: HOSPADM

## 2020-03-14 RX ORDER — IBUPROFEN 200 MG
24 TABLET ORAL
Status: DISCONTINUED | OUTPATIENT
Start: 2020-03-14 | End: 2020-03-17 | Stop reason: HOSPADM

## 2020-03-14 RX ORDER — ISOSORBIDE DINITRATE 40 MG/1
40 TABLET ORAL EVERY 8 HOURS
Status: DISCONTINUED | OUTPATIENT
Start: 2020-03-14 | End: 2020-03-17 | Stop reason: HOSPADM

## 2020-03-14 RX ORDER — ACETAMINOPHEN 325 MG/1
650 TABLET ORAL EVERY 4 HOURS PRN
Status: DISCONTINUED | OUTPATIENT
Start: 2020-03-14 | End: 2020-03-17 | Stop reason: HOSPADM

## 2020-03-14 RX ORDER — FUROSEMIDE 20 MG/1
20 TABLET ORAL EVERY OTHER DAY
Status: DISCONTINUED | OUTPATIENT
Start: 2020-03-15 | End: 2020-03-14

## 2020-03-14 RX ORDER — NAPROXEN SODIUM 220 MG/1
81 TABLET, FILM COATED ORAL DAILY
Status: DISCONTINUED | OUTPATIENT
Start: 2020-03-14 | End: 2020-03-17 | Stop reason: HOSPADM

## 2020-03-14 RX ORDER — HYDRALAZINE HYDROCHLORIDE 50 MG/1
100 TABLET, FILM COATED ORAL EVERY 8 HOURS
Status: DISCONTINUED | OUTPATIENT
Start: 2020-03-14 | End: 2020-03-17 | Stop reason: HOSPADM

## 2020-03-14 RX ORDER — GLUCAGON 1 MG
1 KIT INJECTION
Status: DISCONTINUED | OUTPATIENT
Start: 2020-03-14 | End: 2020-03-17 | Stop reason: HOSPADM

## 2020-03-14 RX ADMIN — PANTOPRAZOLE SODIUM 40 MG: 40 TABLET, DELAYED RELEASE ORAL at 03:03

## 2020-03-14 RX ADMIN — ASPIRIN 81 MG CHEWABLE TABLET 81 MG: 81 TABLET CHEWABLE at 03:03

## 2020-03-14 RX ADMIN — ISOSORBIDE DINITRATE 40 MG: 40 TABLET ORAL at 03:03

## 2020-03-14 RX ADMIN — HYDRALAZINE HYDROCHLORIDE 100 MG: 50 TABLET, FILM COATED ORAL at 03:03

## 2020-03-14 RX ADMIN — STANDARDIZED SENNA CONCENTRATE AND DOCUSATE SODIUM 1 TABLET: 8.6; 5 TABLET ORAL at 10:03

## 2020-03-14 RX ADMIN — AMIODARONE HYDROCHLORIDE 400 MG: 200 TABLET ORAL at 10:03

## 2020-03-14 RX ADMIN — APIXABAN 5 MG: 5 TABLET, FILM COATED ORAL at 10:03

## 2020-03-14 RX ADMIN — POTASSIUM CHLORIDE 20 MEQ: 1500 TABLET, EXTENDED RELEASE ORAL at 03:03

## 2020-03-14 RX ADMIN — ATORVASTATIN CALCIUM 80 MG: 20 TABLET, FILM COATED ORAL at 03:03

## 2020-03-14 RX ADMIN — HYDRALAZINE HYDROCHLORIDE 100 MG: 50 TABLET, FILM COATED ORAL at 10:03

## 2020-03-14 RX ADMIN — ISOSORBIDE DINITRATE 40 MG: 40 TABLET ORAL at 10:03

## 2020-03-14 NOTE — ASSESSMENT & PLAN NOTE
"Has URI symptoms with negative Flu test. Leukocytosis with lymphopenia. ALT is elevated (not previously noted). Bilateral perihilar densities described as "Interval development of airspace opacification in the perihilar areas bilaterally since March 9, 2020"   -RIP testing  -COVID-19 testing  -Droplet isolation/precautions  -ID and infection control consultation   -Blood culture, UA, Flu, CRP, D-dimer, sputum culture    "

## 2020-03-14 NOTE — NURSING
Received call from primary team in reference to COVID-19 - informed team to call the command center at [phone] in reference to protocol. Updated primary nurse

## 2020-03-14 NOTE — ASSESSMENT & PLAN NOTE
Developed hyperglycemia at home   -Will need something to cover for metformin  -Consultation to Endocrine when near discharge  -SSI during IP

## 2020-03-14 NOTE — ASSESSMENT & PLAN NOTE
ICMP with recent D/C after cardiogenic shock in stable hemodynamic conditions.   Received diuretic a OSH and will monitor response throughout the day.  -BNP is 833, troponin 0.2 and CRP 20  -Received Lasix 80 mg IV at OSH (PTA Lasix 20 mg QOD)   -Will hold further Lasix today until volume assessment and to allow for renal recovery   -Continue Isordil/Hydralazine for now   -TSH was recently normal  -Has medina catheter in place with red tinged urine (?traumatic)  -Strict I/O  -UA

## 2020-03-14 NOTE — SUBJECTIVE & OBJECTIVE
Past Medical History:   Diagnosis Date    CHF (congestive heart failure)     Diabetes mellitus        History reviewed. No pertinent surgical history.    Review of patient's allergies indicates:   Allergen Reactions    Penicillins      Pt stated his mother said it will kill him- always inform to take ampicillin       Current Facility-Administered Medications   Medication    acetaminophen tablet 650 mg    amiodarone tablet 400 mg    apixaban tablet 5 mg    aspirin chewable tablet 81 mg    atorvastatin tablet 80 mg    dextrose 10% (D10W) Bolus    dextrose 10% (D10W) Bolus    glucagon (human recombinant) injection 1 mg    glucose chewable tablet 16 g    glucose chewable tablet 24 g    hydrALAZINE tablet 100 mg    influenza (QUADRIVALENT PF) vaccine 0.5 mL    insulin aspart U-100 pen 0-5 Units    isosorbide dinitrate tablet 40 mg    pantoprazole EC tablet 40 mg    pneumoc 13-louis conj-dip cr(PF) (PREVNAR 13 (PF)) 0.5 mL    potassium chloride SA CR tablet 20 mEq    senna-docusate 8.6-50 mg per tablet 1 tablet     Family History     None        Tobacco Use    Smoking status: Never Smoker    Smokeless tobacco: Never Used   Substance and Sexual Activity    Alcohol use: Not on file    Drug use: Not on file    Sexual activity: Not on file     Review of Systems   Constitutional: Positive for fatigue.   HENT: Negative for rhinorrhea, sinus pressure, sinus pain, sneezing and sore throat.    Eyes: Negative for discharge.   Respiratory: Positive for cough and shortness of breath.    Cardiovascular: Negative for chest pain, palpitations and leg swelling.   Gastrointestinal: Positive for abdominal distention. Negative for blood in stool, constipation, diarrhea and nausea.   Endocrine: Negative for cold intolerance and heat intolerance.   Genitourinary: Negative for dysuria.   Musculoskeletal: Negative for arthralgias, back pain, joint swelling and myalgias.   Skin: Negative for color change.   Neurological:  Positive for dizziness, weakness and light-headedness.   Psychiatric/Behavioral: Negative for confusion and decreased concentration.     Objective:     Vital Signs (Most Recent):  Temp: 96.3 °F (35.7 °C) (03/14/20 1146)  Pulse: 98 (03/14/20 1537)  Resp: 18 (03/14/20 1146)  BP: 130/71 (03/14/20 1146)  SpO2: 100 % (03/14/20 1146) Vital Signs (24h Range):  Temp:  [96.3 °F (35.7 °C)-99.6 °F (37.6 °C)] 96.3 °F (35.7 °C)  Pulse:  [] 98  Resp:  [18-27] 18  SpO2:  [94 %-100 %] 100 %  BP: (130-136)/(71-88) 130/71     No data found.  Body mass index is 26.49 kg/m².    No intake or output data in the 24 hours ending 03/14/20 1543    Physical Exam   Constitutional: He is oriented to person, place, and time. He appears well-developed and well-nourished.   HENT:   Head: Normocephalic and atraumatic.   Eyes: Right eye exhibits no discharge. Left eye exhibits no discharge.   Neck: Normal range of motion. No JVD present.   Cardiovascular: Normal rate and regular rhythm. Exam reveals distant heart sounds. Exam reveals no gallop.   No murmur heard.  Pulmonary/Chest: He has rales in the right middle field, the right lower field, the left middle field and the left lower field.   Abdominal: Soft. Bowel sounds are normal. He exhibits distension. He exhibits no mass. There is no tenderness.   Musculoskeletal: Normal range of motion. He exhibits no edema.   Neurological: He is alert and oriented to person, place, and time.   Skin: Skin is warm and dry. Capillary refill takes less than 2 seconds. No erythema.   Psychiatric: He has a normal mood and affect.       Significant Labs:  CBC:  Recent Labs   Lab 03/12/20  0448 03/13/20  0446 03/14/20  1116   WBC 10.05 9.83 28.51*   RBC 2.85* 2.71* 3.07*   HGB 8.3* 7.8* 8.8*   HCT 26.0* 24.8* 27.6*   * 397* 448*   MCV 91 92 90   MCH 29.1 28.8 28.7   MCHC 31.9* 31.5* 31.9*     BNP:  Recent Labs   Lab 03/14/20  1116   *     CMP:  Recent Labs   Lab 03/12/20  0448 03/13/20  0448  03/14/20  1116   * 127* 186*   CALCIUM 8.5* 8.4* 8.8   ALBUMIN 3.0* 3.1* 3.4*   PROT 6.4 6.5 7.4    140 137   K 3.6 4.0 4.3   CO2 24 21* 23    109 102   BUN 51* 41* 35*   CREATININE 2.8* 2.5* 2.8*   ALKPHOS 74 75 91   ALT 37 47* 56*   AST 33 34 34   BILITOT 0.5 0.5 0.6      Coagulation:   Recent Labs   Lab 03/11/20  0320 03/12/20  0448 03/13/20  0446   APTT 52.5* 57.4* 30.0     LDH:  No results for input(s): LDH in the last 72 hours.  Microbiology:  Microbiology Results (last 7 days)     Procedure Component Value Units Date/Time    Blood culture [[id number]] Collected:  03/14/20 1341    Order Status:  Sent Specimen:  Blood Updated:  03/14/20 1358    Blood culture [[id number]] Collected:  03/14/20 1341    Order Status:  Sent Specimen:  Blood Updated:  03/14/20 1358    Influenza A & B by Molecular [[id number]] Collected:  03/14/20 1203    Order Status:  Completed Specimen:  Nasopharyngeal Swab Updated:  03/14/20 1248     Influenza A, Molecular Negative     Influenza B, Molecular Negative     Flu A & B Source Nasal swab    Culture, Respiratory with Gram Stain [[id number]]     Order Status:  No result Specimen:  Respiratory     Respiratory Infection Panel (PCR), Nasopharyngeal [[id number]] Collected:  03/14/20 1203    Order Status:  Sent Specimen:  Nasopharyngeal Swab Updated:  03/14/20 1222          I have reviewed all pertinent labs within the past 24 hours.    Diagnostic Results:  I have reviewed all pertinent imaging results/findings within the past 24 hours.

## 2020-03-14 NOTE — TELEPHONE ENCOUNTER
"Patient does not have his   isosorbide dinitrate (ISORDIL) 40 MG Tab 180 tablet 3 3/12/2020 3/12/2021 No   Sig - Route: Take 1 tablet (40 mg total) by mouth every 8 (eight) hours. - Oral   Sent to pharmacy as: isosorbide dinitrate (ISORDIL) 40 MG Tab   Class: Normal     He has all other medications brought to him by the [facility]. Called the Charge Nurse Kimmy with [facility] at [address] to ask her to look around for the medications. Meanwhile, patient asked if he would be off of his metformin because his blood sugar is currently 275 but he does not have symptoms. Patient accepts care advice and understands when he should call back about elevated blood sugars. His bp is elevated. Advised patient's brother I would call back when I find out if the meds were delivered and still located on the 3 rd floor of the hospital.     Kimmy ARAUJO called back and informed me that she could not find the medications on the floor.     0139--called patient's brother back to inform him the charge nurse did not find the meds. Now the patient is coughing, has "a flutter in his chest", shortness of breath, and blood sugar is 340. Patient is dizzy. Instructed his brother to call 911 and he verbalized understanding.   "

## 2020-03-14 NOTE — H&P
"[facility]  Heart Transplant  H&P    Patient Name: John Javier  MRN: [id number]  Admission Date: 3/14/2020  Attending Physician: Lizzy Cavanaugh MD  Primary Care Provider: To Obtain Unable  Principal Problem:Suspected Covid-19 Virus Infection    Subjective:     History of Present Illness:  65 y/o M hx of CAD (50-60% LCx, OM disease on [facility] 2/29), EF 10-15% s/p AICD, HTN, DM2, HLD, obesity, CKD. Recent admission (2/29-3/13) for cardiogenic shock with Impella. A LHC showed 50-60% LCx, OM disease; no PCI performed. RHC with LVEDP 35. CO 3, CI 1.8, tte w LVEF 10-15%. Had intermittent suction alarms that improved w/ P6->P4. Weaned off impella and Dobutamine. Started on Hydralazine/isordil and Lasix QOD. TTE 3/2 EF 10%, LVIDD 6, TAPSE 1.98, Mod MR, Mod TR. No advanced options due to medical instability/renal failure.     His brother picked him up from the hospital to take him to his home in [place], [place], on 3/13. He was discharged without Metformin due to ANGEL. Pharmacy did not dispense ISDN included in his discharge instructions but he noticed this at home, where he felt unusually tired to minimal activity such as packing his bag of clothes and had to ask daughter to pack for him. He felt early satiety with bloated feeling during a chicken meal. There was associated "gas pain" with bloating that improved after passing flatus, dizziness, dyspnea, cough and malaise. His glucose levels increased to 390 and he called an RN who recommended to go to the [facility] ER. There he was noted in acute distress and started on BiPAP and given a dose of Lasix IV 80 mg. Was given Zosyn IV and transferred to our center out of concern for ADHF. A traumatic medina insertion was done followed with salmon colored urine that the providers there said was "secondary to the antibiotic".     He denies sick contacts but mentions having a 4-5 day history of a non-productive cough without fever that is ongoing and was " present throughout the course of his recent hospital stay. Scant sputum is yellow and blood tinged. No chest pain or dyspnea at rest. Last BM was on 3/13 and it was dark green but well formed. At the time of our visit we provided him with a mask to wear during his physical exam.     Past Medical History:   Diagnosis Date    CHF (congestive heart failure)     Diabetes mellitus        History reviewed. No pertinent surgical history.    Review of patient's allergies indicates:   Allergen Reactions    Penicillins      Pt stated his mother said it will kill him- always inform to take ampicillin       Current Facility-Administered Medications   Medication    acetaminophen tablet 650 mg    amiodarone tablet 400 mg    apixaban tablet 5 mg    aspirin chewable tablet 81 mg    atorvastatin tablet 80 mg    dextrose 10% (D10W) Bolus    dextrose 10% (D10W) Bolus    glucagon (human recombinant) injection 1 mg    glucose chewable tablet 16 g    glucose chewable tablet 24 g    hydrALAZINE tablet 100 mg    influenza (QUADRIVALENT PF) vaccine 0.5 mL    insulin aspart U-100 pen 0-5 Units    isosorbide dinitrate tablet 40 mg    pantoprazole EC tablet 40 mg    pneumoc 13-louis conj-dip cr(PF) (PREVNAR 13 (PF)) 0.5 mL    potassium chloride SA CR tablet 20 mEq    senna-docusate 8.6-50 mg per tablet 1 tablet     Family History     None        Tobacco Use    Smoking status: Never Smoker    Smokeless tobacco: Never Used   Substance and Sexual Activity    Alcohol use: Not on file    Drug use: Not on file    Sexual activity: Not on file     Review of Systems   Constitutional: Positive for fatigue.   HENT: Negative for rhinorrhea, sinus pressure, sinus pain, sneezing and sore throat.    Eyes: Negative for discharge.   Respiratory: Positive for cough and shortness of breath.    Cardiovascular: Negative for chest pain, palpitations and leg swelling.   Gastrointestinal: Positive for abdominal distention. Negative for blood  in stool, constipation, diarrhea and nausea.   Endocrine: Negative for cold intolerance and heat intolerance.   Genitourinary: Negative for dysuria.   Musculoskeletal: Negative for arthralgias, back pain, joint swelling and myalgias.   Skin: Negative for color change.   Neurological: Positive for dizziness, weakness and light-headedness.   Psychiatric/Behavioral: Negative for confusion and decreased concentration.     Objective:     Vital Signs (Most Recent):  Temp: 96.3 °F (35.7 °C) (03/14/20 1146)  Pulse: 98 (03/14/20 1537)  Resp: 18 (03/14/20 1146)  BP: 130/71 (03/14/20 1146)  SpO2: 100 % (03/14/20 1146) Vital Signs (24h Range):  Temp:  [96.3 °F (35.7 °C)-99.6 °F (37.6 °C)] 96.3 °F (35.7 °C)  Pulse:  [] 98  Resp:  [18-27] 18  SpO2:  [94 %-100 %] 100 %  BP: (130-136)/(71-88) 130/71     No data found.  Body mass index is 26.49 kg/m².    No intake or output data in the 24 hours ending 03/14/20 1543    Physical Exam   Constitutional: He is oriented to person, place, and time. He appears well-developed and well-nourished.   HENT:   Head: Normocephalic and atraumatic.   Eyes: Right eye exhibits no discharge. Left eye exhibits no discharge.   Neck: Normal range of motion. No JVD present.   Cardiovascular: Normal rate and regular rhythm. Exam reveals distant heart sounds. Exam reveals no gallop.   No murmur heard.  Pulmonary/Chest: He has rales in the right middle field, the right lower field, the left middle field and the left lower field.   Abdominal: Soft. Bowel sounds are normal. He exhibits distension. He exhibits no mass. There is no tenderness.   Musculoskeletal: Normal range of motion. He exhibits no edema.   Neurological: He is alert and oriented to person, place, and time.   Skin: Skin is warm and dry. Capillary refill takes less than 2 seconds. No erythema.   Psychiatric: He has a normal mood and affect.       Significant Labs:  CBC:  Recent Labs   Lab 03/12/20  0448 03/13/20  0446 03/14/20  1116   WBC  10.05 9.83 28.51*   RBC 2.85* 2.71* 3.07*   HGB 8.3* 7.8* 8.8*   HCT 26.0* 24.8* 27.6*   * 397* 448*   MCV 91 92 90   MCH 29.1 28.8 28.7   MCHC 31.9* 31.5* 31.9*     BNP:  Recent Labs   Lab 03/14/20  1116   *     CMP:  Recent Labs   Lab 03/12/20  0448 03/13/20  0446 03/14/20  1116   * 127* 186*   CALCIUM 8.5* 8.4* 8.8   ALBUMIN 3.0* 3.1* 3.4*   PROT 6.4 6.5 7.4    140 137   K 3.6 4.0 4.3   CO2 24 21* 23    109 102   BUN 51* 41* 35*   CREATININE 2.8* 2.5* 2.8*   ALKPHOS 74 75 91   ALT 37 47* 56*   AST 33 34 34   BILITOT 0.5 0.5 0.6      Coagulation:   Recent Labs   Lab 03/11/20  0320 03/12/20 0448 03/13/20 0446   APTT 52.5* 57.4* 30.0     LDH:  No results for input(s): LDH in the last 72 hours.  Microbiology:  Microbiology Results (last 7 days)     Procedure Component Value Units Date/Time    Blood culture [[id number]] Collected:  03/14/20 1341    Order Status:  Sent Specimen:  Blood Updated:  03/14/20 1358    Blood culture [[id number]] Collected:  03/14/20 1341    Order Status:  Sent Specimen:  Blood Updated:  03/14/20 1358    Influenza A & B by Molecular [[id number]] Collected:  03/14/20 1203    Order Status:  Completed Specimen:  Nasopharyngeal Swab Updated:  03/14/20 1248     Influenza A, Molecular Negative     Influenza B, Molecular Negative     Flu A & B Source Nasal swab    Culture, Respiratory with Gram Stain [[id number]]     Order Status:  No result Specimen:  Respiratory     Respiratory Infection Panel (PCR), Nasopharyngeal [[id number]] Collected:  03/14/20 1203    Order Status:  Sent Specimen:  Nasopharyngeal Swab Updated:  03/14/20 1222          I have reviewed all pertinent labs within the past 24 hours.    Diagnostic Results:  I have reviewed all pertinent imaging results/findings within the past 24 hours.    Assessment/Plan:     * Suspected Covid-19 Virus Infection  Has URI symptoms with negative Flu test. Leukocytosis with lymphopenia. ALT is elevated (not previously  "noted). Bilateral perihilar densities described as "Interval development of airspace opacification in the perihilar areas bilaterally since March 9, 2020"   -RIP testing  -COVID-19 testing  -Droplet isolation/precautions  -ID and infection control consultation   -Blood culture, UA, Flu, CRP, D-dimer, sputum culture      Acute decompensated heart failure  ICMP with recent D/C after cardiogenic shock in stable hemodynamic conditions.   Received diuretic a OSH and will monitor response throughout the day.  -BNP is 833, troponin 0.2 and CRP 20  -Received Lasix 80 mg IV at OSH (PTA Lasix 20 mg QOD)   -Will hold further Lasix today until volume assessment and to allow for renal recovery   -Continue Isordil/Hydralazine for now   -TSH was recently normal  -Has medina catheter in place with red tinged urine (?traumatic)  -Strict I/O  -UA    Type 2 diabetes mellitus, without long-term current use of insulin  Developed hyperglycemia at home   -Will need something to cover for metformin  -Consultation to Endocrine when near discharge  -SSI during IP    CAD (coronary artery disease)  Continue ASA/Statin/Eliquis  -Troponin in downtrend  -EKG without acute ischemic changes    ANGEL (acute kidney injury)  He has CKD but unclear of baseline but slow down trend since previous admission.   -Will hold Lasix today  -I/O and assess diuretic needs on daily basis      Anant Hammer MD  Heart Transplant  [facility]  "

## 2020-03-14 NOTE — NURSING
Admit    Pt arrived to 0355 from hospital in [place]- pt did have a medina 18 M[place] upon arriving to floor; draining red in color in urine. Notified team of pt's arrival to floor. Pt was on a bipap upon transfer- transition to oxygen to 3l sats at 96%.. No skn breakdown on sacral. Call bell within reach, side rails up x2, bed in lowest position and locked.  Updated primary nurse

## 2020-03-14 NOTE — HPI
"63 y/o M hx of CAD (50-60% LCx, OM disease on [facility] 2/29), EF 10-15% s/p AICD, HTN, DM2, HLD, obesity, CKD. Recent admission (2/29-3/13) for cardiogenic shock with Impella. A LHC showed 50-60% LCx, OM disease; no PCI performed. RHC with LVEDP 35. CO 3, CI 1.8, tte w LVEF 10-15%. Had intermittent suction alarms that improved w/ P6->P4. Weaned off impella and Dobutamine. Started on Hydralazine/isordil and Lasix QOD. TTE 3/2 EF 10%, LVIDD 6, TAPSE 1.98, Mod MR, Mod TR. No advanced options due to medical instability/renal failure.     His brother picked him up from the hospital to take him to his home in [place], [place], on 3/13. He was discharged without Metformin due to ANGEL. Pharmacy did not dispense ISDN included in his discharge instructions but he noticed this at home, where he felt unusually tired to minimal activity such as packing his bag of clothes and had to ask daughter to pack for him. He felt early satiety with bloated feeling during a chicken meal. There was associated "gas pain" with bloating that improved after passing flatus, dizziness, dyspnea, cough and malaise. His glucose levels increased to 390 and he called an RN who recommended to go to the [facility] ER. There he was noted in acute distress and started on BiPAP and given a dose of Lasix IV 80 mg. Was given Zosyn IV and transferred to our center out of concern for ADHF. A traumatic medina insertion was done followed with salmon colored urine that the providers there said was "secondary to the antibiotic".     He denies sick contacts but mentions having a 4-5 day history of a non-productive cough without fever that is ongoing and was present throughout the course of his recent hospital stay. Scant sputum is yellow and blood tinged. No chest pain or dyspnea at rest. Last BM was on 3/13 and it was dark green but well formed. At the time of our visit we provided him with a mask to wear during his physical exam.   "

## 2020-03-14 NOTE — ASSESSMENT & PLAN NOTE
He has CKD but unclear of baseline but slow down trend since previous admission.   -Will hold Lasix today  -I/O and assess diuretic needs on daily basis

## 2020-03-15 LAB
ALBUMIN SERPL BCP-MCNC: 2.9 G/DL (ref 3.5–5.2)
ALP SERPL-CCNC: 78 U/L (ref 55–135)
ALT SERPL W/O P-5'-P-CCNC: 40 U/L (ref 10–44)
ANION GAP SERPL CALC-SCNC: 9 MMOL/L (ref 8–16)
AST SERPL-CCNC: 24 U/L (ref 10–40)
BACTERIA UR CULT: NO GROWTH
BASOPHILS # BLD AUTO: 0.02 K/UL (ref 0–0.2)
BASOPHILS NFR BLD: 0.1 % (ref 0–1.9)
BILIRUB SERPL-MCNC: 0.6 MG/DL (ref 0.1–1)
BUN SERPL-MCNC: 31 MG/DL (ref 8–23)
CALCIUM SERPL-MCNC: 8.3 MG/DL (ref 8.7–10.5)
CHLORIDE SERPL-SCNC: 106 MMOL/L (ref 95–110)
CO2 SERPL-SCNC: 22 MMOL/L (ref 23–29)
CREAT SERPL-MCNC: 2.3 MG/DL (ref 0.5–1.4)
DIFFERENTIAL METHOD: ABNORMAL
EOSINOPHIL # BLD AUTO: 0.2 K/UL (ref 0–0.5)
EOSINOPHIL NFR BLD: 1.2 % (ref 0–8)
ERYTHROCYTE [DISTWIDTH] IN BLOOD BY AUTOMATED COUNT: 14.5 % (ref 11.5–14.5)
EST. GFR  (AFRICAN AMERICAN): 33.7 ML/MIN/1.73 M^2
EST. GFR  (NON AFRICAN AMERICAN): 29.1 ML/MIN/1.73 M^2
GLUCOSE SERPL-MCNC: 153 MG/DL (ref 70–110)
HCT VFR BLD AUTO: 25.3 % (ref 40–54)
HGB BLD-MCNC: 7.9 G/DL (ref 14–18)
IMM GRANULOCYTES # BLD AUTO: 0.08 K/UL (ref 0–0.04)
IMM GRANULOCYTES NFR BLD AUTO: 0.6 % (ref 0–0.5)
LYMPHOCYTES # BLD AUTO: 2 K/UL (ref 1–4.8)
LYMPHOCYTES NFR BLD: 14.5 % (ref 18–48)
MAGNESIUM SERPL-MCNC: 2.9 MG/DL (ref 1.6–2.6)
MCH RBC QN AUTO: 29.2 PG (ref 27–31)
MCHC RBC AUTO-ENTMCNC: 31.2 G/DL (ref 32–36)
MCV RBC AUTO: 93 FL (ref 82–98)
MONOCYTES # BLD AUTO: 1.2 K/UL (ref 0.3–1)
MONOCYTES NFR BLD: 9.1 % (ref 4–15)
NEUTROPHILS # BLD AUTO: 10.1 K/UL (ref 1.8–7.7)
NEUTROPHILS NFR BLD: 74.5 % (ref 38–73)
NRBC BLD-RTO: 0 /100 WBC
PHOSPHATE SERPL-MCNC: 3.2 MG/DL (ref 2.7–4.5)
PLATELET # BLD AUTO: 365 K/UL (ref 150–350)
PMV BLD AUTO: 12.4 FL (ref 9.2–12.9)
POCT GLUCOSE: 127 MG/DL (ref 70–110)
POCT GLUCOSE: 189 MG/DL (ref 70–110)
POCT GLUCOSE: 268 MG/DL (ref 70–110)
POTASSIUM SERPL-SCNC: 4.8 MMOL/L (ref 3.5–5.1)
PROT SERPL-MCNC: 6.2 G/DL (ref 6–8.4)
RBC # BLD AUTO: 2.71 M/UL (ref 4.6–6.2)
SODIUM SERPL-SCNC: 137 MMOL/L (ref 136–145)
WBC # BLD AUTO: 13.53 K/UL (ref 3.9–12.7)

## 2020-03-15 PROCEDURE — 99233 PR SUBSEQUENT HOSPITAL CARE,LEVL III: ICD-10-PCS | Mod: NTX,,, | Performed by: INTERNAL MEDICINE

## 2020-03-15 PROCEDURE — 36415 COLL VENOUS BLD VENIPUNCTURE: CPT | Mod: NTX

## 2020-03-15 PROCEDURE — 99233 SBSQ HOSP IP/OBS HIGH 50: CPT | Mod: NTX,,, | Performed by: INTERNAL MEDICINE

## 2020-03-15 PROCEDURE — 80053 COMPREHEN METABOLIC PANEL: CPT | Mod: NTX

## 2020-03-15 PROCEDURE — 84100 ASSAY OF PHOSPHORUS: CPT | Mod: NTX

## 2020-03-15 PROCEDURE — 99223 1ST HOSP IP/OBS HIGH 75: CPT | Mod: NTX,,, | Performed by: INTERNAL MEDICINE

## 2020-03-15 PROCEDURE — 63600175 PHARM REV CODE 636 W HCPCS: Mod: NTX | Performed by: INTERNAL MEDICINE

## 2020-03-15 PROCEDURE — 20600001 HC STEP DOWN PRIVATE ROOM: Mod: NTX

## 2020-03-15 PROCEDURE — 85025 COMPLETE CBC W/AUTO DIFF WBC: CPT | Mod: NTX

## 2020-03-15 PROCEDURE — 83735 ASSAY OF MAGNESIUM: CPT | Mod: NTX

## 2020-03-15 PROCEDURE — 99223 PR INITIAL HOSPITAL CARE,LEVL III: ICD-10-PCS | Mod: NTX,,, | Performed by: INTERNAL MEDICINE

## 2020-03-15 PROCEDURE — 25000003 PHARM REV CODE 250: Mod: NTX | Performed by: STUDENT IN AN ORGANIZED HEALTH CARE EDUCATION/TRAINING PROGRAM

## 2020-03-15 PROCEDURE — 25000003 PHARM REV CODE 250: Mod: NTX | Performed by: INTERNAL MEDICINE

## 2020-03-15 RX ADMIN — ISOSORBIDE DINITRATE 40 MG: 40 TABLET ORAL at 08:03

## 2020-03-15 RX ADMIN — INSULIN ASPART 3 UNITS: 100 INJECTION, SOLUTION INTRAVENOUS; SUBCUTANEOUS at 09:03

## 2020-03-15 RX ADMIN — MAGNESIUM SULFATE HEPTAHYDRATE 2 G: 40 INJECTION, SOLUTION INTRAVENOUS at 02:03

## 2020-03-15 RX ADMIN — HYDRALAZINE HYDROCHLORIDE 100 MG: 50 TABLET, FILM COATED ORAL at 01:03

## 2020-03-15 RX ADMIN — MAGNESIUM SULFATE HEPTAHYDRATE 2 G: 40 INJECTION, SOLUTION INTRAVENOUS at 12:03

## 2020-03-15 RX ADMIN — PANTOPRAZOLE SODIUM 40 MG: 40 TABLET, DELAYED RELEASE ORAL at 09:03

## 2020-03-15 RX ADMIN — HYDRALAZINE HYDROCHLORIDE 100 MG: 50 TABLET, FILM COATED ORAL at 08:03

## 2020-03-15 RX ADMIN — ISOSORBIDE DINITRATE 40 MG: 40 TABLET ORAL at 01:03

## 2020-03-15 RX ADMIN — ISOSORBIDE DINITRATE 40 MG: 40 TABLET ORAL at 05:03

## 2020-03-15 RX ADMIN — STANDARDIZED SENNA CONCENTRATE AND DOCUSATE SODIUM 1 TABLET: 8.6; 5 TABLET ORAL at 09:03

## 2020-03-15 RX ADMIN — APIXABAN 5 MG: 5 TABLET, FILM COATED ORAL at 09:03

## 2020-03-15 RX ADMIN — ATORVASTATIN CALCIUM 80 MG: 20 TABLET, FILM COATED ORAL at 09:03

## 2020-03-15 RX ADMIN — APIXABAN 5 MG: 5 TABLET, FILM COATED ORAL at 08:03

## 2020-03-15 RX ADMIN — HYDRALAZINE HYDROCHLORIDE 100 MG: 50 TABLET, FILM COATED ORAL at 05:03

## 2020-03-15 RX ADMIN — ASPIRIN 81 MG CHEWABLE TABLET 81 MG: 81 TABLET CHEWABLE at 09:03

## 2020-03-15 RX ADMIN — AMIODARONE HYDROCHLORIDE 400 MG: 200 TABLET ORAL at 09:03

## 2020-03-15 RX ADMIN — AMIODARONE HYDROCHLORIDE 400 MG: 200 TABLET ORAL at 08:03

## 2020-03-15 NOTE — ASSESSMENT & PLAN NOTE
Developed hyperglycemia at home   -Will need to replace Metformin unless ANGEL recovers further or maintains euglycemia with minimal requirements   -Consultation to Endocrine when near discharge  -SSI during IP

## 2020-03-15 NOTE — HPI
64 y/o male with a history of CAD, HFrEF (EF=10-15%) s/p AICD, HTN, DM2, HLD, obesity and CKD.  He was recently admitted to the hospital with cardiogenic shock requiring Impella placement.  He was in the hospital for 2 weeks and was ultimately discharged on March 13.  After discharge, he states that he wasn't able to get his blood pressure medications or his diabetes medications.  He states that his blood sugar kept increasing and he was advised to go to the ER.  When he presented to the ER at the outside hospital, he was noted to develop tachypnea and difficulty breathing.  He also began coughing a lot.  The patient says that it was more due to anxiety.  Says that he feels fine now.  He denied any fevers.  He was transferred here for a higher level of care.  He is currently being screened for COVID19.

## 2020-03-15 NOTE — ASSESSMENT & PLAN NOTE
Has CKD with unclear baseline. Scr slowly improving since previous admission.   -Continue to hold Lasix   -I/O and assess diuretic needs on daily basis  -At home on Lasix 20 mg QOD

## 2020-03-15 NOTE — CONSULTS
[facility]  Infectious Disease  Consult Note    Patient Name: John Javier  MRN: [id number]  Admission Date: 3/14/2020  Hospital Length of Stay: 1 days  Attending Physician: Lizzy Cavanaugh MD  Primary Care Provider: To Obtain Unable     Isolation Status: Airborne and Contact and Droplet    Patient information was obtained from patient, past medical records and ER records.      Inpatient consult to Infectious Diseases  Consult performed by: Kurtis Valenzuela MD  Consult ordered by: Anant Hammer MD        Assessment/Plan:     * Suspected Covid-19 Virus Infection  66 y/o male with a history of CAD, HFrEF (EF=10-15%) s/p AICD, HTN, DM2, HLD, obesity and CKD.  He was recently admitted to the hospital with cardiogenic shock requiring Impella placement.  He was in the hospital for 2 weeks and was ultimately discharged on March 13.  After discharge, he states that he wasn't able to get his blood pressure medications or his diabetes medications.  He states that his blood sugar kept increasing and he was advised to go to the ER.  When he presented to the ER at the outside hospital, he was noted to develop tachypnea and difficulty breathing.  He also began coughing a lot.  The patient says that it was more due to anxiety.  Says that he feels fine now.  He denied any fevers.  He was transferred here for a higher level of care.  He is currently being screened for COVID19.    Patient has been in the hospital for the past 2 weeks with only a 1-2 day period when he wasn't admitted.  Incubation period for COVID 19 is about 5 days but can range up to 14 days.  I agree with testing.  Will follow up on results.        Thank you for your consult. I will follow-up with patient. Please contact us if you have any additional questions.    Kurtis Valenzuela MD  Infectious Disease  [facility]    Subjective:     Principal Problem: Suspected Covid-19 Virus Infection    HPI: 66 y/o male with a history  of CAD, HFrEF (EF=10-15%) s/p AICD, HTN, DM2, HLD, obesity and CKD.  He was recently admitted to the hospital with cardiogenic shock requiring Impella placement.  He was in the hospital for 2 weeks and was ultimately discharged on March 13.  After discharge, he states that he wasn't able to get his blood pressure medications or his diabetes medications.  He states that his blood sugar kept increasing and he was advised to go to the ER.  When he presented to the ER at the outside hospital, he was noted to develop tachypnea and difficulty breathing.  He also began coughing a lot.  The patient says that it was more due to anxiety.  Says that he feels fine now.  He denied any fevers.  He was transferred here for a higher level of care.  He is currently being screened for COVID19.    Past Medical History:   Diagnosis Date    CHF (congestive heart failure)     Diabetes mellitus        History reviewed. No pertinent surgical history.    Review of patient's allergies indicates:   Allergen Reactions    Penicillins      Pt stated his mother said it will kill him- always inform to take ampicillin       Medications:  Medications Prior to Admission   Medication Sig    amiodarone (PACERONE) 400 MG tablet Take 1 tablet 400mg twice daily through 3/24/20, then 1 tablet (400mg) by mouth daily.    apixaban (ELIQUIS) 5 mg Tab Take 1 tablet (5 mg total) by mouth 2 (two) times daily.    aspirin 81 MG Chew Take 1 tablet (81 mg total) by mouth once daily.    atorvastatin (LIPITOR) 80 MG tablet Take 1 tablet (80 mg total) by mouth once daily.    furosemide (LASIX) 20 MG tablet Take 1 tablet (20 mg total) by mouth every other day.    hydrALAZINE (APRESOLINE) 100 MG tablet Take 1 tablet (100 mg total) by mouth every 8 (eight) hours.    isosorbide dinitrate (ISORDIL) 40 MG Tab Take 1 tablet (40 mg total) by mouth every 8 (eight) hours.    pantoprazole (PROTONIX) 40 MG tablet Take 40 mg by mouth once daily.    potassium chloride SA  (K-DUR,KLOR-CON) 20 MEQ tablet Take 1 tablet (20 mEq total) by mouth once daily.     Antibiotics (From admission, onward)    None        Antifungals (From admission, onward)    None        Antivirals (From admission, onward)    None           There is no immunization history for the selected administration types on file for this patient.    Family History     None        Social History     Socioeconomic History    Marital status: Unknown     Spouse name: Not on file    Number of children: Not on file    Years of education: Not on file    Highest education level: Not on file   Occupational History    Not on file   Social Needs    Financial resource strain: Not on file    Food insecurity:     Worry: Not on file     Inability: Not on file    Transportation needs:     Medical: Not on file     Non-medical: Not on file   Tobacco Use    Smoking status: Never Smoker    Smokeless tobacco: Never Used   Substance and Sexual Activity    Alcohol use: Not on file    Drug use: Not on file    Sexual activity: Not on file   Lifestyle    Physical activity:     Days per week: Not on file     Minutes per session: Not on file    Stress: Not on file   Relationships    Social connections:     Talks on phone: Not on file     Gets together: Not on file     Attends Church service: Not on file     Active member of club or organization: Not on file     Attends meetings of clubs or organizations: Not on file     Relationship status: Not on file   Other Topics Concern    Not on file   Social History Narrative    Not on file     Review of Systems   All other systems reviewed and are negative.    Objective:     Vital Signs (Most Recent):  Temp: 98.1 °F (36.7 °C) (03/15/20 1633)  Pulse: 97 (03/15/20 1633)  Resp: 18 (03/15/20 1633)  BP: 109/65 (03/15/20 1633)  SpO2: 98 % (03/15/20 1633) Vital Signs (24h Range):  Temp:  [97.8 °F (36.6 °C)-99.6 °F (37.6 °C)] 98.1 °F (36.7 °C)  Pulse:  [] 97  Resp:  [16-20] 18  SpO2:  [96  %-99 %] 98 %  BP: (109-128)/(62-73) 109/65     Weight: 64.9 kg (143 lb 3 oz)  Body mass index is 24.58 kg/m².    Estimated Creatinine Clearance: 27.5 mL/min (A) (based on SCr of 2.3 mg/dL (H)).    Physical Exam   Constitutional: He is oriented to person, place, and time. He appears well-developed and well-nourished. No distress.   HENT:   Head: Normocephalic and atraumatic.   Right Ear: External ear normal.   Left Ear: External ear normal.   Nose: Nose normal.   Mouth/Throat: Oropharynx is clear and moist. No oropharyngeal exudate.   Eyes: Pupils are equal, round, and reactive to light. Conjunctivae and EOM are normal. Right eye exhibits no discharge. Left eye exhibits no discharge. No scleral icterus.   Neck: Normal range of motion. Neck supple. No JVD present. No tracheal deviation present. No thyromegaly present.   Cardiovascular: Normal rate, regular rhythm, normal heart sounds and intact distal pulses. Exam reveals no gallop and no friction rub.   No murmur heard.  Pulmonary/Chest: Effort normal and breath sounds normal. No stridor. No respiratory distress. He has no wheezes. He has no rales. He exhibits no tenderness.   Abdominal: Soft. Bowel sounds are normal. He exhibits no distension and no mass. There is no tenderness. There is no rebound and no guarding.   Musculoskeletal: Normal range of motion. He exhibits no edema or tenderness.   Lymphadenopathy:     He has no cervical adenopathy.   Neurological: He is alert and oriented to person, place, and time. He has normal reflexes. He displays normal reflexes. No cranial nerve deficit. He exhibits normal muscle tone. Coordination normal.   Skin: Skin is warm. No rash noted. He is not diaphoretic. No erythema. No pallor.   Psychiatric: He has a normal mood and affect. His behavior is normal. Judgment and thought content normal.   Nursing note and vitals reviewed.      Significant Labs:   Microbiology Results (last 7 days)     Procedure Component Value Units  Date/Time    Blood culture [[id number]] Collected:  03/14/20 1341    Order Status:  Completed Specimen:  Blood Updated:  03/15/20 1612     Blood Culture, Routine No Growth to date      No Growth to date    Blood culture [[id number]] Collected:  03/14/20 1341    Order Status:  Completed Specimen:  Blood Updated:  03/15/20 1612     Blood Culture, Routine No Growth to date      No Growth to date    Culture, Respiratory with Gram Stain [[id number]] Collected:  03/14/20 1556    Order Status:  Completed Specimen:  Respiratory from Sputum, Expectorated Updated:  03/15/20 1030     Respiratory Culture Normal respiratory ana maría     Gram Stain (Respiratory) >10 epithelial cells per low power field     Gram Stain (Respiratory) Many WBC's     Gram Stain (Respiratory) Many Gram positive cocci     Gram Stain (Respiratory) Many Gram negative diplococci     Gram Stain (Respiratory) Few Gram negative rods     Gram Stain (Respiratory) Few Gram positive rods    Respiratory Infection Panel (PCR), Nasopharyngeal [[id number]] Collected:  03/14/20 1203    Order Status:  Completed Specimen:  Nasopharyngeal Swab Updated:  03/14/20 2134     Respiratory Infection Panel Source NP Swab     Adenovirus Not Detected     Coronavirus 229E, Common Cold Virus Not Detected     Coronavirus HKU1, Common Cold Virus Not Detected     Coronavirus NL63, Common Cold Virus Not Detected     Coronavirus OC43, Common Cold Virus Not Detected     Comment: The Coronavirus strains detected in this test cause the common cold.  These strains are not the COVID-19 (novel Coronavirus)strain   associated with the respiratory disease outbreak.          Human Metapneumovirus Not Detected     Human Rhinovirus/Enterovirus Not Detected     Influenza A (subtypes H1, H1-2009,H3) Not Detected     Influenza B Not Detected     Parainfluenza Virus 1 Not Detected     Parainfluenza Virus 2 Not Detected     Parainfluenza Virus 3 Not Detected     Parainfluenza Virus 4 Not Detected      Respiratory Syncytial Virus Not Detected     Bordetella Parapertussis ([id number]) Not Detected     Bordetella pertussis (ptxP) Not Detected     Chlamydia pneumoniae Not Detected     Mycoplasma pneumoniae Not Detected     Comment: Respiratory Infection Panel testing performed by Multiplex PCR.       Narrative:       For all other respiratory sources, order [id number] -  Respiratory Viral Panel by PCR    Urine culture [[id number]] Collected:  03/14/20 1556    Order Status:  No result Specimen:  Urine Updated:  03/14/20 1613    Influenza A & B by Molecular [[id number]] Collected:  03/14/20 1203    Order Status:  Completed Specimen:  Nasopharyngeal Swab Updated:  03/14/20 1248     Influenza A, Molecular Negative     Influenza B, Molecular Negative     Flu A & B Source Nasal swab          Significant Imaging: I have reviewed all pertinent imaging results/findings within the past 24 hours.

## 2020-03-15 NOTE — ASSESSMENT & PLAN NOTE
64 y/o male with a history of CAD, HFrEF (EF=10-15%) s/p AICD, HTN, DM2, HLD, obesity and CKD.  He was recently admitted to the hospital with cardiogenic shock requiring Impella placement.  He was in the hospital for 2 weeks and was ultimately discharged on March 13.  After discharge, he states that he wasn't able to get his blood pressure medications or his diabetes medications.  He states that his blood sugar kept increasing and he was advised to go to the ER.  When he presented to the ER at the outside hospital, he was noted to develop tachypnea and difficulty breathing.  He also began coughing a lot.  The patient says that it was more due to anxiety.  Says that he feels fine now.  He denied any fevers.  He was transferred here for a higher level of care.  He is currently being screened for COVID19.    Patient has been in the hospital for the past 2 weeks with only a 1-2 day period when he wasn't admitted.  Incubation period for COVID 19 is about 5 days but can range up to 14 days.  I agree with testing.  Will follow up on results.

## 2020-03-15 NOTE — PLAN OF CARE
Problem: Adult Inpatient Plan of Care  Goal: Plan of Care Review  Outcome: Ongoing, Progressing   Pt free of falls/traumas/injuries. Skin remains clean, dry, and intact.  CBG checked ACHS, no coverage needed. Pt's Mag 1.2 replaced with 4 grams of IVPB Mag. Airbone precautions maintained, care clustered. Medina maintained per MD order. Will reassess need for medina 3/15/2020 in am. Pt re-educated on importance of measuring accurate intake and out put; pt verbalized and demonstrates understanding. Reviewed plan of care with pt; and pt verbalized understanding.  Pt AAOX4, VSS, and in no distress will continue to monitor.

## 2020-03-15 NOTE — PROGRESS NOTES
[facility]  Heart Transplant  Progress Note    Patient Name: John Javier  MRN: [id number]  Admission Date: 3/14/2020  Hospital Length of Stay: 1 days  Attending Physician: Lizzy Cavanaugh MD  Primary Care Provider: To Obtain Unable  Principal Problem:Suspected Covid-19 Virus Infection    Subjective:     Interval History: Continues to have a non-productive cough. No orthopnea, dyspnea or chest pain at rest. Tolerating medications without issues and remains off Lasix for now (PTA 20 mg QOD).   -Low threshold for restarting Lasix as his renal function recovers  -He did have post ANGEL polyuria and will continue to monitor fluid status closely (no CVC)  -Pending COVID results and WBC decreased to 13.5<- 28K  -Flu and RIP negative  -Low threshold to treat superimposed bacterial PNA with a course of Abx (ID consulted)    Continuous Infusions:  Scheduled Meds:   amiodarone  400 mg Oral BID    apixaban  5 mg Oral BID    aspirin  81 mg Oral Daily    atorvastatin  80 mg Oral Daily    hydrALAZINE  100 mg Oral Q8H    isosorbide dinitrate  40 mg Oral Q8H    pantoprazole  40 mg Oral Daily    potassium chloride SA  20 mEq Oral Daily    senna-docusate 8.6-50 mg  1 tablet Oral BID     PRN Meds:acetaminophen, Dextrose 10% Bolus, Dextrose 10% Bolus, glucagon (human recombinant), glucose, glucose, influenza, insulin aspart U-100, pneumoc 13-louis conj-dip cr(PF)    Review of patient's allergies indicates:   Allergen Reactions    Penicillins      Pt stated his mother said it will kill him- always inform to take ampicillin     Objective:     Vital Signs (Most Recent):  Temp: 98.4 °F (36.9 °C) (03/15/20 1324)  Pulse: 94 (03/15/20 0912)  Resp: 16 (03/15/20 1324)  BP: 121/62 (03/15/20 1324)  SpO2: 98 % (03/15/20 1324) Vital Signs (24h Range):  Temp:  [97.8 °F (36.6 °C)-99.6 °F (37.6 °C)] 98.4 °F (36.9 °C)  Pulse:  [] 94  Resp:  [16-20] 16  SpO2:  [96 %-99 %] 98 %  BP: (113-128)/(62-73) 121/62     Patient  Vitals for the past 72 hrs (Last 3 readings):   Weight   03/15/20 0800 64.9 kg (143 lb 3 oz)   03/14/20 2127 66.9 kg (147 lb 7.8 oz)     Body mass index is 24.58 kg/m².      Intake/Output Summary (Last 24 hours) at 3/15/2020 1504  Last data filed at 3/15/2020 1236  Gross per 24 hour   Intake 580 ml   Output 1900 ml   Net -1320 ml       Hemodynamic Parameters:       Telemetry: No acute events     Physical Exam    Significant Labs:  CBC:  Recent Labs   Lab 03/13/20  0446 03/14/20  1116 03/15/20  0404   WBC 9.83 28.51* 13.53*   RBC 2.71* 3.07* 2.71*   HGB 7.8* 8.8* 7.9*   HCT 24.8* 27.6* 25.3*   * 448* 365*   MCV 92 90 93   MCH 28.8 28.7 29.2   MCHC 31.5* 31.9* 31.2*     BNP:  Recent Labs   Lab 03/14/20  1116   *     CMP:  Recent Labs   Lab 03/13/20  0446 03/14/20  1116 03/15/20  0404   * 186* 153*   CALCIUM 8.4* 8.8 8.3*   ALBUMIN 3.1* 3.4* 2.9*   PROT 6.5 7.4 6.2    137 137   K 4.0 4.3 4.8   CO2 21* 23 22*    102 106   BUN 41* 35* 31*   CREATININE 2.5* 2.8* 2.3*   ALKPHOS 75 91 78   ALT 47* 56* 40   AST 34 34 24   BILITOT 0.5 0.6 0.6      Coagulation:   Recent Labs   Lab 03/11/20  0320 03/12/20  0448 03/13/20  0446   APTT 52.5* 57.4* 30.0     LDH:  No results for input(s): LDH in the last 72 hours.  Microbiology:  Microbiology Results (last 7 days)     Procedure Component Value Units Date/Time    Culture, Respiratory with Gram Stain [[id number]] Collected:  03/14/20 1556    Order Status:  Completed Specimen:  Respiratory from Sputum, Expectorated Updated:  03/15/20 1030     Respiratory Culture Normal respiratory ana maría     Gram Stain (Respiratory) >10 epithelial cells per low power field     Gram Stain (Respiratory) Many WBC's     Gram Stain (Respiratory) Many Gram positive cocci     Gram Stain (Respiratory) Many Gram negative diplococci     Gram Stain (Respiratory) Few Gram negative rods     Gram Stain (Respiratory) Few Gram positive rods    Respiratory Infection Panel (PCR),  Nasopharyngeal [[id number]] Collected:  03/14/20 1203    Order Status:  Completed Specimen:  Nasopharyngeal Swab Updated:  03/14/20 2134     Respiratory Infection Panel Source NP Swab     Adenovirus Not Detected     Coronavirus 229E, Common Cold Virus Not Detected     Coronavirus HKU1, Common Cold Virus Not Detected     Coronavirus NL63, Common Cold Virus Not Detected     Coronavirus OC43, Common Cold Virus Not Detected     Comment: The Coronavirus strains detected in this test cause the common cold.  These strains are not the COVID-19 (novel Coronavirus)strain   associated with the respiratory disease outbreak.          Human Metapneumovirus Not Detected     Human Rhinovirus/Enterovirus Not Detected     Influenza A (subtypes H1, H1-2009,H3) Not Detected     Influenza B Not Detected     Parainfluenza Virus 1 Not Detected     Parainfluenza Virus 2 Not Detected     Parainfluenza Virus 3 Not Detected     Parainfluenza Virus 4 Not Detected     Respiratory Syncytial Virus Not Detected     Bordetella Parapertussis (JO5316) Not Detected     Bordetella pertussis (ptxP) Not Detected     Chlamydia pneumoniae Not Detected     Mycoplasma pneumoniae Not Detected     Comment: Respiratory Infection Panel testing performed by Multiplex PCR.       Narrative:       For all other respiratory sources, order [id number] -  Respiratory Viral Panel by PCR    Blood culture [[id number]] Collected:  03/14/20 1341    Order Status:  Completed Specimen:  Blood Updated:  03/14/20 2115     Blood Culture, Routine No Growth to date    Blood culture [[id number]] Collected:  03/14/20 1341    Order Status:  Completed Specimen:  Blood Updated:  03/14/20 2115     Blood Culture, Routine No Growth to date    Urine culture [[id number]] Collected:  03/14/20 1556    Order Status:  No result Specimen:  Urine Updated:  03/14/20 1613    Influenza A & B by Molecular [[id number]] Collected:  03/14/20 1203    Order Status:  Completed Specimen:  Nasopharyngeal Swab Updated:  " 03/14/20 1248     Influenza A, Molecular Negative     Influenza B, Molecular Negative     Flu A & B Source Nasal swab          I have reviewed all pertinent labs within the past 24 hours.    Estimated Creatinine Clearance: 27.5 mL/min (A) (based on SCr of 2.3 mg/dL (H)).    Diagnostic Results:  I have reviewed all pertinent imaging results/findings within the past 24 hours.    Assessment and Plan:     65 y/o M hx of CAD (50-60% LCx, OM disease on [facility] 2/29), EF 10-15% s/p AICD, HTN, DM2, HLD, obesity, CKD. Recent admission (2/29-3/13) for cardiogenic shock with Impella. A [facility] showed 50-60% LCx, OM disease; no PCI performed. RHC with LVEDP 35. CO 3, CI 1.8, tte w LVEF 10-15%. Had intermittent suction alarms that improved w/ P6->P4. Weaned off impella and Dobutamine. Started on Hydralazine/isordil and Lasix QOD. TTE 3/2 EF 10%, LVIDD 6, TAPSE 1.98, Mod MR, Mod TR. No advanced options due to medical instability/renal failure.     His brother picked him up from the hospital to take him to his home in [place], [place], on 3/13. He was discharged without Metformin due to ANGEL. Pharmacy did not dispense ISDN included in his discharge instructions but he noticed this at home, where he felt unusually tired to minimal activity such as packing his bag of clothes and had to ask daughter to pack for him. He felt early satiety with bloated feeling during a chicken meal. There was associated "gas pain" with bloating that improved after passing flatus, dizziness, dyspnea, cough and malaise. His glucose levels increased to 390 and he called an RN who recommended to go to the [facility] ER. There he was noted in acute distress and started on BiPAP and given a dose of Lasix IV 80 mg. Was given Zosyn IV and transferred to our center out of concern for ADHF. A traumatic medina insertion was done followed with salmon colored urine that the providers there said was "secondary to the antibiotic".     He denies sick contacts but " "mentions having a 4-5 day history of a non-productive cough without fever that is ongoing and was present throughout the course of his recent hospital stay. Scant sputum is yellow and blood tinged. No chest pain or dyspnea at rest. Last BM was on 3/13 and it was dark green but well formed. At the time of our visit we provided him with a mask to wear during his physical exam.     * Suspected Covid-19 Virus Infection  Developed acute dyspnea on the grounds of URI symptoms. Negative Flu/RIP test. Leukocytosis with lymphopenia. ALT is elevated (not previously noted). Bilateral perihilar densities described as "Interval development of airspace opacification in the perihilar areas bilaterally since March 9, 2020"   -RIP/Flu negative  -?superimposed bacterial PNA  -COVID-19 testing in process   -Droplet isolation/precautions  -ID and infection control consultation   -Blood culture NGTD  -D-dimer is positive   -Sputum culture with multiple ana maría       Acute decompensated heart failure  ICMP with recent D/C after cardiogenic shock in stable hemodynamic conditions.   Received diuretic a OSH and will monitor response throughout the day.  -BNP is 833, troponin 0.2 and CRP 20  -Received Lasix 80 mg IV at OSH (PTA Lasix 20 mg QOD)   -Continue to Lasix to allow for renal recovery  -Continue Isordil/Hydralazine   -TSH was recently normal  -Strict I/O  -Remove Lamb     Type 2 diabetes mellitus, without long-term current use of insulin  Developed hyperglycemia at home   -Will need to replace Metformin unless ANGEL recovers further or maintains euglycemia with minimal requirements   -Consultation to Endocrine when near discharge  -SSI during IP    CAD (coronary artery disease)  Continue ASA/Statin/Eliquis  -Troponin in downtrend  -EKG without acute ischemic changes    ANGEL (acute kidney injury)  Has CKD with unclear baseline. Scr slowly improving since previous admission.   -Continue to hold Lasix   -I/O and assess diuretic needs on " daily basis  -At home on Lasix 20 mg QOD        Anant Hammer MD  Heart Transplant  [facility]

## 2020-03-15 NOTE — SUBJECTIVE & OBJECTIVE
Past Medical History:   Diagnosis Date    CHF (congestive heart failure)     Diabetes mellitus        History reviewed. No pertinent surgical history.    Review of patient's allergies indicates:   Allergen Reactions    Penicillins      Pt stated his mother said it will kill him- always inform to take ampicillin       Medications:  Medications Prior to Admission   Medication Sig    amiodarone (PACERONE) 400 MG tablet Take 1 tablet 400mg twice daily through 3/24/20, then 1 tablet (400mg) by mouth daily.    apixaban (ELIQUIS) 5 mg Tab Take 1 tablet (5 mg total) by mouth 2 (two) times daily.    aspirin 81 MG Chew Take 1 tablet (81 mg total) by mouth once daily.    atorvastatin (LIPITOR) 80 MG tablet Take 1 tablet (80 mg total) by mouth once daily.    furosemide (LASIX) 20 MG tablet Take 1 tablet (20 mg total) by mouth every other day.    hydrALAZINE (APRESOLINE) 100 MG tablet Take 1 tablet (100 mg total) by mouth every 8 (eight) hours.    isosorbide dinitrate (ISORDIL) 40 MG Tab Take 1 tablet (40 mg total) by mouth every 8 (eight) hours.    pantoprazole (PROTONIX) 40 MG tablet Take 40 mg by mouth once daily.    potassium chloride SA (K-DUR,KLOR-CON) 20 MEQ tablet Take 1 tablet (20 mEq total) by mouth once daily.     Antibiotics (From admission, onward)    None        Antifungals (From admission, onward)    None        Antivirals (From admission, onward)    None           There is no immunization history for the selected administration types on file for this patient.    Family History     None        Social History     Socioeconomic History    Marital status: Unknown     Spouse name: Not on file    Number of children: Not on file    Years of education: Not on file    Highest education level: Not on file   Occupational History    Not on file   Social Needs    Financial resource strain: Not on file    Food insecurity:     Worry: Not on file     Inability: Not on file    Transportation needs:      Medical: Not on file     Non-medical: Not on file   Tobacco Use    Smoking status: Never Smoker    Smokeless tobacco: Never Used   Substance and Sexual Activity    Alcohol use: Not on file    Drug use: Not on file    Sexual activity: Not on file   Lifestyle    Physical activity:     Days per week: Not on file     Minutes per session: Not on file    Stress: Not on file   Relationships    Social connections:     Talks on phone: Not on file     Gets together: Not on file     Attends Judaism service: Not on file     Active member of club or organization: Not on file     Attends meetings of clubs or organizations: Not on file     Relationship status: Not on file   Other Topics Concern    Not on file   Social History Narrative    Not on file     Review of Systems   All other systems reviewed and are negative.    Objective:     Vital Signs (Most Recent):  Temp: 98.1 °F (36.7 °C) (03/15/20 1633)  Pulse: 97 (03/15/20 1633)  Resp: 18 (03/15/20 1633)  BP: 109/65 (03/15/20 1633)  SpO2: 98 % (03/15/20 1633) Vital Signs (24h Range):  Temp:  [97.8 °F (36.6 °C)-99.6 °F (37.6 °C)] 98.1 °F (36.7 °C)  Pulse:  [] 97  Resp:  [16-20] 18  SpO2:  [96 %-99 %] 98 %  BP: (109-128)/(62-73) 109/65     Weight: 64.9 kg (143 lb 3 oz)  Body mass index is 24.58 kg/m².    Estimated Creatinine Clearance: 27.5 mL/min (A) (based on SCr of 2.3 mg/dL (H)).    Physical Exam   Constitutional: He is oriented to person, place, and time. He appears well-developed and well-nourished. No distress.   HENT:   Head: Normocephalic and atraumatic.   Right Ear: External ear normal.   Left Ear: External ear normal.   Nose: Nose normal.   Mouth/Throat: Oropharynx is clear and moist. No oropharyngeal exudate.   Eyes: Pupils are equal, round, and reactive to light. Conjunctivae and EOM are normal. Right eye exhibits no discharge. Left eye exhibits no discharge. No scleral icterus.   Neck: Normal range of motion. Neck supple. No JVD present. No tracheal  deviation present. No thyromegaly present.   Cardiovascular: Normal rate, regular rhythm, normal heart sounds and intact distal pulses. Exam reveals no gallop and no friction rub.   No murmur heard.  Pulmonary/Chest: Effort normal and breath sounds normal. No stridor. No respiratory distress. He has no wheezes. He has no rales. He exhibits no tenderness.   Abdominal: Soft. Bowel sounds are normal. He exhibits no distension and no mass. There is no tenderness. There is no rebound and no guarding.   Musculoskeletal: Normal range of motion. He exhibits no edema or tenderness.   Lymphadenopathy:     He has no cervical adenopathy.   Neurological: He is alert and oriented to person, place, and time. He has normal reflexes. He displays normal reflexes. No cranial nerve deficit. He exhibits normal muscle tone. Coordination normal.   Skin: Skin is warm. No rash noted. He is not diaphoretic. No erythema. No pallor.   Psychiatric: He has a normal mood and affect. His behavior is normal. Judgment and thought content normal.   Nursing note and vitals reviewed.      Significant Labs:   Microbiology Results (last 7 days)     Procedure Component Value Units Date/Time    Blood culture [[id number]] Collected:  03/14/20 1341    Order Status:  Completed Specimen:  Blood Updated:  03/15/20 1612     Blood Culture, Routine No Growth to date      No Growth to date    Blood culture [[id number]] Collected:  03/14/20 1341    Order Status:  Completed Specimen:  Blood Updated:  03/15/20 1612     Blood Culture, Routine No Growth to date      No Growth to date    Culture, Respiratory with Gram Stain [[id number]] Collected:  03/14/20 1556    Order Status:  Completed Specimen:  Respiratory from Sputum, Expectorated Updated:  03/15/20 1030     Respiratory Culture Normal respiratory ana maría     Gram Stain (Respiratory) >10 epithelial cells per low power field     Gram Stain (Respiratory) Many WBC's     Gram Stain (Respiratory) Many Gram positive cocci      Gram Stain (Respiratory) Many Gram negative diplococci     Gram Stain (Respiratory) Few Gram negative rods     Gram Stain (Respiratory) Few Gram positive rods    Respiratory Infection Panel (PCR), Nasopharyngeal [[id number]] Collected:  03/14/20 1203    Order Status:  Completed Specimen:  Nasopharyngeal Swab Updated:  03/14/20 2134     Respiratory Infection Panel Source NP Swab     Adenovirus Not Detected     Coronavirus 229E, Common Cold Virus Not Detected     Coronavirus HKU1, Common Cold Virus Not Detected     Coronavirus NL63, Common Cold Virus Not Detected     Coronavirus OC43, Common Cold Virus Not Detected     Comment: The Coronavirus strains detected in this test cause the common cold.  These strains are not the COVID-19 (novel Coronavirus)strain   associated with the respiratory disease outbreak.          Human Metapneumovirus Not Detected     Human Rhinovirus/Enterovirus Not Detected     Influenza A (subtypes H1, H1-2009,H3) Not Detected     Influenza B Not Detected     Parainfluenza Virus 1 Not Detected     Parainfluenza Virus 2 Not Detected     Parainfluenza Virus 3 Not Detected     Parainfluenza Virus 4 Not Detected     Respiratory Syncytial Virus Not Detected     Bordetella Parapertussis ([id number]) Not Detected     Bordetella pertussis (ptxP) Not Detected     Chlamydia pneumoniae Not Detected     Mycoplasma pneumoniae Not Detected     Comment: Respiratory Infection Panel testing performed by Multiplex PCR.       Narrative:       For all other respiratory sources, order [id number] -  Respiratory Viral Panel by PCR    Urine culture [[id number]] Collected:  03/14/20 1556    Order Status:  No result Specimen:  Urine Updated:  03/14/20 1613    Influenza A & B by Molecular [[id number]] Collected:  03/14/20 1203    Order Status:  Completed Specimen:  Nasopharyngeal Swab Updated:  03/14/20 1248     Influenza A, Molecular Negative     Influenza B, Molecular Negative     Flu A & B Source Nasal swab          Significant  Imaging: I have reviewed all pertinent imaging results/findings within the past 24 hours.

## 2020-03-15 NOTE — ASSESSMENT & PLAN NOTE
"Developed acute dyspnea on the grounds of URI symptoms. Negative Flu/RIP test. Leukocytosis with lymphopenia. ALT is elevated (not previously noted). Bilateral perihilar densities described as "Interval development of airspace opacification in the perihilar areas bilaterally since March 9, 2020"   -RIP/Flu negative  -?superimposed bacterial PNA  -COVID-19 testing in process   -Droplet isolation/precautions  -ID and infection control consultation   -Blood culture NGTD  -D-dimer is positive   -Sputum culture with multiple ana maría     "

## 2020-03-15 NOTE — SUBJECTIVE & OBJECTIVE
Interval History: Continues to have a non-productive cough. No orthopnea, dyspnea or chest pain at rest. Tolerating medications without issues and remains off Lasix for now (PTA 20 mg QOD).   -Low threshold for restarting medications as his renal function recovers  -He did have post ANGEL polyuria and will need to monitor fluid status closely   -Pending COVID results and WBC decreased to 13.5<- 28K  -Flu and RIP negative  -Low threshold to treat superimposed bacterial PNA with a course of Abx (ID consulted)    Continuous Infusions:  Scheduled Meds:   amiodarone  400 mg Oral BID    apixaban  5 mg Oral BID    aspirin  81 mg Oral Daily    atorvastatin  80 mg Oral Daily    hydrALAZINE  100 mg Oral Q8H    isosorbide dinitrate  40 mg Oral Q8H    pantoprazole  40 mg Oral Daily    potassium chloride SA  20 mEq Oral Daily    senna-docusate 8.6-50 mg  1 tablet Oral BID     PRN Meds:acetaminophen, Dextrose 10% Bolus, Dextrose 10% Bolus, glucagon (human recombinant), glucose, glucose, influenza, insulin aspart U-100, pneumoc 13-louis conj-dip cr(PF)    Review of patient's allergies indicates:   Allergen Reactions    Penicillins      Pt stated his mother said it will kill him- always inform to take ampicillin     Objective:     Vital Signs (Most Recent):  Temp: 98.4 °F (36.9 °C) (03/15/20 1324)  Pulse: 94 (03/15/20 0912)  Resp: 16 (03/15/20 1324)  BP: 121/62 (03/15/20 1324)  SpO2: 98 % (03/15/20 1324) Vital Signs (24h Range):  Temp:  [97.8 °F (36.6 °C)-99.6 °F (37.6 °C)] 98.4 °F (36.9 °C)  Pulse:  [] 94  Resp:  [16-20] 16  SpO2:  [96 %-99 %] 98 %  BP: (113-128)/(62-73) 121/62     Patient Vitals for the past 72 hrs (Last 3 readings):   Weight   03/15/20 0800 64.9 kg (143 lb 3 oz)   03/14/20 2127 66.9 kg (147 lb 7.8 oz)     Body mass index is 24.58 kg/m².      Intake/Output Summary (Last 24 hours) at 3/15/2020 1504  Last data filed at 3/15/2020 1236  Gross per 24 hour   Intake 580 ml   Output 1900 ml   Net -1320 ml        Hemodynamic Parameters:       Telemetry: No acute events     Physical Exam    Significant Labs:  CBC:  Recent Labs   Lab 03/13/20  0446 03/14/20  1116 03/15/20  0404   WBC 9.83 28.51* 13.53*   RBC 2.71* 3.07* 2.71*   HGB 7.8* 8.8* 7.9*   HCT 24.8* 27.6* 25.3*   * 448* 365*   MCV 92 90 93   MCH 28.8 28.7 29.2   MCHC 31.5* 31.9* 31.2*     BNP:  Recent Labs   Lab 03/14/20  1116   *     CMP:  Recent Labs   Lab 03/13/20  0446 03/14/20  1116 03/15/20  0404   * 186* 153*   CALCIUM 8.4* 8.8 8.3*   ALBUMIN 3.1* 3.4* 2.9*   PROT 6.5 7.4 6.2    137 137   K 4.0 4.3 4.8   CO2 21* 23 22*    102 106   BUN 41* 35* 31*   CREATININE 2.5* 2.8* 2.3*   ALKPHOS 75 91 78   ALT 47* 56* 40   AST 34 34 24   BILITOT 0.5 0.6 0.6      Coagulation:   Recent Labs   Lab 03/11/20  0320 03/12/20  0448 03/13/20  0446   APTT 52.5* 57.4* 30.0     LDH:  No results for input(s): LDH in the last 72 hours.  Microbiology:  Microbiology Results (last 7 days)     Procedure Component Value Units Date/Time    Culture, Respiratory with Gram Stain [[id number]] Collected:  03/14/20 1556    Order Status:  Completed Specimen:  Respiratory from Sputum, Expectorated Updated:  03/15/20 1030     Respiratory Culture Normal respiratory ana maría     Gram Stain (Respiratory) >10 epithelial cells per low power field     Gram Stain (Respiratory) Many WBC's     Gram Stain (Respiratory) Many Gram positive cocci     Gram Stain (Respiratory) Many Gram negative diplococci     Gram Stain (Respiratory) Few Gram negative rods     Gram Stain (Respiratory) Few Gram positive rods    Respiratory Infection Panel (PCR), Nasopharyngeal [[id number]] Collected:  03/14/20 1203    Order Status:  Completed Specimen:  Nasopharyngeal Swab Updated:  03/14/20 2134     Respiratory Infection Panel Source NP Swab     Adenovirus Not Detected     Coronavirus 229E, Common Cold Virus Not Detected     Coronavirus HKU1, Common Cold Virus Not Detected     Coronavirus NL63,  Common Cold Virus Not Detected     Coronavirus OC43, Common Cold Virus Not Detected     Comment: The Coronavirus strains detected in this test cause the common cold.  These strains are not the COVID-19 (novel Coronavirus)strain   associated with the respiratory disease outbreak.          Human Metapneumovirus Not Detected     Human Rhinovirus/Enterovirus Not Detected     Influenza A (subtypes H1, H1-2009,H3) Not Detected     Influenza B Not Detected     Parainfluenza Virus 1 Not Detected     Parainfluenza Virus 2 Not Detected     Parainfluenza Virus 3 Not Detected     Parainfluenza Virus 4 Not Detected     Respiratory Syncytial Virus Not Detected     Bordetella Parapertussis ([id number]) Not Detected     Bordetella pertussis (ptxP) Not Detected     Chlamydia pneumoniae Not Detected     Mycoplasma pneumoniae Not Detected     Comment: Respiratory Infection Panel testing performed by Multiplex PCR.       Narrative:       For all other respiratory sources, order [id number] -  Respiratory Viral Panel by PCR    Blood culture [[id number]] Collected:  03/14/20 1341    Order Status:  Completed Specimen:  Blood Updated:  03/14/20 2115     Blood Culture, Routine No Growth to date    Blood culture [[id number]] Collected:  03/14/20 1341    Order Status:  Completed Specimen:  Blood Updated:  03/14/20 2115     Blood Culture, Routine No Growth to date    Urine culture [[id number]] Collected:  03/14/20 1556    Order Status:  No result Specimen:  Urine Updated:  03/14/20 1613    Influenza A & B by Molecular [[id number]] Collected:  03/14/20 1203    Order Status:  Completed Specimen:  Nasopharyngeal Swab Updated:  03/14/20 1248     Influenza A, Molecular Negative     Influenza B, Molecular Negative     Flu A & B Source Nasal swab          I have reviewed all pertinent labs within the past 24 hours.    Estimated Creatinine Clearance: 27.5 mL/min (A) (based on SCr of 2.3 mg/dL (H)).    Diagnostic Results:  I have reviewed all pertinent imaging  results/findings within the past 24 hours.

## 2020-03-15 NOTE — PLAN OF CARE
Plan of care discussed with patient. Patient is free of fall/trauma/injury. Denies CP, SOB, or pain/discomfort. Lamb was removed today via MD order. Care has been clustered. Still waiting on COVID19 test results. Patient still on airborne, contact, and droplet precautions.  All questions addressed. Will continue to monitor

## 2020-03-15 NOTE — ASSESSMENT & PLAN NOTE
ICMP with recent D/C after cardiogenic shock in stable hemodynamic conditions.   Received diuretic a OSH and will monitor response throughout the day.  -BNP is 833, troponin 0.2 and CRP 20  -Received Lasix 80 mg IV at OSH (PTA Lasix 20 mg QOD)   -Continue to Lasix to allow for renal recovery  -Continue Isordil/Hydralazine   -TSH was recently normal  -Strict I/O  -Remove Lamb

## 2020-03-16 LAB
ALBUMIN SERPL BCP-MCNC: 3.3 G/DL (ref 3.5–5.2)
ALP SERPL-CCNC: 79 U/L (ref 55–135)
ALT SERPL W/O P-5'-P-CCNC: 38 U/L (ref 10–44)
ANION GAP SERPL CALC-SCNC: 10 MMOL/L (ref 8–16)
AST SERPL-CCNC: 22 U/L (ref 10–40)
BACTERIA SPEC AEROBE CULT: NORMAL
BACTERIA SPEC AEROBE CULT: NORMAL
BASOPHILS # BLD AUTO: 0.04 K/UL (ref 0–0.2)
BASOPHILS NFR BLD: 0.4 % (ref 0–1.9)
BILIRUB SERPL-MCNC: 0.5 MG/DL (ref 0.1–1)
BUN SERPL-MCNC: 27 MG/DL (ref 8–23)
CALCIUM SERPL-MCNC: 8.5 MG/DL (ref 8.7–10.5)
CHLORIDE SERPL-SCNC: 104 MMOL/L (ref 95–110)
CO2 SERPL-SCNC: 22 MMOL/L (ref 23–29)
CREAT SERPL-MCNC: 2.2 MG/DL (ref 0.5–1.4)
DIFFERENTIAL METHOD: ABNORMAL
EOSINOPHIL # BLD AUTO: 0.2 K/UL (ref 0–0.5)
EOSINOPHIL NFR BLD: 1.9 % (ref 0–8)
ERYTHROCYTE [DISTWIDTH] IN BLOOD BY AUTOMATED COUNT: 14.6 % (ref 11.5–14.5)
EST. GFR  (AFRICAN AMERICAN): 35.5 ML/MIN/1.73 M^2
EST. GFR  (NON AFRICAN AMERICAN): 30.7 ML/MIN/1.73 M^2
GLUCOSE SERPL-MCNC: 140 MG/DL (ref 70–110)
GRAM STN SPEC: NORMAL
HCT VFR BLD AUTO: 24.5 % (ref 40–54)
HGB BLD-MCNC: 7.7 G/DL (ref 14–18)
IMM GRANULOCYTES # BLD AUTO: 0.04 K/UL (ref 0–0.04)
IMM GRANULOCYTES NFR BLD AUTO: 0.4 % (ref 0–0.5)
LYMPHOCYTES # BLD AUTO: 1.7 K/UL (ref 1–4.8)
LYMPHOCYTES NFR BLD: 17.1 % (ref 18–48)
MAGNESIUM SERPL-MCNC: 2 MG/DL (ref 1.6–2.6)
MCH RBC QN AUTO: 29.3 PG (ref 27–31)
MCHC RBC AUTO-ENTMCNC: 31.4 G/DL (ref 32–36)
MCV RBC AUTO: 93 FL (ref 82–98)
MONOCYTES # BLD AUTO: 0.8 K/UL (ref 0.3–1)
MONOCYTES NFR BLD: 8.2 % (ref 4–15)
NEUTROPHILS # BLD AUTO: 7.2 K/UL (ref 1.8–7.7)
NEUTROPHILS NFR BLD: 72 % (ref 38–73)
NRBC BLD-RTO: 0 /100 WBC
PHOSPHATE SERPL-MCNC: 3.2 MG/DL (ref 2.7–4.5)
PLATELET # BLD AUTO: 357 K/UL (ref 150–350)
PMV BLD AUTO: 12.4 FL (ref 9.2–12.9)
POCT GLUCOSE: 150 MG/DL (ref 70–110)
POCT GLUCOSE: 168 MG/DL (ref 70–110)
POCT GLUCOSE: 204 MG/DL (ref 70–110)
POCT GLUCOSE: 237 MG/DL (ref 70–110)
POTASSIUM SERPL-SCNC: 4.3 MMOL/L (ref 3.5–5.1)
PROT SERPL-MCNC: 6.9 G/DL (ref 6–8.4)
RBC # BLD AUTO: 2.63 M/UL (ref 4.6–6.2)
SODIUM SERPL-SCNC: 136 MMOL/L (ref 136–145)
WBC # BLD AUTO: 10.04 K/UL (ref 3.9–12.7)

## 2020-03-16 PROCEDURE — 20600001 HC STEP DOWN PRIVATE ROOM: Mod: NTX

## 2020-03-16 PROCEDURE — 99233 SBSQ HOSP IP/OBS HIGH 50: CPT | Mod: NTX,,, | Performed by: INTERNAL MEDICINE

## 2020-03-16 PROCEDURE — 25000003 PHARM REV CODE 250: Mod: NTX | Performed by: HOSPITALIST

## 2020-03-16 PROCEDURE — 83735 ASSAY OF MAGNESIUM: CPT | Mod: NTX

## 2020-03-16 PROCEDURE — 36415 COLL VENOUS BLD VENIPUNCTURE: CPT | Mod: NTX

## 2020-03-16 PROCEDURE — 80053 COMPREHEN METABOLIC PANEL: CPT | Mod: NTX

## 2020-03-16 PROCEDURE — 84100 ASSAY OF PHOSPHORUS: CPT | Mod: NTX

## 2020-03-16 PROCEDURE — 99233 PR SUBSEQUENT HOSPITAL CARE,LEVL III: ICD-10-PCS | Mod: NTX,,, | Performed by: INTERNAL MEDICINE

## 2020-03-16 PROCEDURE — 25000003 PHARM REV CODE 250: Mod: NTX | Performed by: INTERNAL MEDICINE

## 2020-03-16 PROCEDURE — 63600175 PHARM REV CODE 636 W HCPCS: Mod: NTX | Performed by: INTERNAL MEDICINE

## 2020-03-16 PROCEDURE — 85025 COMPLETE CBC W/AUTO DIFF WBC: CPT | Mod: NTX

## 2020-03-16 RX ORDER — SIMETHICONE 80 MG
1 TABLET,CHEWABLE ORAL 3 TIMES DAILY PRN
Status: DISCONTINUED | OUTPATIENT
Start: 2020-03-16 | End: 2020-03-17 | Stop reason: HOSPADM

## 2020-03-16 RX ADMIN — PANTOPRAZOLE SODIUM 40 MG: 40 TABLET, DELAYED RELEASE ORAL at 08:03

## 2020-03-16 RX ADMIN — ASPIRIN 81 MG CHEWABLE TABLET 81 MG: 81 TABLET CHEWABLE at 08:03

## 2020-03-16 RX ADMIN — POTASSIUM CHLORIDE 20 MEQ: 1500 TABLET, EXTENDED RELEASE ORAL at 08:03

## 2020-03-16 RX ADMIN — HYDRALAZINE HYDROCHLORIDE 100 MG: 50 TABLET, FILM COATED ORAL at 05:03

## 2020-03-16 RX ADMIN — APIXABAN 5 MG: 5 TABLET, FILM COATED ORAL at 08:03

## 2020-03-16 RX ADMIN — ISOSORBIDE DINITRATE 40 MG: 40 TABLET ORAL at 05:03

## 2020-03-16 RX ADMIN — INSULIN ASPART 2 UNITS: 100 INJECTION, SOLUTION INTRAVENOUS; SUBCUTANEOUS at 12:03

## 2020-03-16 RX ADMIN — ISOSORBIDE DINITRATE 40 MG: 40 TABLET ORAL at 08:03

## 2020-03-16 RX ADMIN — HYDRALAZINE HYDROCHLORIDE 100 MG: 50 TABLET, FILM COATED ORAL at 08:03

## 2020-03-16 RX ADMIN — AMIODARONE HYDROCHLORIDE 400 MG: 200 TABLET ORAL at 08:03

## 2020-03-16 RX ADMIN — STANDARDIZED SENNA CONCENTRATE AND DOCUSATE SODIUM 1 TABLET: 8.6; 5 TABLET ORAL at 08:03

## 2020-03-16 RX ADMIN — INSULIN ASPART 1 UNITS: 100 INJECTION, SOLUTION INTRAVENOUS; SUBCUTANEOUS at 08:03

## 2020-03-16 RX ADMIN — SIMETHICONE CHEW TAB 80 MG 80 MG: 80 TABLET ORAL at 08:03

## 2020-03-16 RX ADMIN — ATORVASTATIN CALCIUM 80 MG: 20 TABLET, FILM COATED ORAL at 08:03

## 2020-03-16 NOTE — PLAN OF CARE
Problem: Adult Inpatient Plan of Care  Goal: Plan of Care Review  Outcome: Ongoing, Progressing   Pt free of falls/traumas/injuries. Skin remains clean, dry, and intact.  Strict COVID 19 precautions taken. Still awaiting COVID 19 test results. Pt re-educated on importance of measuring accurate intake and out put; pt verbalized and demonstrates understanding. Reviewed plan of care with pt; and pt verbalized understanding.  Pt AAOX4, VSS, and in no distress will continue to monitor.

## 2020-03-16 NOTE — PROGRESS NOTES
"DISCHARGE  *Late entry from previous admission    SW to pt's room on Fri 3/13 for discharge.  Pt presents as sitting up at bedside, aao x4, pleasant, calm, cooperative, and asking and answering questions appropriately.  Pt verbalizes understanding and agreement with plan for d/c to home today.  Pt states he is in agreement with HH being arranged for skilled nrsg and PT.  Pt denies preference of HH agency.  Pt reports his brother is on his way to  pt today & pt reports he will be staying with brother temporarily after d/c, until pt & family feel pt is ready to be at his own home alone again.  Brother's address is: [address] [place] [address].    Pt reports coping adequately at this time, and denies any other needs or concerns to SW.  Pt reports that family is "willing to help" him with caregiving/asstc at home as needed and with medication copays if needed.  Pt states he has been in contact with his employer about disability, workmans comp, etc.  Pt states he has not decided yet if he will return to work.    CHELITA spoke with Mara at [facility] (ph: [phone], f: [phone]), who confirms that they service [place] & accept BCBS and can accept pt.  CHELITA faxed HH orders & clinical records to [facility] and informed them of d/c today.  SW providing ongoing psychosocial and counseling support, education, resources, assistance, and discharge planning as indicated.  SW remains available.  "

## 2020-03-16 NOTE — PLAN OF CARE
Strict COVID 19 precautions taken. Still awaiting COVID 19 test results. Plan of care discussed with patient. Fall precautions in place. Patient has no complaints of pain. Discussed medications and care. Patient has no questions at this time. Will continue to monitor.

## 2020-03-16 NOTE — SUBJECTIVE & OBJECTIVE
Interval History: Still complaints of cough but states he feels much better. No fevers reported     Continuous Infusions:  Scheduled Meds:   amiodarone  400 mg Oral BID    apixaban  5 mg Oral BID    aspirin  81 mg Oral Daily    atorvastatin  80 mg Oral Daily    hydrALAZINE  100 mg Oral Q8H    isosorbide dinitrate  40 mg Oral Q8H    pantoprazole  40 mg Oral Daily    potassium chloride SA  20 mEq Oral Daily    senna-docusate 8.6-50 mg  1 tablet Oral BID     PRN Meds:acetaminophen, Dextrose 10% Bolus, Dextrose 10% Bolus, glucagon (human recombinant), glucose, glucose, influenza, insulin aspart U-100, pneumoc 13-louis conj-dip cr(PF)    Review of patient's allergies indicates:   Allergen Reactions    Penicillins      Pt stated his mother said it will kill him- always inform to take ampicillin     Objective:     Vital Signs (Most Recent):  Temp: 98.7 °F (37.1 °C) (03/16/20 0850)  Pulse: 106 (03/16/20 1113)  Resp: 18 (03/16/20 0850)  BP: 115/68 (03/16/20 0850)  SpO2: 99 % (03/16/20 0850) Vital Signs (24h Range):  Temp:  [97.8 °F (36.6 °C)-98.7 °F (37.1 °C)] 98.7 °F (37.1 °C)  Pulse:  [] 106  Resp:  [16-20] 18  SpO2:  [96 %-99 %] 99 %  BP: (109-121)/(62-72) 115/68     Patient Vitals for the past 72 hrs (Last 3 readings):   Weight   03/15/20 0800 64.9 kg (143 lb 3 oz)   03/14/20 2127 66.9 kg (147 lb 7.8 oz)     Body mass index is 24.58 kg/m².      Intake/Output Summary (Last 24 hours) at 3/16/2020 1146  Last data filed at 3/15/2020 2000  Gross per 24 hour   Intake 600 ml   Output 1250 ml   Net -650 ml       Hemodynamic Parameters:       Telemetry: NSR     Physical Exam  Constitutional: He is oriented to person, place, and time. He appears well-developed and well-nourished.   HENT:   Head: Normocephalic and atraumatic.   Eyes: Right eye exhibits no discharge. Left eye exhibits no discharge.   Neck: Normal range of motion. No JVD present.   Cardiovascular: Normal rate and regular rhythm. Exam reveals distant  heart sounds. Exam reveals no gallop.   No murmur heard.  Pulmonary/Chest:  CTA   Abdominal: Soft. Bowel sounds are normal. . He exhibits no mass. There is no tenderness.   Musculoskeletal: Normal range of motion. He exhibits no edema.   Neurological: He is alert and oriented to person, place, and time.   Skin: Skin is warm and dry. Capillary refill takes less than 2 seconds. No erythema.   Psychiatric: He has a normal mood and affect.   Significant Labs:  CBC:  Recent Labs   Lab 03/14/20  1116 03/15/20  0404 03/16/20  0358   WBC 28.51* 13.53* 10.04   RBC 3.07* 2.71* 2.63*   HGB 8.8* 7.9* 7.7*   HCT 27.6* 25.3* 24.5*   * 365* 357*   MCV 90 93 93   MCH 28.7 29.2 29.3   MCHC 31.9* 31.2* 31.4*     BNP:  Recent Labs   Lab 03/14/20  1116   *     CMP:  Recent Labs   Lab 03/14/20  1116 03/15/20  0404 03/16/20  0358   * 153* 140*   CALCIUM 8.8 8.3* 8.5*   ALBUMIN 3.4* 2.9* 3.3*   PROT 7.4 6.2 6.9    137 136   K 4.3 4.8 4.3   CO2 23 22* 22*    106 104   BUN 35* 31* 27*   CREATININE 2.8* 2.3* 2.2*   ALKPHOS 91 78 79   ALT 56* 40 38   AST 34 24 22   BILITOT 0.6 0.6 0.5      Coagulation:   Recent Labs   Lab 03/11/20  0320 03/12/20  0448 03/13/20  0446   APTT 52.5* 57.4* 30.0     LDH:  No results for input(s): LDH in the last 72 hours.  Microbiology:  Microbiology Results (last 7 days)     Procedure Component Value Units Date/Time    Culture, Respiratory with Gram Stain [[id number]] Collected:  03/14/20 1556    Order Status:  Completed Specimen:  Respiratory from Sputum, Expectorated Updated:  03/16/20 0989     Respiratory Culture Normal respiratory ana maría      No S aureus or Pseudomonas isolated.     Gram Stain (Respiratory) >10 epithelial cells per low power field     Gram Stain (Respiratory) Many WBC's     Gram Stain (Respiratory) Many Gram positive cocci     Gram Stain (Respiratory) Many Gram negative diplococci     Gram Stain (Respiratory) Few Gram negative rods     Gram Stain (Respiratory)  Few Gram positive rods    Urine culture [[id number]] Collected:  03/14/20 1556    Order Status:  Completed Specimen:  Urine Updated:  03/15/20 2125     Urine Culture, Routine No growth    Narrative:       Preferred Collection Type->Urine, Clean Catch    Blood culture [[id number]] Collected:  03/14/20 1341    Order Status:  Completed Specimen:  Blood Updated:  03/15/20 1612     Blood Culture, Routine No Growth to date      No Growth to date    Blood culture [[id number]] Collected:  03/14/20 1341    Order Status:  Completed Specimen:  Blood Updated:  03/15/20 1612     Blood Culture, Routine No Growth to date      No Growth to date    Respiratory Infection Panel (PCR), Nasopharyngeal [[id number]] Collected:  03/14/20 1203    Order Status:  Completed Specimen:  Nasopharyngeal Swab Updated:  03/14/20 2134     Respiratory Infection Panel Source NP Swab     Adenovirus Not Detected     Coronavirus 229E, Common Cold Virus Not Detected     Coronavirus HKU1, Common Cold Virus Not Detected     Coronavirus NL63, Common Cold Virus Not Detected     Coronavirus OC43, Common Cold Virus Not Detected     Comment: The Coronavirus strains detected in this test cause the common cold.  These strains are not the COVID-19 (novel Coronavirus)strain   associated with the respiratory disease outbreak.          Human Metapneumovirus Not Detected     Human Rhinovirus/Enterovirus Not Detected     Influenza A (subtypes H1, H1-2009,H3) Not Detected     Influenza B Not Detected     Parainfluenza Virus 1 Not Detected     Parainfluenza Virus 2 Not Detected     Parainfluenza Virus 3 Not Detected     Parainfluenza Virus 4 Not Detected     Respiratory Syncytial Virus Not Detected     Bordetella Parapertussis ([id number]) Not Detected     Bordetella pertussis (ptxP) Not Detected     Chlamydia pneumoniae Not Detected     Mycoplasma pneumoniae Not Detected     Comment: Respiratory Infection Panel testing performed by Multiplex PCR.       Narrative:       For all  other respiratory sources, order [id number] -  Respiratory Viral Panel by PCR    Influenza A & B by Molecular [[id number]] Collected:  03/14/20 1203    Order Status:  Completed Specimen:  Nasopharyngeal Swab Updated:  03/14/20 1248     Influenza A, Molecular Negative     Influenza B, Molecular Negative     Flu A & B Source Nasal swab          I have reviewed all pertinent labs within the past 24 hours.    Estimated Creatinine Clearance: 28.8 mL/min (A) (based on SCr of 2.2 mg/dL (H)).    Diagnostic Results:  I have reviewed and interpreted all pertinent imaging results/findings within the past 24 hours.

## 2020-03-16 NOTE — PROGRESS NOTES
[facility]  Heart Transplant  Progress Note    Patient Name: John Javier  MRN: [id number]  Admission Date: 3/14/2020  Hospital Length of Stay: 2 days  Attending Physician: Lizzy Cavanaugh MD  Primary Care Provider: To Obtain Unable  Principal Problem:Suspected Covid-19 Virus Infection    Subjective:     Interval History: Still complaints of cough but states he feels much better. No fevers reported     Continuous Infusions:  Scheduled Meds:   amiodarone  400 mg Oral BID    apixaban  5 mg Oral BID    aspirin  81 mg Oral Daily    atorvastatin  80 mg Oral Daily    hydrALAZINE  100 mg Oral Q8H    isosorbide dinitrate  40 mg Oral Q8H    pantoprazole  40 mg Oral Daily    potassium chloride SA  20 mEq Oral Daily    senna-docusate 8.6-50 mg  1 tablet Oral BID     PRN Meds:acetaminophen, Dextrose 10% Bolus, Dextrose 10% Bolus, glucagon (human recombinant), glucose, glucose, influenza, insulin aspart U-100, pneumoc 13-louis conj-dip cr(PF)    Review of patient's allergies indicates:   Allergen Reactions    Penicillins      Pt stated his mother said it will kill him- always inform to take ampicillin     Objective:     Vital Signs (Most Recent):  Temp: 98.7 °F (37.1 °C) (03/16/20 0850)  Pulse: 106 (03/16/20 1113)  Resp: 18 (03/16/20 0850)  BP: 115/68 (03/16/20 0850)  SpO2: 99 % (03/16/20 0850) Vital Signs (24h Range):  Temp:  [97.8 °F (36.6 °C)-98.7 °F (37.1 °C)] 98.7 °F (37.1 °C)  Pulse:  [] 106  Resp:  [16-20] 18  SpO2:  [96 %-99 %] 99 %  BP: (109-121)/(62-72) 115/68     Patient Vitals for the past 72 hrs (Last 3 readings):   Weight   03/15/20 0800 64.9 kg (143 lb 3 oz)   03/14/20 2127 66.9 kg (147 lb 7.8 oz)     Body mass index is 24.58 kg/m².      Intake/Output Summary (Last 24 hours) at 3/16/2020 1146  Last data filed at 3/15/2020 2000  Gross per 24 hour   Intake 600 ml   Output 1250 ml   Net -650 ml       Hemodynamic Parameters:       Telemetry: NSR     Physical Exam  Constitutional: He  is oriented to person, place, and time. He appears well-developed and well-nourished.   HENT:   Head: Normocephalic and atraumatic.   Eyes: Right eye exhibits no discharge. Left eye exhibits no discharge.   Neck: Normal range of motion. No JVD present.   Cardiovascular: Normal rate and regular rhythm. Exam reveals distant heart sounds. Exam reveals no gallop.   No murmur heard.  Pulmonary/Chest:  CTA   Abdominal: Soft. Bowel sounds are normal. . He exhibits no mass. There is no tenderness.   Musculoskeletal: Normal range of motion. He exhibits no edema.   Neurological: He is alert and oriented to person, place, and time.   Skin: Skin is warm and dry. Capillary refill takes less than 2 seconds. No erythema.   Psychiatric: He has a normal mood and affect.   Significant Labs:  CBC:  Recent Labs   Lab 03/14/20  1116 03/15/20  0404 03/16/20  0358   WBC 28.51* 13.53* 10.04   RBC 3.07* 2.71* 2.63*   HGB 8.8* 7.9* 7.7*   HCT 27.6* 25.3* 24.5*   * 365* 357*   MCV 90 93 93   MCH 28.7 29.2 29.3   MCHC 31.9* 31.2* 31.4*     BNP:  Recent Labs   Lab 03/14/20  1116   *     CMP:  Recent Labs   Lab 03/14/20  1116 03/15/20  0404 03/16/20  0358   * 153* 140*   CALCIUM 8.8 8.3* 8.5*   ALBUMIN 3.4* 2.9* 3.3*   PROT 7.4 6.2 6.9    137 136   K 4.3 4.8 4.3   CO2 23 22* 22*    106 104   BUN 35* 31* 27*   CREATININE 2.8* 2.3* 2.2*   ALKPHOS 91 78 79   ALT 56* 40 38   AST 34 24 22   BILITOT 0.6 0.6 0.5      Coagulation:   Recent Labs   Lab 03/11/20  0320 03/12/20  0448 03/13/20  0446   APTT 52.5* 57.4* 30.0     LDH:  No results for input(s): LDH in the last 72 hours.  Microbiology:  Microbiology Results (last 7 days)     Procedure Component Value Units Date/Time    Culture, Respiratory with Gram Stain [[id number]] Collected:  03/14/20 1556    Order Status:  Completed Specimen:  Respiratory from Sputum, Expectorated Updated:  03/16/20 0909     Respiratory Culture Normal respiratory ana maría      No S aureus or  Pseudomonas isolated.     Gram Stain (Respiratory) >10 epithelial cells per low power field     Gram Stain (Respiratory) Many WBC's     Gram Stain (Respiratory) Many Gram positive cocci     Gram Stain (Respiratory) Many Gram negative diplococci     Gram Stain (Respiratory) Few Gram negative rods     Gram Stain (Respiratory) Few Gram positive rods    Urine culture [[id number]] Collected:  03/14/20 1556    Order Status:  Completed Specimen:  Urine Updated:  03/15/20 2125     Urine Culture, Routine No growth    Narrative:       Preferred Collection Type->Urine, Clean Catch    Blood culture [[id number]] Collected:  03/14/20 1341    Order Status:  Completed Specimen:  Blood Updated:  03/15/20 1612     Blood Culture, Routine No Growth to date      No Growth to date    Blood culture [[id number]] Collected:  03/14/20 1341    Order Status:  Completed Specimen:  Blood Updated:  03/15/20 1612     Blood Culture, Routine No Growth to date      No Growth to date    Respiratory Infection Panel (PCR), Nasopharyngeal [[id number]] Collected:  03/14/20 1203    Order Status:  Completed Specimen:  Nasopharyngeal Swab Updated:  03/14/20 2134     Respiratory Infection Panel Source NP Swab     Adenovirus Not Detected     Coronavirus 229E, Common Cold Virus Not Detected     Coronavirus HKU1, Common Cold Virus Not Detected     Coronavirus NL63, Common Cold Virus Not Detected     Coronavirus OC43, Common Cold Virus Not Detected     Comment: The Coronavirus strains detected in this test cause the common cold.  These strains are not the COVID-19 (novel Coronavirus)strain   associated with the respiratory disease outbreak.          Human Metapneumovirus Not Detected     Human Rhinovirus/Enterovirus Not Detected     Influenza A (subtypes H1, H1-2009,H3) Not Detected     Influenza B Not Detected     Parainfluenza Virus 1 Not Detected     Parainfluenza Virus 2 Not Detected     Parainfluenza Virus 3 Not Detected     Parainfluenza Virus 4 Not Detected      Respiratory Syncytial Virus Not Detected     Bordetella Parapertussis (OV2033) Not Detected     Bordetella pertussis (ptxP) Not Detected     Chlamydia pneumoniae Not Detected     Mycoplasma pneumoniae Not Detected     Comment: Respiratory Infection Panel testing performed by Multiplex PCR.       Narrative:       For all other respiratory sources, order [id number] -  Respiratory Viral Panel by PCR    Influenza A & B by Molecular [[id number]] Collected:  03/14/20 1203    Order Status:  Completed Specimen:  Nasopharyngeal Swab Updated:  03/14/20 1248     Influenza A, Molecular Negative     Influenza B, Molecular Negative     Flu A & B Source Nasal swab          I have reviewed all pertinent labs within the past 24 hours.    Estimated Creatinine Clearance: 28.8 mL/min (A) (based on SCr of 2.2 mg/dL (H)).    Diagnostic Results:  I have reviewed and interpreted all pertinent imaging results/findings within the past 24 hours.    Assessment and Plan:     65 y/o M hx of CAD (50-60% LCx, OM disease on [facility] 2/29), EF 10-15% s/p AICD, HTN, DM2, HLD, obesity, CKD. Recent admission (2/29-3/13) for cardiogenic shock with Impella. A C showed 50-60% LCx, OM disease; no PCI performed. RHC with LVEDP 35. CO 3, CI 1.8, tte w LVEF 10-15%. Had intermittent suction alarms that improved w/ P6->P4. Weaned off impella and Dobutamine. Started on Hydralazine/isordil and Lasix QOD. TTE 3/2 EF 10%, LVIDD 6, TAPSE 1.98, Mod MR, Mod TR. No advanced options due to medical instability/renal failure.     His brother picked him up from the hospital to take him to his home in [place], [place], on 3/13. He was discharged without Metformin due to ANGEL. Pharmacy did not dispense ISDN included in his discharge instructions but he noticed this at home, where he felt unusually tired to minimal activity such as packing his bag of clothes and had to ask daughter to pack for him. He felt early satiety with bloated feeling during a chicken meal. There was associated  ""gas pain" with bloating that improved after passing flatus, dizziness, dyspnea, cough and malaise. His glucose levels increased to 390 and he called an RN who recommended to go to the [facility] ER. There he was noted in acute distress and started on BiPAP and given a dose of Lasix IV 80 mg. Was given Zosyn IV and transferred to our center out of concern for ADHF. A traumatic medina insertion was done followed with salmon colored urine that the providers there said was "secondary to the antibiotic".     He denies sick contacts but mentions having a 4-5 day history of a non-productive cough without fever that is ongoing and was present throughout the course of his recent hospital stay. Scant sputum is yellow and blood tinged. No chest pain or dyspnea at rest. Last BM was on 3/13 and it was dark green but well formed. At the time of our visit we provided him with a mask to wear during his physical exam.     * Suspected Covid-19 Virus Infection  Developed acute dyspnea on the grounds of URI symptoms. Negative Flu/RIP test. Leukocytosis with lymphopenia. ALT is elevated (not previously noted). Bilateral perihilar densities described as "Interval development of airspace opacification in the perihilar areas bilaterally since March 9, 2020"   -RIP/Flu negative  -?superimposed bacterial PNA  -COVID-19 testing in process   -Droplet isolation/precautions  -ID and infection control consultation   -Blood culture NGTD  -D-dimer is positive   -Sputum culture with multiple ana maría       Acute decompensated heart failure  ICMP with recent D/C after cardiogenic shock in stable hemodynamic conditions.   Received diuretic a OSH and will monitor response throughout the day.  -BNP is 833, troponin 0.2 and CRP 20  -Received Lasix 80 mg IV at OSH (PTA Lasix 20 mg QOD)   -Continue Isordil/Hydralazine   -TSH was recently normal  -Strict I/O       Type 2 diabetes mellitus, without long-term current use of insulin  Developed " hyperglycemia at home   -Will need to replace Metformin unless ANGEL recovers further or maintains euglycemia with minimal requirements   -Consultation to Endocrine when near discharge  -SSI during IP    CAD (coronary artery disease)  Continue ASA/Statin/Eliquis  -Troponin in downtrend  -EKG without acute ischemic changes    ANGEL (acute kidney injury)  Has CKD with unclear baseline. Scr slowly improving since previous admission.   -Continue to hold Lasix and cont to monitor creatinine.   -I/O and assess diuretic needs on daily basis         Stuart Viera MD  Heart Transplant  [facility]

## 2020-03-17 VITALS
HEART RATE: 102 BPM | HEIGHT: 64 IN | BODY MASS INDEX: 24.45 KG/M2 | WEIGHT: 143.19 LBS | OXYGEN SATURATION: 99 % | RESPIRATION RATE: 18 BRPM | TEMPERATURE: 99 F | DIASTOLIC BLOOD PRESSURE: 78 MMHG | SYSTOLIC BLOOD PRESSURE: 125 MMHG

## 2020-03-17 LAB
ALBUMIN SERPL BCP-MCNC: 3.1 G/DL (ref 3.5–5.2)
ALP SERPL-CCNC: 78 U/L (ref 55–135)
ALT SERPL W/O P-5'-P-CCNC: 42 U/L (ref 10–44)
ANION GAP SERPL CALC-SCNC: 9 MMOL/L (ref 8–16)
AST SERPL-CCNC: 27 U/L (ref 10–40)
BASOPHILS # BLD AUTO: 0.06 K/UL (ref 0–0.2)
BASOPHILS NFR BLD: 0.7 % (ref 0–1.9)
BILIRUB SERPL-MCNC: 0.5 MG/DL (ref 0.1–1)
BUN SERPL-MCNC: 21 MG/DL (ref 8–23)
CALCIUM SERPL-MCNC: 8.4 MG/DL (ref 8.7–10.5)
CHLORIDE SERPL-SCNC: 103 MMOL/L (ref 95–110)
CO2 SERPL-SCNC: 22 MMOL/L (ref 23–29)
CREAT SERPL-MCNC: 2.2 MG/DL (ref 0.5–1.4)
DIFFERENTIAL METHOD: ABNORMAL
EOSINOPHIL # BLD AUTO: 0.1 K/UL (ref 0–0.5)
EOSINOPHIL NFR BLD: 1.3 % (ref 0–8)
ERYTHROCYTE [DISTWIDTH] IN BLOOD BY AUTOMATED COUNT: 14.6 % (ref 11.5–14.5)
EST. GFR  (AFRICAN AMERICAN): 35.5 ML/MIN/1.73 M^2
EST. GFR  (NON AFRICAN AMERICAN): 30.7 ML/MIN/1.73 M^2
GLUCOSE SERPL-MCNC: 185 MG/DL (ref 70–110)
HCT VFR BLD AUTO: 23.7 % (ref 40–54)
HGB BLD-MCNC: 7.6 G/DL (ref 14–18)
IMM GRANULOCYTES # BLD AUTO: 0.07 K/UL (ref 0–0.04)
IMM GRANULOCYTES NFR BLD AUTO: 0.8 % (ref 0–0.5)
LYMPHOCYTES # BLD AUTO: 1.4 K/UL (ref 1–4.8)
LYMPHOCYTES NFR BLD: 16.5 % (ref 18–48)
MAGNESIUM SERPL-MCNC: 1.7 MG/DL (ref 1.6–2.6)
MCH RBC QN AUTO: 29.9 PG (ref 27–31)
MCHC RBC AUTO-ENTMCNC: 32.1 G/DL (ref 32–36)
MCV RBC AUTO: 93 FL (ref 82–98)
MONOCYTES # BLD AUTO: 0.9 K/UL (ref 0.3–1)
MONOCYTES NFR BLD: 10.2 % (ref 4–15)
NEUTROPHILS # BLD AUTO: 5.9 K/UL (ref 1.8–7.7)
NEUTROPHILS NFR BLD: 70.5 % (ref 38–73)
NRBC BLD-RTO: 0 /100 WBC
PHOSPHATE SERPL-MCNC: 3.1 MG/DL (ref 2.7–4.5)
PLATELET # BLD AUTO: 320 K/UL (ref 150–350)
PMV BLD AUTO: 12.8 FL (ref 9.2–12.9)
POCT GLUCOSE: 124 MG/DL (ref 70–110)
POCT GLUCOSE: 150 MG/DL (ref 70–110)
POTASSIUM SERPL-SCNC: 4.3 MMOL/L (ref 3.5–5.1)
PROT SERPL-MCNC: 6.6 G/DL (ref 6–8.4)
RBC # BLD AUTO: 2.54 M/UL (ref 4.6–6.2)
SODIUM SERPL-SCNC: 134 MMOL/L (ref 136–145)
WBC # BLD AUTO: 8.36 K/UL (ref 3.9–12.7)

## 2020-03-17 PROCEDURE — 99233 PR SUBSEQUENT HOSPITAL CARE,LEVL III: ICD-10-PCS | Mod: NTX,,, | Performed by: INTERNAL MEDICINE

## 2020-03-17 PROCEDURE — 36415 COLL VENOUS BLD VENIPUNCTURE: CPT | Mod: NTX

## 2020-03-17 PROCEDURE — 84100 ASSAY OF PHOSPHORUS: CPT | Mod: NTX

## 2020-03-17 PROCEDURE — 80053 COMPREHEN METABOLIC PANEL: CPT | Mod: NTX

## 2020-03-17 PROCEDURE — 99233 SBSQ HOSP IP/OBS HIGH 50: CPT | Mod: NTX,,, | Performed by: INTERNAL MEDICINE

## 2020-03-17 PROCEDURE — 83735 ASSAY OF MAGNESIUM: CPT | Mod: NTX

## 2020-03-17 PROCEDURE — 85025 COMPLETE CBC W/AUTO DIFF WBC: CPT | Mod: NTX

## 2020-03-17 PROCEDURE — 25000003 PHARM REV CODE 250: Mod: NTX | Performed by: INTERNAL MEDICINE

## 2020-03-17 RX ADMIN — PANTOPRAZOLE SODIUM 40 MG: 40 TABLET, DELAYED RELEASE ORAL at 08:03

## 2020-03-17 RX ADMIN — ATORVASTATIN CALCIUM 80 MG: 20 TABLET, FILM COATED ORAL at 08:03

## 2020-03-17 RX ADMIN — AMIODARONE HYDROCHLORIDE 400 MG: 200 TABLET ORAL at 08:03

## 2020-03-17 RX ADMIN — POTASSIUM CHLORIDE 20 MEQ: 1500 TABLET, EXTENDED RELEASE ORAL at 08:03

## 2020-03-17 RX ADMIN — ISOSORBIDE DINITRATE 40 MG: 40 TABLET ORAL at 03:03

## 2020-03-17 RX ADMIN — ISOSORBIDE DINITRATE 40 MG: 40 TABLET ORAL at 05:03

## 2020-03-17 RX ADMIN — APIXABAN 5 MG: 5 TABLET, FILM COATED ORAL at 08:03

## 2020-03-17 RX ADMIN — HYDRALAZINE HYDROCHLORIDE 100 MG: 50 TABLET, FILM COATED ORAL at 03:03

## 2020-03-17 RX ADMIN — ASPIRIN 81 MG CHEWABLE TABLET 81 MG: 81 TABLET CHEWABLE at 08:03

## 2020-03-17 RX ADMIN — HYDRALAZINE HYDROCHLORIDE 100 MG: 50 TABLET, FILM COATED ORAL at 05:03

## 2020-03-17 NOTE — PROGRESS NOTES
[facility]  Heart Transplant  Progress Note    Patient Name: John Javier  MRN: [id number]  Admission Date: 3/14/2020  Hospital Length of Stay: 3 days  Attending Physician: Lizzy Cavanaugh MD  Primary Care Provider: To Obtain Unable  Principal Problem:Suspected Covid-19 Virus Infection    Subjective:     Interval History: No fevers reported. Denies SOB, fevers.     Continuous Infusions:  Scheduled Meds:   amiodarone  400 mg Oral BID    apixaban  5 mg Oral BID    aspirin  81 mg Oral Daily    atorvastatin  80 mg Oral Daily    hydrALAZINE  100 mg Oral Q8H    isosorbide dinitrate  40 mg Oral Q8H    pantoprazole  40 mg Oral Daily    potassium chloride SA  20 mEq Oral Daily    senna-docusate 8.6-50 mg  1 tablet Oral BID     PRN Meds:acetaminophen, Dextrose 10% Bolus, Dextrose 10% Bolus, glucagon (human recombinant), glucose, glucose, influenza, insulin aspart U-100, pneumoc 13-louis conj-dip cr(PF), simethicone    Review of patient's allergies indicates:   Allergen Reactions    Penicillins      Pt stated his mother said it will kill him- always inform to take ampicillin     Objective:     Vital Signs (Most Recent):  Temp: 98.6 °F (37 °C) (03/17/20 0913)  Pulse: 90 (03/17/20 1117)  Resp: 18 (03/17/20 0913)  BP: 114/62 (03/17/20 0913)  SpO2: 95 % (03/17/20 0913) Vital Signs (24h Range):  Temp:  [97.8 °F (36.6 °C)-99.3 °F (37.4 °C)] 98.6 °F (37 °C)  Pulse:  [] 90  Resp:  [18-20] 18  SpO2:  [95 %-100 %] 95 %  BP: (103-124)/(55-62) 114/62     Patient Vitals for the past 72 hrs (Last 3 readings):   Weight   03/15/20 0800 64.9 kg (143 lb 3 oz)   03/14/20 2127 66.9 kg (147 lb 7.8 oz)     Body mass index is 24.58 kg/m².      Intake/Output Summary (Last 24 hours) at 3/17/2020 1221  Last data filed at 3/17/2020 0500  Gross per 24 hour   Intake 480 ml   Output 2000 ml   Net -1520 ml       Hemodynamic Parameters:       Telemetry:  NSR    Physical Exam  Constitutional: He is oriented to person, place,  and time. He appears well-developed and well-nourished.   HENT:   Head: Normocephalic and atraumatic.   Eyes: Right eye exhibits no discharge. Left eye exhibits no discharge.   Neck: Normal range of motion. No JVD present.   Cardiovascular: Normal rate and regular rhythm. Exam reveals distant heart sounds. Exam reveals no gallop.   No murmur heard.  Pulmonary/Chest:  CTA   Abdominal: Soft. Bowel sounds are normal. . He exhibits no mass. There is no tenderness.   Musculoskeletal: Normal range of motion. He exhibits no edema.   Neurological: He is alert and oriented to person, place, and time.   Skin: Skin is warm and dry. Capillary refill takes less than 2 seconds. No erythema.   Psychiatric: He has a normal mood and affect.  Significant Labs:  CBC:  Recent Labs   Lab 03/15/20  0404 03/16/20  0358 03/17/20  0344   WBC 13.53* 10.04 8.36   RBC 2.71* 2.63* 2.54*   HGB 7.9* 7.7* 7.6*   HCT 25.3* 24.5* 23.7*   * 357* 320   MCV 93 93 93   MCH 29.2 29.3 29.9   MCHC 31.2* 31.4* 32.1     BNP:  Recent Labs   Lab 03/14/20  1116   *     CMP:  Recent Labs   Lab 03/15/20  0404 03/16/20 0358 03/17/20  0344   * 140* 185*   CALCIUM 8.3* 8.5* 8.4*   ALBUMIN 2.9* 3.3* 3.1*   PROT 6.2 6.9 6.6    136 134*   K 4.8 4.3 4.3   CO2 22* 22* 22*    104 103   BUN 31* 27* 21   CREATININE 2.3* 2.2* 2.2*   ALKPHOS 78 79 78   ALT 40 38 42   AST 24 22 27   BILITOT 0.6 0.5 0.5      Coagulation:   Recent Labs   Lab 03/11/20  0320 03/12/20  0448 03/13/20  0446   APTT 52.5* 57.4* 30.0     LDH:  No results for input(s): LDH in the last 72 hours.  Microbiology:  Microbiology Results (last 7 days)     Procedure Component Value Units Date/Time    Blood culture [[id number]] Collected:  03/14/20 1341    Order Status:  Completed Specimen:  Blood Updated:  03/16/20 1612     Blood Culture, Routine No Growth to date      No Growth to date      No Growth to date    Blood culture [[id number]] Collected:  03/14/20 1341    Order Status:   Completed Specimen:  Blood Updated:  03/16/20 1612     Blood Culture, Routine No Growth to date      No Growth to date      No Growth to date    Culture, Respiratory with Gram Stain [[id number]] Collected:  03/14/20 1556    Order Status:  Completed Specimen:  Respiratory from Sputum, Expectorated Updated:  03/16/20 0945     Respiratory Culture Normal respiratory ana maría      No S aureus or Pseudomonas isolated.     Gram Stain (Respiratory) >10 epithelial cells per low power field     Gram Stain (Respiratory) Many WBC's     Gram Stain (Respiratory) Many Gram positive cocci     Gram Stain (Respiratory) Many Gram negative diplococci     Gram Stain (Respiratory) Few Gram negative rods     Gram Stain (Respiratory) Few Gram positive rods    Urine culture [[id number]] Collected:  03/14/20 1556    Order Status:  Completed Specimen:  Urine Updated:  03/15/20 2125     Urine Culture, Routine No growth    Narrative:       Preferred Collection Type->Urine, Clean Catch    Respiratory Infection Panel (PCR), Nasopharyngeal [[id number]] Collected:  03/14/20 1203    Order Status:  Completed Specimen:  Nasopharyngeal Swab Updated:  03/14/20 2134     Respiratory Infection Panel Source NP Swab     Adenovirus Not Detected     Coronavirus 229E, Common Cold Virus Not Detected     Coronavirus HKU1, Common Cold Virus Not Detected     Coronavirus NL63, Common Cold Virus Not Detected     Coronavirus OC43, Common Cold Virus Not Detected     Comment: The Coronavirus strains detected in this test cause the common cold.  These strains are not the COVID-19 (novel Coronavirus)strain   associated with the respiratory disease outbreak.          Human Metapneumovirus Not Detected     Human Rhinovirus/Enterovirus Not Detected     Influenza A (subtypes H1, H1-2009,H3) Not Detected     Influenza B Not Detected     Parainfluenza Virus 1 Not Detected     Parainfluenza Virus 2 Not Detected     Parainfluenza Virus 3 Not Detected     Parainfluenza Virus 4 Not  Detected     Respiratory Syncytial Virus Not Detected     Bordetella Parapertussis (XL5542) Not Detected     Bordetella pertussis (ptxP) Not Detected     Chlamydia pneumoniae Not Detected     Mycoplasma pneumoniae Not Detected     Comment: Respiratory Infection Panel testing performed by Multiplex PCR.       Narrative:       For all other respiratory sources, order [id number] -  Respiratory Viral Panel by PCR    Influenza A & B by Molecular [[id number]] Collected:  03/14/20 1203    Order Status:  Completed Specimen:  Nasopharyngeal Swab Updated:  03/14/20 1248     Influenza A, Molecular Negative     Influenza B, Molecular Negative     Flu A & B Source Nasal swab          I have reviewed all pertinent labs within the past 24 hours.    Estimated Creatinine Clearance: 28.8 mL/min (A) (based on SCr of 2.2 mg/dL (H)).    Diagnostic Results:  I have reviewed and interpreted all pertinent imaging results/findings within the past 24 hours.    Assessment and Plan:     63 y/o M hx of CAD (50-60% LCx, OM disease on [facility] 2/29), EF 10-15% s/p AICD, HTN, DM2, HLD, obesity, CKD. Recent admission (2/29-3/13) for cardiogenic shock with Impella. A LHC showed 50-60% LCx, OM disease; no PCI performed. RHC with LVEDP 35. CO 3, CI 1.8, tte w LVEF 10-15%. Had intermittent suction alarms that improved w/ P6->P4. Weaned off impella and Dobutamine. Started on Hydralazine/isordil and Lasix QOD. TTE 3/2 EF 10%, LVIDD 6, TAPSE 1.98, Mod MR, Mod TR. No advanced options due to medical instability/renal failure.     His brother picked him up from the hospital to take him to his home in [place], [place], on 3/13. He was discharged without Metformin due to ANGEL. Pharmacy did not dispense ISDN included in his discharge instructions but he noticed this at home, where he felt unusually tired to minimal activity such as packing his bag of clothes and had to ask daughter to pack for him. He felt early satiety with bloated feeling during a chicken meal. There  "was associated "gas pain" with bloating that improved after passing flatus, dizziness, dyspnea, cough and malaise. His glucose levels increased to 390 and he called an [facility] There he was noted in acute distress and started on BiPAP and given a dose of Lasix IV 80 mg. Was given Zosyn IV and transferred to our center out of concern for ADHF. A traumatic medina insertion was done followed with salmon colored urine that the providers there said was "secondary to the antibiotic".     He denies sick contacts but mentions having a 4-5 day history of a non-productive cough without fever that is ongoing and was present throughout the course of his recent hospital stay. Scant sputum is yellow and blood tinged. No chest pain or dyspnea at rest. Last BM was on 3/13 and it was dark green but well formed. At the time of our visit we provided him with a mask to wear during his physical exam.     * Suspected Covid-19 Virus Infection  Developed acute dyspnea on the grounds of URI symptoms. Negative Flu/RIP test. Leukocytosis with lymphopenia. ALT is elevated (not previously noted). Bilateral perihilar densities described as "Interval development of airspace opacification in the perihilar areas bilaterally since March 9, 2020"   -RIP/Flu negative  -COVID-19 testing in process   -Droplet isolation/precautions  -ID and infection control consultation   -Blood culture NGTD  -D-dimer is positive   -Sputum culture with multiple ana maría   - Will DC home today       Acute decompensated heart failure  ICMP with recent D/C after cardiogenic shock in stable hemodynamic conditions.   Received diuretic a OSH and will monitor response throughout the day.  -BNP is 833, troponin 0.2 and CRP 20  -Received Lasix 80 mg IV at OSH (PTA Lasix 20 mg QOD)   -Continue to Lasix to allow for renal recovery  -Continue Isordil/Hydralazine   -TSH was recently normal  -Strict I/O  -Remove Medina     Type 2 diabetes " mellitus, without long-term current use of insulin  Developed hyperglycemia at home   -Will need to replace Metformin unless ANGEL recovers further or maintains euglycemia with minimal requirements   -Consultation to Endocrine when near discharge  -SSI during IP    CAD (coronary artery disease)  Continue ASA/Statin/Eliquis  -Troponin in downtrend  -EKG without acute ischemic changes    ANGEL (acute kidney injury)  Has CKD with unclear baseline. Scr slowly improving since previous admission.   -Continue to hold Lasix   -I/O and assess diuretic needs on daily basis  -At home on Lasix 20 mg QOD        Stuart Viera MD  Heart Transplant  [facility]

## 2020-03-17 NOTE — NURSING
Pt discharged per MD orders.  Tele discontinued and returned to station.  IV discontinued; catheter tip intact 1.  Medication list and prescriptions reviewed; prescriptions sent to pt preferred pharmacy and printed prescriptions provided.  Pt verbalizes understanding of all written and verbal discharge instructions.  Pt wheeled down by RN in wheelchair to go to front of hospital. Pt wearing N95 mask upon exiting.

## 2020-03-17 NOTE — SUBJECTIVE & OBJECTIVE
Interval History: No fevers reported. Denies SOB, fevers.     Continuous Infusions:  Scheduled Meds:   amiodarone  400 mg Oral BID    apixaban  5 mg Oral BID    aspirin  81 mg Oral Daily    atorvastatin  80 mg Oral Daily    hydrALAZINE  100 mg Oral Q8H    isosorbide dinitrate  40 mg Oral Q8H    pantoprazole  40 mg Oral Daily    potassium chloride SA  20 mEq Oral Daily    senna-docusate 8.6-50 mg  1 tablet Oral BID     PRN Meds:acetaminophen, Dextrose 10% Bolus, Dextrose 10% Bolus, glucagon (human recombinant), glucose, glucose, influenza, insulin aspart U-100, pneumoc 13-louis conj-dip cr(PF), simethicone    Review of patient's allergies indicates:   Allergen Reactions    Penicillins      Pt stated his mother said it will kill him- always inform to take ampicillin     Objective:     Vital Signs (Most Recent):  Temp: 98.6 °F (37 °C) (03/17/20 0913)  Pulse: 90 (03/17/20 1117)  Resp: 18 (03/17/20 0913)  BP: 114/62 (03/17/20 0913)  SpO2: 95 % (03/17/20 0913) Vital Signs (24h Range):  Temp:  [97.8 °F (36.6 °C)-99.3 °F (37.4 °C)] 98.6 °F (37 °C)  Pulse:  [] 90  Resp:  [18-20] 18  SpO2:  [95 %-100 %] 95 %  BP: (103-124)/(55-62) 114/62     Patient Vitals for the past 72 hrs (Last 3 readings):   Weight   03/15/20 0800 64.9 kg (143 lb 3 oz)   03/14/20 2127 66.9 kg (147 lb 7.8 oz)     Body mass index is 24.58 kg/m².      Intake/Output Summary (Last 24 hours) at 3/17/2020 1221  Last data filed at 3/17/2020 0500  Gross per 24 hour   Intake 480 ml   Output 2000 ml   Net -1520 ml       Hemodynamic Parameters:       Telemetry:  NSR    Physical Exam  Constitutional: He is oriented to person, place, and time. He appears well-developed and well-nourished.   HENT:   Head: Normocephalic and atraumatic.   Eyes: Right eye exhibits no discharge. Left eye exhibits no discharge.   Neck: Normal range of motion. No JVD present.   Cardiovascular: Normal rate and regular rhythm. Exam reveals distant heart sounds. Exam reveals no  gallop.   No murmur heard.  Pulmonary/Chest:  CTA   Abdominal: Soft. Bowel sounds are normal. . He exhibits no mass. There is no tenderness.   Musculoskeletal: Normal range of motion. He exhibits no edema.   Neurological: He is alert and oriented to person, place, and time.   Skin: Skin is warm and dry. Capillary refill takes less than 2 seconds. No erythema.   Psychiatric: He has a normal mood and affect.  Significant Labs:  CBC:  Recent Labs   Lab 03/15/20  0404 03/16/20  0358 03/17/20  0344   WBC 13.53* 10.04 8.36   RBC 2.71* 2.63* 2.54*   HGB 7.9* 7.7* 7.6*   HCT 25.3* 24.5* 23.7*   * 357* 320   MCV 93 93 93   MCH 29.2 29.3 29.9   MCHC 31.2* 31.4* 32.1     BNP:  Recent Labs   Lab 03/14/20  1116   *     CMP:  Recent Labs   Lab 03/15/20  0404 03/16/20  0358 03/17/20  0344   * 140* 185*   CALCIUM 8.3* 8.5* 8.4*   ALBUMIN 2.9* 3.3* 3.1*   PROT 6.2 6.9 6.6    136 134*   K 4.8 4.3 4.3   CO2 22* 22* 22*    104 103   BUN 31* 27* 21   CREATININE 2.3* 2.2* 2.2*   ALKPHOS 78 79 78   ALT 40 38 42   AST 24 22 27   BILITOT 0.6 0.5 0.5      Coagulation:   Recent Labs   Lab 03/11/20  0320 03/12/20  0448 03/13/20  0446   APTT 52.5* 57.4* 30.0     LDH:  No results for input(s): LDH in the last 72 hours.  Microbiology:  Microbiology Results (last 7 days)     Procedure Component Value Units Date/Time    Blood culture [[id number]] Collected:  03/14/20 1341    Order Status:  Completed Specimen:  Blood Updated:  03/16/20 1612     Blood Culture, Routine No Growth to date      No Growth to date      No Growth to date    Blood culture [[id number]] Collected:  03/14/20 1341    Order Status:  Completed Specimen:  Blood Updated:  03/16/20 1612     Blood Culture, Routine No Growth to date      No Growth to date      No Growth to date    Culture, Respiratory with Gram Stain [[id number]] Collected:  03/14/20 8186    Order Status:  Completed Specimen:  Respiratory from Sputum, Expectorated Updated:  03/16/20 0980      Respiratory Culture Normal respiratory ana maría      No S aureus or Pseudomonas isolated.     Gram Stain (Respiratory) >10 epithelial cells per low power field     Gram Stain (Respiratory) Many WBC's     Gram Stain (Respiratory) Many Gram positive cocci     Gram Stain (Respiratory) Many Gram negative diplococci     Gram Stain (Respiratory) Few Gram negative rods     Gram Stain (Respiratory) Few Gram positive rods    Urine culture [[id number]] Collected:  03/14/20 1556    Order Status:  Completed Specimen:  Urine Updated:  03/15/20 2125     Urine Culture, Routine No growth    Narrative:       Preferred Collection Type->Urine, Clean Catch    Respiratory Infection Panel (PCR), Nasopharyngeal [[id number]] Collected:  03/14/20 1203    Order Status:  Completed Specimen:  Nasopharyngeal Swab Updated:  03/14/20 2134     Respiratory Infection Panel Source NP Swab     Adenovirus Not Detected     Coronavirus 229E, Common Cold Virus Not Detected     Coronavirus HKU1, Common Cold Virus Not Detected     Coronavirus NL63, Common Cold Virus Not Detected     Coronavirus OC43, Common Cold Virus Not Detected     Comment: The Coronavirus strains detected in this test cause the common cold.  These strains are not the COVID-19 (novel Coronavirus)strain   associated with the respiratory disease outbreak.          Human Metapneumovirus Not Detected     Human Rhinovirus/Enterovirus Not Detected     Influenza A (subtypes H1, H1-2009,H3) Not Detected     Influenza B Not Detected     Parainfluenza Virus 1 Not Detected     Parainfluenza Virus 2 Not Detected     Parainfluenza Virus 3 Not Detected     Parainfluenza Virus 4 Not Detected     Respiratory Syncytial Virus Not Detected     Bordetella Parapertussis (KA9626) Not Detected     Bordetella pertussis (ptxP) Not Detected     Chlamydia pneumoniae Not Detected     Mycoplasma pneumoniae Not Detected     Comment: Respiratory Infection Panel testing performed by Multiplex PCR.       Narrative:        For all other respiratory sources, order [id number] -  Respiratory Viral Panel by PCR    Influenza A & B by Molecular [[id number]] Collected:  03/14/20 1203    Order Status:  Completed Specimen:  Nasopharyngeal Swab Updated:  03/14/20 1248     Influenza A, Molecular Negative     Influenza B, Molecular Negative     Flu A & B Source Nasal swab          I have reviewed all pertinent labs within the past 24 hours.    Estimated Creatinine Clearance: 28.8 mL/min (A) (based on SCr of 2.2 mg/dL (H)).    Diagnostic Results:  I have reviewed and interpreted all pertinent imaging results/findings within the past 24 hours.

## 2020-03-17 NOTE — HOSPITAL COURSE
Patient was kept in Airborne and contact isolation. COVID 19 was sent and results are still pending. Patient never did spike fever During his hospital stay. He has Leukocytosis on presentation but that got improved with him being placed on any antibiotics. Also SOB got better since he was hospitalized and was saturating above 96% on RA.  Patient will be discharged home with surgical mask today and he will follow self quarantine measures at home until test results comes back. He will also continue to follow up with heart failure clinic

## 2020-03-17 NOTE — PLAN OF CARE
63 y/o male with a history  CAD, HFrEF (EF=10-15%) s/p AICD, HTN, DM2, HLD, obesity and CKD.  He as re-admitted to the hospital following a prolonged 2 week stay due to heart failure.  I was consulted to assist with questions around COVID testing.  I wasn't able to see him today.  His chart was reviewed.  He is afebrile and his WBC count has returned to normal.  COVID testing is pending.  Will follow along.

## 2020-03-17 NOTE — PLAN OF CARE
Problem: Adult Inpatient Plan of Care  Goal: Plan of Care Review  Outcome: Ongoing, Progressing   Pt free of falls/traumas/injuries. Skin remains clean, dry, and intact.  Pt re-educated on importance of measuring accurate intake and out put; pt verbalized and demonstrates understanding. Pt being tested to r/o COVID-19, no results just yet. Reviewed plan of care with pt; and pt verbalized understanding.  Pt AAOX4, VSS, and in no distress will continue to monitor.

## 2020-03-17 NOTE — DISCHARGE SUMMARY
"[facility]  Heart Transplant  Discharge Summary      Patient Name: John Javier  MRN: [id number]  Admission Date: 3/14/2020  Hospital Length of Stay: 3 days  Discharge Date and Time: 03/17/2020 12:32 PM  Attending Physician: Lizzy Cavanaugh MD   Discharging Provider: Stuart Viera MD  Primary Care Provider: To Obtain Unable     HPI: 65 y/o M hx of CAD (50-60% LCx, OM disease on [facility] 2/29), EF 10-15% s/p AICD, HTN, DM2, HLD, obesity, CKD. Recent admission (2/29-3/13) for cardiogenic shock with Impella. A [facility] showed 50-60% LCx, OM disease; no PCI performed. RHC with LVEDP 35. CO 3, CI 1.8, tte w LVEF 10-15%. Had intermittent suction alarms that improved w/ P6->P4. Weaned off impella and Dobutamine. Started on Hydralazine/isordil and Lasix QOD. TTE 3/2 EF 10%, LVIDD 6, TAPSE 1.98, Mod MR, Mod TR. No advanced options due to medical instability/renal failure.     His brother picked him up from the hospital to take him to his home in [place], [place], on 3/13. He was discharged without Metformin due to ANGEL. Pharmacy did not dispense ISDN included in his discharge instructions but he noticed this at home, where he felt unusually tired to minimal activity such as packing his bag of clothes and had to ask daughter to pack for him. He felt early satiety with bloated feeling during a chicken meal. There was associated "gas pain" with bloating that improved after passing flatus, dizziness, dyspnea, cough and malaise. His glucose levels increased to 390 and he called an RN who recommended to go to the [facility] ER. There he was noted in acute distress and started on BiPAP and given a dose of Lasix IV 80 mg. Was given Zosyn IV and transferred to our center out of concern for ADHF. A traumatic medina insertion was done followed with salmon colored urine that the providers there said was "secondary to the antibiotic".     He denies sick contacts but mentions having a 4-5 day history of a non-productive " cough without fever that is ongoing and was present throughout the course of his recent hospital stay. Scant sputum is yellow and blood tinged. No chest pain or dyspnea at rest. Last BM was on 3/13 and it was dark green but well formed. At the time of our visit we provided him with a mask to wear during his physical exam.     * No surgery found *     Hospital Course: Patient was kept in Airborne and contact isolation. COVID 19 was sent and results are still pending. Patient never did spike fever During his hospital stay. He has Leukocytosis on presentation but that got improved with him being placed on any antibiotics. Also SOB got better since he was hospitalized and was saturating above 96% on RA.  Patient will be discharged home with surgical mask today and he will follow self quarantine measures at home until test results comes back. He will also continue to follow up with heart failure clinic     Consults (From admission, onward)        Status Ordering Provider     Inpatient consult to Infection Control (Nurse)  Once     Provider:  (Not yet assigned)    Acknowledged ALEENA DELANEY     Inpatient consult to Infectious Diseases  Once     Provider:  (Not yet assigned)    Completed ALEENA DELANEY          Significant Diagnostic Studies: Labs:   CMP   Recent Labs   Lab 03/16/20  0358 03/17/20  0344    134*   K 4.3 4.3    103   CO2 22* 22*   * 185*   BUN 27* 21   CREATININE 2.2* 2.2*   CALCIUM 8.5* 8.4*   PROT 6.9 6.6   ALBUMIN 3.3* 3.1*   BILITOT 0.5 0.5   ALKPHOS 79 78   AST 22 27   ALT 38 42   ANIONGAP 10 9   ESTGFRAFRICA 35.5* 35.5*   EGFRNONAA 30.7* 30.7*    and CBC   Recent Labs   Lab 03/16/20  0358 03/17/20  0344   WBC 10.04 8.36   HGB 7.7* 7.6*   HCT 24.5* 23.7*   * 320       Pending Diagnostic Studies:     Procedure Component Value Units Date/Time    SARS- CoV-2 (COVID-19) QUALITATIVE PCR [[id number]] Collected:  03/14/20 1556    Order Status:  Sent Lab Status:  In process  Updated:  03/14/20 1603    Specimen:  Nasopharyngeal         Final Active Diagnoses:    Diagnosis Date Noted POA    PRINCIPAL PROBLEM:  Suspected Covid-19 Virus Infection [R68.89] 03/14/2020 Yes    Acute decompensated heart failure [I50.9] 03/14/2020 Yes    Type 2 diabetes mellitus, without long-term current use of insulin [E11.9] 03/01/2020 Yes    ANGEL (acute kidney injury) [N17.9] 03/01/2020 Yes    CAD (coronary artery disease) [I25.10] 03/01/2020 Yes      Problems Resolved During this Admission:      Discharged Condition: fair    Disposition: Home or Self Care    Follow Up:    Patient Instructions:      Comprehensive metabolic panel   Standing Status: Future Standing Exp. Date: 05/16/21     CBC auto differential   Standing Status: Future Standing Exp. Date: 05/16/21     Other restrictions (specify):   Order Comments: Airborne, contact precautions until COVID 19 result comes back.     Medications:  Reconciled Home Medications:      Medication List      START taking these medications    SITagliptin 25 MG Tab  Commonly known as:  JANUVIA  Take 1 tablet (25 mg total) by mouth once daily.        CONTINUE taking these medications    amiodarone 400 MG tablet  Commonly known as:  PACERONE  Take 1 tablet 400mg twice daily through 3/24/20, then 1 tablet (400mg) by mouth daily.     aspirin 81 MG Chew  Take 1 tablet (81 mg total) by mouth once daily.     atorvastatin 80 MG tablet  Commonly known as:  LIPITOR  Take 1 tablet (80 mg total) by mouth once daily.     ELIQUIS 5 mg Tab  Generic drug:  apixaban  Take 1 tablet (5 mg total) by mouth 2 (two) times daily.     furosemide 20 MG tablet  Commonly known as:  LASIX  Take 1 tablet (20 mg total) by mouth every other day.     hydrALAZINE 100 MG tablet  Commonly known as:  APRESOLINE  Take 1 tablet (100 mg total) by mouth every 8 (eight) hours.     isosorbide dinitrate 40 MG Tab  Commonly known as:  ISORDIL  Take 1 tablet (40 mg total) by mouth every 8 (eight) hours.      pantoprazole 40 MG tablet  Commonly known as:  PROTONIX  Take 40 mg by mouth once daily.        STOP taking these medications    potassium chloride SA 20 MEQ tablet  Commonly known as:  YARELY REYNOLDS MD  Heart Transplant  [facility]

## 2020-03-19 ENCOUNTER — TELEPHONE (OUTPATIENT)
Dept: TRANSPLANT | Facility: CLINIC | Age: 65
End: 2020-03-19

## 2020-03-19 LAB
BACTERIA BLD CULT: NORMAL
BACTERIA BLD CULT: NORMAL

## 2020-03-19 NOTE — TELEPHONE ENCOUNTER
----- Message from Mikelemily Jimenez sent at 3/19/2020  3:26 PM CDT -----  Contact: pt  Pt called and said that he was released from the hospital on 3/17/20    Pt would like to know if you can suggest something that he can take over the counter for sinusitis. He is clogged up.    Pt can be reached at [phone]

## 2020-03-19 NOTE — TELEPHONE ENCOUNTER
Attempted to reach pt to discuss OTC meds for sinus symptoms.  No answer and no voicemail option.     Primary coordinator notified electronically

## 2020-03-19 NOTE — PHYSICIAN QUERY
PT Name: John Javier  MR #: [id number]    Physician Query Form - Cause and Effect Relationship Clarification      CDS/: Cortney Glover RN              Contact information: [email or web address]    This form is a permanent document in the medical record.     Query Date: March 19, 2020    By submitting this query, we are merely seeking further clarification of documentation. Please utilize your independent clinical judgment when addressing the question(s) below.    The Medical record contains the following:  Supporting Clinical Findings   Location in record   MD Kenna updated. priscila sized hematoma noted to right groin where Impella was removed. no new orders, [facility]                                                                                                  Hematoma   -CT 3/7/20: Right anterior abdominal wall hematoma, abuts the distal most aspect of the rectus sheath.   -Bedside nurse to trace hematoma today with marker for monitoring   -Secondary to impella which was removed   -Continue to monitor closely                                                                          -R groin impella site with hematoma that is stable/no abdominal or back pain.                  Progress Note 3/4       Intensive Care      Progress Note 3/8       Transplant Heart                   Progress Note 3/8       Transplant Heart         Already on heparin gtt from previous episode of paroxysmal afib.     Continued on heparin gtt for impella, weaned to dobutamine 5, gave 500cc bolus in setting of CVP 10 and suction alarms.                                                                                                                                                                                               Care Update 3/4       Transplant Heart    Progress Note 3/5       Transplant Heart                Provider, please clarify if there is any correlation between _____ Hematoma_________ and  ____heparin______________.           Are the conditions:      [  ] Due to or associated with each other     [ X ] Unrelated to each other     [  ] Other (Please Specify): _________________________   [  ] Clinically Undetermined

## 2020-03-20 ENCOUNTER — TELEPHONE (OUTPATIENT)
Dept: TRANSPLANT | Facility: CLINIC | Age: 65
End: 2020-03-20

## 2020-03-20 NOTE — TELEPHONE ENCOUNTER
Returned call to pt's brother, alan, per pt request.   Advised that his covid-19 test is still pending.       Also reviewed prior instructions given to pt this AM with alan regarding sinusitis.    Brother asked again about home O2.  Advised that pt did not qualify for home O2 when he was in the hospital.

## 2020-03-20 NOTE — PHYSICIAN QUERY
"PT Name: John Javier  MR #: [id number]    Physician Query Form - Heart  Condition Clarification     CDS/: Clarence Franks Jr, RN              Contact information:[email or web address]   This form is a permanent document in the medical record.     Query Date: March 20, 2020    By submitting this query, we are merely seeking further clarification of documentation. Please utilize your independent clinical judgment when addressing the question(s) below.    The medical record contains the following   Indicators     Supporting Clinical Findings Location in Medical Record   x BNP BNP is 833, troponin 0.2 and CRP 20 3/14 H&P    EF      Radiology findings     x Echo Results EF 10-15% s/p AICD 3/15 Heart Transplant Progress Note    "Ascites" documented     x "SOB" or "SOMMERS" documented Respiratory: Positive for cough and shortness of breath.   3/14 H&P    "Hypoxia" documented     x Heart Failure documented Acute decompensated heart failure  ICMP with recent D/C after cardiogenic shock in stable hemodynamic conditions    65 y/o male with a history  CAD, HFrEF (EF=10-15%)   3/16 Heart Transplant Progress Note      3/16 Infectious Disease Plan of Care Note   x "Edema" documented Musculoskeletal: Normal range of motion. He exhibits no edema.    3/14 H&P    Diuretics/Meds     x Treatment: Has ANGEL, was given dose of lasix IV at outside hospital ED, however does not appear volume overloaded on exam today.     Low threshold for restarting Lasix as his renal function recovers 3/14 H&P        3/15 Heart Transplant Progress Note    Other:      Heart failure (HF) can be acute, chronic or both. It is generally further specificed as systolic, diastolic, or combined. Lastly, it is important to identify an underlying etiology if known or suspected.     Common clues to acute exacerbation:  Rapidly progressive symptoms (w/in 2 weeks of presentation), using IV diuretics to treat, using supplemental O2, pulmonary edema on Xray, MI " w/in 4 weeks, and/or BNP >500    Systolic Heart Failure: is defined as chart documentation of a left ventricular ejection fraction (LVEF) less than 40%     Diastolic Heart Failure: is defined as a left ventricular ejection fraction (LVEF) greater than 40%   +      Evidence of diastolic dysfunction on echocardiography OR    Right heart catheterization wedge pressure above 12 mm Hg OR    Left heart catheterization left ventricular end diastolic pressure 18 mm Hg or above.    References: *American Heart Association    The clinical guidelines noted below are only system guidelines, and do not replace the providers clinical judgment.     Provider, please specify the diagnosis associated with above clinical findings  Please Clarify the Type and Acuity of Heart Failure    [   ] Chronic Systolic Heart Failure - Pre-existing systolic HF diagnosis.  EF < 40%  without  acute HF symptoms documented    [x   ] Chronic Combined Systolic and Diastolic Heart Failure     [   ] Acute on Chronic Systolic Heart Failure     [  ] Acute on Chronic Combined Systolic and Diastolic Heart Fialure     [   ] Other Type of Heart Failure (please specify type):     [   ] Clinically Undetermined                           Please document in your progress notes daily for the duration of treatment until resolved and include in your discharge summary.

## 2020-03-20 NOTE — TELEPHONE ENCOUNTER
----- Message from Eboni Pettit sent at 3/20/2020 12:15 PM CDT -----  Contact: Diego pt brother [phone]  Pt brother calling for his brother's Coronavirus test results.    Thanks

## 2020-03-20 NOTE — TELEPHONE ENCOUNTER
"Returned call to pt again.  He reports having a family member bring him Claritin.  Advised that plain claritin is ok to take. He should avoid taking any meds with a decongestant (claritin -D).     Pt reports he has chronic sinus issues and gets "stuffy headed" a lot.     Pt also reports getting SOB with too much activity.  Advised pt to take it slow and have frequent rest periods.  If his SOB does not go away with rest, he should call [facility] or be evaluated by his local MD.  Pt voiced understanding.     Pt requested that the above info be given to his brother alan as well but alan is still sleeping at this time.  Pt requested call be placed to alan later today.   Informed pt that  I would attempt to call later today but if alan wishes to call [facility], he is welcomed to do that.   "

## 2020-03-20 NOTE — TELEPHONE ENCOUNTER
Spoke with pt about setting up his [facility] portal so that his visit for 3/27/20 could be switched to a virtual visit.  He advised that his daughter will be helping him to set this up and to download the greta to his smart phone.  He will call back when completed so that visit can be changed.  Provided number to HH agency to reschedule visit; lab to be done per HH.

## 2020-03-20 NOTE — TELEPHONE ENCOUNTER
Spoke with patient and pt's brother separately today.  Both advised that result for Coronavirus is still pending.  Pt advised that he is doing well and encouraged to check back early next week.  Understanding verbalized.

## 2020-03-23 ENCOUNTER — SOCIAL WORK (OUTPATIENT)
Dept: TRANSPLANT | Facility: CLINIC | Age: 65
End: 2020-03-23

## 2020-03-23 ENCOUNTER — DOCUMENTATION ONLY (OUTPATIENT)
Dept: TRANSPLANT | Facility: CLINIC | Age: 65
End: 2020-03-23

## 2020-03-23 ENCOUNTER — TELEPHONE (OUTPATIENT)
Dept: TRANSPLANT | Facility: CLINIC | Age: 65
End: 2020-03-23

## 2020-03-23 NOTE — PROGRESS NOTES
[facility]              Heart Transplant Clinic  [address] [place] [address]              [phone] after hours                    HOME  HEALTH ORDERS  Admit to Home Health     Diagnosis:       Patient Active Problem List   Diagnosis    Cardiogenic shock    ANGEL (acute kidney injury)    CAD (coronary artery disease)    Type 2 diabetes mellitus, without long-term current use of insulin    Acute on chronic combined systolic and diastolic heart failure    VT (ventricular tachycardia)    PAF (paroxysmal atrial fibrillation)    Malnutrition of moderate degree    Hyperphosphatemia    Iron deficiency anemia    Abdominal distention    Hematoma    Positive blood cultures      Patient is homebound due to:   Diet: Low Sodium  Acitivities: As Tolerated     Nursing:   SN to complete comprehensive assessment including routine vital signs. Instruct on disease process and s/s of complications to report to MD. Review/verify medication list sent home with the patient at time of discharge  and instruct patient/caregiver as needed. Frequency may be adjusted depending on start of care date.     Notify MD if SBP > 160 or < 90; DBP > 90 or < 50; HR > 120 or < 50; Temp > 101; Weight gain >3lbs in 1 day or 5lbs in 1 week.     LABS: SN to perform labs: Weekly bmp, mag, bnp starting 3/23/2020     CONSULTS:       Physical Therapy to evaluate and treat. Evaluate for home safety and equipment needs; Establish/upgrade home exercise program. Perform / instruct on therapeutic exercises, gait training, transfer training, and Range of Motion.     Occupational Therapy to evaluate and treat. Evaluate home environment for safety and equipment needs. Perform/Instruct on transfers, ADL training, ROM, and therapeutic exercises.     Send initial Home Health orders to [facility] attending physician on call.  Send follow up questions to [phone] or fax:                                                                                                                       Pre Transplant:   [phone]

## 2020-03-23 NOTE — PROGRESS NOTES
SW rec'd message from pre-coordinator Celinedangelo López that Pt is due for labs today, however HH was not set up. Reviewed hospitalization record from admit 3/14 - no orders or d/c with HH noted. CHELITA requested new orders be placed & faxed orders, d/c summary, and progress note to [facility] (ph: [phone], f: [phone]). Telephone call f/u to confirm - spoke with Kenia who advised that team has rec'd referral and will begin working on it. Return call rec'd from Kenia that they are unable to accept Pt d/t low staffing.     CHELITA made referral to [facility] ([phone]  F: [phone]) who confirmed they can accept [employer] and should be able to take Pt based on address: [address] [place] [address], [place] [address]. Referral faxed attn: Rey. Return call rec'd from Rey advising that DON of HH will not accept Pt at this time.    CHELITA placed TC to Kitty with  [phone] who advised that they are unable to accept Pts in Covid rule-out at this time.    CHELITA notified pre-coordinator Celine Maribel of above in order to look into setting Pt up with lab visits.

## 2020-03-23 NOTE — TELEPHONE ENCOUNTER
Lab order faxed to [facility] for blood draw tomorrow morning.  Spoke with patient and his brother who voiced understanding of need to report for lab in am. Fax confirmation received.

## 2020-03-24 ENCOUNTER — TELEPHONE (OUTPATIENT)
Dept: TRANSPLANT | Facility: CLINIC | Age: 65
End: 2020-03-24

## 2020-03-24 NOTE — TELEPHONE ENCOUNTER
Call returned to further assist pt to prepare for possible video visit on Friday; voice mail not available.

## 2020-03-24 NOTE — TELEPHONE ENCOUNTER
Spoke with patient who advised that he was able to get MyChart on his son's phone and will complete video visit on Friday.  Instruction provided; understanding verbalized.

## 2020-03-25 ENCOUNTER — DOCUMENTATION ONLY (OUTPATIENT)
Dept: TRANSPLANT | Facility: CLINIC | Age: 65
End: 2020-03-25

## 2020-03-25 LAB
BILIRUBIN, TOTAL: 0.5
EXT ALBUMIN: 4
EXT ALT: 38
EXT AST: 20
EXT BUN: 15.4
EXT CALCIUM: 9.5
EXT CHLORIDE: 99
EXT CREATININE: 2.1 MG/DL
EXT GLUCOSE: 205
EXT HEMATOCRIT: 29.3
EXT HEMOGLOBIN: 9.4
EXT PLATELETS: 231
EXT POTASSIUM: 3.9
EXT PROTEIN TOTAL: 7.5
EXT SODIUM: 137 MMOL/L
EXT WBC: 11

## 2020-03-26 ENCOUNTER — TELEPHONE (OUTPATIENT)
Dept: TRANSPLANT | Facility: CLINIC | Age: 65
End: 2020-03-26

## 2020-03-26 LAB — SARS-COV-2 RNA RESP QL NAA+PROBE: NOT DETECTED

## 2020-03-26 NOTE — TELEPHONE ENCOUNTER
Spoke with pt regarding form he requested be faxed.  He is unsure what a [employer] form is. He said it is part of his hospital indemnity for recent hospital stay.   Advised pt that insurance / disability type forms are filled out by medical records dept.   Provided pt with that number to call.

## 2020-03-26 NOTE — TELEPHONE ENCOUNTER
----- Message from Farhana Thomas sent at 3/25/2020  5:50 PM CDT -----  Contact: Pt   Pt is requesting that a copy of pt's UBO4 faxed to [phone]    Pt can be reached at [phone]

## 2020-03-27 ENCOUNTER — OFFICE VISIT (OUTPATIENT)
Dept: TRANSPLANT | Facility: CLINIC | Age: 65
End: 2020-03-27
Attending: INTERNAL MEDICINE
Payer: COMMERCIAL

## 2020-03-27 ENCOUNTER — TELEPHONE (OUTPATIENT)
Dept: TRANSPLANT | Facility: CLINIC | Age: 65
End: 2020-03-27

## 2020-03-27 VITALS
DIASTOLIC BLOOD PRESSURE: 82 MMHG | SYSTOLIC BLOOD PRESSURE: 154 MMHG | HEART RATE: 101 BPM | HEIGHT: 64 IN | WEIGHT: 142 LBS | BODY MASS INDEX: 24.24 KG/M2

## 2020-03-27 DIAGNOSIS — I48.0 PAF (PAROXYSMAL ATRIAL FIBRILLATION): ICD-10-CM

## 2020-03-27 DIAGNOSIS — Z79.899 AT RISK FOR AMIODARONE TOXICITY WITH LONG TERM USE: ICD-10-CM

## 2020-03-27 DIAGNOSIS — E11.9 TYPE 2 DIABETES MELLITUS WITHOUT COMPLICATION, WITHOUT LONG-TERM CURRENT USE OF INSULIN: ICD-10-CM

## 2020-03-27 DIAGNOSIS — I47.20 VT (VENTRICULAR TACHYCARDIA): ICD-10-CM

## 2020-03-27 DIAGNOSIS — I42.0 DILATED CARDIOMYOPATHY: ICD-10-CM

## 2020-03-27 DIAGNOSIS — I13.0 HYPERTENSIVE CARDIOVASCULAR-RENAL DISEASE, STAGE 1-4 OR UNSPECIFIED CHRONIC KIDNEY DISEASE, WITH HEART FAILURE: ICD-10-CM

## 2020-03-27 DIAGNOSIS — Z91.89 AT RISK FOR AMIODARONE TOXICITY WITH LONG TERM USE: ICD-10-CM

## 2020-03-27 DIAGNOSIS — I25.10 CORONARY ARTERY DISEASE INVOLVING NATIVE CORONARY ARTERY OF NATIVE HEART WITHOUT ANGINA PECTORIS: ICD-10-CM

## 2020-03-27 DIAGNOSIS — G47.33 OSA (OBSTRUCTIVE SLEEP APNEA): ICD-10-CM

## 2020-03-27 DIAGNOSIS — I50.42 CHRONIC COMBINED SYSTOLIC AND DIASTOLIC HEART FAILURE: Primary | ICD-10-CM

## 2020-03-27 PROBLEM — T14.8XXA HEMATOMA: Status: RESOLVED | Noted: 2020-03-08 | Resolved: 2020-03-27

## 2020-03-27 PROBLEM — R78.81 POSITIVE BLOOD CULTURES: Status: RESOLVED | Noted: 2020-03-09 | Resolved: 2020-03-27

## 2020-03-27 PROBLEM — I50.9 ACUTE DECOMPENSATED HEART FAILURE: Status: RESOLVED | Noted: 2020-03-14 | Resolved: 2020-03-27

## 2020-03-27 PROBLEM — Z20.822 SUSPECTED COVID-19 VIRUS INFECTION: Status: RESOLVED | Noted: 2020-03-14 | Resolved: 2020-03-27

## 2020-03-27 PROBLEM — N17.9 AKI (ACUTE KIDNEY INJURY): Status: RESOLVED | Noted: 2020-03-01 | Resolved: 2020-03-27

## 2020-03-27 PROBLEM — R14.0 ABDOMINAL DISTENTION: Status: RESOLVED | Noted: 2020-03-06 | Resolved: 2020-03-27

## 2020-03-27 PROBLEM — R57.0 CARDIOGENIC SHOCK: Status: RESOLVED | Noted: 2020-03-01 | Resolved: 2020-03-27

## 2020-03-27 PROCEDURE — 3008F BODY MASS INDEX DOCD: CPT | Mod: CPTII,S$GLB,, | Performed by: INTERNAL MEDICINE

## 2020-03-27 PROCEDURE — 3051F HG A1C>EQUAL 7.0%<8.0%: CPT | Mod: CPTII,95,S$GLB, | Performed by: INTERNAL MEDICINE

## 2020-03-27 PROCEDURE — 3008F PR BODY MASS INDEX (BMI) DOCUMENTED: ICD-10-PCS | Mod: CPTII,S$GLB,, | Performed by: INTERNAL MEDICINE

## 2020-03-27 PROCEDURE — 3077F SYST BP >= 140 MM HG: CPT | Mod: CPTII,S$GLB,, | Performed by: INTERNAL MEDICINE

## 2020-03-27 PROCEDURE — 3077F PR MOST RECENT SYSTOLIC BLOOD PRESSURE >= 140 MM HG: ICD-10-PCS | Mod: CPTII,S$GLB,, | Performed by: INTERNAL MEDICINE

## 2020-03-27 PROCEDURE — 99213 PR OFFICE/OUTPT VISIT, EST, LEVL III, 20-29 MIN: ICD-10-PCS | Mod: 95,,, | Performed by: INTERNAL MEDICINE

## 2020-03-27 PROCEDURE — 3079F PR MOST RECENT DIASTOLIC BLOOD PRESSURE 80-89 MM HG: ICD-10-PCS | Mod: CPTII,S$GLB,, | Performed by: INTERNAL MEDICINE

## 2020-03-27 PROCEDURE — 3051F PR MOST RECENT HEMOGLOBIN A1C LEVEL 7.0 - < 8.0%: ICD-10-PCS | Mod: CPTII,95,S$GLB, | Performed by: INTERNAL MEDICINE

## 2020-03-27 PROCEDURE — 3079F DIAST BP 80-89 MM HG: CPT | Mod: CPTII,S$GLB,, | Performed by: INTERNAL MEDICINE

## 2020-03-27 PROCEDURE — 99213 OFFICE O/P EST LOW 20 MIN: CPT | Mod: 95,,, | Performed by: INTERNAL MEDICINE

## 2020-03-27 RX ORDER — AMIODARONE HYDROCHLORIDE 400 MG/1
400 TABLET ORAL DAILY
Qty: 30 TABLET | Refills: 3 | Status: SHIPPED | OUTPATIENT
Start: 2020-03-27 | End: 2020-03-27 | Stop reason: SDUPTHER

## 2020-03-27 RX ORDER — SPIRONOLACTONE 25 MG/1
25 TABLET ORAL DAILY
Qty: 30 TABLET | Refills: 1 | Status: SHIPPED | OUTPATIENT
Start: 2020-03-27 | End: 2020-06-23 | Stop reason: SDUPTHER

## 2020-03-27 RX ORDER — FUROSEMIDE 40 MG/1
40 TABLET ORAL DAILY
Qty: 30 TABLET | Refills: 1 | Status: SHIPPED | OUTPATIENT
Start: 2020-03-27 | End: 2020-06-23 | Stop reason: SDUPTHER

## 2020-03-27 RX ORDER — AMIODARONE HYDROCHLORIDE 400 MG/1
400 TABLET ORAL DAILY
Qty: 30 TABLET | Refills: 3 | Status: SHIPPED | OUTPATIENT
Start: 2020-03-27 | End: 2020-06-12 | Stop reason: SDUPTHER

## 2020-03-27 NOTE — TELEPHONE ENCOUNTER
Care of patient is being transferred to CHF section from Preht section, per Dr. Darden.    Dx:  CHF  Pt is not on home inotrope therapy.  Lab Schedule:  BMP q 2 wks [place]  Lab/phone/fax:  [phone] (fax)  Outstanding orders scheduled:  Video visit in one month is pending.

## 2020-03-27 NOTE — PROGRESS NOTES
Your test was NEGATIVE for COVID-19 (coronavirus).  If you still have symptoms, treat with rest, fluids, and over-the-counter medications.  Continue to stay home, avoid large crowds, and practice proper handwashing.     If your symptoms worsen or if you have any other concerns, please contact [facility] at [phone].     Sincerely,    Citlalli Ames NP

## 2020-03-27 NOTE — Clinical Note
Please move from pre to HF sectionPlease send lab request for BMP every 2 weeks to [facility] starting April 6, 2020Schednixon, please schedule video visit for 1 month

## 2020-03-27 NOTE — ASSESSMENT & PLAN NOTE
He needs baseline PFT's on amiodarone as not done in hospital  AMIODARONE FOLLOW-UP:   CXR yearly   CMP at 3 months and thereafter every 6 months   TSH at 3 months and thereafter every 6 months

## 2020-03-27 NOTE — ASSESSMENT & PLAN NOTE
Obtain BMP every 2 weeks starting 4/6/20 at [facility]  Start Entresto 24-26 mg BID  Increase furosemide to 40 mg qd from 20 mg QOD  Add spironolactone 25 mg qd

## 2020-03-27 NOTE — PROGRESS NOTES
Subjective:     Patient ID:  John Javier is a 64 y.o. male who presents for follow-up of MY VIDEO VISIT and Congestive Heart Failure    The patient location is: Brother's home  The chief complaint leading to consultation is: CHF  Visit type: Virtual visit with synchronous audio and video  Total time spent with patient: 35 minutes  Each patient to whom he or she provides medical services by telemedicine is:  (1) informed of the relationship between the physician and patient and the respective role of any other health care provider with respect to management of the patient; and (2) notified that he or she may decline to receive medical services by telemedicine and may withdraw from such care at any time.    Notes:  65 yo BM presented with cardiogenic shock and transferred to [facility] 2/29/20 with impella.  He had cath documented CAD (50-60% LCx, OM disease on [facility] 2/29).  Course complicated by bacteremia and renal function remains impaired.  Compliance concerns so plan was to move to CHF section which I will do today.  He went home 3/13 but readmitted with concern for Covid though test was negative.  He is staying with his brother in [place] now and reports wt stable 141.8-143.0# since discharge and weighs daily.  He notes edema at end of day usually gone in the AM but not today on lasix 20 mg QOD.  He reports in past entresto worked great for him but could not afford.  I will try to run through out pharmacy to copay determination as wonder if lack of prior auth was issue since they wanted to charge him $300/month.  He is compliant with meds though was still taking amiodarone 400 mg BID on today's call instructed to drop to 400 mg qd as on label (he showed bottle to me over video).  He has nasal congestion, dry cough, some SOB, sleeping on 2 pillows without PND.  He has JANET not on treatment as yrs ago did not tolerate CPAP.  He would like to try again and describes waking up startled with deep breath (does not  "sound like PND) and daytime somnolence plus narcolepsy.    Review of Systems   Constitution: Positive for malaise/fatigue. Negative for chills, fever, weight gain and weight loss.   HENT: Positive for congestion.    Cardiovascular: Positive for dyspnea on exertion, leg swelling and orthopnea. Negative for paroxysmal nocturnal dyspnea.   Respiratory: Positive for cough, shortness of breath, sleep disturbances due to breathing and snoring. Negative for sputum production and wheezing.    Gastrointestinal: Positive for constipation.   Psychiatric/Behavioral: The patient has insomnia (old).       VIDEO VISIT EXAM:  WT TODAY 143# WITH DAY AFTER DISCHARGE HOME 141.8#  /82  P 101  HT 54"  JVP WITH EXTERNAL JUGULAR "Y" VEIN DISTENDED TO JAW  HE HAD TROUBLE SHOWING ME LEGS WITH CAMERA SO COULD NOT ASSESS BUT HE REPORTS MILD EDEMA THIS MORNING     [facility] LAB 3/25/2020  H/H 9.4/29.3  BUN 15 Cr 2.1 Na 137 K 3.9  Glu 205  LFT's WNL    ECHO 3/2/20 [facility] Conclusion   · Severe left atrial enlargement.  · Eccentric left ventricular hypertrophy.  · Moderate left ventricular enlargement.  · Mild-to-moderate mitral regurgitation.  · Severely decreased left ventricular systolic function. The estimated ejection fraction is 10%.  · Segmental wall motion abnormalities: akinetic inferolateral and inferior walls. All other walls are hypokinetic.  · Grade III (severe) left ventricular diastolic dysfunction consistent with restrictive physiology.  · Mild right atrial enlargement.  · Low normal right ventricular systolic function.  · Mild to moderate tricuspid regurgitation.  · Mechanically ventilated; cannot use inferior caval vein diameter to estimate central venous pressure.  · There is no evidence of a thrombus.     EKG 3/14/20 NSR, IVCD  Objective:   Physical Exam    VIDEO VISIT EXAM:  WT TODAY 143# WITH DAY AFTER DISCHARGE HOME 141.8#  /82  P 101  HT 54"  JVP WITH EXTERNAL JUGULAR "Y" VEIN DISTENDED TO JAW  HE " HAD TROUBLE SHOWING ME LEGS WITH CAMERA SO COULD NOT ASSESS BUT HE REPORTS MILD EDEMA THIS MORNING   Assessment:     1. Chronic combined systolic and diastolic heart failure    2. VT (ventricular tachycardia)    3. PAF (paroxysmal atrial fibrillation)    4. Coronary artery disease involving native coronary artery of native heart without angina pectoris    5. Hypertensive cardiovascular-renal disease, stage 1-4 or unspecified chronic kidney disease, with heart failure    6. Dilated cardiomyopathy out of proportion to CAD    7. At risk for amiodarone toxicity with long term use    8. JANET (obstructive sleep apnea)    9. Type 2 diabetes mellitus without complication, without long-term current use of insulin      Problem List Items Addressed This Visit        1 - High    VT (ventricular tachycardia)    Current Assessment & Plan     Continue amiodarone  Monitor ICD         PAF (paroxysmal atrial fibrillation)    Current Assessment & Plan     Continue amiodarone  Continue Eliquis         Hypertensive cardiovascular-renal disease, stage 1-4 or unspecified chronic kidney disease, with heart failure    Current Assessment & Plan     Target BP < 130/80  Treat as per CHF/cardiomyopathy sections         Dilated cardiomyopathy out of proportion to CAD    Current Assessment & Plan     Start Entresto 24-26 mg BID  Add spironolactone 25 mg qd  Add beta blocker when unloaded more         Chronic combined systolic and diastolic heart failure - Primary    Current Assessment & Plan     Obtain BMP every 2 weeks starting 4/6/20 at [facility]  Start Entresto 24-26 mg BID  Increase furosemide to 40 mg qd from 20 mg QOD  Add spironolactone 25 mg qd         Relevant Medications    furosemide (LASIX) 40 MG tablet    sacubitriL-valsartan (ENTRESTO) 24-26 mg per tablet    Other Relevant Orders    Basic metabolic panel    Complete PFT with bronchodilator    At risk for amiodarone toxicity with long term use    Current Assessment & Plan      He needs baseline PFT's on amiodarone as not done in hospital  AMIODARONE FOLLOW-UP:   CXR yearly   CMP at 3 months and thereafter every 6 months   TSH at 3 months and thereafter every 6 months             Relevant Orders    Complete PFT with bronchodilator       2     CAD (coronary artery disease)    Overview     CAD (50-60% LCx, OM disease on [facility] 2/29/20),          Current Assessment & Plan     Target LDL < 70  Anti-platelet regimen  Control BP < 130/80 mm Hg            3     Type 2 diabetes mellitus, without long-term current use of insulin    Current Assessment & Plan     Sugar high on outside lab and he will check with treating physician at home            8     JANET (obstructive sleep apnea)    Current Assessment & Plan     Based upon history  He was intolerant of CPAP in past so will arrange for sleep consult         Relevant Orders    Ambulatory referral/consult to Sleep Disorders        Plan:   Obtain BMP every 2 weeks starting 4/6/20 at [facility]  Start Entresto 24-26 mg BID  Increase furosemide to 40 mg qd from 20 mg QOD  Add spironolactone 25 mg qd  Next video visit 1 month

## 2020-03-31 ENCOUNTER — TELEPHONE (OUTPATIENT)
Dept: PHARMACY | Facility: CLINIC | Age: 65
End: 2020-03-31

## 2020-04-13 NOTE — PROGRESS NOTES
DISCHARGE - late addendum to d/c note    COVID-19 precaution -- Transplant SW discharge assesment conducted with patient by phone.    SW spoke with pt by room phone on 3/17 re: discharge today.  Pt presents as awake and alert, calm, and cooperative.  Pt verbalizes understanding and agreement with plan for d/c to home today.  Pt reports his brother is on his way to hospital to transport pt home today.  SW discussed HH with HTS team at multidisciplinary rounds today.  Per team, pt has been ambulating well in room during hospital stay and team does not recommend HH at this time.  SW discussed same with pt today; pt verbalizes understanding.  Pt denies any d/c needs, and none identified by medical team.  Pt reports coping adequately at this time, and denies any needs or concerns to SW.  SW providing ongoing psychosocial and counseling support, education, resources, assistance, and discharge planning as indicated.  SW remains available.

## 2020-04-29 ENCOUNTER — TELEPHONE (OUTPATIENT)
Dept: TRANSPLANT | Facility: CLINIC | Age: 65
End: 2020-04-29

## 2020-04-29 ENCOUNTER — OFFICE VISIT (OUTPATIENT)
Dept: TRANSPLANT | Facility: CLINIC | Age: 65
End: 2020-04-29
Attending: INTERNAL MEDICINE
Payer: COMMERCIAL

## 2020-04-29 VITALS
WEIGHT: 138.38 LBS | HEART RATE: 80 BPM | DIASTOLIC BLOOD PRESSURE: 80 MMHG | SYSTOLIC BLOOD PRESSURE: 119 MMHG | HEIGHT: 64 IN | BODY MASS INDEX: 23.63 KG/M2

## 2020-04-29 DIAGNOSIS — I50.42 CHRONIC COMBINED SYSTOLIC AND DIASTOLIC HEART FAILURE: Primary | ICD-10-CM

## 2020-04-29 DIAGNOSIS — I13.0 HYPERTENSIVE CARDIOVASCULAR-RENAL DISEASE, STAGE 1-4 OR UNSPECIFIED CHRONIC KIDNEY DISEASE, WITH HEART FAILURE: ICD-10-CM

## 2020-04-29 DIAGNOSIS — I42.0 DILATED CARDIOMYOPATHY: ICD-10-CM

## 2020-04-29 PROCEDURE — 3074F PR MOST RECENT SYSTOLIC BLOOD PRESSURE < 130 MM HG: ICD-10-PCS | Mod: CPTII,,, | Performed by: INTERNAL MEDICINE

## 2020-04-29 PROCEDURE — 3008F PR BODY MASS INDEX (BMI) DOCUMENTED: ICD-10-PCS | Mod: CPTII,,, | Performed by: INTERNAL MEDICINE

## 2020-04-29 PROCEDURE — 3079F PR MOST RECENT DIASTOLIC BLOOD PRESSURE 80-89 MM HG: ICD-10-PCS | Mod: CPTII,,, | Performed by: INTERNAL MEDICINE

## 2020-04-29 PROCEDURE — 3008F BODY MASS INDEX DOCD: CPT | Mod: CPTII,,, | Performed by: INTERNAL MEDICINE

## 2020-04-29 PROCEDURE — 3074F SYST BP LT 130 MM HG: CPT | Mod: CPTII,,, | Performed by: INTERNAL MEDICINE

## 2020-04-29 PROCEDURE — 99213 PR OFFICE/OUTPT VISIT, EST, LEVL III, 20-29 MIN: ICD-10-PCS | Mod: 95,,, | Performed by: INTERNAL MEDICINE

## 2020-04-29 PROCEDURE — 3079F DIAST BP 80-89 MM HG: CPT | Mod: CPTII,,, | Performed by: INTERNAL MEDICINE

## 2020-04-29 PROCEDURE — 99213 OFFICE O/P EST LOW 20 MIN: CPT | Mod: 95,,, | Performed by: INTERNAL MEDICINE

## 2020-04-29 RX ORDER — HYDRALAZINE HYDROCHLORIDE 100 MG/1
50 TABLET, FILM COATED ORAL EVERY 8 HOURS
Qty: 180 TABLET | Refills: 3
Start: 2020-04-29 | End: 2020-05-16 | Stop reason: SDUPTHER

## 2020-04-29 NOTE — TELEPHONE ENCOUNTER
2;45 pm : TORB: Dr. Del Real/Iesha RN:  CRISTIAN @ [facility] tomorrow and [facility] same location in 3 weeks   Re: Entresto dose increase  Sent message to  Gracie FINCH to schedule   Dr. Darden just had a video visiit today with pt.

## 2020-04-29 NOTE — PROGRESS NOTES
"The patient location is: Home   The chief complaint leading to consultation is: CHF  Visit type: audiovisual  Total time spent with patient: 15 min  Each patient to whom he or she provides medical services by telemedicine is:  (1) informed of the relationship between the physician and patient and the respective role of any other health care provider with respect to management of the patient; and (2) notified that he or she may decline to receive medical services by telemedicine and may withdraw from such care at any time.    Notes:  63 yo BM presented with cardiogenic shock and transferred to [facility] 2/29/20 with impella.  He had cath documented CAD (50-60% LCx, OM disease on [facility] 2/29).  Course complicated by bacteremia and renal function remains impaired.  He went home 3/13/20 but was readmitted with concern for Covid though test was negative.  At telemedicine visit 3/27/20 I asked him to obtain BMP every 2 weeks starting 4/6/20 at --he got 4/6 but nothing since that time as not due yet.  At that visit I started entresto 24-26 mg BID, aldactone 25 mg qd and changed lasix to 40 mg qd.  On this regimen he reports that he is feeling much better with more energy, less SOMMERS, no edema.  He has moved from his brother's house to his own.  Home wt 138.4# today down from 142-143# at last video visit but different scale.    EXAM:  /80   Pulse 80   Ht 5' 4" (1.626 m)   Wt 62.8 kg (138 lb 6.4 oz)   BMI 23.76 kg/m²   Limited by video call but grossly neck veins not elevated and no edema today    Labs:  On 3/24/20 BUN 15  Cr 2.1  K 3.9  Glu 205 (scan date 4/16)  On 4/6/20   BUN 13  Cr 1.8  K 3.4 (that lab's lower limit 3.3)    1. Chronic combined systolic and diastolic heart failure    2. Dilated cardiomyopathy out of proportion to CAD    3. Hypertensive cardiovascular-renal disease, stage 1-4 or unspecified chronic kidney disease, with heart failure        PLAN:  I called [facility] and " increased Entresto to 49-51 mg BID trying to intercept his call for refill on 24-26 mg strength earlier today.  He will go to [facility] tomorrow and get BMP drawn which I will repeat 3 weeks later with hope to further increase Entresto to  mg BID.   When he starts Entresto 49-51 mg BID dose he will cut hydralazine from 100 mg to 50 mg TID.  He asked about returning to work but at this time I prefer he continue on sick leave though expect that he will be able to return to work in couple of months.  RTC here in 6 weeks to see me and adjust meds as needed then and give work clearance if all OK

## 2020-05-11 DIAGNOSIS — I50.42 CHRONIC COMBINED SYSTOLIC AND DIASTOLIC HEART FAILURE: Primary | ICD-10-CM

## 2020-05-11 RX ORDER — ISOSORBIDE DINITRATE 40 MG/1
40 TABLET ORAL EVERY 8 HOURS
Qty: 270 TABLET | Refills: 3 | Status: SHIPPED | OUTPATIENT
Start: 2020-05-11 | End: 2020-05-16 | Stop reason: SDUPTHER

## 2020-05-11 NOTE — TELEPHONE ENCOUNTER
----- Message from Sonia Melgoza sent at 5/11/2020 12:55 PM CDT -----  Contact: Pt called   Pt requesting for a 90 day supply of medication apixaban (ELIQUIS) 5 mg Tab,isosorbide dinitrate (ISORDIL) 40 MG Tab, SITagliptin (JANUVIA) 25 MG Tab  and send to   [id number] [address] [place] [address] AT [facility] OF [address] [phone] (Phone)[phone] (Fax). Thank you.

## 2020-05-12 ENCOUNTER — TELEPHONE (OUTPATIENT)
Dept: TRANSPLANT | Facility: CLINIC | Age: 65
End: 2020-05-12

## 2020-05-12 NOTE — TELEPHONE ENCOUNTER
I informed patient that only two of medications ( Isordil and Eliquis)was sent over to his local pharmacy at this time. I also instructed patient to call [facility] pharmacy, and have all his medications transferred to his local pharmacy at this time, so there's no problem when there's time for additional refills. Patient also had a question about his next lab appointment, per the [facility] coordinator it should be three weeks from 4/30/2020. I explained to him that someone from the [facility] should be calling him to remind him of his lab appointment. Patient verbalized understanding.

## 2020-05-12 NOTE — TELEPHONE ENCOUNTER
----- Message from Eboni Wilver sent at 5/12/2020  2:51 PM CDT -----  Contact: pt  Pt says he's f/u on a previous request to have all his meds prescribed to him by Dr. Darden to be transferred to [facility] in [place], [place] and he would like them all to be 90 days. Please call pt once this has been done.     Thanks

## 2020-05-16 DIAGNOSIS — I13.0 HYPERTENSIVE CARDIOVASCULAR-RENAL DISEASE, STAGE 1-4 OR UNSPECIFIED CHRONIC KIDNEY DISEASE, WITH HEART FAILURE: ICD-10-CM

## 2020-05-16 DIAGNOSIS — I42.0 DILATED CARDIOMYOPATHY: ICD-10-CM

## 2020-05-16 DIAGNOSIS — I50.42 CHRONIC COMBINED SYSTOLIC AND DIASTOLIC HEART FAILURE: ICD-10-CM

## 2020-05-16 RX ORDER — ISOSORBIDE DINITRATE 20 MG/1
20 TABLET ORAL 3 TIMES DAILY
Qty: 90 TABLET | Refills: 3 | Status: SHIPPED | OUTPATIENT
Start: 2020-05-16 | End: 2020-06-23 | Stop reason: SDUPTHER

## 2020-05-16 RX ORDER — HYDRALAZINE HYDROCHLORIDE 50 MG/1
50 TABLET, FILM COATED ORAL 3 TIMES DAILY
Qty: 90 TABLET | Refills: 3 | Status: SHIPPED | OUTPATIENT
Start: 2020-05-16 | End: 2020-06-23 | Stop reason: SDUPTHER

## 2020-05-16 NOTE — PROGRESS NOTES
He is doing well but pharm out of isosorbide 40 mg    I reviewed meds and would like to change hydralazine to 50 tabs TID and isosorbide to 20 mg tab and reduce dose 20 mg TID    I reviewed changes with patient and called in script to pharmacist today

## 2020-05-30 ENCOUNTER — NURSE TRIAGE (OUTPATIENT)
Dept: ADMINISTRATIVE | Facility: CLINIC | Age: 65
End: 2020-05-30

## 2020-05-30 NOTE — TELEPHONE ENCOUNTER
"    Reason for Disposition   [1] Caller requesting NON-URGENT health information AND [2] PCP's office is the best resource    Protocols used: ST INFORMATION ONLY CALL-A-JANY Lopez states he has been taking a laxative daily, and has "a lot" of BMs.  He said he felt very "gassy" earlier today and he felt distressed, which was why he called, but during this call he said he "passed a lot of gas, and I feel fine now".  No abdominal pain or distension. Comfortable now.  He said his pharmacist recommended he change the laxative he is taking, and he will call transplant dept on Monday, Dr Darden, and discuss with them.  Encouraged call back for any concerns, return of abdominal distress / gas, or any new worries.  Message to Dr Darden.  Please contact caller directly with any additional care advice. Of note, he says he thinks he is being evaluated for heart transplant, but then someone told him he is not.  Please clarify for patient.       "

## 2020-06-03 ENCOUNTER — OFFICE VISIT (OUTPATIENT)
Dept: TRANSPLANT | Facility: CLINIC | Age: 65
End: 2020-06-03
Attending: INTERNAL MEDICINE
Payer: COMMERCIAL

## 2020-06-03 VITALS
BODY MASS INDEX: 25.06 KG/M2 | HEART RATE: 80 BPM | WEIGHT: 146.81 LBS | DIASTOLIC BLOOD PRESSURE: 79 MMHG | HEIGHT: 64 IN | SYSTOLIC BLOOD PRESSURE: 119 MMHG

## 2020-06-03 DIAGNOSIS — I47.20 VT (VENTRICULAR TACHYCARDIA): ICD-10-CM

## 2020-06-03 DIAGNOSIS — I48.0 PAF (PAROXYSMAL ATRIAL FIBRILLATION): ICD-10-CM

## 2020-06-03 DIAGNOSIS — G47.33 OSA (OBSTRUCTIVE SLEEP APNEA): ICD-10-CM

## 2020-06-03 DIAGNOSIS — Z79.899 AT RISK FOR AMIODARONE TOXICITY WITH LONG TERM USE: ICD-10-CM

## 2020-06-03 DIAGNOSIS — N18.30 TYPE 2 DIABETES MELLITUS WITH STAGE 3 CHRONIC KIDNEY DISEASE, WITHOUT LONG-TERM CURRENT USE OF INSULIN: ICD-10-CM

## 2020-06-03 DIAGNOSIS — I50.42 CHRONIC COMBINED SYSTOLIC AND DIASTOLIC HEART FAILURE: Primary | ICD-10-CM

## 2020-06-03 DIAGNOSIS — I42.0 DILATED CARDIOMYOPATHY: ICD-10-CM

## 2020-06-03 DIAGNOSIS — Z91.89 AT RISK FOR AMIODARONE TOXICITY WITH LONG TERM USE: ICD-10-CM

## 2020-06-03 DIAGNOSIS — E11.22 TYPE 2 DIABETES MELLITUS WITH STAGE 3 CHRONIC KIDNEY DISEASE, WITHOUT LONG-TERM CURRENT USE OF INSULIN: ICD-10-CM

## 2020-06-03 DIAGNOSIS — I25.10 CORONARY ARTERY DISEASE INVOLVING NATIVE CORONARY ARTERY OF NATIVE HEART WITHOUT ANGINA PECTORIS: ICD-10-CM

## 2020-06-03 DIAGNOSIS — I13.0 HYPERTENSIVE CARDIOVASCULAR-RENAL DISEASE, STAGE 1-4 OR UNSPECIFIED CHRONIC KIDNEY DISEASE, WITH HEART FAILURE: ICD-10-CM

## 2020-06-03 PROBLEM — E44.0 MALNUTRITION OF MODERATE DEGREE: Status: RESOLVED | Noted: 2020-03-03 | Resolved: 2020-06-03

## 2020-06-03 PROCEDURE — 99214 PR OFFICE/OUTPT VISIT, EST, LEVL IV, 30-39 MIN: ICD-10-PCS | Mod: 95,,, | Performed by: INTERNAL MEDICINE

## 2020-06-03 PROCEDURE — 99214 OFFICE O/P EST MOD 30 MIN: CPT | Mod: 95,,, | Performed by: INTERNAL MEDICINE

## 2020-06-03 NOTE — PROGRESS NOTES
The patient location is: [place]  The chief complaint leading to consultation is: CHF    Visit type: audiovisual    Face to Face time with patient: 15 min  30 minutes of total time spent on the encounter, which includes face to face time and non-face to face time preparing to see the patient (eg, review of tests), Obtaining and/or reviewing separately obtained history, Documenting clinical information in the electronic or other health record, Independently interpreting results (not separately reported) and communicating results to the patient/family/caregiver, or Care coordination (not separately reported).     Each patient to whom he or she provides medical services by telemedicine is:  (1) informed of the relationship between the physician and patient and the respective role of any other health care provider with respect to management of the patient; and (2) notified that he or she may decline to receive medical services by telemedicine and may withdraw from such care at any time.    Notes:  65 yo BM presented with cardiogenic shock and transferred to [facility] 2/29/20 with impella.  He had cath documented CAD (50-60% LCx, OM disease on [facility] 2/29).  Course complicated by bacteremia and renal function remains impaired.  He went home 3/13/20 but was readmitted with concern for Covid though test was negative.  At telemedicine visit 3/27/20 I asked him to obtain BMP every 2 weeks starting 4/6/20 at [facility]--he got 4/6 but nothing since that time as not due yet.  At that visit I started entresto 24-26 mg BID, aldactone 25 mg qd and changed lasix to 40 mg qd.  At video visit 4/29/20 increased Entresto to 49-51 mg BID and cut hydralazine from 100 mg to 50 mg TID.  At that time his home wt 138.4# today down from 142-143# at last video visit but different scale.    Today he reports that he continues to feel well and stronger every day.  Currently riding stationary bike 35-45 min plus light wts (10# dumbbells).   Sleeping on 3 pillows for general comfort not for breathing, no PND, no dizzy or LH spells and improved energy.  Body wt up to 146.8# on his home scale which is a different scale than one above.  He is back in his own home now and caring for himself.    EXAM:  Limited by video call   Self reported home wt 146.8#, /79 P 81 by local physician today and he reports his home readings are similar.    LABS:  [place] 6/2/2020    Cr 1.5  Na 138  K 3.8  Glu 85    ASSESSMENT:  1. Chronic combined systolic and diastolic heart failure  sacubitriL-valsartan (ENTRESTO)  mg per tablet   2. Dilated cardiomyopathy out of proportion to CAD     3. Hypertensive cardiovascular-renal disease, stage 1-4 or unspecified chronic kidney disease, with heart failure     4. PAF (paroxysmal atrial fibrillation)     5. VT (ventricular tachycardia)     6. At risk for amiodarone toxicity with long term use     7. Coronary artery disease involving native coronary artery of native heart without angina pectoris     8. Type 2 diabetes mellitus with stage 3 chronic kidney disease, without long-term current use of insulin     9. JANET (obstructive sleep apnea)       PLAN:  Increase Entresto to  mg BID and obtain BMP in [place] after on this dose 2-3 weeks  Next step is to up-titrate BB but want to assure that max entresto attained  He needs to return for in-person visit within the next 4-6 weeks  I told him that if he became light headed, dizzy or BP required change in meds I would stop hydralazine and isosorbide

## 2020-06-07 ENCOUNTER — NURSE TRIAGE (OUTPATIENT)
Dept: ADMINISTRATIVE | Facility: CLINIC | Age: 65
End: 2020-06-07

## 2020-06-07 NOTE — TELEPHONE ENCOUNTER
Patient's blood 107/79. Patient reported dizziness but reports dizziness subsided. The patient was advised per protocol and was advised to call back with questions,concerns or symptoms. The patient verbalized understanding. Patient was advised to take blood pressure prior to taking meds tomorrow.     Reason for Disposition   [1] MODERATE dizziness (e.g., vertigo; feels very unsteady, interferes with normal activities) AND [2] has NOT been evaluated by physician for this    Additional Information   Negative: [1] Weakness (i.e., paralysis, loss of muscle strength) of the face, arm or leg on one side of the body AND [2] sudden onset AND [3] present now   Negative: [1] Numbness (i.e., loss of sensation) of the face, arm or leg on one side of the body AND [2] sudden onset AND [3] present now   Negative: [1] Loss of speech or garbled speech AND [2] sudden onset AND [3] present now   Negative: Difficult to awaken or acting confused (e.g., disoriented, slurred speech)   Negative: Sounds like a life-threatening emergency to the triager   Negative: Followed a head injury   Negative: Followed an ear injury   Negative: Localized weakness or numbness is main symptom   Negative: Dizziness relates to riding in a car, going to an amusement park, etc.   Negative: [1] Dizziness is main symptom AND [2] NO spinning sensation (i.e., vertigo)   Negative: SEVERE dizziness (vertigo) (e.g., unable to walk without assistance)   Negative: [1] Dizziness (vertigo) present now AND [2] one or more stroke risk factors (i.e., hypertension, diabetes, prior stroke/TIA/heart attack)  (Exception: prior physician evaluation for this AND no different/worse than usual)   Negative: [1] Dizziness (vertigo) present now AND [2] age > 60  (Exception: prior physician evaluation for this AND no different/worse than usual)   Negative: Severe headache (e.g., excruciating)  (Exception: similar to previous migraines)   Negative: Patient sounds very  sick or weak to the triager   Negative: Taking a medicine that could cause dizziness (e.g., phenytoin [Dilantin], carbamazepine [Tegretol], primidone [Mysoline])    Protocols used: DIZZINESS - VERTIGO-A-AH

## 2020-06-07 NOTE — TELEPHONE ENCOUNTER
Reason for Disposition   Health Information question, no triage required and triager able to answer question    Additional Information   Negative: [1] Caller is not with the adult (patient) AND [2] reporting urgent symptoms   Negative: Lab result questions   Negative: Medication questions   Negative: Caller can't be reached by phone   Negative: Caller has already spoken to PCP or another triager   Negative: RN needs further essential information from caller in order to complete triage   Negative: Requesting regular office appointment   Negative: [1] Caller requesting NON-URGENT health information AND [2] PCP's office is the best resource    Protocols used: INFORMATION ONLY CALL-A-

## 2020-06-15 RX ORDER — AMIODARONE HYDROCHLORIDE 400 MG/1
400 TABLET ORAL DAILY
Qty: 30 TABLET | Refills: 3 | Status: SHIPPED | OUTPATIENT
Start: 2020-06-15 | End: 2020-06-15 | Stop reason: SDUPTHER

## 2020-06-15 RX ORDER — AMIODARONE HYDROCHLORIDE 400 MG/1
400 TABLET ORAL DAILY
Qty: 90 TABLET | Refills: 0 | Status: SHIPPED | OUTPATIENT
Start: 2020-06-15 | End: 2020-06-23 | Stop reason: SDUPTHER

## 2020-06-23 DIAGNOSIS — I13.0 HYPERTENSIVE CARDIOVASCULAR-RENAL DISEASE, STAGE 1-4 OR UNSPECIFIED CHRONIC KIDNEY DISEASE, WITH HEART FAILURE: ICD-10-CM

## 2020-06-23 DIAGNOSIS — I50.42 CHRONIC COMBINED SYSTOLIC AND DIASTOLIC HEART FAILURE: ICD-10-CM

## 2020-06-23 DIAGNOSIS — I42.0 DILATED CARDIOMYOPATHY: ICD-10-CM

## 2020-06-28 RX ORDER — ATORVASTATIN CALCIUM 80 MG/1
80 TABLET, FILM COATED ORAL NIGHTLY
Qty: 30 TABLET | Refills: 0 | Status: SHIPPED | OUTPATIENT
Start: 2020-06-28 | End: 2020-07-01 | Stop reason: SDUPTHER

## 2020-06-28 RX ORDER — HYDRALAZINE HYDROCHLORIDE 50 MG/1
50 TABLET, FILM COATED ORAL 3 TIMES DAILY
Qty: 90 TABLET | Refills: 0 | Status: SHIPPED | OUTPATIENT
Start: 2020-06-28 | End: 2020-07-01 | Stop reason: SDUPTHER

## 2020-06-28 RX ORDER — ISOSORBIDE DINITRATE 20 MG/1
20 TABLET ORAL 3 TIMES DAILY
Qty: 90 TABLET | Refills: 0 | Status: SHIPPED | OUTPATIENT
Start: 2020-06-28 | End: 2020-07-01 | Stop reason: SDUPTHER

## 2020-06-28 RX ORDER — AMIODARONE HYDROCHLORIDE 400 MG/1
400 TABLET ORAL DAILY
Qty: 30 TABLET | Refills: 0 | Status: SHIPPED | OUTPATIENT
Start: 2020-06-28 | End: 2020-07-01 | Stop reason: SDUPTHER

## 2020-06-28 RX ORDER — SACUBITRIL AND VALSARTAN 97; 103 MG/1; MG/1
1 TABLET, FILM COATED ORAL 2 TIMES DAILY
Qty: 60 TABLET | Refills: 0 | Status: SHIPPED | OUTPATIENT
Start: 2020-06-28 | End: 2020-07-01 | Stop reason: SDUPTHER

## 2020-06-28 RX ORDER — SPIRONOLACTONE 25 MG/1
25 TABLET ORAL DAILY
Qty: 30 TABLET | Refills: 0 | Status: SHIPPED | OUTPATIENT
Start: 2020-06-28 | End: 2020-07-01 | Stop reason: SDUPTHER

## 2020-06-28 RX ORDER — FUROSEMIDE 40 MG/1
40 TABLET ORAL DAILY
Qty: 30 TABLET | Refills: 0 | Status: SHIPPED | OUTPATIENT
Start: 2020-06-28 | End: 2020-07-01 | Stop reason: SDUPTHER

## 2020-06-28 RX ORDER — PANTOPRAZOLE SODIUM 40 MG/1
40 TABLET, DELAYED RELEASE ORAL DAILY
Qty: 90 TABLET | Refills: 1 | OUTPATIENT
Start: 2020-06-28

## 2020-06-28 NOTE — TELEPHONE ENCOUNTER
He never got labs so get CMP, lipids, CBC, TSH  He needs in person visit with me within the next 30 days  Januvia not prescribed by [facility] nor is pantoprazole; primary care treats diabetes  Sent to [facility] 30 day supply pending labs

## 2020-06-29 DIAGNOSIS — I50.42 CHRONIC COMBINED SYSTOLIC AND DIASTOLIC HEART FAILURE: Primary | ICD-10-CM

## 2020-07-01 DIAGNOSIS — I42.0 DILATED CARDIOMYOPATHY: ICD-10-CM

## 2020-07-01 DIAGNOSIS — I13.0 HYPERTENSIVE CARDIOVASCULAR-RENAL DISEASE, STAGE 1-4 OR UNSPECIFIED CHRONIC KIDNEY DISEASE, WITH HEART FAILURE: ICD-10-CM

## 2020-07-01 DIAGNOSIS — I50.42 CHRONIC COMBINED SYSTOLIC AND DIASTOLIC HEART FAILURE: ICD-10-CM

## 2020-07-02 RX ORDER — FUROSEMIDE 40 MG/1
40 TABLET ORAL DAILY
Qty: 90 TABLET | Refills: 3 | Status: ON HOLD | OUTPATIENT
Start: 2020-07-02 | End: 2020-10-08 | Stop reason: HOSPADM

## 2020-07-02 RX ORDER — ATORVASTATIN CALCIUM 40 MG/1
40 TABLET, FILM COATED ORAL NIGHTLY
Qty: 90 TABLET | Refills: 3 | Status: SHIPPED | OUTPATIENT
Start: 2020-07-02 | End: 2020-07-28 | Stop reason: SDUPTHER

## 2020-07-02 RX ORDER — HYDRALAZINE HYDROCHLORIDE 50 MG/1
50 TABLET, FILM COATED ORAL 3 TIMES DAILY
Qty: 270 TABLET | Refills: 3 | Status: ON HOLD | OUTPATIENT
Start: 2020-07-02 | End: 2020-10-08 | Stop reason: SDUPTHER

## 2020-07-02 RX ORDER — SACUBITRIL AND VALSARTAN 97; 103 MG/1; MG/1
1 TABLET, FILM COATED ORAL 2 TIMES DAILY
Qty: 180 TABLET | Refills: 3 | Status: SHIPPED | OUTPATIENT
Start: 2020-07-02 | End: 2021-06-09 | Stop reason: SDUPTHER

## 2020-07-02 RX ORDER — ISOSORBIDE DINITRATE 20 MG/1
20 TABLET ORAL 3 TIMES DAILY
Qty: 270 TABLET | Refills: 3 | Status: ON HOLD | OUTPATIENT
Start: 2020-07-02 | End: 2020-10-08 | Stop reason: SDUPTHER

## 2020-07-02 RX ORDER — PANTOPRAZOLE SODIUM 40 MG/1
40 TABLET, DELAYED RELEASE ORAL DAILY
Qty: 90 TABLET | Refills: 3 | Status: SHIPPED | OUTPATIENT
Start: 2020-07-02 | End: 2021-06-23 | Stop reason: SDUPTHER

## 2020-07-02 RX ORDER — AMIODARONE HYDROCHLORIDE 400 MG/1
400 TABLET ORAL DAILY
Qty: 90 TABLET | Refills: 3 | Status: SHIPPED | OUTPATIENT
Start: 2020-07-02 | End: 2021-07-26 | Stop reason: SDUPTHER

## 2020-07-02 RX ORDER — SPIRONOLACTONE 25 MG/1
25 TABLET ORAL DAILY
Qty: 90 TABLET | Refills: 3 | Status: SHIPPED | OUTPATIENT
Start: 2020-07-02 | End: 2021-06-25 | Stop reason: SDUPTHER

## 2020-07-28 ENCOUNTER — OFFICE VISIT (OUTPATIENT)
Dept: TRANSPLANT | Facility: CLINIC | Age: 65
End: 2020-07-28
Attending: INTERNAL MEDICINE
Payer: COMMERCIAL

## 2020-07-28 VITALS
SYSTOLIC BLOOD PRESSURE: 120 MMHG | WEIGHT: 149.94 LBS | HEART RATE: 84 BPM | HEIGHT: 64 IN | DIASTOLIC BLOOD PRESSURE: 68 MMHG | BODY MASS INDEX: 25.6 KG/M2

## 2020-07-28 DIAGNOSIS — E11.9 TYPE 2 DIABETES MELLITUS WITHOUT COMPLICATION, WITHOUT LONG-TERM CURRENT USE OF INSULIN: ICD-10-CM

## 2020-07-28 DIAGNOSIS — Z91.89 AT RISK FOR AMIODARONE TOXICITY WITH LONG TERM USE: ICD-10-CM

## 2020-07-28 DIAGNOSIS — I25.10 CORONARY ARTERY DISEASE INVOLVING NATIVE CORONARY ARTERY OF NATIVE HEART WITHOUT ANGINA PECTORIS: ICD-10-CM

## 2020-07-28 DIAGNOSIS — I42.0 DILATED CARDIOMYOPATHY: ICD-10-CM

## 2020-07-28 DIAGNOSIS — I50.42 CHRONIC COMBINED SYSTOLIC AND DIASTOLIC HEART FAILURE: Primary | ICD-10-CM

## 2020-07-28 DIAGNOSIS — Z79.899 AT RISK FOR AMIODARONE TOXICITY WITH LONG TERM USE: ICD-10-CM

## 2020-07-28 DIAGNOSIS — R74.8 ABNORMAL TRANSAMINASES: ICD-10-CM

## 2020-07-28 DIAGNOSIS — I47.20 VT (VENTRICULAR TACHYCARDIA): ICD-10-CM

## 2020-07-28 DIAGNOSIS — I48.0 PAF (PAROXYSMAL ATRIAL FIBRILLATION): ICD-10-CM

## 2020-07-28 DIAGNOSIS — I13.0 HYPERTENSIVE CARDIOVASCULAR-RENAL DISEASE, STAGE 1-4 OR UNSPECIFIED CHRONIC KIDNEY DISEASE, WITH HEART FAILURE: ICD-10-CM

## 2020-07-28 DIAGNOSIS — E78.5 HYPERLIPIDEMIA LDL GOAL <70: ICD-10-CM

## 2020-07-28 PROCEDURE — 3074F PR MOST RECENT SYSTOLIC BLOOD PRESSURE < 130 MM HG: ICD-10-PCS | Mod: CPTII,S$GLB,, | Performed by: INTERNAL MEDICINE

## 2020-07-28 PROCEDURE — 3008F PR BODY MASS INDEX (BMI) DOCUMENTED: ICD-10-PCS | Mod: CPTII,S$GLB,, | Performed by: INTERNAL MEDICINE

## 2020-07-28 PROCEDURE — 3051F HG A1C>EQUAL 7.0%<8.0%: CPT | Mod: CPTII,S$GLB,, | Performed by: INTERNAL MEDICINE

## 2020-07-28 PROCEDURE — 3051F PR MOST RECENT HEMOGLOBIN A1C LEVEL 7.0 - < 8.0%: ICD-10-PCS | Mod: CPTII,S$GLB,, | Performed by: INTERNAL MEDICINE

## 2020-07-28 PROCEDURE — 99214 PR OFFICE/OUTPT VISIT, EST, LEVL IV, 30-39 MIN: ICD-10-PCS | Mod: S$GLB,,, | Performed by: INTERNAL MEDICINE

## 2020-07-28 PROCEDURE — 99214 OFFICE O/P EST MOD 30 MIN: CPT | Mod: S$GLB,,, | Performed by: INTERNAL MEDICINE

## 2020-07-28 PROCEDURE — 99999 PR PBB SHADOW E&M-EST. PATIENT-LVL IV: ICD-10-PCS | Mod: PBBFAC,,, | Performed by: INTERNAL MEDICINE

## 2020-07-28 PROCEDURE — 3074F SYST BP LT 130 MM HG: CPT | Mod: CPTII,S$GLB,, | Performed by: INTERNAL MEDICINE

## 2020-07-28 PROCEDURE — 3078F DIAST BP <80 MM HG: CPT | Mod: CPTII,S$GLB,, | Performed by: INTERNAL MEDICINE

## 2020-07-28 PROCEDURE — 99999 PR PBB SHADOW E&M-EST. PATIENT-LVL IV: CPT | Mod: PBBFAC,,, | Performed by: INTERNAL MEDICINE

## 2020-07-28 PROCEDURE — 3078F PR MOST RECENT DIASTOLIC BLOOD PRESSURE < 80 MM HG: ICD-10-PCS | Mod: CPTII,S$GLB,, | Performed by: INTERNAL MEDICINE

## 2020-07-28 PROCEDURE — 3008F BODY MASS INDEX DOCD: CPT | Mod: CPTII,S$GLB,, | Performed by: INTERNAL MEDICINE

## 2020-07-28 RX ORDER — FLUTICASONE PROPIONATE 50 MCG
SPRAY, SUSPENSION (ML) NASAL
COMMUNITY
Start: 2020-06-26

## 2020-07-28 RX ORDER — ATORVASTATIN CALCIUM 20 MG/1
20 TABLET, FILM COATED ORAL NIGHTLY
Qty: 90 TABLET | Refills: 3 | Status: SHIPPED | OUTPATIENT
Start: 2020-07-28 | End: 2022-05-20

## 2020-07-28 RX ORDER — METOPROLOL SUCCINATE 50 MG/1
50 TABLET, EXTENDED RELEASE ORAL DAILY
Qty: 30 TABLET | Refills: 0 | Status: SHIPPED | OUTPATIENT
Start: 2020-07-28 | End: 2020-08-28 | Stop reason: SDUPTHER

## 2020-07-28 NOTE — PATIENT INSTRUCTIONS
Go back to the lab for BNP today    Reduce atorvastatin to 20 mg bedtime--you can use up 40 mg taking half a tablet until 20 mg tablet is delivered    Start metoprolol succinate 50 mg once a day ( at [facility]) and in 2 weeks call to report BP and heart rate as I plan to increase this dose at that time (you have no refills on this so I can change dose)    You can stop aspirin since this with Eliquis increases your risk of bleeding and it is not critical in your case (with heart blockage might be helpful)    If light-headed/dizzy spells are a problem will reduce hydralazine and isosorbide to half a tablet 3 times a day    See me in 6 weeks

## 2020-07-29 ENCOUNTER — TELEPHONE (OUTPATIENT)
Dept: TRANSPLANT | Facility: CLINIC | Age: 65
End: 2020-07-29

## 2020-07-29 NOTE — PROGRESS NOTES
Subjective:     Patient ID:  John Javier is a 64 y.o. male who presents for follow-up of Congestive Heart Failure    HPI:  65 yo BM presented with cardiogenic shock and transferred to [facility] 2/29/20 with impella.  He had cath documented CAD (50-60% LCx, OM disease on [facility] 2/29).  Course complicated by bacteremia and renal function remains impaired.  He went home 3/13/20 but was readmitted with concern for Covid though test was negative.  At telemedicine visit 3/27/20 I asked him to obtain BMP every 2 weeks starting 4/6/20 at [facility]--he got 4/6 but nothing since that time as not due yet.  At that visit I started entresto 24-26 mg BID, aldactone 25 mg qd and changed lasix to 40 mg qd.  At video visit 4/29/20 increased Entresto to 49-51 mg BID and cut hydralazine from 100 mg to 50 mg TID.  At that time his home wt 138.4# today down from 142-143# at last video visit but different scale.  At telemed visit 6/3/20 he reported NYHA Class 2 symptoms, wt 146.8# on his home scale.  At that time Entresto increased to  mg BID.      He continues to do great with walking 0.6 miles over 15-20 min, sleeping on 1 pillow, no palp, presyncope or syncope.      Review of Systems   Constitution: Positive for weight gain (very gradual with improved appetite; now 147-149#). Negative for chills, fever, malaise/fatigue (resolved), night sweats and weight loss.   Cardiovascular: Positive for dyspnea on exertion (NYHA Class 2 symptoms). Negative for chest pain, irregular heartbeat, leg swelling, near-syncope, orthopnea, palpitations, paroxysmal nocturnal dyspnea and syncope.   Respiratory: Positive for sleep disturbances due to breathing (he has JANET but lost his machine as was not using; he is in process of getting with LMD). Negative for cough, sputum production and wheezing.    Hematologic/Lymphatic: Does not bruise/bleed easily.   Musculoskeletal: Negative for arthritis, joint pain and stiffness.   Gastrointestinal:  "Negative for hematochezia and melena.   Genitourinary: Negative for hematuria.   Neurological: Positive for dizziness (occassionally when bending over) and light-headedness (occasionally when bending over). Negative for brief paralysis, focal weakness, seizures and weakness.     Objective:   Physical Exam   Constitutional: He is oriented to person, place, and time. He appears well-developed and well-nourished. No distress.   /68 (BP Location: Right arm, Patient Position: Sitting, BP Method: Large (Automatic))   Pulse 84   Ht 5' 4" (1.626 m)   Wt 68 kg (149 lb 14.6 oz)   BMI 25.73 kg/m²   WD, WN BM in NAD   HENT:   Head: Normocephalic and atraumatic.   Eyes: Conjunctivae are normal.   Neck: No JVD present. No tracheal deviation present. No thyromegaly present.   Cardiovascular: Normal rate and regular rhythm. Exam reveals no gallop (no S3).   Murmur (Grade 1/6 systollic murmur LLSB and apex) heard.  Pulmonary/Chest: Effort normal and breath sounds normal.   Abdominal: Soft. Bowel sounds are normal. He exhibits no distension (liver 12 cm) and no mass. There is no abdominal tenderness. There is no rebound and no guarding.   Musculoskeletal:         General: No tenderness or edema.   Neurological: He is alert and oriented to person, place, and time.   Skin: Skin is warm and dry. He is not diaphoretic.      Lab Results   Component Value Date     (H) 07/28/2020     (H) 03/14/2020     (H) 02/29/2020     Lab Results   Component Value Date     (H) 07/28/2020     (L) 07/28/2020    K 4.6 07/28/2020    MG 1.7 03/17/2020    CL 99 07/28/2020    CO2 27 07/28/2020    BUN 17 07/28/2020    CREATININE 1.5 (H) 07/28/2020    GLU 92 07/28/2020    HGBA1C 7.0 (H) 03/01/2020    AST 66 (H) 07/28/2020     (H) 07/28/2020    ALBUMIN 4.0 07/28/2020    PROT 7.2 07/28/2020    BILITOT 0.3 07/28/2020    WBC 6.51 07/28/2020    HGB 12.4 (L) 07/28/2020    HCT 38.0 (L) 07/28/2020     07/28/2020 "    INR 1.2 03/01/2020     (H) 03/03/2020    TSH 1.118 07/28/2020    CHOL 123 07/28/2020    HDL 53 07/28/2020    LDLCALC 56.0 (L) 07/28/2020    TRIG 70 07/28/2020     H/H better, Cr better, BNP better but transaminases elevated now    Assessment:     1. Chronic combined systolic and diastolic heart failure    2. Abnormal transaminases    3. Dilated cardiomyopathy out of proportion to CAD    4. At risk for amiodarone toxicity with long term use    5. Hypertensive cardiovascular-renal disease, stage 1-4 or unspecified chronic kidney disease, with heart failure    6. Type 2 diabetes mellitus without complication, without long-term current use of insulin    7. Hyperlipidemia LDL goal <70    8. Coronary artery disease involving native coronary artery of native heart without angina pectoris    9. PAF (paroxysmal atrial fibrillation)    10. VT (ventricular tachycardia)      Plan:   We discussed ASA for CAD and bleeding risk with eliquis vs benefit.  He prefers to stop and I think that is reasonable so discontinued today  Start metoprolol succinate 50 mg qd and in 2 weeks call with HR and BP with plan to increase to 100 mg qd at that time as long as resting HR>70.  For transaminases will reduce atorvastatin to 20 mg hs from 40 mg  If light-headed/dizzy spells are a problem will reduce hydralazine and isosorbide to half a tablet 3 times a day  RTC 6 weeks to continue to up-titrate metoprolol succinate to 200 g qd if resting HR > 70  With next visit obtain CMP, BNP and echo to get an idea re: any recovery  Assuming no recovery of note would plan for risk stratification with CPX and RHC after that visit and make arrangements for SW to meet with him and caregiver at that time as well  If transaminases do not improve on reduce atorvastatin would reduce amiodarone to 200 mg qd     I spoke with brother by phone to review all

## 2020-08-28 ENCOUNTER — TELEPHONE (OUTPATIENT)
Dept: TRANSPLANT | Facility: CLINIC | Age: 65
End: 2020-08-28

## 2020-08-28 DIAGNOSIS — I50.42 CHRONIC COMBINED SYSTOLIC AND DIASTOLIC HEART FAILURE: ICD-10-CM

## 2020-08-28 RX ORDER — METOPROLOL SUCCINATE 50 MG/1
50 TABLET, EXTENDED RELEASE ORAL DAILY
Qty: 90 TABLET | Refills: 3 | Status: ON HOLD | OUTPATIENT
Start: 2020-08-28 | End: 2020-10-08 | Stop reason: SDUPTHER

## 2020-08-28 NOTE — TELEPHONE ENCOUNTER
Pt called to request refills on his metoprolol and report his BP and HR per Dr. Moeller plan last visit.     8/16/20- 8/27/20  BP ranged from 103-118/68-84                               HR ranged from 60-70  Plan was to increase metoprolol to 100mg as long as resting HR was >70.  Refilled Called to [facility] and Instructed pt to stay at metoprolol 50mg once daily. Pt verbalized understanding.

## 2020-09-22 ENCOUNTER — TELEPHONE (OUTPATIENT)
Dept: TRANSPLANT | Facility: CLINIC | Age: 65
End: 2020-09-22

## 2020-09-22 NOTE — TELEPHONE ENCOUNTER
Pt called because he said a nurse from [facility] calls to check on him and told him to call because he reported to them that he was having sob ,and swelling.     Pt says he has been having difficulty bending over tieing his shoes, and is having more sob , notices swelling in his arms, ankles, and legs(says its a little puffy but not like it was back in Feb when he was hospitalized). Pt also reports good daily yellow urine output, and abdominal bloating. Pt denies any immediate distress and has a fu this Friday but does report about a 10lb wt gain over the past 2 months, and a gradual wt gain of about 20lbs since discharged from hospital in March. BMP stat ordered today at [facility] then discuss plan with MD.

## 2020-09-23 ENCOUNTER — TELEPHONE (OUTPATIENT)
Dept: TRANSPLANT | Facility: HOSPITAL | Age: 65
End: 2020-09-23

## 2020-09-23 ENCOUNTER — TELEPHONE (OUTPATIENT)
Dept: TRANSPLANT | Facility: CLINIC | Age: 65
End: 2020-09-23

## 2020-09-23 NOTE — TELEPHONE ENCOUNTER
Called pt and informed him of Dr Darden Instructions to take 80mg of lasix today in addition to the 40mg he took this am and 80mg qam until instructed further at visit with Dr Darden Friday. Pt confirmed his lab, echo and MD visit times, ad is aware of importance to have labs repeated. Pt verbalized understanding.

## 2020-09-23 NOTE — TELEPHONE ENCOUNTER
----- Message from Sasha Thomas LPN sent at 9/23/2020  9:00 AM CDT -----  Regarding: Swelling gradual Wt gain renal Insufficiency  Hi Dr Darden    Please see my note from yest on this pt. Hes coming  in to see you Friday with labs and echo. I had him do a BMP at local lab yest and didn't results until 6p.  K 3.9  BUN/CR 25/2.0.  He's taking lasix 40 daily, spironolactone 25 daily. Wanted to know if you wanted to diurese  a little then reassess at visit.    Please advise Thank you !

## 2020-09-23 NOTE — TELEPHONE ENCOUNTER
OK to take lasix 80 mg (at one time starting today even if took 40 mg earlier) but will need CMP and BNP as planned

## 2020-09-25 ENCOUNTER — OFFICE VISIT (OUTPATIENT)
Dept: TRANSPLANT | Facility: CLINIC | Age: 65
End: 2020-09-25
Attending: INTERNAL MEDICINE
Payer: COMMERCIAL

## 2020-09-25 ENCOUNTER — HOSPITAL ENCOUNTER (OUTPATIENT)
Dept: CARDIOLOGY | Facility: HOSPITAL | Age: 65
Discharge: HOME | End: 2020-09-25
Attending: INTERNAL MEDICINE
Payer: COMMERCIAL

## 2020-09-25 VITALS
BODY MASS INDEX: 25.61 KG/M2 | SYSTOLIC BLOOD PRESSURE: 120 MMHG | WEIGHT: 150 LBS | HEART RATE: 70 BPM | DIASTOLIC BLOOD PRESSURE: 70 MMHG | HEIGHT: 64 IN

## 2020-09-25 VITALS
HEART RATE: 60 BPM | HEIGHT: 64 IN | WEIGHT: 156.06 LBS | DIASTOLIC BLOOD PRESSURE: 53 MMHG | BODY MASS INDEX: 26.64 KG/M2 | SYSTOLIC BLOOD PRESSURE: 85 MMHG

## 2020-09-25 DIAGNOSIS — I42.0 DILATED CARDIOMYOPATHY: ICD-10-CM

## 2020-09-25 DIAGNOSIS — I47.20 VT (VENTRICULAR TACHYCARDIA): ICD-10-CM

## 2020-09-25 DIAGNOSIS — R74.01 ELEVATED TRANSAMINASE MEASUREMENT: ICD-10-CM

## 2020-09-25 DIAGNOSIS — I48.0 PAF (PAROXYSMAL ATRIAL FIBRILLATION): ICD-10-CM

## 2020-09-25 DIAGNOSIS — I13.0 HYPERTENSIVE CARDIOVASCULAR-RENAL DISEASE, STAGE 1-4 OR UNSPECIFIED CHRONIC KIDNEY DISEASE, WITH HEART FAILURE: ICD-10-CM

## 2020-09-25 DIAGNOSIS — R74.8 ABNORMAL TRANSAMINASES: ICD-10-CM

## 2020-09-25 DIAGNOSIS — Z91.89 AT RISK FOR AMIODARONE TOXICITY WITH LONG TERM USE: ICD-10-CM

## 2020-09-25 DIAGNOSIS — I42.0 COCM (CONGESTIVE CARDIOMYOPATHY): ICD-10-CM

## 2020-09-25 DIAGNOSIS — N28.9 ACUTE ON CHRONIC RENAL INSUFFICIENCY: Primary | ICD-10-CM

## 2020-09-25 DIAGNOSIS — G47.33 OSA (OBSTRUCTIVE SLEEP APNEA): ICD-10-CM

## 2020-09-25 DIAGNOSIS — I25.10 CORONARY ARTERY DISEASE INVOLVING NATIVE CORONARY ARTERY OF NATIVE HEART WITHOUT ANGINA PECTORIS: ICD-10-CM

## 2020-09-25 DIAGNOSIS — N18.30 TYPE 2 DIABETES MELLITUS WITH STAGE 3 CHRONIC KIDNEY DISEASE, WITHOUT LONG-TERM CURRENT USE OF INSULIN: ICD-10-CM

## 2020-09-25 DIAGNOSIS — N18.9 ACUTE ON CHRONIC RENAL INSUFFICIENCY: Primary | ICD-10-CM

## 2020-09-25 DIAGNOSIS — E11.22 TYPE 2 DIABETES MELLITUS WITH STAGE 3 CHRONIC KIDNEY DISEASE, WITHOUT LONG-TERM CURRENT USE OF INSULIN: ICD-10-CM

## 2020-09-25 DIAGNOSIS — Z79.899 AT RISK FOR AMIODARONE TOXICITY WITH LONG TERM USE: ICD-10-CM

## 2020-09-25 DIAGNOSIS — I50.42 CHRONIC COMBINED SYSTOLIC AND DIASTOLIC HEART FAILURE: ICD-10-CM

## 2020-09-25 LAB
ASCENDING AORTA: 3.55 CM
AV INDEX (PROSTH): 0.71
AV MEAN GRADIENT: 4 MMHG
AV PEAK GRADIENT: 8 MMHG
AV VALVE AREA: 2.31 CM2
AV VELOCITY RATIO: 0.67
BSA FOR ECHO PROCEDURE: 1.75 M2
CV ECHO LV RWT: 0.3 CM
DOP CALC AO PEAK VEL: 1.41 M/S
DOP CALC AO VTI: 25.26 CM
DOP CALC LVOT AREA: 3.3 CM2
DOP CALC LVOT DIAMETER: 2.04 CM
DOP CALC LVOT PEAK VEL: 0.95 M/S
DOP CALC LVOT STROKE VOLUME: 58.35 CM3
DOP CALCLVOT PEAK VEL VTI: 17.86 CM
E WAVE DECELERATION TIME: 138.95 MSEC
E/A RATIO: 0.98
E/E' RATIO: 10 M/S
ECHO LV POSTERIOR WALL: 0.81 CM (ref 0.6–1.1)
FRACTIONAL SHORTENING: 12 % (ref 28–44)
INTERVENTRICULAR SEPTUM: 0.87 CM (ref 0.6–1.1)
IVRT: 119.89 MSEC
LA MAJOR: 5.2 CM
LA MINOR: 5.45 CM
LA WIDTH: 4.91 CM
LEFT ATRIUM SIZE: 4.08 CM
LEFT ATRIUM VOLUME INDEX: 52.4 ML/M2
LEFT ATRIUM VOLUME: 90.62 CM3
LEFT INTERNAL DIMENSION IN SYSTOLE: 4.84 CM (ref 2.1–4)
LEFT VENTRICLE DIASTOLIC VOLUME INDEX: 84.13 ML/M2
LEFT VENTRICLE DIASTOLIC VOLUME: 145.62 ML
LEFT VENTRICLE MASS INDEX: 97 G/M2
LEFT VENTRICLE SYSTOLIC VOLUME INDEX: 63.5 ML/M2
LEFT VENTRICLE SYSTOLIC VOLUME: 109.87 ML
LEFT VENTRICULAR INTERNAL DIMENSION IN DIASTOLE: 5.47 CM (ref 3.5–6)
LEFT VENTRICULAR MASS: 168.55 G
LV LATERAL E/E' RATIO: 8.57 M/S
LV SEPTAL E/E' RATIO: 12 M/S
MV PEAK A VEL: 0.61 M/S
MV PEAK E VEL: 0.6 M/S
MV STENOSIS PRESSURE HALF TIME: 40.3 MS
MV VALVE AREA P 1/2 METHOD: 5.46 CM2
PISA TR MAX VEL: 2.54 M/S
PULM VEIN S/D RATIO: 1.31
PV PEAK D VEL: 0.39 M/S
PV PEAK S VEL: 0.51 M/S
RA MAJOR: 3.99 CM
RA PRESSURE: 3 MMHG
RA WIDTH: 3.36 CM
RIGHT VENTRICULAR END-DIASTOLIC DIMENSION: 3.56 CM
RV TISSUE DOPPLER FREE WALL SYSTOLIC VELOCITY 1 (APICAL 4 CHAMBER VIEW): 7.67 CM/S
SINUS: 3.49 CM
STJ: 3.19 CM
TDI LATERAL: 0.07 M/S
TDI SEPTAL: 0.05 M/S
TDI: 0.06 M/S
TR MAX PG: 26 MMHG
TRICUSPID ANNULAR PLANE SYSTOLIC EXCURSION: 1.72 CM
TV REST PULMONARY ARTERY PRESSURE: 29 MMHG

## 2020-09-25 PROCEDURE — 3078F PR MOST RECENT DIASTOLIC BLOOD PRESSURE < 80 MM HG: ICD-10-PCS | Mod: CPTII,S$GLB,, | Performed by: INTERNAL MEDICINE

## 2020-09-25 PROCEDURE — 99214 PR OFFICE/OUTPT VISIT, EST, LEVL IV, 30-39 MIN: ICD-10-PCS | Mod: S$GLB,,, | Performed by: INTERNAL MEDICINE

## 2020-09-25 PROCEDURE — 3078F DIAST BP <80 MM HG: CPT | Mod: CPTII,S$GLB,, | Performed by: INTERNAL MEDICINE

## 2020-09-25 PROCEDURE — 99999 PR PBB SHADOW E&M-EST. PATIENT-LVL III: CPT | Mod: PBBFAC,,, | Performed by: INTERNAL MEDICINE

## 2020-09-25 PROCEDURE — 3008F PR BODY MASS INDEX (BMI) DOCUMENTED: ICD-10-PCS | Mod: CPTII,S$GLB,, | Performed by: INTERNAL MEDICINE

## 2020-09-25 PROCEDURE — 93306 TTE W/DOPPLER COMPLETE: CPT | Mod: 26,,, | Performed by: INTERNAL MEDICINE

## 2020-09-25 PROCEDURE — 3074F SYST BP LT 130 MM HG: CPT | Mod: CPTII,S$GLB,, | Performed by: INTERNAL MEDICINE

## 2020-09-25 PROCEDURE — 93306 ECHO (CUPID ONLY): ICD-10-PCS | Mod: 26,,, | Performed by: INTERNAL MEDICINE

## 2020-09-25 PROCEDURE — 99214 OFFICE O/P EST MOD 30 MIN: CPT | Mod: S$GLB,,, | Performed by: INTERNAL MEDICINE

## 2020-09-25 PROCEDURE — 3008F BODY MASS INDEX DOCD: CPT | Mod: CPTII,S$GLB,, | Performed by: INTERNAL MEDICINE

## 2020-09-25 PROCEDURE — 99999 PR PBB SHADOW E&M-EST. PATIENT-LVL III: ICD-10-PCS | Mod: PBBFAC,,, | Performed by: INTERNAL MEDICINE

## 2020-09-25 PROCEDURE — 3051F HG A1C>EQUAL 7.0%<8.0%: CPT | Mod: CPTII,S$GLB,, | Performed by: INTERNAL MEDICINE

## 2020-09-25 PROCEDURE — 3074F PR MOST RECENT SYSTOLIC BLOOD PRESSURE < 130 MM HG: ICD-10-PCS | Mod: CPTII,S$GLB,, | Performed by: INTERNAL MEDICINE

## 2020-09-25 PROCEDURE — 3051F PR MOST RECENT HEMOGLOBIN A1C LEVEL 7.0 - < 8.0%: ICD-10-PCS | Mod: CPTII,S$GLB,, | Performed by: INTERNAL MEDICINE

## 2020-09-25 PROCEDURE — 93306 TTE W/DOPPLER COMPLETE: CPT

## 2020-09-25 NOTE — PATIENT INSTRUCTIONS
Do not take lasix and repeat lab on Monday    You will have right heart cath on Monday 10/5.  You may eat and drink up to time of procedure.  Do not take Eliquis Sunday night before or Monday morning of the procedure.

## 2020-09-26 NOTE — H&P (VIEW-ONLY)
Subjective:     Patient ID:  John Javier is a 65 y.o. male who presents for follow-up of Congestive Heart Failure and Dizziness    HPI:  63 yo BM presented with cardiogenic shock and transferred to [facility] 2/29/20 with impella.  He had cath documented CAD (50-60% LCx, OM disease on [facility] 2/29).  Course complicated by bacteremia and renal function remains impaired.  He went home 3/13/20 but was readmitted with concern for Covid though test was negative.  At telemedicine visit 3/27/20 I asked him to obtain BMP every 2 weeks starting 4/6/20 at [facility]--he got 4/6 but nothing since that time as not due yet.  At that visit I started entresto 24-26 mg BID, aldactone 25 mg qd and changed lasix to 40 mg qd.  At video visit 4/29/20 increased Entresto to 49-51 mg BID and cut hydralazine from 100 mg to 50 mg TID.  At that time his home wt 138.4# today down from 142-143# at last video visit but different scale.  At telemed visit 6/3/20 he reported NYHA Class 2 symptoms, wt 146.8# on his home scale.  At that time Entresto increased to  mg BID.        When seen 7/28/20 we discussed ASA for CAD and bleeding risk with eliquis vs benefit.  He prefered to stop and I thought that was reasonable so discontinued.  Started metoprolol succinate 50 mg qd and on 8/28 he called in HR which were 60-70 so left at this dose.  His transaminases were elevated so I reduced atorvastatin to 20 mg hs from 40 mg.    He comes today for routine visit.  He reports that he thinks he is carrying fluid so has been taking lasix 80 mg qd for the past 3 days instead of 40 mg.  He is not more SOB but reports wt up couple of pounds and abdomen seems swollen.  Wt went down with increased lasix but abdominal swelling did not.  He is still walking 0.6 miles over 15-20 min, sleeping on 1 pillow, no palp, presyncope or syncope.      Review of Systems   Constitution: Positive for weight gain (weight continues to increase since hospitalization;  "since July up 8 pounds). Negative for chills, fever, malaise/fatigue (resolved), night sweats and weight loss.   Cardiovascular: Positive for dyspnea on exertion (NYHA Class 2 symptoms). Negative for chest pain, irregular heartbeat, leg swelling, near-syncope, orthopnea, palpitations, paroxysmal nocturnal dyspnea and syncope.   Respiratory: Positive for sleep disturbances due to breathing (he has JANET but lost his machine as was not using; he is still working with LMD to obtain machine). Negative for cough, sputum production and wheezing.    Hematologic/Lymphatic: Does not bruise/bleed easily.   Musculoskeletal: Negative for arthritis, joint pain and stiffness.   Gastrointestinal: Negative for hematochezia and melena.   Genitourinary: Negative for hematuria.   Neurological: Positive for dizziness (worse with increased lasix past 3 days ) and light-headedness (worse with increased lasix past 3 days). Negative for brief paralysis, focal weakness, seizures and weakness.     Objective:   Physical Exam   Constitutional: He is oriented to person, place, and time. He appears well-developed and well-nourished. No distress.   BP (!) 85/53 (BP Location: Right arm, Patient Position: Sitting, BP Method: Medium (Automatic))   Pulse 60   Ht 5' 4" (1.626 m)   Wt 70.8 kg (156 lb 1.4 oz)   BMI 26.79 kg/m²   Last visit wt 68 kg (149 lb 14.6 oz)  WD, WN BM in NAD   HENT:   Head: Normocephalic and atraumatic.   Eyes: Conjunctivae are normal.   Neck: No JVD present. No tracheal deviation present. No thyromegaly present.   Cardiovascular: Normal rate and regular rhythm. Exam reveals no gallop (no S3).   Murmur (Grade 1/6 systollic murmur LLSB and apex) heard.  Pulmonary/Chest: Effort normal and breath sounds normal.   Abdominal: Soft. Bowel sounds are normal. He exhibits no distension (liver 12 cm) and no mass. There is no abdominal tenderness. There is no rebound and no guarding.   Musculoskeletal:         General: No tenderness or " edema.   Neurological: He is alert and oriented to person, place, and time.   Skin: Skin is warm and dry. He is not diaphoretic.      Lab Results   Component Value Date    BNP 86 09/25/2020     (H) 07/28/2020     (H) 03/14/2020     Lab Results   Component Value Date     09/25/2020     (L) 07/28/2020    K 3.6 09/25/2020    K 4.6 07/28/2020     (H) 09/25/2020    GLU 92 07/28/2020    BUN 33 (H) 09/25/2020    BUN 17 07/28/2020    CREATININE 2.8 (H) 09/25/2020    CREATININE 1.5 (H) 07/28/2020       (H) 09/25/2020    HGBA1C 7.0 (H) 03/01/2020    AST 57 (H) 09/25/2020    ALT 98 (H) 09/25/2020    ALBUMIN 4.0 09/25/2020    PROT 7.3 09/25/2020    BILITOT 0.3 09/25/2020    WBC 6.51 07/28/2020    HGB 12.4 (L) 07/28/2020    HCT 38.0 (L) 07/28/2020     07/28/2020    INR 1.2 03/01/2020     (H) 03/03/2020    TSH 1.118 07/28/2020    CHOL 123 07/28/2020    HDL 53 07/28/2020    LDLCALC 56.0 (L) 07/28/2020    TRIG 70 07/28/2020     TODAY/S ECHO Conclusion   · The left ventricle is normal in size with severely decreased systolic function. The estimated ejection fraction is 25%.  · Normal right ventricular size and systolic function.  · Grade I left ventricular diastolic dysfunction.  · Severe left atrial enlargement.  · The estimated PA systolic pressure is 29 mmHg.  · Normal central venous pressure (3 mmHg).       Assessment:     1. Acute on chronic renal insufficiency    2. Chronic combined systolic and diastolic heart failure    3. Elevated transaminase measurement    4. COCM (congestive cardiomyopathy)    5. Hypertensive cardiovascular-renal disease, stage 1-4 or unspecified chronic kidney disease, with heart failure    6. Dilated cardiomyopathy out of proportion to CAD    7. PAF (paroxysmal atrial fibrillation)    8. VT (ventricular tachycardia)    9. At risk for amiodarone toxicity with long term use    10. Coronary artery disease involving native coronary artery of native  heart without angina pectoris    11. Type 2 diabetes mellitus with stage 3 chronic kidney disease, without long-term current use of insulin    12. JANET (obstructive sleep apnea)    13. Abnormal transaminases      Plan:   I spent a lot of time explaining volume status, labs, meds and that despite weight gain I believe that he is dry.  Discussed role of diuretics to control congestion and other meds offer improved symptoms, functional status and survival.  I have asked him to stop lasix and obtain RHC week of Oct 5 and he is agreeable.  He will need COVID that day as lives too far away and will need to come early.  I will meet with him after test to review result and med changes.  He thinks that his brother can only come on Fridays due to work schedule so thinks cousin will bring him.  I explained that it would be ideal if brother could come.    The procedure of RHC was fully reviewed with the patient.  No consents in clinic so written informed consent will be obtained the day he returns for the procedure.    He will not take lasix until further notice, obtain BMP on Monday/Tues at home and I will review and decide on whether to resume lasix perhaps M-W-F at that time or wait for RHC.

## 2020-09-26 NOTE — PROGRESS NOTES
Subjective:     Patient ID:  John Javier is a 65 y.o. male who presents for follow-up of Congestive Heart Failure and Dizziness    HPI:  65 yo BM presented with cardiogenic shock and transferred to [facility] 2/29/20 with impella.  He had cath documented CAD (50-60% LCx, OM disease on [facility] 2/29).  Course complicated by bacteremia and renal function remains impaired.  He went home 3/13/20 but was readmitted with concern for Covid though test was negative.  At telemedicine visit 3/27/20 I asked him to obtain BMP every 2 weeks starting 4/6/20 at [facility]--he got 4/6 but nothing since that time as not due yet.  At that visit I started entresto 24-26 mg BID, aldactone 25 mg qd and changed lasix to 40 mg qd.  At video visit 4/29/20 increased Entresto to 49-51 mg BID and cut hydralazine from 100 mg to 50 mg TID.  At that time his home wt 138.4# today down from 142-143# at last video visit but different scale.  At telemed visit 6/3/20 he reported NYHA Class 2 symptoms, wt 146.8# on his home scale.  At that time Entresto increased to  mg BID.        When seen 7/28/20 we discussed ASA for CAD and bleeding risk with eliquis vs benefit.  He prefered to stop and I thought that was reasonable so discontinued.  Started metoprolol succinate 50 mg qd and on 8/28 he called in HR which were 60-70 so left at this dose.  His transaminases were elevated so I reduced atorvastatin to 20 mg hs from 40 mg.    He comes today for routine visit.  He reports that he thinks he is carrying fluid so has been taking lasix 80 mg qd for the past 3 days instead of 40 mg.  He is not more SOB but reports wt up couple of pounds and abdomen seems swollen.  Wt went down with increased lasix but abdominal swelling did not.  He is still walking 0.6 miles over 15-20 min, sleeping on 1 pillow, no palp, presyncope or syncope.      Review of Systems   Constitution: Positive for weight gain (weight continues to increase since hospitalization;  "since July up 8 pounds). Negative for chills, fever, malaise/fatigue (resolved), night sweats and weight loss.   Cardiovascular: Positive for dyspnea on exertion (NYHA Class 2 symptoms). Negative for chest pain, irregular heartbeat, leg swelling, near-syncope, orthopnea, palpitations, paroxysmal nocturnal dyspnea and syncope.   Respiratory: Positive for sleep disturbances due to breathing (he has JANET but lost his machine as was not using; he is still working with LMD to obtain machine). Negative for cough, sputum production and wheezing.    Hematologic/Lymphatic: Does not bruise/bleed easily.   Musculoskeletal: Negative for arthritis, joint pain and stiffness.   Gastrointestinal: Negative for hematochezia and melena.   Genitourinary: Negative for hematuria.   Neurological: Positive for dizziness (worse with increased lasix past 3 days ) and light-headedness (worse with increased lasix past 3 days). Negative for brief paralysis, focal weakness, seizures and weakness.     Objective:   Physical Exam   Constitutional: He is oriented to person, place, and time. He appears well-developed and well-nourished. No distress.   BP (!) 85/53 (BP Location: Right arm, Patient Position: Sitting, BP Method: Medium (Automatic))   Pulse 60   Ht 5' 4" (1.626 m)   Wt 70.8 kg (156 lb 1.4 oz)   BMI 26.79 kg/m²   Last visit wt 68 kg (149 lb 14.6 oz)  WD, WN BM in NAD   HENT:   Head: Normocephalic and atraumatic.   Eyes: Conjunctivae are normal.   Neck: No JVD present. No tracheal deviation present. No thyromegaly present.   Cardiovascular: Normal rate and regular rhythm. Exam reveals no gallop (no S3).   Murmur (Grade 1/6 systollic murmur LLSB and apex) heard.  Pulmonary/Chest: Effort normal and breath sounds normal.   Abdominal: Soft. Bowel sounds are normal. He exhibits no distension (liver 12 cm) and no mass. There is no abdominal tenderness. There is no rebound and no guarding.   Musculoskeletal:         General: No tenderness or " edema.   Neurological: He is alert and oriented to person, place, and time.   Skin: Skin is warm and dry. He is not diaphoretic.      Lab Results   Component Value Date    BNP 86 09/25/2020     (H) 07/28/2020     (H) 03/14/2020     Lab Results   Component Value Date     09/25/2020     (L) 07/28/2020    K 3.6 09/25/2020    K 4.6 07/28/2020     (H) 09/25/2020    GLU 92 07/28/2020    BUN 33 (H) 09/25/2020    BUN 17 07/28/2020    CREATININE 2.8 (H) 09/25/2020    CREATININE 1.5 (H) 07/28/2020       (H) 09/25/2020    HGBA1C 7.0 (H) 03/01/2020    AST 57 (H) 09/25/2020    ALT 98 (H) 09/25/2020    ALBUMIN 4.0 09/25/2020    PROT 7.3 09/25/2020    BILITOT 0.3 09/25/2020    WBC 6.51 07/28/2020    HGB 12.4 (L) 07/28/2020    HCT 38.0 (L) 07/28/2020     07/28/2020    INR 1.2 03/01/2020     (H) 03/03/2020    TSH 1.118 07/28/2020    CHOL 123 07/28/2020    HDL 53 07/28/2020    LDLCALC 56.0 (L) 07/28/2020    TRIG 70 07/28/2020     TODAY/S ECHO Conclusion   · The left ventricle is normal in size with severely decreased systolic function. The estimated ejection fraction is 25%.  · Normal right ventricular size and systolic function.  · Grade I left ventricular diastolic dysfunction.  · Severe left atrial enlargement.  · The estimated PA systolic pressure is 29 mmHg.  · Normal central venous pressure (3 mmHg).       Assessment:     1. Acute on chronic renal insufficiency    2. Chronic combined systolic and diastolic heart failure    3. Elevated transaminase measurement    4. COCM (congestive cardiomyopathy)    5. Hypertensive cardiovascular-renal disease, stage 1-4 or unspecified chronic kidney disease, with heart failure    6. Dilated cardiomyopathy out of proportion to CAD    7. PAF (paroxysmal atrial fibrillation)    8. VT (ventricular tachycardia)    9. At risk for amiodarone toxicity with long term use    10. Coronary artery disease involving native coronary artery of native  heart without angina pectoris    11. Type 2 diabetes mellitus with stage 3 chronic kidney disease, without long-term current use of insulin    12. JANET (obstructive sleep apnea)    13. Abnormal transaminases      Plan:   I spent a lot of time explaining volume status, labs, meds and that despite weight gain I believe that he is dry.  Discussed role of diuretics to control congestion and other meds offer improved symptoms, functional status and survival.  I have asked him to stop lasix and obtain RHC week of Oct 5 and he is agreeable.  He will need COVID that day as lives too far away and will need to come early.  I will meet with him after test to review result and med changes.  He thinks that his brother can only come on Fridays due to work schedule so thinks cousin will bring him.  I explained that it would be ideal if brother could come.    The procedure of RHC was fully reviewed with the patient.  No consents in clinic so written informed consent will be obtained the day he returns for the procedure.    He will not take lasix until further notice, obtain BMP on Monday/Tues at home and I will review and decide on whether to resume lasix perhaps M-W-F at that time or wait for RHC.

## 2020-10-05 ENCOUNTER — NURSE TRIAGE (OUTPATIENT)
Dept: ADMINISTRATIVE | Facility: CLINIC | Age: 65
End: 2020-10-05

## 2020-10-05 ENCOUNTER — HOSPITAL ENCOUNTER (OUTPATIENT)
Facility: HOSPITAL | Age: 65
Discharge: HOME | End: 2020-10-05
Attending: INTERNAL MEDICINE | Admitting: INTERNAL MEDICINE
Payer: COMMERCIAL

## 2020-10-05 VITALS
HEIGHT: 64 IN | BODY MASS INDEX: 27.28 KG/M2 | DIASTOLIC BLOOD PRESSURE: 60 MMHG | OXYGEN SATURATION: 98 % | HEART RATE: 68 BPM | RESPIRATION RATE: 18 BRPM | TEMPERATURE: 98 F | WEIGHT: 159.81 LBS | SYSTOLIC BLOOD PRESSURE: 118 MMHG

## 2020-10-05 DIAGNOSIS — I50.9 CONGESTIVE HEART FAILURE, UNSPECIFIED HF CHRONICITY, UNSPECIFIED HEART FAILURE TYPE: ICD-10-CM

## 2020-10-05 LAB
POCT GLUCOSE: 124 MG/DL (ref 70–110)
SARS-COV-2 RDRP RESP QL NAA+PROBE: NEGATIVE

## 2020-10-05 PROCEDURE — U0002 COVID-19 LAB TEST NON-CDC: HCPCS

## 2020-10-05 PROCEDURE — 82962 GLUCOSE BLOOD TEST: CPT | Performed by: INTERNAL MEDICINE

## 2020-10-05 NOTE — PLAN OF CARE
Patient presented today for RHC. He took his Eliquis last night and this morning at 8 am (he received instructions to hold it on his office visit 9/25). Will reschedule the procedure for tomorrow. Again we instructed patient to hold his Eliquis tonight and tomorrow morning. COVID test done this morning.

## 2020-10-05 NOTE — PLAN OF CARE
Patient arrived to room. Admit assessment completed. Plan of care discussed with patient. Will monitor

## 2020-10-06 NOTE — TELEPHONE ENCOUNTER
Pt called in to cancel procedure with cardiology on tomorrow and reschedule to Wednesday. Pt states he was unable to secure ride from procedure tomorrow.  Pt told that he would have to contact office on tomorrow to reschedule procedure and that a message would be sent on his behalf.  Pt advised per protocol and verbalized understanding.   Message sent per secure chat to MD as a curtesy.     Reason for Disposition   [1] Caller requesting NON-URGENT health information AND [2] PCP's office is the best resource    Additional Information   Negative: [1] Caller is not with the adult (patient) AND [2] reporting urgent symptoms   Negative: Lab result questions   Negative: Medication questions   Negative: Caller can't be reached by phone   Negative: Caller has already spoken to PCP or another triager   Negative: RN needs further essential information from caller in order to complete triage   Negative: Requesting regular office appointment    Protocols used: INFORMATION ONLY CALL - NO TRIAGE-A-

## 2020-10-07 ENCOUNTER — TELEPHONE (OUTPATIENT)
Dept: TRANSPLANT | Facility: CLINIC | Age: 65
End: 2020-10-07

## 2020-10-07 NOTE — TELEPHONE ENCOUNTER
4:10 pm:  Reviewed chart and returned call to pt  Noted pt has labs and covid test Monday 10/5/20 for RHC, not sure why [facility] not done  Called and spoke with pt at this time.  Pt told me because he took his Eliquis Sunday pm and Monday am. Pt said he was not aware to not take it.  Stated he had left his directions with this information downstairs by the piano after his September visit with Dr. Darden and had called and asked it be mailed to him , but it was not per pt  Pt said he called the on call doctor this past Saturday because he knew he was suppose to stop 2 medicines, but could not remember the 2nd once  Said he knew to stop taking Lasix since his Sept visit and had done so.  Pt said MD he spoke with only told him to stop the lasix  Pt aware of repeat RHC tomorrow and [place] to determine if [facility] opened tomorrow and test would still be done   I told pt as of right now [facility] here in [place] is scheduled for full , normal operation tomorrow   Pt told me he has not taken Eliquis since Monday ( 10/5 am)  He did not know when to restart and stop it    With pt on hold, I called and spoke with Dr Yang   informed Dr Darden pt has not taken Eliquis since 10/5 am-Dr. Darden said for pt not to resume now since RHC in the am.  I also confirmed with Dr. Darden, he does not want any labs repeated tomorrow since done 10/5   Asked about covid test in that it will be about 1  1/2 hours past the 72 hour time frame ( pt had a negative Covid test 10/5/2020 done @ 0853 am   RHC scheduled for 0930 am 10/8)   To the best of my knowledge and Dr. Darden's Covid test is 72 hours, but since barely over that time frame may not need repeated-if so, can do in short stay.     I informed pt of al of this    Also informed pt of RHC instructions he does not have to fast and to have a light breakfast          ----- Message from Evelyn Lynch sent at 10/7/2020   3:40 PM CDT -----  Contact: Pt  Pt called to see if his appt is still scheduled and asked for a call back

## 2020-10-08 ENCOUNTER — HOSPITAL ENCOUNTER (OUTPATIENT)
Facility: HOSPITAL | Age: 65
Discharge: HOME | End: 2020-10-08
Attending: INTERNAL MEDICINE | Admitting: INTERNAL MEDICINE
Payer: COMMERCIAL

## 2020-10-08 VITALS
OXYGEN SATURATION: 98 % | RESPIRATION RATE: 16 BRPM | WEIGHT: 159.38 LBS | SYSTOLIC BLOOD PRESSURE: 116 MMHG | TEMPERATURE: 97 F | HEART RATE: 72 BPM | DIASTOLIC BLOOD PRESSURE: 71 MMHG | BODY MASS INDEX: 27.21 KG/M2 | HEIGHT: 64 IN

## 2020-10-08 DIAGNOSIS — I42.0 DILATED CARDIOMYOPATHY: ICD-10-CM

## 2020-10-08 DIAGNOSIS — I13.0 HYPERTENSIVE CARDIOVASCULAR-RENAL DISEASE, STAGE 1-4 OR UNSPECIFIED CHRONIC KIDNEY DISEASE, WITH HEART FAILURE: ICD-10-CM

## 2020-10-08 DIAGNOSIS — I50.42 CHRONIC COMBINED SYSTOLIC AND DIASTOLIC HEART FAILURE: ICD-10-CM

## 2020-10-08 DIAGNOSIS — I50.9 CHRONIC CONGESTIVE HEART FAILURE: ICD-10-CM

## 2020-10-08 LAB
ANION GAP SERPL CALC-SCNC: 12 MMOL/L (ref 8–16)
BNP SERPL-MCNC: 145 PG/ML (ref 0–99)
BUN SERPL-MCNC: 20 MG/DL (ref 8–23)
CALCIUM SERPL-MCNC: 8.9 MG/DL (ref 8.7–10.5)
CHLORIDE SERPL-SCNC: 105 MMOL/L (ref 95–110)
CO2 SERPL-SCNC: 22 MMOL/L (ref 23–29)
CREAT SERPL-MCNC: 1.7 MG/DL (ref 0.5–1.4)
EST. GFR  (AFRICAN AMERICAN): 48.2 ML/MIN/1.73 M^2
EST. GFR  (NON AFRICAN AMERICAN): 41.7 ML/MIN/1.73 M^2
GLUCOSE SERPL-MCNC: 160 MG/DL (ref 70–110)
POCT GLUCOSE: 172 MG/DL (ref 70–110)
POTASSIUM SERPL-SCNC: 4.8 MMOL/L (ref 3.5–5.1)
SODIUM SERPL-SCNC: 139 MMOL/L (ref 136–145)

## 2020-10-08 PROCEDURE — 83880 ASSAY OF NATRIURETIC PEPTIDE: CPT

## 2020-10-08 PROCEDURE — 25000003 PHARM REV CODE 250: Performed by: INTERNAL MEDICINE

## 2020-10-08 PROCEDURE — 93451 RIGHT HEART CATH: CPT | Mod: 26,,, | Performed by: INTERNAL MEDICINE

## 2020-10-08 PROCEDURE — 82962 GLUCOSE BLOOD TEST: CPT | Performed by: INTERNAL MEDICINE

## 2020-10-08 PROCEDURE — 93451 PR RIGHT HEART CATH O2 SATURATION & CARDIAC OUTPUT: ICD-10-PCS | Mod: 26,,, | Performed by: INTERNAL MEDICINE

## 2020-10-08 PROCEDURE — 93451 RIGHT HEART CATH: CPT | Performed by: INTERNAL MEDICINE

## 2020-10-08 PROCEDURE — C1751 CATH, INF, PER/CENT/MIDLINE: HCPCS | Performed by: INTERNAL MEDICINE

## 2020-10-08 PROCEDURE — 80048 BASIC METABOLIC PNL TOTAL CA: CPT

## 2020-10-08 PROCEDURE — C1894 INTRO/SHEATH, NON-LASER: HCPCS | Performed by: INTERNAL MEDICINE

## 2020-10-08 RX ORDER — METOPROLOL SUCCINATE 100 MG/1
100 TABLET, EXTENDED RELEASE ORAL DAILY
Qty: 90 TABLET | Refills: 3 | Status: SHIPPED | OUTPATIENT
Start: 2020-10-08 | End: 2020-10-17 | Stop reason: SDUPTHER

## 2020-10-08 RX ORDER — ISOSORBIDE DINITRATE 20 MG/1
40 TABLET ORAL 3 TIMES DAILY
Qty: 540 TABLET | Refills: 3 | Status: SHIPPED | OUTPATIENT
Start: 2020-10-08 | End: 2021-11-30 | Stop reason: SDUPTHER

## 2020-10-08 RX ORDER — HYDRALAZINE HYDROCHLORIDE 50 MG/1
75 TABLET, FILM COATED ORAL 3 TIMES DAILY
Qty: 405 TABLET | Refills: 3 | Status: SHIPPED | OUTPATIENT
Start: 2020-10-08 | End: 2021-11-30 | Stop reason: SDUPTHER

## 2020-10-08 RX ORDER — LIDOCAINE HYDROCHLORIDE 20 MG/ML
INJECTION, SOLUTION EPIDURAL; INFILTRATION; INTRACAUDAL; PERINEURAL
Status: DISCONTINUED | OUTPATIENT
Start: 2020-10-08 | End: 2020-10-08 | Stop reason: HOSPADM

## 2020-10-08 RX ORDER — SODIUM CHLORIDE 9 MG/ML
INJECTION, SOLUTION INTRAVENOUS
Status: DISCONTINUED | OUTPATIENT
Start: 2020-10-08 | End: 2020-10-08 | Stop reason: HOSPADM

## 2020-10-08 NOTE — DISCHARGE SUMMARY
[facility]  Discharge Note  Short Stay    Procedure(s) (LRB):  INSERTION, CATHETER, RIGHT HEART (Right)    OUTCOME: Patient tolerated treatment/procedure well without complication and is now ready for discharge.  RA 5, PCWP 16, Ronni CO 4.5 and index 2.5  He has been off lasix since office visit 9/25.  Will leave off lasix.  His labs Monday demonstrated improved renal function.  After today's procedure obtained BMP and BNP but results pending upon discharge.  Since hemodynamics look good will use today's and Monday's values as baseline off diuretics.    MEDICATION CHANGES  Increase hydralazine 50 mg tablets to one and a half tablets three times a day  Increase isosorbide to 20 mg tablet take two tablets three times a day  Increase metoprolol succinate to 100 mg a day (I sent in new prescription for 100 mg tablets; you can wait for that to arrive and do this change in 2 weeks.  If you do not receive medication by then take two of the 50 mg tablets once a day until new prescription arrives  Do not take furosemide (fluid pill/lasix) without our approval    DISPOSITION: Home or Self Care    FINAL DIAGNOSIS:  Chronic combined systolic and diastolic heart failure    FOLLOWUP: with Dr. Darden in 2-3 months    DISCHARGE INSTRUCTIONS:   MEDICATION CHANGES  Increase hydralazine 50 mg tablets to one and a half tablets three times a day  Increase isosorbide to 20 mg tablet take two tablets three times a day  Increase metoprolol succinate to 100 mg a day (I sent in new prescription for 100 mg tablets; you can wait for that to arrive and do this change in 2 weeks.  If you do not receive medication by then take two of the 50 mg tablets once a day until new prescription arrives  Do not take furosemide (fluid pill/lasix) without our approval    Other meds as before     Resume previous diet

## 2020-10-08 NOTE — Clinical Note
The PA catheter is repositioned to the main pulmonary artery. Hemodynamics performed. Cardiac output obtained at 5 L/min. O2 saturation measured at 68%.

## 2020-10-08 NOTE — DISCHARGE INSTRUCTIONS
MEDICATION CHANGES  Increase hydralazine 50 mg tablets to one and a half tablets three times a day  Increase isosorbide to 20 mg tablet take two tablets three times a day  Increase metoprolol succinate to 100 mg a day (I sent in new prescription for 100 mg tablets; you can wait for that to arrive and do this change in 2 weeks.  If you do not receive medication by then take two of the 50 mg tablets once a day until new prescription arrives  Do not take furosemide (fluid pill/lasix) without our approval  See me in 2-3 months    AFTER THE PROCEDURE:   -DO NOT DRINK OR EAT ANYTHING UNTIL NO LONGER HOARSE   -You may remove the bandage in 24 hours and wash with soap and water.   -You may shower, but do not soak in a tub for three days.   PRECAUTIONS FOR THE NEXT 24 HOURS:   -If you need to cough, sneeze, have a bowel movement, or bear down, hold pressure over your bandage.   -Do not  anything heavier than a gallon of milk(about 5 pounds)   -Avoid excessive bending over.   SYMPTOMS TO WATCH FOR AND REPORT TO YOUR DOCTOR:   -BLEEDING: hold pressure over the site until bleeding stops. Proceed to Emergency Room by ambulance (do not drive yourself) if unable to stop bleeding. Notify your doctor.   -HEMATOMA(hard bruise under the skin): Micha around the bruise if one develops. Call your doctor if it increases in size or if you have difficulty talking, swallowing, breathing or anything unusual.   SIGNS OF INFECTION:Fever (temperature over 100.5 F), pus or redness   -RASH   -CHEST PAIN OR SHORTNESS OF BREATH   You may call you coordinator in the Heart Failure/Heart Transplant/Pulmonary Hypertension Clinic at [phone] during normal business hours(Monday through Friday from 8 A.M. to 5 P.M.) After hours, call the Heart Transplant Service doctor on call at [phone].

## 2020-10-08 NOTE — INTERVAL H&P NOTE
The patient has been examined and the H&P has been reviewed:    I concur with the findings and no changes have occurred since H&P was written.    Patient here for RHC. Risks, benefits and alternative options discussed and understood by patient/family.    Initially scheduled for 10/5/20 but was cancelled because he took his Eliquis the night prior and that morning. Has been off Eliquis since that day.     Access - right IJ      Active Hospital Problems    Diagnosis  POA    *Chronic congestive heart failure [I50.9]  Yes      Resolved Hospital Problems   No resolved problems to display.

## 2020-10-08 NOTE — Clinical Note
The PA catheter is repositioned to the pulmonary wedge. Hemodynamics performed. O2 saturation measured at 90%.

## 2020-10-08 NOTE — OP NOTE
RHC Lab Post Procedure Note    Patient tolerated the procedure well.   RA 5  PCWP 16  CI 2.5  /80  There were no complications.   Please see full report in EPIC for details.

## 2020-10-17 ENCOUNTER — NURSE TRIAGE (OUTPATIENT)
Dept: ADMINISTRATIVE | Facility: CLINIC | Age: 65
End: 2020-10-17

## 2020-10-17 DIAGNOSIS — I50.42 CHRONIC COMBINED SYSTOLIC AND DIASTOLIC HEART FAILURE: ICD-10-CM

## 2020-10-17 RX ORDER — METOPROLOL SUCCINATE 100 MG/1
100 TABLET, EXTENDED RELEASE ORAL DAILY
Qty: 90 TABLET | Refills: 3 | Status: SHIPPED | OUTPATIENT
Start: 2020-10-17 | End: 2020-10-27 | Stop reason: SDUPTHER

## 2020-10-17 NOTE — TELEPHONE ENCOUNTER
Spoke with patient he was seen on 10/8.  He states his medication was changed to metoprolol 100 mg from 50 mg. Patient states the pharmacy is not wanting to refill new prescription.  He states they are telling him he should have tablets left from old prescription.     Spoke with Kinza at [facility] she stated that she did not receive transfer prescription from [facility] pharmacy.   Spoke with Joaquina at [facility] pharmacy she stated that prescription was called into [facility] pharmacy by provider.  Prescription resent to [facility] patient made aware.     Reason for Disposition   [1] Prescription prescribed recently is not at pharmacy AND [2] triager has access to patient's EMR AND [3] prescription is recorded in the EMR    Protocols used: MEDICATION QUESTION CALL-A-AH

## 2020-10-17 NOTE — TELEPHONE ENCOUNTER
Attempted to call x 2 no answer.  No option to leave voicemail.      Reason for Disposition   Second attempt to contact family AND no contact made.  Phone number verified.    Protocols used: NO CONTACT OR DUPLICATE CONTACT CALL-A-AH

## 2020-10-27 DIAGNOSIS — I50.42 CHRONIC COMBINED SYSTOLIC AND DIASTOLIC HEART FAILURE: ICD-10-CM

## 2020-10-29 RX ORDER — METOPROLOL SUCCINATE 100 MG/1
100 TABLET, EXTENDED RELEASE ORAL DAILY
Qty: 90 TABLET | Refills: 3 | Status: SHIPPED | OUTPATIENT
Start: 2020-10-29 | End: 2020-11-04 | Stop reason: SDUPTHER

## 2020-11-04 DIAGNOSIS — I50.42 CHRONIC COMBINED SYSTOLIC AND DIASTOLIC HEART FAILURE: ICD-10-CM

## 2020-11-04 NOTE — TELEPHONE ENCOUNTER
----- Message from Sonia Melgoza sent at 11/4/2020 11:14 AM CST -----  Contact: Pt called  Pt need a refill metoprolol succinate (TOPROL-XL) 100 MG 24 hr tablet and send to [id number] [place] [facility] [address] AT [address] [phone] ([phone] (Fax). Thank you.

## 2020-11-05 RX ORDER — METOPROLOL SUCCINATE 100 MG/1
100 TABLET, EXTENDED RELEASE ORAL DAILY
Qty: 90 TABLET | Refills: 3 | Status: SHIPPED | OUTPATIENT
Start: 2020-11-05 | End: 2020-11-06 | Stop reason: SDUPTHER

## 2020-11-06 ENCOUNTER — OFFICE VISIT (OUTPATIENT)
Dept: TRANSPLANT | Facility: CLINIC | Age: 65
End: 2020-11-06
Attending: INTERNAL MEDICINE
Payer: COMMERCIAL

## 2020-11-06 VITALS
SYSTOLIC BLOOD PRESSURE: 120 MMHG | WEIGHT: 164.25 LBS | HEART RATE: 60 BPM | BODY MASS INDEX: 28.04 KG/M2 | DIASTOLIC BLOOD PRESSURE: 70 MMHG | HEIGHT: 64 IN

## 2020-11-06 DIAGNOSIS — E83.39 HYPERPHOSPHATEMIA: ICD-10-CM

## 2020-11-06 DIAGNOSIS — I13.0 HYPERTENSIVE CARDIOVASCULAR-RENAL DISEASE, STAGE 1-4 OR UNSPECIFIED CHRONIC KIDNEY DISEASE, WITH HEART FAILURE: ICD-10-CM

## 2020-11-06 DIAGNOSIS — I25.10 CORONARY ARTERY DISEASE INVOLVING NATIVE CORONARY ARTERY OF NATIVE HEART WITHOUT ANGINA PECTORIS: ICD-10-CM

## 2020-11-06 DIAGNOSIS — I50.42 CHRONIC COMBINED SYSTOLIC AND DIASTOLIC HEART FAILURE: Primary | ICD-10-CM

## 2020-11-06 DIAGNOSIS — I42.0 DILATED CARDIOMYOPATHY: ICD-10-CM

## 2020-11-06 PROCEDURE — 3078F PR MOST RECENT DIASTOLIC BLOOD PRESSURE < 80 MM HG: ICD-10-PCS | Mod: CPTII,S$GLB,, | Performed by: INTERNAL MEDICINE

## 2020-11-06 PROCEDURE — 3008F BODY MASS INDEX DOCD: CPT | Mod: CPTII,S$GLB,, | Performed by: INTERNAL MEDICINE

## 2020-11-06 PROCEDURE — 99213 PR OFFICE/OUTPT VISIT, EST, LEVL III, 20-29 MIN: ICD-10-PCS | Mod: S$GLB,,, | Performed by: INTERNAL MEDICINE

## 2020-11-06 PROCEDURE — 3078F DIAST BP <80 MM HG: CPT | Mod: CPTII,S$GLB,, | Performed by: INTERNAL MEDICINE

## 2020-11-06 PROCEDURE — 3008F PR BODY MASS INDEX (BMI) DOCUMENTED: ICD-10-PCS | Mod: CPTII,S$GLB,, | Performed by: INTERNAL MEDICINE

## 2020-11-06 PROCEDURE — 99999 PR PBB SHADOW E&M-EST. PATIENT-LVL IV: ICD-10-PCS | Mod: PBBFAC,,, | Performed by: INTERNAL MEDICINE

## 2020-11-06 PROCEDURE — 99213 OFFICE O/P EST LOW 20 MIN: CPT | Mod: S$GLB,,, | Performed by: INTERNAL MEDICINE

## 2020-11-06 PROCEDURE — 99999 PR PBB SHADOW E&M-EST. PATIENT-LVL IV: CPT | Mod: PBBFAC,,, | Performed by: INTERNAL MEDICINE

## 2020-11-06 PROCEDURE — 1126F PR PAIN SEVERITY QUANTIFIED, NO PAIN PRESENT: ICD-10-PCS | Mod: S$GLB,,, | Performed by: INTERNAL MEDICINE

## 2020-11-06 PROCEDURE — 3074F SYST BP LT 130 MM HG: CPT | Mod: CPTII,S$GLB,, | Performed by: INTERNAL MEDICINE

## 2020-11-06 PROCEDURE — 3074F PR MOST RECENT SYSTOLIC BLOOD PRESSURE < 130 MM HG: ICD-10-PCS | Mod: CPTII,S$GLB,, | Performed by: INTERNAL MEDICINE

## 2020-11-06 PROCEDURE — 1126F AMNT PAIN NOTED NONE PRSNT: CPT | Mod: S$GLB,,, | Performed by: INTERNAL MEDICINE

## 2020-11-06 RX ORDER — METOPROLOL SUCCINATE 100 MG/1
100 TABLET, EXTENDED RELEASE ORAL DAILY
Qty: 90 TABLET | Refills: 3 | Status: SHIPPED | OUTPATIENT
Start: 2020-11-06 | End: 2021-09-15

## 2020-11-06 NOTE — PROGRESS NOTES
Subjective:     Patient ID:  John Javier is a 65 y.o. male who presents for follow-up of Congestive Heart Failure    HPI:  66 yo BM presented with cardiogenic shock and transferred to [facility] 2/29/20 with impella.  He had cath documented CAD (50-60% LCx, OM disease on [facility] 2/29).  Course complicated by bacteremia and renal function remains impaired.  He went home 3/13/20 but was readmitted with concern for Covid though test was negative.  At telemedicine visit 3/27/20 I asked him to obtain BMP every 2 weeks starting 4/6/20 at [facility]--he got 4/6 but nothing since that time as not due yet. Patient has been uptitrated on his entresto to 97/103mg po BID.     Patient last seen by Dr. emery last month, at which time he was still having some volume issues.     10/08/20 RHC:  RA: 9/ 6/ 5 RV: 50/ 7 PA: 37/ 18/ 24 PWP: 25/ 22/ 16 .   Cardiac output was 4.48  by Ronni. Cardiac index is 2.53 L/min/m2.   O2 Sat: PA 68%.   Pulmonary vascular resistance: 143. Systemic vascular resistance: 1659.     /80 (98); sat 98%    09/25/20 TTE:  · The left ventricle is normal in size with severely decreased systolic function. The estimated ejection fraction is 25%. LVEDD 5.47cm  · Normal right ventricular size and systolic function.  · Grade I left ventricular diastolic dysfunction.  · Severe left atrial enlargement.  · The estimated PA systolic pressure is 29 mmHg.  · Normal central venous pressure (3 mmHg).     Review of Systems   Constitution: Positive for weight gain. Negative for chills, decreased appetite, diaphoresis, fever, malaise/fatigue and weight loss.   HENT: Negative for congestion.    Eyes: Negative for blurred vision and visual disturbance.   Cardiovascular: Positive for dyspnea on exertion. Negative for chest pain, irregular heartbeat, leg swelling, near-syncope, orthopnea, palpitations, paroxysmal nocturnal dyspnea and syncope.   Respiratory: Negative for cough, shortness of breath, sleep  "disturbances due to breathing, snoring and wheezing.    Hematologic/Lymphatic: Negative for bleeding problem. Does not bruise/bleed easily.   Skin: Negative for poor wound healing and rash.   Musculoskeletal: Negative for arthritis, joint pain and muscle weakness.   Gastrointestinal: Negative for bloating, abdominal pain, anorexia, constipation, diarrhea, hematemesis, hematochezia, melena, nausea and vomiting.   Genitourinary: Negative for frequency and urgency.   Neurological: Negative for difficulty with concentration, excessive daytime sleepiness, dizziness, headaches, light-headedness and weakness.   Psychiatric/Behavioral: Negative for depression. The patient does not have insomnia and is not nervous/anxious.      Objective:   Physical Exam   Constitutional: He is oriented to person, place, and time. He appears well-developed and well-nourished. No distress.   /70 (BP Location: Left arm, Patient Position: Sitting, BP Method: Medium (Automatic))   Pulse 60   Ht 5' 4" (1.626 m)   Wt 74.5 kg (164 lb 3.9 oz)   BMI 28.19 kg/m²    HENT:   Head: Normocephalic and atraumatic.   Eyes: Conjunctivae are normal.   Neck: No JVD present. No tracheal deviation present. No thyromegaly present.   Cardiovascular: Normal rate and regular rhythm. Exam reveals no gallop (no S3).   Murmur (Grade 1/6 systollic murmur LLSB and apex) heard.  Pulmonary/Chest: Effort normal and breath sounds normal.   Abdominal: Soft. Bowel sounds are normal. He exhibits no distension (liver 12 cm) and no mass. There is no abdominal tenderness. There is no rebound and no guarding.   Musculoskeletal:         General: No tenderness or edema.   Neurological: He is alert and oriented to person, place, and time.   Skin: Skin is warm and dry. He is not diaphoretic.   Nursing note and vitals reviewed.     Lab Results   Component Value Date     (H) 10/08/2020     10/08/2020    K 4.8 10/08/2020    MG 1.7 03/17/2020     10/08/2020    " CO2 22 (L) 10/08/2020    BUN 20 10/08/2020    CREATININE 1.7 (H) 10/08/2020     (H) 10/08/2020    HGBA1C 7.0 (H) 03/01/2020    AST 25 10/05/2020    ALT 64 (H) 10/05/2020    ALBUMIN 3.9 10/05/2020    PROT 7.4 10/05/2020    BILITOT 0.2 10/05/2020    CHOL 123 07/28/2020    HDL 53 07/28/2020    LDLCALC 56.0 (L) 07/28/2020    TRIG 70 07/28/2020       Magnesium   Date Value Ref Range Status   03/17/2020 1.7 1.6 - 2.6 mg/dL Final       Lab Results   Component Value Date    WBC 6.37 10/05/2020    HGB 12.9 (L) 10/05/2020    HCT 39.6 (L) 10/05/2020    MCV 95 10/05/2020     10/05/2020       Lab Results   Component Value Date    INR 1.2 03/01/2020    INR 1.3 (H) 02/29/2020       BNP   Date Value Ref Range Status   10/08/2020 145 (H) 0 - 99 pg/mL Final     Comment:     Values of less than 100 pg/ml are consistent with non-CHF populations.   10/05/2020 181 (H) 0 - 99 pg/mL Final     Comment:     Values of less than 100 pg/ml are consistent with non-CHF populations.   09/25/2020 86 0 - 99 pg/mL Final     Comment:     Values of less than 100 pg/ml are consistent with non-CHF populations.       Assessment:     1. Chronic combined systolic and diastolic heart failure    2. Coronary artery disease involving native coronary artery of native heart without angina pectoris    3. Hyperphosphatemia    4. Hypertensive cardiovascular-renal disease, stage 1-4 or unspecified chronic kidney disease, with heart failure    5. Dilated cardiomyopathy out of proportion to CAD      Plan:   RHC reviewed, patient had medication changes adjusted accordingly, CO/CI stable/normal.   Recommend 2 gram sodium restriction and 1500cc fluid restriction.  Encourage physical activity with graded exercise program.  Requested patient to weigh themselves daily, and to notify us if their weight increases by more than 3 lbs in 1 day or 5 lbs in 1 week.  RTC 2 months with clinic, labs.   Glad to see patient feeling and doing better.

## 2020-11-20 ENCOUNTER — NURSE TRIAGE (OUTPATIENT)
Dept: ADMINISTRATIVE | Facility: CLINIC | Age: 65
End: 2020-11-20

## 2020-11-20 NOTE — TELEPHONE ENCOUNTER
155/93, HR 73 @0245    Reason for Disposition   Unable to complete triage due to phone connection issues   [1] Chest pain lasts > 5 minutes AND [2] history of heart disease  (i.e., heart attack, bypass surgery, angina, angioplasty, CHF)    Additional Information   Negative: Difficult to awaken or acting confused (e.g., disoriented, slurred speech)   Negative: Severe difficulty breathing (e.g., struggling for each breath, speaks in single words)   Negative: [1] Weakness of the face, arm or leg on one side of the body AND [2] new onset   Negative: [1] Numbness (i.e., loss of sensation) of the face, arm or leg on one side of the body AND [2] new onset    Protocols used: NO CONTACT OR DUPLICATE CONTACT CALL-A-AH, HIGH BLOOD PRESSURE-A-AH    Patient is concerned about blood pressure readings elevating and he is with our heart failure clinic. He has chest pressure right now and I told him to call 911. He verballized understanding and pressed his medic alert bracelet.

## 2020-12-08 ENCOUNTER — TELEPHONE (OUTPATIENT)
Dept: TRANSPLANT | Facility: CLINIC | Age: 65
End: 2020-12-08

## 2020-12-08 NOTE — TELEPHONE ENCOUNTER
1135 am:  Message forwarded to Dr. Cavanaugh in that I did not see anything about this in her 11/2020 visit note plan  Asked her to let us know if anything we need to assist with      ----- Message from Carmen Portillo sent at 12/8/2020 11:23 AM CST -----  Regarding: Sleep Study  If Dr Cavanaugh needs sleep study can call Dr Moss office. Thanks     [phone]- Jindi

## 2020-12-08 NOTE — TELEPHONE ENCOUNTER
Spoke  she said pt  PCP can order sleep study. I called Dr. Moss office and spoke to Carina and informed her what Dr. Cavanaugh said and she said she will set up pt sleep study

## 2020-12-15 ENCOUNTER — TELEPHONE (OUTPATIENT)
Dept: TRANSPLANT | Facility: CLINIC | Age: 65
End: 2020-12-15

## 2020-12-15 NOTE — TELEPHONE ENCOUNTER
----- Message from Eboni Pettit sent at 12/15/2020 12:28 PM CST -----  Regarding: Test results  Pt would like a call [phone] in ref to his test results.    Thanks

## 2020-12-16 ENCOUNTER — TELEPHONE (OUTPATIENT)
Dept: TRANSPLANT | Facility: CLINIC | Age: 65
End: 2020-12-16

## 2021-01-06 ENCOUNTER — TELEPHONE (OUTPATIENT)
Dept: TRANSPLANT | Facility: CLINIC | Age: 66
End: 2021-01-06

## 2021-01-08 ENCOUNTER — LAB VISIT (OUTPATIENT)
Dept: LAB | Facility: HOSPITAL | Age: 66
End: 2021-01-08
Attending: INTERNAL MEDICINE
Payer: COMMERCIAL

## 2021-01-08 ENCOUNTER — TELEPHONE (OUTPATIENT)
Dept: TRANSPLANT | Facility: CLINIC | Age: 66
End: 2021-01-08

## 2021-01-08 ENCOUNTER — EDUCATION (OUTPATIENT)
Dept: TRANSPLANT | Facility: CLINIC | Age: 66
End: 2021-01-08

## 2021-01-08 ENCOUNTER — OFFICE VISIT (OUTPATIENT)
Dept: TRANSPLANT | Facility: CLINIC | Age: 66
End: 2021-01-08
Attending: INTERNAL MEDICINE
Payer: COMMERCIAL

## 2021-01-08 ENCOUNTER — PATIENT MESSAGE (OUTPATIENT)
Dept: TRANSPLANT | Facility: CLINIC | Age: 66
End: 2021-01-08

## 2021-01-08 VITALS
HEIGHT: 64 IN | HEART RATE: 65 BPM | BODY MASS INDEX: 28.71 KG/M2 | OXYGEN SATURATION: 97 % | DIASTOLIC BLOOD PRESSURE: 60 MMHG | SYSTOLIC BLOOD PRESSURE: 109 MMHG | WEIGHT: 168.19 LBS

## 2021-01-08 DIAGNOSIS — N18.31 STAGE 3A CHRONIC KIDNEY DISEASE: ICD-10-CM

## 2021-01-08 DIAGNOSIS — Z79.899 AT RISK FOR AMIODARONE TOXICITY WITH LONG TERM USE: ICD-10-CM

## 2021-01-08 DIAGNOSIS — I47.20 VT (VENTRICULAR TACHYCARDIA): ICD-10-CM

## 2021-01-08 DIAGNOSIS — I13.0 HYPERTENSIVE CARDIOVASCULAR-RENAL DISEASE, STAGE 1-4 OR UNSPECIFIED CHRONIC KIDNEY DISEASE, WITH HEART FAILURE: ICD-10-CM

## 2021-01-08 DIAGNOSIS — I50.9 CONGESTIVE HEART FAILURE, UNSPECIFIED HF CHRONICITY, UNSPECIFIED HEART FAILURE TYPE: Primary | ICD-10-CM

## 2021-01-08 DIAGNOSIS — Z91.89 AT RISK FOR AMIODARONE TOXICITY WITH LONG TERM USE: ICD-10-CM

## 2021-01-08 DIAGNOSIS — Z01.818 PRE-OP TESTING: ICD-10-CM

## 2021-01-08 DIAGNOSIS — I50.42 CHRONIC COMBINED SYSTOLIC AND DIASTOLIC HEART FAILURE: ICD-10-CM

## 2021-01-08 DIAGNOSIS — I48.0 PAF (PAROXYSMAL ATRIAL FIBRILLATION): ICD-10-CM

## 2021-01-08 DIAGNOSIS — G47.33 OSA (OBSTRUCTIVE SLEEP APNEA): ICD-10-CM

## 2021-01-08 DIAGNOSIS — I42.0 DILATED CARDIOMYOPATHY: ICD-10-CM

## 2021-01-08 DIAGNOSIS — I50.42 CHRONIC COMBINED SYSTOLIC AND DIASTOLIC HEART FAILURE: Primary | ICD-10-CM

## 2021-01-08 DIAGNOSIS — I25.10 CORONARY ARTERY DISEASE INVOLVING NATIVE CORONARY ARTERY OF NATIVE HEART WITHOUT ANGINA PECTORIS: ICD-10-CM

## 2021-01-08 LAB
ANION GAP SERPL CALC-SCNC: 10 MMOL/L (ref 8–16)
BUN SERPL-MCNC: 19 MG/DL (ref 8–23)
CALCIUM SERPL-MCNC: 9 MG/DL (ref 8.7–10.5)
CHLORIDE SERPL-SCNC: 103 MMOL/L (ref 95–110)
CO2 SERPL-SCNC: 27 MMOL/L (ref 23–29)
CREAT SERPL-MCNC: 1.7 MG/DL (ref 0.5–1.4)
EST. GFR  (AFRICAN AMERICAN): 48.2 ML/MIN/1.73 M^2
EST. GFR  (NON AFRICAN AMERICAN): 41.7 ML/MIN/1.73 M^2
GLUCOSE SERPL-MCNC: 160 MG/DL (ref 70–110)
POTASSIUM SERPL-SCNC: 4.4 MMOL/L (ref 3.5–5.1)
SODIUM SERPL-SCNC: 140 MMOL/L (ref 136–145)

## 2021-01-08 PROCEDURE — 3078F PR MOST RECENT DIASTOLIC BLOOD PRESSURE < 80 MM HG: ICD-10-PCS | Mod: CPTII,S$GLB,, | Performed by: INTERNAL MEDICINE

## 2021-01-08 PROCEDURE — 3074F PR MOST RECENT SYSTOLIC BLOOD PRESSURE < 130 MM HG: ICD-10-PCS | Mod: CPTII,S$GLB,, | Performed by: INTERNAL MEDICINE

## 2021-01-08 PROCEDURE — 99999 PR PBB SHADOW E&M-EST. PATIENT-LVL IV: CPT | Mod: PBBFAC,,, | Performed by: INTERNAL MEDICINE

## 2021-01-08 PROCEDURE — 36415 COLL VENOUS BLD VENIPUNCTURE: CPT

## 2021-01-08 PROCEDURE — 3078F DIAST BP <80 MM HG: CPT | Mod: CPTII,S$GLB,, | Performed by: INTERNAL MEDICINE

## 2021-01-08 PROCEDURE — 3074F SYST BP LT 130 MM HG: CPT | Mod: CPTII,S$GLB,, | Performed by: INTERNAL MEDICINE

## 2021-01-08 PROCEDURE — 99214 OFFICE O/P EST MOD 30 MIN: CPT | Mod: S$GLB,,, | Performed by: INTERNAL MEDICINE

## 2021-01-08 PROCEDURE — 3008F BODY MASS INDEX DOCD: CPT | Mod: CPTII,S$GLB,, | Performed by: INTERNAL MEDICINE

## 2021-01-08 PROCEDURE — 99999 PR PBB SHADOW E&M-EST. PATIENT-LVL IV: ICD-10-PCS | Mod: PBBFAC,,, | Performed by: INTERNAL MEDICINE

## 2021-01-08 PROCEDURE — 99214 PR OFFICE/OUTPT VISIT, EST, LEVL IV, 30-39 MIN: ICD-10-PCS | Mod: S$GLB,,, | Performed by: INTERNAL MEDICINE

## 2021-01-08 PROCEDURE — 80048 BASIC METABOLIC PNL TOTAL CA: CPT

## 2021-01-08 PROCEDURE — 3008F PR BODY MASS INDEX (BMI) DOCUMENTED: ICD-10-PCS | Mod: CPTII,S$GLB,, | Performed by: INTERNAL MEDICINE

## 2021-01-10 PROBLEM — N18.31 STAGE 3A CHRONIC KIDNEY DISEASE: Status: ACTIVE | Noted: 2021-01-10

## 2021-01-11 ENCOUNTER — TELEPHONE (OUTPATIENT)
Dept: ADMINISTRATIVE | Facility: HOSPITAL | Age: 66
End: 2021-01-11

## 2021-01-12 ENCOUNTER — TELEPHONE (OUTPATIENT)
Dept: TRANSPLANT | Facility: HOSPITAL | Age: 66
End: 2021-01-12

## 2021-01-13 ENCOUNTER — TELEPHONE (OUTPATIENT)
Dept: TRANSPLANT | Facility: CLINIC | Age: 66
End: 2021-01-13

## 2021-01-13 DIAGNOSIS — I50.42 CHRONIC COMBINED SYSTOLIC AND DIASTOLIC HEART FAILURE: Primary | ICD-10-CM

## 2021-01-14 ENCOUNTER — HISTORICAL (OUTPATIENT)
Dept: SPEECH THERAPY | Facility: HOSPITAL | Age: 66
End: 2021-01-14

## 2021-01-21 ENCOUNTER — DOCUMENTATION ONLY (OUTPATIENT)
Dept: TRANSPLANT | Facility: CLINIC | Age: 66
End: 2021-01-21

## 2021-01-22 ENCOUNTER — TELEPHONE (OUTPATIENT)
Dept: TRANSPLANT | Facility: CLINIC | Age: 66
End: 2021-01-22

## 2021-01-22 ENCOUNTER — HOSPITAL ENCOUNTER (OUTPATIENT)
Dept: MEDSURG UNIT | Facility: HOSPITAL | Age: 66
End: 2021-01-23
Attending: INTERNAL MEDICINE | Admitting: INTERNAL MEDICINE

## 2021-01-22 LAB
ABS NEUT (OLG): 4.11 X10(3)/MCL (ref 2.1–9.2)
ALBUMIN SERPL-MCNC: 3.8 GM/DL (ref 3.4–4.8)
ALBUMIN/GLOB SERPL: 1.2 RATIO (ref 1.1–2)
ALP SERPL-CCNC: 74 UNIT/L (ref 40–150)
ALT SERPL-CCNC: 70 UNIT/L (ref 0–55)
APTT PPP: 26.9 SECOND(S) (ref 24.8–36.9)
APTT PPP: 74.3 SECOND(S) (ref 24.8–36.9)
AST SERPL-CCNC: 40 UNIT/L (ref 5–34)
BASOPHILS # BLD AUTO: 0 X10(3)/MCL (ref 0–0.2)
BASOPHILS NFR BLD AUTO: 1 %
BILIRUB SERPL-MCNC: 0.3 MG/DL
BILIRUBIN DIRECT+TOT PNL SERPL-MCNC: 0.1 MG/DL (ref 0–0.5)
BILIRUBIN DIRECT+TOT PNL SERPL-MCNC: 0.2 MG/DL (ref 0–0.8)
BNP BLD-MCNC: 135.6 PG/ML
BUN SERPL-MCNC: 19.8 MG/DL (ref 8.4–25.7)
CALCIUM SERPL-MCNC: 8.5 MG/DL (ref 8.8–10)
CHLORIDE SERPL-SCNC: 103 MMOL/L (ref 98–107)
CO2 SERPL-SCNC: 21 MMOL/L (ref 23–31)
CREAT SERPL-MCNC: 1.92 MG/DL (ref 0.73–1.18)
EOSINOPHIL # BLD AUTO: 0 X10(3)/MCL (ref 0–0.9)
EOSINOPHIL NFR BLD AUTO: 0 %
ERYTHROCYTE [DISTWIDTH] IN BLOOD BY AUTOMATED COUNT: 13.4 % (ref 11.5–17)
GLOBULIN SER-MCNC: 3.1 GM/DL (ref 2.4–3.5)
GLUCOSE SERPL-MCNC: 163 MG/DL (ref 82–115)
HCT VFR BLD AUTO: 38.1 % (ref 42–52)
HGB BLD-MCNC: 12.7 GM/DL (ref 14–18)
INR PPP: 1.2 (ref 0–1.3)
LYMPHOCYTES # BLD AUTO: 0.7 X10(3)/MCL (ref 0.6–4.6)
LYMPHOCYTES NFR BLD AUTO: 13 %
MAGNESIUM SERPL-MCNC: 2 MG/DL (ref 1.6–2.6)
MCH RBC QN AUTO: 30.6 PG (ref 27–31)
MCHC RBC AUTO-ENTMCNC: 33.3 GM/DL (ref 33–36)
MCV RBC AUTO: 91.8 FL (ref 80–94)
MONOCYTES # BLD AUTO: 0.6 X10(3)/MCL (ref 0.1–1.3)
MONOCYTES NFR BLD AUTO: 12 %
NEUTROPHILS # BLD AUTO: 4.11 X10(3)/MCL (ref 2.1–9.2)
NEUTROPHILS NFR BLD AUTO: 74 %
PLATELET # BLD AUTO: 222 X10(3)/MCL (ref 130–400)
PMV BLD AUTO: 11.7 FL (ref 9.4–12.4)
POC TROPONIN: 0.03 NG/ML (ref 0–0.08)
POTASSIUM SERPL-SCNC: 4.4 MMOL/L (ref 3.5–5.1)
PROT SERPL-MCNC: 6.9 GM/DL (ref 5.8–7.6)
PROTHROMBIN TIME: 14.8 SECOND(S) (ref 11.1–13.7)
RBC # BLD AUTO: 4.15 X10(6)/MCL (ref 4.7–6.1)
SARS-COV-2 AG RESP QL IA.RAPID: NEGATIVE
SODIUM SERPL-SCNC: 134 MMOL/L (ref 136–145)
TROPONIN I SERPL-MCNC: 0.03 NG/ML (ref 0–0.04)
TROPONIN I SERPL-MCNC: 0.04 NG/ML (ref 0–0.04)
TROPONIN I SERPL-MCNC: 0.07 NG/ML (ref 0–0.04)
WBC # SPEC AUTO: 5.6 X10(3)/MCL (ref 4.5–11.5)

## 2021-01-23 LAB
ABS NEUT (OLG): 2.52 X10(3)/MCL (ref 2.1–9.2)
APTT PPP: 71.4 SECOND(S) (ref 24.8–36.9)
BASOPHILS NFR BLD MANUAL: 2 % (ref 0–2)
EOSINOPHIL NFR BLD MANUAL: 2 % (ref 0–8)
ERYTHROCYTE [DISTWIDTH] IN BLOOD BY AUTOMATED COUNT: 13.4 % (ref 11.5–17)
HCT VFR BLD AUTO: 41.3 % (ref 42–52)
HGB BLD-MCNC: 13.4 GM/DL (ref 14–18)
LYMPHOCYTES NFR BLD MANUAL: 36 % (ref 13–40)
MCH RBC QN AUTO: 30.5 PG (ref 27–31)
MCHC RBC AUTO-ENTMCNC: 32.4 GM/DL (ref 33–36)
MCV RBC AUTO: 93.9 FL (ref 80–94)
MONOCYTES NFR BLD MANUAL: 7 % (ref 2–11)
NEUTROPHILS NFR BLD MANUAL: 53 % (ref 47–80)
PLATELET # BLD AUTO: 156 X10(3)/MCL (ref 130–400)
PLATELET # BLD EST: NORMAL 10*3/UL
PMV BLD AUTO: 12.9 FL (ref 9.4–12.4)
RBC # BLD AUTO: 4.4 X10(6)/MCL (ref 4.7–6.1)
RBC MORPH BLD: NORMAL
TROPONIN I SERPL-MCNC: 0.04 NG/ML (ref 0–0.04)
WBC # SPEC AUTO: 4.4 X10(3)/MCL (ref 4.5–11.5)

## 2021-01-28 ENCOUNTER — TELEPHONE (OUTPATIENT)
Dept: TRANSPLANT | Facility: CLINIC | Age: 66
End: 2021-01-28

## 2021-01-29 ENCOUNTER — TELEPHONE (OUTPATIENT)
Dept: TRANSPLANT | Facility: CLINIC | Age: 66
End: 2021-01-29

## 2021-02-01 ENCOUNTER — TELEPHONE (OUTPATIENT)
Dept: TRANSPLANT | Facility: CLINIC | Age: 66
End: 2021-02-01

## 2021-02-02 ENCOUNTER — TELEPHONE (OUTPATIENT)
Dept: TRANSPLANT | Facility: CLINIC | Age: 66
End: 2021-02-02

## 2021-02-03 ENCOUNTER — HOSPITAL ENCOUNTER (OUTPATIENT)
Dept: CARDIOLOGY | Facility: HOSPITAL | Age: 66
Discharge: HOME | End: 2021-02-03
Attending: INTERNAL MEDICINE | Admitting: INTERNAL MEDICINE
Payer: COMMERCIAL

## 2021-02-03 ENCOUNTER — HOSPITAL ENCOUNTER (OUTPATIENT)
Dept: CARDIOLOGY | Facility: HOSPITAL | Age: 66
Discharge: HOME | End: 2021-02-03
Attending: INTERNAL MEDICINE
Payer: COMMERCIAL

## 2021-02-03 ENCOUNTER — HOSPITAL ENCOUNTER (OUTPATIENT)
Facility: HOSPITAL | Age: 66
Discharge: HOME | End: 2021-02-03
Attending: INTERNAL MEDICINE | Admitting: INTERNAL MEDICINE
Payer: COMMERCIAL

## 2021-02-03 ENCOUNTER — TELEPHONE (OUTPATIENT)
Dept: TRANSPLANT | Facility: CLINIC | Age: 66
End: 2021-02-03

## 2021-02-03 ENCOUNTER — SOCIAL WORK (OUTPATIENT)
Dept: TRANSPLANT | Facility: CLINIC | Age: 66
End: 2021-02-03
Payer: COMMERCIAL

## 2021-02-03 VITALS
HEART RATE: 64 BPM | OXYGEN SATURATION: 96 % | BODY MASS INDEX: 27.14 KG/M2 | WEIGHT: 159 LBS | HEART RATE: 57 BPM | SYSTOLIC BLOOD PRESSURE: 111 MMHG | DIASTOLIC BLOOD PRESSURE: 82 MMHG | TEMPERATURE: 98 F | DIASTOLIC BLOOD PRESSURE: 65 MMHG | HEIGHT: 64 IN | HEIGHT: 64 IN | RESPIRATION RATE: 18 BRPM | BODY MASS INDEX: 27.23 KG/M2 | SYSTOLIC BLOOD PRESSURE: 116 MMHG | WEIGHT: 159.5 LBS

## 2021-02-03 VITALS
HEART RATE: 65 BPM | WEIGHT: 168 LBS | BODY MASS INDEX: 28.68 KG/M2 | HEIGHT: 64 IN | SYSTOLIC BLOOD PRESSURE: 120 MMHG | DIASTOLIC BLOOD PRESSURE: 70 MMHG

## 2021-02-03 DIAGNOSIS — I50.42 CHRONIC COMBINED SYSTOLIC AND DIASTOLIC HEART FAILURE: ICD-10-CM

## 2021-02-03 DIAGNOSIS — I50.22 CHRONIC SYSTOLIC CONGESTIVE HEART FAILURE: ICD-10-CM

## 2021-02-03 DIAGNOSIS — I50.9 CONGESTIVE HEART FAILURE, UNSPECIFIED HF CHRONICITY, UNSPECIFIED HEART FAILURE TYPE: ICD-10-CM

## 2021-02-03 DIAGNOSIS — I50.22 CHRONIC SYSTOLIC HEART FAILURE: ICD-10-CM

## 2021-02-03 LAB
ASCENDING AORTA: 3.19 CM
AV INDEX (PROSTH): 0.65
AV MEAN GRADIENT: 4 MMHG
AV PEAK GRADIENT: 8 MMHG
AV VALVE AREA: 2.44 CM2
AV VELOCITY RATIO: 0.65
BSA FOR ECHO PROCEDURE: 1.85 M2
CV ECHO LV RWT: 0.33 CM
CV STRESS BASE HR: 57 BPM
DOP CALC AO PEAK VEL: 1.44 M/S
DOP CALC AO VTI: 28.23 CM
DOP CALC LVOT AREA: 3.7 CM2
DOP CALC LVOT DIAMETER: 2.18 CM
DOP CALC LVOT PEAK VEL: 0.93 M/S
DOP CALC LVOT STROKE VOLUME: 68.76 CM3
DOP CALCLVOT PEAK VEL VTI: 18.43 CM
E WAVE DECELERATION TIME: 156.53 MSEC
E/A RATIO: 1.14
E/E' RATIO: 14.73 M/S
ECHO LV POSTERIOR WALL: 0.98 CM (ref 0.6–1.1)
FRACTIONAL SHORTENING: 25 % (ref 28–44)
INTERVENTRICULAR SEPTUM: 1.02 CM (ref 0.6–1.1)
IVRT: 105.61 MSEC
LA MAJOR: 6.62 CM
LA MINOR: 6.4 CM
LA WIDTH: 4.5 CM
LEFT ATRIUM SIZE: 4.56 CM
LEFT ATRIUM VOLUME INDEX MOD: 58.5 ML/M2
LEFT ATRIUM VOLUME INDEX: 62.4 ML/M2
LEFT ATRIUM VOLUME MOD: 106.44 CM3
LEFT ATRIUM VOLUME: 113.52 CM3
LEFT INTERNAL DIMENSION IN SYSTOLE: 4.53 CM (ref 2.1–4)
LEFT VENTRICLE DIASTOLIC VOLUME INDEX: 65.74 ML/M2
LEFT VENTRICLE DIASTOLIC VOLUME: 119.65 ML
LEFT VENTRICLE MASS INDEX: 136 G/M2
LEFT VENTRICLE SYSTOLIC VOLUME INDEX: 51.7 ML/M2
LEFT VENTRICLE SYSTOLIC VOLUME: 94.01 ML
LEFT VENTRICULAR INTERNAL DIMENSION IN DIASTOLE: 6 CM (ref 3.5–6)
LEFT VENTRICULAR MASS: 246.87 G
LV LATERAL E/E' RATIO: 11.57 M/S
LV SEPTAL E/E' RATIO: 20.25 M/S
MV A" WAVE DURATION": 8.28 MSEC
MV PEAK A VEL: 0.71 M/S
MV PEAK E VEL: 0.81 M/S
OHS CV CPX 1 MINUTE RECOVERY HEART RATE: 90 BPM
OHS CV CPX 85 PERCENT MAX PREDICTED HEART RATE MALE: 133
OHS CV CPX ANAEROBIC THRESHOLD DIASTOLIC BLOOD PRESSURE: 81 MMHG
OHS CV CPX ANAEROBIC THRESHOLD HEART RATE: 83
OHS CV CPX ANAEROBIC THRESHOLD RATE PRESSURE PRODUCT: NORMAL
OHS CV CPX ANAEROBIC THRESHOLD SYSTOLIC BLOOD PRESSURE: 130
OHS CV CPX DATA GRADE - AT: 3.1
OHS CV CPX DATA GRADE - PEAK: 3.9
OHS CV CPX DATA O2 SAT - PEAK: 96
OHS CV CPX DATA O2 SAT - REST: 96
OHS CV CPX DATA SPEED - AT: 2.5
OHS CV CPX DATA SPEED - PEAK: 2.7
OHS CV CPX DATA TIME - AT: 5.02
OHS CV CPX DATA TIME - PEAK: 6
OHS CV CPX DATA VE/VCO2 - AT: 41
OHS CV CPX DATA VE/VCO2 - PEAK: 44
OHS CV CPX DATA VE/VO2 - AT: 42
OHS CV CPX DATA VE/VO2 - PEAK: 47
OHS CV CPX DATA VO2 - AT: 12.4
OHS CV CPX DATA VO2 - PEAK: 14.1
OHS CV CPX DATA VO2 - REST: 4
OHS CV CPX FEV1/FVC: 0.7
OHS CV CPX FORCED EXPIRATORY VOLUME: 1.67
OHS CV CPX FORCED VITAL CAPACITY (FVC): 2.37
OHS CV CPX HIGHEST VO: 28.1
OHS CV CPX MAX PREDICTED HEART RATE: 156
OHS CV CPX MAXIMAL VOLUNTARY VENTILATION (MVV) PREDICTED: 66.8
OHS CV CPX MAXIMAL VOLUNTARY VENTILATION (MVV): 82
OHS CV CPX MAXIUMUM EXERCISE VENTILATION (VE MAX): 45.7
OHS CV CPX PATIENT AGE: 64
OHS CV CPX PATIENT HEIGHT IN: 64
OHS CV CPX PATIENT IS FEMALE AGE 11-19: 0
OHS CV CPX PATIENT IS FEMALE AGE GREATER THAN 19: 0
OHS CV CPX PATIENT IS FEMALE AGE LESS THAN 11: 0
OHS CV CPX PATIENT IS FEMALE: 0
OHS CV CPX PATIENT IS MALE AGE 11-25: 0
OHS CV CPX PATIENT IS MALE AGE GREATER THAN 25: 1
OHS CV CPX PATIENT IS MALE AGE LESS THAN 11: 0
OHS CV CPX PATIENT IS MALE GREATER THAN 18: 1
OHS CV CPX PATIENT IS MALE LESS THAN OR EQUAL TO 18: 0
OHS CV CPX PATIENT IS MALE: 1
OHS CV CPX PATIENT WEIGHT RETURNED IN OZ: 2552.04
OHS CV CPX PEAK HEAR RATE: 86 BPM
OHS CV CPX PERCENT BODY FAT: 23
OHS CV CPX PERCENT MAX PREDICTED HEART RATE ACHIEVED: 55
OHS CV CPX PREDICTED VO2: 28.1 ML/KG/MIN
OHS CV CPX REST PET CO2: 32
OHS CV CPX VE/VCO2 SLOPE: 40.9
PISA TR MAX VEL: 3.31 M/S
PULM VEIN S/D RATIO: 0.67
PV PEAK D VEL: 0.85 M/S
PV PEAK S VEL: 0.57 M/S
RA MAJOR: 4.8 CM
RA PRESSURE: 15 MMHG
RA WIDTH: 4.53 CM
RIGHT VENTRICULAR END-DIASTOLIC DIMENSION: 4.51 CM
RV TISSUE DOPPLER FREE WALL SYSTOLIC VELOCITY 1 (APICAL 4 CHAMBER VIEW): 10.85 CM/S
SINUS: 2.75 CM
STJ: 2.23 CM
STRESS ECHO POST EXERCISE DUR MIN: 6 MINUTES
STRESS ECHO POST EXERCISE DUR SEC: 0 SECONDS
STRESS ST DEPRESSION: 0.5 MM
TDI LATERAL: 0.07 M/S
TDI SEPTAL: 0.04 M/S
TDI: 0.06 M/S
TR MAX PG: 44 MMHG
TRICUSPID ANNULAR PLANE SYSTOLIC EXCURSION: 2.34 CM
TV REST PULMONARY ARTERY PRESSURE: 59 MMHG

## 2021-02-03 PROCEDURE — 94621 CARDIOPULM EXERCISE TESTING: CPT | Mod: 26,NTX,, | Performed by: INTERNAL MEDICINE

## 2021-02-03 PROCEDURE — C8929 TTE W OR WO FOL WCON,DOPPLER: HCPCS | Mod: TXP

## 2021-02-03 PROCEDURE — 94621 CARDIOPULMONARY EXERCISE TESTING (CUPID ONLY): ICD-10-PCS | Mod: 26,NTX,, | Performed by: INTERNAL MEDICINE

## 2021-02-03 PROCEDURE — C1751 CATH, INF, PER/CENT/MIDLINE: HCPCS | Mod: TXP | Performed by: INTERNAL MEDICINE

## 2021-02-03 PROCEDURE — 25000003 PHARM REV CODE 250: Mod: NTX | Performed by: INTERNAL MEDICINE

## 2021-02-03 PROCEDURE — 94621 CARDIOPULM EXERCISE TESTING: CPT | Mod: NTX

## 2021-02-03 PROCEDURE — C1894 INTRO/SHEATH, NON-LASER: HCPCS | Mod: TXP | Performed by: INTERNAL MEDICINE

## 2021-02-03 PROCEDURE — 93306 TTE W/DOPPLER COMPLETE: CPT | Mod: 26,NTX,, | Performed by: INTERNAL MEDICINE

## 2021-02-03 PROCEDURE — 93306 ECHO (CUPID ONLY): ICD-10-PCS | Mod: 26,NTX,, | Performed by: INTERNAL MEDICINE

## 2021-02-03 PROCEDURE — 93451 RIGHT HEART CATH: CPT | Mod: NTX | Performed by: INTERNAL MEDICINE

## 2021-02-03 PROCEDURE — 93451 RIGHT HEART CATH: CPT | Mod: 26,NTX,, | Performed by: INTERNAL MEDICINE

## 2021-02-03 PROCEDURE — 25500020 PHARM REV CODE 255: Mod: TXP | Performed by: INTERNAL MEDICINE

## 2021-02-03 PROCEDURE — 93451 PR RIGHT HEART CATH O2 SATURATION & CARDIAC OUTPUT: ICD-10-PCS | Mod: 26,NTX,, | Performed by: INTERNAL MEDICINE

## 2021-02-03 PROCEDURE — 25000003 PHARM REV CODE 250: Mod: TXP | Performed by: INTERNAL MEDICINE

## 2021-02-03 RX ORDER — SODIUM CHLORIDE 0.9 G/100ML
IRRIGANT IRRIGATION
Status: DISCONTINUED | OUTPATIENT
Start: 2021-02-03 | End: 2021-02-03 | Stop reason: HOSPADM

## 2021-02-03 RX ORDER — LIDOCAINE HYDROCHLORIDE AND EPINEPHRINE 20; 10 MG/ML; UG/ML
INJECTION, SOLUTION INFILTRATION; PERINEURAL
Status: DISCONTINUED | OUTPATIENT
Start: 2021-02-03 | End: 2021-02-03 | Stop reason: HOSPADM

## 2021-02-03 RX ORDER — ASPIRIN 81 MG/1
81 TABLET ORAL DAILY
COMMUNITY
End: 2021-03-25

## 2021-02-03 RX ADMIN — HUMAN ALBUMIN MICROSPHERES AND PERFLUTREN 0.66 MG: 10; .22 INJECTION, SOLUTION INTRAVENOUS at 10:02

## 2021-02-10 ENCOUNTER — TELEPHONE (OUTPATIENT)
Dept: ADMINISTRATIVE | Facility: HOSPITAL | Age: 66
End: 2021-02-10

## 2021-02-10 ENCOUNTER — TELEPHONE (OUTPATIENT)
Dept: TRANSPLANT | Facility: CLINIC | Age: 66
End: 2021-02-10

## 2021-02-18 ENCOUNTER — TELEPHONE (OUTPATIENT)
Dept: TRANSPLANT | Facility: CLINIC | Age: 66
End: 2021-02-18

## 2021-02-18 ENCOUNTER — TELEPHONE (OUTPATIENT)
Dept: TRANSPLANT | Facility: HOSPITAL | Age: 66
End: 2021-02-18

## 2021-02-18 ENCOUNTER — NURSE TRIAGE (OUTPATIENT)
Dept: ADMINISTRATIVE | Facility: CLINIC | Age: 66
End: 2021-02-18

## 2021-02-18 DIAGNOSIS — I50.42 CHRONIC COMBINED SYSTOLIC AND DIASTOLIC HEART FAILURE: Primary | ICD-10-CM

## 2021-02-18 DIAGNOSIS — I50.9 HEART FAILURE, UNSPECIFIED HF CHRONICITY, UNSPECIFIED HEART FAILURE TYPE: ICD-10-CM

## 2021-02-18 DIAGNOSIS — Z76.82 ORGAN TRANSPLANT CANDIDATE: ICD-10-CM

## 2021-02-19 ENCOUNTER — TELEPHONE (OUTPATIENT)
Dept: TRANSPLANT | Facility: CLINIC | Age: 66
End: 2021-02-19

## 2021-03-03 ENCOUNTER — TELEPHONE (OUTPATIENT)
Dept: TRANSPLANT | Facility: CLINIC | Age: 66
End: 2021-03-03

## 2021-03-11 ENCOUNTER — HOSPITAL ENCOUNTER (OUTPATIENT)
Dept: PULMONOLOGY | Facility: CLINIC | Age: 66
Discharge: HOME | End: 2021-03-11
Attending: INTERNAL MEDICINE
Payer: COMMERCIAL

## 2021-03-11 ENCOUNTER — HOSPITAL ENCOUNTER (OUTPATIENT)
Dept: RADIOLOGY | Facility: HOSPITAL | Age: 66
Discharge: HOME | End: 2021-03-11
Attending: INTERNAL MEDICINE
Payer: COMMERCIAL

## 2021-03-11 ENCOUNTER — OFFICE VISIT (OUTPATIENT)
Dept: CARDIOTHORACIC SURGERY | Facility: CLINIC | Age: 66
End: 2021-03-11
Payer: COMMERCIAL

## 2021-03-11 ENCOUNTER — EDUCATION (OUTPATIENT)
Dept: TRANSPLANT | Facility: CLINIC | Age: 66
End: 2021-03-11

## 2021-03-11 ENCOUNTER — HOSPITAL ENCOUNTER (OUTPATIENT)
Dept: CARDIOLOGY | Facility: HOSPITAL | Age: 66
Discharge: HOME | End: 2021-03-11
Attending: INTERNAL MEDICINE
Payer: COMMERCIAL

## 2021-03-11 VITALS
HEART RATE: 69 BPM | SYSTOLIC BLOOD PRESSURE: 105 MMHG | WEIGHT: 165.56 LBS | BODY MASS INDEX: 29.34 KG/M2 | TEMPERATURE: 98 F | DIASTOLIC BLOOD PRESSURE: 59 MMHG | HEIGHT: 63 IN | OXYGEN SATURATION: 97 %

## 2021-03-11 VITALS — WEIGHT: 162.69 LBS | BODY MASS INDEX: 28.82 KG/M2 | HEIGHT: 63 IN

## 2021-03-11 DIAGNOSIS — Z76.82 ORGAN TRANSPLANT CANDIDATE: ICD-10-CM

## 2021-03-11 DIAGNOSIS — I50.42 CHRONIC COMBINED SYSTOLIC AND DIASTOLIC HEART FAILURE: ICD-10-CM

## 2021-03-11 DIAGNOSIS — I50.23 ACUTE ON CHRONIC SYSTOLIC CONGESTIVE HEART FAILURE: Primary | ICD-10-CM

## 2021-03-11 LAB
LEFT ABI: 0.85
LEFT ARM BP: 95 MMHG
LEFT ARM SYSTOLIC BLOOD PRESSURE: 95 MMHG
LEFT CBA DIAS: 24 CM/S
LEFT CBA SYS: 71 CM/S
LEFT CCA DIST DIAS: 18 CM/S
LEFT CCA DIST SYS: 80 CM/S
LEFT CCA MID DIAS: 20 CM/S
LEFT CCA MID SYS: 68 CM/S
LEFT CCA PROX DIAS: 19 CM/S
LEFT CCA PROX SYS: 74 CM/S
LEFT DORSALIS PEDIS: 75 MMHG
LEFT ECA DIAS: 18 CM/S
LEFT ECA SYS: 142 CM/S
LEFT ICA DIST DIAS: 26 CM/S
LEFT ICA DIST SYS: 66 CM/S
LEFT ICA MID DIAS: 25 CM/S
LEFT ICA MID SYS: 70 CM/S
LEFT ICA PROX DIAS: 37 CM/S
LEFT ICA PROX SYS: 95 CM/S
LEFT POSTERIOR TIBIAL: 81 MMHG
LEFT VERTEBRAL DIAS: 9 CM/S
LEFT VERTEBRAL SYS: 37 CM/S
OHS CV CAROTID RIGHT ICA EDV HIGHEST: 29
OHS CV CAROTID ULTRASOUND LEFT ICA/CCA RATIO: 1.19
OHS CV CAROTID ULTRASOUND RIGHT ICA/CCA RATIO: 1.25
OHS CV PV CAROTID LEFT HIGHEST CCA: 80
OHS CV PV CAROTID LEFT HIGHEST ICA: 95
OHS CV PV CAROTID RIGHT HIGHEST CCA: 68
OHS CV PV CAROTID RIGHT HIGHEST ICA: 84
OHS CV US CAROTID LEFT HIGHEST EDV: 37
RIGHT ABI: 0.97
RIGHT ARM BP: 92 MMHG
RIGHT ARM SYSTOLIC BLOOD PRESSURE: 92 MMHG
RIGHT CBA DIAS: 32 CM/S
RIGHT CBA SYS: 99 CM/S
RIGHT CCA DIST DIAS: 19 CM/S
RIGHT CCA DIST SYS: 67 CM/S
RIGHT CCA MID DIAS: 21 CM/S
RIGHT CCA MID SYS: 68 CM/S
RIGHT CCA PROX DIAS: 12 CM/S
RIGHT CCA PROX SYS: 56 CM/S
RIGHT DORSALIS PEDIS: 79 MMHG
RIGHT ECA DIAS: 18 CM/S
RIGHT ECA SYS: 145 CM/S
RIGHT ICA DIST DIAS: 22 CM/S
RIGHT ICA DIST SYS: 53 CM/S
RIGHT ICA MID DIAS: 29 CM/S
RIGHT ICA MID SYS: 80 CM/S
RIGHT ICA PROX DIAS: 21 CM/S
RIGHT ICA PROX SYS: 84 CM/S
RIGHT POSTERIOR TIBIAL: 92 MMHG
RIGHT VERTEBRAL DIAS: 8 CM/S
RIGHT VERTEBRAL SYS: 25 CM/S
SARS-COV-2 RDRP RESP QL NAA+PROBE: NEGATIVE

## 2021-03-11 PROCEDURE — 94010 BREATHING CAPACITY TEST: ICD-10-PCS | Mod: S$GLB,TXP,, | Performed by: INTERNAL MEDICINE

## 2021-03-11 PROCEDURE — 94727 PR PULM FUNCTION TEST BY GAS: ICD-10-PCS | Mod: S$GLB,TXP,, | Performed by: INTERNAL MEDICINE

## 2021-03-11 PROCEDURE — 1126F AMNT PAIN NOTED NONE PRSNT: CPT | Mod: S$GLB,TXP,, | Performed by: THORACIC SURGERY (CARDIOTHORACIC VASCULAR SURGERY)

## 2021-03-11 PROCEDURE — 3078F DIAST BP <80 MM HG: CPT | Mod: CPTII,S$GLB,TXP, | Performed by: THORACIC SURGERY (CARDIOTHORACIC VASCULAR SURGERY)

## 2021-03-11 PROCEDURE — 71250 CT THORAX DX C-: CPT | Mod: TC,TXP

## 2021-03-11 PROCEDURE — 93880 EXTRACRANIAL BILAT STUDY: CPT | Mod: 26,TXP,, | Performed by: INTERNAL MEDICINE

## 2021-03-11 PROCEDURE — U0002 COVID-19 LAB TEST NON-CDC: HCPCS | Mod: TXP | Performed by: INTERNAL MEDICINE

## 2021-03-11 PROCEDURE — 74176 CT CHEST ABDOMEN PELVIS WITHOUT CONTRAST(XPD): ICD-10-PCS | Mod: 26,TXP,, | Performed by: RADIOLOGY

## 2021-03-11 PROCEDURE — 3074F PR MOST RECENT SYSTOLIC BLOOD PRESSURE < 130 MM HG: ICD-10-PCS | Mod: CPTII,S$GLB,TXP, | Performed by: THORACIC SURGERY (CARDIOTHORACIC VASCULAR SURGERY)

## 2021-03-11 PROCEDURE — 94729 PR C02/MEMBANE DIFFUSE CAPACITY: ICD-10-PCS | Mod: S$GLB,TXP,, | Performed by: INTERNAL MEDICINE

## 2021-03-11 PROCEDURE — 71250 CT THORAX DX C-: CPT | Mod: 26,TXP,, | Performed by: RADIOLOGY

## 2021-03-11 PROCEDURE — 74176 CT ABD & PELVIS W/O CONTRAST: CPT | Mod: 26,TXP,, | Performed by: RADIOLOGY

## 2021-03-11 PROCEDURE — 1126F PR PAIN SEVERITY QUANTIFIED, NO PAIN PRESENT: ICD-10-PCS | Mod: S$GLB,TXP,, | Performed by: THORACIC SURGERY (CARDIOTHORACIC VASCULAR SURGERY)

## 2021-03-11 PROCEDURE — 70450 CT HEAD WITHOUT CONTRAST: ICD-10-PCS | Mod: 26,TXP,, | Performed by: RADIOLOGY

## 2021-03-11 PROCEDURE — 74176 CT ABD & PELVIS W/O CONTRAST: CPT | Mod: TC,TXP

## 2021-03-11 PROCEDURE — 71250 CT CHEST ABDOMEN PELVIS WITHOUT CONTRAST(XPD): ICD-10-PCS | Mod: 26,TXP,, | Performed by: RADIOLOGY

## 2021-03-11 PROCEDURE — 99205 OFFICE O/P NEW HI 60 MIN: CPT | Mod: S$GLB,TXP,, | Performed by: THORACIC SURGERY (CARDIOTHORACIC VASCULAR SURGERY)

## 2021-03-11 PROCEDURE — 93880 EXTRACRANIAL BILAT STUDY: CPT | Mod: TXP

## 2021-03-11 PROCEDURE — 99205 PR OFFICE/OUTPT VISIT, NEW, LEVL V, 60-74 MIN: ICD-10-PCS | Mod: S$GLB,TXP,, | Performed by: THORACIC SURGERY (CARDIOTHORACIC VASCULAR SURGERY)

## 2021-03-11 PROCEDURE — 99999 PR PBB SHADOW E&M-EST. PATIENT-LVL V: ICD-10-PCS | Mod: PBBFAC,TXP,, | Performed by: THORACIC SURGERY (CARDIOTHORACIC VASCULAR SURGERY)

## 2021-03-11 PROCEDURE — 70450 CT HEAD/BRAIN W/O DYE: CPT | Mod: TC,TXP

## 2021-03-11 PROCEDURE — 93880 CV US DOPPLER CAROTID (CUPID ONLY): ICD-10-PCS | Mod: 26,TXP,, | Performed by: INTERNAL MEDICINE

## 2021-03-11 PROCEDURE — 3078F PR MOST RECENT DIASTOLIC BLOOD PRESSURE < 80 MM HG: ICD-10-PCS | Mod: CPTII,S$GLB,TXP, | Performed by: THORACIC SURGERY (CARDIOTHORACIC VASCULAR SURGERY)

## 2021-03-11 PROCEDURE — 94010 BREATHING CAPACITY TEST: CPT | Mod: S$GLB,TXP,, | Performed by: INTERNAL MEDICINE

## 2021-03-11 PROCEDURE — 94618 PULMONARY STRESS TESTING: CPT | Mod: S$GLB,TXP,, | Performed by: INTERNAL MEDICINE

## 2021-03-11 PROCEDURE — 93922 UPR/L XTREMITY ART 2 LEVELS: CPT | Mod: TXP

## 2021-03-11 PROCEDURE — 99999 PR PBB SHADOW E&M-EST. PATIENT-LVL V: CPT | Mod: PBBFAC,TXP,, | Performed by: THORACIC SURGERY (CARDIOTHORACIC VASCULAR SURGERY)

## 2021-03-11 PROCEDURE — 93922 UPR/L XTREMITY ART 2 LEVELS: CPT | Mod: 26,TXP,, | Performed by: INTERNAL MEDICINE

## 2021-03-11 PROCEDURE — 3008F PR BODY MASS INDEX (BMI) DOCUMENTED: ICD-10-PCS | Mod: CPTII,S$GLB,TXP, | Performed by: THORACIC SURGERY (CARDIOTHORACIC VASCULAR SURGERY)

## 2021-03-11 PROCEDURE — 71046 X-RAY EXAM CHEST 2 VIEWS: CPT | Mod: 26,TXP,, | Performed by: RADIOLOGY

## 2021-03-11 PROCEDURE — 3074F SYST BP LT 130 MM HG: CPT | Mod: CPTII,S$GLB,TXP, | Performed by: THORACIC SURGERY (CARDIOTHORACIC VASCULAR SURGERY)

## 2021-03-11 PROCEDURE — 93922 ANKLE BRACHIAL INDICES (ABI): ICD-10-PCS | Mod: 26,TXP,, | Performed by: INTERNAL MEDICINE

## 2021-03-11 PROCEDURE — 70450 CT HEAD/BRAIN W/O DYE: CPT | Mod: 26,TXP,, | Performed by: RADIOLOGY

## 2021-03-11 PROCEDURE — 94727 GAS DIL/WSHOT DETER LNG VOL: CPT | Mod: S$GLB,TXP,, | Performed by: INTERNAL MEDICINE

## 2021-03-11 PROCEDURE — 3008F BODY MASS INDEX DOCD: CPT | Mod: CPTII,S$GLB,TXP, | Performed by: THORACIC SURGERY (CARDIOTHORACIC VASCULAR SURGERY)

## 2021-03-11 PROCEDURE — 71046 XR CHEST PA AND LATERAL: ICD-10-PCS | Mod: 26,TXP,, | Performed by: RADIOLOGY

## 2021-03-11 PROCEDURE — 94729 DIFFUSING CAPACITY: CPT | Mod: S$GLB,TXP,, | Performed by: INTERNAL MEDICINE

## 2021-03-11 PROCEDURE — 71046 X-RAY EXAM CHEST 2 VIEWS: CPT | Mod: TC,TXP

## 2021-03-11 PROCEDURE — 94618 PULMONARY STRESS TESTING: ICD-10-PCS | Mod: S$GLB,TXP,, | Performed by: INTERNAL MEDICINE

## 2021-03-11 RX ORDER — NITROGLYCERIN 0.4 MG/1
TABLET SUBLINGUAL
COMMUNITY
Start: 2021-01-30 | End: 2021-08-12 | Stop reason: SDUPTHER

## 2021-03-11 RX ORDER — DICLOFENAC SODIUM 10 MG/G
GEL TOPICAL
COMMUNITY
Start: 2021-03-03 | End: 2021-08-12

## 2021-03-12 ENCOUNTER — TELEPHONE (OUTPATIENT)
Dept: TRANSPLANT | Facility: CLINIC | Age: 66
End: 2021-03-12

## 2021-03-16 ENCOUNTER — PATIENT MESSAGE (OUTPATIENT)
Dept: TRANSPLANT | Facility: CLINIC | Age: 66
End: 2021-03-16

## 2021-03-25 ENCOUNTER — OFFICE VISIT (OUTPATIENT)
Dept: INFECTIOUS DISEASES | Facility: CLINIC | Age: 66
End: 2021-03-25
Attending: INTERNAL MEDICINE
Payer: COMMERCIAL

## 2021-03-25 ENCOUNTER — OFFICE VISIT (OUTPATIENT)
Dept: TRANSPLANT | Facility: CLINIC | Age: 66
End: 2021-03-25
Attending: INTERNAL MEDICINE
Payer: COMMERCIAL

## 2021-03-25 ENCOUNTER — LAB VISIT (OUTPATIENT)
Dept: LAB | Facility: HOSPITAL | Age: 66
End: 2021-03-25
Attending: INTERNAL MEDICINE
Payer: COMMERCIAL

## 2021-03-25 ENCOUNTER — CLINICAL SUPPORT (OUTPATIENT)
Dept: TRANSPLANT | Facility: CLINIC | Age: 66
End: 2021-03-25
Payer: COMMERCIAL

## 2021-03-25 ENCOUNTER — NUTRITION (OUTPATIENT)
Dept: NUTRITION | Facility: CLINIC | Age: 66
End: 2021-03-25
Payer: COMMERCIAL

## 2021-03-25 VITALS — WEIGHT: 163.56 LBS | HEIGHT: 63 IN | BODY MASS INDEX: 28.98 KG/M2

## 2021-03-25 VITALS
WEIGHT: 164 LBS | DIASTOLIC BLOOD PRESSURE: 66 MMHG | TEMPERATURE: 98 F | HEIGHT: 64 IN | HEART RATE: 62 BPM | BODY MASS INDEX: 28 KG/M2 | SYSTOLIC BLOOD PRESSURE: 113 MMHG

## 2021-03-25 VITALS
SYSTOLIC BLOOD PRESSURE: 120 MMHG | HEIGHT: 64 IN | WEIGHT: 163.81 LBS | BODY MASS INDEX: 27.96 KG/M2 | DIASTOLIC BLOOD PRESSURE: 58 MMHG | HEART RATE: 61 BPM

## 2021-03-25 DIAGNOSIS — I25.10 CORONARY ARTERY DISEASE INVOLVING NATIVE CORONARY ARTERY OF NATIVE HEART WITHOUT ANGINA PECTORIS: ICD-10-CM

## 2021-03-25 DIAGNOSIS — I42.0 DILATED CARDIOMYOPATHY: ICD-10-CM

## 2021-03-25 DIAGNOSIS — E11.22 TYPE 2 DIABETES MELLITUS WITH STAGE 3 CHRONIC KIDNEY DISEASE, WITHOUT LONG-TERM CURRENT USE OF INSULIN, UNSPECIFIED WHETHER STAGE 3A OR 3B CKD: ICD-10-CM

## 2021-03-25 DIAGNOSIS — I50.42 CHRONIC COMBINED SYSTOLIC AND DIASTOLIC HEART FAILURE: ICD-10-CM

## 2021-03-25 DIAGNOSIS — Z76.82 ORGAN TRANSPLANT CANDIDATE: ICD-10-CM

## 2021-03-25 DIAGNOSIS — Z00.8 NUTRITIONAL ASSESSMENT: ICD-10-CM

## 2021-03-25 DIAGNOSIS — I50.9 CONGESTIVE HEART FAILURE, UNSPECIFIED HF CHRONICITY, UNSPECIFIED HEART FAILURE TYPE: ICD-10-CM

## 2021-03-25 DIAGNOSIS — I50.42 CHRONIC COMBINED SYSTOLIC AND DIASTOLIC HEART FAILURE: Primary | ICD-10-CM

## 2021-03-25 DIAGNOSIS — N18.30 TYPE 2 DIABETES MELLITUS WITH STAGE 3 CHRONIC KIDNEY DISEASE, WITHOUT LONG-TERM CURRENT USE OF INSULIN, UNSPECIFIED WHETHER STAGE 3A OR 3B CKD: ICD-10-CM

## 2021-03-25 DIAGNOSIS — I48.0 PAF (PAROXYSMAL ATRIAL FIBRILLATION): ICD-10-CM

## 2021-03-25 DIAGNOSIS — I50.43 ACUTE ON CHRONIC COMBINED SYSTOLIC AND DIASTOLIC HEART FAILURE: ICD-10-CM

## 2021-03-25 LAB
ALBUMIN SERPL BCP-MCNC: 3.5 G/DL (ref 3.5–5.2)
ALP SERPL-CCNC: 77 U/L (ref 55–135)
ALT SERPL W/O P-5'-P-CCNC: 53 U/L (ref 10–44)
ANION GAP SERPL CALC-SCNC: 9 MMOL/L (ref 8–16)
AST SERPL-CCNC: 31 U/L (ref 10–40)
BILIRUB SERPL-MCNC: 0.3 MG/DL (ref 0.1–1)
BNP SERPL-MCNC: 159 PG/ML (ref 0–99)
BUN SERPL-MCNC: 13 MG/DL (ref 8–23)
CALCIUM SERPL-MCNC: 8.5 MG/DL (ref 8.7–10.5)
CHLORIDE SERPL-SCNC: 107 MMOL/L (ref 95–110)
CO2 SERPL-SCNC: 25 MMOL/L (ref 23–29)
CREAT SERPL-MCNC: 1.6 MG/DL (ref 0.5–1.4)
EST. GFR  (AFRICAN AMERICAN): 51.9 ML/MIN/1.73 M^2
EST. GFR  (NON AFRICAN AMERICAN): 44.9 ML/MIN/1.73 M^2
GLUCOSE SERPL-MCNC: 131 MG/DL (ref 70–110)
HCYS SERPL-SCNC: 11.1 UMOL/L (ref 4–16.5)
POTASSIUM SERPL-SCNC: 3.9 MMOL/L (ref 3.5–5.1)
PROT SERPL-MCNC: 6.7 G/DL (ref 6–8.4)
SODIUM SERPL-SCNC: 141 MMOL/L (ref 136–145)

## 2021-03-25 PROCEDURE — 36415 COLL VENOUS BLD VENIPUNCTURE: CPT | Mod: TXP | Performed by: INTERNAL MEDICINE

## 2021-03-25 PROCEDURE — 99999 PR PBB SHADOW E&M-EST. PATIENT-LVL III: ICD-10-PCS | Mod: PBBFAC,TXP,, | Performed by: INTERNAL MEDICINE

## 2021-03-25 PROCEDURE — 3008F PR BODY MASS INDEX (BMI) DOCUMENTED: ICD-10-PCS | Mod: CPTII,S$GLB,TXP, | Performed by: INTERNAL MEDICINE

## 2021-03-25 PROCEDURE — 85613 RUSSELL VIPER VENOM DILUTED: CPT | Mod: NTX | Performed by: INTERNAL MEDICINE

## 2021-03-25 PROCEDURE — 99213 OFFICE O/P EST LOW 20 MIN: CPT | Mod: S$GLB,TXP,, | Performed by: INTERNAL MEDICINE

## 2021-03-25 PROCEDURE — 81240 F2 GENE: CPT | Mod: NTX | Performed by: INTERNAL MEDICINE

## 2021-03-25 PROCEDURE — 3074F SYST BP LT 130 MM HG: CPT | Mod: CPTII,S$GLB,TXP, | Performed by: INTERNAL MEDICINE

## 2021-03-25 PROCEDURE — 3078F PR MOST RECENT DIASTOLIC BLOOD PRESSURE < 80 MM HG: ICD-10-PCS | Mod: CPTII,S$GLB,TXP, | Performed by: INTERNAL MEDICINE

## 2021-03-25 PROCEDURE — 1126F PR PAIN SEVERITY QUANTIFIED, NO PAIN PRESENT: ICD-10-PCS | Mod: S$GLB,TXP,, | Performed by: INTERNAL MEDICINE

## 2021-03-25 PROCEDURE — 80053 COMPREHEN METABOLIC PANEL: CPT | Mod: TXP | Performed by: INTERNAL MEDICINE

## 2021-03-25 PROCEDURE — 85397 CLOTTING FUNCT ACTIVITY: CPT | Mod: TXP | Performed by: INTERNAL MEDICINE

## 2021-03-25 PROCEDURE — 85305 CLOT INHIBIT PROT S TOTAL: CPT | Mod: NTX | Performed by: INTERNAL MEDICINE

## 2021-03-25 PROCEDURE — 99213 PR OFFICE/OUTPT VISIT, EST, LEVL III, 20-29 MIN: ICD-10-PCS | Mod: S$GLB,TXP,, | Performed by: INTERNAL MEDICINE

## 2021-03-25 PROCEDURE — 97802 MEDICAL NUTRITION INDIV IN: CPT | Mod: S$GLB,TXP,, | Performed by: DIETITIAN, REGISTERED

## 2021-03-25 PROCEDURE — 85246 CLOT FACTOR VIII VW ANTIGEN: CPT | Mod: NTX | Performed by: INTERNAL MEDICINE

## 2021-03-25 PROCEDURE — 85598 HEXAGNAL PHOSPH PLTLT NEUTRL: CPT | Mod: NTX | Performed by: INTERNAL MEDICINE

## 2021-03-25 PROCEDURE — 85302 CLOT INHIBIT PROT C ANTIGEN: CPT | Mod: TXP | Performed by: INTERNAL MEDICINE

## 2021-03-25 PROCEDURE — 3008F BODY MASS INDEX DOCD: CPT | Mod: CPTII,S$GLB,TXP, | Performed by: INTERNAL MEDICINE

## 2021-03-25 PROCEDURE — 83090 ASSAY OF HOMOCYSTEINE: CPT | Mod: NTX | Performed by: INTERNAL MEDICINE

## 2021-03-25 PROCEDURE — 85247 CLOT FACTOR VIII MULTIMETRIC: CPT | Mod: TXP | Performed by: INTERNAL MEDICINE

## 2021-03-25 PROCEDURE — 85301 ANTITHROMBIN III ANTIGEN: CPT | Mod: TXP | Performed by: INTERNAL MEDICINE

## 2021-03-25 PROCEDURE — 83880 ASSAY OF NATRIURETIC PEPTIDE: CPT | Mod: TXP | Performed by: INTERNAL MEDICINE

## 2021-03-25 PROCEDURE — 99999 PR PBB SHADOW E&M-EST. PATIENT-LVL III: ICD-10-PCS | Mod: PBBFAC,TXP,,

## 2021-03-25 PROCEDURE — 3074F PR MOST RECENT SYSTOLIC BLOOD PRESSURE < 130 MM HG: ICD-10-PCS | Mod: CPTII,S$GLB,TXP, | Performed by: INTERNAL MEDICINE

## 2021-03-25 PROCEDURE — 99999 PR PBB SHADOW E&M-EST. PATIENT-LVL V: CPT | Mod: PBBFAC,TXP,, | Performed by: INTERNAL MEDICINE

## 2021-03-25 PROCEDURE — 3078F DIAST BP <80 MM HG: CPT | Mod: CPTII,S$GLB,TXP, | Performed by: INTERNAL MEDICINE

## 2021-03-25 PROCEDURE — 99999 PR PBB SHADOW E&M-EST. PATIENT-LVL V: ICD-10-PCS | Mod: PBBFAC,TXP,, | Performed by: INTERNAL MEDICINE

## 2021-03-25 PROCEDURE — 3044F PR MOST RECENT HEMOGLOBIN A1C LEVEL <7.0%: ICD-10-PCS | Mod: CPTII,S$GLB,TXP, | Performed by: INTERNAL MEDICINE

## 2021-03-25 PROCEDURE — 99214 PR OFFICE/OUTPT VISIT, EST, LEVL IV, 30-39 MIN: ICD-10-PCS | Mod: S$GLB,TXP,, | Performed by: INTERNAL MEDICINE

## 2021-03-25 PROCEDURE — 99214 OFFICE O/P EST MOD 30 MIN: CPT | Mod: S$GLB,TXP,, | Performed by: INTERNAL MEDICINE

## 2021-03-25 PROCEDURE — 97802 PR MED NUTR THER, 1ST, INDIV, EA 15 MIN: ICD-10-PCS | Mod: S$GLB,TXP,, | Performed by: DIETITIAN, REGISTERED

## 2021-03-25 PROCEDURE — 86146 BETA-2 GLYCOPROTEIN ANTIBODY: CPT | Mod: NTX | Performed by: INTERNAL MEDICINE

## 2021-03-25 PROCEDURE — 99999 PR PBB SHADOW E&M-EST. PATIENT-LVL III: CPT | Mod: PBBFAC,TXP,,

## 2021-03-25 PROCEDURE — 81241 F5 GENE: CPT | Mod: TXP | Performed by: INTERNAL MEDICINE

## 2021-03-25 PROCEDURE — 1126F AMNT PAIN NOTED NONE PRSNT: CPT | Mod: S$GLB,TXP,, | Performed by: INTERNAL MEDICINE

## 2021-03-25 PROCEDURE — 99999 PR PBB SHADOW E&M-EST. PATIENT-LVL III: CPT | Mod: PBBFAC,TXP,, | Performed by: INTERNAL MEDICINE

## 2021-03-25 PROCEDURE — 3044F HG A1C LEVEL LT 7.0%: CPT | Mod: CPTII,S$GLB,TXP, | Performed by: INTERNAL MEDICINE

## 2021-03-26 ENCOUNTER — DOCUMENTATION ONLY (OUTPATIENT)
Dept: TRANSPLANT | Facility: CLINIC | Age: 66
End: 2021-03-26

## 2021-03-26 ENCOUNTER — PATIENT MESSAGE (OUTPATIENT)
Dept: TRANSPLANT | Facility: CLINIC | Age: 66
End: 2021-03-26

## 2021-03-26 LAB
AT III AG ACT/NOR PPP IA: 91 % (ref 80–120)
VWF AG ACT/NOR PPP IA: 121 % (ref 55–200)
VWF:AC ACT/NOR PPP IA: 92 % (ref 55–200)

## 2021-03-27 LAB
B2 GLYCOPROT1 IGA SER QL: <9 SAU
B2 GLYCOPROT1 IGG SER QL: <9 SGU
B2 GLYCOPROT1 IGM SER QL: <9 SMU

## 2021-03-29 LAB
PROT C AG ACT/NOR PPP IA: 100 % (ref 70–150)
PROT S AG ACT/NOR PPP IA: 116 % (ref 50–140)

## 2021-03-30 LAB
F2 C.20210G>A GENO BLD/T: NORMAL
F2 GENE MUT ANL BLD/T: NORMAL
F5 GENE MUT ANL BLD/T: NORMAL
F5 P.R506Q BLD/T QL: NORMAL

## 2021-04-02 LAB
APTT HEX PL PPP: NEGATIVE S
VON WILLEBRAND EVAL PPP-IMP: NORMAL
VWF MULTIMERS PPP QL: NORMAL

## 2021-04-06 LAB — LA PPP-IMP: NEGATIVE

## 2021-04-12 ENCOUNTER — TELEPHONE (OUTPATIENT)
Dept: TRANSPLANT | Facility: CLINIC | Age: 66
End: 2021-04-12

## 2021-05-17 ENCOUNTER — TELEPHONE (OUTPATIENT)
Dept: TRANSPLANT | Facility: CLINIC | Age: 66
End: 2021-05-17

## 2021-05-20 ENCOUNTER — TELEPHONE (OUTPATIENT)
Dept: TRANSPLANT | Facility: HOSPITAL | Age: 66
End: 2021-05-20

## 2021-05-28 ENCOUNTER — TELEPHONE (OUTPATIENT)
Dept: TRANSPLANT | Facility: CLINIC | Age: 66
End: 2021-05-28

## 2021-05-28 ENCOUNTER — DOCUMENTATION ONLY (OUTPATIENT)
Dept: TRANSPLANT | Facility: CLINIC | Age: 66
End: 2021-05-28

## 2021-05-28 ENCOUNTER — SOCIAL WORK (OUTPATIENT)
Dept: TRANSPLANT | Facility: CLINIC | Age: 66
End: 2021-05-28
Payer: COMMERCIAL

## 2021-05-28 ENCOUNTER — OFFICE VISIT (OUTPATIENT)
Dept: TRANSPLANT | Facility: CLINIC | Age: 66
End: 2021-05-28
Attending: INTERNAL MEDICINE
Payer: COMMERCIAL

## 2021-05-28 ENCOUNTER — LAB VISIT (OUTPATIENT)
Dept: LAB | Facility: HOSPITAL | Age: 66
End: 2021-05-28
Attending: INTERNAL MEDICINE
Payer: COMMERCIAL

## 2021-05-28 VITALS
BODY MASS INDEX: 27.63 KG/M2 | HEIGHT: 64 IN | SYSTOLIC BLOOD PRESSURE: 136 MMHG | HEART RATE: 61 BPM | DIASTOLIC BLOOD PRESSURE: 75 MMHG | WEIGHT: 161.81 LBS

## 2021-05-28 DIAGNOSIS — E11.22 TYPE 2 DIABETES MELLITUS WITH STAGE 3 CHRONIC KIDNEY DISEASE, WITHOUT LONG-TERM CURRENT USE OF INSULIN, UNSPECIFIED WHETHER STAGE 3A OR 3B CKD: ICD-10-CM

## 2021-05-28 DIAGNOSIS — I25.5 CARDIOMYOPATHY, ISCHEMIC: ICD-10-CM

## 2021-05-28 DIAGNOSIS — I50.42 CHRONIC COMBINED SYSTOLIC AND DIASTOLIC CONGESTIVE HEART FAILURE: Primary | ICD-10-CM

## 2021-05-28 DIAGNOSIS — N18.30 TYPE 2 DIABETES MELLITUS WITH STAGE 3 CHRONIC KIDNEY DISEASE, WITHOUT LONG-TERM CURRENT USE OF INSULIN, UNSPECIFIED WHETHER STAGE 3A OR 3B CKD: ICD-10-CM

## 2021-05-28 DIAGNOSIS — I50.9 CONGESTIVE HEART FAILURE, UNSPECIFIED HF CHRONICITY, UNSPECIFIED HEART FAILURE TYPE: ICD-10-CM

## 2021-05-28 DIAGNOSIS — I25.10 CORONARY ARTERY DISEASE INVOLVING NATIVE CORONARY ARTERY OF NATIVE HEART WITHOUT ANGINA PECTORIS: ICD-10-CM

## 2021-05-28 DIAGNOSIS — I47.20 VT (VENTRICULAR TACHYCARDIA): ICD-10-CM

## 2021-05-28 LAB
ALBUMIN SERPL BCP-MCNC: 3.6 G/DL (ref 3.5–5.2)
ALP SERPL-CCNC: 80 U/L (ref 55–135)
ALT SERPL W/O P-5'-P-CCNC: 80 U/L (ref 10–44)
ANION GAP SERPL CALC-SCNC: 10 MMOL/L (ref 8–16)
AST SERPL-CCNC: 36 U/L (ref 10–40)
BILIRUB SERPL-MCNC: 0.3 MG/DL (ref 0.1–1)
BUN SERPL-MCNC: 16 MG/DL (ref 8–23)
CALCIUM SERPL-MCNC: 9.2 MG/DL (ref 8.7–10.5)
CHLORIDE SERPL-SCNC: 103 MMOL/L (ref 95–110)
CO2 SERPL-SCNC: 27 MMOL/L (ref 23–29)
CREAT SERPL-MCNC: 1.9 MG/DL (ref 0.5–1.4)
EST. GFR  (AFRICAN AMERICAN): 42.1 ML/MIN/1.73 M^2
EST. GFR  (NON AFRICAN AMERICAN): 36.4 ML/MIN/1.73 M^2
GLUCOSE SERPL-MCNC: 132 MG/DL (ref 70–110)
POTASSIUM SERPL-SCNC: 4.6 MMOL/L (ref 3.5–5.1)
PROT SERPL-MCNC: 6.9 G/DL (ref 6–8.4)
SODIUM SERPL-SCNC: 140 MMOL/L (ref 136–145)

## 2021-05-28 PROCEDURE — 1126F PR PAIN SEVERITY QUANTIFIED, NO PAIN PRESENT: ICD-10-PCS | Mod: S$GLB,TXP,, | Performed by: INTERNAL MEDICINE

## 2021-05-28 PROCEDURE — 3008F PR BODY MASS INDEX (BMI) DOCUMENTED: ICD-10-PCS | Mod: CPTII,S$GLB,TXP, | Performed by: INTERNAL MEDICINE

## 2021-05-28 PROCEDURE — 80053 COMPREHEN METABOLIC PANEL: CPT | Mod: NTX | Performed by: INTERNAL MEDICINE

## 2021-05-28 PROCEDURE — 1126F AMNT PAIN NOTED NONE PRSNT: CPT | Mod: S$GLB,TXP,, | Performed by: INTERNAL MEDICINE

## 2021-05-28 PROCEDURE — 99214 PR OFFICE/OUTPT VISIT, EST, LEVL IV, 30-39 MIN: ICD-10-PCS | Mod: S$GLB,TXP,, | Performed by: INTERNAL MEDICINE

## 2021-05-28 PROCEDURE — 99999 PR PBB SHADOW E&M-EST. PATIENT-LVL III: ICD-10-PCS | Mod: PBBFAC,TXP,, | Performed by: INTERNAL MEDICINE

## 2021-05-28 PROCEDURE — 36415 COLL VENOUS BLD VENIPUNCTURE: CPT | Mod: NTX | Performed by: INTERNAL MEDICINE

## 2021-05-28 PROCEDURE — 99999 PR PBB SHADOW E&M-EST. PATIENT-LVL III: CPT | Mod: PBBFAC,TXP,, | Performed by: INTERNAL MEDICINE

## 2021-05-28 PROCEDURE — 3008F BODY MASS INDEX DOCD: CPT | Mod: CPTII,S$GLB,TXP, | Performed by: INTERNAL MEDICINE

## 2021-05-28 PROCEDURE — 99214 OFFICE O/P EST MOD 30 MIN: CPT | Mod: S$GLB,TXP,, | Performed by: INTERNAL MEDICINE

## 2021-05-28 RX ORDER — DOXYCYCLINE HYCLATE 100 MG
100 TABLET ORAL 2 TIMES DAILY
COMMUNITY
Start: 2021-05-21 | End: 2021-08-12

## 2021-05-28 RX ORDER — ALBUTEROL SULFATE 90 UG/1
AEROSOL, METERED RESPIRATORY (INHALATION)
COMMUNITY
Start: 2021-05-18

## 2021-05-28 RX ORDER — DAPAGLIFLOZIN 10 MG/1
10 TABLET, FILM COATED ORAL DAILY
Qty: 90 TABLET | Refills: 3 | Status: SHIPPED | OUTPATIENT
Start: 2021-05-28 | End: 2021-06-25 | Stop reason: SDUPTHER

## 2021-06-01 ENCOUNTER — DOCUMENTATION ONLY (OUTPATIENT)
Dept: TRANSFUSION MEDICINE | Facility: HOSPITAL | Age: 66
End: 2021-06-01
Payer: COMMERCIAL

## 2021-06-01 DIAGNOSIS — Z76.82 HEART TRANSPLANT CANDIDATE: ICD-10-CM

## 2021-06-01 PROCEDURE — 80502 PR  LAB PATHOLOGY CONSULT-COMPLETE: CPT | Mod: TXP,,, | Performed by: PATHOLOGY

## 2021-06-01 PROCEDURE — 80502 PR  LAB PATHOLOGY CONSULT-COMPLETE: ICD-10-PCS | Mod: TXP,,, | Performed by: PATHOLOGY

## 2021-06-02 ENCOUNTER — COMMITTEE REVIEW (OUTPATIENT)
Dept: TRANSPLANT | Facility: CLINIC | Age: 66
End: 2021-06-02

## 2021-06-02 ENCOUNTER — TELEPHONE (OUTPATIENT)
Dept: TRANSPLANT | Facility: HOSPITAL | Age: 66
End: 2021-06-02

## 2021-06-02 DIAGNOSIS — Z76.82 HEART TRANSPLANT CANDIDATE: ICD-10-CM

## 2021-06-02 DIAGNOSIS — I50.42 CHRONIC COMBINED SYSTOLIC AND DIASTOLIC HEART FAILURE: Primary | ICD-10-CM

## 2021-06-03 ENCOUNTER — DOCUMENTATION ONLY (OUTPATIENT)
Dept: TRANSPLANT | Facility: CLINIC | Age: 66
End: 2021-06-03

## 2021-06-03 LAB
ALK PHOS ISOENZYNMES: 69
BILIRUBIN, TOTAL: 0.3
EXT ALBUMIN: 3.8
EXT ALT: 73
EXT AST: 37
EXT BUN: 19.7
EXT CALCIUM: 9.5
EXT CHLORIDE: 105
EXT CREATININE: 1.76 MG/DL
EXT GLUCOSE: 112
EXT POTASSIUM: 4
EXT PROTEIN TOTAL: 7.3
EXT SODIUM: 140 MMOL/L

## 2021-06-10 ENCOUNTER — TELEPHONE (OUTPATIENT)
Dept: TRANSPLANT | Facility: CLINIC | Age: 66
End: 2021-06-10

## 2021-06-11 ENCOUNTER — TELEPHONE (OUTPATIENT)
Dept: TRANSPLANT | Facility: CLINIC | Age: 66
End: 2021-06-11

## 2021-06-22 ENCOUNTER — HOSPITAL ENCOUNTER (OUTPATIENT)
Facility: HOSPITAL | Age: 66
Discharge: HOME | End: 2021-06-22
Attending: INTERNAL MEDICINE | Admitting: INTERNAL MEDICINE
Payer: COMMERCIAL

## 2021-06-22 ENCOUNTER — HOSPITAL ENCOUNTER (OUTPATIENT)
Dept: CARDIOLOGY | Facility: HOSPITAL | Age: 66
Discharge: HOME | End: 2021-06-22
Attending: INTERNAL MEDICINE
Payer: COMMERCIAL

## 2021-06-22 ENCOUNTER — SOCIAL WORK (OUTPATIENT)
Dept: TRANSPLANT | Facility: CLINIC | Age: 66
End: 2021-06-22
Payer: COMMERCIAL

## 2021-06-22 VITALS
SYSTOLIC BLOOD PRESSURE: 109 MMHG | BODY MASS INDEX: 26.59 KG/M2 | HEIGHT: 64 IN | TEMPERATURE: 98 F | WEIGHT: 155.75 LBS | RESPIRATION RATE: 18 BRPM | OXYGEN SATURATION: 96 % | DIASTOLIC BLOOD PRESSURE: 54 MMHG | HEART RATE: 70 BPM

## 2021-06-22 VITALS
BODY MASS INDEX: 26.98 KG/M2 | HEART RATE: 65 BPM | HEIGHT: 64 IN | WEIGHT: 158 LBS | SYSTOLIC BLOOD PRESSURE: 126 MMHG | DIASTOLIC BLOOD PRESSURE: 80 MMHG

## 2021-06-22 DIAGNOSIS — Z76.82 PRE-HEART TRANSPLANT, LISTED: ICD-10-CM

## 2021-06-22 DIAGNOSIS — I50.42 CHRONIC COMBINED SYSTOLIC AND DIASTOLIC HEART FAILURE: Primary | ICD-10-CM

## 2021-06-22 DIAGNOSIS — Z76.82 HEART TRANSPLANT CANDIDATE: ICD-10-CM

## 2021-06-22 DIAGNOSIS — I50.22 CHF (CONGESTIVE HEART FAILURE), NYHA CLASS III, CHRONIC, SYSTOLIC: ICD-10-CM

## 2021-06-22 DIAGNOSIS — I50.42 CHRONIC COMBINED SYSTOLIC AND DIASTOLIC HEART FAILURE: ICD-10-CM

## 2021-06-22 LAB
CV STRESS BASE HR: 65 BPM
DIASTOLIC BLOOD PRESSURE: 80 MMHG
OHS CV CPX 1 MINUTE RECOVERY HEART RATE: 101 BPM
OHS CV CPX 85 PERCENT MAX PREDICTED HEART RATE MALE: 133
OHS CV CPX ANAEROBIC THRESHOLD DIASTOLIC BLOOD PRESSURE: 62 MMHG
OHS CV CPX ANAEROBIC THRESHOLD HEART RATE: 94
OHS CV CPX ANAEROBIC THRESHOLD RATE PRESSURE PRODUCT: 9682
OHS CV CPX ANAEROBIC THRESHOLD SYSTOLIC BLOOD PRESSURE: 103
OHS CV CPX DATA GRADE - AT: 4.3
OHS CV CPX DATA GRADE - PEAK: 6.1
OHS CV CPX DATA O2 SAT - PEAK: 97
OHS CV CPX DATA O2 SAT - REST: 96
OHS CV CPX DATA SPEED - AT: 2.8
OHS CV CPX DATA SPEED - PEAK: 3.4
OHS CV CPX DATA TIME - AT: 6.87
OHS CV CPX DATA TIME - PEAK: 9.53
OHS CV CPX DATA VE/VCO2 - AT: 39
OHS CV CPX DATA VE/VCO2 - PEAK: 41
OHS CV CPX DATA VE/VO2 - AT: 38
OHS CV CPX DATA VE/VO2 - PEAK: 45
OHS CV CPX DATA VO2 - AT: 13.3
OHS CV CPX DATA VO2 - PEAK: 18.1
OHS CV CPX DATA VO2 - REST: 3.9
OHS CV CPX FEV1/FVC: 0.74
OHS CV CPX FORCED EXPIRATORY VOLUME: 1.93
OHS CV CPX FORCED VITAL CAPACITY (FVC): 2.62
OHS CV CPX HIGHEST VO: 33.1
OHS CV CPX MAX PREDICTED HEART RATE: 156
OHS CV CPX MAXIMAL VOLUNTARY VENTILATION (MVV) PREDICTED: 77.2
OHS CV CPX MAXIMAL VOLUNTARY VENTILATION (MVV): 83
OHS CV CPX MAXIUMUM EXERCISE VENTILATION (VE MAX): 51.8
OHS CV CPX PATIENT AGE: 64
OHS CV CPX PATIENT HEIGHT IN: 64
OHS CV CPX PATIENT IS FEMALE AGE 11-19: 0
OHS CV CPX PATIENT IS FEMALE AGE GREATER THAN 19: 0
OHS CV CPX PATIENT IS FEMALE AGE LESS THAN 11: 0
OHS CV CPX PATIENT IS FEMALE: 0
OHS CV CPX PATIENT IS MALE AGE 11-25: 0
OHS CV CPX PATIENT IS MALE AGE GREATER THAN 25: 1
OHS CV CPX PATIENT IS MALE AGE LESS THAN 11: 0
OHS CV CPX PATIENT IS MALE GREATER THAN 18: 1
OHS CV CPX PATIENT IS MALE LESS THAN OR EQUAL TO 18: 0
OHS CV CPX PATIENT IS MALE: 1
OHS CV CPX PATIENT WEIGHT RETURNED IN OZ: 2528
OHS CV CPX PEAK DIASTOLIC BLOOD PRESSURE: 86 MMHG
OHS CV CPX PEAK HEAR RATE: 110 BPM
OHS CV CPX PEAK RATE PRESSURE PRODUCT: NORMAL
OHS CV CPX PEAK SYSTOLIC BLOOD PRESSURE: 167 MMHG
OHS CV CPX PERCENT BODY FAT: 21
OHS CV CPX PERCENT MAX PREDICTED HEART RATE ACHIEVED: 71
OHS CV CPX PREDICTED VO2: 33.1 ML/KG/MIN
OHS CV CPX RATE PRESSURE PRODUCT PRESENTING: 8190
OHS CV CPX REST PET CO2: 32
OHS CV CPX VE/VCO2 SLOPE: 39.1
STRESS ECHO POST EXERCISE DUR MIN: 9 MINUTES
STRESS ECHO POST EXERCISE DUR SEC: 2 SECONDS
SYSTOLIC BLOOD PRESSURE: 126 MMHG

## 2021-06-22 PROCEDURE — 93451 PR RIGHT HEART CATH O2 SATURATION & CARDIAC OUTPUT: ICD-10-PCS | Mod: 26,TXP,, | Performed by: INTERNAL MEDICINE

## 2021-06-22 PROCEDURE — 94621 CARDIOPULMONARY EXERCISE TESTING (CUPID ONLY): ICD-10-PCS | Mod: 26,TXP,, | Performed by: INTERNAL MEDICINE

## 2021-06-22 PROCEDURE — C1894 INTRO/SHEATH, NON-LASER: HCPCS | Mod: TXP | Performed by: INTERNAL MEDICINE

## 2021-06-22 PROCEDURE — 93451 RIGHT HEART CATH: CPT | Mod: TXP | Performed by: INTERNAL MEDICINE

## 2021-06-22 PROCEDURE — 25000003 PHARM REV CODE 250: Mod: TXP | Performed by: INTERNAL MEDICINE

## 2021-06-22 PROCEDURE — 93451 RIGHT HEART CATH: CPT | Mod: 26,TXP,, | Performed by: INTERNAL MEDICINE

## 2021-06-22 PROCEDURE — C1751 CATH, INF, PER/CENT/MIDLINE: HCPCS | Mod: TXP | Performed by: INTERNAL MEDICINE

## 2021-06-22 PROCEDURE — 94621 CARDIOPULM EXERCISE TESTING: CPT | Mod: 26,TXP,, | Performed by: INTERNAL MEDICINE

## 2021-06-22 PROCEDURE — 94621 CARDIOPULM EXERCISE TESTING: CPT | Mod: TXP

## 2021-06-22 RX ORDER — LIDOCAINE HCL/EPINEPHRINE/PF 2%-1:200K
VIAL (ML) INJECTION
Status: DISCONTINUED | OUTPATIENT
Start: 2021-06-22 | End: 2021-06-22 | Stop reason: HOSPADM

## 2021-06-23 RX ORDER — PANTOPRAZOLE SODIUM 40 MG/1
40 TABLET, DELAYED RELEASE ORAL DAILY
Qty: 90 TABLET | Refills: 3 | Status: SHIPPED | OUTPATIENT
Start: 2021-06-23 | End: 2022-03-03 | Stop reason: SDUPTHER

## 2021-06-25 ENCOUNTER — PATIENT MESSAGE (OUTPATIENT)
Dept: TRANSPLANT | Facility: CLINIC | Age: 66
End: 2021-06-25

## 2021-06-25 DIAGNOSIS — I50.42 CHRONIC COMBINED SYSTOLIC AND DIASTOLIC HEART FAILURE: Primary | ICD-10-CM

## 2021-06-25 DIAGNOSIS — E11.22 TYPE 2 DIABETES MELLITUS WITH STAGE 3 CHRONIC KIDNEY DISEASE, WITHOUT LONG-TERM CURRENT USE OF INSULIN, UNSPECIFIED WHETHER STAGE 3A OR 3B CKD: ICD-10-CM

## 2021-06-25 DIAGNOSIS — I50.42 CHRONIC COMBINED SYSTOLIC AND DIASTOLIC CONGESTIVE HEART FAILURE: ICD-10-CM

## 2021-06-25 DIAGNOSIS — N18.30 TYPE 2 DIABETES MELLITUS WITH STAGE 3 CHRONIC KIDNEY DISEASE, WITHOUT LONG-TERM CURRENT USE OF INSULIN, UNSPECIFIED WHETHER STAGE 3A OR 3B CKD: ICD-10-CM

## 2021-06-25 RX ORDER — SPIRONOLACTONE 25 MG/1
25 TABLET ORAL DAILY
Qty: 90 TABLET | Refills: 3 | Status: SHIPPED | OUTPATIENT
Start: 2021-06-25 | End: 2022-03-03 | Stop reason: SDUPTHER

## 2021-06-25 RX ORDER — DAPAGLIFLOZIN 10 MG/1
10 TABLET, FILM COATED ORAL DAILY
Qty: 30 TABLET | Refills: 5 | Status: SHIPPED | OUTPATIENT
Start: 2021-06-25 | End: 2022-01-20

## 2021-06-29 ENCOUNTER — DOCUMENTATION ONLY (OUTPATIENT)
Dept: TRANSFUSION MEDICINE | Facility: HOSPITAL | Age: 66
End: 2021-06-29
Payer: COMMERCIAL

## 2021-06-29 DIAGNOSIS — Z76.82 HEART TRANSPLANT CANDIDATE: ICD-10-CM

## 2021-06-29 PROCEDURE — 80502 PR  LAB PATHOLOGY CONSULT-COMPLETE: CPT | Mod: TXP,,, | Performed by: PATHOLOGY

## 2021-06-29 PROCEDURE — 80502 PR  LAB PATHOLOGY CONSULT-COMPLETE: ICD-10-PCS | Mod: TXP,,, | Performed by: PATHOLOGY

## 2021-06-30 ENCOUNTER — TELEPHONE (OUTPATIENT)
Dept: TRANSPLANT | Facility: CLINIC | Age: 66
End: 2021-06-30

## 2021-06-30 ENCOUNTER — COMMITTEE REVIEW (OUTPATIENT)
Dept: TRANSPLANT | Facility: CLINIC | Age: 66
End: 2021-06-30

## 2021-07-01 ENCOUNTER — TELEPHONE (OUTPATIENT)
Dept: TRANSPLANT | Facility: CLINIC | Age: 66
End: 2021-07-01

## 2021-07-25 ENCOUNTER — NURSE TRIAGE (OUTPATIENT)
Dept: ADMINISTRATIVE | Facility: CLINIC | Age: 66
End: 2021-07-25

## 2021-07-26 RX ORDER — AMIODARONE HYDROCHLORIDE 400 MG/1
400 TABLET ORAL DAILY
Qty: 90 TABLET | Refills: 3 | Status: SHIPPED | OUTPATIENT
Start: 2021-07-26 | End: 2022-03-03 | Stop reason: SDUPTHER

## 2021-08-12 ENCOUNTER — LAB VISIT (OUTPATIENT)
Dept: LAB | Facility: HOSPITAL | Age: 66
End: 2021-08-12
Attending: INTERNAL MEDICINE
Payer: COMMERCIAL

## 2021-08-12 ENCOUNTER — OFFICE VISIT (OUTPATIENT)
Dept: TRANSPLANT | Facility: CLINIC | Age: 66
End: 2021-08-12
Payer: COMMERCIAL

## 2021-08-12 VITALS
WEIGHT: 157.44 LBS | DIASTOLIC BLOOD PRESSURE: 61 MMHG | HEIGHT: 64 IN | HEART RATE: 66 BPM | SYSTOLIC BLOOD PRESSURE: 100 MMHG | BODY MASS INDEX: 26.88 KG/M2

## 2021-08-12 DIAGNOSIS — I50.42 CHRONIC COMBINED SYSTOLIC AND DIASTOLIC CONGESTIVE HEART FAILURE: ICD-10-CM

## 2021-08-12 DIAGNOSIS — I50.42 CHRONIC COMBINED SYSTOLIC AND DIASTOLIC HEART FAILURE: Primary | ICD-10-CM

## 2021-08-12 DIAGNOSIS — I10 ESSENTIAL HYPERTENSION: ICD-10-CM

## 2021-08-12 DIAGNOSIS — N18.30 TYPE 2 DIABETES MELLITUS WITH STAGE 3 CHRONIC KIDNEY DISEASE, WITHOUT LONG-TERM CURRENT USE OF INSULIN, UNSPECIFIED WHETHER STAGE 3A OR 3B CKD: ICD-10-CM

## 2021-08-12 DIAGNOSIS — E11.22 TYPE 2 DIABETES MELLITUS WITH STAGE 3 CHRONIC KIDNEY DISEASE, WITHOUT LONG-TERM CURRENT USE OF INSULIN, UNSPECIFIED WHETHER STAGE 3A OR 3B CKD: ICD-10-CM

## 2021-08-12 DIAGNOSIS — I48.0 PAF (PAROXYSMAL ATRIAL FIBRILLATION): ICD-10-CM

## 2021-08-12 DIAGNOSIS — N18.31 STAGE 3A CHRONIC KIDNEY DISEASE: ICD-10-CM

## 2021-08-12 DIAGNOSIS — I42.8 CARDIOMYOPATHY, NONISCHEMIC: ICD-10-CM

## 2021-08-12 LAB
ANION GAP SERPL CALC-SCNC: 11 MMOL/L (ref 8–16)
BUN SERPL-MCNC: 13 MG/DL (ref 8–23)
CALCIUM SERPL-MCNC: 9.4 MG/DL (ref 8.7–10.5)
CHLORIDE SERPL-SCNC: 105 MMOL/L (ref 95–110)
CO2 SERPL-SCNC: 24 MMOL/L (ref 23–29)
CREAT SERPL-MCNC: 1.6 MG/DL (ref 0.5–1.4)
EST. GFR  (AFRICAN AMERICAN): 51.9 ML/MIN/1.73 M^2
EST. GFR  (NON AFRICAN AMERICAN): 44.9 ML/MIN/1.73 M^2
GLUCOSE SERPL-MCNC: 223 MG/DL (ref 70–110)
POTASSIUM SERPL-SCNC: 3.5 MMOL/L (ref 3.5–5.1)
SODIUM SERPL-SCNC: 140 MMOL/L (ref 136–145)

## 2021-08-12 PROCEDURE — 1126F AMNT PAIN NOTED NONE PRSNT: CPT | Mod: CPTII,S$GLB,, | Performed by: INTERNAL MEDICINE

## 2021-08-12 PROCEDURE — 80048 BASIC METABOLIC PNL TOTAL CA: CPT | Performed by: INTERNAL MEDICINE

## 2021-08-12 PROCEDURE — 3078F DIAST BP <80 MM HG: CPT | Mod: CPTII,S$GLB,, | Performed by: INTERNAL MEDICINE

## 2021-08-12 PROCEDURE — 1159F PR MEDICATION LIST DOCUMENTED IN MEDICAL RECORD: ICD-10-PCS | Mod: CPTII,S$GLB,, | Performed by: INTERNAL MEDICINE

## 2021-08-12 PROCEDURE — 99999 PR PBB SHADOW E&M-EST. PATIENT-LVL IV: CPT | Mod: PBBFAC,,, | Performed by: INTERNAL MEDICINE

## 2021-08-12 PROCEDURE — 1126F PR PAIN SEVERITY QUANTIFIED, NO PAIN PRESENT: ICD-10-PCS | Mod: CPTII,S$GLB,, | Performed by: INTERNAL MEDICINE

## 2021-08-12 PROCEDURE — 3074F PR MOST RECENT SYSTOLIC BLOOD PRESSURE < 130 MM HG: ICD-10-PCS | Mod: CPTII,S$GLB,, | Performed by: INTERNAL MEDICINE

## 2021-08-12 PROCEDURE — 1160F RVW MEDS BY RX/DR IN RCRD: CPT | Mod: CPTII,S$GLB,, | Performed by: INTERNAL MEDICINE

## 2021-08-12 PROCEDURE — 3008F PR BODY MASS INDEX (BMI) DOCUMENTED: ICD-10-PCS | Mod: CPTII,S$GLB,, | Performed by: INTERNAL MEDICINE

## 2021-08-12 PROCEDURE — 3074F SYST BP LT 130 MM HG: CPT | Mod: CPTII,S$GLB,, | Performed by: INTERNAL MEDICINE

## 2021-08-12 PROCEDURE — 1160F PR REVIEW ALL MEDS BY PRESCRIBER/CLIN PHARMACIST DOCUMENTED: ICD-10-PCS | Mod: CPTII,S$GLB,, | Performed by: INTERNAL MEDICINE

## 2021-08-12 PROCEDURE — 1159F MED LIST DOCD IN RCRD: CPT | Mod: CPTII,S$GLB,, | Performed by: INTERNAL MEDICINE

## 2021-08-12 PROCEDURE — 99214 PR OFFICE/OUTPT VISIT, EST, LEVL IV, 30-39 MIN: ICD-10-PCS | Mod: S$GLB,,, | Performed by: INTERNAL MEDICINE

## 2021-08-12 PROCEDURE — 3044F HG A1C LEVEL LT 7.0%: CPT | Mod: CPTII,S$GLB,, | Performed by: INTERNAL MEDICINE

## 2021-08-12 PROCEDURE — 36415 COLL VENOUS BLD VENIPUNCTURE: CPT | Performed by: INTERNAL MEDICINE

## 2021-08-12 PROCEDURE — 3008F BODY MASS INDEX DOCD: CPT | Mod: CPTII,S$GLB,, | Performed by: INTERNAL MEDICINE

## 2021-08-12 PROCEDURE — 3044F PR MOST RECENT HEMOGLOBIN A1C LEVEL <7.0%: ICD-10-PCS | Mod: CPTII,S$GLB,, | Performed by: INTERNAL MEDICINE

## 2021-08-12 PROCEDURE — 3078F PR MOST RECENT DIASTOLIC BLOOD PRESSURE < 80 MM HG: ICD-10-PCS | Mod: CPTII,S$GLB,, | Performed by: INTERNAL MEDICINE

## 2021-08-12 PROCEDURE — 99999 PR PBB SHADOW E&M-EST. PATIENT-LVL IV: ICD-10-PCS | Mod: PBBFAC,,, | Performed by: INTERNAL MEDICINE

## 2021-08-12 PROCEDURE — 99214 OFFICE O/P EST MOD 30 MIN: CPT | Mod: S$GLB,,, | Performed by: INTERNAL MEDICINE

## 2021-08-12 RX ORDER — NITROGLYCERIN 0.4 MG/1
TABLET SUBLINGUAL
Qty: 100 TABLET | Refills: 6 | Status: SHIPPED | OUTPATIENT
Start: 2021-08-12 | End: 2022-03-03 | Stop reason: SDUPTHER

## 2021-08-16 ENCOUNTER — TELEPHONE (OUTPATIENT)
Dept: TRANSPLANT | Facility: CLINIC | Age: 66
End: 2021-08-16

## 2021-10-18 ENCOUNTER — PATIENT MESSAGE (OUTPATIENT)
Dept: TRANSPLANT | Facility: CLINIC | Age: 66
End: 2021-10-18
Payer: MEDICARE

## 2021-11-07 ENCOUNTER — NURSE TRIAGE (OUTPATIENT)
Dept: ADMINISTRATIVE | Facility: CLINIC | Age: 66
End: 2021-11-07
Payer: MEDICARE

## 2021-11-08 ENCOUNTER — HISTORICAL (OUTPATIENT)
Dept: CARDIOLOGY | Facility: HOSPITAL | Age: 66
End: 2021-11-08

## 2021-11-16 ENCOUNTER — TELEPHONE (OUTPATIENT)
Dept: TRANSPLANT | Facility: CLINIC | Age: 66
End: 2021-11-16
Payer: MEDICARE

## 2021-11-17 ENCOUNTER — TELEPHONE (OUTPATIENT)
Dept: TRANSPLANT | Facility: CLINIC | Age: 66
End: 2021-11-17
Payer: MEDICARE

## 2021-11-22 ENCOUNTER — TELEPHONE (OUTPATIENT)
Dept: TRANSPLANT | Facility: CLINIC | Age: 66
End: 2021-11-22
Payer: MEDICARE

## 2021-11-30 DIAGNOSIS — I50.42 CHRONIC COMBINED SYSTOLIC AND DIASTOLIC HEART FAILURE: ICD-10-CM

## 2021-11-30 DIAGNOSIS — I13.0 HYPERTENSIVE CARDIOVASCULAR-RENAL DISEASE, STAGE 1-4 OR UNSPECIFIED CHRONIC KIDNEY DISEASE, WITH HEART FAILURE: ICD-10-CM

## 2021-11-30 DIAGNOSIS — I42.0 DILATED CARDIOMYOPATHY: ICD-10-CM

## 2021-11-30 RX ORDER — HYDRALAZINE HYDROCHLORIDE 50 MG/1
75 TABLET, FILM COATED ORAL 3 TIMES DAILY
Qty: 405 TABLET | Refills: 3 | Status: SHIPPED | OUTPATIENT
Start: 2021-11-30 | End: 2022-01-20 | Stop reason: ALTCHOICE

## 2021-11-30 RX ORDER — ISOSORBIDE DINITRATE 20 MG/1
40 TABLET ORAL 3 TIMES DAILY
Qty: 540 TABLET | Refills: 3 | Status: SHIPPED | OUTPATIENT
Start: 2021-11-30 | End: 2022-03-03 | Stop reason: SDUPTHER

## 2021-11-30 NOTE — TELEPHONE ENCOUNTER
----- Message from Eboni Pettit sent at 11/30/2021 11:38 AM CST -----  Regarding: Refills  Pt [phone] f/u on refills and help with paying for his Farxiga 10 mg. He need refills on his Isordil 20 mg and Apresoline 50 mg.    [facility] #[id number] [address] AT [facility] [address]   Phone:  [phone]  Fax:  [phone]      Thanks

## 2021-12-06 ENCOUNTER — NURSE TRIAGE (OUTPATIENT)
Dept: ADMINISTRATIVE | Facility: CLINIC | Age: 66
End: 2021-12-06
Payer: MEDICARE

## 2021-12-14 DIAGNOSIS — I50.42 CHRONIC COMBINED SYSTOLIC AND DIASTOLIC HEART FAILURE: ICD-10-CM

## 2021-12-14 RX ORDER — METOPROLOL SUCCINATE 100 MG/1
100 TABLET, EXTENDED RELEASE ORAL DAILY
Qty: 90 TABLET | Refills: 3 | Status: SHIPPED | OUTPATIENT
Start: 2021-12-14 | End: 2022-03-03 | Stop reason: SDUPTHER

## 2021-12-19 ENCOUNTER — NURSE TRIAGE (OUTPATIENT)
Dept: ADMINISTRATIVE | Facility: CLINIC | Age: 66
End: 2021-12-19
Payer: MEDICARE

## 2021-12-20 ENCOUNTER — TELEPHONE (OUTPATIENT)
Dept: TRANSPLANT | Facility: CLINIC | Age: 66
End: 2021-12-20
Payer: MEDICARE

## 2021-12-23 ENCOUNTER — NURSE TRIAGE (OUTPATIENT)
Dept: ADMINISTRATIVE | Facility: CLINIC | Age: 66
End: 2021-12-23
Payer: MEDICARE

## 2022-01-01 NOTE — PROGRESS NOTES
"[facility]  Endocrinology  Progress Note    Admit Date: 2/29/2020     Reason for Consult: Management of T2DM, Hyperglycemia     Surgical Procedure and Date: N/A    Diabetes diagnosis year: 2019    Lab Results   Component Value Date    HGBA1C 7.0 (H) 03/01/2020       Home Diabetes Medications:  Metformin 500 mg BID    How often checking glucose at home? Once daily  BG readings on regimen:   Hypoglycemia on the regimen? No  Missed doses on regimen? No    Diabetes Complications include:     none    Complicating diabetes co morbidities:   CHF and CKD      HPI:   Patient is a 64 y.o. male with a diagnosis of DM2, ANGEL, afib, CAD, CHF, and cardiogenic shock.  Patient admitted to [facility] from outside facility in cardiogenic shock, intubated, and with an impella in place.  Diagnosed with DM2 last year and place on Metformin, managed per his PCP.  Endocrinology consulted for DM/BG management.        Interval HPI:   Overnight events: Remains in CMICU, NAEON.  BG at or slightly above goal overnight on transition IV insulin infusion.  Noted creatinine of 6.0.    Eating:   NPO  Nausea: No  Hypoglycemia and intervention: No  Fever: No  TPN and/or TF: No    /60 (BP Location: Right arm, Patient Position: Lying)   Pulse 93   Temp 98.9 °F (37.2 °C) (Oral)   Resp (!) 26   Ht 5' 6" (1.676 m)   Wt 67.8 kg (149 lb 7.6 oz)   SpO2 (!) 92%   BMI 24.13 kg/m²      Labs Reviewed and Include    Recent Labs   Lab 03/08/20  0304 03/08/20  0613   * 188*   CALCIUM 10.0 9.8   ALBUMIN 3.1*  --    PROT 7.7  --    * 149*   K 3.8 3.7   CO2 33* 34*   CL 99 98   * 119*   CREATININE 6.4* 6.0*   ALKPHOS 91  --    ALT 15  --    AST 21  --    BILITOT 0.7  --      Lab Results   Component Value Date    WBC 11.59 03/08/2020    HGB 10.7 (L) 03/08/2020    HCT 33.7 (L) 03/08/2020    MCV 90 03/08/2020     (H) 03/08/2020     No results for input(s): TSH, FREET4 in the last 168 hours.  Lab Results " Girl Janet Chapman is a 2 day old female 8 lb 4.3 oz (3750 g) infant, delivered at Gestational Age: 39w5d on 20[birth year].     Information/ Screenings/ Immunizations     Hearing exam:  Hearing Test Machine: Auditory Brainstem Response (Algo) (22)  [place] Hearing Test Results: Pass R, Pass L (22)    [place]  Screen: done, results pending    Immunizations:   Most Recent Immunizations   Administered Date(s) Administered   • Hep B, adolescent or pediatric 2022   Pended Date(s) Pended   • Hep B, adolescent or pediatric 2022   • Hepatitis B Immune Globulin 2022      Infant Blood Type: O Rh Negative  Bilirubin   Lab Results   Component Value Date/Time    BILIRUBIN 6.5 (H) 2022 07:22 PM     TCB Result: 8 (22)  TCB Site: Head   Hours of age-Transcutaneous Biliribin: 24 Hrs    CHD Screening   Screening complete: Done (22)  Right hand reading %: 100 %  Foot reading %: 100 %  CHD: Normal    PHYSICAL EXAM   VITALS:   Visit Vitals  Pulse 130   Temp 98.5 °F (36.9 °C)   Resp 42   Ht 20\" (50.8 cm)   Wt 3566 g   HC 36 cm (14.17\")   BMI 13.82 kg/m²     Intake/Output  Report       07 0659  0700   0659    P.O. (mL/kg) 1.7 (0.48)     Total Intake(mL/kg) 1.7 (0.48)     Net +1.7           Urine Occurrence 7 x     Stool Occurrence 9 x     Unmeasured Breast Milk Occurrence 8 x           Birth Measurements:        Weight: 8 lb 4.3 oz (3750 g)        Length: 20\"        Head circumference: 36 cm    Weight change since birth: -5%    GENERAL:Baby Girl is an alert, vigorous female with appropriate behavior. She is in no acute distress.  SKIN: Her skin is warm with normal turgor. The color of the skin is pink. There is no rash. There are no bruises or other signs of injury. Significant jaundice is not present.  HEAD: The head is atraumatic and normocephalic. The anterior fontanel is open and flat.  EYES: The conjunctivae appear normal    Component Value Date    HGBA1C 7.0 (H) 03/01/2020       Nutritional status:   Body mass index is 24.13 kg/m².  Lab Results   Component Value Date    ALBUMIN 3.1 (L) 03/08/2020    ALBUMIN 3.1 (L) 03/07/2020    ALBUMIN 2.9 (L) 03/06/2020     Lab Results   Component Value Date    PREALBUMIN <2.5 (L) 03/04/2020       Estimated Creatinine Clearance: 11.4 mL/min (A) (based on SCr of 6 mg/dL (H)).    Accu-Checks  Recent Labs     03/06/20  0147 03/06/20  0544 03/06/20  1335 03/06/20  1826 03/07/20  0030 03/07/20  0411 03/07/20  0838 03/07/20  1133 03/07/20  1606 03/08/20  0620   POCTGLUCOSE 230* 252* 228* 183* 172* 168* 170* 218* 202* 166*       Current Medications and/or Treatments Impacting Glycemic Control  Immunotherapy:    Immunosuppressants     None        Steroids:   Hormones (From admission, onward)    None        Pressors:    Autonomic Drugs (From admission, onward)    None        Hyperglycemia/Diabetes Medications:   Antihyperglycemics (From admission, onward)    Start     Stop Route Frequency Ordered    03/06/20 1815  insulin regular 100 Units in sodium chloride 0.9% 100 mL infusion      -- IV Continuous 03/06/20 1701    03/06/20 1801  insulin aspart U-100 pen 0-4 Units      -- SubQ As needed (PRN) 03/06/20 1701          ASSESSMENT and PLAN    * Cardiogenic shock  Managed per primary team  Avoid hypoglycemia        Type 2 diabetes mellitus, without long-term current use of insulin  BG goal 140 - 180     Increase transition IV insulin infusion to 0.5 u/hr with stepdown parameters  Low dose correction scale - due to decreased renal function  BG monitoring every 4 hours while NPO    Discharge planning: TBD        ANGEL (acute kidney injury)  Titrate insulin slowly to avoid hypoglycemia as the risk of hypoglycemia increases with decreased creatinine clearance.  Caution with insulin stacking  Estimated Creatinine Clearance: 11.4 mL/min (A) (based on SCr of 6 mg/dL (H)).            Barry Chawla,  with neither icterus nor subconjunctival hemorrhage.   Red reflexes are seen bilaterally.  EARS: Pinnae normal.  NOSE: There is no nasal flaring, nares patent bilaterally.  THROAT:  The oropharynx is normal.  There is no cleft of the palate.  NECK: Clavicles without crepitus.  TRUNK AND THORAX: There are no lesions on the trunk; there is no dimple over the presacral area. There are no retractions.  LUNGS: The lung fields are clear to auscultation.  HEART: The precordium is quiet. The heart rhythm is grossly regular. S1 and S2 are normal. There are no murmurs. Normal femoral pulses.  ABDOMEN: The umbilical cord stump is normal. There is not an umbilical hernia. The abdomen is flat and soft.   GENITALIA: normal female genitalia  RECTAL: anus patent  EXTREMITIES: Moving all 4 extremities. The hip exam is normal. There are no hip clicks or clunks.    NEUROLOGIC: She displays normal tone throughout. She is not jittery.    Jaundice: none    ASSESSMENT   Well 2 day old female infant.    Patient Active Problem List   Diagnosis   • Single liveborn, born in hospital, delivered by vaginal delivery   • Breastfeeding (infant)   • Caput succedaneum     DOL2, former 39w5d infant born via an uncomplicated spontaneous vaginal delivery. Pregnancy was complicated by concern for macrosomia, Hep C (0 viral load), HSV (on valtrex), Rh -. Infant has been breast feeding and has been voiding and passing stool. She lost 184g in the first 24 hours and is 5% below birth weight. Serum bilirubin was obtained with 24 hours labs and was 6.5 mg/dL. She is below the level to require phototherapy. The level required to start phototherapy is 12.8 mg/dL.   .  PLAN   Routine  care.    Ames is currently being fed human milk  .  I am aware that mother's feeding choice upon admission was the following:   Information for the patient's mother:  Janet Chapman [[id number]]   Exclusive breast milk feeding   There are no medical contraindications to  NP  Endocrinology  [facility]   exclusive breast milk feeding, and the benefits of exclusively breastfeeding were discussed/reinforced with the mother.    Follow up: With Gold Perez on 11/14 at 10:00 am    Instructions: Baby's mom has been given discharge instructions, including information regarding feeding, any restrictions, and follow up information. Baby will be discharged home with mom and dad.     Procedures: None    Consults: Lactation    Signed by: MIGUEL Taylor   2022

## 2022-01-06 ENCOUNTER — TELEPHONE (OUTPATIENT)
Dept: TRANSPLANT | Facility: CLINIC | Age: 67
End: 2022-01-06

## 2022-01-06 NOTE — TELEPHONE ENCOUNTER
Pt says he had some ankle and feet swelling overnight but after last night swelling is almost back to normal. Denies sob, and cough at this time.  Pt says he stopped weighing about 3 weeks ago. Says losing wt having over sometimes due to GI issues.    Asked pt to start weighing daily please and tracking wt. Notify CHF nurse or MD if he has swelling unresolved after 3 days, and or sob, coughing, wheezing. Pt verbalized understanding.

## 2022-01-19 DIAGNOSIS — I50.42 CHRONIC COMBINED SYSTOLIC AND DIASTOLIC CONGESTIVE HEART FAILURE: Primary | ICD-10-CM

## 2022-01-20 ENCOUNTER — LAB VISIT (OUTPATIENT)
Dept: LAB | Facility: HOSPITAL | Age: 67
End: 2022-01-20
Attending: INTERNAL MEDICINE

## 2022-01-20 ENCOUNTER — OFFICE VISIT (OUTPATIENT)
Dept: TRANSPLANT | Facility: CLINIC | Age: 67
End: 2022-01-20

## 2022-01-20 VITALS
DIASTOLIC BLOOD PRESSURE: 52 MMHG | SYSTOLIC BLOOD PRESSURE: 95 MMHG | WEIGHT: 146.81 LBS | HEIGHT: 64 IN | BODY MASS INDEX: 25.06 KG/M2 | HEART RATE: 65 BPM

## 2022-01-20 DIAGNOSIS — I48.0 PAF (PAROXYSMAL ATRIAL FIBRILLATION): ICD-10-CM

## 2022-01-20 DIAGNOSIS — I50.42 CHRONIC COMBINED SYSTOLIC AND DIASTOLIC HEART FAILURE: ICD-10-CM

## 2022-01-20 DIAGNOSIS — G47.33 OSA (OBSTRUCTIVE SLEEP APNEA): ICD-10-CM

## 2022-01-20 DIAGNOSIS — I42.0 DILATED CARDIOMYOPATHY: ICD-10-CM

## 2022-01-20 DIAGNOSIS — I25.10 CORONARY ARTERY DISEASE INVOLVING NATIVE CORONARY ARTERY OF NATIVE HEART WITHOUT ANGINA PECTORIS: ICD-10-CM

## 2022-01-20 DIAGNOSIS — I50.42 CHRONIC COMBINED SYSTOLIC AND DIASTOLIC CONGESTIVE HEART FAILURE: Primary | ICD-10-CM

## 2022-01-20 DIAGNOSIS — E11.22 TYPE 2 DIABETES MELLITUS WITH STAGE 3 CHRONIC KIDNEY DISEASE, WITHOUT LONG-TERM CURRENT USE OF INSULIN, UNSPECIFIED WHETHER STAGE 3A OR 3B CKD: ICD-10-CM

## 2022-01-20 DIAGNOSIS — N18.30 TYPE 2 DIABETES MELLITUS WITH STAGE 3 CHRONIC KIDNEY DISEASE, WITHOUT LONG-TERM CURRENT USE OF INSULIN, UNSPECIFIED WHETHER STAGE 3A OR 3B CKD: ICD-10-CM

## 2022-01-20 LAB
ALBUMIN SERPL BCP-MCNC: 2.3 G/DL (ref 3.5–5.2)
ALP SERPL-CCNC: 132 U/L (ref 55–135)
ALT SERPL W/O P-5'-P-CCNC: 72 U/L (ref 10–44)
ANION GAP SERPL CALC-SCNC: 9 MMOL/L (ref 8–16)
AST SERPL-CCNC: 103 U/L (ref 10–40)
BILIRUB SERPL-MCNC: 1 MG/DL (ref 0.1–1)
BUN SERPL-MCNC: 26 MG/DL (ref 8–23)
CALCIUM SERPL-MCNC: 8.4 MG/DL (ref 8.7–10.5)
CHLORIDE SERPL-SCNC: 103 MMOL/L (ref 95–110)
CO2 SERPL-SCNC: 20 MMOL/L (ref 23–29)
CREAT SERPL-MCNC: 1.8 MG/DL (ref 0.5–1.4)
EST. GFR  (AFRICAN AMERICAN): 44.7 ML/MIN/1.73 M^2
EST. GFR  (NON AFRICAN AMERICAN): 38.6 ML/MIN/1.73 M^2
GLUCOSE SERPL-MCNC: 165 MG/DL (ref 70–110)
POTASSIUM SERPL-SCNC: 4.6 MMOL/L (ref 3.5–5.1)
PROT SERPL-MCNC: 6.1 G/DL (ref 6–8.4)
SODIUM SERPL-SCNC: 132 MMOL/L (ref 136–145)

## 2022-01-20 PROCEDURE — 99999 PR PBB SHADOW E&M-EST. PATIENT-LVL IV: CPT | Mod: PBBFAC,,, | Performed by: INTERNAL MEDICINE

## 2022-01-20 PROCEDURE — 99214 OFFICE O/P EST MOD 30 MIN: CPT | Mod: PBBFAC | Performed by: INTERNAL MEDICINE

## 2022-01-20 PROCEDURE — 99999 PR PBB SHADOW E&M-EST. PATIENT-LVL IV: ICD-10-PCS | Mod: PBBFAC,,, | Performed by: INTERNAL MEDICINE

## 2022-01-20 PROCEDURE — 99215 OFFICE O/P EST HI 40 MIN: CPT | Mod: S$PBB,,, | Performed by: INTERNAL MEDICINE

## 2022-01-20 PROCEDURE — 99215 PR OFFICE/OUTPT VISIT, EST, LEVL V, 40-54 MIN: ICD-10-PCS | Mod: S$PBB,,, | Performed by: INTERNAL MEDICINE

## 2022-01-20 PROCEDURE — 80053 COMPREHEN METABOLIC PANEL: CPT | Performed by: INTERNAL MEDICINE

## 2022-01-20 PROCEDURE — 36415 COLL VENOUS BLD VENIPUNCTURE: CPT | Performed by: INTERNAL MEDICINE

## 2022-01-20 RX ORDER — EMPAGLIFLOZIN 10 MG/1
10 TABLET, FILM COATED ORAL DAILY
Qty: 90 TABLET | Refills: 3 | Status: SHIPPED | OUTPATIENT
Start: 2022-01-20 | End: 2022-04-26

## 2022-01-20 NOTE — PROGRESS NOTES
Subjective:       HPI:  Very pleasant 67 yo black male with stage C HFrEF, ICMP, who was previously treated (1 year ago) for ADHF with cardiogenic shock and transferred to [facility] 2/29/20 with impella.  He had cath documented CAD (50-60% LCx, OM disease on [facility] 2/29).  Course complicated by bacteremia and renal function remains impaired.  He went home 3/13/20 but was readmitted with concern for Covid though test was negative. He did undergo OHTX/VAD work-up but was decline due to medical stability. Today he comes for a follow-up visit. Continues to do really well from a cardiac standpoint but unfortunately was recently admitted in the hospital for acute pancreatitis. Today, he reports NYHA class II symptoms. Denies PND and orthopnea. No issues with his HF regimen.  No recent HF admissions. Of note, due to low BP during his hospital stay his Hydralazine was discontinued. Labs are pending today.      RHC done on 2/4/2021  RA: 12/ 10/ 8 RV: 53/ 11 PA: 52/ 24/ 33 PWP: 31/ 35/ 26 .   Cardiac output was 4.6  by Ronni. Cardiac index is 2.6 L/min/m2.   O2 Sat: PA 67%.   Pulmonary vascular resistance: 122. Systemic vascular resistance: 1531.   /79 (96); sat 99%       Past Medical History:   Diagnosis Date    Anticoagulant long-term use     CHF (congestive heart failure)     Chronic combined systolic and diastolic heart failure 3/1/2020    Coronary artery disease     Diabetes mellitus     Digestive disorder     Diverticulitis     Encounter for blood transfusion     Hypertension     Renal disorder     Sleep apnea     Suspected Covid-19 Virus Infection; test negative 3/14/2020     Past Surgical History:   Procedure Laterality Date    COLECTOMY      RIGHT HEART CATHETERIZATION Right 10/8/2020    Procedure: INSERTION, CATHETER, RIGHT HEART;  Surgeon: Adilson Darden Jr., MD;  Location: [facility];  Service: Cardiology;  Laterality: Right;    RIGHT HEART CATHETERIZATION Right 2/3/2021    Procedure:  "INSERTION, CATHETER, RIGHT HEART;  Surgeon: Adilson Darden Jr., MD;  Location: [facility] CATH LAB;  Service: Cardiology;  Laterality: Right;    RIGHT HEART CATHETERIZATION Right 6/22/2021    Procedure: INSERTION, CATHETER, RIGHT HEART;  Surgeon: Yuki Delgado MD;  Location: [facility] CATH LAB;  Service: Cardiology;  Laterality: Right;       Family History   Problem Relation Age of Onset    Diabetes Mother     Hypertension Mother     Hypertension Father     Heart disease Father        Review of Systems   Constitutional: Negative. Negative for chills, decreased appetite, diaphoresis, fever, malaise/fatigue, night sweats, weight gain and weight loss.   Eyes: Negative.    Cardiovascular: Positive for dyspnea on exertion. Negative for chest pain, claudication, cyanosis, irregular heartbeat, leg swelling, near-syncope, orthopnea, palpitations, paroxysmal nocturnal dyspnea and syncope.   Respiratory: Negative for cough, hemoptysis and shortness of breath.    Endocrine: Negative.    Hematologic/Lymphatic: Negative.    Skin: Negative for color change, dry skin and nail changes.   Musculoskeletal: Negative.    Gastrointestinal: Negative.    Genitourinary: Negative.    Neurological: Negative for weakness.       Objective:   Blood pressure (!) 95/52, pulse 65, height 5' 4" (1.626 m), weight 66.6 kg (146 lb 13.2 oz).body mass index is 25.2 kg/m².    Physical Exam  Constitutional:       Appearance: He is well-developed and well-nourished.   HENT:      Head: Normocephalic.   Eyes:      Extraocular Movements: EOM normal.      Pupils: Pupils are equal, round, and reactive to light.   Neck:      Vascular: No JVD.   Cardiovascular:      Rate and Rhythm: Normal rate and regular rhythm.      Chest Wall: PMI is displaced.   Musculoskeletal:      Cervical back: Neck supple.         Labs:      Chemistry        Component Value Date/Time     08/12/2021 1207    K 3.5 08/12/2021 1207     08/12/2021 1207    CO2 24 08/12/2021 1207 "    BUN 13 08/12/2021 1207    CREATININE 1.6 (H) 08/12/2021 1207     (H) 08/12/2021 1207        Component Value Date/Time    CALCIUM 9.4 08/12/2021 1207    ALKPHOS 77 06/22/2021 1112    AST 59 (H) 06/22/2021 1112     (H) 06/22/2021 1112    BILITOT 0.3 06/22/2021 1112    ESTGFRAFRICA 51.9 (A) 08/12/2021 1207    EGFRNONAA 44.9 (A) 08/12/2021 1207          Magnesium   Date Value Ref Range Status   03/11/2021 2.2 1.6 - 2.6 mg/dL Final     Lab Results   Component Value Date    WBC 7.25 06/22/2021    HGB 14.3 06/22/2021    HCT 42.4 06/22/2021    MCV 92 06/22/2021     06/22/2021     BNP   Date Value Ref Range Status   03/25/2021 159 (H) 0 - 99 pg/mL Final     Comment:     Values of less than 100 pg/ml are consistent with non-CHF populations.   03/11/2021 137 (H) 0 - 99 pg/mL Final     Comment:     Values of less than 100 pg/ml are consistent with non-CHF populations.   02/03/2021 763 (H) 0 - 99 pg/mL Final     Comment:     Values of less than 100 pg/ml are consistent with non-CHF populations.     Assessment:      1. Chronic combined systolic and diastolic congestive heart failure    2. Coronary artery disease involving native coronary artery of native heart without angina pectoris    3. Dilated cardiomyopathy out of proportion to CAD    4. JANET (obstructive sleep apnea)    5. PAF (paroxysmal atrial fibrillation)    6. Type 2 diabetes mellitus with stage 3 chronic kidney disease, without long-term current use of insulin, unspecified whether stage 3a or 3b CKD        Plan:   NYHA class II-III symptoms.   Continue current HF regimen   Unfortunately due to cost he discontinued his  Dapagliflozin. Will prescribe Empagliflozin 10 mg daily may be cheaper (reduces risk of worsening Hf and cardiovascular death in patients with HFrEF)  Recommend 2 gram sodium restriction and 1500cc fluid restriction.  Encourage physical activity with graded exercise program.  Requested patient to weigh themselves daily, and to  notify us if their weight increases by more than 3 lbs in 1 day or 5 lbs in 1 week.   RTC in 3 months with labs with labs and Echo      Yuki Delgado MD

## 2022-01-20 NOTE — LETTER
January 20, 2022        Quentin Redman  [address] [facility] [facility] [address]  Phone: [phone]  Fax: [phone]             [id number]  [address] [address]  Phone: [phone]   Patient: John Javier   MR Number: [id number]   YOB: 1955   Date of Visit: 1/20/2022       Dear Dr. Quentin Redman    Thank you for referring John Javier to me for evaluation. Attached you will find relevant portions of my assessment and plan of care.    If you have questions, please do not hesitate to call me. I look forward to following John Javier along with you.    Sincerely,    Yuki Delgado MD    Enclosure    If you would like to receive this communication electronically, please contact [email or web address] or [phone] to request [employer] access.    [employer] is a tool which provides read-only access to select patient information with whom you have a relationship. Its easy to use and provides real time access to review your patients record including encounter summaries, notes, results, and demographic information.    If you feel you have received this communication in error or would no longer like to receive these types of communications, please e-mail [email or web address]

## 2022-01-26 ENCOUNTER — DOCUMENTATION ONLY (OUTPATIENT)
Dept: TRANSPLANT | Facility: CLINIC | Age: 67
End: 2022-01-26

## 2022-01-26 NOTE — PROGRESS NOTES
Removed from VAD potential list d/t medical stability x6 months. Will be re- consulted if patient requires advanced options.

## 2022-01-27 ENCOUNTER — HISTORICAL (OUTPATIENT)
Dept: RADIOLOGY | Facility: HOSPITAL | Age: 67
End: 2022-01-27

## 2022-01-29 ENCOUNTER — NURSE TRIAGE (OUTPATIENT)
Dept: ADMINISTRATIVE | Facility: CLINIC | Age: 67
End: 2022-01-29

## 2022-01-30 NOTE — TELEPHONE ENCOUNTER
Glucose 211 at 1138pm. 98.2 temp oral temp. No symptoms     Reason for Disposition   Blood glucose  mg/dL (3.9 -13.3 mmol/L)    Additional Information   Negative: Unconscious or difficult to awaken   Negative: Acting confused (e.g., disoriented, slurred speech)   Negative: Very weak (e.g., can't stand)   Negative: Sounds like a life-threatening emergency to the triager   Negative: [1] Vomiting AND [2] signs of dehydration (e.g., very dry mouth, lightheaded, dark urine)   Negative: [1] Blood glucose > 240 mg/dL (13.3 mmol/L) AND [2] rapid breathing   Negative: Blood glucose > 500 mg/dL (27.8 mmol/L)   Negative: [1] Blood glucose > 240 mg/dL (13.3 mmol/L) AND [2] urine ketones moderate-large (or more than 1+)   Negative: [1] Blood glucose > 240 mg/dL (13.3 mmol/L) AND [2] blood ketones > 1.4 mmol/L   Negative: [1] Blood glucose > 240 mg/dL (13.3 mmol/L) AND [2] vomiting AND [3] unable to check for ketones (in blood or urine)   Negative: [1] New-onset diabetes suspected (e.g., frequent urination, weak, weight loss) AND [2] vomiting or rapid breathing   Negative: Vomiting lasts > 4 hours   Negative: Patient sounds very sick or weak to the triager   Negative: Fever > 100.4 F (38.0 C)   Negative: Blood glucose > 400 mg/dL (22.2 mmol/L)   Negative: [1] Blood glucose > 300 mg/dL (16.7 mmol/L) AND [2] two or more times in a row   Negative: Urine ketones moderate - large (or blood ketones > 1.4 mmol/L)   Negative: [1] Caller has URGENT medication or insulin pump question AND [2] triager unable to answer question   Negative: [1] Symptoms of high blood sugar (e.g., frequent urination, weak, weight loss) AND [2] not able to test blood glucose   Negative: New-onset diabetes suspected (e.g., frequent urination, weakness, weight loss)   Negative: [1] Caller has NON-URGENT medication or insulin pump question AND [2] triager unable to answer question    Protocols used: DIABETES - HIGH BLOOD  SUGAR-A-AH

## 2022-01-31 ENCOUNTER — TELEPHONE (OUTPATIENT)
Dept: TRANSPLANT | Facility: CLINIC | Age: 67
End: 2022-01-31

## 2022-01-31 NOTE — TELEPHONE ENCOUNTER
Called pt in response to on call nurse message about pts 211bsg. Pt says his bsg is down now. Asked pt to please inform his GP if  bsgs are not stable. Pt verbalized understanding. Reminded pt that  The Farxiga the CHF MD prscribed was for his CHF, please reach to GP who pt says manages his bsg.

## 2022-02-03 ENCOUNTER — TELEPHONE (OUTPATIENT)
Dept: TRANSPLANT | Facility: CLINIC | Age: 67
End: 2022-02-03

## 2022-02-03 NOTE — TELEPHONE ENCOUNTER
Pt says his BP at 1p was 79/57, and he was tired but he'd also over slept and  That why he was tired. Denied any lightheadedness, dizziness, blurred vision or weakness.  Pt reports he took his meds right after.  Pt states 130 pm bp 85/47Pt says he was told his bp may be low sometimes due to the pancreatitis he was recently diagnosed with.     Says he also had forgotten his glucometer at his daughters so he though his blood sugar may have been abnormal.   Pt states he called EMS and right before EMS got there his bp was 121/76 HR 64, states EMS got BP about 130/70's.    4pm bp 118/72, HR 60    Pt confirmed he is taking entresto, metoprolol, spironolactone, and isosorbide as I read to per prescribed in epic.       Pt says he had labs at [facility] a few days after seeing Dr Delgado because Dr Delgado wanted the labs by the 27th,and says he went to ER while there to be checked out and was told he had lots of fluid in his belly ,and says some  fluid was removed from belly, they did a sonogram of his belly and he was told he was discharged. I confirmed on 1/20 visits AVS bmp was requested to be done by 1/27 by Dr Delgado. Sent msg to . CHF JOSETTE Lacy requesting she please gets lab results and records from that ER visit if possible.    Pt presently denies any sob or distress at this time. I informed pt that I will call him tomorrow to follow on lab and ER records. Pt verbalized understanding.  Sending  this note to Dr Delgado for his review.

## 2022-02-06 ENCOUNTER — NURSE TRIAGE (OUTPATIENT)
Dept: ADMINISTRATIVE | Facility: CLINIC | Age: 67
End: 2022-02-06

## 2022-02-06 NOTE — TELEPHONE ENCOUNTER
Spoke with patient he states is blood sugar is reading 278.  States he feels weak and lightheaded.  Patient states he has had diarrhea x 4 months and feels dehydrated.  Denies that he feels very weak.  Advised patient to go to ER and have another adult drive.  Patient verbalized understanding.     Reason for Disposition   Patient sounds very sick or weak to the triager   SEVERE dizziness (e.g., unable to stand, requires support to walk, feels like passing out now)    Additional Information   Negative: Unconscious or difficult to awaken   Negative: Acting confused (e.g., disoriented, slurred speech)   Negative: Very weak (e.g., can't stand)   Negative: Sounds like a life-threatening emergency to the triager   Negative: [1] Vomiting AND [2] signs of dehydration (e.g., very dry mouth, lightheaded, dark urine)   Negative: [1] Blood glucose > 240 mg/dL (13.3 mmol/L) AND [2] rapid breathing   Negative: Blood glucose > 500 mg/dL (27.8 mmol/L)   Negative: [1] Blood glucose > 240 mg/dL (13.3 mmol/L) AND [2] urine ketones moderate-large (or more than 1+)   Negative: [1] Blood glucose > 240 mg/dL (13.3 mmol/L) AND [2] blood ketones > 1.4 mmol/L   Negative: [1] Blood glucose > 240 mg/dL (13.3 mmol/L) AND [2] vomiting AND [3] unable to check for ketones (in blood or urine)   Negative: [1] New-onset diabetes suspected (e.g., frequent urination, weak, weight loss) AND [2] vomiting or rapid breathing   Negative: Vomiting lasts > 4 hours   Negative: SEVERE difficulty breathing (e.g., struggling for each breath, speaks in single words)   Negative: [1] Difficulty breathing or swallowing AND [2] started suddenly after medicine, an allergic food or bee sting   Negative: Shock suspected (e.g., cold/pale/clammy skin, too weak to stand, low BP, rapid pulse)   Negative: Difficult to awaken or acting confused (e.g., disoriented, slurred speech)   Negative: [1] Weakness (i.e., paralysis, loss of muscle strength) of the  face, arm or leg on one side of the body AND [2] sudden onset AND [3] present now   Negative: [1] Numbness (i.e., loss of sensation) of the face, arm or leg on one side of the body AND [2] sudden onset AND [3] present now   Negative: [1] Loss of speech or garbled speech AND [2] sudden onset AND [3] present now   Negative: Overdose (accidental or intentional) of medications   Negative: [1] Fainted > 15 minutes ago AND [2] still feels too weak or dizzy to stand   Negative: Heart beating < 50 beats per minute OR > 140 beats per minute   Negative: Sounds like a life-threatening emergency to the triager   Negative: Difficulty breathing    Protocols used: DIABETES - HIGH BLOOD SUGAR-A-AH, DIZZINESS - [id number]

## 2022-02-11 ENCOUNTER — NURSE TRIAGE (OUTPATIENT)
Dept: ADMINISTRATIVE | Facility: CLINIC | Age: 67
End: 2022-02-11

## 2022-02-11 NOTE — TELEPHONE ENCOUNTER
Reason for Disposition   Very weak (can't stand)    Additional Information   Negative: Acting confused (e.g., disoriented, slurred speech)   Negative: Unconscious or difficult to awaken    Protocols used: DIABETES - HIGH BLOOD SUGAR-A-OH  Pt states high blood sugar. Pt states he overslept and didnt take morning meds until 230pm. took DM med at that time. Pt admits to being tired today. changed oil today and thought he overdid it.  fasting this am now 250. Pt admits to being so weak he couldn't stand on his own. rec EMS. Pt agrees. Call back with questions

## 2022-02-22 ENCOUNTER — HISTORICAL (OUTPATIENT)
Dept: ADMINISTRATIVE | Facility: HOSPITAL | Age: 67
End: 2022-02-22

## 2022-03-03 DIAGNOSIS — I50.42 CHRONIC COMBINED SYSTOLIC AND DIASTOLIC HEART FAILURE: ICD-10-CM

## 2022-03-03 RX ORDER — ISOSORBIDE DINITRATE 20 MG/1
40 TABLET ORAL 3 TIMES DAILY
Qty: 540 TABLET | Refills: 3 | Status: SHIPPED | OUTPATIENT
Start: 2022-03-03 | End: 2022-04-26 | Stop reason: ALTCHOICE

## 2022-03-03 RX ORDER — SACUBITRIL AND VALSARTAN 97; 103 MG/1; MG/1
1 TABLET, FILM COATED ORAL 2 TIMES DAILY
Qty: 180 TABLET | Refills: 3 | Status: SHIPPED | OUTPATIENT
Start: 2022-03-03 | End: 2022-04-26 | Stop reason: ALTCHOICE

## 2022-03-03 RX ORDER — AMIODARONE HYDROCHLORIDE 400 MG/1
400 TABLET ORAL DAILY
Qty: 90 TABLET | Refills: 3 | Status: SHIPPED | OUTPATIENT
Start: 2022-03-03 | End: 2022-03-15 | Stop reason: SDUPTHER

## 2022-03-03 RX ORDER — NITROGLYCERIN 0.4 MG/1
TABLET SUBLINGUAL
Qty: 100 TABLET | Refills: 3 | Status: SHIPPED | OUTPATIENT
Start: 2022-03-03

## 2022-03-03 RX ORDER — PANTOPRAZOLE SODIUM 40 MG/1
40 TABLET, DELAYED RELEASE ORAL DAILY
Qty: 90 TABLET | Refills: 3 | Status: SHIPPED | OUTPATIENT
Start: 2022-03-03 | End: 2023-03-03

## 2022-03-03 RX ORDER — METOPROLOL SUCCINATE 100 MG/1
100 TABLET, EXTENDED RELEASE ORAL DAILY
Qty: 90 TABLET | Refills: 3 | Status: ON HOLD | OUTPATIENT
Start: 2022-03-03 | End: 2022-06-02 | Stop reason: HOSPADM

## 2022-03-03 RX ORDER — SPIRONOLACTONE 25 MG/1
25 TABLET ORAL DAILY
Qty: 90 TABLET | Refills: 3 | Status: SHIPPED | OUTPATIENT
Start: 2022-03-03 | End: 2022-04-26 | Stop reason: ALTCHOICE

## 2022-03-10 ENCOUNTER — TELEPHONE (OUTPATIENT)
Dept: TRANSPLANT | Facility: CLINIC | Age: 67
End: 2022-03-10

## 2022-03-10 NOTE — TELEPHONE ENCOUNTER
----- Message from Maria Luz Becerra sent at 3/9/2022  2:10 PM CST -----  Regarding: Rx refill  PT stated that he gets his meds from [facility] not [facility].      amiodarone (PACERONE) 400 MG tablet    apixaban (ELIQUIS) 5 mg Tab      [id number] [address] AT [facility] [address]   Phone:  [phone]  Fax:  [phone]

## 2022-03-10 NOTE — TELEPHONE ENCOUNTER
Called the prescription into the pharmacy the patient requested. I spoke with Paris/the pharmacist.

## 2022-03-11 ENCOUNTER — TELEPHONE (OUTPATIENT)
Dept: TRANSPLANT | Facility: CLINIC | Age: 67
End: 2022-03-11

## 2022-03-31 ENCOUNTER — LAB VISIT (OUTPATIENT)
Dept: LAB | Facility: HOSPITAL | Age: 67
End: 2022-03-31
Attending: INTERNAL MEDICINE

## 2022-03-31 ENCOUNTER — OFFICE VISIT (OUTPATIENT)
Dept: TRANSPLANT | Facility: CLINIC | Age: 67
End: 2022-03-31

## 2022-03-31 VITALS
HEIGHT: 64 IN | DIASTOLIC BLOOD PRESSURE: 54 MMHG | SYSTOLIC BLOOD PRESSURE: 99 MMHG | HEART RATE: 62 BPM | BODY MASS INDEX: 22.89 KG/M2 | WEIGHT: 134.06 LBS

## 2022-03-31 DIAGNOSIS — I42.0 DILATED CARDIOMYOPATHY: ICD-10-CM

## 2022-03-31 DIAGNOSIS — G47.33 OSA (OBSTRUCTIVE SLEEP APNEA): ICD-10-CM

## 2022-03-31 DIAGNOSIS — I50.42 CHRONIC COMBINED SYSTOLIC AND DIASTOLIC CONGESTIVE HEART FAILURE: Primary | ICD-10-CM

## 2022-03-31 DIAGNOSIS — I25.10 CORONARY ARTERY DISEASE INVOLVING NATIVE CORONARY ARTERY OF NATIVE HEART WITHOUT ANGINA PECTORIS: ICD-10-CM

## 2022-03-31 DIAGNOSIS — E11.22 TYPE 2 DIABETES MELLITUS WITH STAGE 3 CHRONIC KIDNEY DISEASE, WITHOUT LONG-TERM CURRENT USE OF INSULIN, UNSPECIFIED WHETHER STAGE 3A OR 3B CKD: ICD-10-CM

## 2022-03-31 DIAGNOSIS — I50.42 CHRONIC COMBINED SYSTOLIC AND DIASTOLIC CONGESTIVE HEART FAILURE: ICD-10-CM

## 2022-03-31 DIAGNOSIS — I48.0 PAF (PAROXYSMAL ATRIAL FIBRILLATION): ICD-10-CM

## 2022-03-31 DIAGNOSIS — N18.31 STAGE 3A CHRONIC KIDNEY DISEASE: ICD-10-CM

## 2022-03-31 DIAGNOSIS — N18.30 TYPE 2 DIABETES MELLITUS WITH STAGE 3 CHRONIC KIDNEY DISEASE, WITHOUT LONG-TERM CURRENT USE OF INSULIN, UNSPECIFIED WHETHER STAGE 3A OR 3B CKD: ICD-10-CM

## 2022-03-31 LAB
ANION GAP SERPL CALC-SCNC: 7 MMOL/L (ref 8–16)
BUN SERPL-MCNC: 22 MG/DL (ref 8–23)
CALCIUM SERPL-MCNC: 8.5 MG/DL (ref 8.7–10.5)
CHLORIDE SERPL-SCNC: 106 MMOL/L (ref 95–110)
CO2 SERPL-SCNC: 19 MMOL/L (ref 23–29)
CREAT SERPL-MCNC: 1.6 MG/DL (ref 0.5–1.4)
EST. GFR  (AFRICAN AMERICAN): 51.5 ML/MIN/1.73 M^2
EST. GFR  (NON AFRICAN AMERICAN): 44.5 ML/MIN/1.73 M^2
GLUCOSE SERPL-MCNC: 164 MG/DL (ref 70–110)
POTASSIUM SERPL-SCNC: 4.9 MMOL/L (ref 3.5–5.1)
SODIUM SERPL-SCNC: 132 MMOL/L (ref 136–145)

## 2022-03-31 PROCEDURE — 80048 BASIC METABOLIC PNL TOTAL CA: CPT | Performed by: INTERNAL MEDICINE

## 2022-03-31 PROCEDURE — 99999 PR PBB SHADOW E&M-EST. PATIENT-LVL III: CPT | Mod: PBBFAC,,, | Performed by: INTERNAL MEDICINE

## 2022-03-31 PROCEDURE — 36415 COLL VENOUS BLD VENIPUNCTURE: CPT | Performed by: INTERNAL MEDICINE

## 2022-03-31 PROCEDURE — 99215 PR OFFICE/OUTPT VISIT, EST, LEVL V, 40-54 MIN: ICD-10-PCS | Mod: S$PBB,,, | Performed by: INTERNAL MEDICINE

## 2022-03-31 PROCEDURE — 99213 OFFICE O/P EST LOW 20 MIN: CPT | Mod: PBBFAC | Performed by: INTERNAL MEDICINE

## 2022-03-31 PROCEDURE — 99999 PR PBB SHADOW E&M-EST. PATIENT-LVL III: ICD-10-PCS | Mod: PBBFAC,,, | Performed by: INTERNAL MEDICINE

## 2022-03-31 PROCEDURE — 99215 OFFICE O/P EST HI 40 MIN: CPT | Mod: S$PBB,,, | Performed by: INTERNAL MEDICINE

## 2022-03-31 NOTE — LETTER
March 31, 2022        Quentin Redman  [address]  [address]  [facility] [facility] [address]  Phone: [phone]  Fax: [phone]             [id number]  [address]  [facility] [address]  Phone: [phone]   Patient: John Javier Jr.   MR Number: [id number]   YOB: 1955   Date of Visit: 3/31/2022       Dear Dr. Quentin Redman    Thank you for referring John Javier to me for evaluation. Attached you will find relevant portions of my assessment and plan of care.    If you have questions, please do not hesitate to call me. I look forward to following John Javier along with you.    Sincerely,    Yuki Delgado MD    Enclosure    If you would like to receive this communication electronically, please contact [email or web address] or [phone] to request [email or web address] Link access.    [email or web address] is a tool which provides read-only access to select patient information with whom you have a relationship. Its easy to use and provides real time access to review your patients record including encounter summaries, notes, results, and demographic information.    If you feel you have received this communication in error or would no longer like to receive these types of communications, please e-mail [email or web address]

## 2022-03-31 NOTE — PROGRESS NOTES
Subjective:     HPI:  Very pleasant 67 yo black male with stage C HFrEF, ICMP, who was previously treated (1 year ago) for ADHF with cardiogenic shock and transferred to [facility] 2/29/20 with impella.  He had cath documented CAD (50-60% LCx, OM disease on [facility] 2/29).  Course complicated by bacteremia and renal function remains impaired.  He went home 3/13/20 but was readmitted with concern for Covid though test was negative. He did undergo OHTX/VAD work-up but was decline due to medical stability. Today he comes for a follow-up visit. Continues to do really well from a cardiac standpoint but unfortunately was recently admitted in the hospital for acute pancreatitis and also recently after a mechanical fall. Today, he reports NYHA class II symptoms. Denies PND and orthopnea. No issues with his HF regimen (duue to low BP his hydralazine and isordil were discontuned).  No recent HF admissions.  Labs are pending today.      RHC done on 2/4/2021  RA: 12/ 10/ 8 RV: 53/ 11 PA: 52/ 24/ 33 PWP: 31/ 35/ 26 .   Cardiac output was 4.6  by Ronni. Cardiac index is 2.6 L/min/m2.   O2 Sat: PA 67%.   Pulmonary vascular resistance: 122. Systemic vascular resistance: 1531.   /79 (96); sat 99%      Past Medical History:   Diagnosis Date    Anticoagulant long-term use     CHF (congestive heart failure)     Chronic combined systolic and diastolic heart failure 3/1/2020    Coronary artery disease     Diabetes mellitus     Digestive disorder     Diverticulitis     Encounter for blood transfusion     Hypertension     Renal disorder     Sleep apnea     Suspected Covid-19 Virus Infection; test negative 3/14/2020     Past Surgical History:   Procedure Laterality Date    COLECTOMY      RIGHT HEART CATHETERIZATION Right 10/8/2020    Procedure: INSERTION, CATHETER, RIGHT HEART;  Surgeon: Adilson Darden Jr., MD;  Location: [facility] CATH LAB;  Service: Cardiology;  Laterality: Right;    RIGHT HEART CATHETERIZATION Right 2/3/2021  "   Procedure: INSERTION, CATHETER, RIGHT HEART;  Surgeon: Adilson Darden Jr., MD;  Location: [facility] CATH LAB;  Service: Cardiology;  Laterality: Right;    RIGHT HEART CATHETERIZATION Right 6/22/2021    Procedure: INSERTION, CATHETER, RIGHT HEART;  Surgeon: Yuki Delgado MD;  Location: [facility] CATH LAB;  Service: Cardiology;  Laterality: Right;       Review of Systems   Constitutional: Negative. Negative for chills, decreased appetite, diaphoresis, fever, malaise/fatigue, night sweats, weight gain and weight loss.   Eyes: Negative.    Cardiovascular: Positive for dyspnea on exertion. Negative for chest pain, claudication, cyanosis, irregular heartbeat, leg swelling, near-syncope, orthopnea, palpitations, paroxysmal nocturnal dyspnea and syncope.   Respiratory: Negative for cough, hemoptysis and shortness of breath.    Endocrine: Negative.    Hematologic/Lymphatic: Negative.    Skin: Negative for color change, dry skin and nail changes.   Musculoskeletal: Negative.    Gastrointestinal: Negative.    Genitourinary: Negative.    Neurological: Negative for weakness.       Objective:   Blood pressure (!) 99/54, pulse 62, height 5' 4" (1.626 m), weight 60.8 kg (134 lb 0.6 oz).body mass index is 23.01 kg/m².  Physical Exam  Vitals reviewed.   Constitutional:       Appearance: He is well-developed.      Comments: BP (!) 99/54 (BP Location: Left arm, Patient Position: Sitting, BP Method: Medium (Automatic))   Pulse 62   Ht 5' 4" (1.626 m)   Wt 60.8 kg (134 lb 0.6 oz)   BMI 23.01 kg/m²      HENT:      Head: Normocephalic.   Neck:      Vascular: No carotid bruit or JVD.   Cardiovascular:      Rate and Rhythm: Regular rhythm.      Chest Wall: PMI is displaced.      Pulses: Normal pulses.      Heart sounds: Normal heart sounds. No murmur heard.  Pulmonary:      Effort: Pulmonary effort is normal.      Breath sounds: Normal breath sounds.   Abdominal:      General: Bowel sounds are normal.      Palpations: Abdomen is soft. "   Skin:     General: Skin is warm.   Neurological:      Mental Status: He is alert.         Labs:    Chemistry        Component Value Date/Time     (L) 01/20/2022 1410    K 4.6 01/20/2022 1410     01/20/2022 1410    CO2 20 (L) 01/20/2022 1410    BUN 26 (H) 01/20/2022 1410    CREATININE 1.8 (H) 01/20/2022 1410     (H) 01/20/2022 1410        Component Value Date/Time    CALCIUM 8.4 (L) 01/20/2022 1410    ALKPHOS 132 01/20/2022 1410     (H) 01/20/2022 1410    ALT 72 (H) 01/20/2022 1410    BILITOT 1.0 01/20/2022 1410    ESTGFRAFRICA 44.7 (A) 01/20/2022 1410    EGFRNONAA 38.6 (A) 01/20/2022 1410          Magnesium   Date Value Ref Range Status   03/11/2021 2.2 1.6 - 2.6 mg/dL Final     Lab Results   Component Value Date    WBC 7.25 06/22/2021    HGB 14.3 06/22/2021    HCT 42.4 06/22/2021     06/22/2021     Lab Results   Component Value Date    INR 1.0 06/22/2021    INR 1.0 03/11/2021    INR 1.0 02/03/2021     BNP   Date Value Ref Range Status   03/25/2021 159 (H) 0 - 99 pg/mL Final     Comment:     Values of less than 100 pg/ml are consistent with non-CHF populations.   03/11/2021 137 (H) 0 - 99 pg/mL Final     Comment:     Values of less than 100 pg/ml are consistent with non-CHF populations.   02/03/2021 763 (H) 0 - 99 pg/mL Final     Comment:     Values of less than 100 pg/ml are consistent with non-CHF populations.     LD   Date Value Ref Range Status   03/11/2021 307 (H) 110 - 260 U/L Final     Comment:     Results are increased in hemolyzed samples.   03/03/2020 584 (H) 110 - 260 U/L Final     Comment:     Results are increased in hemolyzed samples.   03/02/2020 911 (H) 110 - 260 U/L Final     Comment:     Results are increased in hemolyzed samples.         Assessment:      1. Chronic combined systolic and diastolic congestive heart failure    2. Dilated cardiomyopathy out of proportion to CAD    3. Coronary artery disease involving native coronary artery of native heart without  angina pectoris    4. PAF (paroxysmal atrial fibrillation)    5. Stage 3a chronic kidney disease    6. Type 2 diabetes mellitus with stage 3 chronic kidney disease, without long-term current use of insulin, unspecified whether stage 3a or 3b CKD    7. JANET (obstructive sleep apnea)        Plan:   NYHA class II symptoms. Recent admission after a mechanical fall.  In view of his low BP, I agree with discontinuing his hydralazine and isordil.    Continue current HF regimen (full dose Entresto, metoprolol succinate 100 mg daily and spironolactone 25 mg daily)  Unfortunately he did not tolerate empagliflozin (pancreatitis as per patient) and was found to be hypoglycemic.   Recommend 2 gram sodium restriction and 1500cc fluid restriction.  Encourage physical activity with graded exercise program.  Requested patient to weigh themselves daily, and to notify us if their weight increases by more than 3 lbs in 1 day or 5 lbs in 1 week.   RTC in 6 months with labs with labs and Echo      Yuki Delgado MD

## 2022-04-06 ENCOUNTER — HISTORICAL (OUTPATIENT)
Dept: RADIOLOGY | Facility: HOSPITAL | Age: 67
End: 2022-04-06

## 2022-04-06 ENCOUNTER — HISTORICAL (OUTPATIENT)
Dept: ADMINISTRATIVE | Facility: HOSPITAL | Age: 67
End: 2022-04-06

## 2022-04-13 ENCOUNTER — TELEPHONE (OUTPATIENT)
Dept: TRANSPLANT | Facility: CLINIC | Age: 67
End: 2022-04-13
Payer: COMMERCIAL

## 2022-04-13 ENCOUNTER — HISTORICAL (OUTPATIENT)
Dept: ADMINISTRATIVE | Facility: HOSPITAL | Age: 67
End: 2022-04-13

## 2022-04-13 NOTE — TELEPHONE ENCOUNTER
Received call from Dr. Cabrera ER MD stating they were admitting pt because he's says hes feeling bad after running out of his amiodarone and lasix for a week. Dr. Luna asked to speak to MD. I was able to get Dr Delgado, but when got back to phone with Dr Delgado on the line nurse said Dr Cabrera was called to a Trauma emergency. Dr Delgado informed nurse  there was no special recommendations other than pt needed to be diuresed. Nurse stated Dr. Luna wanted to know if ok for pt to take midodrine. Per Dr Yuki Delgado its ok for pt to take midodrine from heart perspective at dose of their MDs discretion.    Of note pt does not have lasix on his med profile.   Pt does have up to date script for amiodarone in epic.Per Dr Delgado it may have been stopped by another provider.

## 2022-04-14 NOTE — TELEPHONE ENCOUNTER
4/7/22     Returned pts call. Pt reports that he is now having a paracentesis done when needed at [facility] and on 4/6/22 he had the 2nd paracentesis. States ordered by Dr Narcisa LA.  Pt says after his paracentesis he walked to his car, became very weak and could hardly make it to his car. Reports 4/5 wt was 138lbs and wt on 4/7 122lbs.     4/7 when the home health nurse was at his home and his bp was 79/47 HR 56, and pt says he was told by [facility] she had to call an ambulance because she couldn't leave him home feeling weak and with the low bp.  Asked pt to please notify Dr. Brewster that this occurred. Its possible too much fluid was removed. Pt verbalized understanding.

## 2022-04-16 ENCOUNTER — NURSE TRIAGE (OUTPATIENT)
Dept: ADMINISTRATIVE | Facility: CLINIC | Age: 67
End: 2022-04-16
Payer: COMMERCIAL

## 2022-04-17 NOTE — TELEPHONE ENCOUNTER
Calling to verify if regular PM meds are BP medications. Takes atorvastatin, eloquis, and entresto. Instructed that his PM meds were ok to take. Protocol reviewed and care advice given. Pt agrees with advice and verbalizes understanding. Instructed to call for questions, concerns or changes.

## 2022-04-17 NOTE — TELEPHONE ENCOUNTER
Edward calling and states that was in the hospital and at discharge was told not to take any of his BP meds until reviewed by PCP. Calling to ask if any of his PM meds   Reason for Disposition   Caller has medicine question only, adult not sick, AND triager answers question    Protocols used: MEDICATION QUESTION CALL-A-

## 2022-04-21 ENCOUNTER — NURSE TRIAGE (OUTPATIENT)
Dept: ADMINISTRATIVE | Facility: CLINIC | Age: 67
End: 2022-04-21
Payer: COMMERCIAL

## 2022-04-22 ENCOUNTER — TELEPHONE (OUTPATIENT)
Dept: TRANSPLANT | Facility: CLINIC | Age: 67
End: 2022-04-22
Payer: COMMERCIAL

## 2022-04-22 NOTE — TELEPHONE ENCOUNTER
Patient called with concerns about the medications he was to continue taking after changes in medication recently. Before nurse began evaluation of med record Patient states that's ok I see and understand now,I just needed to go through my meds I should have done so before I called. Advised patient to call back if he had any further questions.  Reason for Disposition   Caller has medicine question only, adult not sick, AND triager answers question    Additional Information   Negative: [1] Intentional drug overdose AND [2] suicidal thoughts or ideas   Negative: Drug overdose and triager unable to answer question   Negative: Caller requesting information unrelated to medicine   Negative: Caller requesting information about COVID-19 Vaccine   Negative: Caller requesting information about Emergency Contraception   Negative: Caller requesting information about Combined Birth Control Pills   Negative: Caller requesting information about Progestin Birth Control Pills   Negative: Caller requesting information about Post-Op pain or medicines   Negative: Caller requesting a prescription antibiotic (such as Penicillin) for Strep throat and has a positive culture result   Negative: Caller requesting a prescription anti-viral med (such as Tamiflu) and has influenza (flu)  symptoms   Negative: Immunization reaction suspected   Negative: Rash while taking a medicine or within 3 days of stopping it   Negative: [1] Asthma and [2] having symptoms of asthma (cough, wheezing, etc.)   Negative: [1] Symptom of illness (e.g., headache, abdominal pain, earache, vomiting) AND [2] more than mild   Negative: Breastfeeding questions about mother's medicines and diet   Negative: MORE THAN A DOUBLE DOSE of a prescription or over-the-counter (OTC) drug   Negative: [1] DOUBLE DOSE (an extra dose or lesser amount) of prescription drug AND [2] any symptoms (e.g., dizziness, nausea, pain, sleepiness)   Negative: [1] DOUBLE DOSE (an  extra dose or lesser amount) of over-the-counter (OTC) drug AND [2] any symptoms (e.g., dizziness, nausea, pain, sleepiness)   Negative: Took another person's prescription drug   Negative: [1] DOUBLE DOSE (an extra dose or lesser amount) of prescription drug AND [2] NO symptoms (Exception: a double dose of antibiotics)   Negative: Diabetes drug error or overdose (e.g., took wrong type of insulin or took extra dose)   Negative: [1] Prescription refill request for ESSENTIAL medicine (i.e., likelihood of harm to patient if not taken) AND [2] triager unable to refill per department policy   Negative: [1] Prescription not at pharmacy AND [2] was prescribed by PCP recently (Exception: triager has access to EMR and prescription is recorded there. Go to Home Care and confirm for pharmacy.)   Negative: [1] Pharmacy calling with prescription question AND [2] triager unable to answer question   Negative: [1] Caller has URGENT medicine question about med that PCP or specialist prescribed AND [2] triager unable to answer question   Negative: Medicine patch causing local rash or itching   Negative: [1] Caller has medicine question about med NOT prescribed by PCP AND [2] triager unable to answer question (e.g., compatibility with other med, storage)   Negative: Prescription request for new medicine (not a refill)   Negative: Prescription refill request for a CONTROLLED substance (e.g., narcotics, ADHD medicines)   Negative: [1] Prescription refill request for NON-ESSENTIAL medicine (i.e., no harm to patient if med not taken) AND [2] triager unable to refill per department policy   Negative: [1] Caller has NON-URGENT medicine question about med that PCP prescribed AND [2] triager unable to answer question   Negative: Caller wants to use a complementary or alternative medicine   Negative: [1] Prescription prescribed recently is not at pharmacy AND [2] triager has access to patient's EMR AND [3] prescription is  recorded in the EMR   Negative: [1] DOUBLE DOSE (an extra dose or lesser amount) of over-the-counter (OTC) drug AND [2] NO symptoms   Negative: [1] DOUBLE DOSE (an extra dose or lesser amount) of antibiotic drug AND [2] NO symptoms    Protocols used: MEDICATION QUESTION CALL-A-AH

## 2022-04-22 NOTE — TELEPHONE ENCOUNTER
Call pt back earlier. Could hardly hear pt. Sounded like pt was driving. Pt was saying something about his spironolactone, then was able to hear pt and pt said he was about to heave, vomit, he'd call me back..

## 2022-04-26 ENCOUNTER — TELEPHONE (OUTPATIENT)
Dept: TRANSPLANT | Facility: CLINIC | Age: 67
End: 2022-04-26
Payer: COMMERCIAL

## 2022-04-26 DIAGNOSIS — I50.42 CHRONIC COMBINED SYSTOLIC AND DIASTOLIC HEART FAILURE: ICD-10-CM

## 2022-04-30 NOTE — ED PROVIDER NOTES
Patient:   John Javier Jr             MRN: [id number]            FIN: [id number]               Age:   65 years     Sex:  Male     :  1955   Associated Diagnoses:   Chest pain, rule out acute myocardial infarction; History of coronary artery disease   Author:   Antonia Ken MD      Basic Information   Time seen: Date & time 2021 02:26:00.   History source: Patient, EMS.   Arrival mode: Ambulance.   History limitation: None.   Additional information: Patient's physician(s): Юлия [facility], Quentin Weinberg, CV surgery at [facility] in [place], Chief Complaint from Nursing Triage Note : Chief Complaint   2021 2:01 CST       Chief Complaint           Pt c/o L-sided chest pain/pressure intermittent x 2 days, worsening tonight. Pt states has heart failure. Currently on heart transplant list. Given 3 sprays of nitro and 1 in of nitro per EMS with relief of pain. Denies pain at this time.  .      History of Present Illness   The patient presents with     66 y/o male with a history of HTN, HLD, DM, CAD with MI and s/p pacemaker, presents to the ED for complaints of central chest pressure. Pt says he has had chest pain for a couple days, that was worse tonight around . He was reportedly given 3 x sublingual NTG and 1 inch of NTG, and was pain free on arrival to the ED, however, he reported some chest tightness during my exam. Pt also says he is currently being evaluated for possible transplant vs. LVAD..  The onset was 2-3 days.  The course/duration of symptoms is worsening.  Location: Central chest. Radiating pain: none. The character of symptoms is pressure.  The degree at onset was minimal.  The degree at maximum was moderate.  The degree at present is minimal.  The exacerbating factor is none.  The relieving factor is nitroglycerin.  Risk factors consist of coronary artery disease, hypertension, diabetes mellitus and hyperlipidemia.  Prior episodes: angina, cardiac and coronary artery disease.   Therapy today Nitroglycerin.  Associated symptoms: none.        Review of Systems   Constitutional symptoms:  No fever, no chills   Skin symptoms:  No jaundice, no rash   Eye symptoms:  Vision unchangedNo blurred vision   Respiratory symptoms:  No shortness of breath, no orthopnea, no cough, no sputum production   Cardiovascular symptoms:  Chest pain, central, pressure, no palpitations, no diaphoresis, no peripheral edema.    Gastrointestinal symptoms:  No abdominal pain, no nausea, no vomiting, no diarrhea, no constipation   Genitourinary symptoms:  No dysuria, no hematuria.    Neurologic symptoms:  No headache, no dizziness.    Hematologic/Lymphatic symptoms:  Bleeding tendency negative,    Allergy/immunologic symptoms:  No impaired immunity,              Additional review of systems information: All other systems reviewed and otherwise negative.      Health Status   Allergies:    Allergic Reactions (Selected)  Severity Not Documented  Penicillin- Unknown..   Medications:  (Selected)   Documented Medications  Documented  Pantoprazole 40 mg ORAL EC-Tablet: 40 mg = 1 tab(s), Oral, Daily, # 30 tab(s), 0 Refill(s)  Ventolin HFA 90 mcg/inh inhalation aerosol: 90 mcg = 1 puff(s), INH, q4hr, PRN PRN shortness of breath or wheezing, 0 Refill(s)  aspirin 81 mg oral tablet: 81 mg = 1 tab(s), Oral, Daily, # 30 tab(s), 0 Refill(s)  atorvastatin 40 mg oral tablet: 40 mg = 1 tab(s), Oral, At Bedtime, # 30 tab(s), 0 Refill(s)  clopidogrel 75 mg oral tablet: 75 mg = 1 tab(s), Oral, Daily, # 30 tab(s), 0 Refill(s)  furosemide 20 mg oral tablet: 20 mg = 1 tab(s), Oral, BID, # 30 tab(s), 0 Refill(s)  losartan 25 mg oral tablet: 25 mg = 1 tab(s), Oral, BID, 0 Refill(s)  metformin 500 mg oral tablet: 500 mg = 1 tab(s), Oral, BID, # 60 tab(s), 0 Refill(s)  metoprolol succinate 50 mg oral tablet, extended release: 50 mg = 1 tab(s), Oral, BID  nitroglycerin 0.4 mg sublingual TAB: 0.4 mg = 1 tab(s), SL, q5min, PRN PRN for chest pain, # 1  bottle(s), 0 Refill(s)  spironolactone 25 mg oral tablet: See Instructions, 0.5 tab(s) Oral Daily, 0 Refill(s).      Past Medical/ Family/ Social History   Medical history:   Acid reflux   Diabetes mellitus   Diverticulitis   Hyperlipidemia   Hypertension   MI - Myocardial infarction   Pacemaker catheter   Sleep apnea .   Surgical history:    Colon Resection Descending Robotic Assist (.) on 11/6/2018 at 63 Years.  Comments:  11/6/2018 12:26 CST - Mary Ardon RN  auto-populated from documented surgical case  2 cardiac stents.  Implantation of cardiac pacemaker ([id number]).  shoulder surgery.  Colonoscopy ([id number])..   Family history:    No family history items have been selected or recorded..   Social history:    Social & Psychosocial Habits    Alcohol  08/30/2018  Use: Never    Employment/School  08/30/2018  Status: Employed    Home/Environment  08/30/2018  Lives with: Alone    Nutrition/Health  08/30/2018  Type of diet: Regular    Substance Use  08/30/2018  Use: Never    Tobacco  11/06/2018  Use: Never smoker    Patient Wants Consult For Cessation Counseling N/A    11/10/2018  Use: Never smoker    Patient Wants Consult For Cessation Counseling N/A    11/13/2018  Use: Never smoker    Patient Wants Consult For Cessation Counseling N/A    05/10/2019  Use: Former smoker, quit more    Patient Wants Consult For Cessation Counseling N/A  .      Physical Examination               Vital Signs   Vital Signs   1/22/2021 2:01 CST       Temperature Temporal Artery               36.7 DegC                             Peripheral Pulse Rate     66 bpm                             Respiratory Rate          18 br/min                             SpO2                      99 %                             Oxygen Therapy            Room air                             Systolic Blood Pressure   120 mmHg                             Diastolic Blood Pressure  60 mmHg  .   Measurements   1/22/2021 2:01 CST       Weight Dosing              73 kg                             Weight Measured and Calculated in Lbs     160.94 lb                             Weight Estimated          73 kg                             Height/Length Dosing      162.56 cm                             Height/Length Estimated   162.56 cm                             Body Mass Index Estimated 27.62 kg/m2  .   Basic Oxygen Information   1/22/2021 2:01 CST       SpO2                      99 %                             Oxygen Therapy            Room air  .   General:  Alert, no acute distress.    Skin:  Warm, dry.    Head:  Normocephalic, atraumatic.    Neck:  Supple, trachea midline.    Eye:  Normal conjunctiva.   Ears, nose, mouth and throat:  Oral mucosa moist.   Cardiovascular:  Regular rate and rhythm, No murmur, Normal peripheral perfusion, No edema.    Respiratory:  Lungs are clear to auscultation, respirations are non-labored.    Gastrointestinal:  Soft, Nontender, Mild abdominal distention, Bowel sounds: decreased.    Neurological:  Alert and oriented to person, place, time, and situation.   Psychiatric:  Cooperative.      Medical Decision Making   Rationale:  Patient with hx HTN, HLD, CAD, CHF w/ chest pain tonight, alleviated w/ NTG. ECG w/o ischemic changes, initial cardiac enzymes negative. Will be admitted to trend cardiac enzymes, r/o ACS. Findings and plan discussed with the patient, and he is agreeable to admission at this time.   .   Documents reviewed:  Emergency department nurses' notes.   Orders  Launch Order Profile (Selected)   Inpatient Orders  Ordered  Patient Isolation: 01/22/21 2:49:59 CST, Contact Precautions, Constant Indicator  Patient Isolation: 01/22/21 2:49:59 CST, Droplet Precautions, Constant Indicator  morphine 4 mg/mL preservative-free intravenous solution: 4 mg, form: Injection, IV Push, Once, first dose 01/22/21 2:48:00 CST, stop date 01/22/21 2:48:00 CST, STAT  simethicone 80 mg Chew Tab ( Mylanta Gas ): 80 mg, form: Tab-Chew, Oral, Once, first  dose 01/22/21 2:49:00 CST, stop date 01/22/21 2:49:00 CST, STAT  Ordered (Dispatched)  COVID-19 IDnow: Now collect, Nasopharyngeal Swab, 01/22/21 2:49:00 CST, Stop date 01/22/21 2:49:00 CST, Nurse collect  Ordered (Exam Completed)  CXR 1 View: Stat, 01/22/21 2:29:00 CST, Chest Pain, None, Ambulatory, Patient Has IV?, Rad Type, Not Scheduled, 01/22/21 2:29:00 CST  Ordered (In-Lab)  Automated Diff: Stat collect, 01/22/21 2:34:00 CST, Blood, Collected, Once, Stop date 01/22/21 2:34:00 CST, Lab Collect, Print Label By Order Location, 01/22/21 2:27:00 CST  BNP: Stat collect, 01/22/21 2:29:00 CST, Blood, Stop date 01/22/21 2:29:00 CST, Lab Collect, Print Label By Order Location, 01/22/21 2:29:00 CST  CBC w/ Auto Diff: Stat collect, 01/22/21 2:27:00 CST, Blood, Once, Stop date 01/22/21 2:29:00 CST, Lab Collect, Print Label By Order Location, 01/22/21 2:27:00 CST  CMP: Stat collect, 01/22/21 2:29:00 CST, Blood, Once, Stop date 01/22/21 2:29:00 CST, Lab Collect, Print Label By Order Location, 01/22/21 2:29:00 CST  Magnesium Level: Stat collect, 01/22/21 2:29:00 CST, Blood, Once, Stop date 01/22/21 2:29:00 CST, Lab Collect, Print Label By Order Location, 01/22/21 2:29:00 CST  Troponin-I: Stat collect, 01/22/21 2:29:00 CST, Blood, Stop date 01/22/21 2:29:00 CST, Lab Collect, Print Label By Order Location, 01/22/21 2:29:00 CST.   Electrocardiogram:  Time 1/22/2021 02:12:00, rate 67, normal sinus rhythm, The Axis is leftward.  , P wave and CT interval 1st degree atrioventricular block, QT interval Prolonged 0.500 seconds.    Results review:  Lab results : Lab View   1/22/2021 3:21 CST       Est Creat Clearance Ser   32.55 mL/min    1/22/2021 2:44 CST       POC Troponin              0.03 ng/mL    1/22/2021 2:34 CST       Sodium Lvl                134 mmol/L  LOW                             Potassium Lvl             4.4 mmol/L                             Chloride                  103 mmol/L                             CO2                        21 mmol/L  LOW                             Calcium Lvl               8.5 mg/dL  LOW                             Magnesium Lvl             2.00 mg/dL                             Glucose Lvl               163 mg/dL  HI                             BUN                       19.8 mg/dL                             Creatinine                1.92 mg/dL  HI                             eGFR-AA                   46  NA                             eGFR-LALI                  38 mL/min/1.73 m2  NA                             Bili Total                0.3 mg/dL                             Bili Direct               0.1 mg/dL                             Bili Indirect             0.20 mg/dL                             AST                       40 unit/L  HI                             ALT                       70 unit/L  HI                             Alk Phos                  74 unit/L                             Total Protein             6.9 gm/dL                             Albumin Lvl               3.8 gm/dL                             Globulin                  3.1 gm/dL                             A/G Ratio                 1.2 ratio                             BNP                       135.6 pg/mL  HI                             Troponin-I                0.035 ng/mL                             WBC                       5.6 x10(3)/mcL                             RBC                       4.15 x10(6)/mcL  LOW                             Hgb                       12.7 gm/dL  LOW                             Hct                       38.1 %  LOW                             Platelet                  222 x10(3)/mcL                             MCV                       91.8 fL                             MCH                       30.6 pg                             MCHC                      33.3 gm/dL                             RDW                       13.4 %                             MPV                       11.7 fL                              Abs Neut                  4.11 x10(3)/mcL                             Neutro Auto               74 %  NA                             Lymph Auto                13 %  NA                             Mono Auto                 12 %  NA                             Eos Auto                  0 %  NA                             Abs Eos                   0.0 x10(3)/mcL                             Basophil Auto             1 %  NA                             Abs Neutro                4.11 x10(3)/mcL                             Abs Lymph                 0.7 x10(3)/mcL                             Abs Mono                  0.6 x10(3)/mcL                             Abs Baso                  0.0 x10(3)/mcL  , Interpretation Abnormal results  mildly elevated BNP.    Chest X-Ray:  No acute disease process, interpretation by Emergency Physician, stable device placement, no acute effusions/infiltrates.       Reexamination/ Reevaluation   Vital signs   results included from flowsheet : Vital Signs   1/22/2021 2:20 CST       Peripheral Pulse Rate     66 bpm                             Respiratory Rate          18 br/min                             SpO2                      99 %                             Oxygen Therapy            Room air                             Systolic Blood Pressure   157 mmHg  HI                             Diastolic Blood Pressure  88 mmHg                             Mean Arterial Pressure, Cuff              111 mmHg     Basic Oxygen Information   1/22/2021 2:01 CST       SpO2                      99 %                             Oxygen Therapy            Room air     Course: improving.   Pain status: decreased.      Impression and Plan   Diagnosis   Chest pain, rule out acute myocardial infarction (VQJ66-[id number] R07.9)   History of coronary artery disease ([id number] Z86.79)   Plan   Disposition: Admit time  1/22/2021 03:57:00, Place in Observation Telemetry Unit, Adonay Glass MD, case  discussed with Mikey Yanez NP.    Counseled: Patient, Regarding diagnosis, Regarding diagnostic results, Regarding treatment plan, Patient indicated understanding of instructions.    Notes: I, Jason Alegria, acted solely as a scribe for and in the presence of Dr. Ken who performed the service. , I, Antonia Ken, have independently performed the history, physical, medical decision making and procedures as documented above and agree with the scribe's documentation. .

## 2022-04-30 NOTE — OP NOTE
DATE OF SURGERY:    05/10/2019    SURGEON:  Oren Bojorquez MD    DIAGNOSES:    1. Angina.   2. Cardiomyopathy.   3. Ischemia.     The patient has history of cardiomyopathy status post AICD placement.  Also history of revascularization with stenting on the right coronary in the past.    PROCEDURE PERFORMED:    1. Selective coronary angiography of the right and left coronary.  2. Hemodynamic evaluation of the left ventricular aorta.  3. Evaluation of the stenosis of the left circumflex using an iFR wire.  4. No ventriculography was performed to avoid contrast induced nephropathy since the patient have a chronic kidney disease.  5. We used a vascular ultrasound to identify the right common femoral artery and place a 6-Prydeinig sheath.    6. Access site was the right common femoral artery.  Initially, we used a 5-Prydeinig sheath for diagnostic, JR4 for the selective coronary angiography of the right coronary, JL4 for the left coronary, and pigtail catheter for the hemodynamic evaluation.  Then we upgraded with a 6-Prydeinig sheath.  Guide catheter used was an EBU to selectively angiogram and evaluate the stenosis of the circumflex with an iFR wire.    COMPLICATIONS:  No complication.    HEMODYNAMICS:    1. Blood pressure in the aorta is 100/80.  2. Blood pressure in the left ventricle 100/35.  The patient was in sinus rhythm with heart rate 70 beats per minute.  3. Left main large normal, LAD proximal mid and distal normal, diagonal 1 2 and 3 normal.  4. In the mid level of the circumflex there is a 50% to 60% stenosis.  iFR evaluation across the lesion is not significant, it is 0.95.    5. OM1 and 2 are very small, OM3 is a very large vessel.  No significant stenosis.  Please note that there is a small superior branch of the OM3 that has a severe stenosis at the ostium, 90%, but this lesion is very small to revascularize.  6. The right coronary is patent, mild luminal irregularity.  There is a stent at the mid  level that was placed in June 2013.  The stent is a drug eluted 3.5 x 24.  The stent is normal, minimal in-stent restenosis.  Normal blood flow.    7. Distal right coronary PDA is normal.    CONCLUSION:    1. The patient had coronary artery disease, but not significant.  iFR evaluation of the left circumflex is not significant.  2. The patient has a severe stenosis in a small branch of the OM 3 too small to intervene.  3. The patient should be treated medically aggressively.  4. Please note that when we reviewed an angiogram from July of summer 2018, it is completely unchanged.  5. The sheath on the right common femoral artery will be removed later when the anticoagulation normalizes.  6. Please note that we used contrast Isovue-75 cc.  Fluoroscopy 4.5 minute.    ESTIMATED BLOOD LOSS:  Minimal, less than 25 cc.        ______________________________  MD LUPIS Zimmerman/CLOTILDE  DD:  05/16/2019  Time:  07:11PM  DT:  05/16/2019  Time:  07:26PM  Job #:  [id number]

## 2022-04-30 NOTE — ED PROVIDER NOTES
Patient:   John Javier             MRN: [id number]            FIN: [id number]               Age:   62 years     Sex:  Male     :  1955   Associated Diagnoses:   Diverticulitis   Author:   Sri Light PA-C      Basic Information   Time seen: Date & time 2018 11:22:00.   History source: Patient.   Arrival mode: Private vehicle.   History limitation: None.   Additional information: Chief Complaint from Nursing Triage Note : Chief Complaint   2018 11:21 CDT      Chief Complaint           pt c/o abd pain, n/v/d since yesterday.  pt denies sob, denies chest pains. pt states low grade fever.  pt aaox4 ambulated into room with steady gait.    .      History of Present Illness   The patient presents with 61y/o M presents to the ED with complaints of LLQ abdominal pain with vomiting x 1 day. Reports diarrhea but denies any blood in stool. Reports fever. States directed here from  from further evaluation.  AdalgisaOrlando FNP-C and 61 yo M who presents with CC of worsening left lower abdominal pain and n/v since last night.  PT states he started with n/v after he ate some leftovers last night and thought it was just from being old food.  PT states that he felt worsening bloating all day and last night he had more vomiting and mild diarrhea.  PT denies fever, noted to have temp 100.2 at urgent care this am.  PT states again this am he tried to eat and vomited again, prompting evlauation at urgent care.  Pt is on plavix for hx of CAD.  He denies previous abdominal surgery, he denies hx of diverticulitis.  .  The onset was 1  days ago.  The course/duration of symptoms is worsening.  The character of symptoms is pressure.  The degree at onset was minimal.  The Location of pain at onset was left, lower and abdominal.  The degree at present is moderate.  The Location of pain at present is left, lower and abdominal.  Radiating pain: none. The exacerbating factor is eating.  The relieving factor is  vomiting.  Therapy today: none.  Risk factors consist of none.  Associated symptoms: nausea, vomiting, diarrhea, denies back pain, denies shortness of breath, denies fever and denies chills.        Review of Systems   Constitutional symptoms:  Fever, chills, no weakness, no fatigue.    Respiratory symptoms:  No shortness of breath, no cough, no wheezing.    Cardiovascular symptoms:  No chest pain, no palpitations, no peripheral edema.    Gastrointestinal symptoms:  Abdominal pain, left lower quadrant, nausea, vomiting, no diarrhea, no constipation.    Genitourinary symptoms:  No dysuria, no hematuria.    Musculoskeletal symptoms:  No back pain, no Joint pain, no Claudication.    Neurologic symptoms:  No dizziness, no numbness, no tingling.              Additional review of systems information: All other systems reviewed and otherwise negative.      Health Status   Allergies:    No active allergies have been recorded.,    No qualifying data available  .      Past Medical/ Family/ Social History   Medical history:    No active or resolved past medical history items have been selected or recorded., HTN, HLD, DM ON METFORMIN, CAD ON PLAVIX WITH 2 STENTS IN CORONARY ARTERIES.   Surgical history:    No active procedure history items have been selected or recorded., ANGIOGRAM .   Family history:    No family history items have been selected or recorded..   Social history:    Social & Psychosocial Habits    No Data Available  , Alcohol use: Denies, Tobacco use: Denies, Drug use: Denies, Occupation: Employed.   Problem list:    No qualifying data available  .      Physical Examination               Vital Signs   Vital Signs   8/30/2018 14:00 CDT      Respiratory Rate          18 br/min                             SpO2                      100 %                             Oxygen Therapy            Room air                             Systolic Blood Pressure   119 mmHg                             Diastolic Blood Pressure  80  mmHg    8/30/2018 11:21 CDT      Temperature Oral          37.5 DegC                             Temperature Oral (calculated)             99.50 DegF                             Peripheral Pulse Rate     71 bpm                             Respiratory Rate          18 br/min                             SpO2                      99 %                             Oxygen Therapy            Room air                             Systolic Blood Pressure   115 mmHg                             Diastolic Blood Pressure  71 mmHg  .   Oxygen saturation.   General:  Alert, no acute distress, nontoxic, talkative, does not appear acutely ill .    Skin:  Warm, dry, pink.    Head:  Normocephalic, atraumatic.    Neck:  Supple, trachea midline, no tenderness.    Eye:  Pupils are equal, round and reactive to light, extraocular movements are intact, normal conjunctiva.    Ears, nose, mouth and throat:  Tympanic membranes clear, oral mucosa moist, no pharyngeal erythema or exudate.    Cardiovascular:  Regular rate and rhythm, No murmur, No edema.    Respiratory:  Lungs are clear to auscultation, respirations are non-labored, breath sounds are equal.    Gastrointestinal:  Soft, Non distended, Normal bowel sounds, Tenderness: Moderate, left lower quadrant, Guarding: Negative, Rebound: Negative.    Musculoskeletal:  Normal ROM, normal strength, no tenderness, no swelling.    Neurological:  Alert and oriented to person, place, time, and situation, No focal neurological deficit observed, CN II-XII intact.    Psychiatric:  Cooperative, appropriate mood & affect.       Medical Decision Making   Differential Diagnosis:  Abdominal pain, diverticulitis.    Documents reviewed:  Emergency department nurses' notes.   Orders  Launch Orders   Laboratory:  POC ISTAT Chem8 Request: (Order): Blood, Routine collect, 8/30/2018 11:26 CDT by Harper Ortega Lab Collect, Print Label By Order Location  Blood Culture (Order): 8/30/2018 11:26 CDT, Stat  collect, Blood, Print Label By Order Location  Blood Culture (Order): 8/30/2018 11:26 CDT, Stat collect, Blood, Print Label By Order Location  Lactic Acid (Order): Stat collect, 8/30/2018 11:26 CDT, Blood, Lab Collect, Print Label By Order Location, 8/30/2018 11:26 CDT  UA Total a reflex to culture (Order): Stat collect, Urine, 8/30/2018 11:26 CDT, Nurse collect, Print Label By Order Location  PTT (Order): Stat collect, 8/30/2018 11:26 CDT, Blood, Lab Collect, Print Label By Order Location, 8/30/2018 11:26 CDT  PT (Order): Stat collect, 8/30/2018 11:25 CDT, Blood, Lab Collect, Print Label By Order Location, 8/30/2018 11:25 CDT  POC UPT ED (Order): Urine, Stat collect, Collected, 8/30/2018 11:25 CDT by Arielle SANDOVAL-C, Harper STUART, Nurse collect, Print Label By Order Location  CMP (Order): Stat collect, 8/30/2018 11:25 CDT, Blood, Lab Collect, Print Label By Order Location, 8/30/2018 11:25 CDT  CBC w/ Auto Diff (Order): Now collect, 8/30/2018 11:25 CDT, Blood, Lab Collect, Print Label By Order Location, 8/30/2018 11:25 CDT  Pharmacy:  Normal Saline (0.9% NS) IV 1000 mL (Order): 1,000 mL, 1,000 mL, IV, 999 mL/hr, start date 8/30/2018 11:25 CDT  Zofran 2 mg/mL injectable solution (Order): 4 mg, form: Injection, IV Push, Once, first dose 8/30/2018 11:25 CDT, stop date 8/30/2018 11:25 CDT, STAT.   Results review:  Lab results : Lab View   8/30/2018 12:42 CDT      UA Appear                 CLEAR                             UA Color                  YELLOW                             UA Spec Grav              1.018                             UA Bili                   Negative                             UA pH                     6.0                             UA Urobilinogen           1.0                             UA Blood                  Negative                             UA Glucose                1+                             UA Ketones                Negative                             UA Protein                1+                              UA Nitrite                Negative                             UA Leuk Est               Negative                             UA WBC                    NONE SEEN /HPF                             UA RBC                    NONE SEEN                             UA Bacteria               NONE SEEN /HPF                             UA Squam Epithelial       NONE SEEN    8/30/2018 11:56 CDT      Sodium Lvl                134 mmol/L  LOW                             Potassium Lvl             3.9 mmol/L                             Chloride                  97 mmol/L  LOW                             CO2                       28.0 mmol/L                             Calcium Lvl               9.1 mg/dL                             Glucose Lvl               172 mg/dL  HI                             BUN                       11.0 mg/dL                             Creatinine                1.21 mg/dL                             eGFR-AA                   >60 mL/min/1.73 m2  NA                             eGFR-LALI                  >60 mL/min/1.73 m2  NA                             Bili Total                1.1 mg/dL  HI                             Bili Direct               0.30 mg/dL                             Bili Indirect             0.80 mg/dL                             AST                       17 unit/L                             ALT                       21 unit/L                             Alk Phos                  83 unit/L                             Total Protein             7.2 gm/dL                             Albumin Lvl               3.80 gm/dL                             Globulin                  3.40 gm/dL                             A/G Ratio                 1.1 ratio                             Lactic Acid Lvl           3.0 mmol/L  CRIT                             PT                        15.6 second(s)  HI                             INR                       1.20                              PTT                       26.2 second(s)                             WBC                       16.2 x10(3)/mcL  HI                             RBC                       4.35 x10(6)/mcL  LOW                             Hgb                       13.3 gm/dL  LOW                             Hct                       39.6 %  LOW                             Platelet                  177 x10(3)/mcL                             MCV                       91.0 fL                             MCH                       30.6 pg                             MCHC                      33.6 gm/dL                             RDW                       12.9 %                             MPV                       11.8 fL                             Abs Neut                  13.57 x10(3)/mcL  HI                             Neutro Auto               84 %  NA                             Lymph Auto                6 %  NA                             Mono Auto                 9 %  NA                             Basophil Auto             0 %  NA                             Abs Neutro                13.57 x10(3)/mcL  HI                             Abs Lymph                 1.0 x10(3)/mcL                             Abs Mono                  1.5 x10(3)/mcL  HI                             Abs Baso                  0.0 x10(3)/mcL    .   Radiology results:  Rad Results (ST)  < 12 hrs   Accession: [id number]  Order: CT Abdomen and Pelvis W Contrast  Report Dt/Tm: 08/30/2018 13:58  Report:         INDICATION:  llq pain, suspect diverticulitis, wbc 16k;Abdominal Pain     TECHNIQUE:   Multidetector axial images were obtained of the abdomen  and pelvis following the administration of IV contrast. Oral contrast  was not administered.     Dose length product of 728 mGycm. Automated exposure control was  utilized to minimize radiation dose.     COMPARISON: None available.     FINDINGS:     Included portion of the lungs are without suspicious  nodularity, acute  air space infiltrates or fluid within the pleural spaces.      Diffuse hepatic low attenuation consistent with steatosis without  focal lesion. Hepatic volume is within normal limits. Gallbladder wall  is not thickened and there is no intra luminal calcified calculus.  No  biliary dilation identified. Pancreatic unremarkable attenuation  without acute peripancreatic phlegmons. Main pancreatic duct is not  dilated. Spleen is of normal size without focal lesion.  The adrenal  glands appear within normal limits. The kidneys are unremarkable in  size and contour. No solid or cystic renal lesion identified. There is  no hydronephrosis. No perinephric fluid strandings or collections  identified.     The stomach is is mostly decompressed accentuating wall thickness.   Small bowel is normal in caliber.  The appendix appears unremarkable.  There is  mural thickening of the mid to distal portion of the  descending colon with associated diverticulosis coli and pericolonic  considerable strandings consistent with acute diverticulitis. There is  small free air around the descending colon on axial image 47 series 2  consistent with contained perforation. There is no susy  pneumoperitoneum.      Urinary bladder appears within normal limits. There is no pelvic free  fluid. Left unilateral sacralization of L5. No acute or aggressive  skeletal abnormality.      IMPRESSION:     1.  Descending colon acute diverticulitis with adjacent small free air  consistent with contained perforation without susy pneumoperitoneum.  2.  No abscess or bowel obstruction.        .      Reexamination/ Reevaluation   Time: 8/30/2018 14:00:00 .   Vital signs   results included from flowsheet : Vital Signs   8/30/2018 14:00 CDT      Respiratory Rate          18 br/min                             SpO2                      100 %                             Oxygen Therapy            Room air                             Systolic Blood  Pressure   119 mmHg                             Diastolic Blood Pressure  80 mmHg     Notes: PT pain improved with meds, he remains stable vital signs, looks well clinically.  Reports anaphlaxis to penicillin will start cipro/flagyl.  Surgery paged.  .      Impression and Plan   Diagnosis   Diverticulitis (UHC65-DG K57.92)      Calls-Consults   -  8/30/2018 14:18:00 , surgical hospitalist, recommends Evaluating in ED, will admit, anbx and follow course..    Plan   Condition: Stable.    Disposition: Admit to Surgery, Dk Dozier MD.    Notes: Dr. Ken is SP. .       Addendum      Teaching-Supervisory Addendum-Brief   I participated in the following activities of this patients care: the medical history, the physical exam, medical decision making.   I personally performed: supervision of the patient's care, the medical history, the physical exam, the medical decision making.   The case was discussed with: the physician assistant.   Evaluation and management service: I agree with the evaluation and management decisions made in this patient's care.   Results interpretation: I agree with the study interpretation in this patient's care.   Notes: I conducted my own face-to-face evaluation of the patient and performed an independent history and physical examination of this patient. I discussed the MDM and reviewed the results with the PA.     Briefly, this is a 62-year-old male who presented to the emergency department complaining of left lower quadrant abdominal pain with nausea, vomiting, and diarrhea since yesterday.  He reports low-grade fevers as well.  On exam, he is afebrile here and nontoxic appearing though tender in the left lower quadrant without overt peritoneal signs.  Laboratory evaluation notable for leukocytosis with a moderately elevated lactic acid measurement.  He was given IV fluids.  CT scan obtained demonstrates diverticulitis with likely a contained perforation.  Surgical hospitalist was  consult who agrees to admission.  Ciprofloxacin and Flagyl started for IV antibiotic coverage. Findings and plan discussed with the patient, and he is agreeable to admission at this time.     Antonia Ken MD  .

## 2022-04-30 NOTE — DISCHARGE SUMMARY
Patient:   John Javier             MRN: [id number]            FIN: [id number]               Age:   62 years     Sex:  Male     :  1955   Associated Diagnoses:   Diverticulitis of large intestine with perforation   Author:   Jason Faith MD      Discharge Information   Discharge Summary Information     Admitted  2018.     Discharged  2018.     Diverticulitis of large intestine with perforation ([id number] K57.20).        Physical Examination   Vital signs   Within normal limits.     General   Within normal limits.     Alert and oriented X3.     No acute distress.     Able to ambulate: within normal limits, with steady gait.     Hydration: well hydrated.     Nutritional status: obese.     Cardiovascular   Within normal limits.     Heart: within normal limits, regular rate and rhythm.     Arterial pulses: within normal limits.     Respiratory   Within normal limits.     Respirations: within normal limits.     Breath sounds clear to auscultation.     Gastrointestinal   Abdomen: within normal limits, appearance obese, generalized distension, bowel sounds present, no tenderness.     Integumentary   Within normal limits.     Neurologic   Within normal limits.     Alert and oriented.        Hospital Course   Patient is a 63 yo male that presented to [facility] ED c/o 1 day of LLQ pain, non-bloody emesis and diarrhea. He was found to have a contained perforated diverticulum on CT scan on admission. Patient was admitted for observation and resusitation. Patient was given IV cipro and flagyl for a course of 7 days total. Patient spiked fever to 103 with worsening abdominal pain on HD #2. Patient was made NPO at that time and gradually improved becoming afebrile and his leukocytosis normalized. Patient's diet was advanced and tolerated on HD 4 and his medications were switched from IV to PO. Patient was discharge home on HD#5 with an additional 3 days of PO cipro/flagyl         Discharge Plan   Discharge  Summary Plan   Discharge Status: stable.     Discharge Disposition: discharge to home self care.

## 2022-05-03 NOTE — HISTORICAL [facility] CERNER
This is a historical note converted from [employer]. Formatting and pictures may have been removed.  Please reference [employer] for original formatting and attached multimedia. Chief Complaint  pt c/o abd pain, n/v/d since yesterday. pt denies sob, denies chest pains. pt states low grade fever. pt aaox4 ambulated into room with steady gait.  History of Present Illness  Patient is a 62-year-old male?who presents emergency room with?a history of left lower quadrant abdominal pain.? Patient states that for the past day he has had left lower quadrant pain for the last 24 hours that has worsened.? He reports having nausea and 2 episodes of nonbloody nonbilious emesis.? Patient states that he?has also had tenesmus?as well as?constipation. ?Patient states that he has not had?any sort of?bloody?urine or discharge. ?Patient denies any sort of diarrhea.? Patient denies any dizziness shortness of breath or chest pain.? He states is the first time he has had this kind of pain. ?He reports that his last colonoscopy was 6 years ago when it was reported that they had a couple polyps removed. ?Patient reports that he has not seen a PCP in 20 years.? Otherwise the patient?gets his primary care medication through his cardiologist.  Review of Systems  Constitutional:?no fever, fatigue, weakness  Eye:?no vision loss, eye redness, drainage, or pain  ENMT:?no sore throat, ear pain, sinus pain/congestion, nasal congestion/drainage  Respiratory:?no cough, no wheezing, no shortness of breath  Cardiovascular:?no chest pain, no palpitations, no edema  Gastrointestinal:?Some nausea and vomiting ?3,?no diarrhea. ?Left lower quadrant abdominal pain  Genitourinary:?no dysuria, no urinary frequency or urgency, no hematuria  Hema/Lymph:?no abnormal bruising or bleeding  Endocrine:?no heat or cold intolerance, no excessive thirst or excessive urination  Musculoskeletal:?no muscle or joint pain, no joint swelling  Integumentary:?no skin rash or abnormal  lesion  Neurologic: no headache, no dizziness, no weakness or numbness  Physical Exam  Vitals & Measurements  T:?37.5? ?C (Oral)? HR:?71(Peripheral)? RR:?18? BP:?119/80? SpO2:?100%? WT:?71?kg? WT:?71?kg?  General:?well-developed well-nourished in no acute distress  Eye: PERRLA, EOMI, clear conjunctiva, eyelids normal  HENT:?TMs/ear canals clear, oropharynx without erythema/exudate, oropharynx and nasal mucosal surfaces moist, no maxillary/frontal sinus tenderness to palpation  Neck: full range of motion, no thyromegaly or lymphadenopathy  Respiratory:?clear to auscultation bilaterally  Cardiovascular:?regular rate and rhythm without murmurs, gallops or rubs  Gastrointestinal:?soft, non-tender, non-distended with normal bowel sounds, without masses to palpation  Genitourinary: no CVA tenderness to palpation  Musculoskeletal:?full range of motion of all extremities/spine without limitation or discomfort  Integumentary: no rashes or skin lesions present  Neurologic: cranial nerves intact, no signs of peripheral neurological deficit, motor/sensory function intact  ?  Assessment/Plan  Patient is 62-year-old male with?contained perforated?diverticulitis  ?  -Admit for observation  -Clear liquid diet  -IV antibiotics  -Maintenance IV fluids  -Reassess in the morning   Problem List/Past Medical History  Ongoing  No qualifying data  Historical  No qualifying data  Medications  Inpatient  ciprofloxacin 400 mg/200 mL-D5% intravenous solution, 400 mg= 200 mL, IV Piggyback, [id number]  Flagyl 500 mg / 100 ml premix, 500 mg= 100 mL, IV Piggyback, [id number]  Lovenox, 40 mg= 0.4 mL, Subcutaneous, Daily  morphine, 2 mg= 0.5 mL, IV, q2hr, PRN  Norco 5 mg-325 mg oral tablet, 1 tab(s), Oral, q6hr, PRN  Normal Saline (0.9% NS) IV 1,000 mL, 1000 mL, IV  Sodium Chloride 0.9% intravenous solution 1,000 mL, 1000 mL, IV  Home  No active home medications  Allergies  penicillin?(unknown)  Social History  Alcohol  Never,  08/30/2018  Tobacco  Never smoker Use:., 08/30/2018  Lab Results  Sodium 134, potassium 3.9, chloride 97, CO2 28, glucose 172  BUN 11, creatinine 1.21  ?  Lactic acid 3  ?  WBC 16.2  H/H: 13.3/39.6  ?  Diagnostic Results  CT Abdomen: ?  1. ?Descending colon acute diverticulitis with adjacent small free air  consistent with contained perforation without susy pneumoperitoneum.  2. ?No abscess or bowel obstruction.  ?      small contained air from perfd diverticulitis of descending colon.? Admit NPO for abx.? ?will attempt nonop management first

## 2022-05-08 ENCOUNTER — HOSPITAL ENCOUNTER (INPATIENT)
Facility: HOSPITAL | Age: 67
LOS: 7 days | Discharge: HOME | DRG: 291 | End: 2022-05-15
Attending: STUDENT IN AN ORGANIZED HEALTH CARE EDUCATION/TRAINING PROGRAM | Admitting: INTERNAL MEDICINE
Payer: COMMERCIAL

## 2022-05-08 ENCOUNTER — NURSE TRIAGE (OUTPATIENT)
Dept: ADMINISTRATIVE | Facility: CLINIC | Age: 67
End: 2022-05-08
Payer: COMMERCIAL

## 2022-05-08 DIAGNOSIS — R79.89 ELEVATED TROPONIN: ICD-10-CM

## 2022-05-08 DIAGNOSIS — I50.23 ACUTE ON CHRONIC SYSTOLIC CONGESTIVE HEART FAILURE: Primary | ICD-10-CM

## 2022-05-08 DIAGNOSIS — R06.02 SOB (SHORTNESS OF BREATH): ICD-10-CM

## 2022-05-08 DIAGNOSIS — R07.9 CHEST PAIN, UNSPECIFIED TYPE: ICD-10-CM

## 2022-05-08 DIAGNOSIS — I50.22 CHF (CONGESTIVE HEART FAILURE), NYHA CLASS III, CHRONIC, SYSTOLIC: ICD-10-CM

## 2022-05-08 DIAGNOSIS — D64.9 ANEMIA, UNSPECIFIED TYPE: ICD-10-CM

## 2022-05-08 DIAGNOSIS — I48.0 PAF (PAROXYSMAL ATRIAL FIBRILLATION): ICD-10-CM

## 2022-05-08 DIAGNOSIS — K92.2 GASTROINTESTINAL HEMORRHAGE, UNSPECIFIED GASTROINTESTINAL HEMORRHAGE TYPE: ICD-10-CM

## 2022-05-08 DIAGNOSIS — D50.0 IRON DEFICIENCY ANEMIA DUE TO CHRONIC BLOOD LOSS: ICD-10-CM

## 2022-05-08 LAB
ALBUMIN SERPL-MCNC: 2.5 GM/DL (ref 3.4–4.8)
ALBUMIN/GLOB SERPL: 0.7 RATIO (ref 1.1–2)
ALP SERPL-CCNC: 98 UNIT/L (ref 40–150)
ALT SERPL-CCNC: 90 UNIT/L (ref 0–55)
APAP SERPL-MCNC: <17.4 UG/ML (ref 17.4–30)
APPEARANCE UR: CLEAR
AST SERPL-CCNC: 110 UNIT/L (ref 5–34)
BACTERIA #/AREA URNS AUTO: NORMAL /HPF
BASOPHILS # BLD AUTO: 0.05 X10(3)/MCL (ref 0–0.2)
BASOPHILS NFR BLD AUTO: 0.5 %
BILIRUB UR QL STRIP.AUTO: NEGATIVE MG/DL
BILIRUBIN DIRECT+TOT PNL SERPL-MCNC: 1.2 MG/DL (ref 0–0.5)
BILIRUBIN DIRECT+TOT PNL SERPL-MCNC: 1.4 MG/DL (ref 0–0.8)
BILIRUBIN DIRECT+TOT PNL SERPL-MCNC: 2.6 MG/DL
BNP BLD-MCNC: 536.6 PG/ML
BUN SERPL-MCNC: 13.3 MG/DL (ref 8.4–25.7)
BURR CELLS (OLG): ABNORMAL
CALCIUM SERPL-MCNC: 8.8 MG/DL (ref 8.8–10)
CHLORIDE SERPL-SCNC: 106 MMOL/L (ref 98–107)
CO2 SERPL-SCNC: 20 MMOL/L (ref 23–31)
COLOR UR AUTO: YELLOW
CREAT SERPL-MCNC: 1.51 MG/DL (ref 0.73–1.18)
EOSINOPHIL # BLD AUTO: 0.04 X10(3)/MCL (ref 0–0.9)
EOSINOPHIL NFR BLD AUTO: 0.4 %
ERYTHROCYTE [DISTWIDTH] IN BLOOD BY AUTOMATED COUNT: 19.9 % (ref 11.5–17)
ETHANOL SERPL-MCNC: <10 MG/DL
GLOBULIN SER-MCNC: 3.8 GM/DL (ref 2.4–3.5)
GLUCOSE SERPL-MCNC: 209 MG/DL (ref 82–115)
GLUCOSE UR QL STRIP.AUTO: NEGATIVE MG/DL
HCT VFR BLD AUTO: 25.3 % (ref 42–52)
HGB BLD-MCNC: 8.3 GM/DL (ref 14–18)
IMM GRANULOCYTES # BLD AUTO: 0.07 X10(3)/MCL (ref 0–0.02)
IMM GRANULOCYTES NFR BLD AUTO: 0.7 % (ref 0–0.43)
INR BLD: 3 (ref 0–1.3)
KETONES UR QL STRIP.AUTO: NEGATIVE MG/DL
LEUKOCYTE ESTERASE UR QL STRIP.AUTO: NEGATIVE UNIT/L
LIPASE SERPL-CCNC: 36 U/L
LYMPHOCYTES # BLD AUTO: 1.38 X10(3)/MCL (ref 0.6–4.6)
LYMPHOCYTES NFR BLD AUTO: 12.9 %
MACROCYTES BLD QL SMEAR: ABNORMAL
MCH RBC QN AUTO: 35.5 PG (ref 27–31)
MCHC RBC AUTO-ENTMCNC: 32.8 MG/DL (ref 33–36)
MCV RBC AUTO: 108.1 FL (ref 80–94)
MONOCYTES # BLD AUTO: 0.83 X10(3)/MCL (ref 0.1–1.3)
MONOCYTES NFR BLD AUTO: 7.8 %
NEUTROPHILS # BLD AUTO: 8.3 X10(3)/MCL (ref 2.1–9.2)
NEUTROPHILS NFR BLD AUTO: 77.7 %
NITRITE UR QL STRIP.AUTO: NEGATIVE
NRBC BLD AUTO-RTO: 0 %
PH UR STRIP.AUTO: 5 [PH]
PLATELET # BLD AUTO: 132 X10(3)/MCL (ref 130–400)
PLATELET # BLD EST: ABNORMAL 10*3/UL
PMV BLD AUTO: 12.1 FL (ref 9.4–12.4)
POCT GLUCOSE: 270 MG/DL (ref 70–110)
POIKILOCYTOSIS BLD QL SMEAR: ABNORMAL
POTASSIUM SERPL-SCNC: 4.9 MMOL/L (ref 3.5–5.1)
PROT SERPL-MCNC: 6.3 GM/DL (ref 5.8–7.6)
PROT UR QL STRIP.AUTO: NEGATIVE MG/DL
PROTHROMBIN TIME: 30.6 SECONDS (ref 12.5–14.5)
RBC # BLD AUTO: 2.34 X10(6)/MCL (ref 4.7–6.1)
RBC #/AREA URNS AUTO: NORMAL /HPF
RBC MORPH BLD: ABNORMAL
RBC UR QL AUTO: NEGATIVE UNIT/L
SODIUM SERPL-SCNC: 136 MMOL/L (ref 136–145)
SP GR UR STRIP.AUTO: 1.01 (ref 1–1.03)
SQUAMOUS #/AREA URNS AUTO: NORMAL /LPF
TROPONIN I SERPL-MCNC: 0.05 NG/ML (ref 0–0.04)
TROPONIN I SERPL-MCNC: 0.06 NG/ML (ref 0–0.04)
TROPONIN I SERPL-MCNC: 0.06 NG/ML (ref 0–0.04)
UROBILINOGEN UR STRIP-ACNC: 0.2 MG/DL
WBC # SPEC AUTO: 10.7 X10(3)/MCL (ref 4.5–11.5)
WBC #/AREA URNS AUTO: NORMAL /HPF

## 2022-05-08 PROCEDURE — 82077 ASSAY SPEC XCP UR&BREATH IA: CPT | Performed by: STUDENT IN AN ORGANIZED HEALTH CARE EDUCATION/TRAINING PROGRAM

## 2022-05-08 PROCEDURE — 11000001 HC ACUTE MED/SURG PRIVATE ROOM

## 2022-05-08 PROCEDURE — C9113 INJ PANTOPRAZOLE SODIUM, VIA: HCPCS | Performed by: STUDENT IN AN ORGANIZED HEALTH CARE EDUCATION/TRAINING PROGRAM

## 2022-05-08 PROCEDURE — 82746 ASSAY OF FOLIC ACID SERUM: CPT | Performed by: INTERNAL MEDICINE

## 2022-05-08 PROCEDURE — 83921 ORGANIC ACID SINGLE QUANT: CPT | Performed by: INTERNAL MEDICINE

## 2022-05-08 PROCEDURE — 93005 ELECTROCARDIOGRAM TRACING: CPT

## 2022-05-08 PROCEDURE — 96375 TX/PRO/DX INJ NEW DRUG ADDON: CPT

## 2022-05-08 PROCEDURE — 83880 ASSAY OF NATRIURETIC PEPTIDE: CPT | Performed by: NURSE PRACTITIONER

## 2022-05-08 PROCEDURE — 83690 ASSAY OF LIPASE: CPT | Performed by: NURSE PRACTITIONER

## 2022-05-08 PROCEDURE — 81015 MICROSCOPIC EXAM OF URINE: CPT | Performed by: NURSE PRACTITIONER

## 2022-05-08 PROCEDURE — 85025 COMPLETE CBC W/AUTO DIFF WBC: CPT | Performed by: NURSE PRACTITIONER

## 2022-05-08 PROCEDURE — 63600175 PHARM REV CODE 636 W HCPCS: Performed by: STUDENT IN AN ORGANIZED HEALTH CARE EDUCATION/TRAINING PROGRAM

## 2022-05-08 PROCEDURE — 80053 COMPREHEN METABOLIC PANEL: CPT | Performed by: NURSE PRACTITIONER

## 2022-05-08 PROCEDURE — 96374 THER/PROPH/DIAG INJ IV PUSH: CPT

## 2022-05-08 PROCEDURE — 80143 DRUG ASSAY ACETAMINOPHEN: CPT | Performed by: STUDENT IN AN ORGANIZED HEALTH CARE EDUCATION/TRAINING PROGRAM

## 2022-05-08 PROCEDURE — 99291 CRITICAL CARE FIRST HOUR: CPT | Mod: 25

## 2022-05-08 PROCEDURE — 93010 EKG 12-LEAD: ICD-10-PCS | Mod: ,,, | Performed by: INTERNAL MEDICINE

## 2022-05-08 PROCEDURE — 84484 ASSAY OF TROPONIN QUANT: CPT | Performed by: NURSE PRACTITIONER

## 2022-05-08 PROCEDURE — 85610 PROTHROMBIN TIME: CPT | Performed by: NURSE PRACTITIONER

## 2022-05-08 PROCEDURE — 36415 COLL VENOUS BLD VENIPUNCTURE: CPT | Performed by: NURSE PRACTITIONER

## 2022-05-08 PROCEDURE — 25000003 PHARM REV CODE 250: Performed by: INTERNAL MEDICINE

## 2022-05-08 PROCEDURE — 83540 ASSAY OF IRON: CPT | Performed by: INTERNAL MEDICINE

## 2022-05-08 PROCEDURE — 81003 URINALYSIS AUTO W/O SCOPE: CPT | Performed by: NURSE PRACTITIONER

## 2022-05-08 PROCEDURE — 93010 ELECTROCARDIOGRAM REPORT: CPT | Mod: ,,, | Performed by: INTERNAL MEDICINE

## 2022-05-08 RX ORDER — PANTOPRAZOLE SODIUM 40 MG/10ML
40 INJECTION, POWDER, LYOPHILIZED, FOR SOLUTION INTRAVENOUS DAILY
Status: DISCONTINUED | OUTPATIENT
Start: 2022-05-09 | End: 2022-05-08

## 2022-05-08 RX ORDER — PANTOPRAZOLE SODIUM 40 MG/1
40 TABLET, DELAYED RELEASE ORAL DAILY
Status: DISCONTINUED | OUTPATIENT
Start: 2022-05-09 | End: 2022-05-09

## 2022-05-08 RX ORDER — FLUTICASONE PROPIONATE 50 MCG
1 SPRAY, SUSPENSION (ML) NASAL DAILY
Status: DISCONTINUED | OUTPATIENT
Start: 2022-05-09 | End: 2022-05-15 | Stop reason: HOSPADM

## 2022-05-08 RX ORDER — ACETAMINOPHEN 325 MG/1
650 TABLET ORAL EVERY 4 HOURS PRN
Status: DISCONTINUED | OUTPATIENT
Start: 2022-05-08 | End: 2022-05-15 | Stop reason: HOSPADM

## 2022-05-08 RX ORDER — FUROSEMIDE 10 MG/ML
40 INJECTION INTRAMUSCULAR; INTRAVENOUS
Status: COMPLETED | OUTPATIENT
Start: 2022-05-08 | End: 2022-05-08

## 2022-05-08 RX ORDER — ACETAMINOPHEN 325 MG/1
650 TABLET ORAL EVERY 8 HOURS PRN
Status: DISCONTINUED | OUTPATIENT
Start: 2022-05-08 | End: 2022-05-15 | Stop reason: HOSPADM

## 2022-05-08 RX ORDER — GLUCAGON 1 MG
1 KIT INJECTION
Status: DISCONTINUED | OUTPATIENT
Start: 2022-05-08 | End: 2022-05-15 | Stop reason: HOSPADM

## 2022-05-08 RX ORDER — IBUPROFEN 200 MG
16 TABLET ORAL
Status: DISCONTINUED | OUTPATIENT
Start: 2022-05-08 | End: 2022-05-15 | Stop reason: HOSPADM

## 2022-05-08 RX ORDER — ATORVASTATIN CALCIUM 10 MG/1
20 TABLET, FILM COATED ORAL NIGHTLY
Status: DISCONTINUED | OUTPATIENT
Start: 2022-05-08 | End: 2022-05-09

## 2022-05-08 RX ORDER — ALBUTEROL SULFATE 90 UG/1
1 AEROSOL, METERED RESPIRATORY (INHALATION) EVERY 4 HOURS PRN
Status: DISCONTINUED | OUTPATIENT
Start: 2022-05-08 | End: 2022-05-15 | Stop reason: HOSPADM

## 2022-05-08 RX ORDER — ASPIRIN 325 MG
325 TABLET, DELAYED RELEASE (ENTERIC COATED) ORAL ONCE
Status: DISCONTINUED | OUTPATIENT
Start: 2022-05-08 | End: 2022-05-08

## 2022-05-08 RX ORDER — IBUPROFEN 200 MG
24 TABLET ORAL
Status: DISCONTINUED | OUTPATIENT
Start: 2022-05-08 | End: 2022-05-15 | Stop reason: HOSPADM

## 2022-05-08 RX ORDER — FUROSEMIDE 10 MG/ML
40 INJECTION INTRAMUSCULAR; INTRAVENOUS DAILY
Status: DISCONTINUED | OUTPATIENT
Start: 2022-05-09 | End: 2022-05-10

## 2022-05-08 RX ORDER — PANTOPRAZOLE SODIUM 40 MG/10ML
80 INJECTION, POWDER, LYOPHILIZED, FOR SOLUTION INTRAVENOUS
Status: COMPLETED | OUTPATIENT
Start: 2022-05-08 | End: 2022-05-08

## 2022-05-08 RX ORDER — AMIODARONE HYDROCHLORIDE 200 MG/1
400 TABLET ORAL DAILY
Status: DISCONTINUED | OUTPATIENT
Start: 2022-05-09 | End: 2022-05-09

## 2022-05-08 RX ADMIN — PANTOPRAZOLE SODIUM 80 MG: 40 INJECTION, POWDER, FOR SOLUTION INTRAVENOUS at 12:05

## 2022-05-08 RX ADMIN — ATORVASTATIN CALCIUM 20 MG: 10 TABLET, FILM COATED ORAL at 08:05

## 2022-05-08 RX ADMIN — FUROSEMIDE 40 MG: 10 INJECTION, SOLUTION INTRAMUSCULAR; INTRAVENOUS at 12:05

## 2022-05-08 NOTE — TELEPHONE ENCOUNTER
102, 106/70. Caller denies CP or SOB at this time. Caller has several questions regarding his CHF, sodium intake and diet. Caller c/o weakness and bilateral ankle swelling, refused care dispo at this time.  Pt advised per protocol and verbalized understanding.    Reason for Disposition   New or worsened ankle swelling    Additional Information   Negative: Passed out (i.e., lost consciousness, collapsed and was not responding)   Negative: Shock suspected (e.g., cold/pale/clammy skin, too weak to stand, low BP, rapid pulse)   Negative: Difficult to awaken or acting confused (e.g., disoriented, slurred speech)   Negative: Visible sweat on face or sweat dripping down face   Negative: Unable to walk, or can only walk with assistance (e.g., requires support)   Negative: [1] Received SHOCK from implantable cardiac defibrillator AND [2] persisting symptoms (i.e., palpitations, lightheadedness)   Negative: [1] Dizziness, lightheadedness, or weakness AND [2] heart beating very rapidly (e.g., > 140 / minute)   Negative: [1] Dizziness, lightheadedness, or weakness AND [2] heart beating very slowly  (e.g., < 50 / minute)   Negative: Sounds like a life-threatening emergency to the triager   Negative: Difficulty breathing   Negative: Dizziness, lightheadedness, or weakness   Negative: [1] Heart beating very rapidly (e.g., > 140 / minute) AND [2] present now  (Exception: during exercise)   Negative: Heart beating very slowly (e.g., < 50 / minute)  (Exception: athlete and heart rate normal for caller)   Negative: New or worsened shortness of breath with activity (dyspnea on exertion)   Negative: Patient sounds very sick or weak to the triager   Negative: [1] Heart beating very rapidly (e.g., > 140 / minute) AND [2] not present now  (Exception: during exercise)   Negative: [1] Skipped or extra beat(s) AND [2] increases with exercise or exertion   Negative: [1] Skipped or extra beat(s) AND [2] occurs 4 or more times  per minute    Protocols used: HEART RATE AND HEARTBEAT [id number]

## 2022-05-08 NOTE — H&P
[facility] Hospital Medicine History & Physical Examination       Patient Name: John Javier Jr.  MRN: [id number]  Patient Class: IP- Inpatient   Admission Date: 05/08/2022   Admitting Physician: Dr. Fernanda Meraz  Length of Stay: 0  Attending Physician: Dr. Fernanda Meraz  Primary Care Provider: Og Moss MD  Face-to-Face encounter date: 05/08/2022  Code Status: Full  Chief Complaint: Shortness of Breath (C/o increasing swelling to lower extremities and abd, mild SOB. Hx CHF, on heart transplant list. Has pacemaker/defib. Has been off fluid pill x2 months. Denies CP)        Patient information was obtained from patient, patient's family, past medical records and ER records.     HISTORY OF PRESENT ILLNESS:   John Javier Jr. is a 66 y.o. male with a PMHx of CHF/ischemic CMO EF 25-30% s/p AICD awaiting heart transplant in [facility], cirrhosis, PAD, DM2, JANET noncompliant with CPAP, VHD and PAF on Eliquis. He presented to ED with c/o intermittent SOB with associated BLE swelling over the past 2 months since being off of his Lasix. He also endorsed dark stools x2-3 days. He is a poor historian, so the majority of the history was obtained primarily from review of the medical record.     Initial ED VS include BP 91/55, HR 93, RR 20, SpO2 99% on RA, temp 98.6. Labs notable for hgb 8.3, hct 25.3, INR 3, CO2 20, elevated LFTs, .6, toponin 0.063. Stool was guaiac positive. CXR negative for acute abnormality. EKG revealed SR. He was given IV Lasix in the ED. Cardiology services were consulted. He has been admitted to hospital medicine services for further work-up and management of care.     PAST MEDICAL HISTORY:   Ischemic cardiomyopathy, EF 25-30% 4/12/22 status post AICD (on transplant list at [facility])   Diverticulitis with perf status post colectomy in 2018  PAF on amiodarone and Eliquis  CKD stage III  Cirrhosis  Jardiance induced pancreatitis  PAD  Hyperlipidemia  Type 2  DM  JANET, noncompliant with CPAP  VHD-mitral regurgitation mild to moderate    PAST SURGICAL HISTORY:   AICD implantation ([employer]), partial colon resection, shoulder surgery, C s/p PCI to RCA  ALLERGIES:   Penicillins, Penicillin, and Sitagliptin    FAMILY HISTORY:   Reviewed and negative    SOCIAL HISTORY:   Denied tobacco, alcohol or illicit drug use.     HOME MEDICATIONS:     Prior to Admission medications    Medication Sig Start Date End Date Taking? Authorizing Provider   albuterol (PROVENTIL/VENTOLIN HFA) 90 mcg/actuation inhaler INHALE 2 PUFFS BY MOUTH FOUR TIMES DAILY AS NEEDED FOR WHEEZING OR SHORTNESS OF BREATH 21  Yes Historical Provider   amiodarone (PACERONE) 400 MG tablet Take 1 tablet (400 mg total) by mouth once daily. 3/15/22 3/15/23 Yes Yuki Delgado MD   apixaban (ELIQUIS) 5 mg Tab Take 1 tablet (5 mg total) by mouth 2 (two) times daily. 3/3/22  Yes Yuki Delgado MD   fluticasone propionate (FLONASE) 50 mcg/actuation nasal spray  20  Yes Historical Provider   metoprolol succinate (TOPROL-XL) 100 MG 24 hr tablet Take 1 tablet (100 mg total) by mouth once daily. 3/3/22  Yes Yuki Delgado MD   nitroGLYCERIN (NITROSTAT) 0.4 MG SL tablet SMARTSI Tablet(s) Sublingual As Needed 3/3/22  Yes Yuki Delgado MD   pantoprazole (PROTONIX) 40 MG tablet Take 1 tablet (40 mg total) by mouth once daily. 3/3/22 3/3/23 Yes Yuki Delgado MD   atorvastatin (LIPITOR) 20 MG tablet Take 1 tablet (20 mg total) by mouth every evening. 20  Adilson Darden Jr., MD       REVIEW OF SYSTEMS:   Except as documented, all other systems reviewed and negative     PHYSICAL EXAM:     VITAL SIGNS: 24 HRS MIN & MAX LAST   Temp  Min: 98 °F (36.7 °C)  Max: 98.6 °F (37 °C) 98 °F (36.7 °C)   BP  Min: 91/55  Max: 134/65 104/65   Pulse  Min: 79  Max: 93  81   Resp  Min: 17  Max: 20 18   SpO2  Min: 97 %  Max: 100 % 100 %       General appearance: Well-developed male in no apparent distress.  HENT:  Atraumatic head. Moist mucous membranes of oral cavity.  Eyes: Normal extraocular movements.   Neck: Supple.   Lungs: Clear to auscultation bilaterally. No wheezing present.   Heart: Regular rate and rhythm. S1 and S2 present. No pedal edema.  Abdomen: Soft, distended, non-tender. bowel sounds are normal.   Extremities: No cyanosis, clubbing, or edema.  Skin: No Rash.   Neuro: Motor and sensory exams grossly intact.   Psych/mental status: Appropriate mood and affect. Responds appropriately to questions.     LABS AND IMAGING:     Recent Labs   Lab 05/08/22  1102   WBC 10.7   RBC 2.34*   HGB 8.3*   HCT 25.3*   .1*   MCH 35.5*   MCHC 32.8*   RDW 19.9*   MPV 12.1       Recent Labs   Lab 05/08/22  1102   CO2 20*   BUN 13.3   CREATININE 1.51*   CALCIUM 8.8   ALBUMIN 2.5*   ALKPHOS 98   ALT 90*   *       Microbiology Results (last 7 days)     ** No results found for the last 168 hours. **           X-Ray Chest 1 View  Narrative: EXAMINATION:  XR CHEST 1 VIEW    CLINICAL HISTORY:  sob;    TECHNIQUE:  One view    COMPARISON:  April 13, 2022.    FINDINGS:  Cardiopericardial silhouette is within normal limits.  Left chest implanted cardiac device electrode terminates within the right ventricle.  Left lower lung linear atelectasis or scarring.  No acute dense focal or segmental consolidation, congestive process, pleural effusions or pneumothorax.  Impression: NO ACUTE CARDIOPULMONARY PROCESS IDENTIFIED.    Electronically signed by: Biju Pelaez  Date:    05/08/2022  Time:    10:54        ASSESSMENT & PLAN:   Acute on Chronic HFrEF  Decompensated  ICMO -s/p AICD awaiting Heart Transplantation  Elevated Troponin, Likely Type II NSTEMI  Acute Blood Loss Anemia, Likely Upper GI Bleed  Cirrhosis  Hyperbilirubinemia and Transaminitis 2/2 above  CKD stage III  PAF on Eliquis  DM 2    Plan:  Cardiology consultation, follow up with recommendations  GI consultation, follow up with recommendations  Trend Troponin  x3  Telemetry Monitoring  Lasix 40 mg IV x1 given in ED, defer additional Lasix to attending  IV PPI  Resume appropriate home medications, once updated  Labs in AM      VTE Prophylaxis: will be placed on SCD for DVT prophylaxis and will be advised to be as mobile as possible and sit in a chair as tolerated. Resume Eliquis when appropriate.    __________________________________________________________________________  INPATIENT LIST OF MEDICATIONS   No current facility-administered medications for this encounter.      Scheduled Meds:  Continuous Infusions:  PRN Meds:.      Discharge Planning and Disposition: TBD    JASON, Morena Bose, NP have reviewed and discussed the case with Dr. Kathi Vazquez.   Please see the following addendum for further assessment and plan from the attending MD.    05/08/2022    ________________________________________________________________________________    MD Addendum:  I, Dr eliza marion assumed care of this patient today at around 4.20 pmFor the patient encounter, I performed the substantive portion of the visit, I reviewed the NP/PA documentation, treatment plan, and medical decision making.  I had face to face time with this patient     66-year-old  male with past medical history of heart failure with reduced EF, cirrhosis, CKD stage 3, paroxysmal AFib, diabetes mellitus type 2 presented to ER with complaints of chest pain and shortness of breath.  Patient reports he has not taken Lasix in the past 2 months apparently that was discontinued by 1 of his providers.  Reports his shortness of breath has been getting worse for the past couple of weeks this morning he started having chest pain to when asked where was it hurting he pointed towards the left side denied any radiation but did complain of some nausea and vomiting yesterday.  Denies any fever or chills.  Level done here showed.Hemoglobin was found to be 8.3 occult blood positive GI consulted.  Troponins are  elevated we will cycle enzymes.  Chest x-ray is is negative BNP slightly elevated cardiology consulted    General alert awake oriented  Chest clear to auscultate    Will start on Lasix 40 IV daily, strict input and output  Trend troponin  Cardiology consulted  Will resume home medications once the medication list has been updated, telemetry monitoring  Occult blood was positive so GI has been consulted hemoglobin is 8.3 no need of transfusion at this time but if it still trending down then we might transfuse considering elevated troponins  Will also order B12 and folate since patient has macrocytosis and will order iron profile    All diagnosis and differential diagnosis have been reviewed; assessment and plan has been documented; I have personally reviewed the labs and test results that are presently available; I have reviewed the patients medication list; I have reviewed the consulting providers response and recommendations. I have reviewed or attempted to review medical records based upon their availability.    All of the patient and family questions have been addressed and answered. Patient's is agreeable to the above stated plan. I will continue to monitor closely and make adjustments to medical management as needed.    Morena Bose, GAELCNP-BC   05/08/2022

## 2022-05-08 NOTE — FIRST PROVIDER EVALUATION
Medical screening exam completed.  I have conducted a focused provider triage encounter, findings are as follows:    Brief history of present illness:  67 y/o male who presents with mild sob, bilateral LE swelling, abdminal distention as well. Hx pacer/defib, chf, on transplant list for heart    There were no vitals filed for this visit.    Pertinent physical exam:  Alert, nonlabored respirations, cooperative    Brief workup plan:  Labs, ekg, xray    Preliminary workup initiated; this workup will be continued and followed by the physician or advanced practice provider that is assigned to the patient when roomed.

## 2022-05-08 NOTE — CONSULTS
Gastroenterology Consultation Note    Reason for Consult:  The primary encounter diagnosis was Acute on chronic systolic congestive heart failure. Diagnoses of SOB (shortness of breath), Chest pain, unspecified type, Elevated troponin, Anemia, unspecified type, PAF (paroxysmal atrial fibrillation), and Gastrointestinal hemorrhage, unspecified gastrointestinal hemorrhage type were also pertinent to this visit.     We were asked to see the patient regarding anemia in    PCP:   Og Moss MD    Referring MD:  Aaareferral Self  No address on file    Initial History of Present Illness (HPI):  This is a 66 y.o.  male known to Dr. Corado with multiple medical problems including congested heart failure with an ejection fraction of 10% on transplant list at [facility], paroxysmal ventricular tachycardia, hypotension, diabetes, diverticulitis status post sigmoid colectomy who presented to the emergency department with intermittent shortness of breath.  He was anemic with a hemoglobin 8.3 and hematocrit 25.3.  He admits to intermittent dark stools.  He was diagnosed with cirrhosis in January of this year felt probably secondary to congestive hepatopathy .  workup was otherwise unrevealing.  Paracentesis fluid studies were consistent with portal hypertension.  He underwent an EGD on 03/02/2022 and was found to have small esophageal varices, normal stomach and normal duodenum.  His last colonoscopy was in 2018 where he was found to have a couple of small polyps as well as diverticulosis and diverticulitis.  A 5 year follow-up was recommended.  Patient has been felt to be too sick to undergo colonoscopy at this time.  He denies any abdominal pain or increased abdominal swelling.  He feels his abdomen is about the same size as usual and may be smaller.    ROS:  Review of Systems   Constitutional: Positive for malaise/fatigue.   HENT: Negative.    Eyes: Negative.    Respiratory: Positive for  shortness of breath.    Cardiovascular: Positive for orthopnea and leg swelling.   Gastrointestinal:        As per HPI   Genitourinary: Negative.    Skin: Negative.    Neurological: Negative.    Endo/Heme/Allergies: Negative.    Psychiatric/Behavioral: Negative.        Medical History:   Past Medical History:   Diagnosis Date    Anticoagulant long-term use     CHF (congestive heart failure)     Chronic combined systolic and diastolic heart failure 03/01/2020    Coronary artery disease     Diabetes mellitus     Digestive disorder     Diverticulitis     Encounter for blood transfusion     Hypertension     Renal disorder     Sleep apnea     Suspected Covid-19 Virus Infection; test negative 03/14/2020    Unspecified cirrhosis of liver        Surgical History:   Past Surgical History:   Procedure Laterality Date    COLECTOMY      RIGHT HEART CATHETERIZATION Right 10/8/2020    Procedure: INSERTION, CATHETER, RIGHT HEART;  Surgeon: Adilson Darden Jr., MD;  Location: [facility] CATH LAB;  Service: Cardiology;  Laterality: Right;    RIGHT HEART CATHETERIZATION Right 2/3/2021    Procedure: INSERTION, CATHETER, RIGHT HEART;  Surgeon: Adilson Darden Jr., MD;  Location: [facility] CATH LAB;  Service: Cardiology;  Laterality: Right;    RIGHT HEART CATHETERIZATION Right 6/22/2021    Procedure: INSERTION, CATHETER, RIGHT HEART;  Surgeon: Yuki Delgado MD;  Location: [facility] CATH LAB;  Service: Cardiology;  Laterality: Right;       Family History:   Family History   Problem Relation Age of Onset    Diabetes Mother     Hypertension Mother     Hypertension Father     Heart disease Father    .     Social History:   Social History     Tobacco Use    Smoking status: Never Smoker    Smokeless tobacco: Never Used   Substance Use Topics    Alcohol use: Never       Allergies:  Review of patient's allergies indicates:   Allergen Reactions    Penicillins      Pt stated his mother said it will kill him- always inform to  "take ampicillin    Penicillin     Sitagliptin Other (See Comments)     pancreatitis       Medications Prior to Admission   Medication Sig Dispense Refill Last Dose    albuterol (PROVENTIL/VENTOLIN HFA) 90 mcg/actuation inhaler INHALE 2 PUFFS BY MOUTH FOUR TIMES DAILY AS NEEDED FOR WHEEZING OR SHORTNESS OF BREATH   2022    amiodarone (PACERONE) 400 MG tablet Take 1 tablet (400 mg total) by mouth once daily. 90 tablet 3 2022    apixaban (ELIQUIS) 5 mg Tab Take 1 tablet (5 mg total) by mouth 2 (two) times daily. 180 tablet 3 2022    fluticasone propionate (FLONASE) 50 mcg/actuation nasal spray    2022    metoprolol succinate (TOPROL-XL) 100 MG 24 hr tablet Take 1 tablet (100 mg total) by mouth once daily. 90 tablet 3 2022    nitroGLYCERIN (NITROSTAT) 0.4 MG SL tablet SMARTSI Tablet(s) Sublingual As Needed 100 tablet 3 2022    pantoprazole (PROTONIX) 40 MG tablet Take 1 tablet (40 mg total) by mouth once daily. 90 tablet 3 2022    atorvastatin (LIPITOR) 20 MG tablet Take 1 tablet (20 mg total) by mouth every evening. 90 tablet 3          Objective Findings:    Vital Signs:  /64   Pulse 80   Temp 97.8 °F (36.6 °C) (Oral)   Resp 18   Ht 5' 4" (1.626 m)   Wt 57.5 kg (126 lb 11.2 oz)   SpO2 96%   BMI 21.75 kg/m²   Body mass index is 21.75 kg/m².    Physical Exam:  Physical Exam  Constitutional:       Appearance: He is ill-appearing.   HENT:      Head: Normocephalic and atraumatic.      Nose: Nose normal.      Mouth/Throat:      Mouth: Mucous membranes are moist.   Eyes:      Extraocular Movements: Extraocular movements intact.      Conjunctiva/sclera: Conjunctivae normal.      Pupils: Pupils are equal, round, and reactive to light.   Cardiovascular:      Rate and Rhythm: Normal rate and regular rhythm.      Heart sounds: Normal heart sounds.   Pulmonary:      Effort: Pulmonary effort is normal.      Breath sounds: Normal breath sounds.   Abdominal:      General: Bowel " sounds are normal. There is distension.      Palpations: Abdomen is soft. There is no mass.      Tenderness: There is no abdominal tenderness. There is no guarding or rebound.      Hernia: No hernia is present.      Comments: Positive fluid wave   Musculoskeletal:         General: Normal range of motion.      Cervical back: Normal range of motion.   Skin:     General: Skin is warm and dry.   Neurological:      General: No focal deficit present.   Psychiatric:         Judgment: Judgment normal.         Labs:  Recent Results (from the past 48 hour(s))   Comprehensive Metabolic Panel    Collection Time: 05/08/22 11:02 AM   Result Value Ref Range    Sodium Level 136 136 - 145 mmol/L    Potassium Level 4.9 3.5 - 5.1 mmol/L    Chloride 106 98 - 107 mmol/L    Carbon Dioxide 20 (L) 23 - 31 mmol/L    Glucose Level 209 (H) 82 - 115 mg/dL    Blood Urea Nitrogen 13.3 8.4 - 25.7 mg/dL    Creatinine 1.51 (H) 0.73 - 1.18 mg/dL    Calcium Level Total 8.8 8.8 - 10.0 mg/dL    Protein Total 6.3 5.8 - 7.6 gm/dL    Albumin Level 2.5 (L) 3.4 - 4.8 gm/dL    Globulin 3.8 (H) 2.4 - 3.5 gm/dL    Albumin/Globulin Ratio 0.7 (L) 1.1 - 2.0 ratio    Bilirubin Total 2.6 (H) <=1.5 mg/dL    Bilirubin Direct 1.2 (H) 0.0 - 0.5 mg/dL    Bilirubin Indirect 1.40 (H) 0.00 - 0.80 mg/dL    Alkaline Phosphatase 98 40 - 150 unit/L    Alanine Aminotransferase 90 (H) 0 - 55 unit/L    Aspartate Aminotransferase 110 (H) 5 - 34 unit/L    Estimated GFR- 60 mls/min/1.73/m2   CBC with Differential    Collection Time: 05/08/22 11:02 AM   Result Value Ref Range    WBC 10.7 4.5 - 11.5 x10(3)/mcL    RBC 2.34 (L) 4.70 - 6.10 x10(6)/mcL    Hgb 8.3 (L) 14.0 - 18.0 gm/dL    Hct 25.3 (L) 42.0 - 52.0 %    .1 (H) 80.0 - 94.0 fL    MCH 35.5 (H) 27.0 - 31.0 pg    MCHC 32.8 (L) 33.0 - 36.0 mg/dL    RDW 19.9 (H) 11.5 - 17.0 %    Platelet 132 130 - 400 x10(3)/mcL    MPV 12.1 9.4 - 12.4 fL    Neutro Auto 77.7 %    Lymph Auto 12.9 %    Mono Auto 7.8 %    Eos  Auto 0.4 %    Basophil Auto 0.5 %    Abs Lymph 1.38 0.6 - 4.6 x10(3)/mcL    Abs Neutro 8.3 2.1 - 9.2 x10(3)/mcL    Abs Mono 0.83 0.1 - 1.3 x10(3)/mcL    Abs Eos 0.04 0 - 0.9 x10(3)/mcL    Abs Baso 0.05 0 - 0.2 x10(3)/mcL    IG# 0.07 (H) 0 - 0.0155 x10(3)/mcL    IG% 0.7 (H) 0 - 0.43 %    NRBC% 0.0 %   BNP    Collection Time: 05/08/22 11:02 AM   Result Value Ref Range    Natriuretic Peptide 536.6 (H) <=100.0 pg/mL   Troponin I    Collection Time: 05/08/22 11:02 AM   Result Value Ref Range    Troponin-I 0.063 (H) 0.000 - 0.045 ng/mL   Lipase    Collection Time: 05/08/22 11:02 AM   Result Value Ref Range    Lipase Level 36 <=60 U/L   Protime-INR    Collection Time: 05/08/22 11:02 AM   Result Value Ref Range    PT 30.6 (H) 12.5 - 14.5 seconds    INR 3.00 (H) 0.00 - 1.30   Blood Smear Microscopic Exam    Collection Time: 05/08/22 11:02 AM   Result Value Ref Range    Platelet Est Decreased (A) Adequate    RBC Morph Abnormal (A) Normal    [place] Cells 2+ (A) (none)    Macrocyte 1+ (A) (none)    Poik 2+ (A) (none)   Ethanol    Collection Time: 05/08/22 11:02 AM   Result Value Ref Range    Ethanol Level <10.0 <=10.0 mg/dL   Acetaminophen Level    Collection Time: 05/08/22 11:02 AM   Result Value Ref Range    Acetaminophen Level <17.4 (L) 17.4 - 30.0 ug/ml   POCT glucose    Collection Time: 05/08/22  4:21 PM   Result Value Ref Range    POCT Glucose 270 (H) 70 - 110 mg/dL       X-Ray Chest 1 View   Final Result      NO ACUTE CARDIOPULMONARY PROCESS IDENTIFIED.         Electronically signed by: Biju Pelaez   Date:    05/08/2022   Time:    10:54          Imaging:    Endoscopy:        Assessment/Plan:  1. Acute on chronic systolic congestive heart failure    2. SOB (shortness of breath)    3. Chest pain, unspecified type    4. Elevated troponin    5. Anemia, unspecified type    6. PAF (paroxysmal atrial fibrillation)    7. Gastrointestinal hemorrhage, unspecified gastrointestinal hemorrhage type    8       anemia probably  multifactorial and would include possibly some chronic GI blood loss related to his portal hypertension.  I do not think he is having an acute GI bleed and he had a recent EGD.  I do not think he is a good candidate for a repeat colonoscopy.  We will continue to monitor his stooling in H&H with you.  No plans for endoscopy at present.     9      compensated cirrhosis with ascites and history of nonbleeding esophageal varices  Patient's long-term prognosis is poor given his numerous severe comorbidities.  Thank you for allowing us to participate in the care of John Javier Jr..    Mino Guzmán MD

## 2022-05-08 NOTE — ED PROVIDER NOTES
Encounter Date: 5/8/2022    SCRIBE #1 NOTE: I, Rhonda Mtz, am scribing for, and in the presence of,  Paulo Fuller MD. I have scribed the following portions of the note - Other sections scribed: HPI, ROS, PE.       History     Chief Complaint   Patient presents with    Shortness of Breath     C/o increasing swelling to lower extremities and abd, mild SOB. Hx CHF, on heart transplant list. Has pacemaker/defib. Has been off fluid pill x2 months. Denies CP     65 y/o male with hx of cirrhosis, CHF, DM, HTN, and CAD presents to the ED complaining of intermittent SOB. Associated symptoms include worsening abdominal pain and abdominal distention, nausea, vomiting and chest pain. The pt also complains of dark stools for the past 2 to 3 days. The pt reports he was taken off of some of his CHF medications because they were making his BP too low. The pt denies ETOH.    The history is provided by the patient. No  was used.   Shortness of Breath  This is a chronic problem. The problem occurs intermittently.Episode onset: chronic. The problem has been gradually worsening. Associated symptoms include chest pain, vomiting, abdominal pain and leg swelling. He has tried nothing for the symptoms. Associated medical issues include CAD and heart failure.     Review of patient's allergies indicates:   Allergen Reactions    Penicillins      Pt stated his mother said it will kill him- always inform to take ampicillin    Penicillin     Sitagliptin Other (See Comments)     pancreatitis     Past Medical History:   Diagnosis Date    Anticoagulant long-term use     CHF (congestive heart failure)     Chronic combined systolic and diastolic heart failure 03/01/2020    Coronary artery disease     Diabetes mellitus     Digestive disorder     Diverticulitis     Encounter for blood transfusion     Hypertension     Renal disorder     Sleep apnea     Suspected Covid-19 Virus Infection; test negative 03/14/2020     Unspecified cirrhosis of liver      Past Surgical History:   Procedure Laterality Date    COLECTOMY      RIGHT HEART CATHETERIZATION Right 10/8/2020    Procedure: INSERTION, CATHETER, RIGHT HEART;  Surgeon: Adilson Darden Jr., MD;  Location: [facility] CATH LAB;  Service: Cardiology;  Laterality: Right;    RIGHT HEART CATHETERIZATION Right 2/3/2021    Procedure: INSERTION, CATHETER, RIGHT HEART;  Surgeon: Adilson Darden Jr., MD;  Location: [facility] CATH LAB;  Service: Cardiology;  Laterality: Right;    RIGHT HEART CATHETERIZATION Right 6/22/2021    Procedure: INSERTION, CATHETER, RIGHT HEART;  Surgeon: Yuki Delgado MD;  Location: [facility] CATH LAB;  Service: Cardiology;  Laterality: Right;     Family History   Problem Relation Age of Onset    Diabetes Mother     Hypertension Mother     Hypertension Father     Heart disease Father      Social History     Tobacco Use    Smoking status: Never Smoker    Smokeless tobacco: Never Used   Substance Use Topics    Alcohol use: Never    Drug use: Never     Review of Systems   Constitutional: Negative.    HENT: Negative.    Eyes: Negative.    Respiratory: Positive for shortness of breath.    Cardiovascular: Positive for chest pain and leg swelling.   Gastrointestinal: Positive for abdominal distention, abdominal pain, nausea and vomiting.        Dark stools   Endocrine: Negative.    Genitourinary: Negative.    Musculoskeletal: Negative.    Skin: Negative.    Allergic/Immunologic: Negative.    Neurological: Negative.    Hematological: Negative.    Psychiatric/Behavioral: Negative.    All other systems reviewed and are negative.      Physical Exam     Initial Vitals [05/08/22 1028]   BP Pulse Resp Temp SpO2   (!) 91/55 93 20 98.6 °F (37 °C) 99 %      MAP       --         Physical Exam    Nursing note and vitals reviewed.  Constitutional: He appears well-developed. He does not appear ill. No distress.   HENT:   Head: Normocephalic and atraumatic.   Mouth/Throat:  Oropharynx is clear and moist.   Eyes: Conjunctivae and EOM are normal. Pupils are equal, round, and reactive to light.   Neck: Neck supple.   Normal range of motion.  Cardiovascular: Normal rate and regular rhythm.   No murmur heard.  Pulmonary/Chest: Breath sounds normal. No respiratory distress. He exhibits no tenderness.   Pacemaker defibrillator to left chest wall   Abdominal: Abdomen is soft. Bowel sounds are normal. He exhibits distension. There is no abdominal tenderness.   Positive fluid wave  Guaiac positive   Genitourinary:    Genitourinary Comments: Chaperone present.  Brown stool.  Guaiac positive.     Musculoskeletal:         General: Normal range of motion.      Cervical back: Normal range of motion and neck supple.      Lumbar back: Normal. No tenderness. Normal range of motion.     Neurological: He is alert and oriented to person, place, and time. He has normal strength. No cranial nerve deficit or sensory deficit.   Psychiatric: He has a normal mood and affect. His mood appears not anxious.         ED Course   Critical Care    Date/Time: 5/8/2022 12:34 PM  Performed by: Paulo Fuller MD  Authorized by: Paulo Fuller MD   Total critical care time (exclusive of procedural time) : 35 minutes  Critical care time was exclusive of separately billable procedures and treating other patients and teaching time.  Critical care was necessary to treat or prevent imminent or life-threatening deterioration of the following conditions: cardiac failure, dehydration, metabolic crisis, shock, renal failure and hepatic failure.  Critical care was time spent personally by me on the following activities: blood draw for specimens, development of treatment plan with patient or surrogate, discussions with consultants, discussions with primary provider, interpretation of cardiac output measurements, evaluation of patient's response to treatment, obtaining history from patient or surrogate, ordering and performing  treatments and interventions, ordering and review of laboratory studies, ordering and review of radiographic studies, pulse oximetry, re-evaluation of patient's condition and review of old charts.        Labs Reviewed   COMPREHENSIVE METABOLIC PANEL - Abnormal; Notable for the following components:       Result Value    Carbon Dioxide 20 (*)     Glucose Level 209 (*)     Creatinine 1.51 (*)     Albumin Level 2.5 (*)     Globulin 3.8 (*)     Albumin/Globulin Ratio 0.7 (*)     Bilirubin Total 2.6 (*)     Bilirubin Direct 1.2 (*)     Bilirubin Indirect 1.40 (*)     Alanine Aminotransferase 90 (*)     Aspartate Aminotransferase 110 (*)     All other components within normal limits   CBC WITH DIFFERENTIAL - Abnormal; Notable for the following components:    RBC 2.34 (*)     Hgb 8.3 (*)     Hct 25.3 (*)     .1 (*)     MCH 35.5 (*)     MCHC 32.8 (*)     RDW 19.9 (*)     IG# 0.07 (*)     IG% 0.7 (*)     All other components within normal limits   B-TYPE NATRIURETIC PEPTIDE - Abnormal; Notable for the following components:    Natriuretic Peptide 536.6 (*)     All other components within normal limits   TROPONIN I - Abnormal; Notable for the following components:    Troponin-I 0.063 (*)     All other components within normal limits   PROTIME-INR - Abnormal; Notable for the following components:    PT 30.6 (*)     INR 3.00 (*)     All other components within normal limits   LIPASE - Normal   URINALYSIS, REFLEX TO URINE CULTURE   BLOOD SMEAR MICROSCOPIC EXAM ([facility])   ALCOHOL,MEDICAL (ETHANOL)   ACETAMINOPHEN LEVEL     EKG Readings: (Independently Interpreted)   Initial Reading: No STEMI. Rhythm: Normal Sinus Rhythm. Heart Rate: 87. Ectopy: No Ectopy. Conduction: Normal. ST Segments: Normal ST Segments. T Waves: Normal. [facility] ED EKG2 - Other Findings: intraventricular block.   EKG performed at 1031.     ECG Results          EKG 12-lead (In process)  Result time 05/08/22 10:36:36    In process by Interface, Lab In [facility]  (05/08/22 10:36:36)                 Narrative:    Test Reason : R06.02,    Vent. Rate : 087 BPM     Atrial Rate : 087 BPM     P-R Int : 188 ms          QRS Dur : 150 ms      QT Int : 412 ms       P-R-T Axes : 044 020 234 degrees     QTc Int : 495 ms    Normal sinus rhythm  Nonspecific intraventricular block  Abnormal ECG  When compared with ECG of 22-JUN-2021 11:33,  Previous ECG has undetermined rhythm, needs review  Nonspecific T wave abnormality has replaced inverted T waves in Inferior  leads    Referred By:             Confirmed By:                             Imaging Results          X-Ray Chest 1 View (Final result)  Result time 05/08/22 10:54:46    Final result by Biju Pelaez MD (05/08/22 10:54:46)                 Impression:      NO ACUTE CARDIOPULMONARY PROCESS IDENTIFIED.      Electronically signed by: Biju Pelaez  Date:    05/08/2022  Time:    10:54             Narrative:    EXAMINATION:  XR CHEST 1 VIEW    CLINICAL HISTORY:  sob;    TECHNIQUE:  One view    COMPARISON:  April 13, 2022.    FINDINGS:  Cardiopericardial silhouette is within normal limits.  Left chest implanted cardiac device electrode terminates within the right ventricle.  Left lower lung linear atelectasis or scarring.  No acute dense focal or segmental consolidation, congestive process, pleural effusions or pneumothorax.                                 Medications   aspirin EC tablet 325 mg (has no administration in time range)   furosemide injection 40 mg (has no administration in time range)   pantoprazole injection 80 mg (has no administration in time range)     Medical Decision Making:   Clinical Tests:   Lab Tests: Ordered and Reviewed  Radiological Study: Ordered and Reviewed  Medical Tests: Ordered and Reviewed          Scribe Attestation:   Scribe #1: I performed the above scribed service and the documentation accurately describes the services I performed. I attest to the accuracy of the note.        ED Course as of 05/08/22  1233   Sun May 08, 2022   [address] [facility]  Patient is a 66-year-old male with past medical history of hypertension, peripheral artery disease, ischemic cardiomyopathy with EF of 10% on echocardiogram in December of 2021, currently on transplant list in 1 month, paroxysmal atrial fibrillation on amiodarone, Eliquis, diabetes, CKD stage 3, obstructive sleep apnea presents to emergency department for chest pain and shortness of breath.  Symptoms began yesterday.  Patient states he has an appointment tomorrow at [facility], but does not know for what.  Reports he has been out of his Lasix or they have been discontinued for the last 2 months.  On exam abdomen distended, positive fluid wave.  Lab work significant for elevated troponin.  Anemic, previous hemoglobin of 13. Records in [facility] review, previous hemoglobin of 11.2.  Will discuss with Cardiology.  Will discuss with medicine.  Troponin likely elevated due to heart failure exacerbation, acute blood loss anemia. [RP]   1217 Spoke with [facility]. Will see pt. Admit to medicine. [VM]   1219 Spoke with hospital medicine. Admit pt. [VM]   1228 Patient additionally mentioned that he is also having dark stools.  Rectal exam performed with chaperone present, guaiac-positive.  Will give Protonix.  Discussed with medicine for admission.  Will hold Eliquis. [RP]      ED Course User Index  [RP] Paulo Fuller MD  [VM] Rhonda Mtz             Clinical Impression:   Final diagnoses:  [R06.02] SOB (shortness of breath)  [I50.23] Acute on chronic systolic congestive heart failure (Primary)  [R07.9] Chest pain, unspecified type  [R77.8] Elevated troponin  [D64.9] Anemia, unspecified type          ED Disposition Condition    Admit               Paulo Fuller MD  05/08/22 1234

## 2022-05-09 LAB
ABO + RH BLD: NORMAL
ALBUMIN SERPL-MCNC: 2.3 GM/DL (ref 3.4–4.8)
ALBUMIN/GLOB SERPL: 0.8 RATIO (ref 1.1–2)
ALP SERPL-CCNC: 86 UNIT/L (ref 40–150)
ALT SERPL-CCNC: 80 UNIT/L (ref 0–55)
AST SERPL-CCNC: 90 UNIT/L (ref 5–34)
BASOPHILS # BLD AUTO: 0.04 X10(3)/MCL (ref 0–0.2)
BASOPHILS NFR BLD AUTO: 0.5 %
BILIRUBIN DIRECT+TOT PNL SERPL-MCNC: 1.3 MG/DL (ref 0–0.5)
BILIRUBIN DIRECT+TOT PNL SERPL-MCNC: 1.4 MG/DL (ref 0–0.8)
BILIRUBIN DIRECT+TOT PNL SERPL-MCNC: 2.7 MG/DL
BLD PROD TYP BPU: NORMAL
BLOOD UNIT EXPIRATION DATE: NORMAL
BLOOD UNIT TYPE CODE: 5100
BUN SERPL-MCNC: 14 MG/DL (ref 8.4–25.7)
CALCIUM SERPL-MCNC: 8.2 MG/DL (ref 8.8–10)
CHLORIDE SERPL-SCNC: 100 MMOL/L (ref 98–107)
CO2 SERPL-SCNC: 25 MMOL/L (ref 23–31)
CREAT SERPL-MCNC: 1.66 MG/DL (ref 0.73–1.18)
CROSSMATCH INTERPRETATION: NORMAL
DISPENSE STATUS: NORMAL
EOSINOPHIL # BLD AUTO: 0.16 X10(3)/MCL (ref 0–0.9)
EOSINOPHIL NFR BLD AUTO: 1.9 %
ERYTHROCYTE [DISTWIDTH] IN BLOOD BY AUTOMATED COUNT: 19.3 % (ref 11.5–17)
FOLATE SERPL-MCNC: 10.4 NG/ML (ref 7–31.4)
GLOBULIN SER-MCNC: 2.9 GM/DL (ref 2.4–3.5)
GLUCOSE SERPL-MCNC: 138 MG/DL (ref 82–115)
GROUP & RH: NORMAL
HCT VFR BLD AUTO: 21.5 % (ref 42–52)
HGB BLD-MCNC: 7.1 GM/DL (ref 14–18)
IMM GRANULOCYTES # BLD AUTO: 0.05 X10(3)/MCL (ref 0–0.02)
IMM GRANULOCYTES NFR BLD AUTO: 0.6 % (ref 0–0.43)
INDIRECT COOMBS GEL: NORMAL
IRON SATN MFR SERPL: 34 % (ref 20–50)
IRON SERPL-MCNC: 54 UG/DL (ref 65–175)
LYMPHOCYTES # BLD AUTO: 1.47 X10(3)/MCL (ref 0.6–4.6)
LYMPHOCYTES NFR BLD AUTO: 17.5 %
MCH RBC QN AUTO: 35.1 PG (ref 27–31)
MCHC RBC AUTO-ENTMCNC: 33 MG/DL (ref 33–36)
MCV RBC AUTO: 106.4 FL (ref 80–94)
MONOCYTES # BLD AUTO: 0.85 X10(3)/MCL (ref 0.1–1.3)
MONOCYTES NFR BLD AUTO: 10.1 %
NEUTROPHILS # BLD AUTO: 5.8 X10(3)/MCL (ref 2.1–9.2)
NEUTROPHILS NFR BLD AUTO: 69.4 %
NRBC BLD AUTO-RTO: 0 %
PLATELET # BLD AUTO: 128 X10(3)/MCL (ref 130–400)
PMV BLD AUTO: 12 FL (ref 9.4–12.4)
POTASSIUM SERPL-SCNC: 4.2 MMOL/L (ref 3.5–5.1)
PROT SERPL-MCNC: 5.2 GM/DL (ref 5.8–7.6)
RBC # BLD AUTO: 2.02 X10(6)/MCL (ref 4.7–6.1)
SODIUM SERPL-SCNC: 134 MMOL/L (ref 136–145)
TIBC SERPL-MCNC: 107 UG/DL (ref 69–240)
TIBC SERPL-MCNC: 161 UG/DL (ref 250–450)
TRANSFERRIN SERPL-MCNC: 146 MG/DL (ref 163–344)
UNIT NUMBER: NORMAL
WBC # SPEC AUTO: 8.4 X10(3)/MCL (ref 4.5–11.5)

## 2022-05-09 PROCEDURE — 85025 COMPLETE CBC W/AUTO DIFF WBC: CPT | Performed by: NURSE PRACTITIONER

## 2022-05-09 PROCEDURE — 25000003 PHARM REV CODE 250: Performed by: NURSE PRACTITIONER

## 2022-05-09 PROCEDURE — 63600175 PHARM REV CODE 636 W HCPCS: Performed by: PHYSICIAN ASSISTANT

## 2022-05-09 PROCEDURE — 36415 COLL VENOUS BLD VENIPUNCTURE: CPT | Performed by: INTERNAL MEDICINE

## 2022-05-09 PROCEDURE — 11000001 HC ACUTE MED/SURG PRIVATE ROOM

## 2022-05-09 PROCEDURE — 80053 COMPREHEN METABOLIC PANEL: CPT | Performed by: NURSE PRACTITIONER

## 2022-05-09 PROCEDURE — 36415 COLL VENOUS BLD VENIPUNCTURE: CPT | Performed by: NURSE PRACTITIONER

## 2022-05-09 PROCEDURE — 63600175 PHARM REV CODE 636 W HCPCS: Performed by: INTERNAL MEDICINE

## 2022-05-09 PROCEDURE — 25000003 PHARM REV CODE 250: Performed by: PHYSICIAN ASSISTANT

## 2022-05-09 PROCEDURE — P9016 RBC LEUKOCYTES REDUCED: HCPCS | Performed by: INTERNAL MEDICINE

## 2022-05-09 PROCEDURE — 36430 TRANSFUSION BLD/BLD COMPNT: CPT

## 2022-05-09 PROCEDURE — 25000242 PHARM REV CODE 250 ALT 637 W/ HCPCS: Performed by: INTERNAL MEDICINE

## 2022-05-09 PROCEDURE — 86850 RBC ANTIBODY SCREEN: CPT | Performed by: INTERNAL MEDICINE

## 2022-05-09 PROCEDURE — 25000003 PHARM REV CODE 250: Performed by: INTERNAL MEDICINE

## 2022-05-09 PROCEDURE — 86920 COMPATIBILITY TEST SPIN: CPT | Performed by: INTERNAL MEDICINE

## 2022-05-09 RX ORDER — HYDROCODONE BITARTRATE AND ACETAMINOPHEN 500; 5 MG/1; MG/1
TABLET ORAL
Status: DISCONTINUED | OUTPATIENT
Start: 2022-05-09 | End: 2022-05-15 | Stop reason: HOSPADM

## 2022-05-09 RX ORDER — SPIRONOLACTONE 25 MG/1
25 TABLET ORAL DAILY
Status: DISCONTINUED | OUTPATIENT
Start: 2022-05-09 | End: 2022-05-10

## 2022-05-09 RX ORDER — METOPROLOL SUCCINATE 50 MG/1
100 TABLET, EXTENDED RELEASE ORAL DAILY
Status: DISCONTINUED | OUTPATIENT
Start: 2022-05-09 | End: 2022-05-15 | Stop reason: HOSPADM

## 2022-05-09 RX ORDER — PANTOPRAZOLE SODIUM 40 MG/1
40 TABLET, DELAYED RELEASE ORAL 2 TIMES DAILY
Status: DISCONTINUED | OUTPATIENT
Start: 2022-05-09 | End: 2022-05-15 | Stop reason: HOSPADM

## 2022-05-09 RX ORDER — AMIODARONE HYDROCHLORIDE 200 MG/1
200 TABLET ORAL DAILY
Status: DISCONTINUED | OUTPATIENT
Start: 2022-05-10 | End: 2022-05-15 | Stop reason: HOSPADM

## 2022-05-09 RX ADMIN — PANTOPRAZOLE SODIUM 40 MG: 40 TABLET, DELAYED RELEASE ORAL at 09:05

## 2022-05-09 RX ADMIN — FLUTICASONE PROPIONATE 50 MCG: 50 SPRAY, METERED NASAL at 09:05

## 2022-05-09 RX ADMIN — PHYTONADIONE 10 MG: 10 INJECTION, EMULSION INTRAMUSCULAR; INTRAVENOUS; SUBCUTANEOUS at 09:05

## 2022-05-09 RX ADMIN — SPIRONOLACTONE 25 MG: 25 TABLET ORAL at 12:05

## 2022-05-09 RX ADMIN — METOPROLOL SUCCINATE 100 MG: 50 TABLET, FILM COATED, EXTENDED RELEASE ORAL at 12:05

## 2022-05-09 RX ADMIN — AMIODARONE HYDROCHLORIDE 400 MG: 200 TABLET ORAL at 09:05

## 2022-05-09 RX ADMIN — FUROSEMIDE 40 MG: 10 INJECTION, SOLUTION INTRAMUSCULAR; INTRAVENOUS at 09:05

## 2022-05-09 NOTE — PLAN OF CARE
05/09/22 1518   Discharge Assessment   Confirmed/corrected address, phone number and insurance Yes   Source of Information patient   Lives With alone   Do you expect to return to your current living situation? Yes   Prior to hospitilization cognitive status: Alert/Oriented   Walking or Climbing Stairs Difficulty ambulation difficulty, requires equipment   Equipment Currently Used at Home rollator   Do you currently have service(s) that help you manage your care at home? Yes   Name and Contact number of agency [facility]   Discharge Plan A Home Health   DME Needed Upon Discharge  walker, rolling   PCP:Parish Currie

## 2022-05-09 NOTE — PROGRESS NOTES
"Gastroenterology Progress Note    Subjective:    Tolerating liquid diet this AM. Reports his last stool was brown with a red blood streak. He was previously having black stools at home. Denies abdominal pain, nausea, or vomiting.    Hgb 8.3-->7.1-->1 unit ordered.    Plt 128,000    INR 3.0      ROS:    Review of Systems   Respiratory: Negative for shortness of breath.    Cardiovascular: Negative for chest pain.   Gastrointestinal: Negative for abdominal pain, nausea and vomiting.         Vital Signs:  /62 (BP Location: Right arm, Patient Position: Lying)   Pulse 82   Temp 98.1 °F (36.7 °C) (Oral)   Resp 18   Ht 5' 4" (1.626 m)   Wt 58.3 kg (128 lb 8.5 oz)   SpO2 (!) 92%   BMI 22.06 kg/m²   Body mass index is 22.06 kg/m².    Physical Exam:    Physical Exam  Constitutional:       General: He is not in acute distress.  Cardiovascular:      Rate and Rhythm: Normal rate and regular rhythm.   Pulmonary:      Effort: Pulmonary effort is normal. No respiratory distress.   Abdominal:      General: Bowel sounds are normal. There is distension.      Palpations: Abdomen is soft.      Tenderness: There is no abdominal tenderness.   Skin:     General: Skin is warm and dry.   Neurological:      Mental Status: He is alert.         Labs:  Recent Results (from the past 48 hour(s))   Comprehensive Metabolic Panel    Collection Time: 05/08/22 11:02 AM   Result Value Ref Range    Sodium Level 136 136 - 145 mmol/L    Potassium Level 4.9 3.5 - 5.1 mmol/L    Chloride 106 98 - 107 mmol/L    Carbon Dioxide 20 (L) 23 - 31 mmol/L    Glucose Level 209 (H) 82 - 115 mg/dL    Blood Urea Nitrogen 13.3 8.4 - 25.7 mg/dL    Creatinine 1.51 (H) 0.73 - 1.18 mg/dL    Calcium Level Total 8.8 8.8 - 10.0 mg/dL    Protein Total 6.3 5.8 - 7.6 gm/dL    Albumin Level 2.5 (L) 3.4 - 4.8 gm/dL    Globulin 3.8 (H) 2.4 - 3.5 gm/dL    Albumin/Globulin Ratio 0.7 (L) 1.1 - 2.0 ratio    Bilirubin Total 2.6 (H) <=1.5 mg/dL    Bilirubin Direct 1.2 (H) 0.0 - " 0.5 mg/dL    Bilirubin Indirect 1.40 (H) 0.00 - 0.80 mg/dL    Alkaline Phosphatase 98 40 - 150 unit/L    Alanine Aminotransferase 90 (H) 0 - 55 unit/L    Aspartate Aminotransferase 110 (H) 5 - 34 unit/L    Estimated GFR- 60 mls/min/1.73/m2   CBC with Differential    Collection Time: 05/08/22 11:02 AM   Result Value Ref Range    WBC 10.7 4.5 - 11.5 x10(3)/mcL    RBC 2.34 (L) 4.70 - 6.10 x10(6)/mcL    Hgb 8.3 (L) 14.0 - 18.0 gm/dL    Hct 25.3 (L) 42.0 - 52.0 %    .1 (H) 80.0 - 94.0 fL    MCH 35.5 (H) 27.0 - 31.0 pg    MCHC 32.8 (L) 33.0 - 36.0 mg/dL    RDW 19.9 (H) 11.5 - 17.0 %    Platelet 132 130 - 400 x10(3)/mcL    MPV 12.1 9.4 - 12.4 fL    Neutro Auto 77.7 %    Lymph Auto 12.9 %    Mono Auto 7.8 %    Eos Auto 0.4 %    Basophil Auto 0.5 %    Abs Lymph 1.38 0.6 - 4.6 x10(3)/mcL    Abs Neutro 8.3 2.1 - 9.2 x10(3)/mcL    Abs Mono 0.83 0.1 - 1.3 x10(3)/mcL    Abs Eos 0.04 0 - 0.9 x10(3)/mcL    Abs Baso 0.05 0 - 0.2 x10(3)/mcL    IG# 0.07 (H) 0 - 0.0155 x10(3)/mcL    IG% 0.7 (H) 0 - 0.43 %    NRBC% 0.0 %   BNP    Collection Time: 05/08/22 11:02 AM   Result Value Ref Range    Natriuretic Peptide 536.6 (H) <=100.0 pg/mL   Troponin I    Collection Time: 05/08/22 11:02 AM   Result Value Ref Range    Troponin-I 0.063 (H) 0.000 - 0.045 ng/mL   Lipase    Collection Time: 05/08/22 11:02 AM   Result Value Ref Range    Lipase Level 36 <=60 U/L   Protime-INR    Collection Time: 05/08/22 11:02 AM   Result Value Ref Range    PT 30.6 (H) 12.5 - 14.5 seconds    INR 3.00 (H) 0.00 - 1.30   Blood Smear Microscopic Exam    Collection Time: 05/08/22 11:02 AM   Result Value Ref Range    Platelet Est Decreased (A) Adequate    RBC Morph Abnormal (A) Normal    Edgar Cells 2+ (A) (none)    Macrocyte 1+ (A) (none)    Poik 2+ (A) (none)   Ethanol    Collection Time: 05/08/22 11:02 AM   Result Value Ref Range    Ethanol Level <10.0 <=10.0 mg/dL   Acetaminophen Level    Collection Time: 05/08/22 11:02 AM   Result Value Ref  Range    Acetaminophen Level <17.4 (L) 17.4 - 30.0 ug/ml   POCT glucose    Collection Time: 05/08/22  4:21 PM   Result Value Ref Range    POCT Glucose 270 (H) 70 - 110 mg/dL   Troponin I    Collection Time: 05/08/22  5:12 PM   Result Value Ref Range    Troponin-I 0.061 (H) 0.000 - 0.045 ng/mL   Urinalysis, Reflex to Urine Culture Urine, Clean Catch    Collection Time: 05/08/22  7:16 PM    Specimen: Urine   Result Value Ref Range    Color, UA Yellow Yellow, Colorless, Other, Clear    Appearance, UA Clear Clear    Specific Gravity, UA 1.009 1.001 - 1.030    pH, UA 5.0 5.0, 5.5, 6.0, 6.5, 7.0, 7.5, 8.0, 8.5    Protein, UA Negative Negative mg/dL    Glucose, UA Negative Negative, Normal mg/dL    Ketones, UA Negative Negative, +1, +2, +3, +4, +5 mg/dL    Blood, UA Negative Negative unit/L    Bilirubin, UA Negative Negative mg/dL    Urobilinogen, UA 0.2 0.2, 1.0, Normal mg/dL    Nitrites, UA Negative Negative    Leukocyte Esterase, UA Negative Negative, 75  unit/L   Urinalysis, Microscopic    Collection Time: 05/08/22  7:16 PM   Result Value Ref Range    RBC, UA None Seen None Seen, 0-2, 3-5, 0-5 /HPF    WBC, UA None Seen None Seen, 0-2, 3-5, 0-5 /HPF    Squamous Epithelial Cells, UA None Seen 0-4, None Seen /LPF    Bacteria, UA None Seen None Seen, Rare, Occasional /HPF   Folate    Collection Time: 05/08/22 10:56 PM   Result Value Ref Range    Folate Level 10.4 7.0 - 31.4 ng/mL   Iron and TIBC    Collection Time: 05/08/22 10:56 PM   Result Value Ref Range    Iron Binding Capacity Unsaturated 107 69 - 240 ug/dL    Iron Level 54 (L) 65 - 175 ug/dL    Transferrin 146 (L) 163 - 344 mg/dL    Iron Binding Capacity Total 161 (L) 250 - 450 ug/dL    Iron Saturation 215 (H) 20 - 50 %   Troponin I    Collection Time: 05/08/22 10:56 PM   Result Value Ref Range    Troponin-I 0.050 (H) 0.000 - 0.045 ng/mL   Comprehensive Metabolic Panel    Collection Time: 05/09/22  4:15 AM   Result Value Ref Range    Sodium Level 134 (L) 136 - 145  mmol/L    Potassium Level 4.2 3.5 - 5.1 mmol/L    Chloride 100 98 - 107 mmol/L    Carbon Dioxide 25 23 - 31 mmol/L    Glucose Level 138 (H) 82 - 115 mg/dL    Blood Urea Nitrogen 14.0 8.4 - 25.7 mg/dL    Creatinine 1.66 (H) 0.73 - 1.18 mg/dL    Calcium Level Total 8.2 (L) 8.8 - 10.0 mg/dL    Protein Total 5.2 (L) 5.8 - 7.6 gm/dL    Albumin Level 2.3 (L) 3.4 - 4.8 gm/dL    Globulin 2.9 2.4 - 3.5 gm/dL    Albumin/Globulin Ratio 0.8 (L) 1.1 - 2.0 ratio    Bilirubin Total 2.7 (H) <=1.5 mg/dL    Bilirubin Direct 1.3 (H) 0.0 - 0.5 mg/dL    Bilirubin Indirect 1.40 (H) 0.00 - 0.80 mg/dL    Alkaline Phosphatase 86 40 - 150 unit/L    Alanine Aminotransferase 80 (H) 0 - 55 unit/L    Aspartate Aminotransferase 90 (H) 5 - 34 unit/L    Estimated GFR- 54 mls/min/1.73/m2   CBC with Differential    Collection Time: 05/09/22  4:15 AM   Result Value Ref Range    WBC 8.4 4.5 - 11.5 x10(3)/mcL    RBC 2.02 (L) 4.70 - 6.10 x10(6)/mcL    Hgb 7.1 (L) 14.0 - 18.0 gm/dL    Hct 21.5 (L) 42.0 - 52.0 %    .4 (H) 80.0 - 94.0 fL    MCH 35.1 (H) 27.0 - 31.0 pg    MCHC 33.0 33.0 - 36.0 mg/dL    RDW 19.3 (H) 11.5 - 17.0 %    Platelet 128 (L) 130 - 400 x10(3)/mcL    MPV 12.0 9.4 - 12.4 fL    Neutro Auto 69.4 %    Lymph Auto 17.5 %    Mono Auto 10.1 %    Eos Auto 1.9 %    Basophil Auto 0.5 %    Abs Lymph 1.47 0.6 - 4.6 x10(3)/mcL    Abs Neutro 5.8 2.1 - 9.2 x10(3)/mcL    Abs Mono 0.85 0.1 - 1.3 x10(3)/mcL    Abs Eos 0.16 0 - 0.9 x10(3)/mcL    Abs Baso 0.04 0 - 0.2 x10(3)/mcL    IG# 0.05 (H) 0 - 0.0155 x10(3)/mcL    IG% 0.6 (H) 0 - 0.43 %    NRBC% 0.0 %         Assessment/Plan:  1. Acute on chronic systolic congestive heart failure    2. SOB (shortness of breath)    3. Chest pain, unspecified type    4. Elevated troponin    5. Anemia, unspecified type    6. PAF (paroxysmal atrial fibrillation)    7. Gastrointestinal hemorrhage, unspecified gastrointestinal hemorrhage type      65 y/o male with a PMHx of CHF/ischemic CMO EF 25-30%  s/p AICD awaiting heart transplant in [place], cirrhosis, PAD, DM2, JANET noncompliant with CPAP, VHD and PAF on Eliquis. Here with BLE swelling and intermittent dark stools and SOB.    GI hx : diagnosed with cirrhosis in January of this year felt probably secondary to congestive hepatopathy. Paracentesis fluid studies were consistent with portal hypertension.  He underwent an EGD on 03/02/2022 and was found to have small esophageal varices, normal stomach and normal duodenum.  His last colonoscopy was in 2018 where he was found to have a couple of small polyps as well as diverticulosis and diverticulitis.  A 5 year follow-up was recommended.  Patient has been felt to be too sick to undergo colonoscopy at this time.         1. Intermittent dark stools  2. Macrocytic anemia  3. Elevated INR  4. Thrombocytopenia  Hgb 8.3-->7.1  Plt 128,000  INR 3.0  Normal folate  Low iron 54, low TIBC 161, low transferrin 146, elevated iron % sat 215.  BUN normal  - Monitor stools for color and signs of bleeding  - Monitor H&H; transfuse to keep hgb >7-8. 1 unit ordered earlier  - Repeat INR  - PPI BID  - Avoid NSAIDs  - Likely a poor colonoscopy candidate  - Keep NPO after midnight for tentative EGD tomorrow since he ate this AM. Also, INR 3.0 which may need to improve before considering EGD. Will discuss further    5. Cirrhosis-->felt secondary to CHF  EGD 3/2/22: small esophageal varices, normal stomach and normal duodenum.   Paracentesis 1/2022: fluid analysis consistent with portal HTN  -Moderate ascites on US, but not symptomatic. Will hold off on paracentesis for now. Continue Lasix 40 mg daily and spironolactone 25 mg daily  -Needs to f/u with his primary GI after d/c    Reese Isabel PA-C

## 2022-05-09 NOTE — CONSULTS
Inpatient consult to Cardiology  Consult performed by: LORI Gaona  Consult ordered by: Fernanda Meraz MD        [facility] - 6th Floor Medical Telemetry  Cardiology  Consult Note    Patient Name: John Javier Jr.  MRN: [id number]  Admission Date: 5/8/2022  Hospital Length of Stay: 1 days  Code Status: Full Code   Attending Provider: Fernanda Meraz MD   Consulting Provider: LORI Gaona  Primary Care Physician: Og Moss MD  Principal Problem:<principal problem not specified>    Patient information was obtained from patient and ER records.     Subjective:     Chief Complaint:  Consult for elevated troponin     HPI:   Mr. Lopez is a 66 year old, known to Dr. Redman, who presented to [facility] on 5.8.22 with c/o increasing swelling to BLE and abdomen. He has c/o mild SOB. He has a history of CHF, CAD, CMO-s/p AICD, V tach, PAF, HTN, HLD, PAD, mod MR, and JANET. Upon workup, his BNP was 536, and troponin was mildly elevated. [facility] is being consulted for NSTEMI.      PMH:  CHF, CAD, CMO-s/p AICD, V tach, PAF, HTN, HLD, PAD, mod MR, and JANET.  PSH:  AICD, h/o partial resection of the colon, coronary stents  Social History: Father-CABG, HTN; Mother-HTN, DM 2  Family History: Tobacco-denies; Alcohol-quit 4 years ago; Illicit drug use-denies    Previous Cardiac Diagnostics:   Echo 4.13.22  Lv concentric remodeling.  LV systolic function is severely reduced.  Visually estimated EF is 25-30%. Severe global hypokinesis.  Grade I DD  Mildly enlarged left atrium  There is trace TR with RVSP estimated at 28 mm Hg.    LHC 7.12.18  Left main-large, normal  LAD-normal  Diag-normal  Left cx-normal  RCA patent stent          Past Medical History:   Diagnosis Date    Anticoagulant long-term use     CHF (congestive heart failure)     Chronic combined systolic and diastolic heart failure 03/01/2020    Coronary artery disease     Diabetes mellitus     Digestive disorder     Diverticulitis      Encounter for blood transfusion     Hypertension     Renal disorder     Sleep apnea     Suspected Covid-19 Virus Infection; test negative 2020    Unspecified cirrhosis of liver        Past Surgical History:   Procedure Laterality Date    COLECTOMY      RIGHT HEART CATHETERIZATION Right 10/8/2020    Procedure: INSERTION, CATHETER, RIGHT HEART;  Surgeon: Adilson Darden Jr., MD;  Location: [facility] CATH LAB;  Service: Cardiology;  Laterality: Right;    RIGHT HEART CATHETERIZATION Right 2/3/2021    Procedure: INSERTION, CATHETER, RIGHT HEART;  Surgeon: Adilson Darden Jr., MD;  Location: [facility] CATH LAB;  Service: Cardiology;  Laterality: Right;    RIGHT HEART CATHETERIZATION Right 2021    Procedure: INSERTION, CATHETER, RIGHT HEART;  Surgeon: Yuki Delgado MD;  Location: [facility] CATH LAB;  Service: Cardiology;  Laterality: Right;       Review of patient's allergies indicates:   Allergen Reactions    Penicillins      Pt stated his mother said it will kill him- always inform to take ampicillin    Penicillin     Sitagliptin Other (See Comments)     pancreatitis       No current facility-administered medications on file prior to encounter.     Current Outpatient Medications on File Prior to Encounter   Medication Sig    albuterol (PROVENTIL/VENTOLIN HFA) 90 mcg/actuation inhaler INHALE 2 PUFFS BY MOUTH FOUR TIMES DAILY AS NEEDED FOR WHEEZING OR SHORTNESS OF BREATH    amiodarone (PACERONE) 400 MG tablet Take 1 tablet (400 mg total) by mouth once daily.    apixaban (ELIQUIS) 5 mg Tab Take 1 tablet (5 mg total) by mouth 2 (two) times daily.    fluticasone propionate (FLONASE) 50 mcg/actuation nasal spray     metoprolol succinate (TOPROL-XL) 100 MG 24 hr tablet Take 1 tablet (100 mg total) by mouth once daily.    nitroGLYCERIN (NITROSTAT) 0.4 MG SL tablet SMARTSI Tablet(s) Sublingual As Needed    pantoprazole (PROTONIX) 40 MG tablet Take 1 tablet (40 mg total) by mouth once daily.     atorvastatin (LIPITOR) 20 MG tablet Take 1 tablet (20 mg total) by mouth every evening.     Family History     Problem Relation (Age of Onset)    Diabetes Mother    Heart disease Father    Hypertension Mother, Father        Tobacco Use    Smoking status: Never Smoker    Smokeless tobacco: Never Used   Substance and Sexual Activity    Alcohol use: Never    Drug use: Never    Sexual activity: Not on file     Review of Systems:  Review of Systems   Respiratory: Positive for shortness of breath.    Cardiovascular: Positive for leg swelling.   Gastrointestinal: Positive for abdominal distention.   All other systems reviewed and are negative.      Objective:     Vital Signs (Most Recent):  Temp: 98.1 °F (36.7 °C) (05/09/22 0735)  Pulse: 82 (05/09/22 0735)  Resp: 18 (05/09/22 0735)  BP: 101/62 (05/09/22 0735)  SpO2: (!) 92 % (05/09/22 0735) Vital Signs (24h Range):  Temp:  [97.3 °F (36.3 °C)-98.6 °F (37 °C)] 98.1 °F (36.7 °C)  Pulse:  [79-93] 82  Resp:  [17-20] 18  SpO2:  [92 %-100 %] 92 %  BP: ()/(55-70) 101/62     Weight: 58.3 kg (128 lb 8.5 oz)  Body mass index is 22.06 kg/m².    SpO2: (!) 92 %  O2 Device (Oxygen Therapy): room air      Intake/Output Summary (Last 24 hours) at 5/9/2022 1018  Last data filed at 5/8/2022 2300  Gross per 24 hour   Intake 1560 ml   Output 300 ml   Net 1260 ml       Lines/Drains/Airways     Peripheral Intravenous Line  Duration                Peripheral IV - Single Lumen 05/08/22 1219 20 G Left Antecubital <1 day                  Significant Labs:  Recent Lab Results  (Last 5 results in the past 72 hours)      05/09/22  0415   05/08/22  2256   05/08/22  1916   05/08/22  1712   05/08/22  1621        Abs Lymph 1.47               Abs Neutro 5.8               Albumin 2.3               Albumin/Globulin Ratio 0.8               Alkaline Phosphatase 86               ALT 80               Appearance, UA     Clear           AST 90               Bacteria, UA     None Seen           Baso #  0.04               Basophil % 0.5               Bilirubin, Direct 1.3               Bilirubin, Indirect 1.40               BILIRUBIN TOTAL 2.7               Bilirubin, UA     Negative           BUN 14.0               Calcium 8.2               Chloride 100               CO2 25               Color, UA     Yellow           Creatinine 1.66               eGFR if  54               Eos # 0.16               Eosinophil % 1.9               Folate   10.4             Globulin, Total 2.9               Glucose 138               Glucose, UA     Negative           Hematocrit 21.5               Hemoglobin 7.1               Immature Grans (Abs) 0.05               Immature Granulocytes 0.6               Iron   54             Iron Binding Capacity Unsaturated   107             Iron Saturation   215             Ketones, UA     Negative           Leukocytes, UA     Negative           Lymph Auto 17.5               MCH 35.1               MCHC 33.0               .4               Mono # 0.85               Mono % 10.1               MPV 12.0               Neutrophils Relative 69.4               NITRITE UA     Negative           nRBC 0.0               Occult Blood UA     Negative           pH, UA     5.0           Platelets 128               POCT Glucose         270       Potassium 4.2               PROTEIN TOTAL 5.2               Protein, UA     Negative           RBC 2.02               RBC, UA     None Seen           RDW 19.3               Sodium 134               Specific Gravity,UA     1.009           Squam Epithel, UA     None Seen           TIBC   161             Transferrin   146             Troponin I   0.050     0.061         Urobilinogen, UA     0.2           WBC, UA     None Seen           WBC 8.4                                      Significant Imaging: X-Ray: CXR: X-Ray Chest 1 View (CXR):   Results for orders placed or performed during the hospital encounter of 05/08/22   X-Ray Chest 1 View    Narrative     EXAMINATION:  XR CHEST 1 VIEW    CLINICAL HISTORY:  sob;    TECHNIQUE:  One view    COMPARISON:  April 13, 2022.    FINDINGS:  Cardiopericardial silhouette is within normal limits.  Left chest implanted cardiac device electrode terminates within the right ventricle.  Left lower lung linear atelectasis or scarring.  No acute dense focal or segmental consolidation, congestive process, pleural effusions or pneumothorax.      Impression    NO ACUTE CARDIOPULMONARY PROCESS IDENTIFIED.      Electronically signed by: Biju Pelaez  Date:    05/08/2022  Time:    10:54     Imaging Results          X-Ray Chest 1 View (Final result)  Result time 05/08/22 10:54:46    Final result by Biju Pelaez MD (05/08/22 10:54:46)                 Impression:      NO ACUTE CARDIOPULMONARY PROCESS IDENTIFIED.      Electronically signed by: Biju Pelaez  Date:    05/08/2022  Time:    10:54             Narrative:    EXAMINATION:  XR CHEST 1 VIEW    CLINICAL HISTORY:  sob;    TECHNIQUE:  One view    COMPARISON:  April 13, 2022.    FINDINGS:  Cardiopericardial silhouette is within normal limits.  Left chest implanted cardiac device electrode terminates within the right ventricle.  Left lower lung linear atelectasis or scarring.  No acute dense focal or segmental consolidation, congestive process, pleural effusions or pneumothorax.                                Last Lipids:  Lab Results   Component Value Date    CHOL 113 12/07/2021    CHOL 174 03/11/2021    CHOL 123 07/28/2020     Lab Results   Component Value Date    HDL 25 (L) 12/07/2021    HDL 44 03/11/2021    HDL 53 07/28/2020     Lab Results   Component Value Date    LDLCALC 97.8 03/11/2021    LDLCALC 56.0 (L) 07/28/2020    LDLCALC 78.6 03/01/2020     Lab Results   Component Value Date    TRIG 87 12/07/2021    TRIG 161 (H) 03/11/2021    TRIG 70 07/28/2020     Lab Results   Component Value Date    CHOLHDL 25.3 03/11/2021    CHOLHDL 43.1 07/28/2020    CHOLHDL 25.2 03/01/2020       EKG:     Results for orders placed or performed during the hospital encounter of 05/08/22   EKG 12-lead    Narrative    Test Reason : R06.02,    Vent. Rate : 087 BPM     Atrial Rate : 087 BPM     P-R Int : 188 ms          QRS Dur : 150 ms      QT Int : 412 ms       P-R-T Axes : 044 020 234 degrees     QTc Int : 495 ms    Normal sinus rhythm  Nonspecific intraventricular block  Abnormal ECG  When compared with ECG of 22-JUN-2021 11:33,  Previous ECG has undetermined rhythm, needs review  Nonspecific T wave abnormality has replaced inverted T waves in Inferior  leads    Referred By:             Confirmed By:        Telemetry:  SR    Physical Exam:  Physical Exam  Vitals reviewed.   Constitutional:       Appearance: Normal appearance.   Cardiovascular:      Rate and Rhythm: Normal rate and regular rhythm.      Pulses: Normal pulses.      Heart sounds: Normal heart sounds.      Comments: 2+ BLE edema  Abdominal:      General: There is distension.   Skin:     Capillary Refill: Capillary refill takes less than 2 seconds.   Neurological:      General: No focal deficit present.      Mental Status: He is alert and oriented to person, place, and time.         Home Medications:   No current facility-administered medications on file prior to encounter.     Current Outpatient Medications on File Prior to Encounter   Medication Sig Dispense Refill    albuterol (PROVENTIL/VENTOLIN HFA) 90 mcg/actuation inhaler INHALE 2 PUFFS BY MOUTH FOUR TIMES DAILY AS NEEDED FOR WHEEZING OR SHORTNESS OF BREATH      amiodarone (PACERONE) 400 MG tablet Take 1 tablet (400 mg total) by mouth once daily. 90 tablet 3    apixaban (ELIQUIS) 5 mg Tab Take 1 tablet (5 mg total) by mouth 2 (two) times daily. 180 tablet 3    fluticasone propionate (FLONASE) 50 mcg/actuation nasal spray       metoprolol succinate (TOPROL-XL) 100 MG 24 hr tablet Take 1 tablet (100 mg total) by mouth once daily. 90 tablet 3    nitroGLYCERIN (NITROSTAT) 0.4 MG SL tablet  SMARTSI Tablet(s) Sublingual As Needed 100 tablet 3    pantoprazole (PROTONIX) 40 MG tablet Take 1 tablet (40 mg total) by mouth once daily. 90 tablet 3    atorvastatin (LIPITOR) 20 MG tablet Take 1 tablet (20 mg total) by mouth every evening. 90 tablet 3       Current Inpatient Medications:    Current Facility-Administered Medications:     0.9%  NaCl infusion (for blood administration), , Intravenous, Q24H PRN, Fernanda Meraz MD    acetaminophen tablet 650 mg, 650 mg, Oral, Q8H PRN, Morena Bose, AGACNP-BC    acetaminophen tablet 650 mg, 650 mg, Oral, Q4H PRN, Morena Bose, AGACNP-BC    albuterol inhaler 1 puff, 1 puff, Inhalation, Q4H PRN, Fernanda Meraz MD    amiodarone tablet 400 mg, 400 mg, Oral, Daily, Fernanda Meraz MD, 400 mg at 22    atorvastatin tablet 20 mg, 20 mg, Oral, QHS, Fernanda Meraz MD, 20 mg at 22    dextrose 10% bolus 125 mL, 12.5 g, Intravenous, PRN, Morena Carrenot, AGACNP-BC    dextrose 10% bolus 250 mL, 25 g, Intravenous, PRN, FRANDY MarionP-BC    fluticasone propionate 50 mcg/actuation nasal spray 50 mcg, 1 spray, Each Nostril, Daily, Fernanda Meraz MD, 50 mcg at 22    furosemide injection 40 mg, 40 mg, Intravenous, Daily, Fernanda Meraz MD, 40 mg at 22    glucagon (human recombinant) injection 1 mg, 1 mg, Intramuscular, PRN, Morena Bose, AGACNP-BC    glucose chewable tablet 16 g, 16 g, Oral, PRN, Morena JIMENEZ Doumit, AGACNP-BC    glucose chewable tablet 24 g, 24 g, Oral, PRN, Morena Carrenot, AGACNP-BC    pantoprazole EC tablet 40 mg, 40 mg, Oral, Daily, Fernanda Meraz MD, 40 mg at 22         VTE Risk Mitigation (From admission, onward)         Ordered     IP VTE HIGH RISK PATIENT  Once         22     Place sequential compression device  Until discontinued         22                Assessment:     There are no hospital problems to display for this  patient.      IMPRESSION:  CMO   -EF 25%   -s/p AICD   -being worked up for heart transplant  Acute/Chronic Systolic Heart Failure   -Patient was taken off of entresto and diuretics due to hypotension.  NSTEMI probable type II (demand ischemia) in the setting of acute heart failure exacerbation and acute anemia   -troponin low level, trending down  Anemia  Acute/Chronic Renal Failure  VT/VF  PAF   -CHADS-Vasc 4  Cirrhosis/portal hypertension  Esophageal varices  PAD  HTN  HLD  VHD   -mod MR  JANET  GI bleed-GI workup in progress      PLAN:   Plan:  Continue Amiodaron 200 mg po daily/ranexa 500 mg po bid/Aldactone 25 mg po daily  Continue metoprolol and entresto as bp tolerates  Continue Eliquis when cleared by GI  No statin due to cirrhosis  Strict I & O  Daily weights  Follow up with Dr. Redman in 5-7 days.        Thank you for your consult.     Cortney Campbell, LORI  Cardiology  [facility] - 6th Floor Medical Telemetry  05/09/2022 10:18 AM    ATTESTATION:   I evaluated the patient and agree with the NP's A/P.

## 2022-05-09 NOTE — PLAN OF CARE
Lives alone. Current with [facility]. Patient has a rollator. Patient states that rollator is to heavy for him to carry and request a rolling walker. List of dme providers given. FOC obtained for [facility]. Referral faxed.

## 2022-05-09 NOTE — PROGRESS NOTES
[facility]  Hospital Medicine Progress Note        Chief Complaint: Inpatient Follow-up for chf exacerbation    HPI: 66 y.o. male with a PMHx of CHF/ischemic CMO EF 25-30% s/p AICD awaiting heart transplant in [facility], cirrhosis, PAD, DM2, JANET noncompliant with CPAP, VHD and PAF on Eliquis. He presented to ED with c/o intermittent SOB with associated BLE swelling over the past 2 months since being off of his Lasix. He also endorsed dark stools x2-3 days. He is a poor historian, so the majority of the history was obtained primarily from review of the medical record.      Initial ED VS include BP 91/55, HR 93, RR 20, SpO2 99% on RA, temp 98.6. Labs notable for hgb 8.3, hct 25.3, INR 3, CO2 20, elevated LFTs, .6, toponin 0.063. Stool was guaiac positive. CXR negative for acute abnormality. EKG revealed SR. He was given IV Lasix in the ED. Cardiology services were consulted. He has been admitted to hospital medicine services for further work-up and management of care.     Interval Hx:    Patient seen and examined this morning H&H dropped today.  Otherwise reports he is feeling better denies any chest pain    Objective/physical exam:  General: In no acute distress, afebrile  Chest: Clear to auscultation bilaterally  Heart: S1, S2, no appreciable murmur  Abdomen:  Distended with positive fluid thrill  MSK: Warm,   Neurologic: Alert and oriented x4,     VITAL SIGNS: 24 HRS MIN & MAX LAST   Temp  Min: 97.3 °F (36.3 °C)  Max: 98.2 °F (36.8 °C) 97.5 °F (36.4 °C)   BP  Min: 101/62  Max: 116/67 111/66   Pulse  Min: 79  Max: 85  81   Resp  Min: 18  Max: 18 18   SpO2  Min: 92 %  Max: 100 % (!) 94 %       Recent Labs   Lab 05/08/22  1102 05/09/22  0415   WBC 10.7 8.4   RBC 2.34* 2.02*   HGB 8.3* 7.1*   HCT 25.3* 21.5*   .1* 106.4*   MCH 35.5* 35.1*   MCHC 32.8* 33.0   RDW 19.9* 19.3*   MPV 12.1 12.0       Recent Labs   Lab 05/08/22  1102 05/09/22  0415   CO2 20* 25   BUN 13.3 14.0    CREATININE 1.51* 1.66*   CALCIUM 8.8 8.2*   ALBUMIN 2.5* 2.3*   ALKPHOS 98 86   ALT 90* 80*   * 90*          Microbiology Results (last 7 days)     ** No results found for the last 168 hours. **           X-Ray Chest 1 View  Narrative: EXAMINATION:  XR CHEST 1 VIEW    CLINICAL HISTORY:  sob;    TECHNIQUE:  One view    COMPARISON:  April 13, 2022.    FINDINGS:  Cardiopericardial silhouette is within normal limits.  Left chest implanted cardiac device electrode terminates within the right ventricle.  Left lower lung linear atelectasis or scarring.  No acute dense focal or segmental consolidation, congestive process, pleural effusions or pneumothorax.  Impression: NO ACUTE CARDIOPULMONARY PROCESS IDENTIFIED.    Electronically signed by: Bjiu Pelaez  Date:    05/08/2022  Time:    10:54      Scheduled Med:   [START ON 5/10/2022] amiodarone  200 mg Oral Daily    fluticasone propionate  1 spray Each Nostril Daily    furosemide (LASIX) injection  40 mg Intravenous Daily    metoprolol succinate  100 mg Oral Daily    pantoprazole  40 mg Oral BID    spironolactone  25 mg Oral Daily        Continuous Infusions:       PRN Meds:  sodium chloride, acetaminophen, acetaminophen, albuterol, dextrose 10%, dextrose 10%, glucagon (human recombinant), glucose, glucose       Assessment/Plan:  Acute on chronic heart failure with reduced EF decompensated  Ischemic cardiomyopathy status post AICD awaiting heart transplant  Three chronic STEMI unknown type  Acute blood loss anemia  Cirrhosis  Hyperbilirubinemia and transaminitis  CKD stage 3  Paroxysmal AFib   diabetes mellitus type 2 with hyperglycemia  Essential hypertension    H&H dropped will give 1 unit of packed RBC, GI consulted most likely plan for EGD in a.m.  Appreciate GI recommendations  Continue with PPI, iron level is low, folate is normal  Troponin trending down  Will continue with Lasix, keep a close watch on creatinine it trended up slightly today  We have  consulted Cardiology will await their recommendation  Blood pressure slightly better will keep a close watch resume home dose of amiodarone in keep a close watch on blood pressure  Eliquis on hold because of possible GI bleed    Repeat blood work in a.m.    Prophylaxis:  SCD    Anticipated discharge and Disposition:      All diagnosis and differential diagnosis have been reviewed; assessment and plan has been documented; I have personally reviewed the labs and test results that are presently available; I have reviewed the patients medication list; I have reviewed the consulting providers response and recommendations. I have reviewed or attempted to review medical records based upon their availability    All of the patient's questions have been  addressed and answered. Patient's is agreeable to the above stated plan. I will continue to monitor closely and make adjustments to medical management as needed.      Nutrition Status:        Fernanda Meraz MD   05/09/2022

## 2022-05-10 ENCOUNTER — ANESTHESIA (OUTPATIENT)
Dept: ENDOSCOPY | Facility: HOSPITAL | Age: 67
DRG: 291 | End: 2022-05-10
Payer: COMMERCIAL

## 2022-05-10 ENCOUNTER — ANESTHESIA EVENT (OUTPATIENT)
Dept: ENDOSCOPY | Facility: HOSPITAL | Age: 67
DRG: 291 | End: 2022-05-10
Payer: COMMERCIAL

## 2022-05-10 LAB
ANION GAP SERPL CALC-SCNC: 8 MEQ/L
BASOPHILS # BLD AUTO: 0.03 X10(3)/MCL (ref 0–0.2)
BASOPHILS NFR BLD AUTO: 0.4 %
BUN SERPL-MCNC: 16.7 MG/DL (ref 8.4–25.7)
CALCIUM SERPL-MCNC: 7.8 MG/DL (ref 8.8–10)
CHLORIDE SERPL-SCNC: 98 MMOL/L (ref 98–107)
CO2 SERPL-SCNC: 26 MMOL/L (ref 23–31)
CREAT SERPL-MCNC: 1.71 MG/DL (ref 0.73–1.18)
CREAT/UREA NIT SERPL: 10
EOSINOPHIL # BLD AUTO: 0.16 X10(3)/MCL (ref 0–0.9)
EOSINOPHIL NFR BLD AUTO: 1.9 %
ERYTHROCYTE [DISTWIDTH] IN BLOOD BY AUTOMATED COUNT: 18.7 % (ref 11.5–17)
GLUCOSE SERPL-MCNC: 112 MG/DL (ref 82–115)
HCT VFR BLD AUTO: 26 % (ref 42–52)
HGB BLD-MCNC: 8.6 GM/DL (ref 14–18)
IMM GRANULOCYTES # BLD AUTO: 0.04 X10(3)/MCL (ref 0–0.02)
IMM GRANULOCYTES NFR BLD AUTO: 0.5 % (ref 0–0.43)
INR BLD: 2.11 (ref 0–1.3)
LYMPHOCYTES # BLD AUTO: 1.38 X10(3)/MCL (ref 0.6–4.6)
LYMPHOCYTES NFR BLD AUTO: 16.2 %
MCH RBC QN AUTO: 33.6 PG (ref 27–31)
MCHC RBC AUTO-ENTMCNC: 33.1 MG/DL (ref 33–36)
MCV RBC AUTO: 101.6 FL (ref 80–94)
MONOCYTES # BLD AUTO: 0.68 X10(3)/MCL (ref 0.1–1.3)
MONOCYTES NFR BLD AUTO: 8 %
NEUTROPHILS # BLD AUTO: 6.2 X10(3)/MCL (ref 2.1–9.2)
NEUTROPHILS NFR BLD AUTO: 73 %
NRBC BLD AUTO-RTO: 0 %
PLATELET # BLD AUTO: 122 X10(3)/MCL (ref 130–400)
PMV BLD AUTO: 12 FL (ref 9.4–12.4)
POCT GLUCOSE: 150 MG/DL (ref 70–110)
POTASSIUM SERPL-SCNC: 4.1 MMOL/L (ref 3.5–5.1)
PROTHROMBIN TIME: 23.3 SECONDS (ref 12.5–14.5)
RBC # BLD AUTO: 2.56 X10(6)/MCL (ref 4.7–6.1)
SODIUM SERPL-SCNC: 132 MMOL/L (ref 136–145)
VIT B12 SERPL-MCNC: >1400 NG/L (ref 180–914)
WBC # SPEC AUTO: 8.5 X10(3)/MCL (ref 4.5–11.5)

## 2022-05-10 PROCEDURE — 25000003 PHARM REV CODE 250: Performed by: INTERNAL MEDICINE

## 2022-05-10 PROCEDURE — 25000003 PHARM REV CODE 250: Performed by: PHYSICIAN ASSISTANT

## 2022-05-10 PROCEDURE — 43235 EGD DIAGNOSTIC BRUSH WASH: CPT | Performed by: INTERNAL MEDICINE

## 2022-05-10 PROCEDURE — 85610 PROTHROMBIN TIME: CPT | Performed by: PHYSICIAN ASSISTANT

## 2022-05-10 PROCEDURE — 85025 COMPLETE CBC W/AUTO DIFF WBC: CPT | Performed by: INTERNAL MEDICINE

## 2022-05-10 PROCEDURE — 80048 BASIC METABOLIC PNL TOTAL CA: CPT | Performed by: INTERNAL MEDICINE

## 2022-05-10 PROCEDURE — 36415 COLL VENOUS BLD VENIPUNCTURE: CPT | Performed by: PHYSICIAN ASSISTANT

## 2022-05-10 PROCEDURE — 25000003 PHARM REV CODE 250: Performed by: NURSE ANESTHETIST, CERTIFIED REGISTERED

## 2022-05-10 PROCEDURE — 11000001 HC ACUTE MED/SURG PRIVATE ROOM

## 2022-05-10 PROCEDURE — 63600175 PHARM REV CODE 636 W HCPCS: Performed by: NURSE ANESTHETIST, CERTIFIED REGISTERED

## 2022-05-10 PROCEDURE — 96365 THER/PROPH/DIAG IV INF INIT: CPT | Performed by: INTERNAL MEDICINE

## 2022-05-10 PROCEDURE — 37000009 HC ANESTHESIA EA ADD 15 MINS: Performed by: INTERNAL MEDICINE

## 2022-05-10 PROCEDURE — 37000008 HC ANESTHESIA 1ST 15 MINUTES: Performed by: INTERNAL MEDICINE

## 2022-05-10 RX ORDER — SODIUM CHLORIDE 9 MG/ML
INJECTION, SOLUTION INTRAVENOUS CONTINUOUS
Status: DISCONTINUED | OUTPATIENT
Start: 2022-05-10 | End: 2022-05-11

## 2022-05-10 RX ORDER — SODIUM, POTASSIUM,MAG SULFATES 17.5-3.13G
1 SOLUTION, RECONSTITUTED, ORAL ORAL 2 TIMES DAILY
Status: COMPLETED | OUTPATIENT
Start: 2022-05-10 | End: 2022-05-11

## 2022-05-10 RX ORDER — GLYCOPYRROLATE 0.2 MG/ML
INJECTION INTRAMUSCULAR; INTRAVENOUS
Status: DISPENSED
Start: 2022-05-10 | End: 2022-05-10

## 2022-05-10 RX ORDER — EPHEDRINE SULFATE 50 MG/ML
INJECTION, SOLUTION INTRAVENOUS
Status: COMPLETED
Start: 2022-05-10 | End: 2022-05-10

## 2022-05-10 RX ORDER — PHENYLEPHRINE HYDROCHLORIDE 10 MG/ML
INJECTION INTRAVENOUS
Status: DISCONTINUED | OUTPATIENT
Start: 2022-05-10 | End: 2022-05-10

## 2022-05-10 RX ORDER — EPHEDRINE SULFATE 50 MG/ML
INJECTION, SOLUTION INTRAVENOUS
Status: DISCONTINUED | OUTPATIENT
Start: 2022-05-10 | End: 2022-05-10

## 2022-05-10 RX ORDER — PROPOFOL 10 MG/ML
VIAL (ML) INTRAVENOUS CONTINUOUS PRN
Status: DISCONTINUED | OUTPATIENT
Start: 2022-05-10 | End: 2022-05-10

## 2022-05-10 RX ORDER — PROPOFOL 10 MG/ML
VIAL (ML) INTRAVENOUS
Status: COMPLETED
Start: 2022-05-10 | End: 2022-05-10

## 2022-05-10 RX ORDER — LIDOCAINE HYDROCHLORIDE 20 MG/ML
INJECTION, SOLUTION EPIDURAL; INFILTRATION; INTRACAUDAL; PERINEURAL
Status: COMPLETED
Start: 2022-05-10 | End: 2022-05-10

## 2022-05-10 RX ORDER — SODIUM CHLORIDE, SODIUM LACTATE, POTASSIUM CHLORIDE, CALCIUM CHLORIDE 600; 310; 30; 20 MG/100ML; MG/100ML; MG/100ML; MG/100ML
INJECTION, SOLUTION INTRAVENOUS CONTINUOUS
Status: CANCELLED | OUTPATIENT
Start: 2022-05-10

## 2022-05-10 RX ORDER — LIDOCAINE HYDROCHLORIDE 10 MG/ML
1 INJECTION, SOLUTION EPIDURAL; INFILTRATION; INTRACAUDAL; PERINEURAL ONCE
Status: DISCONTINUED | OUTPATIENT
Start: 2022-05-10 | End: 2022-05-11

## 2022-05-10 RX ORDER — ETOMIDATE 2 MG/ML
INJECTION INTRAVENOUS
Status: DISCONTINUED | OUTPATIENT
Start: 2022-05-10 | End: 2022-05-10

## 2022-05-10 RX ORDER — LIDOCAINE HYDROCHLORIDE 20 MG/ML
INJECTION, SOLUTION EPIDURAL; INFILTRATION; INTRACAUDAL; PERINEURAL
Status: DISCONTINUED | OUTPATIENT
Start: 2022-05-10 | End: 2022-05-10

## 2022-05-10 RX ORDER — ETOMIDATE 2 MG/ML
INJECTION INTRAVENOUS
Status: COMPLETED
Start: 2022-05-10 | End: 2022-05-10

## 2022-05-10 RX ADMIN — PROPOFOL 50 MCG/KG/MIN: 10 INJECTION, EMULSION INTRAVENOUS at 11:05

## 2022-05-10 RX ADMIN — EPHEDRINE SULFATE 10 MG: 50 INJECTION INTRAVENOUS at 12:05

## 2022-05-10 RX ADMIN — ETOMIDATE 6 MG: 2 INJECTION INTRAVENOUS at 11:05

## 2022-05-10 RX ADMIN — PHENYLEPHRINE HYDROCHLORIDE 100 MCG: 10 INJECTION INTRAVENOUS at 12:05

## 2022-05-10 RX ADMIN — LIDOCAINE HYDROCHLORIDE 4 ML: 20 INJECTION, SOLUTION INTRAVENOUS at 11:05

## 2022-05-10 RX ADMIN — SODIUM SULFATE, POTASSIUM SULFATE, MAGNESIUM SULFATE 354 ML: 17.5; 3.13; 1.6 SOLUTION, CONCENTRATE ORAL at 05:05

## 2022-05-10 RX ADMIN — SODIUM CHLORIDE: 9 INJECTION, SOLUTION INTRAVENOUS at 10:05

## 2022-05-10 RX ADMIN — PANTOPRAZOLE SODIUM 40 MG: 40 TABLET, DELAYED RELEASE ORAL at 09:05

## 2022-05-10 RX ADMIN — SODIUM CHLORIDE: 9 INJECTION, SOLUTION INTRAVENOUS at 11:05

## 2022-05-10 NOTE — PROGRESS NOTES
[facility]  Hospital Medicine Progress Note        Chief Complaint: Inpatient Follow-up for chf exacerbation    HPI: 66 y.o. male with a PMHx of CHF/ischemic CMO EF 25-30% s/p AICD awaiting heart transplant in [facility], cirrhosis, PAD, DM2, JANET noncompliant with CPAP, VHD and PAF on Eliquis. He presented to ED with c/o intermittent SOB with associated BLE swelling over the past 2 months since being off of his Lasix. He also endorsed dark stools x2-3 days. He is a poor historian, so the majority of the history was obtained primarily from review of the medical record.      Initial ED VS include BP 91/55, HR 93, RR 20, SpO2 99% on RA, temp 98.6. Labs notable for hgb 8.3, hct 25.3, INR 3, CO2 20, elevated LFTs, .6, toponin 0.063. Stool was guaiac positive. CXR negative for acute abnormality. EKG revealed SR. He was given IV Lasix in the ED. Cardiology services were consulted. He has been admitted to hospital medicine services for further work-up and management of care.     Interval Hx:    Patient seen and examined this morning denies any chest pain or shortness of breath plan for  Objective/physical exam:  General: In no acute distress, afebrile  Chest: Clear to auscultation bilaterally  Heart: S1, S2, no appreciable murmur  Abdomen:  Distended with positive fluid thrill  MSK: Warm,   Neurologic: Alert and oriented x4,     VITAL SIGNS: 24 HRS MIN & MAX LAST   Temp  Min: 97.3 °F (36.3 °C)  Max: 98.6 °F (37 °C) 98.1 °F (36.7 °C)   BP  Min: 92/68  Max: 115/65 96/65   Pulse  Min: 55  Max: 81  (!) 55   Resp  Min: 13  Max: 18 13   SpO2  Min: 94 %  Max: 99 % 97 %       Recent Labs   Lab 05/08/22  1102 05/09/22  0415 05/10/22  0408   WBC 10.7 8.4 8.5   RBC 2.34* 2.02* 2.56*   HGB 8.3* 7.1* 8.6*   HCT 25.3* 21.5* 26.0*   .1* 106.4* 101.6*   MCH 35.5* 35.1* 33.6*   MCHC 32.8* 33.0 33.1   RDW 19.9* 19.3* 18.7*   MPV 12.1 12.0 12.0       Recent Labs   Lab 05/08/22  1102 05/09/22  0415  05/10/22  0408   CO2 20* 25 26   BUN 13.3 14.0 16.7   CREATININE 1.51* 1.66* 1.71*   CALCIUM 8.8 8.2* 7.8*   ALBUMIN 2.5* 2.3*  --    ALKPHOS 98 86  --    ALT 90* 80*  --    * 90*  --           Microbiology Results (last 7 days)     ** No results found for the last 168 hours. **           X-Ray Chest 1 View  Narrative: EXAMINATION:  XR CHEST 1 VIEW    CLINICAL HISTORY:  sob;    TECHNIQUE:  One view    COMPARISON:  April 13, 2022.    FINDINGS:  Cardiopericardial silhouette is within normal limits.  Left chest implanted cardiac device electrode terminates within the right ventricle.  Left lower lung linear atelectasis or scarring.  No acute dense focal or segmental consolidation, congestive process, pleural effusions or pneumothorax.  Impression: NO ACUTE CARDIOPULMONARY PROCESS IDENTIFIED.    Electronically signed by: Biju Pelaez  Date:    05/08/2022  Time:    10:54      Scheduled Med:   amiodarone  200 mg Oral Daily    fluticasone propionate  1 spray Each Nostril Daily    LIDOcaine (PF) 10 mg/ml (1%)  1 mL Intradermal Once    metoprolol succinate  100 mg Oral Daily    pantoprazole  40 mg Oral BID    spironolactone  25 mg Oral Daily        Continuous Infusions:   sodium chloride 0.9% 50 mL/hr at 05/10/22 1031        PRN Meds:  sodium chloride, acetaminophen, acetaminophen, albuterol, dextrose 10%, dextrose 10%, glucagon (human recombinant), glucose, glucose       Assessment/Plan:  Acute on chronic heart failure with reduced EF decompensated  Ischemic cardiomyopathy status post AICD awaiting heart transplant  Three chronic STEMI unknown type  Acute blood loss anemia  Cirrhosis  Hyperbilirubinemia and transaminitis  CKD stage 3  Paroxysmal AFib   diabetes mellitus type 2 with hyperglycemia  Essential hypertension    Plan for EGD today status post 1 unit of packed RBC hemoglobin this morning is 8.6 H  Continue with PPI, iron level is low, folate is normal  Troponin trending down, Cardiology was consulted  and they recommended outpatient follow-up  Creatinine is trending up I will hold off on Lasix for today and will also hold off on spironolactone and repeat blood work in a.m.  Eliquis on hold because of possible GI bleed  Will keep a close watch on blood pressure    Repeat blood work in a.m.    Prophylaxis:  SCD    Anticipated discharge and Disposition:      All diagnosis and differential diagnosis have been reviewed; assessment and plan has been documented; I have personally reviewed the labs and test results that are presently available; I have reviewed the patients medication list; I have reviewed the consulting providers response and recommendations. I have reviewed or attempted to review medical records based upon their availability    All of the patient's questions have been  addressed and answered. Patient's is agreeable to the above stated plan. I will continue to monitor closely and make adjustments to medical management as needed.      Nutrition Status:        Fernanda Meraz MD   05/10/2022

## 2022-05-10 NOTE — ANESTHESIA POSTPROCEDURE EVALUATION
Anesthesia Post Evaluation    Patient: John Javier Jr.    Procedure(s) Performed: Procedure(s) (LRB):  EGD (ESOPHAGOGASTRODUODENOSCOPY) (N/A)    Final Anesthesia Type: general      Patient location during evaluation: PACU  Patient participation: Yes- Able to Participate  Level of consciousness: awake and alert  Post-procedure vital signs: reviewed and stable  Pain management: adequate  Airway patency: patent    PONV status at discharge: No PONV  Anesthetic complications: no      Cardiovascular status: hemodynamically stable  Respiratory status: unassisted  Hydration status: euvolemic  Follow-up not needed.          Vitals Value Taken Time   BP 91/51 05/10/22 1225   Temp 97 05/10/22 1231   Pulse 67 05/10/22 1225   Resp 14 05/10/22 1225   SpO2 96 % 05/10/22 1225         No case tracking events are documented in the log.      Pain/Tom Score: Tom Score: 8 (5/10/2022 12:19 PM)

## 2022-05-10 NOTE — ANESTHESIA PREPROCEDURE EVALUATION
05/10/2022  John Javier Jr. is a 66 y.o., male.      Pre-op Assessment    I have reviewed the Patient Summary Reports.     I have reviewed the Nursing Notes. I have reviewed the NPO Status.   I have reviewed the Medications.     Review of Systems  Anesthesia Hx:  No problems with previous Anesthesia  Denies Family Hx of Anesthesia complications.   Denies Personal Hx of Anesthesia complications.   Hematology/Oncology:  Hematology Normal   Oncology Normal     EENT/Dental:  EENT/Dental Normal  Ears General/Symptom(s) Ear Disease: Tympanic Membrane Problem   Cardiovascular:   Exercise tolerance: poor Hypertension CAD   CHF NYHA Classification III Card transplant list, severe HFrEF 10% per pt,  ECHO 4/22 review by card estimated EF at 25-30%   Pulmonary:   Sleep Apnea    Renal/:   Chronic Renal Disease, CRI    Hepatic/GI:   Liver Disease, N/V   Musculoskeletal:  Musculoskeletal Normal    Neurological:  Neurology Normal    Endocrine:   Diabetes    Dermatological:  Skin Normal    Psych:  Psychiatric Normal           Physical Exam  General: Cachexia    Airway:  Mallampati: II / II  Mouth Opening: Normal  TM Distance: Normal  Tongue: Normal  Neck ROM: Normal ROM    Dental:  Intact    Chest/Lungs:  Clear to auscultation, Normal Respiratory Rate    Heart:  Rate: Normal  Rhythm: Regular Rhythm        Anesthesia Plan  Type of Anesthesia, risks & benefits discussed:    Anesthesia Type: Gen Natural Airway  Intra-op Monitoring Plan: Standard ASA Monitors  Post Op Pain Control Plan: multimodal analgesia and IV/PO Opioids PRN  Induction:  IV  Informed Consent: Informed consent signed with the Patient and all parties understand the risks and agree with anesthesia plan.  All questions answered.   ASA Score: 4  Day of Surgery Review of History & Physical: H&P Update referred to the surgeon/provider.I have interviewed and  examined the patient. I have reviewed the patient's H&P dated: There are no significant changes. H&P completed by Anesthesiologist.    Ready For Surgery From Anesthesia Perspective.     .

## 2022-05-10 NOTE — PROVATION PATIENT INSTRUCTIONS
Discharge Summary/Instructions after an Endoscopic Procedure  Patient Name: John Javier  Patient MRN: [id number]  Patient YOB: 1955  Tuesday, May 10, 2022  Dk Jacobson MD  Dear patient,  As a result of recent federal legislation (The Federal Cures Act), you may   receive lab or pathology results from your procedure in your [facility]   account before your physician is able to contact you. Your physician or   their representative will relay the results to you with their   recommendations at their soonest availability.  Thank you,  RESTRICTIONS:  During your procedure today, you received medications for sedation.  These   medications may affect your judgment, balance and coordination.  Therefore,   for 24 hours, you have the following restrictions:   - DO NOT drive a car, operate machinery, make legal/financial decisions,   sign important papers or drink alcohol.    ACTIVITY:  Today: no heavy lifting, straining or running due to procedural   sedation/anesthesia.  The following day: return to full activity including work.  DIET:  Eat and drink normally unless instructed otherwise.     TREATMENT FOR COMMON SIDE EFFECTS:  - Mild abdominal pain, nausea, belching, bloating or excessive gas:  rest,   eat lightly and use a heating pad.  - Sore Throat: treat with throat lozenges and/or gargle with warm salt   water.  - Because air was used during the procedure, expelling large amounts of air   from your rectum or belching is normal.  - If a bowel prep was taken, you may not have a bowel movement for 1-3 days.    This is normal.  SYMPTOMS TO WATCH FOR AND REPORT TO YOUR PHYSICIAN:  1. Abdominal pain or bloating, other than gas cramps.  2. Chest pain.  3. Back pain.  4. Signs of infection such as: chills or fever occurring within 24 hours   after the procedure.  5. Rectal bleeding, which would show as bright red, maroon, or black stools.   (A tablespoon of blood from the rectum is not serious,  especially if   hemorrhoids are present.)  6. Vomiting.  7. Weakness or dizziness.  GO DIRECTLY TO THE NEAREST EMERGENCY ROOM IF YOU HAVE ANY OF THE FOLLOWING:      Difficulty breathing              Chills and/or fever over 101 F   Persistent vomiting and/or vomiting blood   Severe abdominal pain   Severe chest pain   Black, tarry stools   Bleeding- more than one tablespoon   Any other symptom or condition that you feel may need urgent attention  Your doctor recommends these additional instructions:  If any biopsies were taken, your doctors clinic will contact you in 1 to 2   weeks with any results.  colon tomorrow  monitor inr  clear liquid diet today.   For questions, problems or results please call your physician - Dk Jacobson MD at Work:  [phone].  [facility], EMERGENCY ROOM PHONE NUMBER: [phone]  IF A COMPLICATION OR EMERGENCY SITUATION ARISES AND YOU ARE UNABLE TO REACH   YOUR PHYSICIAN - GO DIRECTLY TO THE EMERGENCY ROOM.  MD Dk Nation MD  5/10/2022 12:26:00 PM  This report has been verified and signed electronically.  Dear patient,  As a result of recent federal legislation (The Federal Cures Act), you may   receive lab or pathology results from your procedure in your [facility]   account before your physician is able to contact you. Your physician or   their representative will relay the results to you with their   recommendations at their soonest availability.  Thank you,  PROVATION

## 2022-05-10 NOTE — TRANSFER OF CARE
Anesthesia Transfer of Care Note    Patient: John Javier Jr.    Procedure(s) Performed: Procedure(s) (LRB):  EGD (ESOPHAGOGASTRODUODENOSCOPY) (N/A)    Patient location: GI    Anesthesia Type: general    Transport from OR: Transported from OR on room air with adequate spontaneous ventilation    Post pain: adequate analgesia    Post assessment: no apparent anesthetic complications    Post vital signs: stable    Level of consciousness: awake    Nausea/Vomiting: no nausea/vomiting    Complications: none    Transfer of care protocol was followed

## 2022-05-10 NOTE — PROGRESS NOTES
"[facility] - 6th Floor Medical Telemetry  Adult Nutrition  Progress Note    SUMMARY       Recommendations    Recommendation/Intervention: 1. Recommend Cardiac, Diabetic diet as tolerated      Reason for Assessment    Reason For Assessment: identified at risk by screening criteria    Diagnosis: other (see comments) (Acute on CHF Ischemic cardiomyopathy s/p AICD awaiting heart transplant  3 chronic STEMI Acute blood loss anemia  Cirrhosis  Hyperbilirubinemia and transaminitis  CKD stage 3  Paroxysmal AFib   diabetes mellitus type 2 with hyperglycemia  Essential HTN)    Relevant Medical History: CHF/ischemic CMO EF 25-30% s/p AICD awaiting heart transplant in [place], cirrhosis, PAD, DM2, JANET noncompliant with CPAP, VHD and PAF on Eliquis    General Information Comments:   5/10: Pt in GI lab at time of visit. Pt having EGD. Will monitor intake/tolerance of meals post procedure.    Nutrition Risk Screen    Nutrition Risk Screen: no indicators present      Anthropometrics    Temp: 97.6 °F (36.4 °C)  Height Method: Estimated  Height: 5' 4.02" (162.6 cm)  Height (inches): 64.02 in  Weight Method: Estimated  Weight: 58.3 kg (128 lb 8.5 oz)  Weight (lb): 128.53 lb  Ideal Body Weight (IBW), Male: 130.12 lb  % Ideal Body Weight, Male (lb): 98.78 %  BMI (Calculated): 22.1  BMI Grade: 18.5-24.9 - normal       Lab/Procedures/Meds    Pertinent Labs Reviewed: reviewed  Pertinent Labs Comments: 5/10: Na-132, Crea-1.71, Ca-7.8, H/H-8.6/26.0  Pertinent Medications Reviewed: reviewed  Pertinent Medications Comments: amiodarone        Nutrition Prescription Ordered    Current Diet Order: NPO    Evaluation of Received Nutrient/Fluid Intake       % Intake of Estimated Energy Needs: 0 - 25 %  % Meal Intake: NPO    Nutrition Risk    Level of Risk/Frequency of Follow-up: low     Monitor and Evaluation    Food and Nutrient Intake: food and beverage intake     Nutrition Follow-Up    RD Follow-up?: Yes    "

## 2022-05-11 ENCOUNTER — ANESTHESIA (OUTPATIENT)
Dept: ENDOSCOPY | Facility: HOSPITAL | Age: 67
DRG: 291 | End: 2022-05-11
Payer: COMMERCIAL

## 2022-05-11 ENCOUNTER — ANESTHESIA EVENT (OUTPATIENT)
Dept: ENDOSCOPY | Facility: HOSPITAL | Age: 67
DRG: 291 | End: 2022-05-11
Payer: COMMERCIAL

## 2022-05-11 LAB
ALBUMIN SERPL-MCNC: 2.3 GM/DL (ref 3.4–4.8)
ALBUMIN/GLOB SERPL: 0.6 RATIO (ref 1.1–2)
ALP SERPL-CCNC: 100 UNIT/L (ref 40–150)
ALT SERPL-CCNC: 75 UNIT/L (ref 0–55)
AST SERPL-CCNC: 118 UNIT/L (ref 5–34)
BASOPHILS # BLD AUTO: 0.03 X10(3)/MCL (ref 0–0.2)
BASOPHILS NFR BLD AUTO: 0.5 %
BILIRUBIN DIRECT+TOT PNL SERPL-MCNC: 1.1 MG/DL (ref 0–0.5)
BILIRUBIN DIRECT+TOT PNL SERPL-MCNC: 1.6 MG/DL (ref 0–0.8)
BILIRUBIN DIRECT+TOT PNL SERPL-MCNC: 2.7 MG/DL
BUN SERPL-MCNC: 18.3 MG/DL (ref 8.4–25.7)
CALCIUM SERPL-MCNC: 7.9 MG/DL (ref 8.8–10)
CHLORIDE SERPL-SCNC: 98 MMOL/L (ref 98–107)
CO2 SERPL-SCNC: 23 MMOL/L (ref 23–31)
CREAT SERPL-MCNC: 1.7 MG/DL (ref 0.73–1.18)
EOSINOPHIL # BLD AUTO: 0.06 X10(3)/MCL (ref 0–0.9)
EOSINOPHIL NFR BLD AUTO: 1 %
ERYTHROCYTE [DISTWIDTH] IN BLOOD BY AUTOMATED COUNT: 18.3 % (ref 11.5–17)
GLOBULIN SER-MCNC: 3.9 GM/DL (ref 2.4–3.5)
GLUCOSE SERPL-MCNC: 89 MG/DL (ref 82–115)
HCT VFR BLD AUTO: 33 % (ref 42–52)
HGB BLD-MCNC: 11.6 GM/DL (ref 14–18)
IMM GRANULOCYTES # BLD AUTO: 0.03 X10(3)/MCL (ref 0–0.02)
IMM GRANULOCYTES NFR BLD AUTO: 0.5 % (ref 0–0.43)
LYMPHOCYTES # BLD AUTO: 0.96 X10(3)/MCL (ref 0.6–4.6)
LYMPHOCYTES NFR BLD AUTO: 15.6 %
MCH RBC QN AUTO: 34.9 PG (ref 27–31)
MCHC RBC AUTO-ENTMCNC: 35.2 MG/DL (ref 33–36)
MCV RBC AUTO: 99.4 FL (ref 80–94)
MONOCYTES # BLD AUTO: 0.48 X10(3)/MCL (ref 0.1–1.3)
MONOCYTES NFR BLD AUTO: 7.8 %
NEUTROPHILS # BLD AUTO: 4.6 X10(3)/MCL (ref 2.1–9.2)
NEUTROPHILS NFR BLD AUTO: 74.6 %
NRBC BLD AUTO-RTO: 0 %
PLATELET # BLD AUTO: 83 X10(3)/MCL (ref 130–400)
PMV BLD AUTO: 12.9 FL (ref 9.4–12.4)
POCT GLUCOSE: 105 MG/DL (ref 70–110)
POCT GLUCOSE: 97 MG/DL (ref 70–110)
POTASSIUM SERPL-SCNC: 4.4 MMOL/L (ref 3.5–5.1)
PROT SERPL-MCNC: 6.2 GM/DL (ref 5.8–7.6)
RBC # BLD AUTO: 3.32 X10(6)/MCL (ref 4.7–6.1)
SODIUM SERPL-SCNC: 132 MMOL/L (ref 136–145)
WBC # SPEC AUTO: 6.2 X10(3)/MCL (ref 4.5–11.5)

## 2022-05-11 PROCEDURE — 36415 COLL VENOUS BLD VENIPUNCTURE: CPT | Performed by: INTERNAL MEDICINE

## 2022-05-11 PROCEDURE — 25000003 PHARM REV CODE 250: Performed by: PHYSICIAN ASSISTANT

## 2022-05-11 PROCEDURE — 91110 GI TRC IMG INTRAL ESOPH-ILE: CPT | Performed by: INTERNAL MEDICINE

## 2022-05-11 PROCEDURE — 27201423 OPTIME MED/SURG SUP & DEVICES STERILE SUPPLY: Performed by: INTERNAL MEDICINE

## 2022-05-11 PROCEDURE — 37000008 HC ANESTHESIA 1ST 15 MINUTES: Performed by: INTERNAL MEDICINE

## 2022-05-11 PROCEDURE — 37000009 HC ANESTHESIA EA ADD 15 MINS: Performed by: INTERNAL MEDICINE

## 2022-05-11 PROCEDURE — 80053 COMPREHEN METABOLIC PANEL: CPT | Performed by: INTERNAL MEDICINE

## 2022-05-11 PROCEDURE — 45378 DIAGNOSTIC COLONOSCOPY: CPT | Performed by: INTERNAL MEDICINE

## 2022-05-11 PROCEDURE — 63600175 PHARM REV CODE 636 W HCPCS: Performed by: NURSE ANESTHETIST, CERTIFIED REGISTERED

## 2022-05-11 PROCEDURE — 11000001 HC ACUTE MED/SURG PRIVATE ROOM

## 2022-05-11 PROCEDURE — 96365 THER/PROPH/DIAG IV INF INIT: CPT | Performed by: INTERNAL MEDICINE

## 2022-05-11 PROCEDURE — 25000003 PHARM REV CODE 250: Performed by: NURSE ANESTHETIST, CERTIFIED REGISTERED

## 2022-05-11 PROCEDURE — 85025 COMPLETE CBC W/AUTO DIFF WBC: CPT | Performed by: INTERNAL MEDICINE

## 2022-05-11 PROCEDURE — 82962 GLUCOSE BLOOD TEST: CPT | Performed by: INTERNAL MEDICINE

## 2022-05-11 PROCEDURE — 25000003 PHARM REV CODE 250: Performed by: INTERNAL MEDICINE

## 2022-05-11 RX ORDER — ETOMIDATE 2 MG/ML
INJECTION INTRAVENOUS
Status: COMPLETED
Start: 2022-05-11 | End: 2022-05-11

## 2022-05-11 RX ORDER — PROPOFOL 10 MG/ML
VIAL (ML) INTRAVENOUS
Status: COMPLETED
Start: 2022-05-11 | End: 2022-05-11

## 2022-05-11 RX ORDER — EPHEDRINE SULFATE 50 MG/ML
INJECTION, SOLUTION INTRAVENOUS
Status: COMPLETED
Start: 2022-05-11 | End: 2022-05-11

## 2022-05-11 RX ORDER — EPHEDRINE SULFATE 50 MG/ML
INJECTION, SOLUTION INTRAVENOUS
Status: DISCONTINUED | OUTPATIENT
Start: 2022-05-11 | End: 2022-05-11

## 2022-05-11 RX ORDER — PROPOFOL 10 MG/ML
INJECTION, EMULSION INTRAVENOUS
Status: DISCONTINUED | OUTPATIENT
Start: 2022-05-11 | End: 2022-05-11

## 2022-05-11 RX ORDER — LIDOCAINE HCL/PF 100 MG/5ML
SYRINGE (ML) INTRAVENOUS
Status: DISCONTINUED | OUTPATIENT
Start: 2022-05-11 | End: 2022-05-11

## 2022-05-11 RX ORDER — SODIUM CHLORIDE 9 MG/ML
0-999 INJECTION, SOLUTION INTRAVENOUS CONTINUOUS
Status: CANCELLED | OUTPATIENT
Start: 2022-05-11

## 2022-05-11 RX ORDER — ETOMIDATE 2 MG/ML
INJECTION INTRAVENOUS
Status: DISCONTINUED | OUTPATIENT
Start: 2022-05-11 | End: 2022-05-11

## 2022-05-11 RX ORDER — LIDOCAINE HYDROCHLORIDE 20 MG/ML
INJECTION, SOLUTION EPIDURAL; INFILTRATION; INTRACAUDAL; PERINEURAL
Status: DISPENSED
Start: 2022-05-11 | End: 2022-05-11

## 2022-05-11 RX ADMIN — PROPOFOL 10 MG: 10 INJECTION, EMULSION INTRAVENOUS at 10:05

## 2022-05-11 RX ADMIN — SODIUM SULFATE, POTASSIUM SULFATE, MAGNESIUM SULFATE 177 ML: 17.5; 3.13; 1.6 SOLUTION, CONCENTRATE ORAL at 04:05

## 2022-05-11 RX ADMIN — EPHEDRINE SULFATE 5 MG: 50 INJECTION INTRAVENOUS at 10:05

## 2022-05-11 RX ADMIN — PROPOFOL 10 MG: 10 INJECTION, EMULSION INTRAVENOUS at 11:05

## 2022-05-11 RX ADMIN — Medication 40 MG: at 10:05

## 2022-05-11 RX ADMIN — PANTOPRAZOLE SODIUM 40 MG: 40 TABLET, DELAYED RELEASE ORAL at 08:05

## 2022-05-11 RX ADMIN — ETOMIDATE 2 MG: 2 INJECTION INTRAVENOUS at 11:05

## 2022-05-11 RX ADMIN — EPHEDRINE SULFATE 5 MG: 50 INJECTION INTRAVENOUS at 11:05

## 2022-05-11 RX ADMIN — ETOMIDATE 4 MG: 2 INJECTION INTRAVENOUS at 10:05

## 2022-05-11 RX ADMIN — SODIUM CHLORIDE: 9 INJECTION, SOLUTION INTRAVENOUS at 10:05

## 2022-05-11 RX ADMIN — ETOMIDATE 2 MG: 2 INJECTION INTRAVENOUS at 10:05

## 2022-05-11 NOTE — TRANSFER OF CARE
"Anesthesia Transfer of Care Note    Patient: John Javier Jr.    Procedure(s) Performed: Procedure(s) (LRB):  COLON (N/A)    Patient location: GI    Anesthesia Type: MAC    Transport from OR: Transported from OR on room air with adequate spontaneous ventilation    Post pain: adequate analgesia    Post assessment: no apparent anesthetic complications and tolerated procedure well    Post vital signs: stable    Level of consciousness: awake    Complications: none    Transfer of care protocol was followed      Last vitals:   Visit Vitals  /68 (BP Location: Right arm, Patient Position: Lying)   Pulse 63   Temp 36.1 °C (97 °F) (Temporal)   Resp 16   Ht 5' 4.02" (1.626 m)   Wt 58.3 kg (128 lb 8.5 oz)   SpO2 98%   BMI 22.05 kg/m²     "

## 2022-05-11 NOTE — ANESTHESIA POSTPROCEDURE EVALUATION
Anesthesia Post Evaluation    Patient: John Javier Jr.    Procedure(s) Performed: Procedure(s) (LRB):  COLON (N/A)    Final Anesthesia Type: general      Patient location during evaluation: GI PACU  Patient participation: Yes- Able to Participate  Level of consciousness: awake and alert and oriented  Post-procedure vital signs: reviewed and stable  Pain management: adequate  Airway patency: patent    PONV status at discharge: No PONV  Anesthetic complications: no      Cardiovascular status: hemodynamically stable  Respiratory status: unassisted  Hydration status: euvolemic  Follow-up not needed.          Vitals Value Taken Time   /68 05/11/22 1130   Temp 98.0 05/11/22 1211   Pulse 62 05/11/22 1130   Resp 12 05/11/22 1130   SpO2 97 % 05/11/22 1130         No case tracking events are documented in the log.      Pain/Tom Score: Tom Score: 10 (5/11/2022 11:30 AM)

## 2022-05-11 NOTE — ANESTHESIA PREPROCEDURE EVALUATION
05/11/2022  John Javier Jr. is a 66 y.o., male.  Past Medical History:   Diagnosis Date    Anticoagulant long-term use     CHF (congestive heart failure)     Chronic combined systolic and diastolic heart failure 03/01/2020    Coronary artery disease     Diabetes mellitus     Digestive disorder     Diverticulitis     Encounter for blood transfusion     Hypertension     Renal disorder     Sleep apnea     Suspected Covid-19 Virus Infection; test negative 03/14/2020    Unspecified cirrhosis of liver      Past Surgical History:   Procedure Laterality Date    COLECTOMY      RIGHT HEART CATHETERIZATION Right 10/8/2020    Procedure: INSERTION, CATHETER, RIGHT HEART;  Surgeon: Adilson Darden Jr., MD;  Location: [facility] CATH LAB;  Service: Cardiology;  Laterality: Right;    RIGHT HEART CATHETERIZATION Right 2/3/2021    Procedure: INSERTION, CATHETER, RIGHT HEART;  Surgeon: Adilson Darden Jr., MD;  Location: [facility] CATH LAB;  Service: Cardiology;  Laterality: Right;    RIGHT HEART CATHETERIZATION Right 6/22/2021    Procedure: INSERTION, CATHETER, RIGHT HEART;  Surgeon: Yuki Delgado MD;  Location: [facility] CATH LAB;  Service: Cardiology;  Laterality: Right;     Mr. Lopez is a 66 year old, known to Dr. Redman, who presented to [facility] on 5.8.22 with c/o increasing swelling to BLE and abdomen. He has c/o mild SOB. He has a history of CHF, CAD, CMO-s/p AICD, V tach, PAF, HTN, HLD, PAD, mod MR, and JANET. Upon workup, his BNP was 536, and troponin was mildly elevated. [facility] is being consulted for NSTEMI.        PMH:  CHF, CAD, CMO-s/p AICD, V tach, PAF, HTN, HLD, PAD, mod MR, and JANET.  PSH:  AICD, h/o partial resection of the colon, coronary stents  Social History: Father-CABG, HTN; Mother-HTN, DM 2  Family History: Tobacco-denies; Alcohol-quit 4 years ago; Illicit drug use-denies     Previous Cardiac  Diagnostics:   Echo 4.13.22  Lv concentric remodeling.  LV systolic function is severely reduced.  Visually estimated EF is 25-30%. Severe global hypokinesis.  Grade I DD  Mildly enlarged left atrium  There is trace TR with RVSP estimated at 28 mm Hg.     LHC 7.12.18  Left main-large, normal  LAD-normal  Diag-normal  Left cx-normal  RCA patent stent    Pre-op Assessment    I have reviewed the Patient Summary Reports.    I have reviewed the NPO Status.   I have reviewed the Medications.     Review of Systems  Anesthesia Hx:  No problems with previous Anesthesia  Denies Family Hx of Anesthesia complications.   Denies Personal Hx of Anesthesia complications.   Social:  Non-Smoker    Cardiovascular:   Exercise tolerance: poor Hypertension CAD    Denies Angina. CHF  Denies Orthopnea.  Denies PND. Denies recent chest pain / recent angina Functional Capacity low / < 4 METS    Pulmonary:   Sleep Apnea    Renal/:   Chronic Renal Disease no renal calculi    Hepatic/GI:  Bowel Conditions:  Diverticulitis  Liver Disease , Cirrhosis    Musculoskeletal:  Musculoskeletal Normal    Neurological:   Denies TIA. Denies CVA.    Endocrine:   Diabetes    Psych:  Psychiatric Normal           Physical Exam  General: Well nourished, Alert and Oriented    Airway:  Mallampati: III   Mouth Opening: Normal  TM Distance: Normal  Tongue: Normal  Neck ROM: Normal ROM    Dental:  Intact  LOWER PERMANENT IMPLANT  Chest/Lungs:  Clear to auscultation    Heart:  Rate: Normal  Rhythm: Regular Rhythm  No pretibial edema  No carotid bruits      Anesthesia Plan  Type of Anesthesia, risks & benefits discussed:    Anesthesia Type: Gen Natural Airway  Intra-op Monitoring Plan: Standard ASA Monitors  Post Op Pain Control Plan: IV/PO Opioids PRN  Induction:  IV  Informed Consent: Informed consent signed with the Patient and all parties understand the risks and agree with anesthesia plan.  All questions answered. Patient consented to blood products? No  ASA  Score: 4  Day of Surgery Review of History & Physical: H&P Update referred to the surgeon/provider.  Anesthesia Plan Notes: GA TIVA    Ready For Surgery From Anesthesia Perspective.     .

## 2022-05-11 NOTE — PROGRESS NOTES
[facility]  Hospital Medicine Progress Note        Chief Complaint: Inpatient Follow-up for chf exacerbation    HPI: 66 y.o. male with a PMHx of CHF/ischemic CMO EF 25-30% s/p AICD awaiting heart transplant in [facility], cirrhosis, PAD, DM2, JANET noncompliant with CPAP, VHD and PAF on Eliquis. He presented to ED with c/o intermittent SOB with associated BLE swelling over the past 2 months since being off of his Lasix. He also endorsed dark stools x2-3 days. He is a poor historian, so the majority of the history was obtained primarily from review of the medical record.      Initial ED VS include BP 91/55, HR 93, RR 20, SpO2 99% on RA, temp 98.6. Labs notable for hgb 8.3, hct 25.3, INR 3, CO2 20, elevated LFTs, .6, toponin 0.063. Stool was guaiac positive. CXR negative for acute abnormality. EKG revealed SR. He was given IV Lasix in the ED. Cardiology services were consulted. He has been admitted to hospital medicine services for further work-up and management of care.     Interval Hx:    Patient seen and examined this morning denies any chest pain or shortness of breath plan for colonoscopy and capsule study  Objective/physical exam:  General: In no acute distress, afebrile  Chest: Clear to auscultation bilaterally  Heart: S1, S2, no appreciable murmur  Abdomen:  Distended with positive fluid thrill  MSK: Warm,   Neurologic: Alert and oriented x4,     VITAL SIGNS: 24 HRS MIN & MAX LAST   Temp  Min: 97 °F (36.1 °C)  Max: 97.9 °F (36.6 °C) 97.5 °F (36.4 °C)   BP  Min: 92/53  Max: 117/68 104/65   Pulse  Min: 56  Max: 82  82   Resp  Min: 12  Max: 18 16   SpO2  Min: 93 %  Max: 100 % 97 %       Recent Labs   Lab 05/09/22  0415 05/10/22  0408 05/11/22  0410   WBC 8.4 8.5 6.2   RBC 2.02* 2.56* 3.32*   HGB 7.1* 8.6* 11.6*   HCT 21.5* 26.0* 33.0*   .4* 101.6* 99.4*   MCH 35.1* 33.6* 34.9*   MCHC 33.0 33.1 35.2   RDW 19.3* 18.7* 18.3*   * 122* 83*   MPV 12.0 12.0 12.9*       Recent  Labs   Lab 05/08/22  1102 05/09/22  0415 05/10/22  0408 05/11/22  0410    134* 132* 132*   K 4.9 4.2 4.1 4.4   CO2 20* 25 26 23   BUN 13.3 14.0 16.7 18.3   CREATININE 1.51* 1.66* 1.71* 1.70*   CALCIUM 8.8 8.2* 7.8* 7.9*   ALBUMIN 2.5* 2.3*  --  2.3*   ALKPHOS 98 86  --  100   ALT 90* 80*  --  75*   * 90*  --  118*   BILITOT 2.6* 2.7*  --  2.7*          Microbiology Results (last 7 days)     ** No results found for the last 168 hours. **           X-Ray Chest 1 View  Narrative: EXAMINATION:  XR CHEST 1 VIEW    CLINICAL HISTORY:  sob;    TECHNIQUE:  One view    COMPARISON:  April 13, 2022.    FINDINGS:  Cardiopericardial silhouette is within normal limits.  Left chest implanted cardiac device electrode terminates within the right ventricle.  Left lower lung linear atelectasis or scarring.  No acute dense focal or segmental consolidation, congestive process, pleural effusions or pneumothorax.  Impression: NO ACUTE CARDIOPULMONARY PROCESS IDENTIFIED.    Electronically signed by: Biju Pelaez  Date:    05/08/2022  Time:    10:54      Scheduled Med:   amiodarone  200 mg Oral Daily    fluticasone propionate  1 spray Each Nostril Daily    LIDOcaine (PF) 20 mg/mL (2%)        metoprolol succinate  100 mg Oral Daily    pantoprazole  40 mg Oral BID        Continuous Infusions:       PRN Meds:  sodium chloride, acetaminophen, acetaminophen, albuterol, dextrose 10%, dextrose 10%, glucagon (human recombinant), glucose, glucose       Assessment/Plan:  Acute on chronic heart failure with reduced EF decompensated  Ischemic cardiomyopathy status post AICD awaiting heart transplant  Three chronic STEMI unknown type  Acute blood loss anemia  Cirrhosis  Hyperbilirubinemia and transaminitis  CKD stage 3  Paroxysmal AFib   diabetes mellitus type 2 with hyperglycemia  Essential hypertension    Colonoscopy today awaiting results  And capsule study  Plan for EGD today status post 1 unit of packed RBC hemoglobin this morning  is 11.6  Continue with PPI, iron level is low, folate is normal  Troponin trending down, Cardiology was consulted and they recommended outpatient follow-up  Creatinine is trending up I will hold off on Lasix for today and will also hold off on spironolactone and repeat blood work in a.m.  Eliquis on hold because of possible GI bleed  Will keep a close watch on blood pressure    Repeat blood work in a.m.      Potential discharge in a.m. if patient stays stable and if okay with GI  Prophylaxis:  SCD    Anticipated discharge and Disposition:      All diagnosis and differential diagnosis have been reviewed; assessment and plan has been documented; I have personally reviewed the labs and test results that are presently available; I have reviewed the patients medication list; I have reviewed the consulting providers response and recommendations. I have reviewed or attempted to review medical records based upon their availability    All of the patient's questions have been  addressed and answered. Patient's is agreeable to the above stated plan. I will continue to monitor closely and make adjustments to medical management as needed.      Nutrition Status:        Fernanda Meraz MD   05/11/2022

## 2022-05-12 LAB
ANION GAP SERPL CALC-SCNC: 10 MEQ/L
BASOPHILS # BLD AUTO: 0.04 X10(3)/MCL (ref 0–0.2)
BASOPHILS NFR BLD AUTO: 0.6 %
BUN SERPL-MCNC: 16.8 MG/DL (ref 8.4–25.7)
CALCIUM SERPL-MCNC: 7.8 MG/DL (ref 8.8–10)
CHLORIDE SERPL-SCNC: 101 MMOL/L (ref 98–107)
CO2 SERPL-SCNC: 21 MMOL/L (ref 23–31)
CREAT SERPL-MCNC: 1.61 MG/DL (ref 0.73–1.18)
CREAT/UREA NIT SERPL: 10
EOSINOPHIL # BLD AUTO: 0.12 X10(3)/MCL (ref 0–0.9)
EOSINOPHIL NFR BLD AUTO: 1.8 %
ERYTHROCYTE [DISTWIDTH] IN BLOOD BY AUTOMATED COUNT: 18.1 % (ref 11.5–17)
GLUCOSE SERPL-MCNC: 117 MG/DL (ref 82–115)
HCT VFR BLD AUTO: 27.5 % (ref 42–52)
HGB BLD-MCNC: 9.3 GM/DL (ref 14–18)
IMM GRANULOCYTES # BLD AUTO: 0.01 X10(3)/MCL (ref 0–0.02)
IMM GRANULOCYTES NFR BLD AUTO: 0.1 % (ref 0–0.43)
LYMPHOCYTES # BLD AUTO: 0.98 X10(3)/MCL (ref 0.6–4.6)
LYMPHOCYTES NFR BLD AUTO: 14.5 %
MCH RBC QN AUTO: 33.9 PG (ref 27–31)
MCHC RBC AUTO-ENTMCNC: 33.8 MG/DL (ref 33–36)
MCV RBC AUTO: 100.4 FL (ref 80–94)
MONOCYTES # BLD AUTO: 0.69 X10(3)/MCL (ref 0.1–1.3)
MONOCYTES NFR BLD AUTO: 10.2 %
NEUTROPHILS # BLD AUTO: 4.9 X10(3)/MCL (ref 2.1–9.2)
NEUTROPHILS NFR BLD AUTO: 72.8 %
NRBC BLD AUTO-RTO: 0 %
PLATELET # BLD AUTO: 123 X10(3)/MCL (ref 130–400)
PMV BLD AUTO: 11.3 FL (ref 9.4–12.4)
POCT GLUCOSE: 199 MG/DL (ref 70–110)
POTASSIUM SERPL-SCNC: 4.1 MMOL/L (ref 3.5–5.1)
RBC # BLD AUTO: 2.74 X10(6)/MCL (ref 4.7–6.1)
SODIUM SERPL-SCNC: 132 MMOL/L (ref 136–145)
WBC # SPEC AUTO: 6.8 X10(3)/MCL (ref 4.5–11.5)

## 2022-05-12 PROCEDURE — 25000003 PHARM REV CODE 250: Performed by: NURSE PRACTITIONER

## 2022-05-12 PROCEDURE — 25000003 PHARM REV CODE 250: Performed by: INTERNAL MEDICINE

## 2022-05-12 PROCEDURE — 25000003 PHARM REV CODE 250: Performed by: PHYSICIAN ASSISTANT

## 2022-05-12 PROCEDURE — 80048 BASIC METABOLIC PNL TOTAL CA: CPT | Performed by: INTERNAL MEDICINE

## 2022-05-12 PROCEDURE — 63600175 PHARM REV CODE 636 W HCPCS: Mod: JG | Performed by: PHYSICIAN ASSISTANT

## 2022-05-12 PROCEDURE — 36415 COLL VENOUS BLD VENIPUNCTURE: CPT | Performed by: INTERNAL MEDICINE

## 2022-05-12 PROCEDURE — 11000001 HC ACUTE MED/SURG PRIVATE ROOM

## 2022-05-12 PROCEDURE — 85025 COMPLETE CBC W/AUTO DIFF WBC: CPT | Performed by: INTERNAL MEDICINE

## 2022-05-12 RX ORDER — POLYETHYLENE GLYCOL 3350 17 G/17G
17 POWDER, FOR SOLUTION ORAL ONCE
Status: COMPLETED | OUTPATIENT
Start: 2022-05-12 | End: 2022-05-12

## 2022-05-12 RX ADMIN — PANTOPRAZOLE SODIUM 40 MG: 40 TABLET, DELAYED RELEASE ORAL at 10:05

## 2022-05-12 RX ADMIN — APIXABAN 5 MG: 5 TABLET, FILM COATED ORAL at 09:05

## 2022-05-12 RX ADMIN — PANTOPRAZOLE SODIUM 40 MG: 40 TABLET, DELAYED RELEASE ORAL at 09:05

## 2022-05-12 RX ADMIN — POLYETHYLENE GLYCOL 3350 17 G: 17 POWDER, FOR SOLUTION ORAL at 10:05

## 2022-05-12 RX ADMIN — AMIODARONE HYDROCHLORIDE 200 MG: 200 TABLET ORAL at 10:05

## 2022-05-12 RX ADMIN — FERRIC CARBOXYMALTOSE INJECTION 750 MG: 50 INJECTION, SOLUTION INTRAVENOUS at 10:05

## 2022-05-12 NOTE — PROGRESS NOTES
[facility] Hospital Medicine Progress Note        Chief Complaint: Inpatient Follow-up for GIB, CHF    HPI:   66 y.o. male with a PMHx of CHF/ischemic CMO EF 25-30% s/p AICD awaiting heart transplant in [facility], cirrhosis, PAD, DM2, JANET noncompliant with CPAP, VHD and PAF on Eliquis. He presented to ED with c/o intermittent SOB with associated BLE swelling over the past 2 months since being off of his Lasix. He also endorsed dark stools x2-3 days. He is a poor historian, so the majority of the history was obtained primarily from review of the medical record.      Initial ED VS include BP 91/55, HR 93, RR 20, SpO2 99% on RA, temp 98.6. Labs notable for hgb 8.3, hct 25.3, INR 3, CO2 20, elevated LFTs, .6, toponin 0.063. Stool was guaiac positive. CXR negative for acute abnormality. EKG revealed SR. He was given IV Lasix in the ED. Cardiology & GI were consulted. He has been admitted to hospital medicine services for further work-up and management of care. Lasix was continued.  Troponins were trended.    Cardiology recommended continuing amiodarone, Ranexa, Aldactone with metoprolol and Entresto as BP tolerates.  Cardiology recommended to continue Eliquis if cleared from GI standpoint.  Signed off and recommended outpatient follow-up.  GI suggested no intervention in the beginning.  Hemoglobin dropped acutely while in the hospital and then GI recommended flex sig to evaluate her anastomosis site bleeding.  Also revealed blood transfusion for anemia.  Protonix was continued.  Lasix was held due to ANGEL and borderline low blood pressures as well.  Eliquis is on hold due to possible GI bleed..    EGD revealed less than 5 mm variceal site in the lower third of esophagus with no bleeding stigmata, medium size hiatal hernia with no Ronny lesions, mild portal hypertensive gastropathy with few mild petechial erythema in the antrum of the stomach which could be early GAVE.  There was a single  localized mild erosion without bleeding in the jejunum.  Duodenum was normal.  Colonoscopy revealed medium sized internal hemorrhoid.  There was no blood or dark stool in the colon and anastomosis was around 25 cm.  Capsule study was suggested for further assessment. Capsule study revealed tiny nonbleeding petechiae scattered throughout the small bowel with no active bleeding or obvious single source of GI bleeding.  GI recommending IV iron and blood transfusion as needed and cleared to restart Eliquis from their standpoint.      Interval Hx:     There were no acute events overnight.  Blood pressure running borderline low this morning.  Seen and examined at bedside.  He reports feeling well.  Has no new complaints or concerns.  Moving better.  Denies abdominal symptoms.  Tolerating diet.    Morning labs revealed drop in hemoglobin to 9.3 from 11.6.  Macrocytosis noted.  Platelets improving.  Sodium remained stable with mild acidosis and stable but elevated serum creatinine      Objective/physical exam:    VITAL SIGNS: 24 HRS MIN & MAX LAST   Temp  Min: 98.3 °F (36.8 °C)  Max: 99.9 °F (37.7 °C) 99.1 °F (37.3 °C)   BP  Min: 132/57  Max: 179/75 (!) 154/74   Pulse  Min: 65  Max: 80  68   Resp  Min: 15  Max: 25 18   SpO2  Min: 90 %  Max: 98 % (!) 90 %     General: In no acute distress, afebrile  Chest: Clear to auscultation bilaterally  Heart: S1, S2, no appreciable murmur  Abdomen: Soft, nontender, BS +, distended  Neurologic: Alert and oriented x4, moving all extremities with good strength     Lab Results   Component Value Date     (L) 05/12/2022    K 4.1 05/12/2022     03/31/2022    CO2 21 (L) 05/12/2022    BUN 16.8 05/12/2022    CREATININE 1.61 (H) 05/12/2022    CALCIUM 7.8 (L) 05/12/2022    ANIONGAP 7 (L) 03/31/2022    ESTGFRAFRICA 51.5 (A) 03/31/2022    EGFRNONAA 44.5 (A) 03/31/2022        Lab Results   Component Value Date    ALT 75 (H) 05/11/2022     (H) 05/11/2022    ALKPHOS 100 05/11/2022     BILITOT 2.7 (H) 05/11/2022        Lab Results   Component Value Date    WBC 6.2 05/11/2022    HGB 11.6 (L) 05/11/2022    HCT 33.0 (L) 05/11/2022    MCV 99.4 (H) 05/11/2022    PLT 83 (L) 05/11/2022          Scheduled Med:   amitriptyline  75 mg Oral QHS    atenoloL  100 mg Oral Daily    atorvastatin  80 mg Oral QHS    bisacodyL  10 mg Rectal Daily    ceFAZolin (ANCEF) IVPB  2 g Intravenous Q8H    [START ON 5/8/2022] enoxaparin  40 mg Subcutaneous Daily    gabapentin  600 mg Oral TID    hydrOXYzine HCL  25 mg Oral TID    LIDOcaine (PF) 10 mg/ml (1%)  1 mL Intradermal Once    lisinopriL  5 mg Oral Daily    metFORMIN  1,000 mg Oral BID WM    methocarbamoL  750 mg Oral TID    senna-docusate 8.6-50 mg  2 tablet Oral BID    terbinafine HCL  250 mg Oral Daily    triamterene-hydrochlorothiazide 37.5-25 mg  2 tablet Oral Daily        Continuous Infusions:   sodium chloride 0.9% 100 mL/hr at 05/07/22 0646    electrolyte-A          PRN Meds:  acetaminophen, acetaminophen, aluminum-magnesium hydroxide-simethicone, calcium carbonate, ceFAZolin 2 g/50mL Dextrose IVPB, dextrose 10%, dextrose 50%, dextrose 50%, glucagon (human recombinant), glucose, glucose, HYDROcodone-acetaminophen, HYDROcodone-acetaminophen, HYDROcodone-acetaminophen, HYDROmorphone, insulin aspart U-100, methocarbamoL, midazolam, morphine, morphine, ondansetron, ondansetron, prochlorperazine, sodium chloride 0.9%, sodium chloride 0.9%       Assessment/Plan:    Acute on chronic [id number]%  Acute blood loss anemia s/p blood transfusion-s/p EGD, colonoscopy and capsule study  Chronic cirrhosis, hyperbilirubinemia and transaminitis  Ischemic cardiomyopathy s/p AICD, being evaluated for transplant    Hx: T2DM, CHF, CAD, CMO-s/p AICD, V tach, PAF, HTN, HLD, PAD, mod MR, and JANET.      Plan:  - Blood pressure remains borderline low.  Continue to hold Entresto and diuretics until blood pressure improves.  Not on statin due to cirrhosis.  Continue  amiodarone, metoprolol  - Underwent EGD, colonoscopy and capsule study.  GI cleared for Eliquis with close monitoring of hemoglobin.  [facility]  --- Lasix on hold.  Monitor serum creatinine and resume on Aldactone when appropriate    Labs for Am  protonix      Panchito Bhandari MD   05/07/2022

## 2022-05-12 NOTE — PLAN OF CARE
M2A reviewed.  Innumerable small erythematous spots throughout entire small bowel.  No active bleeding.  IT is possible that these could be very small avm's vs other.  Nothing to treat endoscopically unfortunately.            Recommend IV iron infusion and blood transfusions as needed. Okay to restart Eliquis from a GI standpoint. May need to consider stopping Eliquis if he were to ooze again.     IF he bleeds again down the road AND drops his hg, we could try to consider sandostatin long acting.      No further GI evaluation planned at this time. We will sign off. Please call back as needed.

## 2022-05-12 NOTE — PLAN OF CARE
05/12/22 0825   Medicare Message   Important Message from Medicare regarding Discharge Appeal Rights Given to patient/caregiver;Explained to patient/caregiver

## 2022-05-12 NOTE — OP NOTE
Procedure: capsule endoscopy        Physician: Dr. Cedric Jacobson        Indication: melena/anemia            Findings:   Capsule passed from stomach to small intestine to colon.      There were innumerable small/tiny erythematous spots throughout entire small bowel.      No active bleeding.         Doubt avm's, but not sure what else this could be.  ?congestion?  IN the setting of blood thinners this could be potentially leading to blood loss.     Unfortunately, no treatment options for this except for long acting sandostatin.  I would favor trying some iron infusions and see how he does.              Impression:   1.  Innumerable small erythemetous red spots throughout entire small bowel.    Doubt avm's, but not sure what else this could be.  ?congestion?  IN the setting of blood thinners this could be potentially leading to blood loss.     Unfortunately, no treatment options for this except for long acting sandostatin.  I would favor trying some iron infusions and see how he does.                Plan:   1. Iron infusions  2. Ok to resume blood thinners and see how he does.  If he drops again, consider long acting sandostatin.

## 2022-05-13 LAB
ALBUMIN SERPL-MCNC: 2.1 GM/DL (ref 3.4–4.8)
ALBUMIN/GLOB SERPL: 0.7 RATIO (ref 1.1–2)
ALP SERPL-CCNC: 95 UNIT/L (ref 40–150)
ALT SERPL-CCNC: 66 UNIT/L (ref 0–55)
AST SERPL-CCNC: 103 UNIT/L (ref 5–34)
BASOPHILS # BLD AUTO: 0.05 X10(3)/MCL (ref 0–0.2)
BASOPHILS # BLD AUTO: 0.05 X10(3)/MCL (ref 0–0.2)
BASOPHILS NFR BLD AUTO: 0.5 %
BASOPHILS NFR BLD AUTO: 0.6 %
BILIRUBIN DIRECT+TOT PNL SERPL-MCNC: 1.8 MG/DL
BUN SERPL-MCNC: 12.8 MG/DL (ref 8.4–25.7)
CALCIUM SERPL-MCNC: 7.7 MG/DL (ref 8.8–10)
CHLORIDE SERPL-SCNC: 101 MMOL/L (ref 98–107)
CO2 SERPL-SCNC: 23 MMOL/L (ref 23–31)
CREAT SERPL-MCNC: 1.39 MG/DL (ref 0.73–1.18)
EOSINOPHIL # BLD AUTO: 0.09 X10(3)/MCL (ref 0–0.9)
EOSINOPHIL # BLD AUTO: 0.1 X10(3)/MCL (ref 0–0.9)
EOSINOPHIL NFR BLD AUTO: 0.9 %
EOSINOPHIL NFR BLD AUTO: 1.3 %
ERYTHROCYTE [DISTWIDTH] IN BLOOD BY AUTOMATED COUNT: 17.4 % (ref 11.5–17)
ERYTHROCYTE [DISTWIDTH] IN BLOOD BY AUTOMATED COUNT: 17.7 % (ref 11.5–17)
GLOBULIN SER-MCNC: 3 GM/DL (ref 2.4–3.5)
GLUCOSE SERPL-MCNC: 119 MG/DL (ref 82–115)
HCT VFR BLD AUTO: 25.4 % (ref 42–52)
HCT VFR BLD AUTO: 31.1 % (ref 42–52)
HGB BLD-MCNC: 10.3 GM/DL (ref 14–18)
HGB BLD-MCNC: 8.8 GM/DL (ref 14–18)
IMM GRANULOCYTES # BLD AUTO: 0.06 X10(3)/MCL (ref 0–0.02)
IMM GRANULOCYTES # BLD AUTO: 0.08 X10(3)/MCL (ref 0–0.02)
IMM GRANULOCYTES NFR BLD AUTO: 0.8 % (ref 0–0.43)
IMM GRANULOCYTES NFR BLD AUTO: 0.8 % (ref 0–0.43)
LYMPHOCYTES # BLD AUTO: 0.67 X10(3)/MCL (ref 0.6–4.6)
LYMPHOCYTES # BLD AUTO: 1.07 X10(3)/MCL (ref 0.6–4.6)
LYMPHOCYTES NFR BLD AUTO: 13.8 %
LYMPHOCYTES NFR BLD AUTO: 6.7 %
MAGNESIUM SERPL-MCNC: 1.8 MG/DL (ref 1.6–2.6)
MCH RBC QN AUTO: 34.1 PG (ref 27–31)
MCH RBC QN AUTO: 34.9 PG (ref 27–31)
MCHC RBC AUTO-ENTMCNC: 33.1 MG/DL (ref 33–36)
MCHC RBC AUTO-ENTMCNC: 34.6 MG/DL (ref 33–36)
MCV RBC AUTO: 100.8 FL (ref 80–94)
MCV RBC AUTO: 103 FL (ref 80–94)
MONOCYTES # BLD AUTO: 0.67 X10(3)/MCL (ref 0.1–1.3)
MONOCYTES # BLD AUTO: 1.05 X10(3)/MCL (ref 0.1–1.3)
MONOCYTES NFR BLD AUTO: 10.4 %
MONOCYTES NFR BLD AUTO: 8.6 %
NEUTROPHILS # BLD AUTO: 5.8 X10(3)/MCL (ref 2.1–9.2)
NEUTROPHILS # BLD AUTO: 8.1 X10(3)/MCL (ref 2.1–9.2)
NEUTROPHILS NFR BLD AUTO: 74.9 %
NEUTROPHILS NFR BLD AUTO: 80.7 %
NRBC BLD AUTO-RTO: 0 %
NRBC BLD AUTO-RTO: 0 %
PLATELET # BLD AUTO: 115 X10(3)/MCL (ref 130–400)
PLATELET # BLD AUTO: 122 X10(3)/MCL (ref 130–400)
PMV BLD AUTO: 11.4 FL (ref 9.4–12.4)
PMV BLD AUTO: 12.4 FL (ref 9.4–12.4)
POCT GLUCOSE: 169 MG/DL (ref 70–110)
POCT GLUCOSE: 296 MG/DL (ref 70–110)
POTASSIUM SERPL-SCNC: 3.7 MMOL/L (ref 3.5–5.1)
PROT SERPL-MCNC: 5.1 GM/DL (ref 5.8–7.6)
RBC # BLD AUTO: 2.52 X10(6)/MCL (ref 4.7–6.1)
RBC # BLD AUTO: 3.02 X10(6)/MCL (ref 4.7–6.1)
SODIUM SERPL-SCNC: 133 MMOL/L (ref 136–145)
WBC # SPEC AUTO: 10.1 X10(3)/MCL (ref 4.5–11.5)
WBC # SPEC AUTO: 7.8 X10(3)/MCL (ref 4.5–11.5)

## 2022-05-13 PROCEDURE — 85025 COMPLETE CBC W/AUTO DIFF WBC: CPT | Performed by: INTERNAL MEDICINE

## 2022-05-13 PROCEDURE — 11000001 HC ACUTE MED/SURG PRIVATE ROOM

## 2022-05-13 PROCEDURE — 36415 COLL VENOUS BLD VENIPUNCTURE: CPT | Performed by: INTERNAL MEDICINE

## 2022-05-13 PROCEDURE — 25000003 PHARM REV CODE 250: Performed by: PHYSICIAN ASSISTANT

## 2022-05-13 PROCEDURE — 25000003 PHARM REV CODE 250: Performed by: NURSE PRACTITIONER

## 2022-05-13 PROCEDURE — 80053 COMPREHEN METABOLIC PANEL: CPT | Performed by: INTERNAL MEDICINE

## 2022-05-13 PROCEDURE — 25000003 PHARM REV CODE 250: Performed by: INTERNAL MEDICINE

## 2022-05-13 PROCEDURE — 83735 ASSAY OF MAGNESIUM: CPT | Performed by: INTERNAL MEDICINE

## 2022-05-13 RX ORDER — SYRING-NEEDL,DISP,INSUL,0.3 ML 29 G X1/2"
296 SYRINGE, EMPTY DISPOSABLE MISCELLANEOUS ONCE
Status: COMPLETED | OUTPATIENT
Start: 2022-05-13 | End: 2022-05-13

## 2022-05-13 RX ADMIN — APIXABAN 5 MG: 5 TABLET, FILM COATED ORAL at 08:05

## 2022-05-13 RX ADMIN — PANTOPRAZOLE SODIUM 40 MG: 40 TABLET, DELAYED RELEASE ORAL at 08:05

## 2022-05-13 RX ADMIN — MAGNESIUM CITRATE 296 ML: 1.75 LIQUID ORAL at 01:05

## 2022-05-13 RX ADMIN — AMIODARONE HYDROCHLORIDE 200 MG: 200 TABLET ORAL at 08:05

## 2022-05-13 RX ADMIN — METOPROLOL SUCCINATE 100 MG: 50 TABLET, FILM COATED, EXTENDED RELEASE ORAL at 08:05

## 2022-05-13 NOTE — PROGRESS NOTES
[facility] Hospital Medicine Progress Note        Chief Complaint: Inpatient Follow-up for GIB, CHF    HPI:   66 y.o. male with a PMHx of CHF/ischemic CMO EF 25-30% s/p AICD awaiting heart transplant in [facility], cirrhosis, PAD, DM2, JANET noncompliant with CPAP, VHD and PAF on Eliquis. He presented to ED with c/o intermittent SOB with associated BLE swelling over the past 2 months since being off of his Lasix. He also endorsed dark stools x2-3 days. He is a poor historian, so the majority of the history was obtained primarily from review of the medical record.      Initial ED VS include BP 91/55, HR 93, RR 20, SpO2 99% on RA, temp 98.6. Labs notable for hgb 8.3, hct 25.3, INR 3, CO2 20, elevated LFTs, .6, toponin 0.063. Stool was guaiac positive. CXR negative for acute abnormality. EKG revealed SR. He was given IV Lasix in the ED. Cardiology & GI were consulted. He has been admitted to hospital medicine services for further work-up and management of care. Lasix was continued.  Troponins were trended.    Cardiology recommended continuing amiodarone, Ranexa, Aldactone with metoprolol and Entresto as BP tolerates.  Cardiology recommended to continue Eliquis if cleared from GI standpoint.  Signed off and recommended outpatient follow-up.  GI suggested no intervention in the beginning.  Hemoglobin dropped acutely while in the hospital and then GI recommended flex sig to evaluate her anastomosis site bleeding.  Also revealed blood transfusion for anemia.  Protonix was continued.  Lasix was held due to ANGEL and borderline low blood pressures as well.  Eliquis is on hold due to possible GI bleed..    EGD revealed less than 5 mm variceal site in the lower third of esophagus with no bleeding stigmata, medium size hiatal hernia with no Ronny lesions, mild portal hypertensive gastropathy with few mild petechial erythema in the antrum of the stomach which could be early GAVE.  There was a single  localized mild erosion without bleeding in the jejunum.  Duodenum was normal.  Colonoscopy revealed medium sized internal hemorrhoid.  There was no blood or dark stool in the colon and anastomosis was around 25 cm.  Capsule study was suggested for further assessment. Capsule study revealed tiny nonbleeding petechiae scattered throughout the small bowel with no active bleeding or obvious single source of GI bleeding.  GI recommending IV iron and blood transfusion as needed and cleared to restart Eliquis from their standpoint.    Interval Hx:     He was afebrile overnight.  Blood pressure remains borderline low.  When seen examined at bedside he was alert, comfortably resting in the bed.  Tolerating p.o. diet.  Ambulating well.  Constipated.  Denies abdominal pain nausea or vomiting.    Morning labs revealed downtrending hemoglobin and he currently is receiving Eliquis.  Sodium is borderline low.    Objective/physical exam:  VITAL SIGNS: 24 HRS MIN & MAX LAST   Temp  Min: 98.3 °F (36.8 °C)  Max: 99.9 °F (37.7 °C) 99.1 °F (37.3 °C)   BP  Min: 132/57  Max: 179/75 (!) 154/74   Pulse  Min: 65  Max: 80  68   Resp  Min: 15  Max: 25 18   SpO2  Min: 90 %  Max: 98 % (!) 90 %     General: In no acute distress, afebrile  Chest: Clear to auscultation bilaterally  Heart: S1, S2, no appreciable murmur  Abdomen: Soft, nontender, BS +, distended  Neurologic: Alert and oriented x4, moving all extremities with good strength     Lab Results   Component Value Date     (L) 05/13/2022    K 3.7 05/13/2022     03/31/2022    CO2 23 05/13/2022    BUN 12.8 05/13/2022    CREATININE 1.39 (H) 05/13/2022    CALCIUM 7.7 (L) 05/13/2022    ANIONGAP 7 (L) 03/31/2022    ESTGFRAFRICA 51.5 (A) 03/31/2022    EGFRNONAA 44.5 (A) 03/31/2022      Lab Results   Component Value Date    ALT 66 (H) 05/13/2022     (H) 05/13/2022    ALKPHOS 95 05/13/2022    BILITOT 1.8 (H) 05/13/2022      Lab Results   Component Value Date    WBC 7.8 05/13/2022     HGB 8.8 (L) 05/13/2022    HCT 25.4 (L) 05/13/2022    .8 (H) 05/13/2022     (L) 05/13/2022      Scheduled Med:   amitriptyline  75 mg Oral QHS    atenoloL  100 mg Oral Daily    atorvastatin  80 mg Oral QHS    bisacodyL  10 mg Rectal Daily    ceFAZolin (ANCEF) IVPB  2 g Intravenous Q8H    [START ON 5/8/2022] enoxaparin  40 mg Subcutaneous Daily    gabapentin  600 mg Oral TID    hydrOXYzine HCL  25 mg Oral TID    LIDOcaine (PF) 10 mg/ml (1%)  1 mL Intradermal Once    lisinopriL  5 mg Oral Daily    metFORMIN  1,000 mg Oral BID WM    methocarbamoL  750 mg Oral TID    senna-docusate 8.6-50 mg  2 tablet Oral BID    terbinafine HCL  250 mg Oral Daily    triamterene-hydrochlorothiazide 37.5-25 mg  2 tablet Oral Daily    Continuous Infusions:   sodium chloride 0.9% 100 mL/hr at 05/07/22 0646    electrolyte-A      PRN Meds:  acetaminophen, acetaminophen, aluminum-magnesium hydroxide-simethicone, calcium carbonate, ceFAZolin 2 g/50mL Dextrose IVPB, dextrose 10%, dextrose 50%, dextrose 50%, glucagon (human recombinant), glucose, glucose, HYDROcodone-acetaminophen, HYDROcodone-acetaminophen, HYDROcodone-acetaminophen, HYDROmorphone, insulin aspart U-100, methocarbamoL, midazolam, morphine, morphine, ondansetron, ondansetron, prochlorperazine, sodium chloride 0.9%, sodium chloride 0.9%       Assessment/Plan:    Acute on chronic [id number]%  Acute blood loss anemia s/p blood transfusion-s/p EGD, colonoscopy and capsule study  Chronic cirrhosis, hyperbilirubinemia and transaminitis  Ischemic cardiomyopathy s/p AICD, being evaluated for transplant  Constipation    Hx: T2DM, CHF, CAD, CMO-s/p AICD, V tach, PAF, HTN, HLD, PAD, mod MR, and JANET.      Plan:  - Hemoglobin trending down.  Resumed Eliquis when cleared from GI standpoint.  Monitor hemoglobin closely and transfuse as necessary  - Blood pressure borderline low and map is looking good.  Continue to hold Entresto and diuretics.  Will resume as  tolerated  - Not on statin due to cirrhosis.  Continue admitted on metoprolol  - Serum creatinine improving.  Will resume Lasix eventually  --Continue bowel regimen, give one time miralaz    Labs from morning  Protonix        Panchito Bhandari MD   05/07/2022

## 2022-05-14 LAB
ALBUMIN SERPL-MCNC: 2.2 GM/DL (ref 3.4–4.8)
ALBUMIN/GLOB SERPL: 0.6 RATIO (ref 1.1–2)
ALP SERPL-CCNC: 105 UNIT/L (ref 40–150)
ALT SERPL-CCNC: 65 UNIT/L (ref 0–55)
AST SERPL-CCNC: 98 UNIT/L (ref 5–34)
BASOPHILS # BLD AUTO: 0.04 X10(3)/MCL (ref 0–0.2)
BASOPHILS NFR BLD AUTO: 0.5 %
BILIRUBIN DIRECT+TOT PNL SERPL-MCNC: 2 MG/DL
BUN SERPL-MCNC: 9.7 MG/DL (ref 8.4–25.7)
CALCIUM SERPL-MCNC: 8.2 MG/DL (ref 8.8–10)
CHLORIDE SERPL-SCNC: 101 MMOL/L (ref 98–107)
CO2 SERPL-SCNC: 27 MMOL/L (ref 23–31)
CREAT SERPL-MCNC: 1.23 MG/DL (ref 0.73–1.18)
EOSINOPHIL # BLD AUTO: 0.11 X10(3)/MCL (ref 0–0.9)
EOSINOPHIL NFR BLD AUTO: 1.5 %
ERYTHROCYTE [DISTWIDTH] IN BLOOD BY AUTOMATED COUNT: 17.6 % (ref 11.5–17)
GLOBULIN SER-MCNC: 3.6 GM/DL (ref 2.4–3.5)
GLUCOSE SERPL-MCNC: 121 MG/DL (ref 82–115)
HCT VFR BLD AUTO: 29.9 % (ref 42–52)
HGB BLD-MCNC: 9.6 GM/DL (ref 14–18)
IMM GRANULOCYTES # BLD AUTO: 0.02 X10(3)/MCL (ref 0–0.02)
IMM GRANULOCYTES NFR BLD AUTO: 0.3 % (ref 0–0.43)
LYMPHOCYTES # BLD AUTO: 1.05 X10(3)/MCL (ref 0.6–4.6)
LYMPHOCYTES NFR BLD AUTO: 14.1 %
MAGNESIUM SERPL-MCNC: 2.1 MG/DL (ref 1.6–2.6)
MCH RBC QN AUTO: 33.8 PG (ref 27–31)
MCHC RBC AUTO-ENTMCNC: 32.1 MG/DL (ref 33–36)
MCV RBC AUTO: 105.3 FL (ref 80–94)
MONOCYTES # BLD AUTO: 0.76 X10(3)/MCL (ref 0.1–1.3)
MONOCYTES NFR BLD AUTO: 10.2 %
NEUTROPHILS # BLD AUTO: 5.5 X10(3)/MCL (ref 2.1–9.2)
NEUTROPHILS NFR BLD AUTO: 73.4 %
NRBC BLD AUTO-RTO: 0 %
PLATELET # BLD AUTO: 117 X10(3)/MCL (ref 130–400)
PMV BLD AUTO: 11.8 FL (ref 9.4–12.4)
POCT GLUCOSE: 124 MG/DL (ref 70–110)
POCT GLUCOSE: 190 MG/DL (ref 70–110)
POTASSIUM SERPL-SCNC: 4 MMOL/L (ref 3.5–5.1)
PROT SERPL-MCNC: 5.8 GM/DL (ref 5.8–7.6)
RBC # BLD AUTO: 2.84 X10(6)/MCL (ref 4.7–6.1)
SODIUM SERPL-SCNC: 136 MMOL/L (ref 136–145)
WBC # SPEC AUTO: 7.5 X10(3)/MCL (ref 4.5–11.5)

## 2022-05-14 PROCEDURE — 25000003 PHARM REV CODE 250: Performed by: NURSE PRACTITIONER

## 2022-05-14 PROCEDURE — 83735 ASSAY OF MAGNESIUM: CPT | Performed by: INTERNAL MEDICINE

## 2022-05-14 PROCEDURE — 80053 COMPREHEN METABOLIC PANEL: CPT | Performed by: INTERNAL MEDICINE

## 2022-05-14 PROCEDURE — 25000003 PHARM REV CODE 250: Performed by: PHYSICIAN ASSISTANT

## 2022-05-14 PROCEDURE — 85025 COMPLETE CBC W/AUTO DIFF WBC: CPT | Performed by: INTERNAL MEDICINE

## 2022-05-14 PROCEDURE — 25000003 PHARM REV CODE 250: Performed by: INTERNAL MEDICINE

## 2022-05-14 PROCEDURE — 11000001 HC ACUTE MED/SURG PRIVATE ROOM

## 2022-05-14 PROCEDURE — 36415 COLL VENOUS BLD VENIPUNCTURE: CPT | Performed by: INTERNAL MEDICINE

## 2022-05-14 RX ORDER — POLYETHYLENE GLYCOL 3350 17 G/17G
17 POWDER, FOR SOLUTION ORAL DAILY
Status: DISCONTINUED | OUTPATIENT
Start: 2022-05-14 | End: 2022-05-15 | Stop reason: HOSPADM

## 2022-05-14 RX ORDER — DOCUSATE SODIUM 100 MG/1
100 CAPSULE, LIQUID FILLED ORAL 2 TIMES DAILY
Status: DISCONTINUED | OUTPATIENT
Start: 2022-05-14 | End: 2022-05-15 | Stop reason: HOSPADM

## 2022-05-14 RX ADMIN — DOCUSATE SODIUM 100 MG: 100 CAPSULE, LIQUID FILLED ORAL at 08:05

## 2022-05-14 RX ADMIN — ACETAMINOPHEN 650 MG: 325 TABLET, FILM COATED ORAL at 11:05

## 2022-05-14 RX ADMIN — POLYETHYLENE GLYCOL 3350 17 G: 17 POWDER, FOR SOLUTION ORAL at 11:05

## 2022-05-14 RX ADMIN — PANTOPRAZOLE SODIUM 40 MG: 40 TABLET, DELAYED RELEASE ORAL at 10:05

## 2022-05-14 RX ADMIN — PANTOPRAZOLE SODIUM 40 MG: 40 TABLET, DELAYED RELEASE ORAL at 08:05

## 2022-05-14 RX ADMIN — AMIODARONE HYDROCHLORIDE 200 MG: 200 TABLET ORAL at 10:05

## 2022-05-14 RX ADMIN — DOCUSATE SODIUM 100 MG: 100 CAPSULE, LIQUID FILLED ORAL at 11:05

## 2022-05-14 RX ADMIN — APIXABAN 5 MG: 5 TABLET, FILM COATED ORAL at 08:05

## 2022-05-14 RX ADMIN — FLUTICASONE PROPIONATE 50 MCG: 50 SPRAY, METERED NASAL at 10:05

## 2022-05-14 RX ADMIN — APIXABAN 5 MG: 5 TABLET, FILM COATED ORAL at 10:05

## 2022-05-14 NOTE — PROGRESS NOTES
[facility] Hospital Medicine Progress Note        Chief Complaint: Inpatient Follow-up for GIB, CHF    HPI:   66 y.o. male with a PMHx of CHF/ischemic CMO EF 25-30% s/p AICD awaiting heart transplant in [facility], cirrhosis, PAD, DM2, JANET noncompliant with CPAP, VHD and PAF on Eliquis. He presented to ED with c/o intermittent SOB with associated BLE swelling over the past 2 months since being off of his Lasix. He also endorsed dark stools x2-3 days. He is a poor historian, so the majority of the history was obtained primarily from review of the medical record.      Initial ED VS include BP 91/55, HR 93, RR 20, SpO2 99% on RA, temp 98.6. Labs notable for hgb 8.3, hct 25.3, INR 3, CO2 20, elevated LFTs, .6, toponin 0.063. Stool was guaiac positive. CXR negative for acute abnormality. EKG revealed SR. He was given IV Lasix in the ED. Cardiology & GI were consulted. He has been admitted to hospital medicine services for further work-up and management of care. Lasix was continued.  Troponins were trended.    Cardiology recommended continuing amiodarone, Ranexa, Aldactone with metoprolol and Entresto as BP tolerates.  Cardiology recommended to continue Eliquis if cleared from GI standpoint.  Signed off and recommended outpatient follow-up.  GI suggested no intervention in the beginning.  Hemoglobin dropped acutely while in the hospital and then GI recommended flex sig to evaluate her anastomosis site bleeding.  Also revealed blood transfusion for anemia.  Protonix was continued.  Lasix was held due to ANGEL and borderline low blood pressures as well.  Eliquis is on hold due to possible GI bleed..    EGD revealed less than 5 mm variceal site in the lower third of esophagus with no bleeding stigmata, medium size hiatal hernia with no Ronny lesions, mild portal hypertensive gastropathy with few mild petechial erythema in the antrum of the stomach which could be early GAVE.  There was a single  localized mild erosion without bleeding in the jejunum.  Duodenum was normal.  Colonoscopy revealed medium sized internal hemorrhoid.  There was no blood or dark stool in the colon and anastomosis was around 25 cm.  Capsule study was suggested for further assessment. Capsule study revealed tiny nonbleeding petechiae scattered throughout the small bowel with no active bleeding or obvious single source of GI bleeding.  GI recommending IV iron and blood transfusion as needed and cleared to restart Eliquis from their standpoint.    Interval Hx:   Afebrile, BP remains olw with good MAP. Comfortably resting in bed. He an episode of vomiting after taking magcitrate yesterday. No further nausea or vomiting. Multiple BMs since then. No blood.      Objective/physical exam:  VITAL SIGNS: 24 HRS MIN & MAX LAST   Temp  Min: 98.3 °F (36.8 °C)  Max: 99.9 °F (37.7 °C) 99.1 °F (37.3 °C)   BP  Min: 132/57  Max: 179/75 (!) 154/74   Pulse  Min: 65  Max: 80  68   Resp  Min: 15  Max: 25 18   SpO2  Min: 90 %  Max: 98 % (!) 90 %     General: In no acute distress, afebrile  Chest: Clear to auscultation bilaterally  Heart: S1, S2, no appreciable murmur  Abdomen: Soft, nontender, BS +, distended  Neurologic: Alert and oriented x4, moving all extremities with good strength     Lab Results   Component Value Date     05/14/2022    K 4.0 05/14/2022     03/31/2022    CO2 27 05/14/2022    BUN 9.7 05/14/2022    CREATININE 1.23 (H) 05/14/2022    CALCIUM 8.2 (L) 05/14/2022    ANIONGAP 7 (L) 03/31/2022    ESTGFRAFRICA 51.5 (A) 03/31/2022    EGFRNONAA 44.5 (A) 03/31/2022      Lab Results   Component Value Date    ALT 65 (H) 05/14/2022    AST 98 (H) 05/14/2022    ALKPHOS 105 05/14/2022    BILITOT 2.0 (H) 05/14/2022      Lab Results   Component Value Date    WBC 7.5 05/14/2022    HGB 9.6 (L) 05/14/2022    HCT 29.9 (L) 05/14/2022    .3 (H) 05/14/2022     (L) 05/14/2022      Scheduled Med:   amitriptyline  75 mg Oral QHS     atenoloL  100 mg Oral Daily    atorvastatin  80 mg Oral QHS    bisacodyL  10 mg Rectal Daily    ceFAZolin (ANCEF) IVPB  2 g Intravenous Q8H    [START ON 5/8/2022] enoxaparin  40 mg Subcutaneous Daily    gabapentin  600 mg Oral TID    hydrOXYzine HCL  25 mg Oral TID    LIDOcaine (PF) 10 mg/ml (1%)  1 mL Intradermal Once    lisinopriL  5 mg Oral Daily    metFORMIN  1,000 mg Oral BID WM    methocarbamoL  750 mg Oral TID    senna-docusate 8.6-50 mg  2 tablet Oral BID    terbinafine HCL  250 mg Oral Daily    triamterene-hydrochlorothiazide 37.5-25 mg  2 tablet Oral Daily    Continuous Infusions:   sodium chloride 0.9% 100 mL/hr at 05/07/22 0646    electrolyte-A      PRN Meds:  acetaminophen, acetaminophen, aluminum-magnesium hydroxide-simethicone, calcium carbonate, ceFAZolin 2 g/50mL Dextrose IVPB, dextrose 10%, dextrose 50%, dextrose 50%, glucagon (human recombinant), glucose, glucose, HYDROcodone-acetaminophen, HYDROcodone-acetaminophen, HYDROcodone-acetaminophen, HYDROmorphone, insulin aspart U-100, methocarbamoL, midazolam, morphine, morphine, ondansetron, ondansetron, prochlorperazine, sodium chloride 0.9%, sodium chloride 0.9%       Assessment/Plan:    Acute on chronic [id number]%  Acute blood loss anemia s/p blood transfusion-s/p EGD, colonoscopy and capsule study  Chronic cirrhosis, hyperbilirubinemia and transaminitis  Ischemic cardiomyopathy s/p AICD, being evaluated for transplant  Constipation    Hx: T2DM, CHF, CAD, CMO-s/p AICD, V tach, PAF, HTN, HLD, PAD, mod MR, and JANET.      Plan:   - Hemoglobin stable.  Continue Eliquis. had EGD, colonoscopy and capsule study  - GI recommended IV iron infusion and Sandostatin eventually as long-term.  - Entresto and diuretics on hold.  Continue metoprolol and amiodarone  - Not on statin due to cirrhosis  - Serum creatinine continue to improve.  Will resume diuresis eventually as blood pressure tolerates  - Encourage oral hydration  nutrition    Protonix  Eliquis  Possible discharge tomorrow      Panchito Bhandari MD   05/07/2022

## 2022-05-15 VITALS
BODY MASS INDEX: 21.94 KG/M2 | RESPIRATION RATE: 17 BRPM | TEMPERATURE: 98 F | HEIGHT: 64 IN | SYSTOLIC BLOOD PRESSURE: 101 MMHG | DIASTOLIC BLOOD PRESSURE: 58 MMHG | HEART RATE: 72 BPM | OXYGEN SATURATION: 95 % | WEIGHT: 128.5 LBS

## 2022-05-15 PROBLEM — K92.2 GASTROINTESTINAL HEMORRHAGE: Status: ACTIVE | Noted: 2022-05-15

## 2022-05-15 PROBLEM — D64.9 ANEMIA: Status: ACTIVE | Noted: 2022-05-15

## 2022-05-15 PROBLEM — K92.2 GASTROINTESTINAL HEMORRHAGE: Status: RESOLVED | Noted: 2022-05-15 | Resolved: 2022-05-15

## 2022-05-15 LAB
ALBUMIN SERPL-MCNC: 2.1 GM/DL (ref 3.4–4.8)
ALBUMIN/GLOB SERPL: 0.6 RATIO (ref 1.1–2)
ALP SERPL-CCNC: 93 UNIT/L (ref 40–150)
ALT SERPL-CCNC: 69 UNIT/L (ref 0–55)
AST SERPL-CCNC: 109 UNIT/L (ref 5–34)
BASOPHILS # BLD AUTO: 0.03 X10(3)/MCL (ref 0–0.2)
BASOPHILS NFR BLD AUTO: 0.3 %
BILIRUBIN DIRECT+TOT PNL SERPL-MCNC: 2.7 MG/DL
BUN SERPL-MCNC: 14.3 MG/DL (ref 8.4–25.7)
CALCIUM SERPL-MCNC: 7.9 MG/DL (ref 8.8–10)
CHLORIDE SERPL-SCNC: 101 MMOL/L (ref 98–107)
CO2 SERPL-SCNC: 23 MMOL/L (ref 23–31)
CREAT SERPL-MCNC: 1.47 MG/DL (ref 0.73–1.18)
EOSINOPHIL # BLD AUTO: 0.05 X10(3)/MCL (ref 0–0.9)
EOSINOPHIL NFR BLD AUTO: 0.5 %
ERYTHROCYTE [DISTWIDTH] IN BLOOD BY AUTOMATED COUNT: 17.3 % (ref 11.5–17)
GLOBULIN SER-MCNC: 3.5 GM/DL (ref 2.4–3.5)
GLUCOSE SERPL-MCNC: 120 MG/DL (ref 82–115)
HCT VFR BLD AUTO: 29.4 % (ref 42–52)
HGB BLD-MCNC: 9.6 GM/DL (ref 14–18)
IMM GRANULOCYTES # BLD AUTO: 0.06 X10(3)/MCL (ref 0–0.02)
IMM GRANULOCYTES NFR BLD AUTO: 0.6 % (ref 0–0.43)
LYMPHOCYTES # BLD AUTO: 1.48 X10(3)/MCL (ref 0.6–4.6)
LYMPHOCYTES NFR BLD AUTO: 14.6 %
MAGNESIUM SERPL-MCNC: 2 MG/DL (ref 1.6–2.6)
MCH RBC QN AUTO: 34.2 PG (ref 27–31)
MCHC RBC AUTO-ENTMCNC: 32.7 MG/DL (ref 33–36)
MCV RBC AUTO: 104.6 FL (ref 80–94)
MONOCYTES # BLD AUTO: 0.96 X10(3)/MCL (ref 0.1–1.3)
MONOCYTES NFR BLD AUTO: 9.5 %
NEUTROPHILS # BLD AUTO: 7.6 X10(3)/MCL (ref 2.1–9.2)
NEUTROPHILS NFR BLD AUTO: 74.5 %
NRBC BLD AUTO-RTO: 0 %
PLATELET # BLD AUTO: 113 X10(3)/MCL (ref 130–400)
PMV BLD AUTO: 12.7 FL (ref 9.4–12.4)
POTASSIUM SERPL-SCNC: 4.7 MMOL/L (ref 3.5–5.1)
PROT SERPL-MCNC: 5.6 GM/DL (ref 5.8–7.6)
RBC # BLD AUTO: 2.81 X10(6)/MCL (ref 4.7–6.1)
SODIUM SERPL-SCNC: 133 MMOL/L (ref 136–145)
WBC # SPEC AUTO: 10.1 X10(3)/MCL (ref 4.5–11.5)

## 2022-05-15 PROCEDURE — 25000003 PHARM REV CODE 250: Performed by: NURSE PRACTITIONER

## 2022-05-15 PROCEDURE — 25000003 PHARM REV CODE 250: Performed by: PHYSICIAN ASSISTANT

## 2022-05-15 PROCEDURE — 80053 COMPREHEN METABOLIC PANEL: CPT | Performed by: INTERNAL MEDICINE

## 2022-05-15 PROCEDURE — 83735 ASSAY OF MAGNESIUM: CPT | Performed by: INTERNAL MEDICINE

## 2022-05-15 PROCEDURE — 85025 COMPLETE CBC W/AUTO DIFF WBC: CPT | Performed by: INTERNAL MEDICINE

## 2022-05-15 PROCEDURE — 25000003 PHARM REV CODE 250: Performed by: INTERNAL MEDICINE

## 2022-05-15 PROCEDURE — 36415 COLL VENOUS BLD VENIPUNCTURE: CPT | Performed by: INTERNAL MEDICINE

## 2022-05-15 RX ORDER — POLYETHYLENE GLYCOL 3350 17 G/17G
17 POWDER, FOR SOLUTION ORAL DAILY
Qty: 5 PACKET | Refills: 0 | Status: SHIPPED | OUTPATIENT
Start: 2022-05-15 | End: 2022-05-24

## 2022-05-15 RX ORDER — DOCUSATE SODIUM 100 MG/1
100 CAPSULE, LIQUID FILLED ORAL 2 TIMES DAILY
Qty: 62 CAPSULE | Refills: 0 | Status: SHIPPED | OUTPATIENT
Start: 2022-05-15 | End: 2022-06-15

## 2022-05-15 RX ORDER — SIMETHICONE 80 MG
1 TABLET,CHEWABLE ORAL 3 TIMES DAILY PRN
Status: DISCONTINUED | OUTPATIENT
Start: 2022-05-15 | End: 2022-05-15 | Stop reason: HOSPADM

## 2022-05-15 RX ORDER — FERROUS SULFATE 324(65)MG
324 TABLET, DELAYED RELEASE (ENTERIC COATED) ORAL 2 TIMES DAILY
Qty: 60 TABLET | Refills: 2 | Status: SHIPPED | OUTPATIENT
Start: 2022-05-15 | End: 2022-06-15

## 2022-05-15 RX ORDER — SIMETHICONE 80 MG
80 TABLET,CHEWABLE ORAL 3 TIMES DAILY PRN
Qty: 30 TABLET | Refills: 0 | Status: ON HOLD | OUTPATIENT
Start: 2022-05-15 | End: 2022-06-02

## 2022-05-15 RX ORDER — AMIODARONE HYDROCHLORIDE 200 MG/1
200 TABLET ORAL DAILY
Qty: 30 TABLET | Refills: 11 | Status: SHIPPED | OUTPATIENT
Start: 2022-05-16 | End: 2023-05-16

## 2022-05-15 RX ADMIN — SIMETHICONE 80 MG: 80 TABLET, CHEWABLE ORAL at 01:05

## 2022-05-15 RX ADMIN — FLUTICASONE PROPIONATE 50 MCG: 50 SPRAY, METERED NASAL at 08:05

## 2022-05-15 RX ADMIN — APIXABAN 5 MG: 5 TABLET, FILM COATED ORAL at 08:05

## 2022-05-15 RX ADMIN — DOCUSATE SODIUM 100 MG: 100 CAPSULE, LIQUID FILLED ORAL at 08:05

## 2022-05-15 RX ADMIN — METOPROLOL SUCCINATE 100 MG: 50 TABLET, FILM COATED, EXTENDED RELEASE ORAL at 08:05

## 2022-05-15 RX ADMIN — ACETAMINOPHEN 650 MG: 325 TABLET, FILM COATED ORAL at 01:05

## 2022-05-15 RX ADMIN — AMIODARONE HYDROCHLORIDE 200 MG: 200 TABLET ORAL at 08:05

## 2022-05-15 RX ADMIN — PANTOPRAZOLE SODIUM 40 MG: 40 TABLET, DELAYED RELEASE ORAL at 08:05

## 2022-05-15 NOTE — DISCHARGE SUMMARY
[facility] - 6th Floor [facility] Medicine  Discharge Summary      Patient Name: John Javier Jr.  MRN: [id number]  Patient Class: IP- Inpatient  Admission Date: 5/8/2022  Hospital Length of Stay: 7 days  Discharge Date and Time:  05/15/2022 11:11 AM  Attending Physician: Fernanda Meraz MD   Discharging Provider: Panchito Bhandari MD  Primary Care Provider: No primary care provider on file.    66 y.o. male with a PMHx of CHF/ischemic CMO EF 25-30% s/p AICD awaiting heart transplant in [facility], cirrhosis, PAD, DM2, JANET noncompliant with CPAP, VHD and PAF on Eliquis. He presented to ED with c/o intermittent SOB with associated BLE swelling over the past 2 months since being off of his Lasix. He also endorsed dark stools x2-3 days. He is a poor historian, so the majority of the history was obtained primarily from review of the medical record.      Initial ED VS include BP 91/55, HR 93, RR 20, SpO2 99% on RA, temp 98.6. Labs notable for hgb 8.3, hct 25.3, INR 3, CO2 20, elevated LFTs, .6, toponin 0.063. Stool was guaiac positive. CXR negative for acute abnormality. EKG revealed SR. He was given IV Lasix in the ED. Cardiology & GI were consulted. He has been admitted to hospital medicine services for further work-up and management of care. Lasix was continued.  Troponins were trended.     Cardiology recommended continuing amiodarone, Ranexa, Aldactone with metoprolol and Entresto as BP tolerates.  Cardiology recommended to continue Eliquis if cleared from GI standpoint.  Signed off and recommended outpatient follow-up.  GI suggested no intervention in the beginning.  Hemoglobin dropped acutely while in the hospital and then GI recommended flex sig to evaluate her anastomosis site bleeding.  Also revealed blood transfusion for anemia.  Protonix was continued.  Lasix was held due to ANGEL and borderline low blood pressures as well.  Eliquis is on hold due to possible GI bleed..     EGD  revealed less than 5 mm variceal site in the lower third of esophagus with no bleeding stigmata, medium size hiatal hernia with no Ronny lesions, mild portal hypertensive gastropathy with few mild petechial erythema in the antrum of the stomach which could be early GAVE.  There was a single localized mild erosion without bleeding in the jejunum.  Duodenum was normal.  Colonoscopy revealed medium sized internal hemorrhoid.  There was no blood or dark stool in the colon and anastomosis was around 25 cm.  Capsule study was suggested for further assessment. Capsule study revealed tiny nonbleeding petechiae scattered throughout the small bowel with no active bleeding or obvious single source of GI bleeding.  GI recommending IV iron and blood transfusion as needed and cleared to restart Eliquis from their standpoint. Hemoglobin remained stable.  His blood pressure remains borderline low.  Patient was requested not to take Entresto and Lasix until cleared from cardiology standpoint.  He currently is being evaluated for cardiac transplant.  Also encouraged follow-up with GI and PCP closely.  He verbalized understanding of all the recommendations.  Prior to discharge all his medications were reconciled, necessary prescriptions were provided.  Bowel regimen, iron supplementation was added.      Acute on chronic [id number]%  Acute blood loss anemia s/p blood transfusion-s/p EGD, colonoscopy and capsule study  Chronic cirrhosis, hyperbilirubinemia and transaminitis  Ischemic cardiomyopathy s/p AICD, being evaluated for transplant  Constipation  CONNOR     Hx: T2DM, CHF, CAD, CMO-s/p AICD, V tach, PAF, HTN, HLD, PAD, mod MR, and JANET.    Goals of Care Treatment Preferences:  Code Status: Full Code      Consults:   Consults (From admission, onward)        Status Ordering Provider     Inpatient consult to Cardiology  Once        Provider:  Antonio George MD    Completed VIVIAN MANJARREZ     Inpatient consult to Gastroenterology  Once    "     Provider:  Mino Guzmán MD    Acknowledged ALEXIS FROST          No new Assessment & Plan notes have been filed under this hospital service since the last note was generated.  Service: Hospital Medicine    Final Active Diagnoses:    Diagnosis Date Noted POA    PRINCIPAL PROBLEM:  Anemia [D64.9] 05/15/2022 Unknown    Gastrointestinal hemorrhage [K92.2] 05/15/2022 Unknown      Problems Resolved During this Admission:       Discharged Condition: good    Disposition: Home    Follow Up:   Follow-up Information     Pending sale to [phone] Follow up.           PCP Follow up in 1 week(s).           GI Follow up in 3 week(s).                     Patient Instructions:      WALKER FOR HOME USE     Order Specific Question Answer Comments   Type of Walker: Adult (5'4"-6'6")    With wheels? Yes    Height: 5' 4" (1.626 m)    Weight: 58.3 kg (128 lb 8.5 oz)    Length of need (1-99 months): 99    Does patient have medical equipment at home? rollator    Please check all that apply: Patient's condition impairs ambulation.    Please check all that apply: Patient is unable to safely ambulate without equipment.      Type & Screen   Standing Status: Future Standing Exp. Date: 07/07/23       Significant Diagnostic Studies: Labs:   CMP   Recent Labs   Lab 05/14/22  0428 05/15/22  0437    133*   K 4.0 4.7   CO2 27 23   BUN 9.7 14.3   CREATININE 1.23* 1.47*   CALCIUM 8.2* 7.9*   ALBUMIN 2.2* 2.1*   BILITOT 2.0* 2.7*   ALKPHOS 105 93   AST 98* 109*   ALT 65* 69*       Pending Diagnostic Studies:     None         Medications:  Reconciled Home Medications:      Medication List      START taking these medications    docusate sodium 100 MG capsule  Commonly known as: COLACE  Take 1 capsule (100 mg total) by mouth 2 (two) times daily.     ferrous sulfate 324 mg (65 mg iron) Tbec  Take 1 tablet (324 mg total) by mouth 2 (two) times daily.     polyethylene glycol 17 gram Pwpk  Commonly known as: GLYCOLAX  Take 17 " g by mouth once daily. for 9 days     simethicone 80 MG chewable tablet  Commonly known as: MYLICON  Take 1 tablet (80 mg total) by mouth 3 (three) times daily as needed for Flatulence.        CHANGE how you take these medications    amiodarone 200 MG Tab  Commonly known as: PACERONE  Take 1 tablet (200 mg total) by mouth once daily.  Start taking on: May 16, 2022  What changed:   · medication strength  · how much to take        CONTINUE taking these medications    albuterol 90 mcg/actuation inhaler  Commonly known as: PROVENTIL/VENTOLIN HFA  INHALE 2 PUFFS BY MOUTH FOUR TIMES DAILY AS NEEDED FOR WHEEZING OR SHORTNESS OF BREATH     apixaban 5 mg Tab  Commonly known as: ELIQUIS  Take 1 tablet (5 mg total) by mouth 2 (two) times daily.     atorvastatin 20 MG tablet  Commonly known as: LIPITOR  Take 1 tablet (20 mg total) by mouth every evening.     fluticasone propionate 50 mcg/actuation nasal spray  Commonly known as: FLONASE     metoprolol succinate 100 MG 24 hr tablet  Commonly known as: TOPROL-XL  Take 1 tablet (100 mg total) by mouth once daily.     nitroGLYCERIN 0.4 MG SL tablet  Commonly known as: NITROSTAT  SMARTSI Tablet(s) Sublingual As Needed     pantoprazole 40 MG tablet  Commonly known as: PROTONIX  Take 1 tablet (40 mg total) by mouth once daily.            Indwelling Lines/Drains at time of discharge:   Lines/Drains/Airways     None                 Time spent on the discharge of patient: 35 minutes         Panchito Bahndari MD  Department of Hospital Medicine  [facility] - 6th Floor Medical Telemetry

## 2022-05-16 ENCOUNTER — PATIENT OUTREACH (OUTPATIENT)
Dept: ADMINISTRATIVE | Facility: CLINIC | Age: 67
End: 2022-05-16
Payer: COMMERCIAL

## 2022-05-16 NOTE — PLAN OF CARE
Spoke to Lexy at [facility]. Rolling Walker will be delivered to patient's home today. Discharge documentation faxed to [facility] Hairston.  Notified Kimmy of discharge.

## 2022-05-16 NOTE — PROGRESS NOTES
C3 nurse attempted to contact John Javier Jr. for a TCC post hospital discharge follow up call. No answer.Voicemail left for call back information. The patient does not have a scheduled HOSFU appointment within 14 days that I can locate.

## 2022-05-16 NOTE — PHYSICIAN QUERY
PT Name: John Javier Jr.  MR #: [id number]    DOCUMENTATION CLARIFICATION     CDS/: GERRY Morgan, RN, CCDS               Contact information: [email or web address]    This form is a permanent document in the medical record.     Query Date: May 16, 2022    By submitting this query, we are merely seeking further clarification of documentation. Please utilize your independent clinical judgment when addressing the question(s) below.    Supporting Clinical Findings Location in Medical Record   H/H: 8.3/ 25.3    Patient additionally mentioned that he is also having dark stools.  Rectal exam performed with chaperone present, guaiac-positive.  Will give Protonix.  Discussed with medicine for admission.  Will hold Eliquis    PMH: Diverticulitis with perf status post colectomy in 2018; PAF on amiodarone and Eliquis   endorsed dark stools x2-3 days  Acute Blood Loss Anemia, Likely Upper GI Bleed    Hemoglobin was found to be 8.3 occult blood positive GI consulted    Occult blood was positive so GI has been consulted hemoglobin is 8.3 no need of transfusion at this time but if it still trending down then we might transfuse considering elevated troponins    He underwent an EGD on 03/02/2022 and was found to have small esophageal varices, normal stomach and normal duodenum.  His last colonoscopy was in 2018 where he was found to have a couple of small polyps as well as diverticulosis and diverticulitis   ED Note: Dr. Fuller 5/8          H & P: Dr. Meraz 5/8   Gastrointestinal hemorrhage, unspecified gastrointestinal hemorrhage type      multiple reasons for possible dark stools.  He is having significant drop in his hemoglobin.  Likely his coagulopathy playing a role in that. Noted small varices on prior EGD.  I bet he has some portal htn gastropathy playing a role     Reports his last stool was brown with a red blood streak. He was previously having black stools at home  Hgb 8.3-->7.1-->1 unit ordered    Eliquis on  hold because of possible GI bleed    Procedure: capsule endoscopy  Indication: melena/anemia    Findings:   Capsule passed from stomach to small intestine to colon.     There were innumerable small/tiny erythematous spots throughout entire small bowel.     No active bleeding. Doubt avm's, but not sure what else this could be.  ?congestion?  IN the setting of blood thinners this could be potentially leading to blood loss Gastro Consult: Dr. Guzmán 5/8    Gastro PN: Dr. Jacobson 5/9             PN: Dr. Meraz 5/9    Op Note: Dr. Jacobson 5/12       Provider, please clarify if there is any clinical correlation between ____Melena_______ and ____anticoagulants___.           Are the conditions:      [  x] Due to or associated with each other   [  ] Unrelated to each other   [  ] Other explanation (Please Specify): ______________   [  ] Clinically Undetermined                                                                               Please document in your progress notes daily for the duration of treatment until resolved and include in your discharge summary.

## 2022-05-16 NOTE — PHYSICIAN QUERY
PT Name: John Javier Jr.  MR #: [id number]  DOCUMENTATION CLARIFICATION      CDS/: GERRY Morgan, RN, CCDS               Contact information: [email or web address]    This form is a permanent document in the medical record.      Query Date: May 16, 2022    By submitting this query, we are merely seeking further clarification of documentation. Please utilize your independent clinical judgment when addressing the question(s) below.    The Medical Record contains the following:   Indicators  Supporting Clinical Findings Location in Medical Record   X PT        INR        PTT PT: 30.6; INR: 3.00    PT: 23.3; INR: 2.11 Lab: 5/8    Lab: 5/10    Platelets     X Coagulopathy or Coagulation Defect documented Patient with multiple reasons for possible dark stools.  He is having significant drop in his hemoglobin.  Likely his coagulopathy playing a role in that.   Noted small varices on prior EGD.  I bet he has some portal htn gastropathy playing a role    Gastro PN: Dr. Jacobson 5/9   X Acute/Chronic Illness PMHx of CHF/ischemic CMO EF 25-30% s/p AICD awaiting heart transplant in [place], cirrhosis, PAD, DM2, JANET noncompliant with CPAP, VHD and PAF on Eliquis    Acute on chronic SEdYG68-17%  Acute blood loss anemia s/p blood transfusion-s/p EGD, colonoscopy and capsule study  Chronic cirrhosis, hyperbilirubinemia and transaminitis  Ischemic cardiomyopathy s/p AICD, being evaluated for transplant  Constipation  CONNOR DCS: Dr. Bhandari 5/15    Treatment      Other       Provider, please specify type of coagulopathy associated with above clinical findings.    [   ] Coagulation Defect due to (please specify):_________   [  x ] Coagulopathy Secondary to Anticoagulation Therapy   [   ] Coagulation deficiency unspecified   [   ] Hypercoagulable state unspecified (Other Primary Thrombophilia)   [   ] Other hematological diagnosis (please specify):_______   [  ] Clinically Undetermined         Please document in your  progress notes daily for the duration of treatment until resolved, and include in your discharge summary.

## 2022-05-16 NOTE — PHYSICIAN QUERY
PT Name: John Javier Jr.  MR #: [id number]     DOCUMENTATION CLARIFICATION      CDS/: GERRY Morgan, RN, CCDS               Contact information: [email or web address]    This form is a permanent document in the medical record.     Query Date: May 16, 2022    Dear Provider,  By submitting this query, we are merely seeking further clarification of documentation.  Please utilize your independent clinical judgment when addressing the question(s) below.     The Medical Record contains the following:    Supporting Clinical Findings Location in Medical Record   troponin was mildly elevated. CIS is being consulted for NSTEMI    NSTEMI probable type II (demand ischemia) in the setting of acute heart failure exacerbation and acute anemia              -troponin low level, trending down   Cards Consult: Dr. Sanchez    Elevated Troponin, Likely Type II NSTEMI   H & P: Dr. Meraz    Vent. Rate : 087 BPM     Atrial Rate : 087 BPM      P-R Int : 188 ms          QRS Dur : 150 ms       QT Int : 412 ms       P-R-T Axes : 044 020 234 degrees      QTc Int : 495 ms     Normal sinus rhythm   Left bundle branch block   Abnormal ECG  EK/8         Please clarify if the ___NSTEMI 2______ diagnosis has been:    [  ] Ruled In   [  ] Ruled In, Now Resolved   [ x ] Ruled Out   [  ] Still to be ruled out at the time of discharge   [  ] Other/Clarification of findings (please specify): _______________    [   ] Clinically undetermined       Please document in your progress notes daily for the duration of treatment, until resolved, and include in your discharge summary.    Form No. [id number]

## 2022-05-16 NOTE — PHYSICIAN QUERY
PT Name: John Javier Jr.  MR #: [id number]     DOCUMENTATION CLARIFICATION     CDS/: GERRY Morgan, RN, CCDS               Contact information: [email or web address]  This form is a permanent document in the medical record.     Query Date: May 16, 2022    By submitting this query, we are merely seeking further clarification of documentation.  Please utilize your independent clinical judgment when addressing the question(s) below.    The Medical Record contains the following   Indicators Supporting Clinical Findings Location in Medical Record   X Heart Failure documented PMH: Chronic combined systolic and diastolic heart failure    Acute on chronic systolic congestive heart failure     PMHx of CHF/ischemic CMO EF 25-30% s/p AICD awaiting heart transplant   Acute on Chronic HFrEF  Decompensated    Acute on chronic GXwTP28-30%   ED Note: Dr. Fuller 5/8        H & P: Dr. Meraz 5/8      DCS: Dr. Bhandari 5/15   X .6   Lab: 5/8   X EF/Echo Previous Cardiac Diagnostics:   Echo 4.13.22  Lv concentric remodeling.  LV systolic function is severely reduced.  Visually estimated EF is 25-30%. Severe global hypokinesis.  Grade I DD  Mildly enlarged left atrium  There is trace TR with RVSP estimated at 28 mm Hg.   Cards Consult: Dr. Escalona 5/9    Radiology findings     X Subjective/Objective Respiratory Conditions presents to the ED complaining of intermittent SOB    presented to ED with c/o intermittent SOB with associated BLE swelling over the past 2 months since being off of his Lasix ED Note: Dr. Fuller 5/8    H & P: Dr. Meraz 5/8    Recent/Current MI      Heart Transplant, LVAD      Edema, JVD      Ascites     X Diuretics/Meds given IV Lasix in the ED   H & P: Dr. Meraz 5/8    Other Treatment      Other       Heart failure is a clinical diagnosis which includes symptomatic fluid retention, elevated intracardiac pressures, and/or the inability of the heart to deliver adequate blood flow.    Heart Failure  with reduced Ejection Fraction (HFrEF) or Systolic Heart Failure (loses ability to contract normally, EF is <40%)    Heart Failure with preserved Ejection Fraction (HFpEF) or Diastolic Heart Failure (stiff ventricles, does not relax properly, EF is >50%)     Heart Failure with Combined Systolic and Diastolic Failure (stiff ventricles, does not relax properly and EF is <50%)    Mid-range or mildly reduced ejection fraction (HFmrEF) is classified as systolic heart failure.   Common clues to acute exacerbation:  Rapidly progressive symptoms (w/in 2 weeks of presentation), using IV diuretics, using supplemental O2, pulmonary edema on Xray, new or worsening pleural effusion, +JVD or other signs of volume overload, MI w/in 4 weeks, and/or BNP >500  The clinical guidelines noted are only system guidelines, and do not replace the providers clinical judgment.    Provider, please clarify type of acute on chronic CHF associated with the above clinical findings.    [   ]  Acute on Chronic Systolic Heart Failure (HFrEF or HFmrEF) - worsening of CHF signs/symptoms in preexisting CHF   [   ]  Acute on Chronic Combined Systolic and Diastolic Heart Failure - worsening of CHF signs/symptoms in preexisting CHF   [   ]  Other (please specify): ___________________________________   X  Clinically Undetermined x     Please document in your progress notes daily for the duration of treatment until resolved and include in your discharge summary.    References:  American Heart Association editorial staff. (2017, May). Ejection Fraction Heart Failure Measurement. American Heart Association. https://www.heart.org/en/health-topics/heart-failure/diagnosing-heart-failure/ejection-fraction-heart-failure-measurement#:~:text=Ejection%20fraction%20(EF)%20is%20a,pushed%20out%20with%20each%20heartbeat  TOBY Hargrove (2020, December 15). Heart failure with preserved ejection fraction: Clinical manifestations and diagnosis. UpToDate.  https://www.REVENUE.com.iCetana/contents/heart-failure-with-preserved-ejection-fraction-clinical-manifestations-and-diagnosis.  ICD-10-CM/PCS Coding Clinic Third Quarter ICD-10, Effective with discharges: September 8, 2020 [facility] § Heart failure with mid-range or mildly reduced ejection fraction (2020).  Form No. 14131

## 2022-05-17 NOTE — PROGRESS NOTES
C3 nurse attempted to contact John Javier Jr. for a TCC post hospital discharge follow up call. No answer, left VM w/ CB#.  The patient does not have a scheduled HOSFU appointment within 14 days that I can locate.

## 2022-05-18 NOTE — PROGRESS NOTES
C3 nurse attempted to contact John Javier Jr. for a TCC post hospital discharge follow up call. No answer, unable to leave VM.

## 2022-05-20 ENCOUNTER — OFFICE VISIT (OUTPATIENT)
Dept: TRANSPLANT | Facility: CLINIC | Age: 67
End: 2022-05-20
Attending: INTERNAL MEDICINE
Payer: COMMERCIAL

## 2022-05-20 VITALS
SYSTOLIC BLOOD PRESSURE: 90 MMHG | DIASTOLIC BLOOD PRESSURE: 52 MMHG | HEART RATE: 64 BPM | WEIGHT: 132.69 LBS | BODY MASS INDEX: 22.65 KG/M2 | HEIGHT: 64 IN

## 2022-05-20 DIAGNOSIS — Z79.899 AT RISK FOR AMIODARONE TOXICITY WITH LONG TERM USE: ICD-10-CM

## 2022-05-20 DIAGNOSIS — I48.0 PAF (PAROXYSMAL ATRIAL FIBRILLATION): ICD-10-CM

## 2022-05-20 DIAGNOSIS — I50.42 CHRONIC COMBINED SYSTOLIC AND DIASTOLIC HEART FAILURE: Primary | ICD-10-CM

## 2022-05-20 DIAGNOSIS — Z91.89 AT RISK FOR AMIODARONE TOXICITY WITH LONG TERM USE: ICD-10-CM

## 2022-05-20 DIAGNOSIS — I85.01 BLEEDING ESOPHAGEAL VARICES, UNSPECIFIED ESOPHAGEAL VARICES TYPE: ICD-10-CM

## 2022-05-20 DIAGNOSIS — I42.0 DILATED CARDIOMYOPATHY: ICD-10-CM

## 2022-05-20 DIAGNOSIS — I13.0 HYPERTENSIVE CARDIOVASCULAR-RENAL DISEASE, STAGE 1-4 OR UNSPECIFIED CHRONIC KIDNEY DISEASE, WITH HEART FAILURE: ICD-10-CM

## 2022-05-20 DIAGNOSIS — R79.89 ABNORMAL LFTS: ICD-10-CM

## 2022-05-20 DIAGNOSIS — I47.20 VT (VENTRICULAR TACHYCARDIA): ICD-10-CM

## 2022-05-20 PROCEDURE — 1101F PR PT FALLS ASSESS DOC 0-1 FALLS W/OUT INJ PAST YR: ICD-10-PCS | Mod: CPTII,S$GLB,, | Performed by: INTERNAL MEDICINE

## 2022-05-20 PROCEDURE — 99999 PR PBB SHADOW E&M-EST. PATIENT-LVL III: ICD-10-PCS | Mod: PBBFAC,,, | Performed by: INTERNAL MEDICINE

## 2022-05-20 PROCEDURE — 3074F SYST BP LT 130 MM HG: CPT | Mod: CPTII,S$GLB,, | Performed by: INTERNAL MEDICINE

## 2022-05-20 PROCEDURE — 1160F RVW MEDS BY RX/DR IN RCRD: CPT | Mod: CPTII,S$GLB,, | Performed by: INTERNAL MEDICINE

## 2022-05-20 PROCEDURE — 3288F FALL RISK ASSESSMENT DOCD: CPT | Mod: CPTII,S$GLB,, | Performed by: INTERNAL MEDICINE

## 2022-05-20 PROCEDURE — 1160F PR REVIEW ALL MEDS BY PRESCRIBER/CLIN PHARMACIST DOCUMENTED: ICD-10-PCS | Mod: CPTII,S$GLB,, | Performed by: INTERNAL MEDICINE

## 2022-05-20 PROCEDURE — 4010F PR ACE/ARB THEARPY RXD/TAKEN: ICD-10-PCS | Mod: CPTII,S$GLB,, | Performed by: INTERNAL MEDICINE

## 2022-05-20 PROCEDURE — 3008F BODY MASS INDEX DOCD: CPT | Mod: CPTII,S$GLB,, | Performed by: INTERNAL MEDICINE

## 2022-05-20 PROCEDURE — 3008F PR BODY MASS INDEX (BMI) DOCUMENTED: ICD-10-PCS | Mod: CPTII,S$GLB,, | Performed by: INTERNAL MEDICINE

## 2022-05-20 PROCEDURE — 99215 OFFICE O/P EST HI 40 MIN: CPT | Mod: S$GLB,,, | Performed by: INTERNAL MEDICINE

## 2022-05-20 PROCEDURE — 3074F PR MOST RECENT SYSTOLIC BLOOD PRESSURE < 130 MM HG: ICD-10-PCS | Mod: CPTII,S$GLB,, | Performed by: INTERNAL MEDICINE

## 2022-05-20 PROCEDURE — 1111F PR DISCHARGE MEDS RECONCILED W/ CURRENT OUTPATIENT MED LIST: ICD-10-PCS | Mod: CPTII,S$GLB,, | Performed by: INTERNAL MEDICINE

## 2022-05-20 PROCEDURE — 99999 PR PBB SHADOW E&M-EST. PATIENT-LVL III: CPT | Mod: PBBFAC,,, | Performed by: INTERNAL MEDICINE

## 2022-05-20 PROCEDURE — 1159F MED LIST DOCD IN RCRD: CPT | Mod: CPTII,S$GLB,, | Performed by: INTERNAL MEDICINE

## 2022-05-20 PROCEDURE — 3078F PR MOST RECENT DIASTOLIC BLOOD PRESSURE < 80 MM HG: ICD-10-PCS | Mod: CPTII,S$GLB,, | Performed by: INTERNAL MEDICINE

## 2022-05-20 PROCEDURE — 1126F AMNT PAIN NOTED NONE PRSNT: CPT | Mod: CPTII,S$GLB,, | Performed by: INTERNAL MEDICINE

## 2022-05-20 PROCEDURE — 99215 PR OFFICE/OUTPT VISIT, EST, LEVL V, 40-54 MIN: ICD-10-PCS | Mod: S$GLB,,, | Performed by: INTERNAL MEDICINE

## 2022-05-20 PROCEDURE — 4010F ACE/ARB THERAPY RXD/TAKEN: CPT | Mod: CPTII,S$GLB,, | Performed by: INTERNAL MEDICINE

## 2022-05-20 PROCEDURE — 3078F DIAST BP <80 MM HG: CPT | Mod: CPTII,S$GLB,, | Performed by: INTERNAL MEDICINE

## 2022-05-20 PROCEDURE — 1111F DSCHRG MED/CURRENT MED MERGE: CPT | Mod: CPTII,S$GLB,, | Performed by: INTERNAL MEDICINE

## 2022-05-20 PROCEDURE — 3288F PR FALLS RISK ASSESSMENT DOCUMENTED: ICD-10-PCS | Mod: CPTII,S$GLB,, | Performed by: INTERNAL MEDICINE

## 2022-05-20 PROCEDURE — 1126F PR PAIN SEVERITY QUANTIFIED, NO PAIN PRESENT: ICD-10-PCS | Mod: CPTII,S$GLB,, | Performed by: INTERNAL MEDICINE

## 2022-05-20 PROCEDURE — 1101F PT FALLS ASSESS-DOCD LE1/YR: CPT | Mod: CPTII,S$GLB,, | Performed by: INTERNAL MEDICINE

## 2022-05-20 PROCEDURE — 1159F PR MEDICATION LIST DOCUMENTED IN MEDICAL RECORD: ICD-10-PCS | Mod: CPTII,S$GLB,, | Performed by: INTERNAL MEDICINE

## 2022-05-20 NOTE — LETTER
May 20, 2022        Quentin Redman  [address]  [address]  [facility] [address]  Phone: [phone]  Fax: [phone]             [id number]  [address]  [facility] [address]  Phone: [phone]   Patient: John Javier Jr.   MR Number: [id number]   YOB: 1955   Date of Visit: 5/20/2022       Dear Dr. Quentin Redman    Thank you for referring John Javier to me for evaluation. Attached you will find relevant portions of my assessment and plan of care.    If you have questions, please do not hesitate to call me. I look forward to following John Javier along with you.    Sincerely,    Adilson Darden Jr, MD    Enclosure    If you would like to receive this communication electronically, please contact [email or web address] or [phone] to request [employer] Link access.    [employer] Link is a tool which provides read-only access to select patient information with whom you have a relationship. Its easy to use and provides real time access to review your patients record including encounter summaries, notes, results, and demographic information.    If you feel you have received this communication in error or would no longer like to receive these types of communications, please e-mail [email or web address]

## 2022-05-21 NOTE — PROGRESS NOTES
Subjective:     Patient ID:  John Javier Jr. is a 66 y.o. male who presents for follow-up of Congestive Heart Failure    HPI:  67 yo BM presented with cardiogenic shock and transferred to [facility] 2/29/20 with impella.  He had cath documented CAD (50-60% LCx, OM disease on [facility] 2/29).  Course complicated by bacteremia and renal dysfunction.  Compliance concerns so in March 2020 his management was to move to CHF section.  He was started on Entresto and successfully up-titrated to  mg BID.  He RHC Oct 2020 demonstrating:  Right Heart Pressures 10/8/2020  RA: 9/ 6/ 5 RV: 50/ 7 PA: 37/ 18/ 24 PWP: 25/ 22/ 16 .   Cardiac output was 4.48  by Ronni. Cardiac index is 2.53 L/min/m2.   O2 Sat: PA 68%.   Pulmonary vascular resistance: 143. Systemic vascular resistance: 1659.   /80 (98); sat 98%     After RHC recs:  Increase hydralazine and isosorbide to target doses.  Continue Entresto  mg BID.  Emphasize to patient the importance of not using lasix routinely as this is resulting in pre-renal azotemia.  He has no excess fluid on exam or this test today.     He saws Dr. Cavanaugh Nov 2020 and doing well so no changes made.  At visit with me January 2021 I spoke with patient and brother by phone to review his condition and concern that he was worsening.  An evaluation for advanced options was initiated.  He was subsequently declined for medical stability as not requiring readmission for CHF and RHC done on 2/4/2021 with RA 8, wedge 26, CI 2.6.  He saw Dr. Delgado in March 2021 with stable symptoms so no changes made.  He returned May 2021 reporting NYHA class II-III symptoms. Dr. Delgado added dapagliflozin 20 mg daily.  There are notes re: med expenses and trouble getting meds.  He saw Dr. Delgado throughout 2021 and other than requiring admission for pancreatitis doing well.  In 2022 started having problems with ascites required paracentesis, low BP requiring reduction in meds and progressive weakness.  He comes today  "for f/u visit and this is first time that I have seen him since January 2021.  He is in wheelchair, weak and debilitated.  Tells me of paracentesis twice in past month including one last week, 2 hosp stays past month--last 5/8/22-5/15/22 and not able to tolerate meds.  The records report dx of cirrhosis but he says never biopsied and mention of esoph variceal bleed.    At this time weak and SOB with very little activity.  Abd swells along with extr though all "OK now" just discharged.    Reviewed plan with cousin after visit with patient's permission and he was present for this as well    Review of Systems   Constitutional: Positive for malaise/fatigue and weight loss. Negative for chills and fever.   Cardiovascular: Positive for dyspnea on exertion, leg swelling (though not since discharge) and orthopnea. Negative for syncope.   Respiratory: Positive for cough. Negative for sputum production.    Hematologic/Lymphatic: Bruises/bleeds easily.   Musculoskeletal: Positive for falls and muscle weakness.   Gastrointestinal: Positive for bloating and anorexia. Negative for hematemesis (not now), hematochezia (not now), melena (not now), nausea and vomiting.   Genitourinary: Negative for hematuria.   Neurological: Positive for dizziness (not now but with meds), light-headedness and weakness.        Objective:   Physical Exam  Constitutional:       General: He is not in acute distress.     Appearance: He is well-developed. He is not diaphoretic.      Comments: BP (!) 90/52 (BP Location: Left arm, Patient Position: Sitting, BP Method: Medium (Automatic))   Pulse 64   Ht 5' 4" (1.626 m)   Wt 60.2 kg (132 lb 11.5 oz)   BMI 22.78 kg/m²   Last visit with me Jan 2021 wt was 168 lb  Chronically ill appearing BM in NAD   HENT:      Head: Normocephalic and atraumatic.   Eyes:      General: No scleral icterus.        Right eye: No discharge.         Left eye: No discharge.      Conjunctiva/sclera: Conjunctivae normal.   Neck:     "  Thyroid: No thyromegaly.      Vascular: No JVD.      Comments: JVP 10 cm  Cardiovascular:      Rate and Rhythm: Normal rate and regular rhythm.      Heart sounds: Murmur (Grade 1/6 systollic murmur LLSB and apex) heard.     No gallop.   Pulmonary:      Effort: Pulmonary effort is normal.      Breath sounds: Normal breath sounds.   Abdominal:      General: Bowel sounds are normal. There is no distension (hard to determine liver span sitting in wheelchair).      Palpations: Abdomen is soft. There is no mass.      Tenderness: There is no abdominal tenderness. There is no guarding or rebound.   Musculoskeletal:         General: No swelling (no pitting but stasis changes) or tenderness.   Skin:     General: Skin is warm and dry.   Neurological:      General: No focal deficit present.      Mental Status: He is alert and oriented to person, place, and time.      Motor: Weakness (generalized) present.   Psychiatric:         Mood and Affect: Mood normal.         Behavior: Behavior normal.         Thought Content: Thought content normal.         Judgment: Judgment normal.        Lab Results   Component Value Date    .6 (H) 05/08/2022     (H) 03/25/2021     (L) 05/15/2022     (L) 03/31/2022    K 4.7 05/15/2022    K 4.9 03/31/2022    MG 2.00 05/15/2022    MG 2.2 03/11/2021     03/31/2022    CO2 23 05/15/2022    CO2 19 (L) 03/31/2022    PHOS 3.1 03/17/2020    BUN 14.3 05/15/2022    BUN 22 03/31/2022    CREATININE 1.47 (H) 05/15/2022    CREATININE 1.6 (H) 03/31/2022     (H) 03/31/2022    HGBA1C 6.4 02/25/2022    HGBA1C 6.9 (H) 03/11/2021     (H) 05/15/2022     (H) 01/20/2022    ALT 69 (H) 05/15/2022    ALT 72 (H) 01/20/2022    ALBUMIN 2.1 (L) 05/15/2022    ALBUMIN 2.3 (L) 01/20/2022    PROT 6.1 01/20/2022    BILITOT 2.7 (H) 05/15/2022    BILITOT 1.0 01/20/2022    WBC 10.1 05/15/2022    WBC 7.25 06/22/2021    HGB 9.6 (L) 05/15/2022    HGB 14.3 06/22/2021    HCT 29.4 (L)  05/15/2022    HCT 40.0 02/22/2022     (L) 05/15/2022     06/22/2021    INR 2.11 (H) 05/10/2022    INR 1.0 06/22/2021     (H) 03/11/2021    TSH 2.259 03/11/2021    CHOL 113 12/07/2021    CHOL 174 03/11/2021    HDL 25 (L) 12/07/2021    HDL 44 03/11/2021    LDLCALC 97.8 03/11/2021    TRIG 87 12/07/2021    TRIG 161 (H) 03/11/2021     Assessment:     1. Chronic combined systolic and diastolic heart failure    2. Dilated cardiomyopathy out of proportion to CAD    3. Hypertensive cardiovascular-renal disease, stage 1-4 or unspecified chronic kidney disease, with heart failure    4. PAF (paroxysmal atrial fibrillation)    5. VT (ventricular tachycardia)    6. At risk for amiodarone toxicity with long term use    7. Abnormal LFTs; chart shows cirrhosis but never had biopsy and not clear how dx made    8. Bleeding esophageal varices, unspecified esophageal varices type by history May 2022      Plan:   Cousin brought him for visit.  He tells me he is going to try and move in with dtr for help.  He was declined for medical stability in 2021 but that is clearly not the case now.  In addition to social concerns he is frail and if presumed dx of cirrhosis and esoph variceal bleeding (reportedly confirmed with endoscopy) is correct, would not be a candidate for advanced options.  It is impractical to think that we can assess him as outpatient for full w/u.  Offered admission but he prefers to go home and return next week.    Next week will request liver u/s, fibroscan liver, [facility] and plan to admit

## 2022-05-21 NOTE — H&P (VIEW-ONLY)
Subjective:     Patient ID:  John Javier Jr. is a 66 y.o. male who presents for follow-up of Congestive Heart Failure    HPI:  65 yo BM presented with cardiogenic shock and transferred to [facility] 2/29/20 with impella.  He had cath documented CAD (50-60% LCx, OM disease on [facility] 2/29).  Course complicated by bacteremia and renal dysfunction.  Compliance concerns so in March 2020 his management was to move to CHF section.  He was started on Entresto and successfully up-titrated to  mg BID.  He RHC Oct 2020 demonstrating:  Right Heart Pressures 10/8/2020  RA: 9/ 6/ 5 RV: 50/ 7 PA: 37/ 18/ 24 PWP: 25/ 22/ 16 .   Cardiac output was 4.48  by Ronni. Cardiac index is 2.53 L/min/m2.   O2 Sat: PA 68%.   Pulmonary vascular resistance: 143. Systemic vascular resistance: 1659.   /80 (98); sat 98%     After RHC recs:  Increase hydralazine and isosorbide to target doses.  Continue Entresto  mg BID.  Emphasize to patient the importance of not using lasix routinely as this is resulting in pre-renal azotemia.  He has no excess fluid on exam or this test today.     He saws Dr. Cavanaugh Nov 2020 and doing well so no changes made.  At visit with me January 2021 I spoke with patient and brother by phone to review his condition and concern that he was worsening.  An evaluation for advanced options was initiated.  He was subsequently declined for medical stability as not requiring readmission for CHF and RHC done on 2/4/2021 with RA 8, wedge 26, CI 2.6.  He saw Dr. Delgado in March 2021 with stable symptoms so no changes made.  He returned May 2021 reporting NYHA class II-III symptoms. Dr. Delgado added dapagliflozin 20 mg daily.  There are notes re: med expenses and trouble getting meds.  He saw Dr. Delgado throughout 2021 and other than requiring admission for pancreatitis doing well.  In 2022 started having problems with ascites required paracentesis, low BP requiring reduction in meds and progressive weakness.  He comes today  "for f/u visit and this is first time that I have seen him since January 2021.  He is in wheelchair, weak and debilitated.  Tells me of paracentesis twice in past month including one last week, 2 hosp stays past month--last 5/8/22-5/15/22 and not able to tolerate meds.  The records report dx of cirrhosis but he says never biopsied and mention of esoph variceal bleed.    At this time weak and SOB with very little activity.  Abd swells along with extr though all "OK now" just discharged.    Reviewed plan with cousin after visit with patient's permission and he was present for this as well    Review of Systems   Constitutional: Positive for malaise/fatigue and weight loss. Negative for chills and fever.   Cardiovascular: Positive for dyspnea on exertion, leg swelling (though not since discharge) and orthopnea. Negative for syncope.   Respiratory: Positive for cough. Negative for sputum production.    Hematologic/Lymphatic: Bruises/bleeds easily.   Musculoskeletal: Positive for falls and muscle weakness.   Gastrointestinal: Positive for bloating and anorexia. Negative for hematemesis (not now), hematochezia (not now), melena (not now), nausea and vomiting.   Genitourinary: Negative for hematuria.   Neurological: Positive for dizziness (not now but with meds), light-headedness and weakness.        Objective:   Physical Exam  Constitutional:       General: He is not in acute distress.     Appearance: He is well-developed. He is not diaphoretic.      Comments: BP (!) 90/52 (BP Location: Left arm, Patient Position: Sitting, BP Method: Medium (Automatic))   Pulse 64   Ht 5' 4" (1.626 m)   Wt 60.2 kg (132 lb 11.5 oz)   BMI 22.78 kg/m²   Last visit with me Jan 2021 wt was 168 lb  Chronically ill appearing BM in NAD   HENT:      Head: Normocephalic and atraumatic.   Eyes:      General: No scleral icterus.        Right eye: No discharge.         Left eye: No discharge.      Conjunctiva/sclera: Conjunctivae normal.   Neck:     "  Thyroid: No thyromegaly.      Vascular: No JVD.      Comments: JVP 10 cm  Cardiovascular:      Rate and Rhythm: Normal rate and regular rhythm.      Heart sounds: Murmur (Grade 1/6 systollic murmur LLSB and apex) heard.     No gallop.   Pulmonary:      Effort: Pulmonary effort is normal.      Breath sounds: Normal breath sounds.   Abdominal:      General: Bowel sounds are normal. There is no distension (hard to determine liver span sitting in wheelchair).      Palpations: Abdomen is soft. There is no mass.      Tenderness: There is no abdominal tenderness. There is no guarding or rebound.   Musculoskeletal:         General: No swelling (no pitting but stasis changes) or tenderness.   Skin:     General: Skin is warm and dry.   Neurological:      General: No focal deficit present.      Mental Status: He is alert and oriented to person, place, and time.      Motor: Weakness (generalized) present.   Psychiatric:         Mood and Affect: Mood normal.         Behavior: Behavior normal.         Thought Content: Thought content normal.         Judgment: Judgment normal.        Lab Results   Component Value Date    .6 (H) 05/08/2022     (H) 03/25/2021     (L) 05/15/2022     (L) 03/31/2022    K 4.7 05/15/2022    K 4.9 03/31/2022    MG 2.00 05/15/2022    MG 2.2 03/11/2021     03/31/2022    CO2 23 05/15/2022    CO2 19 (L) 03/31/2022    PHOS 3.1 03/17/2020    BUN 14.3 05/15/2022    BUN 22 03/31/2022    CREATININE 1.47 (H) 05/15/2022    CREATININE 1.6 (H) 03/31/2022     (H) 03/31/2022    HGBA1C 6.4 02/25/2022    HGBA1C 6.9 (H) 03/11/2021     (H) 05/15/2022     (H) 01/20/2022    ALT 69 (H) 05/15/2022    ALT 72 (H) 01/20/2022    ALBUMIN 2.1 (L) 05/15/2022    ALBUMIN 2.3 (L) 01/20/2022    PROT 6.1 01/20/2022    BILITOT 2.7 (H) 05/15/2022    BILITOT 1.0 01/20/2022    WBC 10.1 05/15/2022    WBC 7.25 06/22/2021    HGB 9.6 (L) 05/15/2022    HGB 14.3 06/22/2021    HCT 29.4 (L)  05/15/2022    HCT 40.0 02/22/2022     (L) 05/15/2022     06/22/2021    INR 2.11 (H) 05/10/2022    INR 1.0 06/22/2021     (H) 03/11/2021    TSH 2.259 03/11/2021    CHOL 113 12/07/2021    CHOL 174 03/11/2021    HDL 25 (L) 12/07/2021    HDL 44 03/11/2021    LDLCALC 97.8 03/11/2021    TRIG 87 12/07/2021    TRIG 161 (H) 03/11/2021     Assessment:     1. Chronic combined systolic and diastolic heart failure    2. Dilated cardiomyopathy out of proportion to CAD    3. Hypertensive cardiovascular-renal disease, stage 1-4 or unspecified chronic kidney disease, with heart failure    4. PAF (paroxysmal atrial fibrillation)    5. VT (ventricular tachycardia)    6. At risk for amiodarone toxicity with long term use    7. Abnormal LFTs; chart shows cirrhosis but never had biopsy and not clear how dx made    8. Bleeding esophageal varices, unspecified esophageal varices type by history May 2022      Plan:   Cousin brought him for visit.  He tells me he is going to try and move in with dtr for help.  He was declined for medical stability in 2021 but that is clearly not the case now.  In addition to social concerns he is frail and if presumed dx of cirrhosis and esoph variceal bleeding (reportedly confirmed with endoscopy) is correct, would not be a candidate for advanced options.  It is impractical to think that we can assess him as outpatient for full w/u.  Offered admission but he prefers to go home and return next week.    Next week will request liver u/s, fibroscan liver, [facility] and plan to admit

## 2022-05-25 ENCOUNTER — TELEPHONE (OUTPATIENT)
Dept: TRANSPLANT | Facility: CLINIC | Age: 67
End: 2022-05-25
Payer: COMMERCIAL

## 2022-05-26 ENCOUNTER — TELEPHONE (OUTPATIENT)
Dept: TRANSPLANT | Facility: CLINIC | Age: 67
End: 2022-05-26
Payer: COMMERCIAL

## 2022-05-26 ENCOUNTER — HOSPITAL ENCOUNTER (OUTPATIENT)
Facility: HOSPITAL | Age: 67
Discharge: HOME | End: 2022-05-27
Attending: EMERGENCY MEDICINE | Admitting: INTERNAL MEDICINE
Payer: COMMERCIAL

## 2022-05-26 DIAGNOSIS — R79.1 ELEVATED INR: ICD-10-CM

## 2022-05-26 DIAGNOSIS — R06.02 SHORTNESS OF BREATH: ICD-10-CM

## 2022-05-26 DIAGNOSIS — K92.2 UPPER GI BLEED: ICD-10-CM

## 2022-05-26 DIAGNOSIS — N18.9 ACUTE ON CHRONIC RENAL INSUFFICIENCY: Primary | ICD-10-CM

## 2022-05-26 DIAGNOSIS — E11.65 TYPE 2 DIABETES MELLITUS WITH HYPERGLYCEMIA, WITHOUT LONG-TERM CURRENT USE OF INSULIN: ICD-10-CM

## 2022-05-26 DIAGNOSIS — R07.9 CHEST PAIN: ICD-10-CM

## 2022-05-26 DIAGNOSIS — N28.9 ACUTE ON CHRONIC RENAL INSUFFICIENCY: Primary | ICD-10-CM

## 2022-05-26 DIAGNOSIS — K74.60 HEPATIC CIRRHOSIS, UNSPECIFIED HEPATIC CIRRHOSIS TYPE, UNSPECIFIED WHETHER ASCITES PRESENT: ICD-10-CM

## 2022-05-26 DIAGNOSIS — D64.9 ACUTE ON CHRONIC ANEMIA: ICD-10-CM

## 2022-05-26 DIAGNOSIS — R07.9 CHEST PAIN, UNSPECIFIED TYPE: ICD-10-CM

## 2022-05-26 LAB
ALBUMIN SERPL-MCNC: 2.1 GM/DL (ref 3.4–4.8)
ALBUMIN SERPL-MCNC: 2.1 GM/DL (ref 3.4–4.8)
ALBUMIN/GLOB SERPL: 0.6 RATIO (ref 1.1–2)
ALBUMIN/GLOB SERPL: 0.7 RATIO (ref 1.1–2)
ALP SERPL-CCNC: 108 UNIT/L (ref 40–150)
ALP SERPL-CCNC: 113 UNIT/L (ref 40–150)
ALT SERPL-CCNC: 53 UNIT/L (ref 0–55)
ALT SERPL-CCNC: 56 UNIT/L (ref 0–55)
APTT PPP: 62.5 SECONDS (ref 23.2–33.7)
AST SERPL-CCNC: 63 UNIT/L (ref 5–34)
AST SERPL-CCNC: 72 UNIT/L (ref 5–34)
BASOPHILS # BLD AUTO: 0.01 X10(3)/MCL (ref 0–0.2)
BASOPHILS # BLD AUTO: 0.02 X10(3)/MCL (ref 0–0.2)
BASOPHILS NFR BLD AUTO: 0.1 %
BASOPHILS NFR BLD AUTO: 0.2 %
BILIRUBIN DIRECT+TOT PNL SERPL-MCNC: 1.7 MG/DL
BILIRUBIN DIRECT+TOT PNL SERPL-MCNC: 1.9 MG/DL
BNP BLD-MCNC: 240.7 PG/ML
BUN SERPL-MCNC: 21.2 MG/DL (ref 8.4–25.7)
BUN SERPL-MCNC: 22.3 MG/DL (ref 8.4–25.7)
CALCIUM SERPL-MCNC: 7.7 MG/DL (ref 8.8–10)
CALCIUM SERPL-MCNC: 7.8 MG/DL (ref 8.8–10)
CHLORIDE SERPL-SCNC: 101 MMOL/L (ref 98–107)
CHLORIDE SERPL-SCNC: 98 MMOL/L (ref 98–107)
CO2 SERPL-SCNC: 21 MMOL/L (ref 23–31)
CO2 SERPL-SCNC: 23 MMOL/L (ref 23–31)
CREAT SERPL-MCNC: 1.87 MG/DL (ref 0.73–1.18)
CREAT SERPL-MCNC: 2.06 MG/DL (ref 0.73–1.18)
EOSINOPHIL # BLD AUTO: 0.02 X10(3)/MCL (ref 0–0.9)
EOSINOPHIL # BLD AUTO: 0.1 X10(3)/MCL (ref 0–0.9)
EOSINOPHIL NFR BLD AUTO: 0.2 %
EOSINOPHIL NFR BLD AUTO: 1 %
ERYTHROCYTE [DISTWIDTH] IN BLOOD BY AUTOMATED COUNT: 17.3 % (ref 11.5–17)
ERYTHROCYTE [DISTWIDTH] IN BLOOD BY AUTOMATED COUNT: 17.7 % (ref 11.5–17)
EST. AVERAGE GLUCOSE BLD GHB EST-MCNC: 119.8 MG/DL
GLOBULIN SER-MCNC: 3.1 GM/DL (ref 2.4–3.5)
GLOBULIN SER-MCNC: 3.7 GM/DL (ref 2.4–3.5)
GLUCOSE SERPL-MCNC: 272 MG/DL (ref 82–115)
GLUCOSE SERPL-MCNC: 327 MG/DL (ref 82–115)
GLUCOSE SERPL-MCNC: 368 MG/DL (ref 70–110)
GROUP & RH: NORMAL
HBA1C MFR BLD: 5.8 %
HCT VFR BLD AUTO: 25.7 % (ref 42–52)
HCT VFR BLD AUTO: 25.9 % (ref 42–52)
HGB BLD-MCNC: 8.5 GM/DL (ref 14–18)
HGB BLD-MCNC: 8.7 GM/DL (ref 14–18)
IMM GRANULOCYTES # BLD AUTO: 0.06 X10(3)/MCL (ref 0–0.02)
IMM GRANULOCYTES # BLD AUTO: 0.08 X10(3)/MCL (ref 0–0.02)
IMM GRANULOCYTES NFR BLD AUTO: 0.5 % (ref 0–0.43)
IMM GRANULOCYTES NFR BLD AUTO: 0.8 % (ref 0–0.43)
INDIRECT COOMBS GEL: NORMAL
INR BLD: 4.67 (ref 0–1.3)
INR BLD: 5.57 (ref 0–1.3)
LYMPHOCYTES # BLD AUTO: 0.72 X10(3)/MCL (ref 0.6–4.6)
LYMPHOCYTES # BLD AUTO: 0.75 X10(3)/MCL (ref 0.6–4.6)
LYMPHOCYTES NFR BLD AUTO: 6 %
LYMPHOCYTES NFR BLD AUTO: 7.3 %
MAGNESIUM SERPL-MCNC: 1.9 MG/DL (ref 1.6–2.6)
MCH RBC QN AUTO: 34.3 PG (ref 27–31)
MCH RBC QN AUTO: 34.8 PG (ref 27–31)
MCHC RBC AUTO-ENTMCNC: 32.8 MG/DL (ref 33–36)
MCHC RBC AUTO-ENTMCNC: 33.9 MG/DL (ref 33–36)
MCV RBC AUTO: 102.8 FL (ref 80–94)
MCV RBC AUTO: 104.4 FL (ref 80–94)
MONOCYTES # BLD AUTO: 0.42 X10(3)/MCL (ref 0.1–1.3)
MONOCYTES # BLD AUTO: 0.9 X10(3)/MCL (ref 0.1–1.3)
MONOCYTES NFR BLD AUTO: 4.1 %
MONOCYTES NFR BLD AUTO: 7.6 %
NEUTROPHILS # BLD AUTO: 10.2 X10(3)/MCL (ref 2.1–9.2)
NEUTROPHILS # BLD AUTO: 8.9 X10(3)/MCL (ref 2.1–9.2)
NEUTROPHILS NFR BLD AUTO: 85.6 %
NEUTROPHILS NFR BLD AUTO: 86.6 %
NRBC BLD AUTO-RTO: 0 %
NRBC BLD AUTO-RTO: 0 %
PLATELET # BLD AUTO: 124 X10(3)/MCL (ref 130–400)
PLATELET # BLD AUTO: 144 X10(3)/MCL (ref 130–400)
PMV BLD AUTO: 11.5 FL (ref 9.4–12.4)
PMV BLD AUTO: 12.2 FL (ref 9.4–12.4)
POCT GLUCOSE: 316 MG/DL (ref 70–110)
POCT GLUCOSE: 323 MG/DL (ref 70–110)
POTASSIUM SERPL-SCNC: 5 MMOL/L (ref 3.5–5.1)
POTASSIUM SERPL-SCNC: 5.1 MMOL/L (ref 3.5–5.1)
PROT SERPL-MCNC: 5.2 GM/DL (ref 5.8–7.6)
PROT SERPL-MCNC: 5.8 GM/DL (ref 5.8–7.6)
PROTHROMBIN TIME: 43.4 SECONDS (ref 12.5–14.5)
PROTHROMBIN TIME: 49.8 SECONDS (ref 12.5–14.5)
RBC # BLD AUTO: 2.48 X10(6)/MCL (ref 4.7–6.1)
RBC # BLD AUTO: 2.5 X10(6)/MCL (ref 4.7–6.1)
SODIUM SERPL-SCNC: 130 MMOL/L (ref 136–145)
SODIUM SERPL-SCNC: 131 MMOL/L (ref 136–145)
TROPONIN I SERPL-MCNC: 0.02 NG/ML (ref 0–0.04)
WBC # SPEC AUTO: 10.3 X10(3)/MCL (ref 4.5–11.5)
WBC # SPEC AUTO: 11.9 X10(3)/MCL (ref 4.5–11.5)

## 2022-05-26 PROCEDURE — 96365 THER/PROPH/DIAG IV INF INIT: CPT

## 2022-05-26 PROCEDURE — 25000003 PHARM REV CODE 250: Performed by: EMERGENCY MEDICINE

## 2022-05-26 PROCEDURE — 63600175 PHARM REV CODE 636 W HCPCS: Performed by: EMERGENCY MEDICINE

## 2022-05-26 PROCEDURE — 84484 ASSAY OF TROPONIN QUANT: CPT | Performed by: EMERGENCY MEDICINE

## 2022-05-26 PROCEDURE — 85610 PROTHROMBIN TIME: CPT | Performed by: EMERGENCY MEDICINE

## 2022-05-26 PROCEDURE — 94760 N-INVAS EAR/PLS OXIMETRY 1: CPT

## 2022-05-26 PROCEDURE — 93005 ELECTROCARDIOGRAM TRACING: CPT

## 2022-05-26 PROCEDURE — 83735 ASSAY OF MAGNESIUM: CPT | Performed by: EMERGENCY MEDICINE

## 2022-05-26 PROCEDURE — 80053 COMPREHEN METABOLIC PANEL: CPT | Performed by: NURSE PRACTITIONER

## 2022-05-26 PROCEDURE — 86850 RBC ANTIBODY SCREEN: CPT | Performed by: EMERGENCY MEDICINE

## 2022-05-26 PROCEDURE — 36415 COLL VENOUS BLD VENIPUNCTURE: CPT | Performed by: EMERGENCY MEDICINE

## 2022-05-26 PROCEDURE — 85730 THROMBOPLASTIN TIME PARTIAL: CPT | Performed by: NURSE PRACTITIONER

## 2022-05-26 PROCEDURE — 85610 PROTHROMBIN TIME: CPT | Performed by: NURSE PRACTITIONER

## 2022-05-26 PROCEDURE — 80053 COMPREHEN METABOLIC PANEL: CPT | Performed by: EMERGENCY MEDICINE

## 2022-05-26 PROCEDURE — 99291 CRITICAL CARE FIRST HOUR: CPT | Mod: 25

## 2022-05-26 PROCEDURE — C9113 INJ PANTOPRAZOLE SODIUM, VIA: HCPCS | Performed by: EMERGENCY MEDICINE

## 2022-05-26 PROCEDURE — 83036 HEMOGLOBIN GLYCOSYLATED A1C: CPT | Performed by: EMERGENCY MEDICINE

## 2022-05-26 PROCEDURE — 25000003 PHARM REV CODE 250: Performed by: HOSPITALIST

## 2022-05-26 PROCEDURE — 96375 TX/PRO/DX INJ NEW DRUG ADDON: CPT

## 2022-05-26 PROCEDURE — 25000242 PHARM REV CODE 250 ALT 637 W/ HCPCS: Performed by: HOSPITALIST

## 2022-05-26 PROCEDURE — 85025 COMPLETE CBC W/AUTO DIFF WBC: CPT | Mod: 91 | Performed by: EMERGENCY MEDICINE

## 2022-05-26 PROCEDURE — G0378 HOSPITAL OBSERVATION PER HR: HCPCS

## 2022-05-26 PROCEDURE — 96372 THER/PROPH/DIAG INJ SC/IM: CPT | Mod: 59 | Performed by: EMERGENCY MEDICINE

## 2022-05-26 PROCEDURE — 85025 COMPLETE CBC W/AUTO DIFF WBC: CPT | Performed by: NURSE PRACTITIONER

## 2022-05-26 PROCEDURE — 83880 ASSAY OF NATRIURETIC PEPTIDE: CPT | Performed by: EMERGENCY MEDICINE

## 2022-05-26 PROCEDURE — 82962 GLUCOSE BLOOD TEST: CPT | Mod: 91

## 2022-05-26 PROCEDURE — 36415 COLL VENOUS BLD VENIPUNCTURE: CPT | Performed by: NURSE PRACTITIONER

## 2022-05-26 RX ORDER — DOCUSATE SODIUM 100 MG/1
100 CAPSULE, LIQUID FILLED ORAL 2 TIMES DAILY
Status: DISCONTINUED | OUTPATIENT
Start: 2022-05-26 | End: 2022-05-27 | Stop reason: HOSPADM

## 2022-05-26 RX ORDER — PANTOPRAZOLE SODIUM 40 MG/10ML
80 INJECTION, POWDER, LYOPHILIZED, FOR SOLUTION INTRAVENOUS
Status: COMPLETED | OUTPATIENT
Start: 2022-05-26 | End: 2022-05-26

## 2022-05-26 RX ORDER — SODIUM CHLORIDE 0.9 % (FLUSH) 0.9 %
10 SYRINGE (ML) INJECTION EVERY 12 HOURS PRN
Status: DISCONTINUED | OUTPATIENT
Start: 2022-05-26 | End: 2022-05-27 | Stop reason: HOSPADM

## 2022-05-26 RX ORDER — IBUPROFEN 200 MG
24 TABLET ORAL
Status: DISCONTINUED | OUTPATIENT
Start: 2022-05-26 | End: 2022-05-27 | Stop reason: HOSPADM

## 2022-05-26 RX ORDER — FLUTICASONE PROPIONATE 50 MCG
1 SPRAY, SUSPENSION (ML) NASAL DAILY
Status: DISCONTINUED | OUTPATIENT
Start: 2022-05-26 | End: 2022-05-27 | Stop reason: HOSPADM

## 2022-05-26 RX ORDER — AMIODARONE HYDROCHLORIDE 200 MG/1
200 TABLET ORAL DAILY
Status: DISCONTINUED | OUTPATIENT
Start: 2022-05-26 | End: 2022-05-27 | Stop reason: HOSPADM

## 2022-05-26 RX ORDER — SIMETHICONE 80 MG
80 TABLET,CHEWABLE ORAL 3 TIMES DAILY PRN
Status: DISCONTINUED | OUTPATIENT
Start: 2022-05-26 | End: 2022-05-27 | Stop reason: HOSPADM

## 2022-05-26 RX ORDER — METOPROLOL SUCCINATE 50 MG/1
100 TABLET, EXTENDED RELEASE ORAL DAILY
Status: DISCONTINUED | OUTPATIENT
Start: 2022-05-26 | End: 2022-05-27 | Stop reason: HOSPADM

## 2022-05-26 RX ORDER — GLUCAGON 1 MG
1 KIT INJECTION
Status: DISCONTINUED | OUTPATIENT
Start: 2022-05-26 | End: 2022-05-27 | Stop reason: HOSPADM

## 2022-05-26 RX ORDER — NALOXONE HCL 0.4 MG/ML
0.02 VIAL (ML) INJECTION
Status: DISCONTINUED | OUTPATIENT
Start: 2022-05-26 | End: 2022-05-27 | Stop reason: HOSPADM

## 2022-05-26 RX ORDER — LANOLIN ALCOHOL/MO/W.PET/CERES
1 CREAM (GRAM) TOPICAL DAILY
Refills: 2 | Status: DISCONTINUED | OUTPATIENT
Start: 2022-05-26 | End: 2022-05-27 | Stop reason: HOSPADM

## 2022-05-26 RX ORDER — INSULIN ASPART 100 [IU]/ML
0-5 INJECTION, SOLUTION INTRAVENOUS; SUBCUTANEOUS EVERY 6 HOURS PRN
Status: DISCONTINUED | OUTPATIENT
Start: 2022-05-26 | End: 2022-05-27 | Stop reason: HOSPADM

## 2022-05-26 RX ORDER — PANTOPRAZOLE SODIUM 40 MG/1
40 TABLET, DELAYED RELEASE ORAL DAILY
Status: DISCONTINUED | OUTPATIENT
Start: 2022-05-26 | End: 2022-05-27 | Stop reason: HOSPADM

## 2022-05-26 RX ORDER — ALBUTEROL SULFATE 90 UG/1
1 AEROSOL, METERED RESPIRATORY (INHALATION) EVERY 4 HOURS PRN
Status: DISCONTINUED | OUTPATIENT
Start: 2022-05-26 | End: 2022-05-27 | Stop reason: HOSPADM

## 2022-05-26 RX ORDER — NITROGLYCERIN 0.4 MG/1
0.4 TABLET SUBLINGUAL EVERY 5 MIN PRN
Status: DISCONTINUED | OUTPATIENT
Start: 2022-05-26 | End: 2022-05-27 | Stop reason: HOSPADM

## 2022-05-26 RX ORDER — IBUPROFEN 200 MG
16 TABLET ORAL
Status: DISCONTINUED | OUTPATIENT
Start: 2022-05-26 | End: 2022-05-27 | Stop reason: HOSPADM

## 2022-05-26 RX ORDER — ATORVASTATIN CALCIUM 10 MG/1
20 TABLET, FILM COATED ORAL NIGHTLY
Status: DISCONTINUED | OUTPATIENT
Start: 2022-05-26 | End: 2022-05-27 | Stop reason: HOSPADM

## 2022-05-26 RX ADMIN — DOCUSATE SODIUM 100 MG: 100 CAPSULE, LIQUID FILLED ORAL at 09:05

## 2022-05-26 RX ADMIN — FERROUS SULFATE TAB 325 MG (65 MG ELEMENTAL FE) 1 EACH: 325 (65 FE) TAB at 09:05

## 2022-05-26 RX ADMIN — FLUTICASONE PROPIONATE 50 MCG: 50 SPRAY, METERED NASAL at 02:05

## 2022-05-26 RX ADMIN — SIMETHICONE 80 MG: 80 TABLET, CHEWABLE ORAL at 08:05

## 2022-05-26 RX ADMIN — AMIODARONE HYDROCHLORIDE 200 MG: 200 TABLET ORAL at 09:05

## 2022-05-26 RX ADMIN — INSULIN ASPART 4 UNITS: 100 INJECTION, SOLUTION INTRAVENOUS; SUBCUTANEOUS at 06:05

## 2022-05-26 RX ADMIN — INSULIN ASPART 2 UNITS: 100 INJECTION, SOLUTION INTRAVENOUS; SUBCUTANEOUS at 11:05

## 2022-05-26 RX ADMIN — PHYTONADIONE 10 MG: 10 INJECTION, EMULSION INTRAMUSCULAR; INTRAVENOUS; SUBCUTANEOUS at 02:05

## 2022-05-26 RX ADMIN — INSULIN ASPART 3 UNITS: 100 INJECTION, SOLUTION INTRAVENOUS; SUBCUTANEOUS at 04:05

## 2022-05-26 RX ADMIN — PANTOPRAZOLE SODIUM 80 MG: 40 INJECTION, POWDER, FOR SOLUTION INTRAVENOUS at 02:05

## 2022-05-26 RX ADMIN — ATORVASTATIN CALCIUM 20 MG: 10 TABLET, FILM COATED ORAL at 09:05

## 2022-05-26 RX ADMIN — APIXABAN 5 MG: 5 TABLET, FILM COATED ORAL at 08:05

## 2022-05-26 RX ADMIN — PANTOPRAZOLE SODIUM 40 MG: 40 TABLET, DELAYED RELEASE ORAL at 09:05

## 2022-05-26 RX ADMIN — DOCUSATE SODIUM 100 MG: 100 CAPSULE, LIQUID FILLED ORAL at 08:05

## 2022-05-26 NOTE — CONSULTS
Gastroenterology Consultation Note    Reason for Consult:      PCP:   Primary Doctor No    Referring MD: Marjorie Bassett NP      History of Present Illness (HPI):      67 y/o male known to Dr. Corado with a PMHx of CHF/ischemic CMO EF 25-30% s/p AICD awaiting heart transplant in [facility] on Eliquis, cirrhosis, PAD, DM2, JANET noncompliant with CPAP, VHD and PAF on Eliquis. Here with elevated blood sugar. Patient also reported dark black stools without abdominal pain, nausea, or vomiting.  Labs revealed macrocytic anemia .4, H&H 8.5/25.9, plt 144,00,  INR 5.57, PTT 62.5, Na 131, normla BUN,  CR 2.06, Tbili 1.9, milldly elevated transaminases, alk phos normal 108, glucose 316. CXR showed atelecasis vs fibrosis. GI consulted for anemia.    Patient was diagnosed with cirrhosis in January of this year felt probably secondary to congestive hepatopathy. Paracentesis fluid studies were consistent with portal hypertension.  He underwent an EGD on 03/02/2022 and was found to have small esophageal varices, normal stomach and normal duodenum. He was recently admitted here on 5/8/22 with ascites, BLE sweeling, and melena for which we saw him. H&H 7.1/21.5 that admission. He underwent EGD 5/10/22 revealing small < 5 mm esophageal varices, medium sized hiatal hernia, a few petechia/erythema in the antrum possibly early GAVE, and single localized non-bleeding jejunal erosion. He then underwent colonoscopy 5/11/22, which was completely unrevealing besides internal hemorrhoids. M2A 5/12/22 revealed innumerable small erythematous red spots throughout small bowel that did not appear to be AVMs but possibly congestion. No active bleeding was seen. No treatment options were felt to be available beside long acting sandostatin if H&H drops again and iron infusions.          ROS:  Review of Systems   Constitutional: Negative for chills and fever.   HENT: Negative for sinus pain and sore throat.    Eyes: Negative for pain and redness.    Respiratory: Negative for cough and hemoptysis.    Cardiovascular: Negative for chest pain and orthopnea.   Gastrointestinal: Negative for abdominal pain, heartburn, nausea and vomiting.   Genitourinary: Negative for dysuria and urgency.   Musculoskeletal: Negative for myalgias and neck pain.   Neurological: Negative for dizziness and weakness.       Medical History:   Past Medical History:   Diagnosis Date    Anticoagulant long-term use     CHF (congestive heart failure)     Chronic combined systolic and diastolic heart failure 03/01/2020    Coronary artery disease     Diabetes mellitus     Digestive disorder     Diverticulitis     Encounter for blood transfusion     Hypertension     Renal disorder     Sleep apnea     Suspected Covid-19 Virus Infection; test negative 03/14/2020    Unspecified cirrhosis of liver        Surgical History:   Past Surgical History:   Procedure Laterality Date    COLECTOMY      COLONOSCOPY N/A 5/11/2022    Procedure: COLON;  Surgeon: Dk Jacobson MD;  Location: [facility] ENDOSCOPY;  Service: Gastroenterology;  Laterality: N/A;    ESOPHAGOGASTRODUODENOSCOPY N/A 5/10/2022    Procedure: EGD (ESOPHAGOGASTRODUODENOSCOPY);  Surgeon: Dk Jacobson MD;  Location: [facility] ENDOSCOPY;  Service: Gastroenterology;  Laterality: N/A;    INTRALUMINAL GASTROINTESTINAL TRACT IMAGING VIA CAPSULE N/A 5/11/2022    Procedure: IMAGING PROCEDURE, GI TRACT, INTRALUMINAL, VIA CAPSULE;  Surgeon: Dk Jacobson MD;  Location: [facility] ENDOSCOPY;  Service: Gastroenterology;  Laterality: N/A;  done in recovery    RIGHT HEART CATHETERIZATION Right 10/8/2020    Procedure: INSERTION, CATHETER, RIGHT HEART;  Surgeon: Adilson Darden Jr., MD;  Location: [facility] CATH LAB;  Service: Cardiology;  Laterality: Right;    RIGHT HEART CATHETERIZATION Right 2/3/2021    Procedure: INSERTION, CATHETER, RIGHT HEART;  Surgeon: Adilson Darden Jr., MD;  Location: [facility] CATH  LAB;  Service: Cardiology;  Laterality: Right;    RIGHT HEART CATHETERIZATION Right 2021    Procedure: INSERTION, CATHETER, RIGHT HEART;  Surgeon: Yuki Delgado MD;  Location: [facility] CATH LAB;  Service: Cardiology;  Laterality: Right;       Family History:   Family History   Problem Relation Age of Onset    Diabetes Mother     Hypertension Mother     Hypertension Father     Heart disease Father    .     Social History:   Social History     Tobacco Use    Smoking status: Never Smoker    Smokeless tobacco: Never Used   Substance Use Topics    Alcohol use: Never       Allergies:  Review of patient's allergies indicates:   Allergen Reactions    Penicillins Anaphylaxis     Pt stated his mother said it will kill him- always inform to take ampicillin    Penicillin     Sitagliptin Other (See Comments)     pancreatitis       Medications Prior to Admission   Medication Sig Dispense Refill Last Dose    albuterol (PROVENTIL/VENTOLIN HFA) 90 mcg/actuation inhaler INHALE 2 PUFFS BY MOUTH FOUR TIMES DAILY AS NEEDED FOR WHEEZING OR SHORTNESS OF BREATH       amiodarone (PACERONE) 200 MG Tab Take 1 tablet (200 mg total) by mouth once daily. 30 tablet 11     apixaban (ELIQUIS) 5 mg Tab Take 1 tablet (5 mg total) by mouth 2 (two) times daily. 180 tablet 3     atorvastatin (LIPITOR) 20 MG tablet Take 1 tablet (20 mg total) by mouth every evening. 90 tablet 3     docusate sodium (COLACE) 100 MG capsule Take 1 capsule (100 mg total) by mouth 2 (two) times daily. 62 capsule 0     ferrous sulfate 324 mg (65 mg iron) TbEC Take 1 tablet (324 mg total) by mouth 2 (two) times daily. 60 tablet 2     fluticasone propionate (FLONASE) 50 mcg/actuation nasal spray        metoprolol succinate (TOPROL-XL) 100 MG 24 hr tablet Take 1 tablet (100 mg total) by mouth once daily. 90 tablet 3     nitroGLYCERIN (NITROSTAT) 0.4 MG SL tablet SMARTSI Tablet(s) Sublingual As Needed 100 tablet 3     pantoprazole (PROTONIX) 40 MG tablet  "Take 1 tablet (40 mg total) by mouth once daily. 90 tablet 3     simethicone (MYLICON) 80 MG chewable tablet Take 1 tablet (80 mg total) by mouth 3 (three) times daily as needed for Flatulence. 30 tablet 0          Objective Findings:    Vital Signs:  BP (!) 97/57   Pulse 83   Temp 98.2 °F (36.8 °C) (Oral)   Resp 18   Ht 5' 4" (1.626 m)   Wt 60.8 kg (134 lb)   SpO2 98%   BMI 23.00 kg/m²   Body mass index is 23 kg/m².    Physical Exam:  Physical Exam  Constitutional:       General: He is not in acute distress.  HENT:      Head: Normocephalic and atraumatic.   Cardiovascular:      Rate and Rhythm: Normal rate and regular rhythm.   Pulmonary:      Effort: Pulmonary effort is normal. No respiratory distress.   Abdominal:      General: Bowel sounds are normal. There is distension.      Palpations: Abdomen is soft.      Tenderness: There is no abdominal tenderness.   Musculoskeletal:      Right lower leg: No edema.      Left lower leg: No edema.   Skin:     General: Skin is warm and dry.   Neurological:      Mental Status: He is alert and oriented to person, place, and time.   Psychiatric:         Mood and Affect: Mood normal.         Behavior: Behavior normal.         Labs:  Recent Results (from the past 48 hour(s))   Brain natriuretic peptide    Collection Time: 05/26/22  1:38 AM   Result Value Ref Range    Natriuretic Peptide 240.7 (H) <=100.0 pg/mL   Comprehensive metabolic panel    Collection Time: 05/26/22  1:38 AM   Result Value Ref Range    Sodium Level 131 (L) 136 - 145 mmol/L    Potassium Level 5.1 3.5 - 5.1 mmol/L    Chloride 98 98 - 107 mmol/L    Carbon Dioxide 21 (L) 23 - 31 mmol/L    Glucose Level 327 (H) 82 - 115 mg/dL    Blood Urea Nitrogen 21.2 8.4 - 25.7 mg/dL    Creatinine 2.06 (H) 0.73 - 1.18 mg/dL    Calcium Level Total 7.7 (L) 8.8 - 10.0 mg/dL    Protein Total 5.8 5.8 - 7.6 gm/dL    Albumin Level 2.1 (L) 3.4 - 4.8 gm/dL    Globulin 3.7 (H) 2.4 - 3.5 gm/dL    Albumin/Globulin Ratio 0.6 (L) 1.1 " - 2.0 ratio    Bilirubin Total 1.9 (H) <=1.5 mg/dL    Alkaline Phosphatase 113 40 - 150 unit/L    Alanine Aminotransferase 56 (H) 0 - 55 unit/L    Aspartate Aminotransferase 72 (H) 5 - 34 unit/L    Estimated GFR- 42 mls/min/1.73/m2   Protime-INR    Collection Time: 05/26/22  1:38 AM   Result Value Ref Range    PT 49.8 (H) 12.5 - 14.5 seconds    INR 5.57 (HH) 0.00 - 1.30   Magnesium    Collection Time: 05/26/22  1:38 AM   Result Value Ref Range    Magnesium Level 1.90 1.60 - 2.60 mg/dL   Troponin I    Collection Time: 05/26/22  1:38 AM   Result Value Ref Range    Troponin-I 0.022 0.000 - 0.045 ng/mL   CBC with Differential    Collection Time: 05/26/22  1:38 AM   Result Value Ref Range    WBC 11.9 (H) 4.5 - 11.5 x10(3)/mcL    RBC 2.48 (L) 4.70 - 6.10 x10(6)/mcL    Hgb 8.5 (L) 14.0 - 18.0 gm/dL    Hct 25.9 (L) 42.0 - 52.0 %    .4 (H) 80.0 - 94.0 fL    MCH 34.3 (H) 27.0 - 31.0 pg    MCHC 32.8 (L) 33.0 - 36.0 mg/dL    RDW 17.7 (H) 11.5 - 17.0 %    Platelet 144 130 - 400 x10(3)/mcL    MPV 12.2 9.4 - 12.4 fL    Neut % 85.6 %    Lymph % 6.0 %    Mono % 7.6 %    Eos % 0.2 %    Basophil % 0.1 %    Lymph # 0.72 0.6 - 4.6 x10(3)/mcL    Neut # 10.2 (H) 2.1 - 9.2 x10(3)/mcL    Mono # 0.90 0.1 - 1.3 x10(3)/mcL    Eos # 0.02 0 - 0.9 x10(3)/mcL    Baso # 0.01 0 - 0.2 x10(3)/mcL    IG# 0.06 (H) 0 - 0.0155 x10(3)/mcL    IG% 0.5 (H) 0 - 0.43 %    NRBC% 0.0 %   Type & Screen    Collection Time: 05/26/22  1:38 AM   Result Value Ref Range    Group & Rh O POS     Indirect Jill GEL NEG    Hemoglobin A1c if not done in past 3 months    Collection Time: 05/26/22  1:38 AM   Result Value Ref Range    Hemoglobin A1c 5.8 <=7.0 %    Estimated Average Glucose 119.8 mg/dL   POCT Glucose, Hand-Held Device    Collection Time: 05/26/22  2:00 AM   Result Value Ref Range    POC Glucose 368 (A) 70 - 110 MG/DL   POCT glucose    Collection Time: 05/26/22  4:58 AM   Result Value Ref Range    POCT Glucose 316 (H) 70 - 110 mg/dL    Comprehensive Metabolic Panel (CMP)    Collection Time: 05/26/22  6:05 AM   Result Value Ref Range    Sodium Level 130 (L) 136 - 145 mmol/L    Potassium Level 5.0 3.5 - 5.1 mmol/L    Chloride 101 98 - 107 mmol/L    Carbon Dioxide 23 23 - 31 mmol/L    Glucose Level 272 (H) 82 - 115 mg/dL    Blood Urea Nitrogen 22.3 8.4 - 25.7 mg/dL    Creatinine 1.87 (H) 0.73 - 1.18 mg/dL    Calcium Level Total 7.8 (L) 8.8 - 10.0 mg/dL    Protein Total 5.2 (L) 5.8 - 7.6 gm/dL    Albumin Level 2.1 (L) 3.4 - 4.8 gm/dL    Globulin 3.1 2.4 - 3.5 gm/dL    Albumin/Globulin Ratio 0.7 (L) 1.1 - 2.0 ratio    Bilirubin Total 1.7 (H) <=1.5 mg/dL    Alkaline Phosphatase 108 40 - 150 unit/L    Alanine Aminotransferase 53 0 - 55 unit/L    Aspartate Aminotransferase 63 (H) 5 - 34 unit/L    Estimated GFR- 47 mls/min/1.73/m2   PTT    Collection Time: 05/26/22  6:05 AM   Result Value Ref Range    PTT 62.5 (H) 23.2 - 33.7 seconds   PT/INR    Collection Time: 05/26/22  6:05 AM   Result Value Ref Range    PT 43.4 (H) 12.5 - 14.5 seconds    INR 4.67 (H) 0.00 - 1.30   CBC with Differential    Collection Time: 05/26/22  6:05 AM   Result Value Ref Range    WBC 10.3 4.5 - 11.5 x10(3)/mcL    RBC 2.50 (L) 4.70 - 6.10 x10(6)/mcL    Hgb 8.7 (L) 14.0 - 18.0 gm/dL    Hct 25.7 (L) 42.0 - 52.0 %    .8 (H) 80.0 - 94.0 fL    MCH 34.8 (H) 27.0 - 31.0 pg    MCHC 33.9 33.0 - 36.0 mg/dL    RDW 17.3 (H) 11.5 - 17.0 %    Platelet 124 (L) 130 - 400 x10(3)/mcL    MPV 11.5 9.4 - 12.4 fL    Neut % 86.6 %    Lymph % 7.3 %    Mono % 4.1 %    Eos % 1.0 %    Basophil % 0.2 %    Lymph # 0.75 0.6 - 4.6 x10(3)/mcL    Neut # 8.9 2.1 - 9.2 x10(3)/mcL    Mono # 0.42 0.1 - 1.3 x10(3)/mcL    Eos # 0.10 0 - 0.9 x10(3)/mcL    Baso # 0.02 0 - 0.2 x10(3)/mcL    IG# 0.08 (H) 0 - 0.0155 x10(3)/mcL    IG% 0.8 (H) 0 - 0.43 %    NRBC% 0.0 %   POCT glucose    Collection Time: 05/26/22  6:10 AM   Result Value Ref Range    POCT Glucose 323 (H) 70 - 110 mg/dL       X-Ray Chest  AP Portable   ED Interpretation   cardiomegaly      Final Result      Linear densities at the left base might be related to atelectatic changes and or fibrotic streaks.      No focal consolidative changes.      Better definition of both costophrenic angles         Electronically signed by: Omari Almazan   Date:    05/26/2022   Time:    08:45          Imaging:      Assessment/Plan:  1. Acute on chronic renal insufficiency    2. Chest pain    3. Chest pain, unspecified type    4. Shortness of breath    5. Hepatic cirrhosis, unspecified hepatic cirrhosis type, unspecified whether ascites present    6. Elevated INR    7. Upper GI bleed    8. Acute on chronic anemia    9. Type 2 diabetes mellitus with hyperglycemia, without long-term current use of insulin       65 y/o male known to Dr. 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 Here with elevated blood sugar.     1. Intermittent dark stools  2. Macrocytic anemia  3. Elevated INR  4. Thrombocytopenia  H&H stable compared to last admissino  INR 5.57-->4.67  Earlier this month: Low iron 54, low TIBC 161, low transferrin 146, elevated iron % sat 215. Normal folate  BUN normal  - Monitor stools for color and signs of bleeding  - Monitor H&H; transfuse to keep hgb >7-8.   - No plans for endoscopy given recent EGD, M2A, and colonoscopy and lack of overt bleeding with stable H&H. Our consistency last admission was his elevated INR likely caused some GI blood loss but no focal area of blood loss was found. He does have petechia/erythema in the stomach and throughout his entire small bowel. There is no treatment for this besides long acting sandostatin as outpatient and replacing iron as needed. His H&H has not significantly changed from earlier this month and he does not appear to be actively bleeding. Recommend correcting INR and supportive care for  now.      5. Cirrhosis-->felt secondary to CHF  AScites  EGD 3/2/22: small esophageal varices, normal stomach and normal duodenum.   Paracentesis 1/2022: fluid analysis consistent with portal HTN  -Continue Lasix 40 mg daily and spironolactone 25 mg daily  -Needs paracentesis, but INR is way too high. Also, he wants the paracentesis done in [facility].   -Needs to f/u with his primary GI after d/c     Reese Isabel PA-C    Thank you for allowing us to participate in the care of John Javier Jr..

## 2022-05-26 NOTE — ED PROVIDER NOTES
Encounter Date: 5/26/2022    SCRIBE #1 NOTE: I, Ronda Campbell, am scribing for, and in the presence of,  Dr. Marianela Luevano. I have scribed the following portions of the note - Other sections scribed: HPI, ROS, PE.       History     Chief Complaint   Patient presents with    Hyperglycemia      at home. States [facility] Hospitalist took him off of all his diabetes meds when he was Admitted on Mother's day. Hasnt had any meds since before that admission. No symptoms other than blood sugar is high. Also states trying to move closer to daughter in [place]     66 year old male with hx of CHF, MI, CAD, HTN, and DM presents to the ED due to hyperglycemia. Pt reports CBG was 368 at home tonight and states that he was taken off of Metformin around 5/8/22 by physician. Pt notes that he has been compliant with diabetic diet and usually has CBG between 118-150. Pt complains of chest pain, SOB, abdominal bloating, shakiness, and black stools. Pt states that he has lost weight recently. Pt is compliant with anticoagulation therapy (Eliquis).               The history is provided by the patient.   Diabetes  This is a chronic problem. Associated symptoms include chest pain. Pertinent negatives for hypoglycemia include no dizziness or headaches. Diabetic complications include heart disease. Current diabetic treatment includes diet. He is following a diabetic diet.     Review of patient's allergies indicates:   Allergen Reactions    Penicillins Anaphylaxis     Pt stated his mother said it will kill him- always inform to take ampicillin    Penicillin     Sitagliptin Other (See Comments)     pancreatitis     Past Medical History:   Diagnosis Date    Anticoagulant long-term use     CHF (congestive heart failure)     Chronic combined systolic and diastolic heart failure 03/01/2020    Coronary artery disease     Diabetes mellitus     Digestive disorder     Diverticulitis     Encounter for blood transfusion      Hypertension     Renal disorder     Sleep apnea     Suspected Covid-19 Virus Infection; test negative 03/14/2020    Unspecified cirrhosis of liver      Past Surgical History:   Procedure Laterality Date    COLECTOMY      COLONOSCOPY N/A 5/11/2022    Procedure: COLON;  Surgeon: Dk Jacobson MD;  Location: [facility] ENDOSCOPY;  Service: Gastroenterology;  Laterality: N/A;    ESOPHAGOGASTRODUODENOSCOPY N/A 5/10/2022    Procedure: EGD (ESOPHAGOGASTRODUODENOSCOPY);  Surgeon: Dk Jacobson MD;  Location: [facility] ENDOSCOPY;  Service: Gastroenterology;  Laterality: N/A;    INTRALUMINAL GASTROINTESTINAL TRACT IMAGING VIA CAPSULE N/A 5/11/2022    Procedure: IMAGING PROCEDURE, GI TRACT, INTRALUMINAL, VIA CAPSULE;  Surgeon: Dk Jacobson MD;  Location: [facility] ENDOSCOPY;  Service: Gastroenterology;  Laterality: N/A;  done in recovery    RIGHT HEART CATHETERIZATION Right 10/8/2020    Procedure: INSERTION, CATHETER, RIGHT HEART;  Surgeon: Adilson Darden Jr., MD;  Location: [facility] CATH LAB;  Service: Cardiology;  Laterality: Right;    RIGHT HEART CATHETERIZATION Right 2/3/2021    Procedure: INSERTION, CATHETER, RIGHT HEART;  Surgeon: Adilson Darden Jr., MD;  Location: [facility] CATH LAB;  Service: Cardiology;  Laterality: Right;    RIGHT HEART CATHETERIZATION Right 6/22/2021    Procedure: INSERTION, CATHETER, RIGHT HEART;  Surgeon: Yuki Delgado MD;  Location: [facility] CATH LAB;  Service: Cardiology;  Laterality: Right;     Family History   Problem Relation Age of Onset    Diabetes Mother     Hypertension Mother     Hypertension Father     Heart disease Father      Social History     Tobacco Use    Smoking status: Never Smoker    Smokeless tobacco: Never Used   Substance Use Topics    Alcohol use: Never    Drug use: Never     Review of Systems   Constitutional: Positive for unexpected weight change (recent weight loss ). Negative for chills, diaphoresis and fever.         Shakiness    HENT: Negative for congestion, ear pain, sinus pain and sore throat.    Eyes: Negative for pain, discharge and visual disturbance.   Respiratory: Positive for shortness of breath. Negative for cough, wheezing and stridor.    Cardiovascular: Positive for chest pain. Negative for palpitations.   Gastrointestinal: Positive for blood in stool (black stools ). Negative for abdominal pain, constipation, diarrhea, nausea, rectal pain and vomiting.        Abdominal bloating   Genitourinary: Negative for dysuria and hematuria.   Musculoskeletal: Negative for back pain and myalgias.   Skin: Negative for rash.   Neurological: Negative for dizziness, syncope, numbness and headaches.   Hematological: Negative.    Psychiatric/Behavioral: Negative.    All other systems reviewed and are negative.      Physical Exam     Initial Vitals [05/26/22 0110]   BP Pulse Resp Temp SpO2   (!) 102/47 79 15 98.1 °F (36.7 °C) 98 %      MAP       --         Physical Exam    Nursing note and vitals reviewed.  Constitutional: He appears well-developed and well-nourished. He is not diaphoretic. No distress.   HENT:   Head: Normocephalic and atraumatic.   Nose: Nose normal.   Mouth/Throat: Oropharynx is clear and moist.   Eyes: Conjunctivae and EOM are normal. Pupils are equal, round, and reactive to light.   Neck: Trachea normal. Neck supple.   Normal range of motion.  Cardiovascular: Normal rate, regular rhythm, normal heart sounds and intact distal pulses. Exam reveals no gallop and no friction rub.    No murmur heard.  Pulmonary/Chest: No respiratory distress. He has no wheezes. He has no rhonchi. He has no rales. He exhibits no tenderness.   diminished breath sounds at base    Abdominal: Abdomen is soft. Bowel sounds are normal. He exhibits distension (abdominal distention with positive fluid wave). He exhibits no mass. There is no abdominal tenderness. There is no rebound.   Genitourinary: Rectum:      Guaiac result positive.    Guaiac positive stool. : Acceptable.  Musculoskeletal:         General: Edema (2+ bilateral LE pitting edema ) present. No tenderness. Normal range of motion.      Cervical back: Normal range of motion and neck supple.      Lumbar back: Normal. No tenderness. Normal range of motion.     Neurological: He is alert and oriented to person, place, and time. He has normal strength. No cranial nerve deficit or sensory deficit.   Skin: Skin is warm and dry. Capillary refill takes less than 2 seconds. No rash and no abscess noted. No erythema. No pallor.   Psychiatric: He has a normal mood and affect. His behavior is normal. Judgment and thought content normal.         ED Course   Critical Care    Date/Time: 5/26/2022 3:02 AM  Performed by: Marianela Luevano MD  Authorized by: Marianela Luevano MD   Direct patient critical care time: 17 minutes  Additional history critical care time: 6 minutes  Ordering / reviewing critical care time: 7 minutes  Documentation critical care time: 10 minutes  Consulting other physicians critical care time: 5 minutes  Total critical care time (exclusive of procedural time) : 45 minutes  Critical care was necessary to treat or prevent imminent or life-threatening deterioration of the following conditions: circulatory failure, renal failure and hepatic failure.  Critical care was time spent personally by me on the following activities: discussions with consultants, evaluation of patient's response to treatment, obtaining history from patient or surrogate, ordering and review of laboratory studies, pulse oximetry, review of old charts, development of treatment plan with patient or surrogate, examination of patient, ordering and performing treatments and interventions, ordering and review of radiographic studies and re-evaluation of patient's condition.        Labs Reviewed   B-TYPE NATRIURETIC PEPTIDE - Abnormal; Notable for the following components:       Result Value     Natriuretic Peptide 240.7 (*)     All other components within normal limits   COMPREHENSIVE METABOLIC PANEL - Abnormal; Notable for the following components:    Sodium Level 131 (*)     Carbon Dioxide 21 (*)     Glucose Level 327 (*)     Creatinine 2.06 (*)     Calcium Level Total 7.7 (*)     Albumin Level 2.1 (*)     Globulin 3.7 (*)     Albumin/Globulin Ratio 0.6 (*)     Bilirubin Total 1.9 (*)     Alanine Aminotransferase 56 (*)     Aspartate Aminotransferase 72 (*)     All other components within normal limits   PROTIME-INR - Abnormal; Notable for the following components:    PT 49.8 (*)     INR 5.57 (*)     All other components within normal limits   CBC WITH DIFFERENTIAL - Abnormal; Notable for the following components:    WBC 11.9 (*)     RBC 2.48 (*)     Hgb 8.5 (*)     Hct 25.9 (*)     .4 (*)     MCH 34.3 (*)     MCHC 32.8 (*)     RDW 17.7 (*)     Neut # 10.2 (*)     IG# 0.06 (*)     IG% 0.5 (*)     All other components within normal limits   MAGNESIUM - Normal   TROPONIN I - Normal   CBC W/ AUTO DIFFERENTIAL    Narrative:     The following orders were created for panel order CBC auto differential.  Procedure                               Abnormality         Status                     ---------                               -----------         ------                     CBC with Differential[[id number]]        Abnormal            Final result                 Please view results for these tests on the individual orders.   URINALYSIS, REFLEX TO URINE CULTURE   SARS-COV-2 RNA AMPLIFICATION, QUAL   HEMOGLOBIN A1C   TYPE & SCREEN   POCT GLUCOSE MONITORING CONTINUOUS     EKG Readings: (Independently Interpreted)   Initial Reading: No STEMI. Rhythm: Normal Sinus Rhythm. Conduction: 1st Degree AV Block. Clinical Impression: with LBBB   Time 2:08 a.m.     ECG Results          EKG 12-lead (In process)  Result time 05/26/22 02:28:52    In process by Interface, Lab In [facility] (05/26/22 02:28:52)                  Narrative:    Test Reason : R07.9,    Vent. Rate : 078 BPM     Atrial Rate : 078 BPM     P-R Int : 214 ms          QRS Dur : 160 ms      QT Int : 480 ms       P-R-T Axes : 069 014 133 degrees     QTc Int : 547 ms    Sinus rhythm with 1st degree A-V block  Left bundle branch block  Abnormal ECG  When compared with ECG of 08-MAY-2022 10:31,  Nonspecific T wave abnormality, improved in Inferior leads    Referred By: [email or web address]   SELF           Confirmed By:                             Imaging Results          X-Ray Chest AP Portable (Preliminary result)  Result time 05/26/22 02:38:16    ED Interpretation by Marianela Luevano MD (05/26/22 02:38:16, [facility] - Emergency Dept, Emergency Medicine)    cardiomegaly                               Medications   pantoprazole injection 80 mg (has no administration in time range)   phytonadione vitamin k (AQUA-MEPHYTON) 10 mg in dextrose 5 % 50 mL IVPB (has no administration in time range)   glucagon (human recombinant) injection 1 mg (has no administration in time range)   dextrose 10% bolus 125 mL (has no administration in time range)   dextrose 10% bolus 250 mL (has no administration in time range)     Medical Decision Making:   History:   I obtained history from: EMS provider.  Old Medical Records: I decided to obtain old medical records.  Old Records Summarized: records from previous admission(s).  Initial Assessment:   Pt received 500 ivf  Differential Diagnosis:   Dka, untreated dm, chf, sandy, gi bleed  Independently Interpreted Test(s):   I have ordered and independently interpreted X-rays - see prior notes.  I have ordered and independently interpreted EKG Reading(s) - see prior notes  Clinical Tests:   Lab Tests: Reviewed and Ordered  Radiological Study: Reviewed and Ordered  Medical Tests: Reviewed and Ordered  ED Management:  Gi bleeding, elevated inr, sandy on ckd, given 500cc ivf with e3ms, did not continue due to very low ef, admit  Other:   I have  discussed this case with another health care provider.          Scribe Attestation:   Scribe #1: I performed the above scribed service and the documentation accurately describes the services I performed. I attest to the accuracy of the note.  Comments: Attending:   Physician Attestation Statement for Scribe #1: I, Marianela Luevano MD, personally performed the services described in this documentation. All medical record entries made by the scribe were at my direction and in my presence.  I have reviewed the chart and agree that the record reflects my personal performance and is accurate and complete.      Attending Attestation:           Physician Attestation for Scribe:  Physician Attestation Statement for Scribe #1: I, reviewed documentation, as scribed by Ronda Campbell in my presence, and it is both accurate and complete.             ED Course as of 05/26/22 0312   Thu May 26, 2022   0224 Renal function worse, slightly more anemic, elevated inr likely related to cirrhosis as pt is on eliquis not coumadin [BS]   0237 Rectal exam with latoya patel rn. Stool brown occult + [BS]   0237 Hospitalist - paged  [KF]      ED Course User Index  [BS] Marianela Luevano MD  [KF] Ronda Campbell             Clinical Impression:   Final diagnoses:  [R07.9] Chest pain  [N28.9, N18.9] Acute on chronic renal insufficiency (Primary)  [R07.9] Chest pain, unspecified type  [R06.02] Shortness of breath  [K74.60] Hepatic cirrhosis, unspecified hepatic cirrhosis type, unspecified whether ascites present  [R79.1] Elevated INR  [K92.2] Upper GI bleed  [D64.9] Acute on chronic anemia  [E11.65] Type 2 diabetes mellitus with hyperglycemia, without long-term current use of insulin          ED Disposition Condition    Observation               Marianela Luevano MD  05/26/22 0312

## 2022-05-26 NOTE — TELEPHONE ENCOUNTER
Patient was seen on 5/20 with plan for admit this  week. Spoke to patient and patient reports that he is coming on 6/7. After clarifying why patients not planning to come in for admit until then, and patient says he was told by the nurse that scheduled him that that was soonest day to get all of the tests done so he doesn't have to make a separate trip here since he lives far away.    I informed patient that he does not need to wait to be admitted on 6/7 and that his testing can be done  while admitted inpatient. Patient stated  he would is aware per Dr. Darden that not much is done on Fridays and that he'd like to come in to be admitted asap. I informed patient that I will notify Dr. Darden and contact him in the am.  Patient verbalized understanding.      This message sent to Dr. Darden. Will discuss plan with Dr Darden in am and contact patient.

## 2022-05-26 NOTE — H&P
[facility] Hospital Medicine History & Physical Examination       Patient Name: John Javier Jr.  MRN: [id number]  Patient Class: OP- Observation   Admission Date: 5/26/2022   Admitting Physician:  Service   Attending Physician: Roberto Carlos Pandya MD  Primary Care Provider: Primary Doctor No  Face-to-Face encounter date: 05/26/2022  Code Status:Good Outcome Following Attempted Resuscitation (GO-FAR) Score:  The Good Outcome Following Attempted Resuscitation (GO-FAR) score provides validated risk stratification for a patients chance of neurologically-intact survival to discharge should he/she be successfully resuscitated following in-hospital cardiopulmonary arrest. The clinical significance of the GO-FAR score may be discussed with the patient and/or family while coming to a decision about code status.     Age: <70      Interpretation Key:   GO-FAR Score       Likelihood of NISD       -15 to - 6       Above average > 15 %         - 5 to 13       Average     >3 to 15 %         14 to 23       Low                1 to 3 %               > 23       Very low             < 1 %            Chief Complaint: Hyperglycemia ( at home. States [facility] Hospitalist took him off of all his diabetes meds when he was Admitted on Mother's day. Hasnt had any meds since before that admission. No symptoms other than blood sugar is high. Also states trying to move closer to daughter in [place])          Patient information was obtained from patient, patient's family, past medical records and ER records.     HISTORY OF PRESENT ILLNESS:   John Javier Jr. is a 66 y.o. male who  has a past medical history of Anticoagulant long-term use, CHF (congestive heart failure), Chronic combined systolic and diastolic heart failure (03/01/2020), Coronary artery disease, Diabetes mellitus, Digestive disorder, Diverticulitis, Encounter for blood transfusion, Hypertension, Renal disorder, Sleep apnea, Suspected  Covid-19 Virus Infection; test negative (03/14/2020), and Unspecified cirrhosis of liver.. The patient presented to [facility] on 5/26/2022 .    Patient came to the emergency room due to elevated blood sugars at home.  States getting as high as 400.  Since he was here couple weeks ago and diabetes well controlled he was taken off of all his diabetes medications.  He has followed up with his PCP twice since discharge and sugars have been good.  Denies any change in his diet.  He yesterday for abdominal bloating.  Reports that he has had to have 2 paracentesis prior secondary to chronic ascites secondary to cirrhosis.  He does report some fatigue and shakiness.  Noted some black stools recently.  Also with weight loss.  He is on chronic anticoagulation secondary to history of MI. He tells me this morning that he is awaiting a phone call from his heart transplant physician [facility] as a response to be doing some procedures this week.  He is asking about transfer to that facility.  Told him that we need to take further into what is being done.  In the meantime I try to get him stabilized he can be discharged to make his appointments.  His vital signs are currently stable.  He did have a positive FOBT but history of bleed and remains on Eliquis.  He does take his protonix daily. The hospitalist group was asked to admit for further workup.    PAST MEDICAL HISTORY:     Past Medical History:   Diagnosis Date    Anticoagulant long-term use     CHF (congestive heart failure)     Chronic combined systolic and diastolic heart failure 03/01/2020    Coronary artery disease     Diabetes mellitus     Digestive disorder     Diverticulitis     Encounter for blood transfusion     Hypertension     Renal disorder     Sleep apnea     Suspected Covid-19 Virus Infection; test negative 03/14/2020    Unspecified cirrhosis of liver        PAST SURGICAL HISTORY:     Past Surgical History:   Procedure Laterality Date    COLECTOMY       COLONOSCOPY N/A 5/11/2022    Procedure: COLON;  Surgeon: Dk Jacobson MD;  Location: [facility] ENDOSCOPY;  Service: Gastroenterology;  Laterality: N/A;    ESOPHAGOGASTRODUODENOSCOPY N/A 5/10/2022    Procedure: EGD (ESOPHAGOGASTRODUODENOSCOPY);  Surgeon: Dk Jacobson MD;  Location: [facility] ENDOSCOPY;  Service: Gastroenterology;  Laterality: N/A;    INTRALUMINAL GASTROINTESTINAL TRACT IMAGING VIA CAPSULE N/A 5/11/2022    Procedure: IMAGING PROCEDURE, GI TRACT, INTRALUMINAL, VIA CAPSULE;  Surgeon: Dk Jacobson MD;  Location: [facility] ENDOSCOPY;  Service: Gastroenterology;  Laterality: N/A;  done in recovery    RIGHT HEART CATHETERIZATION Right 10/8/2020    Procedure: INSERTION, CATHETER, RIGHT HEART;  Surgeon: Adilson Darden Jr., MD;  Location: [facility] CATH LAB;  Service: Cardiology;  Laterality: Right;    RIGHT HEART CATHETERIZATION Right 2/3/2021    Procedure: INSERTION, CATHETER, RIGHT HEART;  Surgeon: Adilson Darden Jr., MD;  Location: [facility] CATH LAB;  Service: Cardiology;  Laterality: Right;    RIGHT HEART CATHETERIZATION Right 6/22/2021    Procedure: INSERTION, CATHETER, RIGHT HEART;  Surgeon: Yuki Delgado MD;  Location: [facility] CATH LAB;  Service: Cardiology;  Laterality: Right;       ALLERGIES:   Penicillins, Penicillin, and Sitagliptin    FAMILY HISTORY:   Reviewed and negative    SOCIAL HISTORY:     Social History     Tobacco Use    Smoking status: Never Smoker    Smokeless tobacco: Never Used   Substance Use Topics    Alcohol use: Never        HOME MEDICATIONS:     Prior to Admission medications    Not on File       REVIEW OF SYSTEMS:   Except as documented, all other systems reviewed and negative     PHYSICAL EXAM:     VITAL SIGNS: 24 HRS MIN & MAX LAST   Temp  Min: 98.1 °F (36.7 °C)  Max: 98.1 °F (36.7 °C) 98.1 °F (36.7 °C)   BP  Min: 92/49  Max: 106/64 95/61   Pulse  Min: 76  Max: 84  79   Resp  Min: 13  Max: 20 16   SpO2  Min: 95 %  Max: 99 % 99 %        General appearance: Well-developed, well-nourished male in no apparent distress.  HENT: Atraumatic head. Moist mucous membranes of oral cavity.  Eyes: Normal extraocular movements.   Neck: Supple.   Lungs: Clear to auscultation bilaterally. No wheezing present.   Heart: Regular rate and rhythm. S1 and S2 present with no murmurs/gallop/rub. No pedal edema. No JVD present.   Abdomen: Soft, non-distended, non-tender. No rebound tenderness/guarding. Bowel sounds are normal.   Extremities: No cyanosis, clubbing, or edema.  Skin: No Rash.   Neuro: Motor and sensory exams grossly intact. Good tone. Muscle strength 5/5 in all 4 extremities  Psych/mental status: Appropriate mood and affect. Responds appropriately to questions.     LABS AND IMAGING:     Recent Labs   Lab 05/26/22  0138 05/26/22  0605   WBC 11.9* 10.3   RBC 2.48* 2.50*   HGB 8.5* 8.7*   HCT 25.9* 25.7*   .4* 102.8*   MCH 34.3* 34.8*   MCHC 32.8* 33.9   RDW 17.7* 17.3*    124*   MPV 12.2 11.5       Recent Labs   Lab 05/26/22  0138 05/26/22  0605   * 130*   K 5.1 5.0   CO2 21* 23   BUN 21.2 22.3   CREATININE 2.06* 1.87*   CALCIUM 7.7* 7.8*   MG 1.90  --    ALBUMIN 2.1* 2.1*   ALKPHOS 113 108   ALT 56* 53   AST 72* 63*   BILITOT 1.9* 1.7*       Microbiology Results (last 7 days)     ** No results found for the last 168 hours. **           X-Ray Chest 1 View  Narrative: EXAMINATION:  XR CHEST 1 VIEW    CLINICAL HISTORY:  sob;    TECHNIQUE:  One view    COMPARISON:  April 13, 2022.    FINDINGS:  Cardiopericardial silhouette is within normal limits.  Left chest implanted cardiac device electrode terminates within the right ventricle.  Left lower lung linear atelectasis or scarring.  No acute dense focal or segmental consolidation, congestive process, pleural effusions or pneumothorax.  Impression: NO ACUTE CARDIOPULMONARY PROCESS IDENTIFIED.    Electronically signed by: Biju  Pelaez  Date:    05/08/2022  Time:    10:54      _____________________________________  INPATIENT LIST OF MEDICATIONS     Current Facility-Administered Medications:     dextrose 10% bolus 125 mL, 12.5 g, Intravenous, PRN, Marianela Luevano MD    dextrose 10% bolus 125 mL, 12.5 g, Intravenous, PRN, Marianela Luevano MD    dextrose 10% bolus 250 mL, 25 g, Intravenous, PRN, Marianela Luevano MD    dextrose 10% bolus 250 mL, 25 g, Intravenous, PRN, Marianela Luevano MD    glucagon (human recombinant) injection 1 mg, 1 mg, Intramuscular, PRN, Marianela Luevano MD    glucagon (human recombinant) injection 1 mg, 1 mg, Intramuscular, PRN, Marianela Luevano MD    glucagon (human recombinant) injection 1 mg, 1 mg, Intramuscular, PRN, Marjorie Bassett, AGACNP-BC    glucose chewable tablet 16 g, 16 g, Oral, PRN, Marjorie G Serjio, AGACNP-BC    glucose chewable tablet 24 g, 24 g, Oral, PRN, Marjorie Echevarriauis, AGACNP-BC    insulin aspart U-100 injection 0-5 Units, 0-5 Units, Subcutaneous, Q6H PRN, Marianela Luevano MD, 4 Units at 05/26/22 0621    naloxone 0.4 mg/mL injection 0.02 mg, 0.02 mg, Intravenous, PRN, Marjorie Echevarriauis, AGACNP-BC    sodium chloride 0.9% flush 10 mL, 10 mL, Intravenous, Q12H PRN, Marjorie Echevarriauis, AGACNP-BC    Current Outpatient Medications:     albuterol (PROVENTIL/VENTOLIN HFA) 90 mcg/actuation inhaler, INHALE 2 PUFFS BY MOUTH FOUR TIMES DAILY AS NEEDED FOR WHEEZING OR SHORTNESS OF BREATH, Disp: , Rfl:     amiodarone (PACERONE) 200 MG Tab, Take 1 tablet (200 mg total) by mouth once daily., Disp: 30 tablet, Rfl: 11    apixaban (ELIQUIS) 5 mg Tab, Take 1 tablet (5 mg total) by mouth 2 (two) times daily., Disp: 180 tablet, Rfl: 3    atorvastatin (LIPITOR) 20 MG tablet, Take 1 tablet (20 mg total) by mouth every evening., Disp: 90 tablet, Rfl: 3    docusate sodium (COLACE) 100 MG capsule, Take 1 capsule (100 mg total) by mouth 2 (two) times daily., Disp: 62 capsule, Rfl: 0    ferrous  sulfate 324 mg (65 mg iron) TbEC, Take 1 tablet (324 mg total) by mouth 2 (two) times daily., Disp: 60 tablet, Rfl: 2    fluticasone propionate (FLONASE) 50 mcg/actuation nasal spray, , Disp: , Rfl:     metoprolol succinate (TOPROL-XL) 100 MG 24 hr tablet, Take 1 tablet (100 mg total) by mouth once daily., Disp: 90 tablet, Rfl: 3    nitroGLYCERIN (NITROSTAT) 0.4 MG SL tablet, SMARTSI Tablet(s) Sublingual As Needed, Disp: 100 tablet, Rfl: 3    pantoprazole (PROTONIX) 40 MG tablet, Take 1 tablet (40 mg total) by mouth once daily., Disp: 90 tablet, Rfl: 3    simethicone (MYLICON) 80 MG chewable tablet, Take 1 tablet (80 mg total) by mouth 3 (three) times daily as needed for Flatulence., Disp: 30 tablet, Rfl: 0      Scheduled Meds:    Continuous Infusions:  PRN Meds:.dextrose 10%, dextrose 10%, dextrose 10%, dextrose 10%, glucagon (human recombinant), glucagon (human recombinant), glucagon (human recombinant), glucose, glucose, insulin aspart U-100, naloxone, sodium chloride 0.9%      VTE Prophylaxis: will be placed on appropriate DVT prophylaxis and will be advised to be as mobile as possible and sit in a chair as tolerated  _____________________________________    ASSESSMENT & PLAN:   Type 2 diabetes with hyperglycemia  End-stage systolic heart failure, no acute exacerbation, EF 10%  History of alcoholic cirrhosis  History of GI bleeding  History of well atrial fibrillation, on Eliquis  Hyperlipidemia  Essential hypertension  Iron deficiency anemia    Given some insulin earlier.  Last blood sugar 272.  His A1c is 5.8.  Patient states that he was told with Derm previous admissions.  Diabetes medication.  I reviewed his last few admissions and I do not see any mention of these instructions.  It does not appear that he was on any diabetes medication upon admission.  Will need to look back into the system the C1 he was on before.  With it and pending possible surgery would likely benefit from a low-dose sliding  scale at home.  Will place the patient observation for now.  Try to get his blood sugars better controlled and discharge so he can make his transplant appointments in [facility].  Low suspicion for acute GI bleed, likley chronic. H/H and vitals stable. On Protonix  If doing better can be DC later today    .

## 2022-05-26 NOTE — PLAN OF CARE
[facility]- Observation letter (MOON) reviewed w/ patient by phone. He had no questions. Emailed dc planner to drop copy to room when able. Copy/ addendum loaded to Epic. IRMA MENDEZ RN

## 2022-05-26 NOTE — TELEPHONE ENCOUNTER
Spoke to patient and Dr. VIVIENNE Darden. Plan is for HTS admission on 5/30/22 9a. Patient may be staying in the [facility] overnight on Sunday 5/29/22 prior to admit.    Informed patient that my coworker HERMAN Ellis Rn will contact him about 9a to confirm that he here and or in route so that an admit reservation be called in that morning. Patient verbalized understanding and is having a friend drive him here and stay here.    Informed my coworker HERMAN Ellis RN of plan.   Reminder entering for Monday for CHF nurse to call pt to start process on Monday. Per Dr. Adilson Darden  I instructed  Patient to hold eliquis on Sunday 5/29/22.   Patient verbalized understanding of plan.

## 2022-05-27 VITALS
HEIGHT: 64 IN | TEMPERATURE: 98 F | OXYGEN SATURATION: 94 % | WEIGHT: 134 LBS | BODY MASS INDEX: 22.88 KG/M2 | HEART RATE: 89 BPM | RESPIRATION RATE: 18 BRPM | SYSTOLIC BLOOD PRESSURE: 99 MMHG | DIASTOLIC BLOOD PRESSURE: 63 MMHG

## 2022-05-27 LAB
ANION GAP SERPL CALC-SCNC: 12 MEQ/L
BASOPHILS # BLD AUTO: 0.02 X10(3)/MCL (ref 0–0.2)
BASOPHILS NFR BLD AUTO: 0.1 %
BUN SERPL-MCNC: 20.5 MG/DL (ref 8.4–25.7)
CALCIUM SERPL-MCNC: 8 MG/DL (ref 8.8–10)
CHLORIDE SERPL-SCNC: 96 MMOL/L (ref 98–107)
CO2 SERPL-SCNC: 19 MMOL/L (ref 23–31)
CREAT SERPL-MCNC: 1.66 MG/DL (ref 0.73–1.18)
CREAT/UREA NIT SERPL: 12
EOSINOPHIL # BLD AUTO: 0.02 X10(3)/MCL (ref 0–0.9)
EOSINOPHIL NFR BLD AUTO: 0.1 %
ERYTHROCYTE [DISTWIDTH] IN BLOOD BY AUTOMATED COUNT: 17.6 % (ref 11.5–17)
GLUCOSE SERPL-MCNC: 191 MG/DL (ref 82–115)
HCT VFR BLD AUTO: 29.6 % (ref 42–52)
HGB BLD-MCNC: 9.7 GM/DL (ref 14–18)
IMM GRANULOCYTES # BLD AUTO: 0.1 X10(3)/MCL (ref 0–0.02)
IMM GRANULOCYTES NFR BLD AUTO: 0.6 % (ref 0–0.43)
LYMPHOCYTES # BLD AUTO: 0.87 X10(3)/MCL (ref 0.6–4.6)
LYMPHOCYTES NFR BLD AUTO: 5.5 %
MCH RBC QN AUTO: 35.5 PG (ref 27–31)
MCHC RBC AUTO-ENTMCNC: 32.8 MG/DL (ref 33–36)
MCV RBC AUTO: 108.4 FL (ref 80–94)
MONOCYTES # BLD AUTO: 0.87 X10(3)/MCL (ref 0.1–1.3)
MONOCYTES NFR BLD AUTO: 5.5 %
NEUTROPHILS # BLD AUTO: 14.1 X10(3)/MCL (ref 2.1–9.2)
NEUTROPHILS NFR BLD AUTO: 88.2 %
NRBC BLD AUTO-RTO: 0 %
PLATELET # BLD AUTO: 178 X10(3)/MCL (ref 130–400)
PMV BLD AUTO: 11.6 FL (ref 9.4–12.4)
POCT GLUCOSE: 177 MG/DL (ref 70–110)
POCT GLUCOSE: 182 MG/DL (ref 70–110)
POCT GLUCOSE: 227 MG/DL (ref 70–110)
POCT GLUCOSE: 229 MG/DL (ref 70–110)
POCT GLUCOSE: 255 MG/DL (ref 70–110)
POTASSIUM SERPL-SCNC: 4.8 MMOL/L (ref 3.5–5.1)
RBC # BLD AUTO: 2.73 X10(6)/MCL (ref 4.7–6.1)
SODIUM SERPL-SCNC: 127 MMOL/L (ref 136–145)
WBC # SPEC AUTO: 16 X10(3)/MCL (ref 4.5–11.5)

## 2022-05-27 PROCEDURE — G0378 HOSPITAL OBSERVATION PER HR: HCPCS

## 2022-05-27 PROCEDURE — 96372 THER/PROPH/DIAG INJ SC/IM: CPT | Performed by: EMERGENCY MEDICINE

## 2022-05-27 PROCEDURE — 36415 COLL VENOUS BLD VENIPUNCTURE: CPT | Performed by: HOSPITALIST

## 2022-05-27 PROCEDURE — 80048 BASIC METABOLIC PNL TOTAL CA: CPT | Performed by: HOSPITALIST

## 2022-05-27 PROCEDURE — 25000003 PHARM REV CODE 250: Performed by: HOSPITALIST

## 2022-05-27 PROCEDURE — 85025 COMPLETE CBC W/AUTO DIFF WBC: CPT | Performed by: HOSPITALIST

## 2022-05-27 PROCEDURE — 63600175 PHARM REV CODE 636 W HCPCS: Performed by: EMERGENCY MEDICINE

## 2022-05-27 RX ADMIN — INSULIN ASPART 2 UNITS: 100 INJECTION, SOLUTION INTRAVENOUS; SUBCUTANEOUS at 11:05

## 2022-05-27 RX ADMIN — APIXABAN 5 MG: 5 TABLET, FILM COATED ORAL at 08:05

## 2022-05-27 RX ADMIN — AMIODARONE HYDROCHLORIDE 200 MG: 200 TABLET ORAL at 08:05

## 2022-05-27 RX ADMIN — FERROUS SULFATE TAB 325 MG (65 MG ELEMENTAL FE) 1 EACH: 325 (65 FE) TAB at 08:05

## 2022-05-27 RX ADMIN — PANTOPRAZOLE SODIUM 40 MG: 40 TABLET, DELAYED RELEASE ORAL at 08:05

## 2022-05-27 RX ADMIN — FLUTICASONE PROPIONATE 50 MCG: 50 SPRAY, METERED NASAL at 08:05

## 2022-05-27 RX ADMIN — DOCUSATE SODIUM 100 MG: 100 CAPSULE, LIQUID FILLED ORAL at 08:05

## 2022-05-27 NOTE — DISCHARGE SUMMARY
[facility] Hospital Medicine Discharge Summary    Admit Date: 5/26/2022  Discharge Date and Time: 5/27/20227:09 AM  Admitting Physician: Hospitalist team   Discharging Physician: Roberto Carlos Pandya MD.  Primary Care Physician: Primary Doctor No      Discharge Diagnoses:  Type 2 diabetes with hyperglycemia  End-stage systolic heart failure, no acute exacerbation, EF 10%  History of alcoholic cirrhosis  History of GI bleeding  History of well atrial fibrillation, on Eliquis  Hyperlipidemia  Essential hypertension  Iron deficiency anemia    Hospital Course:   66 y.o. male who  has a past medical history of Anticoagulant long-term use, CHF (congestive heart failure), Chronic combined systolic and diastolic heart failure (03/01/2020), Coronary artery disease, Diabetes mellitus, Digestive disorder, Diverticulitis, Encounter for blood transfusion, Hypertension, Renal disorder, Sleep apnea, Suspected Covid-19 Virus Infection; test negative (03/14/2020), and Unspecified cirrhosis of liver.. The patient presented to [facility] on 5/26/2022 .     Patient came to the emergency room due to elevated blood sugars at home.  States getting as high as 400.  Since he was here couple weeks ago and diabetes well controlled he was taken off of all his diabetes medications.  He has followed up with his PCP twice since discharge and sugars have been good.  Denies any change in his diet.  He yesterday for abdominal bloating.  Reports that he has had to have 2 paracentesis prior secondary to chronic ascites secondary to cirrhosis.  He does report some fatigue and shakiness.  Noted some black stools recently.  Also with weight loss.  He is on chronic anticoagulation secondary to history of MI. He tells me this morning that he is awaiting a phone call from his heart transplant physician in [facility] as a response to be doing some procedures this week.  He is asking about transfer to that facility.  Told him that we need  "to take further into what is being done.  In the meantime I try to get him stabilized he can be discharged to make his appointments.  His vital signs are currently stable.  He did have a positive FOBT but history of bleed and remains on Eliquis.  He does take his protonix daily. The hospitalist group was asked to admit for further workup.  GI evaluated the patient.  No evidence of acute bleeding.  Recommend that he follow-up with his physicians in [facility].  He evidently has an appointment on Monday for paracentesis the patient would prefer to wait and have it done there.  He tells me this morning that he realized that his blood sugars are likely elevated has he had a lot of sweets last week which she does not normally eat.  His A1c was 5.8 so showing that he has good control normally.  I counseled him on portion size of monitoring his sugar intake.  He can continue monitor sugars as an outpatient but no new medication changes today.  He is currently feeling back at baseline and anxious get home for the weekend.  Plans to follow up in [facility] on Monday.  Have a set of labs pending for him this morning but if these remain stable okay to discharge home    Vitals:  Blood pressure 99/63, pulse 86, temperature 98.8 °F (37.1 °C), resp. rate 20, height 5' 4" (1.626 m), weight 60.8 kg (134 lb), SpO2 97 %..    Physical Exam:  Gen: NC AT, Awake, alert, and oriented x4  HEENT: PERRL EOMI normal conjunctiva, neck supple  Cardiac: Regular rhythm and rate, no murmur, rubs, or gallops  Respiratory: Clear to auscultation bilaterally. No wheezes, rales, or crackles  Gastrointestinal: soft, ++distended, ascites, nontender, +bowel sounds  Musculoskeletal: Normal ROM, no pertinent deformities  Neuro: no focal deficits noted, speech clear  Psych: appropriate mood/affect      Procedures Performed: No admission procedures for hospital encounter.     Significant Diagnostic Studies: See Full reports for all details  Admission on " 05/26/2022   Component Date Value    Natriuretic Peptide 05/26/2022 240.7 (A)    Sodium Level 05/26/2022 131 (A)    Potassium Level 05/26/2022 5.1     Chloride 05/26/2022 98     Carbon Dioxide 05/26/2022 21 (A)    Glucose Level 05/26/2022 327 (A)    Blood Urea Nitrogen 05/26/2022 21.2     Creatinine 05/26/2022 2.06 (A)    Calcium Level Total 05/26/2022 7.7 (A)    Protein Total 05/26/2022 5.8     Albumin Level 05/26/2022 2.1 (A)    Globulin 05/26/2022 3.7 (A)    Albumin/Globulin Ratio 05/26/2022 0.6 (A)    Bilirubin Total 05/26/2022 1.9 (A)    Alkaline Phosphatase 05/26/2022 113     Alanine Aminotransferase 05/26/2022 56 (A)    Aspartate Aminotransfera* 05/26/2022 72 (A)    Estimated GFR- Am* 05/26/2022 42     PT 05/26/2022 49.8 (A)    INR 05/26/2022 5.57 (A)    Magnesium Level 05/26/2022 1.90     Troponin-I 05/26/2022 0.022     WBC 05/26/2022 11.9 (A)    RBC 05/26/2022 2.48 (A)    Hgb 05/26/2022 8.5 (A)    Hct 05/26/2022 25.9 (A)    MCV 05/26/2022 104.4 (A)    MCH 05/26/2022 34.3 (A)    MCHC 05/26/2022 32.8 (A)    RDW 05/26/2022 17.7 (A)    Platelet 05/26/2022 144     MPV 05/26/2022 12.2     Neut % 05/26/2022 85.6     Lymph % 05/26/2022 6.0     Mono % 05/26/2022 7.6     Eos % 05/26/2022 0.2     Basophil % 05/26/2022 0.1     Lymph # 05/26/2022 0.72     Neut # 05/26/2022 10.2 (A)    Mono # 05/26/2022 0.90     Eos # 05/26/2022 0.02     Baso # 05/26/2022 0.01     IG# 05/26/2022 0.06 (A)    IG% 05/26/2022 0.5 (A)    NRBC% 05/26/2022 0.0     Group & Rh 05/26/2022 O POS     Indirect Jill GEL 05/26/2022 NEG     POC Glucose 05/26/2022 368 (A)    Hemoglobin A1c 05/26/2022 5.8     Estimated Average Glucose 05/26/2022 119.8     POCT Glucose 05/26/2022 316 (A)    Sodium Level 05/26/2022 130 (A)    Potassium Level 05/26/2022 5.0     Chloride 05/26/2022 101     Carbon Dioxide 05/26/2022 23     Glucose Level 05/26/2022 272 (A)    Blood Urea Nitrogen 05/26/2022 22.3      Creatinine 05/26/2022 1.87 (A)    Calcium Level Total 05/26/2022 7.8 (A)    Protein Total 05/26/2022 5.2 (A)    Albumin Level 05/26/2022 2.1 (A)    Globulin 05/26/2022 3.1     Albumin/Globulin Ratio 05/26/2022 0.7 (A)    Bilirubin Total 05/26/2022 1.7 (A)    Alkaline Phosphatase 05/26/2022 108     Alanine Aminotransferase 05/26/2022 53     Aspartate Aminotransfera* 05/26/2022 63 (A)    Estimated GFR- Am* 05/26/2022 47     PTT 05/26/2022 62.5 (A)    PT 05/26/2022 43.4 (A)    INR 05/26/2022 4.67 (A)    WBC 05/26/2022 10.3     RBC 05/26/2022 2.50 (A)    Hgb 05/26/2022 8.7 (A)    Hct 05/26/2022 25.7 (A)    MCV 05/26/2022 102.8 (A)    MCH 05/26/2022 34.8 (A)    MCHC 05/26/2022 33.9     RDW 05/26/2022 17.3 (A)    Platelet 05/26/2022 124 (A)    MPV 05/26/2022 11.5     Neut % 05/26/2022 86.6     Lymph % 05/26/2022 7.3     Mono % 05/26/2022 4.1     Eos % 05/26/2022 1.0     Basophil % 05/26/2022 0.2     Lymph # 05/26/2022 0.75     Neut # 05/26/2022 8.9     Mono # 05/26/2022 0.42     Eos # 05/26/2022 0.10     Baso # 05/26/2022 0.02     IG# 05/26/2022 0.08 (A)    IG% 05/26/2022 0.8 (A)    NRBC% 05/26/2022 0.0     POCT Glucose 05/26/2022 323 (A)    POCT Glucose 05/26/2022 177 (A)    POCT Glucose 05/27/2022 182 (A)    POCT Glucose 05/26/2022 227 (A)    POCT Glucose 05/26/2022 255 (A)        Microbiology Results (last 7 days)     ** No results found for the last 168 hours. **           X-Ray Chest 1 View    Result Date: 5/8/2022  EXAMINATION: XR CHEST 1 VIEW CLINICAL HISTORY: sob; TECHNIQUE: One view COMPARISON: April 13, 2022. FINDINGS: Cardiopericardial silhouette is within normal limits.  Left chest implanted cardiac device electrode terminates within the right ventricle.  Left lower lung linear atelectasis or scarring.  No acute dense focal or segmental consolidation, congestive process, pleural effusions or pneumothorax.     NO ACUTE CARDIOPULMONARY PROCESS IDENTIFIED.  Electronically signed by: Biju Pelaez Date:    2022 Time:    10:54    X-Ray Chest AP Portable    Result Date: 2022  EXAMINATION: XR CHEST AP PORTABLE CLINICAL HISTORY: chest pain; TECHNIQUE: Single frontal view of the chest was performed. COMPARISON: May 8, 2022 FINDINGS: Examination reveals mediastinal cardiac silhouette to be within normal limits some linear densities identified at the left base which might be related to either subsegmental atelectatic changes and or fibrotic streaks. No focal consolidative changes     Linear densities at the left base might be related to atelectatic changes and or fibrotic streaks. No focal consolidative changes. Better definition of both costophrenic angles Electronically signed by: Omari Almazan Date:    2022 Time:    08:45  - pulls last radiology orders        Medication List      CONTINUE taking these medications    albuterol 90 mcg/actuation inhaler  Commonly known as: PROVENTIL/VENTOLIN HFA     amiodarone 200 MG Tab  Commonly known as: PACERONE  Take 1 tablet (200 mg total) by mouth once daily.     apixaban 5 mg Tab  Commonly known as: ELIQUIS  Take 1 tablet (5 mg total) by mouth 2 (two) times daily.     atorvastatin 20 MG tablet  Commonly known as: LIPITOR  Take 1 tablet (20 mg total) by mouth every evening.     docusate sodium 100 MG capsule  Commonly known as: COLACE  Take 1 capsule (100 mg total) by mouth 2 (two) times daily.     ferrous sulfate 324 mg (65 mg iron) Tbec  Take 1 tablet (324 mg total) by mouth 2 (two) times daily.     fluticasone propionate 50 mcg/actuation nasal spray  Commonly known as: FLONASE     metoprolol succinate 100 MG 24 hr tablet  Commonly known as: TOPROL-XL  Take 1 tablet (100 mg total) by mouth once daily.     nitroGLYCERIN 0.4 MG SL tablet  Commonly known as: NITROSTAT  SMARTSI Tablet(s) Sublingual As Needed     pantoprazole 40 MG tablet  Commonly known as: PROTONIX  Take 1 tablet (40 mg total) by mouth once  daily.     simethicone 80 MG chewable tablet  Commonly known as: MYLICON  Take 1 tablet (80 mg total) by mouth 3 (three) times daily as needed for Flatulence.             Explained in detail to the patient about the discharge plan, medications, and follow-up visits. Pt understands and agrees with the treatment plan  Discharged Condition: stable  Diet:Low sodium, diabetic  Disposition: home    Medications Per DC med rec  Activities as tolerated  Follow up with your PCP in 2 wks   For further questions contact hospitalist office    Discharge time 33 minutes    For worsening symptoms, chest pain, shortness of breath, increased abdominal pain, high grade fever, stroke or stroke like symptoms, immediately go to the nearest Emergency Room or call 911 as soon as possible.      Roberto Carlos Celeste M.D, on 5/27/2022. at 7:09 AM.

## 2022-05-28 ENCOUNTER — NURSE TRIAGE (OUTPATIENT)
Dept: ADMINISTRATIVE | Facility: CLINIC | Age: 67
End: 2022-05-28
Payer: COMMERCIAL

## 2022-05-28 ENCOUNTER — HOSPITAL ENCOUNTER (EMERGENCY)
Facility: HOSPITAL | Age: 67
Discharge: HOME | End: 2022-05-29
Attending: EMERGENCY MEDICINE
Payer: COMMERCIAL

## 2022-05-28 VITALS
BODY MASS INDEX: 22.88 KG/M2 | OXYGEN SATURATION: 98 % | TEMPERATURE: 98 F | DIASTOLIC BLOOD PRESSURE: 54 MMHG | WEIGHT: 134 LBS | RESPIRATION RATE: 16 BRPM | HEIGHT: 64 IN | HEART RATE: 80 BPM | SYSTOLIC BLOOD PRESSURE: 96 MMHG

## 2022-05-28 DIAGNOSIS — M79.89 LEG SWELLING: ICD-10-CM

## 2022-05-28 DIAGNOSIS — I50.23 ACUTE ON CHRONIC SYSTOLIC CONGESTIVE HEART FAILURE: Primary | ICD-10-CM

## 2022-05-28 DIAGNOSIS — R18.8 OTHER ASCITES: ICD-10-CM

## 2022-05-28 LAB
ALBUMIN SERPL BCP-MCNC: 2 G/DL (ref 3.5–5.2)
ALP SERPL-CCNC: 90 U/L (ref 55–135)
ALT SERPL W/O P-5'-P-CCNC: 47 U/L (ref 10–44)
ANION GAP SERPL CALC-SCNC: 8 MMOL/L (ref 8–16)
AST SERPL-CCNC: 70 U/L (ref 10–40)
BASOPHILS # BLD AUTO: 0.01 K/UL (ref 0–0.2)
BASOPHILS NFR BLD: 0.1 % (ref 0–1.9)
BILIRUB SERPL-MCNC: 2 MG/DL (ref 0.1–1)
BNP SERPL-MCNC: 373 PG/ML (ref 0–99)
BUN SERPL-MCNC: 22 MG/DL (ref 8–23)
CALCIUM SERPL-MCNC: 7.8 MG/DL (ref 8.7–10.5)
CHLORIDE SERPL-SCNC: 98 MMOL/L (ref 95–110)
CO2 SERPL-SCNC: 19 MMOL/L (ref 23–29)
CREAT SERPL-MCNC: 1.5 MG/DL (ref 0.5–1.4)
DIFFERENTIAL METHOD: ABNORMAL
EOSINOPHIL # BLD AUTO: 0 K/UL (ref 0–0.5)
EOSINOPHIL NFR BLD: 0.3 % (ref 0–8)
ERYTHROCYTE [DISTWIDTH] IN BLOOD BY AUTOMATED COUNT: 17.5 % (ref 11.5–14.5)
EST. GFR  (AFRICAN AMERICAN): 55.7 ML/MIN/1.73 M^2
EST. GFR  (NON AFRICAN AMERICAN): 48.2 ML/MIN/1.73 M^2
GLUCOSE SERPL-MCNC: 105 MG/DL (ref 70–110)
HCT VFR BLD AUTO: 25 % (ref 40–54)
HGB BLD-MCNC: 8.5 G/DL (ref 14–18)
IMM GRANULOCYTES # BLD AUTO: 0.05 K/UL (ref 0–0.04)
IMM GRANULOCYTES NFR BLD AUTO: 0.4 % (ref 0–0.5)
LYMPHOCYTES # BLD AUTO: 1 K/UL (ref 1–4.8)
LYMPHOCYTES NFR BLD: 7.8 % (ref 18–48)
MCH RBC QN AUTO: 34.4 PG (ref 27–31)
MCHC RBC AUTO-ENTMCNC: 34 G/DL (ref 32–36)
MCV RBC AUTO: 101 FL (ref 82–98)
MONOCYTES # BLD AUTO: 0.9 K/UL (ref 0.3–1)
MONOCYTES NFR BLD: 7 % (ref 4–15)
NEUTROPHILS # BLD AUTO: 10.8 K/UL (ref 1.8–7.7)
NEUTROPHILS NFR BLD: 84.4 % (ref 38–73)
NRBC BLD-RTO: 0 /100 WBC
PLATELET # BLD AUTO: 100 K/UL (ref 150–450)
PMV BLD AUTO: 12.3 FL (ref 9.2–12.9)
POTASSIUM SERPL-SCNC: 4.4 MMOL/L (ref 3.5–5.1)
PROT SERPL-MCNC: 5.2 G/DL (ref 6–8.4)
RBC # BLD AUTO: 2.47 M/UL (ref 4.6–6.2)
SODIUM SERPL-SCNC: 125 MMOL/L (ref 136–145)
TROPONIN I SERPL DL<=0.01 NG/ML-MCNC: 0.03 NG/ML (ref 0–0.03)
WBC # BLD AUTO: 12.78 K/UL (ref 3.9–12.7)

## 2022-05-28 PROCEDURE — 99285 EMERGENCY DEPT VISIT HI MDM: CPT | Mod: 25

## 2022-05-28 PROCEDURE — 93010 ELECTROCARDIOGRAM REPORT: CPT | Mod: ,,, | Performed by: INTERNAL MEDICINE

## 2022-05-28 PROCEDURE — 80053 COMPREHEN METABOLIC PANEL: CPT | Performed by: EMERGENCY MEDICINE

## 2022-05-28 PROCEDURE — 63600175 PHARM REV CODE 636 W HCPCS: Performed by: EMERGENCY MEDICINE

## 2022-05-28 PROCEDURE — 85025 COMPLETE CBC W/AUTO DIFF WBC: CPT | Performed by: EMERGENCY MEDICINE

## 2022-05-28 PROCEDURE — 99285 EMERGENCY DEPT VISIT HI MDM: CPT | Mod: ,,, | Performed by: EMERGENCY MEDICINE

## 2022-05-28 PROCEDURE — 99285 PR EMERGENCY DEPT VISIT,LEVEL V: ICD-10-PCS | Mod: ,,, | Performed by: EMERGENCY MEDICINE

## 2022-05-28 PROCEDURE — 96374 THER/PROPH/DIAG INJ IV PUSH: CPT

## 2022-05-28 PROCEDURE — 84484 ASSAY OF TROPONIN QUANT: CPT | Performed by: EMERGENCY MEDICINE

## 2022-05-28 PROCEDURE — 93005 ELECTROCARDIOGRAM TRACING: CPT

## 2022-05-28 PROCEDURE — 93010 EKG 12-LEAD: ICD-10-PCS | Mod: ,,, | Performed by: INTERNAL MEDICINE

## 2022-05-28 PROCEDURE — 83880 ASSAY OF NATRIURETIC PEPTIDE: CPT | Performed by: EMERGENCY MEDICINE

## 2022-05-28 RX ORDER — FUROSEMIDE 10 MG/ML
80 INJECTION INTRAMUSCULAR; INTRAVENOUS
Status: COMPLETED | OUTPATIENT
Start: 2022-05-28 | End: 2022-05-28

## 2022-05-28 RX ADMIN — FUROSEMIDE 80 MG: 10 INJECTION, SOLUTION INTRAMUSCULAR; INTRAVENOUS at 11:05

## 2022-05-28 NOTE — TELEPHONE ENCOUNTER
Spoke with patient states he can't remember if he was told to start lasix.  Patient was recently in the hospital x 3 days.  He was not given lasix while inpatient.  Patient was not discharged with lasix.  patient then states he was off the lasix for a long time  His PCP (Dr. Parish Currie) and cardiologist (Dr. Carl) is not with [facility].  Patient has swelling in both legs.  States he has difficulty breathing with exertion.  Advised ER for further evaluation first.  Also advised patient to call his PCP and Cardiologist office today after ER evaluation.   Patient verbalized understanding.     Reason for Disposition   [1] Caller has URGENT medicine question about med that PCP or specialist prescribed AND [2] triager unable to answer question   [1] Difficulty breathing with exertion (e.g., walking) AND [2] new-onset or worsening    Additional Information   Negative: SEVERE difficulty breathing (e.g., struggling for each breath, speaks in single words)   Negative: Looks like a broken bone or dislocated joint (e.g., crooked or deformed)   Negative: Sounds like a life-threatening emergency to the triager   Negative: Difficulty breathing at rest   Negative: Entire foot is cool or blue in comparison to other side   Negative: [1] Can't walk or can barely walk AND [2] new-onset    Protocols used: MEDICATION QUESTION CALL-A-AH, LEG SWELLING AND EDEMA-A-AH

## 2022-05-29 ENCOUNTER — NURSE TRIAGE (OUTPATIENT)
Dept: ADMINISTRATIVE | Facility: CLINIC | Age: 67
End: 2022-05-29
Payer: COMMERCIAL

## 2022-05-29 NOTE — TELEPHONE ENCOUNTER
Pt calling to ask if labs for this Monday are fasting. Advised labs are on 6/7.    Reason for Disposition   Question about upcoming scheduled test, no triage required and triager able to answer question    Protocols used: INFORMATION ONLY CALL - NO TRIAGE-A-

## 2022-05-29 NOTE — ED TRIAGE NOTES
John Javier , an 66 y.o. male presents to the ED from home with cousin.  Pt presents bilateral lower extremity edema that started earlier this week and worsened this morning with pain at a 7/10.  Hx of CHF and kidney disease.      Chief Complaint   Patient presents with    Leg Swelling     Review of patient's allergies indicates:   Allergen Reactions    Penicillins Anaphylaxis     Pt stated his mother said it will kill him- always inform to take ampicillin    Penicillin     Sitagliptin Other (See Comments)     pancreatitis     Past Medical History:   Diagnosis Date    Anticoagulant long-term use     CHF (congestive heart failure)     Chronic combined systolic and diastolic heart failure 03/01/2020    Coronary artery disease     Diabetes mellitus     Digestive disorder     Diverticulitis     Encounter for blood transfusion     Hypertension     Renal disorder     Sleep apnea     Suspected Covid-19 Virus Infection; test negative 03/14/2020    Unspecified cirrhosis of liver

## 2022-05-30 ENCOUNTER — HOSPITAL ENCOUNTER (OUTPATIENT)
Facility: HOSPITAL | Age: 67
LOS: 1 days | Discharge: HOME HEALTH | End: 2022-06-03
Attending: INTERNAL MEDICINE | Admitting: INTERNAL MEDICINE
Payer: COMMERCIAL

## 2022-05-30 ENCOUNTER — TELEPHONE (OUTPATIENT)
Dept: TRANSPLANT | Facility: CLINIC | Age: 67
End: 2022-05-30

## 2022-05-30 ENCOUNTER — TELEPHONE (OUTPATIENT)
Dept: TRANSPLANT | Facility: CLINIC | Age: 67
End: 2022-05-30
Payer: COMMERCIAL

## 2022-05-30 DIAGNOSIS — I50.43 ACUTE ON CHRONIC COMBINED SYSTOLIC AND DIASTOLIC CHF (CONGESTIVE HEART FAILURE): ICD-10-CM

## 2022-05-30 DIAGNOSIS — I42.0 DILATED CARDIOMYOPATHY: ICD-10-CM

## 2022-05-30 DIAGNOSIS — I47.20 VT (VENTRICULAR TACHYCARDIA): ICD-10-CM

## 2022-05-30 DIAGNOSIS — N18.31 STAGE 3A CHRONIC KIDNEY DISEASE: ICD-10-CM

## 2022-05-30 DIAGNOSIS — R18.8 OTHER ASCITES: ICD-10-CM

## 2022-05-30 DIAGNOSIS — I48.0 PAF (PAROXYSMAL ATRIAL FIBRILLATION): ICD-10-CM

## 2022-05-30 DIAGNOSIS — K74.60 HEPATIC CIRRHOSIS, UNSPECIFIED HEPATIC CIRRHOSIS TYPE, UNSPECIFIED WHETHER ASCITES PRESENT: ICD-10-CM

## 2022-05-30 DIAGNOSIS — I50.43 ACUTE ON CHRONIC COMBINED SYSTOLIC AND DIASTOLIC CONGESTIVE HEART FAILURE, NYHA CLASS 4: ICD-10-CM

## 2022-05-30 DIAGNOSIS — I50.43 ACUTE ON CHRONIC COMBINED SYSTOLIC AND DIASTOLIC HEART FAILURE, NYHA CLASS 4: ICD-10-CM

## 2022-05-30 DIAGNOSIS — I50.22 CHRONIC SYSTOLIC HEART FAILURE: ICD-10-CM

## 2022-05-30 DIAGNOSIS — I50.23 ACUTE ON CHRONIC SYSTOLIC CONGESTIVE HEART FAILURE: Primary | ICD-10-CM

## 2022-05-30 DIAGNOSIS — Z51.5 PALLIATIVE CARE ENCOUNTER: ICD-10-CM

## 2022-05-30 LAB
ABO + RH BLD: NORMAL
ALBUMIN SERPL BCP-MCNC: 2.1 G/DL (ref 3.5–5.2)
ALP SERPL-CCNC: 102 U/L (ref 55–135)
ALT SERPL W/O P-5'-P-CCNC: 51 U/L (ref 10–44)
ANION GAP SERPL CALC-SCNC: 11 MMOL/L (ref 8–16)
ASCENDING AORTA: 3.11 CM
AST SERPL-CCNC: 84 U/L (ref 10–40)
AV INDEX (PROSTH): 0.6
AV MEAN GRADIENT: 8 MMHG
AV PEAK GRADIENT: 11 MMHG
AV VALVE AREA: 1.89 CM2
AV VELOCITY RATIO: 0.72
BASOPHILS # BLD AUTO: 0 K/UL (ref 0–0.2)
BASOPHILS NFR BLD: 0 % (ref 0–1.9)
BILIRUB SERPL-MCNC: 2 MG/DL (ref 0.1–1)
BLD GP AB SCN CELLS X3 SERPL QL: NORMAL
BNP SERPL-MCNC: 369 PG/ML (ref 0–99)
BSA FOR ECHO PROCEDURE: 1.65 M2
BUN SERPL-MCNC: 25 MG/DL (ref 8–23)
CALCIUM SERPL-MCNC: 7.8 MG/DL (ref 8.7–10.5)
CHLORIDE SERPL-SCNC: 98 MMOL/L (ref 95–110)
CO2 SERPL-SCNC: 19 MMOL/L (ref 23–29)
CREAT SERPL-MCNC: 1.8 MG/DL (ref 0.5–1.4)
CV ECHO LV RWT: 0.31 CM
DIFFERENTIAL METHOD: ABNORMAL
DOP CALC AO PEAK VEL: 1.67 M/S
DOP CALC AO VTI: 36.61 CM
DOP CALC LVOT AREA: 3.1 CM2
DOP CALC LVOT DIAMETER: 2 CM
DOP CALC LVOT PEAK VEL: 1.2 M/S
DOP CALC LVOT STROKE VOLUME: 69.36 CM3
DOP CALCLVOT PEAK VEL VTI: 22.09 CM
ECHO LV POSTERIOR WALL: 0.78 CM (ref 0.6–1.1)
EJECTION FRACTION: 25 %
EOSINOPHIL # BLD AUTO: 0.1 K/UL (ref 0–0.5)
EOSINOPHIL NFR BLD: 0.5 % (ref 0–8)
ERYTHROCYTE [DISTWIDTH] IN BLOOD BY AUTOMATED COUNT: 15.6 % (ref 11.5–14.5)
EST. GFR  (AFRICAN AMERICAN): 44.7 ML/MIN/1.73 M^2
EST. GFR  (NON AFRICAN AMERICAN): 38.6 ML/MIN/1.73 M^2
FRACTIONAL SHORTENING: 4 % (ref 28–44)
GLUCOSE SERPL-MCNC: 142 MG/DL (ref 70–110)
HCT VFR BLD AUTO: 22.2 % (ref 40–54)
HGB BLD-MCNC: 7.8 G/DL (ref 14–18)
IMM GRANULOCYTES # BLD AUTO: 0.05 K/UL (ref 0–0.04)
IMM GRANULOCYTES NFR BLD AUTO: 0.5 % (ref 0–0.5)
INR PPP: 1.6 (ref 0.8–1.2)
INTERVENTRICULAR SEPTUM: 0.78 CM (ref 0.6–1.1)
LA MAJOR: 5.77 CM
LA MINOR: 5.38 CM
LA WIDTH: 3.85 CM
LEFT ATRIUM SIZE: 3.21 CM
LEFT ATRIUM VOLUME INDEX: 35.4 ML/M2
LEFT ATRIUM VOLUME: 58.49 CM3
LEFT INTERNAL DIMENSION IN SYSTOLE: 4.8 CM (ref 2.1–4)
LEFT VENTRICLE DIASTOLIC VOLUME INDEX: 89.7 ML/M2
LEFT VENTRICLE DIASTOLIC VOLUME: 148 ML
LEFT VENTRICLE MASS INDEX: 80 G/M2
LEFT VENTRICLE SYSTOLIC VOLUME INDEX: 65.5 ML/M2
LEFT VENTRICLE SYSTOLIC VOLUME: 108 ML
LEFT VENTRICULAR INTERNAL DIMENSION IN DIASTOLE: 5 CM (ref 3.5–6)
LEFT VENTRICULAR MASS: 131.47 G
LYMPHOCYTES # BLD AUTO: 1 K/UL (ref 1–4.8)
LYMPHOCYTES NFR BLD: 9.8 % (ref 18–48)
MAGNESIUM SERPL-MCNC: 1.7 MG/DL (ref 1.6–2.6)
MCH RBC QN AUTO: 34.4 PG (ref 27–31)
MCHC RBC AUTO-ENTMCNC: 35.1 G/DL (ref 32–36)
MCV RBC AUTO: 98 FL (ref 82–98)
MONOCYTES # BLD AUTO: 1 K/UL (ref 0.3–1)
MONOCYTES NFR BLD: 9.9 % (ref 4–15)
NEUTROPHILS # BLD AUTO: 8.3 K/UL (ref 1.8–7.7)
NEUTROPHILS NFR BLD: 79.3 % (ref 38–73)
NRBC BLD-RTO: 0 /100 WBC
PHOSPHATE SERPL-MCNC: 2.5 MG/DL (ref 2.7–4.5)
PLATELET # BLD AUTO: 185 K/UL (ref 150–450)
PMV BLD AUTO: 11.6 FL (ref 9.2–12.9)
POTASSIUM SERPL-SCNC: 3.5 MMOL/L (ref 3.5–5.1)
PROT SERPL-MCNC: 5.5 G/DL (ref 6–8.4)
PROTHROMBIN TIME: 16.1 SEC (ref 9–12.5)
RA MAJOR: 4.64 CM
RA PRESSURE: 3 MMHG
RA WIDTH: 3.33 CM
RBC # BLD AUTO: 2.27 M/UL (ref 4.6–6.2)
RIGHT VENTRICULAR END-DIASTOLIC DIMENSION: 3.27 CM
SINUS: 2.73 CM
SODIUM SERPL-SCNC: 128 MMOL/L (ref 136–145)
STJ: 3.26 CM
T4 FREE SERPL-MCNC: 1.16 NG/DL (ref 0.71–1.51)
TDI LATERAL: 0.07 M/S
TDI SEPTAL: 0.06 M/S
TDI: 0.07 M/S
TRICUSPID ANNULAR PLANE SYSTOLIC EXCURSION: 2.7 CM
TSH SERPL DL<=0.005 MIU/L-ACNC: 1.03 UIU/ML (ref 0.4–4)
WBC # BLD AUTO: 10.43 K/UL (ref 3.9–12.7)

## 2022-05-30 PROCEDURE — 25000003 PHARM REV CODE 250: Performed by: NURSE PRACTITIONER

## 2022-05-30 PROCEDURE — 83880 ASSAY OF NATRIURETIC PEPTIDE: CPT | Performed by: NURSE PRACTITIONER

## 2022-05-30 PROCEDURE — G0378 HOSPITAL OBSERVATION PER HR: HCPCS

## 2022-05-30 PROCEDURE — 93010 ELECTROCARDIOGRAM REPORT: CPT | Mod: ,,, | Performed by: INTERNAL MEDICINE

## 2022-05-30 PROCEDURE — U0003 INFECTIOUS AGENT DETECTION BY NUCLEIC ACID (DNA OR RNA); SEVERE ACUTE RESPIRATORY SYNDROME CORONAVIRUS 2 (SARS-COV-2) (CORONAVIRUS DISEASE [COVID-19]), AMPLIFIED PROBE TECHNIQUE, MAKING USE OF HIGH THROUGHPUT TECHNOLOGIES AS DESCRIBED BY CMS-2020-01-R: HCPCS | Performed by: NURSE PRACTITIONER

## 2022-05-30 PROCEDURE — 63600175 PHARM REV CODE 636 W HCPCS: Performed by: NURSE PRACTITIONER

## 2022-05-30 PROCEDURE — 86901 BLOOD TYPING SEROLOGIC RH(D): CPT | Performed by: NURSE PRACTITIONER

## 2022-05-30 PROCEDURE — 96374 THER/PROPH/DIAG INJ IV PUSH: CPT | Mod: NTX | Performed by: INTERNAL MEDICINE

## 2022-05-30 PROCEDURE — 80053 COMPREHEN METABOLIC PANEL: CPT | Performed by: NURSE PRACTITIONER

## 2022-05-30 PROCEDURE — 93010 EKG 12-LEAD: ICD-10-PCS | Mod: ,,, | Performed by: INTERNAL MEDICINE

## 2022-05-30 PROCEDURE — 84100 ASSAY OF PHOSPHORUS: CPT | Performed by: NURSE PRACTITIONER

## 2022-05-30 PROCEDURE — 96372 THER/PROPH/DIAG INJ SC/IM: CPT | Performed by: NURSE PRACTITIONER

## 2022-05-30 PROCEDURE — 84443 ASSAY THYROID STIM HORMONE: CPT | Performed by: NURSE PRACTITIONER

## 2022-05-30 PROCEDURE — 84439 ASSAY OF FREE THYROXINE: CPT | Mod: NTX | Performed by: NURSE PRACTITIONER

## 2022-05-30 PROCEDURE — 85025 COMPLETE CBC W/AUTO DIFF WBC: CPT | Performed by: NURSE PRACTITIONER

## 2022-05-30 PROCEDURE — 93005 ELECTROCARDIOGRAM TRACING: CPT

## 2022-05-30 PROCEDURE — U0005 INFEC AGEN DETEC AMPLI PROBE: HCPCS | Performed by: NURSE PRACTITIONER

## 2022-05-30 PROCEDURE — 83735 ASSAY OF MAGNESIUM: CPT | Performed by: NURSE PRACTITIONER

## 2022-05-30 PROCEDURE — A4216 STERILE WATER/SALINE, 10 ML: HCPCS | Performed by: NURSE PRACTITIONER

## 2022-05-30 PROCEDURE — 94761 N-INVAS EAR/PLS OXIMETRY MLT: CPT | Mod: NTX

## 2022-05-30 PROCEDURE — 85610 PROTHROMBIN TIME: CPT | Performed by: NURSE PRACTITIONER

## 2022-05-30 RX ORDER — ATORVASTATIN CALCIUM 20 MG/1
20 TABLET, FILM COATED ORAL NIGHTLY
Status: DISCONTINUED | OUTPATIENT
Start: 2022-05-30 | End: 2022-06-03 | Stop reason: HOSPADM

## 2022-05-30 RX ORDER — PANTOPRAZOLE SODIUM 40 MG/1
40 TABLET, DELAYED RELEASE ORAL DAILY
Status: DISCONTINUED | OUTPATIENT
Start: 2022-05-31 | End: 2022-06-03 | Stop reason: HOSPADM

## 2022-05-30 RX ORDER — DOCUSATE SODIUM 100 MG/1
100 CAPSULE, LIQUID FILLED ORAL 2 TIMES DAILY
Status: DISCONTINUED | OUTPATIENT
Start: 2022-05-30 | End: 2022-06-03 | Stop reason: HOSPADM

## 2022-05-30 RX ORDER — HEPARIN SODIUM 5000 [USP'U]/ML
5000 INJECTION, SOLUTION INTRAVENOUS; SUBCUTANEOUS EVERY 8 HOURS
Status: DISCONTINUED | OUTPATIENT
Start: 2022-05-30 | End: 2022-06-03 | Stop reason: HOSPADM

## 2022-05-30 RX ORDER — FLUTICASONE PROPIONATE 50 MCG
1 SPRAY, SUSPENSION (ML) NASAL DAILY
Status: DISCONTINUED | OUTPATIENT
Start: 2022-05-31 | End: 2022-06-03 | Stop reason: HOSPADM

## 2022-05-30 RX ORDER — SODIUM CHLORIDE 0.9 % (FLUSH) 0.9 %
10 SYRINGE (ML) INJECTION
Status: DISCONTINUED | OUTPATIENT
Start: 2022-05-30 | End: 2022-06-03 | Stop reason: HOSPADM

## 2022-05-30 RX ORDER — FUROSEMIDE 10 MG/ML
20 INJECTION INTRAMUSCULAR; INTRAVENOUS ONCE
Status: COMPLETED | OUTPATIENT
Start: 2022-05-30 | End: 2022-05-30

## 2022-05-30 RX ORDER — AMIODARONE HYDROCHLORIDE 200 MG/1
200 TABLET ORAL DAILY
Status: DISCONTINUED | OUTPATIENT
Start: 2022-05-31 | End: 2022-06-03 | Stop reason: HOSPADM

## 2022-05-30 RX ORDER — LANOLIN ALCOHOL/MO/W.PET/CERES
1 CREAM (GRAM) TOPICAL 2 TIMES DAILY
Refills: 2 | Status: DISCONTINUED | OUTPATIENT
Start: 2022-05-30 | End: 2022-06-03 | Stop reason: HOSPADM

## 2022-05-30 RX ADMIN — FERROUS SULFATE TAB 325 MG (65 MG ELEMENTAL FE) 1 EACH: 325 (65 FE) TAB at 08:05

## 2022-05-30 RX ADMIN — FUROSEMIDE 20 MG: 10 INJECTION, SOLUTION INTRAMUSCULAR; INTRAVENOUS at 06:05

## 2022-05-30 RX ADMIN — HEPARIN SODIUM 5000 UNITS: 5000 INJECTION INTRAVENOUS; SUBCUTANEOUS at 10:05

## 2022-05-30 RX ADMIN — SODIUM CHLORIDE 10 ML: 9 INJECTION, SOLUTION INTRAMUSCULAR; INTRAVENOUS; SUBCUTANEOUS at 06:05

## 2022-05-30 RX ADMIN — ATORVASTATIN CALCIUM 20 MG: 20 TABLET, FILM COATED ORAL at 08:05

## 2022-05-30 RX ADMIN — DOCUSATE SODIUM 100 MG: 100 CAPSULE ORAL at 08:05

## 2022-05-30 NOTE — TELEPHONE ENCOUNTER
[id number] am:  Returned call to pt.   explained to pt the admit process as was previous pllanned for this am and provided direction to pt of how to get to the admit office and if needed  for a wheelchair  Pt stayed in the [facility] yesterday, but said they were all asked to leave the building this am due to an emergency, so he is in his vehicle in the parking garage   Pt said he has someone with him , and a wheelchair and they will take him to the admit office at 9:00 am.    Informed Dr. emery 0825 am the reservation has been called in and pt will be in admit 0900 am   Asked Dr. Emery if he would let Dr. Cavanaugh know if not already done.    Noted pt went to the ED last night    Asked pt about this and said he was having a lot of swelling to his lower legs and ankles an a nurse he called yesterday advised he go to the nearest ED.  Pt is not in any distress at this time speaking with me   No sob or other.     0825 am:  CMP admission questions completed.       ----- Message from Codementor sent at 5/30/2022  6:52 AM CDT -----  Regarding: Pt called to speak to the nurse regarding his scheduled test and procedure appt which pt states is supposed to be today and pt would like a call back this morning asap  Patient Advice:    Pt called to speak to the nurse regarding his scheduled test and procedure appt which pt states is supposed to be today and pt would like a call back this morning asap. Pt states that he is here in [place] in the [facility] Room 564. Pt states that he received a call back from the nurse stating that his appts are going to be scheduled today because the doctor is going on vacation after today.    Pt can be reached at [phone]

## 2022-05-30 NOTE — PLAN OF CARE
- Admit for heart transplant/LVAD workup  - Per patient instructed to hold Elaquis since yesterday  - Significant ascites noted, patient report some nausea with eating & drinking as ascites volume increases  - 2x Falls at home week of 4/30.  Keep bed low & locked, call light in reach, nonslip socks in use, calls appropriately for assistance.  Reviewed use of call light & bed alarm for safety, call for assist OOB every time  - RHC ordered  - Telemetry monitoring started.  NSR on monitor at this time  - Echo complete, awaiting results  - EKG complete, NSR  - Cardiac diet started with 1500 mL FR  - Lasix 20 mg IVP to be administered after PIV obtained  - CXR complete  - CT head, abdomen & pelvis, awaiting transport    Problem: Adult Inpatient Plan of Care  Goal: Plan of Care Review  Outcome: Ongoing, Progressing  Goal: Patient-Specific Goal (Individualized)  Outcome: Ongoing, Progressing  Flowsheets (Taken 5/30/2022 1722)  Anxieties, Fears or Concerns: 40 # weight loss, ascites & edema  Individualized Care Needs: explain procedures  Patient-Specific Goals (Include Timeframe): complete heart transplant/LVAD workup  Goal: Absence of Hospital-Acquired Illness or Injury  Outcome: Ongoing, Progressing  Goal: Optimal Comfort and Wellbeing  Outcome: Ongoing, Progressing     Problem: Diabetes Comorbidity  Goal: Blood Glucose Level Within Targeted Range  Outcome: Ongoing, Progressing     Problem: Adjustment to Illness (Heart Failure)  Goal: Optimal Coping  Outcome: Ongoing, Progressing     Problem: Cardiac Output Decreased (Heart Failure)  Goal: Optimal Cardiac Output  Outcome: Ongoing, Progressing     Problem: Dysrhythmia (Heart Failure)  Goal: Stable Heart Rate and Rhythm  Outcome: Ongoing, Progressing     Problem: Fluid Imbalance (Heart Failure)  Goal: Fluid Balance  Outcome: Ongoing, Progressing     Problem: Functional Ability Impaired (Heart Failure)  Goal: Optimal Functional Ability  Outcome: Ongoing, Progressing      Problem: Oral Intake Inadequate (Heart Failure)  Goal: Optimal Nutrition Intake  Outcome: Ongoing, Progressing     Problem: Respiratory Compromise (Heart Failure)  Goal: Effective Oxygenation and Ventilation  Outcome: Ongoing, Progressing     Problem: Sleep Disordered Breathing (Heart Failure)  Goal: Effective Breathing Pattern During Sleep  Outcome: Ongoing, Progressing     Problem: Cardiac Output Decreased  Goal: Effective Cardiac Output  Outcome: Ongoing, Progressing

## 2022-05-30 NOTE — TELEPHONE ENCOUNTER
0825am  Per request by Dr. Darden.   Admit called in to [facility] to arrive in admit 0900 am today

## 2022-05-30 NOTE — ED PROVIDER NOTES
Encounter Date: 5/28/2022       History     Chief Complaint   Patient presents with    Leg Swelling     66-year-old male with heart failure and liver failure presents with increasing abdominal distension and leg swelling.  Symptoms for several days.  He is pending paracentesis on Monday.  No chest pain or shortness of breath.  He is not on Lasix currently.  Patient denies nausea, vomiting, diarrhea, fever, cough, abdominal pain, or dysuria.  A ten point review of systems was completed and is negative except as documented above.  Patient denies any other acute medical complaint.  The patients available PMH, PSH, Social History, medications, allergies, and triage vital signs were reviewed just prior to their medical evaluation.          Review of patient's allergies indicates:   Allergen Reactions    Penicillins Anaphylaxis     Pt stated his mother said it will kill him- always inform to take ampicillin    Penicillin     Sitagliptin Other (See Comments)     pancreatitis     Past Medical History:   Diagnosis Date    Anticoagulant long-term use     CHF (congestive heart failure)     Chronic combined systolic and diastolic heart failure 03/01/2020    Coronary artery disease     Diabetes mellitus     Digestive disorder     Diverticulitis     Encounter for blood transfusion     Hypertension     Renal disorder     Sleep apnea     Suspected Covid-19 Virus Infection; test negative 03/14/2020    Unspecified cirrhosis of liver      Past Surgical History:   Procedure Laterality Date    COLECTOMY      COLONOSCOPY N/A 5/11/2022    Procedure: COLON;  Surgeon: Dk Jacobson MD;  Location: [facility] ENDOSCOPY;  Service: Gastroenterology;  Laterality: N/A;    ESOPHAGOGASTRODUODENOSCOPY N/A 5/10/2022    Procedure: EGD (ESOPHAGOGASTRODUODENOSCOPY);  Surgeon: Dk Jacobson MD;  Location: [facility] ENDOSCOPY;  Service: Gastroenterology;  Laterality: N/A;    INTRALUMINAL GASTROINTESTINAL TRACT  IMAGING VIA CAPSULE N/A 5/11/2022    Procedure: IMAGING PROCEDURE, GI TRACT, INTRALUMINAL, VIA CAPSULE;  Surgeon: Dk Jacobson MD;  Location: [facility] ENDOSCOPY;  Service: Gastroenterology;  Laterality: N/A;  done in recovery    RIGHT HEART CATHETERIZATION Right 10/8/2020    Procedure: INSERTION, CATHETER, RIGHT HEART;  Surgeon: Adilson Darden Jr., MD;  Location: [facility] CATH LAB;  Service: Cardiology;  Laterality: Right;    RIGHT HEART CATHETERIZATION Right 2/3/2021    Procedure: INSERTION, CATHETER, RIGHT HEART;  Surgeon: Adilson Darden Jr., MD;  Location: [facility] CATH LAB;  Service: Cardiology;  Laterality: Right;    RIGHT HEART CATHETERIZATION Right 6/22/2021    Procedure: INSERTION, CATHETER, RIGHT HEART;  Surgeon: Yuki Delgado MD;  Location: [facility] CATH LAB;  Service: Cardiology;  Laterality: Right;     Family History   Problem Relation Age of Onset    Diabetes Mother     Hypertension Mother     Hypertension Father     Heart disease Father      Social History     Tobacco Use    Smoking status: Never Smoker    Smokeless tobacco: Never Used   Substance Use Topics    Alcohol use: Never    Drug use: Never     Review of Systems   Constitutional: Negative for fever.   HENT: Negative for sore throat.    Eyes: Negative for visual disturbance.   Respiratory: Negative for cough and shortness of breath.    Cardiovascular: Positive for leg swelling. Negative for chest pain.   Gastrointestinal: Positive for abdominal distention. Negative for abdominal pain, diarrhea, nausea and vomiting.   Genitourinary: Negative for dysuria.   Musculoskeletal: Negative for neck pain.   Skin: Negative for rash and wound.   Allergic/Immunologic: Negative for immunocompromised state.   Neurological: Negative for syncope.   Psychiatric/Behavioral: Negative for confusion.       Physical Exam     Initial Vitals [05/28/22 1832]   BP Pulse Resp Temp SpO2   (!) 111/57 88 15 98.1 °F (36.7 °C) 98 %      MAP       --          Physical Exam    Nursing note and vitals reviewed.  Constitutional: He appears well-developed and well-nourished. He is not diaphoretic. No distress.   HENT:   Head: Normocephalic and atraumatic.   Nose: Nose normal.   Eyes: Conjunctivae are normal. Right eye exhibits no discharge. Left eye exhibits no discharge.   Neck: Neck supple.   Normal range of motion.  Cardiovascular: Normal rate, regular rhythm and normal heart sounds. Exam reveals no gallop and no friction rub.    No murmur heard.  Pulmonary/Chest: Breath sounds normal. No respiratory distress. He has no wheezes. He has no rhonchi. He has no rales.   Abdominal: Abdomen is soft. He exhibits distension. There is no abdominal tenderness. There is no rebound and no guarding.   Musculoskeletal:         General: Edema present. No tenderness. Normal range of motion.      Cervical back: Normal range of motion and neck supple.      Comments: 1+ BLE edema     Neurological: He is alert and oriented to person, place, and time. He has normal strength. GCS score is 15. GCS eye subscore is 4. GCS verbal subscore is 5. GCS motor subscore is 6.   Skin: Skin is warm and dry. No rash noted. No erythema.   Psychiatric: He has a normal mood and affect. His behavior is normal. Judgment and thought content normal.         ED Course   Procedures  Labs Reviewed   CBC W/ AUTO DIFFERENTIAL - Abnormal; Notable for the following components:       Result Value    WBC 12.78 (*)     RBC 2.47 (*)     Hemoglobin 8.5 (*)     Hematocrit 25.0 (*)      (*)     MCH 34.4 (*)     RDW 17.5 (*)     Platelets 100 (*)     Gran # (ANC) 10.8 (*)     Immature Grans (Abs) 0.05 (*)     Gran % 84.4 (*)     Lymph % 7.8 (*)     All other components within normal limits   TROPONIN I - Abnormal; Notable for the following components:    Troponin I 0.031 (*)     All other components within normal limits   B-TYPE NATRIURETIC PEPTIDE - Abnormal; Notable for the following components:     (*)      "All other components within normal limits   COMPREHENSIVE METABOLIC PANEL - Abnormal; Notable for the following components:    Sodium 125 (*)     CO2 19 (*)     Creatinine 1.5 (*)     Calcium 7.8 (*)     Total Protein 5.2 (*)     Albumin 2.0 (*)     Total Bilirubin 2.0 (*)     AST 70 (*)     ALT 47 (*)     eGFR if  55.7 (*)     eGFR if non  48.2 (*)     All other components within normal limits     EKG Readings: (Independently Interpreted)   See separate note     ECG Results          EKG 12-lead (Final result)  Result time 05/29/22 09:46:36    Final result by Interface, Lab In [facility] (05/29/22 09:46:36)                 Narrative:    Test Reason :     Vent. Rate : 079 BPM     Atrial Rate : 079 BPM     P-R Int : 202 ms          QRS Dur : 154 ms      QT Int : 462 ms       P-R-T Axes : 055 001 088 degrees     QTc Int : 529 ms    Normal sinus rhythm  Left bundle branch block  Abnormal ECG  When compared with ECG of 26-MAY-2022 02:04,  No significant change was found  Confirmed by Ghulam CABRERA MD ([id number]) on 5/29/2022 9:46:31 AM    Referred By: [email or web address]   SELF           Confirmed By:Ghulam CABRERA MD                            Imaging Results          X-Ray Chest AP Portable (Final result)  Result time 05/28/22 20:26:31    Final result by Tomy Gimenez MD (05/28/22 20:26:31)                 Impression:      Bibasilar interstitial and ground-glass opacities with probable small bilateral pleural effusions.  Findings could be related to CHF exacerbation/volume overload or infectious/inflammatory process.  Aeration is similar to slightly worse when compared with 05/26/2022.      Electronically signed by: Tomy Gimenez MD  Date:    05/28/2022  Time:    20:26             Narrative:    EXAMINATION:  XR CHEST AP PORTABLE    CLINICAL HISTORY:  Provided history is "CHF;  ".    TECHNIQUE:  One view of the chest.    COMPARISON:  05/26/2022.    FINDINGS:  Cardiac wires overlie the chest.  Left chest " wall AICD is present with transvenous lead overlying the heart.  Cardiomediastinal silhouette is not enlarged.  There is central vascular congestion with prominent interstitial lung markings in the mid and lower lung zones.  Mild bibasilar ground-glass opacities.  Small bilateral pleural effusions suspected.  No distinct pneumothorax.                                 Medications   furosemide injection 80 mg (80 mg Intravenous Given 5/28/22 5897)     Medical Decision Making:   History:   Old Medical Records: I decided to obtain old medical records.  Independently Interpreted Test(s):   I have ordered and independently interpreted EKG Reading(s) - see prior notes  Clinical Tests:   Lab Tests: Ordered and Reviewed  Radiological Study: Ordered and Reviewed  Medical Tests: Ordered and Reviewed  ED Management:  70-year-old male presents with both abdominal swelling and lower extremity swelling.  Blood pressure soft.  Rest of vitals unremarkable.  Physical exam as above.  Doubt SBP.  Labs are consistent with his chronic illness.  EKG without acute ischemia.  No hypoxia or significant acute CHF exacerbation.  No indication for emergent paracentesis or for admission.  Treated with 1 dose of IV Lasix.  He has follow-up on Monday for paracentesis which is likely to vastly improved his symptoms.  Will discharge home.  Patient will return to ED for worsening symptoms, inability to eat/drink, fever greater than 100.4, or any other concerns.  Did bedside teaching with return precautions.  All questions answered.  The patient acknowledges understanding.  Gave verbal discharge instructions.                      Clinical Impression:   Final diagnoses:  [M79.89] Leg swelling  [I50.23] Acute on chronic systolic congestive heart failure (Primary)  [R18.8] Other ascites          ED Disposition Condition    Discharge Stable        ED Prescriptions     None        Follow-up Information     Follow up With Specialties Details Why Contact Info     Follow up Monday for paracentesis        [facility] - [facility] Dept Emergency Medicine  Return to ED for worsening symptoms, inability to eat/drink, fever greater than 100.4, or any other concerns. [address]  [facility] [id number] [phone]        Level of Complexity:  High, level 5.     Tayo Gonzalez MD  05/29/22 2011

## 2022-05-30 NOTE — INTERVAL H&P NOTE
The patient has been examined and the H&P has been reviewed:    I concur with the findings and no changes have occurred since H&P was written.    Patient admitted with ADHF for gentle diuresis and further eval of possible liver cirrhosis. Plan RHC likely tomorrow. Intolerant of any GDMT 2/2 hypotension (BP currently 100/62). He relates ~ 4 month h/o gradually worsening activity intolerance, now requires a walker, and ~ 40# weight loss. Lives alone, but has plans to move in with his daughter in [place]. Was worked up for advanced options in the past, but declined for both 2/2 medical stability. Social issues may be prohibitive if he continues to live alone.           Active Hospital Problems    Diagnosis  POA    *Acute on chronic combined systolic and diastolic heart failure, NYHA class 4 [I50.43]  Unknown    Stage 3a chronic kidney disease [N18.31]  Yes    Dilated cardiomyopathy out of proportion to CAD [I42.0]  Yes    PAF (paroxysmal atrial fibrillation) [I48.0]  Yes    VT (ventricular tachycardia) [I47.2]  Yes    CAD (coronary artery disease) [I25.10]  Yes     CAD (50-60% LCx, OM disease on [facility] 2/29/20),         Type 2 diabetes mellitus, without long-term current use of insulin [E11.9]  Yes      Resolved Hospital Problems   No resolved problems to display.

## 2022-05-31 PROBLEM — K74.60 CIRRHOSIS: Status: ACTIVE | Noted: 2022-05-31

## 2022-05-31 PROBLEM — R18.8 ASCITES: Status: ACTIVE | Noted: 2022-05-31

## 2022-05-31 LAB
ALBUMIN FLD-MCNC: <0.4 G/DL
ALBUMIN SERPL BCP-MCNC: 2 G/DL (ref 3.5–5.2)
ALP SERPL-CCNC: 100 U/L (ref 55–135)
ALT SERPL W/O P-5'-P-CCNC: 50 U/L (ref 10–44)
ANION GAP SERPL CALC-SCNC: 11 MMOL/L (ref 8–16)
APPEARANCE FLD: CLEAR
AST SERPL-CCNC: 82 U/L (ref 10–40)
BASOPHILS # BLD AUTO: 0.01 K/UL (ref 0–0.2)
BASOPHILS NFR BLD: 0.1 % (ref 0–1.9)
BILIRUB SERPL-MCNC: 1.7 MG/DL (ref 0.1–1)
BODY FLD TYPE: NORMAL
BODY FLUID SOURCE, LDH: NORMAL
BUN SERPL-MCNC: 25 MG/DL (ref 8–23)
CALCIUM SERPL-MCNC: 7.7 MG/DL (ref 8.7–10.5)
CHLORIDE SERPL-SCNC: 97 MMOL/L (ref 95–110)
CO2 SERPL-SCNC: 20 MMOL/L (ref 23–29)
COLOR FLD: YELLOW
CREAT SERPL-MCNC: 1.9 MG/DL (ref 0.5–1.4)
DIFFERENTIAL METHOD: ABNORMAL
EOSINOPHIL # BLD AUTO: 0.1 K/UL (ref 0–0.5)
EOSINOPHIL NFR BLD: 0.5 % (ref 0–8)
ERYTHROCYTE [DISTWIDTH] IN BLOOD BY AUTOMATED COUNT: 16 % (ref 11.5–14.5)
EST. GFR  (AFRICAN AMERICAN): 41.8 ML/MIN/1.73 M^2
EST. GFR  (NON AFRICAN AMERICAN): 36.2 ML/MIN/1.73 M^2
GLUCOSE SERPL-MCNC: 155 MG/DL (ref 70–110)
GRAM STN SPEC: NORMAL
GRAM STN SPEC: NORMAL
HCT VFR BLD AUTO: 23 % (ref 40–54)
HGB BLD-MCNC: 7.8 G/DL (ref 14–18)
IMM GRANULOCYTES # BLD AUTO: 0.05 K/UL (ref 0–0.04)
IMM GRANULOCYTES NFR BLD AUTO: 0.5 % (ref 0–0.5)
LDH FLD L TO P-CCNC: 37 U/L
LYMPHOCYTES # BLD AUTO: 1.1 K/UL (ref 1–4.8)
LYMPHOCYTES NFR BLD: 10.1 % (ref 18–48)
LYMPHOCYTES NFR FLD MANUAL: 45 %
MAGNESIUM SERPL-MCNC: 1.7 MG/DL (ref 1.6–2.6)
MCH RBC QN AUTO: 34.4 PG (ref 27–31)
MCHC RBC AUTO-ENTMCNC: 33.9 G/DL (ref 32–36)
MCV RBC AUTO: 101 FL (ref 82–98)
MESOTHL CELL NFR FLD MANUAL: 12 %
MONOCYTES # BLD AUTO: 0.9 K/UL (ref 0.3–1)
MONOCYTES NFR BLD: 8.7 % (ref 4–15)
MONOS+MACROS NFR FLD MANUAL: 17 %
NEUTROPHILS # BLD AUTO: 8.7 K/UL (ref 1.8–7.7)
NEUTROPHILS NFR BLD: 80.1 % (ref 38–73)
NEUTROPHILS NFR FLD MANUAL: 26 %
NRBC BLD-RTO: 0 /100 WBC
PHOSPHATE SERPL-MCNC: 2.3 MG/DL (ref 2.7–4.5)
PLATELET # BLD AUTO: 163 K/UL (ref 150–450)
PMV BLD AUTO: 12 FL (ref 9.2–12.9)
POCT GLUCOSE: 191 MG/DL (ref 70–110)
POTASSIUM SERPL-SCNC: 3.7 MMOL/L (ref 3.5–5.1)
PROT FLD-MCNC: <1 G/DL
PROT SERPL-MCNC: 5.3 G/DL (ref 6–8.4)
RBC # BLD AUTO: 2.27 M/UL (ref 4.6–6.2)
SARS-COV-2 RNA RESP QL NAA+PROBE: NOT DETECTED
SARS-COV-2- CYCLE NUMBER: NORMAL
SODIUM SERPL-SCNC: 128 MMOL/L (ref 136–145)
SPECIMEN SOURCE: NORMAL
SPECIMEN SOURCE: NORMAL
WBC # BLD AUTO: 10.82 K/UL (ref 3.9–12.7)
WBC # FLD: 31 /CU MM

## 2022-05-31 PROCEDURE — 87205 SMEAR GRAM STAIN: CPT | Performed by: NURSE PRACTITIONER

## 2022-05-31 PROCEDURE — 97165 OT EVAL LOW COMPLEX 30 MIN: CPT

## 2022-05-31 PROCEDURE — 87070 CULTURE OTHR SPECIMN AEROBIC: CPT | Performed by: NURSE PRACTITIONER

## 2022-05-31 PROCEDURE — 97530 THERAPEUTIC ACTIVITIES: CPT | Mod: NTX

## 2022-05-31 PROCEDURE — 87075 CULTR BACTERIA EXCEPT BLOOD: CPT | Performed by: NURSE PRACTITIONER

## 2022-05-31 PROCEDURE — 99214 PR OFFICE/OUTPT VISIT, EST, LEVL IV, 30-39 MIN: ICD-10-PCS | Mod: ,,, | Performed by: NURSE PRACTITIONER

## 2022-05-31 PROCEDURE — 83615 LACTATE (LD) (LDH) ENZYME: CPT | Mod: NTX | Performed by: NURSE PRACTITIONER

## 2022-05-31 PROCEDURE — 80053 COMPREHEN METABOLIC PANEL: CPT | Performed by: NURSE PRACTITIONER

## 2022-05-31 PROCEDURE — 88112 PR  CYTOPATH, CELL ENHANCE TECH: ICD-10-PCS | Mod: 26,,, | Performed by: PATHOLOGY

## 2022-05-31 PROCEDURE — 84100 ASSAY OF PHOSPHORUS: CPT | Performed by: NURSE PRACTITIONER

## 2022-05-31 PROCEDURE — 63600175 PHARM REV CODE 636 W HCPCS: Performed by: NURSE PRACTITIONER

## 2022-05-31 PROCEDURE — 82042 OTHER SOURCE ALBUMIN QUAN EA: CPT | Performed by: NURSE PRACTITIONER

## 2022-05-31 PROCEDURE — 96372 THER/PROPH/DIAG INJ SC/IM: CPT | Mod: NTX | Performed by: NURSE PRACTITIONER

## 2022-05-31 PROCEDURE — 97535 SELF CARE MNGMENT TRAINING: CPT

## 2022-05-31 PROCEDURE — 25000003 PHARM REV CODE 250: Performed by: NURSE PRACTITIONER

## 2022-05-31 PROCEDURE — G0378 HOSPITAL OBSERVATION PER HR: HCPCS

## 2022-05-31 PROCEDURE — 97161 PT EVAL LOW COMPLEX 20 MIN: CPT

## 2022-05-31 PROCEDURE — 25000003 PHARM REV CODE 250: Performed by: INTERNAL MEDICINE

## 2022-05-31 PROCEDURE — 89051 BODY FLUID CELL COUNT: CPT | Performed by: NURSE PRACTITIONER

## 2022-05-31 PROCEDURE — 25000003 PHARM REV CODE 250: Performed by: PHYSICIAN ASSISTANT

## 2022-05-31 PROCEDURE — 84157 ASSAY OF PROTEIN OTHER: CPT | Mod: NTX | Performed by: NURSE PRACTITIONER

## 2022-05-31 PROCEDURE — 85025 COMPLETE CBC W/AUTO DIFF WBC: CPT | Performed by: NURSE PRACTITIONER

## 2022-05-31 PROCEDURE — 88112 CYTOPATH CELL ENHANCE TECH: CPT | Mod: 26,,, | Performed by: PATHOLOGY

## 2022-05-31 PROCEDURE — 25000242 PHARM REV CODE 250 ALT 637 W/ HCPCS: Performed by: NURSE PRACTITIONER

## 2022-05-31 PROCEDURE — 99214 OFFICE O/P EST MOD 30 MIN: CPT | Mod: ,,, | Performed by: NURSE PRACTITIONER

## 2022-05-31 PROCEDURE — 88112 CYTOPATH CELL ENHANCE TECH: CPT | Performed by: PATHOLOGY

## 2022-05-31 PROCEDURE — 83735 ASSAY OF MAGNESIUM: CPT | Performed by: NURSE PRACTITIONER

## 2022-05-31 PROCEDURE — 36415 COLL VENOUS BLD VENIPUNCTURE: CPT | Performed by: NURSE PRACTITIONER

## 2022-05-31 RX ORDER — CALCIUM CARBONATE 200(500)MG
500 TABLET,CHEWABLE ORAL 3 TIMES DAILY PRN
Status: DISCONTINUED | OUTPATIENT
Start: 2022-05-31 | End: 2022-06-03 | Stop reason: HOSPADM

## 2022-05-31 RX ORDER — IBUPROFEN 200 MG
16 TABLET ORAL
Status: DISCONTINUED | OUTPATIENT
Start: 2022-05-31 | End: 2022-06-01

## 2022-05-31 RX ORDER — GLUCAGON 1 MG
1 KIT INJECTION
Status: DISCONTINUED | OUTPATIENT
Start: 2022-05-31 | End: 2022-06-01

## 2022-05-31 RX ORDER — INSULIN ASPART 100 [IU]/ML
0-5 INJECTION, SOLUTION INTRAVENOUS; SUBCUTANEOUS
Status: DISCONTINUED | OUTPATIENT
Start: 2022-05-31 | End: 2022-06-01

## 2022-05-31 RX ORDER — IBUPROFEN 200 MG
24 TABLET ORAL
Status: DISCONTINUED | OUTPATIENT
Start: 2022-05-31 | End: 2022-06-01

## 2022-05-31 RX ORDER — LIDOCAINE HYDROCHLORIDE 10 MG/ML
INJECTION INFILTRATION; PERINEURAL CODE/TRAUMA/SEDATION MEDICATION
Status: COMPLETED | OUTPATIENT
Start: 2022-05-31 | End: 2022-05-31

## 2022-05-31 RX ADMIN — LIDOCAINE HYDROCHLORIDE 5 ML: 10 INJECTION, SOLUTION INFILTRATION; PERINEURAL at 03:05

## 2022-05-31 RX ADMIN — FERROUS SULFATE TAB 325 MG (65 MG ELEMENTAL FE) 1 EACH: 325 (65 FE) TAB at 09:05

## 2022-05-31 RX ADMIN — CALCIUM CARBONATE (ANTACID) CHEW TAB 500 MG 500 MG: 500 CHEW TAB at 09:05

## 2022-05-31 RX ADMIN — FERROUS SULFATE TAB 325 MG (65 MG ELEMENTAL FE) 1 EACH: 325 (65 FE) TAB at 08:05

## 2022-05-31 RX ADMIN — DOCUSATE SODIUM 100 MG: 100 CAPSULE ORAL at 09:05

## 2022-05-31 RX ADMIN — DOCUSATE SODIUM 100 MG: 100 CAPSULE ORAL at 08:05

## 2022-05-31 RX ADMIN — PANTOPRAZOLE SODIUM 40 MG: 40 TABLET, DELAYED RELEASE ORAL at 09:05

## 2022-05-31 RX ADMIN — AMIODARONE HYDROCHLORIDE 200 MG: 200 TABLET ORAL at 09:05

## 2022-05-31 RX ADMIN — HEPARIN SODIUM 5000 UNITS: 5000 INJECTION INTRAVENOUS; SUBCUTANEOUS at 08:05

## 2022-05-31 RX ADMIN — FLUTICASONE PROPIONATE 50 MCG: 50 SPRAY, METERED NASAL at 09:05

## 2022-05-31 RX ADMIN — ATORVASTATIN CALCIUM 20 MG: 20 TABLET, FILM COATED ORAL at 08:05

## 2022-05-31 NOTE — ASSESSMENT & PLAN NOTE
-Records reflect dx of cirrhosis but he has never been biopsied nor had esophageal variceal bleed  -Liver US done here 5/30 shows cirrhotic liver morphology with sequela of portal hypertension  -Once paracentesis completed, will get liver elastography  -Check hepatitis panel  -Follow LFTs

## 2022-05-31 NOTE — PLAN OF CARE
Problem: Occupational Therapy  Goal: Occupational Therapy Goal  Description: Goals to be met by: 6/14/22     Patient will increase functional independence with ADLs by performing:    LE Dressing with [place].  Grooming while standing at sink with [place].  Toileting from toilet with [place] for hygiene and clothing management.   Toilet transfer to toilet with [place].  Upper extremity exercise program x20 reps per handout, with independence.    Outcome: Ongoing, Progressing

## 2022-05-31 NOTE — H&P
Inpatient Radiology Pre-procedure Note    History of Present Illness:  John Javier Jr. is a 66 y.o. male presented for paracentresis.   Admission H&P reviewed.    Past Medical History:   Diagnosis Date    Anticoagulant long-term use     CHF (congestive heart failure)     Chronic combined systolic and diastolic heart failure 03/01/2020    Coronary artery disease     Diabetes mellitus     Digestive disorder     Diverticulitis     Encounter for blood transfusion     Hypertension     Renal disorder     Sleep apnea     Suspected Covid-19 Virus Infection; test negative 03/14/2020    Unspecified cirrhosis of liver      Past Surgical History:   Procedure Laterality Date    COLECTOMY      COLONOSCOPY N/A 5/11/2022    Procedure: COLON;  Surgeon: Dk Jacobson MD;  Location: [facility] ENDOSCOPY;  Service: Gastroenterology;  Laterality: N/A;    ESOPHAGOGASTRODUODENOSCOPY N/A 5/10/2022    Procedure: EGD (ESOPHAGOGASTRODUODENOSCOPY);  Surgeon: Dk Jacobson MD;  Location: [facility] ENDOSCOPY;  Service: Gastroenterology;  Laterality: N/A;    INTRALUMINAL GASTROINTESTINAL TRACT IMAGING VIA CAPSULE N/A 5/11/2022    Procedure: IMAGING PROCEDURE, GI TRACT, INTRALUMINAL, VIA CAPSULE;  Surgeon: Dk Jacobson MD;  Location: [facility] ENDOSCOPY;  Service: Gastroenterology;  Laterality: N/A;  done in recovery    RIGHT HEART CATHETERIZATION Right 10/8/2020    Procedure: INSERTION, CATHETER, RIGHT HEART;  Surgeon: Adilson Darden Jr., MD;  Location: [facility] CATH LAB;  Service: Cardiology;  Laterality: Right;    RIGHT HEART CATHETERIZATION Right 2/3/2021    Procedure: INSERTION, CATHETER, RIGHT HEART;  Surgeon: Adilson Darden Jr., MD;  Location: [facility] CATH LAB;  Service: Cardiology;  Laterality: Right;    RIGHT HEART CATHETERIZATION Right 6/22/2021    Procedure: INSERTION, CATHETER, RIGHT HEART;  Surgeon: Yuki Delgado MD;  Location: [facility] CATH LAB;  Service: Cardiology;   Laterality: Right;       Review of Systems:   As documented in primary team H&P    Home Meds:   Prior to Admission medications    Medication Sig Start Date End Date Taking? Authorizing Provider   albuterol (PROVENTIL/VENTOLIN HFA) 90 mcg/actuation inhaler INHALE 2 PUFFS BY MOUTH FOUR TIMES DAILY AS NEEDED FOR WHEEZING OR SHORTNESS OF BREATH 21   Historical Provider   amiodarone (PACERONE) 200 MG Tab Take 1 tablet (200 mg total) by mouth once daily. 22  Panchito Bhandari MD   apixaban (ELIQUIS) 5 mg Tab Take 1 tablet (5 mg total) by mouth 2 (two) times daily. 3/3/22   Yuki Delgado MD   atorvastatin (LIPITOR) 20 MG tablet Take 1 tablet (20 mg total) by mouth every evening. 20  Adilson Darden Jr., MD   docusate sodium (COLACE) 100 MG capsule Take 1 capsule (100 mg total) by mouth 2 (two) times daily. 5/15/22 6/15/22  Panchito Bhandari MD   ferrous sulfate 324 mg (65 mg iron) TbEC Take 1 tablet (324 mg total) by mouth 2 (two) times daily. 5/15/22 6/15/22  Panchito Bhandari MD   fluticasone propionate (FLONASE) 50 mcg/actuation nasal spray  20   Historical Provider   metoprolol succinate (TOPROL-XL) 100 MG 24 hr tablet Take 1 tablet (100 mg total) by mouth once daily. 3/3/22   Yuki Delgado MD   nitroGLYCERIN (NITROSTAT) 0.4 MG SL tablet SMARTSI Tablet(s) Sublingual As Needed 3/3/22   Yuki Delgado MD   pantoprazole (PROTONIX) 40 MG tablet Take 1 tablet (40 mg total) by mouth once daily. 3/3/22 3/3/23  Yuki Delgado MD     Scheduled Meds:    amiodarone  200 mg Oral Daily    atorvastatin  20 mg Oral QHS    docusate sodium  100 mg Oral BID    ferrous sulfate  1 tablet Oral BID    fluticasone propionate  1 spray Each Nostril Daily    heparin (porcine)  5,000 Units Subcutaneous Q8H    pantoprazole  40 mg Oral Daily     Continuous Infusions:   PRN Meds:dextrose 10%, dextrose 10%, glucagon (human recombinant), glucose, glucose, insulin aspart U-100, sodium chloride  0.9%  Anticoagulants/Antiplatelets: Heparin    Allergies:   Review of patient's allergies indicates:   Allergen Reactions    Penicillins Anaphylaxis     Pt stated his mother said it will kill him- always inform to take ampicillin    Penicillin     Sitagliptin Other (See Comments)     Pancreatitis & liver disease     Sedation Hx: have not been any systemic reactions    Labs:  Recent Labs   Lab 05/30/22 2012   INR 1.6*       Recent Labs   Lab 05/31/22 0634   WBC 10.82   HGB 7.8*   HCT 23.0*   *         Recent Labs   Lab 05/31/22 0634   *   *   K 3.7   CL 97   CO2 20*   BUN 25*   CREATININE 1.9*   CALCIUM 7.7*   MG 1.7   ALT 50*   AST 82*   ALBUMIN 2.0*   BILITOT 1.7*         Vitals:  Temp: 97.6 °F (36.4 °C) (05/31/22 0927)  Pulse: 81 (05/31/22 1200)  Resp: 13 (05/31/22 1200)  BP: 102/69 (05/31/22 1200)  SpO2: 96 % (05/31/22 1200)     Physical Exam:  ASA: III  Mallampati: n/a    General: no acute distress  Mental Status: alert and oriented to person, place and time  HEENT: normocephalic, atraumatic  Chest: unlabored breathing  Heart: regular heart rate  Abdomen: nondistended  Extremity: moves all extremities      Plan:  Sedation Plan: Local.  Patient will undergo paracentesis.          Haleigh Ulrich MD  Radiology Resident PGY- 2  [facility]

## 2022-05-31 NOTE — PLAN OF CARE
Pt aaox4.  Bed in low and locked position.  Nonskid socks in use.  Call bell, phone, urinal within reach in bed or on bedside table.  Aware to call if needing assistance.  Denies pain.  Currently finishing up para  - 4.5L taken off and then to US before returning to floor.  Tele and visi in use.  accuchecks ac/hs.  DM diet with 1500 cc FR.  See flowsheet for full assessment and details.

## 2022-05-31 NOTE — AI DETERIORATION ALERT
"RAPID RESPONSE NURSE AI ALERT       AI alert received.    Chart Reviewed: 05/30/2022, 10:28 PM    MRN: [id number]  Bed: 76607/55221 A    Dx: Acute on chronic combined systolic and diastolic heart failure, NYHA class 4    John Javier Jr. has a past medical history of Anticoagulant long-term use, CHF (congestive heart failure), Chronic combined systolic and diastolic heart failure, Coronary artery disease, Diabetes mellitus, Digestive disorder, Diverticulitis, Encounter for blood transfusion, Hypertension, Renal disorder, Sleep apnea, Suspected Covid-19 Virus Infection; test negative, and Unspecified cirrhosis of liver.    Last VS: BP (!) 73/47   Pulse 83   Temp 97.8 °F (36.6 °C) (Oral)   Resp (!) 25   Ht 5' 4" (1.626 m)   Wt 60.6 kg (133 lb 9.6 oz)   SpO2 95%   BMI 22.93 kg/m²     24H Vital Sign Range:  Temp:  [97.6 °F (36.4 °C)-97.8 °F (36.6 °C)]   Pulse:  [73-83]   Resp:  [13-25]   BP: ()/(47-71)   SpO2:  [93 %-97 %]          Recent Labs     05/28/22 2041 05/30/22 2012   WBC 12.78* 10.43   HGB 8.5* 7.8*   HCT 25.0* 22.2*   * 185       Recent Labs     05/28/22 2143 05/30/22 2012   * 128*   K 4.4 3.5   CL 98 98   CO2 19* 19*   CREATININE 1.5* 1.8*    142*   PHOS  --  2.5*   MG  --  1.7        No results for input(s): PH, PCO2, PO2, HCO3, POCSATURATED, BE in the last 72 hours.     OXYGEN:        O2 Device (Oxygen Therapy): room air    MEWS score: 4    bedside RNNina contacted. Patient BP low, 70s/40s. Asymptomatic. No concerns verbalized at this time. Instructed to call [phone] for further concerns or assistance.    Dorothy Nguyễn RN        "

## 2022-05-31 NOTE — RESPIRATORY THERAPY
RAPID RESPONSE RESPIRATORY CHART CHECK       Chart check completed.  Please call [phone] for further concerns or assistance.

## 2022-05-31 NOTE — PLAN OF CARE
Paracentesis done. Removed 4,800 ml. Albumin not given; pt did not meet criteria. Patient AAOx3, no distress noted, respirations even and unlabored. Para site clean dry and intact. No bleeding no hematoma. Pt stable to go to ultrasound and return to unit. Report called to VAN Gutierrez.

## 2022-05-31 NOTE — ASSESSMENT & PLAN NOTE
-Currently in SR  -Has not taken Eliquis for months. Will continue holding it for paracentesis  -Continue Amiodarone

## 2022-05-31 NOTE — PLAN OF CARE
PT evaluation complete and appropriate goals established.    2022    Problem: Physical Therapy  Goal: Physical Therapy Goal  Description: Goals to be met by: 6/10/2022     Patient will increase functional independence with mobility by performin. Supine to sit with [place]  2. Sit to stand transfer with Modified [place]  3. Gait  x 400 feet with Modified [place] using LRAD as needed or without AD.   4. Ascend/descend 3 stair with bilateral Handrails Stand-by Assistance.  5. Lower extremity exercise program x15 reps, with supervision, in order to increase LE strength and (I) with functional mobility.      Outcome: Ongoing, Progressing

## 2022-05-31 NOTE — PLAN OF CARE
NAEON  VSS, hypotensive, HTS aware  NSR on telemetry  Room air, sats >92%  Abdomen taut/distended, pt becomes SOMMERS   AOX4  PIVX1  Pt up w/ assist x1  Uses rolling walker but was able to ambulate from bed to stretcher w/ min assist  US and elastography of liver completed  Pt on 1.5 L FR  Minimal UO see flowsheet  Safety/fall precautions maintained  Bed locked in lowest position  Call light and personal belongings within reach  WCTM

## 2022-05-31 NOTE — SUBJECTIVE & OBJECTIVE
No current facility-administered medications on file prior to encounter.     Current Outpatient Medications on File Prior to Encounter   Medication Sig    albuterol (PROVENTIL/VENTOLIN HFA) 90 mcg/actuation inhaler INHALE 2 PUFFS BY MOUTH FOUR TIMES DAILY AS NEEDED FOR WHEEZING OR SHORTNESS OF BREATH    amiodarone (PACERONE) 200 MG Tab Take 1 tablet (200 mg total) by mouth once daily.    apixaban (ELIQUIS) 5 mg Tab Take 1 tablet (5 mg total) by mouth 2 (two) times daily.    atorvastatin (LIPITOR) 20 MG tablet Take 1 tablet (20 mg total) by mouth every evening.    docusate sodium (COLACE) 100 MG capsule Take 1 capsule (100 mg total) by mouth 2 (two) times daily.    ferrous sulfate 324 mg (65 mg iron) TbEC Take 1 tablet (324 mg total) by mouth 2 (two) times daily.    fluticasone propionate (FLONASE) 50 mcg/actuation nasal spray     metoprolol succinate (TOPROL-XL) 100 MG 24 hr tablet Take 1 tablet (100 mg total) by mouth once daily.    nitroGLYCERIN (NITROSTAT) 0.4 MG SL tablet SMARTSI Tablet(s) Sublingual As Needed    pantoprazole (PROTONIX) 40 MG tablet Take 1 tablet (40 mg total) by mouth once daily.       Review of patient's allergies indicates:   Allergen Reactions    Penicillins Anaphylaxis     Pt stated his mother said it will kill him- always inform to take ampicillin    Penicillin     Sitagliptin Other (See Comments)     Pancreatitis & liver disease       Past Medical History:   Diagnosis Date    Anticoagulant long-term use     CHF (congestive heart failure)     Chronic combined systolic and diastolic heart failure 2020    Coronary artery disease     Diabetes mellitus     Digestive disorder     Diverticulitis     Encounter for blood transfusion     Hypertension     Renal disorder     Sleep apnea     Suspected Covid-19 Virus Infection; test negative 2020    Unspecified cirrhosis of liver      Past Surgical History:   Procedure Laterality Date    COLECTOMY      COLONOSCOPY N/A 2022     Procedure: COLON;  Surgeon: Dk Jacobson MD;  Location: [facility] ENDOSCOPY;  Service: Gastroenterology;  Laterality: N/A;    ESOPHAGOGASTRODUODENOSCOPY N/A 5/10/2022    Procedure: EGD (ESOPHAGOGASTRODUODENOSCOPY);  Surgeon: Dk Jacobson MD;  Location: [facility] ENDOSCOPY;  Service: Gastroenterology;  Laterality: N/A;    INTRALUMINAL GASTROINTESTINAL TRACT IMAGING VIA CAPSULE N/A 5/11/2022    Procedure: IMAGING PROCEDURE, GI TRACT, INTRALUMINAL, VIA CAPSULE;  Surgeon: Dk Jacobson MD;  Location: [facility] ENDOSCOPY;  Service: Gastroenterology;  Laterality: N/A;  done in recovery    RIGHT HEART CATHETERIZATION Right 10/8/2020    Procedure: INSERTION, CATHETER, RIGHT HEART;  Surgeon: Adilson Darden Jr., MD;  Location: [facility] CATH LAB;  Service: Cardiology;  Laterality: Right;    RIGHT HEART CATHETERIZATION Right 2/3/2021    Procedure: INSERTION, CATHETER, RIGHT HEART;  Surgeon: Adilson Darden Jr., MD;  Location: [facility] CATH LAB;  Service: Cardiology;  Laterality: Right;    RIGHT HEART CATHETERIZATION Right 6/22/2021    Procedure: INSERTION, CATHETER, RIGHT HEART;  Surgeon: Yuki Delgado MD;  Location: [facility] CATH LAB;  Service: Cardiology;  Laterality: Right;     Family History       Problem Relation (Age of Onset)    Diabetes Mother    Heart disease Father    Hypertension Mother, Father          Tobacco Use    Smoking status: Never Smoker    Smokeless tobacco: Never Used   Substance and Sexual Activity    Alcohol use: Never    Drug use: Never    Sexual activity: Not on file     Review of Systems   Constitutional:  Positive for fatigue. Negative for activity change, appetite change and fever.   HENT:  Negative for nosebleeds.    Respiratory:  Positive for shortness of breath. Negative for cough.    Cardiovascular:  Positive for leg swelling. Negative for chest pain and palpitations.   Gastrointestinal:  Negative for abdominal distention, abdominal pain and nausea.    Genitourinary:  Negative for frequency.   Musculoskeletal:  Negative for arthralgias and myalgias.   Skin:  Negative for rash.   Neurological:  Positive for dizziness. Negative for numbness.   Hematological:  Does not bruise/bleed easily.   Objective:     Vital Signs (Most Recent):  Temp: 97.6 °F (36.4 °C) (05/31/22 0927)  Pulse: 81 (05/31/22 1200)  Resp: 13 (05/31/22 1200)  BP: 102/69 (05/31/22 1200)  SpO2: 96 % (05/31/22 1200) Vital Signs (24h Range):  Temp:  [97.6 °F (36.4 °C)-97.8 °F (36.6 °C)] 97.6 °F (36.4 °C)  Pulse:  [73-83] 81  Resp:  [13-25] 13  SpO2:  [93 %-98 %] 96 %  BP: ()/(47-71) 102/69     Weight: 60.6 kg (133 lb 9.6 oz)  Body mass index is 22.93 kg/m².    SpO2: 96 %  O2 Device (Oxygen Therapy): room air     Intake/Output - Last 3 Shifts         05/29 0700  05/30 0659 05/30 0700  05/31 0659 05/31 0700  06/01 0659    P.O.  480     I.V. (mL/kg)  10 (0.2)     Total Intake(mL/kg)  490 (8.1)     Urine (mL/kg/hr)  300     Total Output  300     Net  +190                     Lines/Drains/Airways       Peripheral Intravenous Line  Duration                  Peripheral IV - Single Lumen 05/30/22 1742 20 G;1 3/4 in Left Upper Arm <1 day                    Physical Exam  HENT:      Head: Normocephalic and atraumatic.   Eyes:      Extraocular Movements: Extraocular movements intact.   Cardiovascular:      Rate and Rhythm: Normal rate and regular rhythm.   Pulmonary:      Effort: Pulmonary effort is normal.   Abdominal:      General: Abdomen is flat.   Musculoskeletal:         General: Normal range of motion.      Cervical back: Normal range of motion.   Skin:     General: Skin is warm and dry.      Capillary Refill: Capillary refill takes less than 2 seconds.   Neurological:      General: No focal deficit present.       Significant Labs:  BMP:   Recent Labs   Lab 05/31/22  0634   *   *   K 3.7   CL 97   CO2 20*   BUN 25*   CREATININE 1.9*   CALCIUM 7.7*   MG 1.7     CBC:   Recent Labs   Lab  05/31/22  0634   WBC 10.82   RBC 2.27*   HGB 7.8*   HCT 23.0*      *   MCH 34.4*   MCHC 33.9     CMP:   Recent Labs   Lab 05/31/22  0634   *   CALCIUM 7.7*   ALBUMIN 2.0*   PROT 5.3*   *   K 3.7   CO2 20*   CL 97   BUN 25*   CREATININE 1.9*   ALKPHOS 100   ALT 50*   AST 82*   BILITOT 1.7*     Coagulation:   Recent Labs   Lab 05/30/22 2012   INR 1.6*       Significant Diagnostics:  Liver US:  1. Cirrhotic liver morphology with sequela of portal hypertension including reversal of portal venous flow as detailed above.  2. Gallbladder wall thickening, a nonspecific finding which can be seen with underlying hepatic dysfunction or reactive in the setting of ascites.  3. Ascites and pleural effusions.    ECHO:  The left ventricle is normal in size with severely decreased systolic function. The estimated ejection fraction is 25%.  Normal right ventricular size with normal right ventricular systolic function.  Left ventricular diastolic dysfunction.  Mild left atrial enlargement.  Normal central venous pressure (3 mmHg).  There is ascites present.  LVIDD 5 cm  TAPSE 2.78    I have reviewed and interpreted all pertinent imaging results/findings within the past 24 hours.

## 2022-05-31 NOTE — CARE UPDATE
RAPID RESPONSE NURSE ROUND       Rounding completed with charge RN, Nora. No concerns verbalized at this time. Instructed to call [phone] for further concerns or assistance.

## 2022-05-31 NOTE — SUBJECTIVE & OBJECTIVE
**Interval History: I&O's not accurate. Liver US shows cirrhotic liver morphology with sequela of portal hypertension. To have diagnostic paracentesis today before liver elastography can be done. TTE shows LVEDD is too small for LVAD (5.0). SSW to assess psychosocial situation. Will have CTS review CT scans from step 1, but likely not an advanced options candidate    Continuous Infusions:  Scheduled Meds:   amiodarone  200 mg Oral Daily    atorvastatin  20 mg Oral QHS    docusate sodium  100 mg Oral BID    ferrous sulfate  1 tablet Oral BID    fluticasone propionate  1 spray Each Nostril Daily    heparin (porcine)  5,000 Units Subcutaneous Q8H    pantoprazole  40 mg Oral Daily     PRN Meds:sodium chloride 0.9%    Review of patient's allergies indicates:   Allergen Reactions    Penicillins Anaphylaxis     Pt stated his mother said it will kill him- always inform to take ampicillin    Penicillin     Sitagliptin Other (See Comments)     Pancreatitis & liver disease     Objective:     Vital Signs (Most Recent):  Temp: 97.6 °F (36.4 °C) (05/31/22 0927)  Pulse: 81 (05/31/22 1200)  Resp: 13 (05/31/22 1200)  BP: 102/69 (05/31/22 1200)  SpO2: 96 % (05/31/22 1200) Vital Signs (24h Range):  Temp:  [97.6 °F (36.4 °C)-97.8 °F (36.6 °C)] 97.6 °F (36.4 °C)  Pulse:  [73-83] 81  Resp:  [13-25] 13  SpO2:  [94 %-98 %] 96 %  BP: ()/(47-71) 102/69     Patient Vitals for the past 72 hrs (Last 3 readings):   Weight   05/31/22 0615 60.6 kg (133 lb 9.6 oz)   05/30/22 1415 60.6 kg (133 lb 9.6 oz)     Body mass index is 22.93 kg/m².      Intake/Output Summary (Last 24 hours) at 5/31/2022 1409  Last data filed at 5/31/2022 0600  Gross per 24 hour   Intake 490 ml   Output 300 ml   Net 190 ml       Hemodynamic Parameters:       Telemetry: SR with LBBB     Physical Exam  Constitutional:       Comments: Temporal wasting   HENT:      Head: Normocephalic and atraumatic.   Eyes:      Conjunctiva/sclera: Conjunctivae normal.      Pupils: Pupils  are equal, round, and reactive to light.   Neck:      Comments: Neck veins do not appear elevated  Cardiovascular:      Rate and Rhythm: Normal rate and regular rhythm.   Pulmonary:      Effort: Pulmonary effort is normal.      Breath sounds: Normal breath sounds.   Abdominal:      General: Bowel sounds are normal.      Comments: ++ ascites   Musculoskeletal:         General: Normal range of motion.      Cervical back: Normal range of motion and neck supple.   Skin:     General: Skin is warm and dry.      Capillary Refill: Capillary refill takes 2 to 3 seconds.   Neurological:      General: No focal deficit present.      Mental Status: He is alert and oriented to person, place, and time.   Psychiatric:         Mood and Affect: Mood normal.         Behavior: Behavior normal.         Thought Content: Thought content normal.         Judgment: Judgment normal.       Significant Labs:  CBC:  Recent Labs   Lab 05/28/22 2041 05/30/22 2012 05/31/22  0634   WBC 12.78* 10.43 10.82   RBC 2.47* 2.27* 2.27*   HGB 8.5* 7.8* 7.8*   HCT 25.0* 22.2* 23.0*   * 185 163   * 98 101*   MCH 34.4* 34.4* 34.4*   MCHC 34.0 35.1 33.9     BNP:  Recent Labs   Lab 05/26/22 0138 05/28/22 2041 05/30/22 2012   .7* 373* 369*     CMP:  Recent Labs   Lab 05/28/22 2143 05/30/22 2012 05/31/22  0634    142* 155*   CALCIUM 7.8* 7.8* 7.7*   ALBUMIN 2.0* 2.1* 2.0*   PROT 5.2* 5.5* 5.3*   * 128* 128*   K 4.4 3.5 3.7   CO2 19* 19* 20*   CL 98 98 97   BUN 22 25* 25*   CREATININE 1.5* 1.8* 1.9*   ALKPHOS 90 102 100   ALT 47* 51* 50*   AST 70* 84* 82*   BILITOT 2.0* 2.0* 1.7*      Coagulation:   Recent Labs   Lab 05/26/22 0138 05/26/22 0605 05/30/22 2012   INR 5.57* 4.67* 1.6*     LDH:  No results for input(s): LDH in the last 72 hours.  Microbiology:  Microbiology Results (last 7 days)       Procedure Component Value Units Date/Time    (rule out SBP) Aerobic culture [[id number]]     Order Status: No result Specimen:  Ascites     (rule out SBP) Culture, Anaerobic [[id number]]     Order Status: No result Specimen: Ascites     (rule out SBP) Gram stain [[id number]]     Order Status: No result Specimen: Ascites             I have reviewed all pertinent labs within the past 24 hours.    Estimated Creatinine Clearance: 32.5 mL/min (A) (based on SCr of 1.9 mg/dL (H)).    Diagnostic Results:  I have reviewed and interpreted all pertinent imaging results/findings within the past 24 hours.

## 2022-05-31 NOTE — ASSESSMENT & PLAN NOTE
-50-60%% LCx, OM disease on [facility] 2/29/20  -Not on ASA possibly 2/2 past use of Eliquis, which patient reports was starting for SUSIE. He has not taken it in months, however  -Continue statin

## 2022-05-31 NOTE — CONSULTS
"[facility] - Transplant Stepdown  Cardiothoracic Surgery  Consult Note    Patient Name: John Javier Jr.  MRN: [id number]  Admission Date: 5/30/2022  Attending Physician: Lizzy Cavanaugh MD  Referring Provider: Adilson Darden Jr*    Patient information was obtained from patient, past medical records and ER records.     Inpatient consult to Cardiothoracic Surgery  Consult performed by: Meseret Billy NP  Consult ordered by: Thania Dorado NP  Reason for consult: transplant eval        Subjective:     Principal Problem: Acute on chronic combined systolic and diastolic heart failure, NYHA class 4    History of Present Illness: Mr John Javier is a 67 yo BM presented with cardiogenic shock and transferred to [facility] 2/29/20 with impella.  He had cath documented CAD (50-60% LCx, OM disease on [facility] 2/29).  Course complicated by bacteremia and renal dysfunction.  Compliance concerns so in March 2020 his management was to move to CHF section.  He was started on Entresto and successfully up-titrated to  mg BID. He was seen by hospitals who states he present in a "wheelchair, weak and debilitated.  Tells me of paracentesis twice in past month including one last week, 2 hosp stays past month--last 5/8/22-5/15/22 and not able to tolerate meds.  The records report dx of cirrhosis but he says never biopsied and mention of esoph variceal bleed."  He was admitted for diuresis and to rule out cirrhosis.        No current facility-administered medications on file prior to encounter.     Current Outpatient Medications on File Prior to Encounter   Medication Sig    albuterol (PROVENTIL/VENTOLIN HFA) 90 mcg/actuation inhaler INHALE 2 PUFFS BY MOUTH FOUR TIMES DAILY AS NEEDED FOR WHEEZING OR SHORTNESS OF BREATH    amiodarone (PACERONE) 200 MG Tab Take 1 tablet (200 mg total) by mouth once daily.    apixaban (ELIQUIS) 5 mg Tab Take 1 tablet (5 mg total) by mouth 2 (two) times daily.    atorvastatin (LIPITOR) 20 MG tablet " Take 1 tablet (20 mg total) by mouth every evening.    docusate sodium (COLACE) 100 MG capsule Take 1 capsule (100 mg total) by mouth 2 (two) times daily.    ferrous sulfate 324 mg (65 mg iron) TbEC Take 1 tablet (324 mg total) by mouth 2 (two) times daily.    fluticasone propionate (FLONASE) 50 mcg/actuation nasal spray     metoprolol succinate (TOPROL-XL) 100 MG 24 hr tablet Take 1 tablet (100 mg total) by mouth once daily.    nitroGLYCERIN (NITROSTAT) 0.4 MG SL tablet SMARTSI Tablet(s) Sublingual As Needed    pantoprazole (PROTONIX) 40 MG tablet Take 1 tablet (40 mg total) by mouth once daily.       Review of patient's allergies indicates:   Allergen Reactions    Penicillins Anaphylaxis     Pt stated his mother said it will kill him- always inform to take ampicillin    Penicillin     Sitagliptin Other (See Comments)     Pancreatitis & liver disease       Past Medical History:   Diagnosis Date    Anticoagulant long-term use     CHF (congestive heart failure)     Chronic combined systolic and diastolic heart failure 2020    Coronary artery disease     Diabetes mellitus     Digestive disorder     Diverticulitis     Encounter for blood transfusion     Hypertension     Renal disorder     Sleep apnea     Suspected Covid-19 Virus Infection; test negative 2020    Unspecified cirrhosis of liver      Past Surgical History:   Procedure Laterality Date    COLECTOMY      COLONOSCOPY N/A 2022    Procedure: COLON;  Surgeon: Dk Jacobson MD;  Location: [facility] ENDOSCOPY;  Service: Gastroenterology;  Laterality: N/A;    ESOPHAGOGASTRODUODENOSCOPY N/A 5/10/2022    Procedure: EGD (ESOPHAGOGASTRODUODENOSCOPY);  Surgeon: Dk Jacobson MD;  Location: [facility] ENDOSCOPY;  Service: Gastroenterology;  Laterality: N/A;    INTRALUMINAL GASTROINTESTINAL TRACT IMAGING VIA CAPSULE N/A 2022    Procedure: IMAGING PROCEDURE, GI TRACT, INTRALUMINAL, VIA CAPSULE;  Surgeon: Dk Jacobson  MD;  Location: [facility] ENDOSCOPY;  Service: Gastroenterology;  Laterality: N/A;  done in recovery    RIGHT HEART CATHETERIZATION Right 10/8/2020    Procedure: INSERTION, CATHETER, RIGHT HEART;  Surgeon: Adilson Darden Jr., MD;  Location: [facility] CATH LAB;  Service: Cardiology;  Laterality: Right;    RIGHT HEART CATHETERIZATION Right 2/3/2021    Procedure: INSERTION, CATHETER, RIGHT HEART;  Surgeon: Adilson Darden Jr., MD;  Location: [facility] CATH LAB;  Service: Cardiology;  Laterality: Right;    RIGHT HEART CATHETERIZATION Right 6/22/2021    Procedure: INSERTION, CATHETER, RIGHT HEART;  Surgeon: Yuki Delgado MD;  Location: [facility] CATH LAB;  Service: Cardiology;  Laterality: Right;     Family History       Problem Relation (Age of Onset)    Diabetes Mother    Heart disease Father    Hypertension Mother, Father          Tobacco Use    Smoking status: Never Smoker    Smokeless tobacco: Never Used   Substance and Sexual Activity    Alcohol use: Never    Drug use: Never    Sexual activity: Not on file     Review of Systems   Constitutional:  Positive for fatigue. Negative for activity change, appetite change and fever.   HENT:  Negative for nosebleeds.    Respiratory:  Positive for shortness of breath. Negative for cough.    Cardiovascular:  Positive for leg swelling. Negative for chest pain and palpitations.   Gastrointestinal:  Negative for abdominal distention, abdominal pain and nausea.   Genitourinary:  Negative for frequency.   Musculoskeletal:  Negative for arthralgias and myalgias.   Skin:  Negative for rash.   Neurological:  Positive for dizziness. Negative for numbness.   Hematological:  Does not bruise/bleed easily.   Objective:     Vital Signs (Most Recent):  Temp: 97.6 °F (36.4 °C) (05/31/22 0927)  Pulse: 81 (05/31/22 1200)  Resp: 13 (05/31/22 1200)  BP: 102/69 (05/31/22 1200)  SpO2: 96 % (05/31/22 1200) Vital Signs (24h Range):  Temp:  [97.6 °F (36.4 °C)-97.8 °F (36.6 °C)] 97.6 °F (36.4 °C)  Pulse:   [73-83] 81  Resp:  [13-25] 13  SpO2:  [93 %-98 %] 96 %  BP: ()/(47-71) 102/69     Weight: 60.6 kg (133 lb 9.6 oz)  Body mass index is 22.93 kg/m².    SpO2: 96 %  O2 Device (Oxygen Therapy): room air     Intake/Output - Last 3 Shifts         05/29 0700 05/30 0659 05/30 0700 05/31 0659 05/31 0700 06/01 0659    P.O.  480     I.V. (mL/kg)  10 (0.2)     Total Intake(mL/kg)  490 (8.1)     Urine (mL/kg/hr)  300     Total Output  300     Net  +190                     Lines/Drains/Airways       Peripheral Intravenous Line  Duration                  Peripheral IV - Single Lumen 05/30/22 1742 20 G;1 3/4 in Left Upper Arm <1 day                    Physical Exam  HENT:      Head: Normocephalic and atraumatic.   Eyes:      Extraocular Movements: Extraocular movements intact.   Cardiovascular:      Rate and Rhythm: Normal rate and regular rhythm.   Pulmonary:      Effort: Pulmonary effort is normal.   Abdominal:      General: Abdomen is flat.   Musculoskeletal:         General: Normal range of motion.      Cervical back: Normal range of motion.   Skin:     General: Skin is warm and dry.      Capillary Refill: Capillary refill takes less than 2 seconds.   Neurological:      General: No focal deficit present.       Significant Labs:  BMP:   Recent Labs   Lab 05/31/22  0634   *   *   K 3.7   CL 97   CO2 20*   BUN 25*   CREATININE 1.9*   CALCIUM 7.7*   MG 1.7     CBC:   Recent Labs   Lab 05/31/22  0634   WBC 10.82   RBC 2.27*   HGB 7.8*   HCT 23.0*      *   MCH 34.4*   MCHC 33.9     CMP:   Recent Labs   Lab 05/31/22  0634   *   CALCIUM 7.7*   ALBUMIN 2.0*   PROT 5.3*   *   K 3.7   CO2 20*   CL 97   BUN 25*   CREATININE 1.9*   ALKPHOS 100   ALT 50*   AST 82*   BILITOT 1.7*     Coagulation:   Recent Labs   Lab 05/30/22 2012   INR 1.6*       Significant Diagnostics:  Liver US:  1. Cirrhotic liver morphology with sequela of portal hypertension including reversal of portal venous flow as  "detailed above.  2. Gallbladder wall thickening, a nonspecific finding which can be seen with underlying hepatic dysfunction or reactive in the setting of ascites.  3. Ascites and pleural effusions.    ECHO:  The left ventricle is normal in size with severely decreased systolic function. The estimated ejection fraction is 25%.  Normal right ventricular size with normal right ventricular systolic function.  Left ventricular diastolic dysfunction.  Mild left atrial enlargement.  Normal central venous pressure (3 mmHg).  There is ascites present.  LVIDD 5 cm  TAPSE 2.78    I have reviewed and interpreted all pertinent imaging results/findings within the past 24 hours.      Assessment/Plan:     NYHA Score: NYHA III: marked limitation of physical activity, comfortable at rest    * Acute on chronic combined systolic and diastolic heart failure, NYHA class 4  Mr John Javier is a 65 yo BM presented with cardiogenic shock and transferred to [facility] 2/29/20 with impella.  He had cath documented CAD (50-60% LCx, OM disease on [facility] 2/29).  Course complicated by bacteremia and renal dysfunction.  Compliance concerns so in March 2020 his management was to move to CHF section.  He was started on Entresto and successfully up-titrated to  mg BID. He was seen by [facility] who states he present in a "wheelchair, weak and debilitated.  Tells me of paracentesis twice in past month including one last week, 2 hosp stays past month--last 5/8/22-5/15/22 and not able to tolerate meds.  The records report dx of cirrhosis but he says never biopsied and mention of esoph variceal bleed."  He was admitted for diuresis and to rule out cirrhosis.    Previous denied by CTS and recommended medical management.  Patient found to have cirrhosis on recent imaging which would preclude him from advanced options.  Will discuss with Dr. Randhawa who will give recommendations.         Thank you for your consult. I will sign off. Please contact us if you " have any additional questions.    Meseret Billy NP  Cardiothoracic Surgery  [facility] - Transplant Stepdown    I have seen the patient and reviewed the nurse practitioner's note above. I have personally interviewed and examined the patient at bedside and agree with the findings.     Due to cirrhosis, he is not a surgical candidate at this time.  We recommend continued medical management.      Dez Randhawa MD  Cardiothoracic Surgery  [facility]

## 2022-05-31 NOTE — PT/OT/SLP EVAL
"Occupational Therapy   Co-Evaluation/Treatment     Name: John Javier Jr.  MRN: [id number]  Admitting Diagnosis:  Acute on chronic combined systolic and diastolic heart failure, NYHA class 4  Recent Surgery: Procedure(s) (LRB):  INSERTION, CATHETER, RIGHT HEART (Right)      Recommendations:     Discharge Recommendations: home health OT  Discharge Equipment Recommendations:  none  Barriers to discharge:  None    Assessment:     John Javier Jr. is a 66 y.o. male with a medical diagnosis of Acute on chronic combined systolic and diastolic heart failure, NYHA class 4.  He presents with the following performance deficits affecting function: impaired endurance, weakness, impaired self care skills, impaired functional mobilty, gait instability, impaired balance, impaired cardiopulmonary response to activity.      Pt agreeable to therapy and tolerated the session well. Pt demonstrating good fine motor skills and functional mobility. He does have decreased range and strength in the LUE compared to the RUE, which he reports may be from a torn rotator cuff. Pt would benefit from continued skilled acute OT services in order to maximize independence and safety with ADLs and functional mobility to ensure safe return to PLOF in the least restrictive environment. OT recommending HHOT once pt is medically appropriate for d/c.     Rehab Prognosis: Good; patient would benefit from acute skilled OT services to address these deficits and reach maximum level of function.       Plan:     Patient to be seen 3 x/week to address the above listed problems via self-care/home management, therapeutic activities, therapeutic exercises  · Plan of Care Expires: 07/01/22  · Plan of Care Reviewed with: patient    Subjective     "I have been having some help with my self care skills"     Chief Complaint: Weakness  Patient/Family Comments/goals: To return to PLOF    Occupational Profile:  Living Environment: Pt currently lives alone in a double " wide trailor with 3 SANIYA and BHR. He has a walk-in shower with built in benches. Pt reports that he plans to move his home onto his daughter's property and live with her there. He reports that his cousin has been assisting most recently.   Previous level of function: Used a rollator for ambulation and most recently required assistance with some ADLs   Roles and Routines: Pt has not recently drove   Equipment Used at Home:  rollator, walker, rolling  Assistance upon Discharge: Pt will have assistance from his daughter and cousin     Pain/Comfort:  · Pain Rating 1: 0/10    Patients cultural, spiritual, Sabianism conflicts given the current situation: no    Objective:     Co-evaluation/treatment performed due to patient's multiple deficits requiring two skilled therapists to appropriately and safely assess patient's strength and endurance while facilitating functional tasks in addition to accommodating for patient's activity tolerance.     Communicated with: RN prior to session.  Patient found HOB elevated with telemetry upon OT entry to room.    General Precautions: Standard, fall   Orthopedic Precautions:N/A   Braces: N/A  Respiratory Status: Room air    Occupational Performance:    Bed Mobility:    · Patient completed Rolling/Turning to Right with stand by assistance  · Patient completed Scooting/Bridging with stand by assistance  · Patient completed Supine to Sit with stand by assistance    Functional Mobility/Transfers:  · Patient completed Sit <> Stand Transfer with stand by assistance  with  no assistive device   · Patient completed Toilet Transfer Step Transfer technique with stand by assistance with  no AD x 7 trials   · Functional Mobility: Pt engaging in functional mobility to simulate household/community distances approx 250 ft with SBA and utilizing rollator in order to maximize functional activity tolerance and standing balance required for engagement in occupations of choice.  Activities of Daily  Living:  · Grooming: stand by assistance : SBA: To brush teeth and wash face in standing at the sink   · Lower Body Dressing: SBA: To simulate donning/doffing socks using figure-4 technique     Cognitive/Visual Perceptual:  Cognitive/Psychosocial Skills:     -       Oriented to: Person, Place, Time and Situation   -       Follows Commands/attention:Follows multistep  commands  -       Safety awareness/insight to disability: intact   -       Mood/Affect/Coping skills/emotional control: Appropriate to situation    Physical Exam:   Balance:  Static Sitting   stand by assistance   Dynamic Sitting   stand by assistance   Static Standing   stand by assistance   Dynamic Standing   stand by assistance     Upper Extremity Function:   Edema Mild : In LUE and LLE    Sensation    -       Intact   Hand Dominance Right   LUE ROM  Deficits: Shoulder flexion AROM: ~30 *, PROM/AAROM WFL. All other planes WFL   RUE ROM WFL   LUE Strength  Deficits: Sh flexion 2+/5, Elbow flexion/extension 4-/5   RUE Strength  grossly 4-/5   LUE  Strength WFL   RUE  Strength  WFL   Fine Motor Skills     -       Intact- able to manipulate toothpaste and mouthwash caps    Gross Motor skills    WFL       AMPAC 6 Click ADL:  AMPAC Total Score: 21    Treatment & Education:   Therapist provided facilitation and instruction of proper body mechanics and fall prevention strategies during tasks listed above.   Instructed patient to sit in bedside chair daily to increase OOB/activity tolerance.   Instructed patient to use call light to have nursing staff assist with needs/transfers.   Discussed OT POC and answered all questions within OT scope of practice.   Whiteboard updated     Education:    Patient left up in chair with all lines intact and call button in reach    GOALS:   Multidisciplinary Problems     Occupational Therapy Goals        Problem: Occupational Therapy    Goal Priority Disciplines Outcome Interventions   Occupational Therapy Goal      OT, PT/OT Ongoing, Progressing    Description: Goals to be met by: 6/14/22     Patient will increase functional independence with ADLs by performing:    LE Dressing with Briscoe.  Grooming while standing at sink with Briscoe.  Toileting from toilet with Briscoe for hygiene and clothing management.   Toilet transfer to toilet with Briscoe.  Upper extremity exercise program x20 reps per handout, with independence.                     History:     Past Medical History:   Diagnosis Date    Anticoagulant long-term use     CHF (congestive heart failure)     Chronic combined systolic and diastolic heart failure 03/01/2020    Coronary artery disease     Diabetes mellitus     Digestive disorder     Diverticulitis     Encounter for blood transfusion     Hypertension     Renal disorder     Sleep apnea     Suspected Covid-19 Virus Infection; test negative 03/14/2020    Unspecified cirrhosis of liver        Past Surgical History:   Procedure Laterality Date    COLECTOMY      COLONOSCOPY N/A 5/11/2022    Procedure: COLON;  Surgeon: Dk Jacobson MD;  Location: [facility] ENDOSCOPY;  Service: Gastroenterology;  Laterality: N/A;    ESOPHAGOGASTRODUODENOSCOPY N/A 5/10/2022    Procedure: EGD (ESOPHAGOGASTRODUODENOSCOPY);  Surgeon: Dk Jacobson MD;  Location: [facility] ENDOSCOPY;  Service: Gastroenterology;  Laterality: N/A;    INTRALUMINAL GASTROINTESTINAL TRACT IMAGING VIA CAPSULE N/A 5/11/2022    Procedure: IMAGING PROCEDURE, GI TRACT, INTRALUMINAL, VIA CAPSULE;  Surgeon: Dk Jacobson MD;  Location: [facility] ENDOSCOPY;  Service: Gastroenterology;  Laterality: N/A;  done in recovery    RIGHT HEART CATHETERIZATION Right 10/8/2020    Procedure: INSERTION, CATHETER, RIGHT HEART;  Surgeon: Adilson Darden Jr., MD;  Location: [facility] CATH LAB;  Service: Cardiology;  Laterality: Right;    RIGHT HEART CATHETERIZATION Right 2/3/2021    Procedure: INSERTION,  CATHETER, RIGHT HEART;  Surgeon: Adilson Darden Jr., MD;  Location: [facility] CATH LAB;  Service: Cardiology;  Laterality: Right;    RIGHT HEART CATHETERIZATION Right 6/22/2021    Procedure: INSERTION, CATHETER, RIGHT HEART;  Surgeon: Yuki Delgado MD;  Location: [facility] CATH LAB;  Service: Cardiology;  Laterality: Right;       Time Tracking:     OT Date of Treatment: 05/31/22  OT Start Time: 0936  OT Stop Time: 1004  OT Total Time (min): 28 min    Billable Minutes:Evaluation 10  Re-eval 18    5/31/2022

## 2022-05-31 NOTE — ASSESSMENT & PLAN NOTE
"Mr John Javier is a 65 yo BM presented with cardiogenic shock and transferred to [facility] 2/29/20 with impella.  He had cath documented CAD (50-60% LCx, OM disease on [facility] 2/29).  Course complicated by bacteremia and renal dysfunction.  Compliance concerns so in March 2020 his management was to move to CHF section.  He was started on Entresto and successfully up-titrated to  mg BID. He was seen by [facility] who states he present in a "wheelchair, weak and debilitated.  Tells me of paracentesis twice in past month including one last week, 2 hosp stays past month--last 5/8/22-5/15/22 and not able to tolerate meds.  The records report dx of cirrhosis but he says never biopsied and mention of esoph variceal bleed."  He was admitted for diuresis and to rule out cirrhosis.    Previous denied by CTS and recommended medical management.  Patient found to have cirrhosis on recent imaging which would preclude him from advanced options.  Will discuss with Dr. Randhawa who will give recommendations.   "

## 2022-05-31 NOTE — PROCEDURES
Radiology Post-Procedure Note    Pre Op Diagnosis: Ascites  Post Op Diagnosis: Same    Procedure: Paracentesis    Procedure performed by: Haleigh Ulrich MD and Mino Agarwal MD    Written Informed Consent Obtained: Yes  Specimen Removed: YES 20 cc of clear serosanguinous   Estimated Blood Loss: Minimal    Findings:   Successful paracentesis.  Albumin administered PRN per protocol.    Patient tolerated procedure well.      Haleigh Ulrich MD  Radiology Resident PGY- 2  [facility]

## 2022-05-31 NOTE — ASSESSMENT & PLAN NOTE
-DCM out of proportion to CAD  -Declined at our center in the past for advanced options 2/2 medical stability, but has had significant decline in activity intolerance over the past 4 months and several admits for ADHF  -Intolerant to any GDMT with hypotension   -TTE done 5/30: LVEDD 5, LVEF 25%, diastolic dysfunction, RV normal, all valves normal, IVC 3, + ascites  -Pathway started on admit, but given likely cirrhosis, small LV and psychosocial issues likely not a candidate for advanced options. Will have CTS weigh in

## 2022-05-31 NOTE — PLAN OF CARE
Patient arrived in MPU  AAOx3, no distress noted, respirations even and unlabored, will continue to monitor. VSS. Acceptance of education, consents signed, H/P done. Labs reviewed. Patient in IR Room 1 for paracentesis procedure. Warm blankets applied to patient. Patient prepped and draped in sterile fashion.

## 2022-05-31 NOTE — HPI
"Mr John Javier is a 67 yo BM presented with cardiogenic shock and transferred to [facility] 2/29/20 with impella.  He had cath documented CAD (50-60% LCx, OM disease on [facility] 2/29).  Course complicated by bacteremia and renal dysfunction.  Compliance concerns so in March 2020 his management was to move to CHF section.  He was started on Entresto and successfully up-titrated to  mg BID. He was seen by [facility] who states he present in a "wheelchair, weak and debilitated.  Tells me of paracentesis twice in past month including one last week, 2 hosp stays past month--last 5/8/22-5/15/22 and not able to tolerate meds.  The records report dx of cirrhosis but he says never biopsied and mention of esoph variceal bleed."  He was admitted for diuresis and to rule out cirrhosis.    "

## 2022-05-31 NOTE — PT/OT/SLP EVAL
Physical Therapy Co-Evaluation/Treatment    Patient Name:  John Javier Jr.   MRN:  [id number]    Recommendations:     Discharge Recommendations:  home health PT, home health OT   Discharge Equipment Recommendations: none   Barriers to discharge: Decreased caregiver support    Assessment:     John Javier Jr. is a 66 y.o. male admitted with a medical diagnosis of Acute on chronic combined systolic and diastolic heart failure, NYHA class 4.  He presents with the following impairments/functional limitations:  weakness, impaired endurance, impaired self care skills, impaired functional mobilty, gait instability, impaired balance, impaired cardiopulmonary response to activity, decreased safety awareness, edema, decreased coordination. Pt completing functional mobility without physical assist. Ambulated greater than household distance with use of rollator 2* impaired endurance and decreased balance; mild instability noted but no overt LOB. Pt would continue to benefit from skilled acute PT in order to address current deficits and progress functional mobility.     Rehab Prognosis: Good; patient would benefit from acute skilled PT services to address these deficits and reach maximum level of function.    Recent Surgery: Procedure(s) (LRB):  INSERTION, CATHETER, RIGHT HEART (Right)      Plan:     During this hospitalization, patient to be seen 3 x/week to address the identified rehab impairments via gait training, therapeutic activities, therapeutic exercises, neuromuscular re-education and progress toward the following goals:    · Plan of Care Expires:  06/29/22    Subjective     Chief Complaint: none noted   Patient/Family Comments/goals: Pt agreeable to therapy.   Pain/Comfort:  · Pain Rating 1: 0/10    Patients cultural, spiritual, Sabianism conflicts given the current situation: no    Living Environment:  Pt lives alone in a mobile home with 3 SANIYA, B handrails present.  Prior to admission, patient was ambulatory  with use of rollator; previously mod-I with ADLs but recently required assistance due to functional decline PTA. Pt reports that he has not driven in ~1 month due to functional decline.  Equipment used at home: walker, rolling, rollator, shower chair.  DME owned (not currently used): none.  Upon discharge, patient will have assistance from daughter and cousin. Pt reports that he is attempting to move his mobile home to his daughter's town so that she can live with him.    Objective:     Communicated with RN prior to session.  Patient found supine with telemetry  upon PT entry to room.    General Precautions: Standard, fall   Orthopedic Precautions:N/A   Braces: N/A  Respiratory Status: Room air    Exams:  · Cognitive Exam:  Patient is oriented to Person, Place, Time and Situation  · Sensation:    · -       Intact  · Skin Integrity/Edema:      · -       Edema: Mild BLE and BUE; ascites noted  · RLE ROM: WFL  · RLE Strength: WFL  · LLE ROM: WFL  · LLE Strength: WFL    Functional Mobility:  · Bed Mobility:     · Supine to Sit: stand by assistance with HOB elevated and using bed rail   · Transfers:     · Sit to Stand:  stand by assistance with 4 wheeled walker, x1 rep from EOB and x5 reps from toilet  · Completed without use of BUE to mimic sternal precautions that will potentially be in place pending possible surgery   · Toilet Transfer: stand by assistance with  4 wheeled walker  using  Stand Pivot  · Gait: ~350 ft. with SBA using rollator  · Mask donned prior to ambulation outside of room  · demo'd decreased step length, decreased toe-floor clearance, impaired weight-shifting ability, and mild instability  · Cues provided for self-pacing, safety awareness, and symptom assessment   · Balance:   · standing at bathroom sink to complete self-care tasks with SBA    Therapeutic Activities and Exercises:  Pt educated on role of PT and PT POC. Pt verbalized understanding.   Pt educated on the effects of bed rest and the  importance of OOB activity. Pt encouraged to sit UIC majority of day as tolerated and continue daily ambulation with nursing assist. Pt verbalized understanding.  Pt oriented to call bell and instructed to call for staff assist with standing tasks/transfers. Pt verbalized understanding.   Pt educated on LB strengthening exercise (sit<>stand without use of UE). Pt educated on only performing while staff supervision present for increased safety.    AM-PAC 6 CLICK MOBILITY  Total Score:19     Patient left up in chair with all lines intact, call button in reach and RN notified.    GOALS:   Multidisciplinary Problems     Physical Therapy Goals        Problem: Physical Therapy    Goal Priority Disciplines Outcome Goal Variances Interventions   Physical Therapy Goal     PT, PT/OT Ongoing, Progressing     Description: Goals to be met by: 6/10/2022     Patient will increase functional independence with mobility by performin. Supine to sit with [place]  2. Sit to stand transfer with Modified [place]  3. Gait  x 400 feet with Modified [place] using LRAD as needed or without AD.   4. Ascend/descend 3 stair with bilateral Handrails Stand-by Assistance.  5. Lower extremity exercise program x15 reps, with supervision, in order to increase LE strength and (I) with functional mobility.                       History:     Past Medical History:   Diagnosis Date    Anticoagulant long-term use     CHF (congestive heart failure)     Chronic combined systolic and diastolic heart failure 2020    Coronary artery disease     Diabetes mellitus     Digestive disorder     Diverticulitis     Encounter for blood transfusion     Hypertension     Renal disorder     Sleep apnea     Suspected Covid-19 Virus Infection; test negative 2020    Unspecified cirrhosis of liver        Past Surgical History:   Procedure Laterality Date    COLECTOMY      COLONOSCOPY N/A 2022    Procedure: COLON;  Surgeon:  Dk Jacobson MD;  Location: [facility] ENDOSCOPY;  Service: Gastroenterology;  Laterality: N/A;    ESOPHAGOGASTRODUODENOSCOPY N/A 5/10/2022    Procedure: EGD (ESOPHAGOGASTRODUODENOSCOPY);  Surgeon: Dk Jacobson MD;  Location: [facility] ENDOSCOPY;  Service: Gastroenterology;  Laterality: N/A;    INTRALUMINAL GASTROINTESTINAL TRACT IMAGING VIA CAPSULE N/A 5/11/2022    Procedure: IMAGING PROCEDURE, GI TRACT, INTRALUMINAL, VIA CAPSULE;  Surgeon: Dk Jacobson MD;  Location: [facility] ENDOSCOPY;  Service: Gastroenterology;  Laterality: N/A;  done in recovery    RIGHT HEART CATHETERIZATION Right 10/8/2020    Procedure: INSERTION, CATHETER, RIGHT HEART;  Surgeon: Adilson Darden Jr., MD;  [place] [facility];  Service: Cardiology;  Laterality: Right;    RIGHT HEART CATHETERIZATION Right 2/3/2021    Procedure: INSERTION, CATHETER, RIGHT HEART;  Surgeon: Adilson Darden Jr., MD;  [place] [facility];  Service: Cardiology;  Laterality: Right;    RIGHT HEART CATHETERIZATION Right 6/22/2021    Procedure: INSERTION, CATHETER, RIGHT HEART;  Surgeon: Yuki Delgado MD;  [facility] [facility];  Service: Cardiology;  Laterality: Right;       Time Tracking:     PT Received On: 05/31/22  PT Start Time: 0936     PT Stop Time: 1003  PT Total Time (min): 27 min     Billable Minutes: Evaluation 13 and Therapeutic Activity 14  (co-eval performed this date due to need for 2 skilled therapists in order to ensure pt safety, accomodate for pt activity tolerance, and maximize functional potential)     05/31/2022

## 2022-05-31 NOTE — PROGRESS NOTES
[facility] - Transplant Stepdown  Heart Transplant  Progress Note    Patient Name: John Javier Jr.  MRN: [id number]  Admission Date: 5/30/2022  Hospital Length of Stay: 1 days  Attending Physician: Lizzy Cavanaugh MD  Primary Care Provider: Primary Doctor No  Principal Problem:Acute on chronic combined systolic and diastolic heart failure, NYHA class 4    Subjective:     **Interval History: I&O's not accurate. Liver US shows cirrhotic liver morphology with sequela of portal hypertension. To have diagnostic paracentesis today before liver elastography can be done. TTE shows LVEDD is too small for LVAD (5.0). SSW to assess psychosocial situation. Will have CTS review CT scans from step 1, but likely not an advanced options candidate    Continuous Infusions:  Scheduled Meds:   amiodarone  200 mg Oral Daily    atorvastatin  20 mg Oral QHS    docusate sodium  100 mg Oral BID    ferrous sulfate  1 tablet Oral BID    fluticasone propionate  1 spray Each Nostril Daily    heparin (porcine)  5,000 Units Subcutaneous Q8H    pantoprazole  40 mg Oral Daily     PRN Meds:sodium chloride 0.9%    Review of patient's allergies indicates:   Allergen Reactions    Penicillins Anaphylaxis     Pt stated his mother said it will kill him- always inform to take ampicillin    Penicillin     Sitagliptin Other (See Comments)     Pancreatitis & liver disease     Objective:     Vital Signs (Most Recent):  Temp: 97.6 °F (36.4 °C) (05/31/22 0927)  Pulse: 81 (05/31/22 1200)  Resp: 13 (05/31/22 1200)  BP: 102/69 (05/31/22 1200)  SpO2: 96 % (05/31/22 1200) Vital Signs (24h Range):  Temp:  [97.6 °F (36.4 °C)-97.8 °F (36.6 °C)] 97.6 °F (36.4 °C)  Pulse:  [73-83] 81  Resp:  [13-25] 13  SpO2:  [94 %-98 %] 96 %  BP: ()/(47-71) 102/69     Patient Vitals for the past 72 hrs (Last 3 readings):   Weight   05/31/22 0615 60.6 kg (133 lb 9.6 oz)   05/30/22 1415 60.6 kg (133 lb 9.6 oz)     Body mass index is 22.93 kg/m².      Intake/Output Summary  (Last 24 hours) at 5/31/2022 1409  Last data filed at 5/31/2022 0600  Gross per 24 hour   Intake 490 ml   Output 300 ml   Net 190 ml       Hemodynamic Parameters:       Telemetry: SR with LBBB     Physical Exam  Constitutional:       Comments: Temporal wasting   HENT:      Head: Normocephalic and atraumatic.   Eyes:      Conjunctiva/sclera: Conjunctivae normal.      Pupils: Pupils are equal, round, and reactive to light.   Neck:      Comments: Neck veins do not appear elevated  Cardiovascular:      Rate and Rhythm: Normal rate and regular rhythm.   Pulmonary:      Effort: Pulmonary effort is normal.      Breath sounds: Normal breath sounds.   Abdominal:      General: Bowel sounds are normal.      Comments: ++ ascites   Musculoskeletal:         General: Normal range of motion.      Cervical back: Normal range of motion and neck supple.   Skin:     General: Skin is warm and dry.      Capillary Refill: Capillary refill takes 2 to 3 seconds.   Neurological:      General: No focal deficit present.      Mental Status: He is alert and oriented to person, place, and time.   Psychiatric:         Mood and Affect: Mood normal.         Behavior: Behavior normal.         Thought Content: Thought content normal.         Judgment: Judgment normal.       Significant Labs:  CBC:  Recent Labs   Lab 05/28/22 2041 05/30/22 2012 05/31/22  0634   WBC 12.78* 10.43 10.82   RBC 2.47* 2.27* 2.27*   HGB 8.5* 7.8* 7.8*   HCT 25.0* 22.2* 23.0*   * 185 163   * 98 101*   MCH 34.4* 34.4* 34.4*   MCHC 34.0 35.1 33.9     BNP:  Recent Labs   Lab 05/26/22  0138 05/28/22 2041 05/30/22 2012   .7* 373* 369*     CMP:  Recent Labs   Lab 05/28/22  2143 05/30/22  2012 05/31/22  0634    142* 155*   CALCIUM 7.8* 7.8* 7.7*   ALBUMIN 2.0* 2.1* 2.0*   PROT 5.2* 5.5* 5.3*   * 128* 128*   K 4.4 3.5 3.7   CO2 19* 19* 20*   CL 98 98 97   BUN 22 25* 25*   CREATININE 1.5* 1.8* 1.9*   ALKPHOS 90 102 100   ALT 47* 51* 50*   AST 70*  84* 82*   BILITOT 2.0* 2.0* 1.7*      Coagulation:   Recent Labs   Lab 05/26/22  0138 05/26/22  0605 05/30/22 2012   INR 5.57* 4.67* 1.6*     LDH:  No results for input(s): LDH in the last 72 hours.  Microbiology:  Microbiology Results (last 7 days)       Procedure Component Value Units Date/Time    (rule out SBP) Aerobic culture [[id number]]     Order Status: No result Specimen: Ascites     (rule out SBP) Culture, Anaerobic [[id number]]     Order Status: No result Specimen: Ascites     (rule out SBP) Gram stain [[id number]]     Order Status: No result Specimen: Ascites             I have reviewed all pertinent labs within the past 24 hours.    Estimated Creatinine Clearance: 32.5 mL/min (A) (based on SCr of 1.9 mg/dL (H)).    Diagnostic Results:  I have reviewed and interpreted all pertinent imaging results/findings within the past 24 hours.    Assessment and Plan:     No notes on file    * Acute on chronic combined systolic and diastolic heart failure, NYHA class 4  -DCM out of proportion to CAD  -Declined at our center in the past for advanced options 2/2 medical stability, but has had significant decline in activity intolerance over the past 4 months and several admits for ADHF  -Intolerant to any GDMT with hypotension   -TTE done 5/30: LVEDD 5, LVEF 25%, diastolic dysfunction, RV normal, all valves normal, IVC 3, + ascites  -Pathway started on admit, but given likely cirrhosis, small LV and psychosocial issues likely not a candidate for advanced options. Will have CTS weigh in      Cirrhosis  -Records reflect dx of cirrhosis but he has never been biopsied nor had esophageal variceal bleed  -Liver US done here 5/30 shows cirrhotic liver morphology with sequela of portal hypertension  -Once paracentesis completed, will get liver elastography  -Check hepatitis panel  -Follow LFTs    Ascites  -Has had 2 recent taps. Getting diagnostic paracentesis here today    Stage 3a chronic kidney disease  -sCr 1.8 on admit,  baseline closer to 1.5    PAF (paroxysmal atrial fibrillation)  -Currently in SR  -Has not taken Eliquis for months. Will continue holding it for paracentesis  -Continue Amiodarone    Type 2 diabetes mellitus, without long-term current use of insulin  -SSI    CAD (coronary artery disease)  -50-60%% LCx, OM disease on [facility] 2/29/20  -Not on ASA possibly 2/2 past use of Eliquis, which patient reports was starting for SUSIE. He has not taken it in months, however  [place] [id number]  Heart Transplant  [facility] - Transplant Stepdown

## 2022-05-31 NOTE — PROGRESS NOTES
Admit Note     Met with patient to assess needs. Patient is a 66 y.o.  male, admitted for  Chronic combined systolic and diastolic heart failure, dilated cardiomyopathy, hypertensive cardiovascular renal disease, paroxysmal atrial fibrillation, and ventricular tachycardia.   Pt is on the Pathway.     Patient admitted to [facility] on 5/30/2022 .  At this time, patient presents as alert and oriented x 4, good eye contact, calm and communicative.  At this time, patients caregiver is not in attendance.     Household/Family Systems     Patient resides alone at       [address] [place] [address].   2.5-3.0 hours from [facility]     Support system includes cousin Scooter, daughter, siblings and close friends.     Patient does not have dependents that are need of being cared for.     Patients primary caregiver is self with support from cousin and other family members.   Pt reports for Pathway/LVAD his cousin Issac would be the primary caregiver and his close friend Carla Asencio would be the secondary caregiver.    Pt reports his son is law is planing on moving his whole house to [place], [place] in the near future.     Pt's cell: [phone]    Emergency contacts;   Issac Hood (cousin, drives) [phone]  Carla Asencio (friend lives in [place]-20 miles from Pt) [phone]  Farhana Javier (daughter, works full time,  lives in [place], [place]) [phone]  Spenser Yaya (brother, lives in [place], [place]) [phone]  Traci Hager (sister, lives in [place], [place]) [phone]          During admission, patient's caregiver plans to stay at home.  Confirmed patient and patients caregivers do have access to reliable transportation. Pt has a car, but is only driving a little. Pt's cousin and other family members assist with transportation.     Cognitive Status/Learning     Patient reports reading ability as college and states patient does have difficulty with writing and hearing. Pt repots he has left sided hearing  loss and it's possible he may be starting with right sided hearing loss as well.  Pt reports he does not read very often. Pt reports due to current health issues he's having some difficulty with memory and comprehension of new information.     Patient reports patient learns best by one on one support.      Needed: No.   Highest education level: Attended College/Technical School    Vocation/Disability   .  Working for Income: No  If no, reason not working: Disability  Patient receives LTD through is employer [employer]. Pt was a .    Pt reports due to his income he will not qualify for Medicaid, food stamps or section 8 housing.   Pt reports in 2023 his work disability will end and he will loose about a 1,000 a month.   Pt reports most of his financial needs are met.       Adherence     Patient reports a high-medium level of adherence to patients health care regimen.  Pt reports he doesn't miss any medical appoitment unless he's in the hospital.  Pt reports no issues with diet.  Pt reports he had trouble with his medications at one time due to cost and constant changing of medications and doses. Pt reports he's not able to afford 90 day supplies as many insurance companies require, but he can afford a 30 day supply.  Pt reports his Entresto and Eliquis are expensive.   Adherence counseling and education provided. Patient verbalizes understanding.    Substance Use    Patient reports the following substance usage.    Tobacco: none, patient denies any use.  Alcohol: none, patient denies any use.  Illicit Drugs/Non-prescribed Medications: none, patient denies any use.  Patient states clear understanding of the potential impact of substance use.  Substance abstinence/cessation counseling, education and resources provided and reviewed.     Services Utilizing/ADLS    Infusion Service: Prior to admission, patient utilizing? no  Home Health: Prior to admission, patient utilizing? yes Pt  reports he has home health skilled nursing  and physical therapy, however he can't remember the name of the [employer] company.   DME: Prior to admission, yes, pt reports he has a CPAP, however he has not used it for many years.  Pt has a Rolator.   Pulmonary/Cardiac Rehab: Prior to admission, no, pt participated in out pt cardiac rehab in 2016.   Dialysis:  Prior to admission, no  Transplant Specialty Pharmacy:  Prior to admission, no.    Prior to admission, patient reports patient was somewhat  independent with ADLS.  Pt reports since his last hospital discharge a few weeks ago he has not been able to ambulate or care for himself as well as he was a few months ago. Pt reports he spends much of his time on the sofa. The pt repots his cousin Issac assists when needed. PT is only driving once in a while.     Patient reports patient is not able to care for self at this time due to compromised medical condition (as documented in medical record) and physical weakness..  Patient indicates a willingness to care for self once medically cleared to do so.    Insurance/Medications    Insured by   Payer/Plan Subscr  Sex Relation Sub. Ins. ID Effective Group Num   1. WELL[employer] - WE* GIFTY LAWSON * 1955 Male Self [id number] 22                                    [address] [address]   2. [employer] - WE* GIFTY LAWSON * 1955 Male Self [id number]                                    [address] [address]      Primary Insurance (for UNOS reporting): Public Insurance - Medicare FFS (Fee For Service)  Secondary Insurance (for UNOS reporting): None    Patient reports patient should able to obtain and afford medications at this time and at time of discharge.  The pt reports there has been some confusion about medications due to how frequently the his medications were changing. Pt also reports he's only able to afford the 30 day supply, instead of a 90 day supply.  Pt reports Entresto and Eliquis continue to be expensive.     Living  Will/Healthcare Power of     Patient states patient does not have a LW and/or HCPA.   provided education regarding LW and HCPA and the completion of forms.    Coping/Mental Health      Patient is coping adequately with the aid of family members. Patient denies mental health difficulties.   Pt has reported in the past he has difficulty with anxiety, however currently the pt reports no mental health difficulty, no medication for mental needs reported.     Pt reports he has three grandchildren to live for and he want to be able to spend as much time with them as possible.   Pt reports an understanding of the role of the LVAD caregiver and gave the Heart Transplant Social Workers permission to contact them to reassess their commitment to pt and LVAD surgery.   Worker provided support and education.     Discharge Planning    At time of discharge, patient plans to return to patient's home under the care of self, cousin Issac and other family members.  Patients family will transport patient.  Per rounds today, expected discharge date has not been medically determined at this time. Patient and patients caregiver  verbalize understanding and are involved in treatment planning and discharge process.    Additional Concerns    Patient is being followed for needs, education, resources, information, emotional support, supportive counseling, and for supportive and skilled discharge plan of care.  providing ongoing psychosocial support, education, resources and d/c planning as needed.  SW remains available. Patient verbalizes understanding and agreement with information reviewed, social work availability, and how to access available resources as needed.

## 2022-06-01 ENCOUNTER — TELEPHONE (OUTPATIENT)
Dept: TRANSPLANT | Facility: CLINIC | Age: 67
End: 2022-06-01
Payer: COMMERCIAL

## 2022-06-01 PROBLEM — Z51.5 PALLIATIVE CARE ENCOUNTER: Status: ACTIVE | Noted: 2022-06-01

## 2022-06-01 LAB
ALBUMIN SERPL BCP-MCNC: 2 G/DL (ref 3.5–5.2)
ALP SERPL-CCNC: 95 U/L (ref 55–135)
ALT SERPL W/O P-5'-P-CCNC: 50 U/L (ref 10–44)
ANION GAP SERPL CALC-SCNC: 10 MMOL/L (ref 8–16)
AST SERPL-CCNC: 76 U/L (ref 10–40)
BASOPHILS # BLD AUTO: 0 K/UL (ref 0–0.2)
BASOPHILS NFR BLD: 0 % (ref 0–1.9)
BILIRUB SERPL-MCNC: 2.2 MG/DL (ref 0.1–1)
BUN SERPL-MCNC: 26 MG/DL (ref 8–23)
CALCIUM SERPL-MCNC: 7.7 MG/DL (ref 8.7–10.5)
CHLORIDE SERPL-SCNC: 98 MMOL/L (ref 95–110)
CO2 SERPL-SCNC: 20 MMOL/L (ref 23–29)
CREAT SERPL-MCNC: 1.6 MG/DL (ref 0.5–1.4)
DIFFERENTIAL METHOD: ABNORMAL
EOSINOPHIL # BLD AUTO: 0 K/UL (ref 0–0.5)
EOSINOPHIL NFR BLD: 0.1 % (ref 0–8)
ERYTHROCYTE [DISTWIDTH] IN BLOOD BY AUTOMATED COUNT: 15.5 % (ref 11.5–14.5)
EST. GFR  (AFRICAN AMERICAN): 51.5 ML/MIN/1.73 M^2
EST. GFR  (NON AFRICAN AMERICAN): 44.5 ML/MIN/1.73 M^2
GLUCOSE SERPL-MCNC: 135 MG/DL (ref 70–110)
HCT VFR BLD AUTO: 22.2 % (ref 40–54)
HGB BLD-MCNC: 7.9 G/DL (ref 14–18)
IMM GRANULOCYTES # BLD AUTO: 0.05 K/UL (ref 0–0.04)
IMM GRANULOCYTES NFR BLD AUTO: 0.4 % (ref 0–0.5)
LYMPHOCYTES # BLD AUTO: 1 K/UL (ref 1–4.8)
LYMPHOCYTES NFR BLD: 7.9 % (ref 18–48)
MAGNESIUM SERPL-MCNC: 1.8 MG/DL (ref 1.6–2.6)
MCH RBC QN AUTO: 34.8 PG (ref 27–31)
MCHC RBC AUTO-ENTMCNC: 35.6 G/DL (ref 32–36)
MCV RBC AUTO: 98 FL (ref 82–98)
MONOCYTES # BLD AUTO: 0.7 K/UL (ref 0.3–1)
MONOCYTES NFR BLD: 5.7 % (ref 4–15)
NEUTROPHILS # BLD AUTO: 10.4 K/UL (ref 1.8–7.7)
NEUTROPHILS NFR BLD: 85.9 % (ref 38–73)
NRBC BLD-RTO: 0 /100 WBC
PHOSPHATE SERPL-MCNC: 2.4 MG/DL (ref 2.7–4.5)
PLATELET # BLD AUTO: 154 K/UL (ref 150–450)
PMV BLD AUTO: 12 FL (ref 9.2–12.9)
POCT GLUCOSE: 157 MG/DL (ref 70–110)
POTASSIUM SERPL-SCNC: 3.3 MMOL/L (ref 3.5–5.1)
PROT SERPL-MCNC: 5.2 G/DL (ref 6–8.4)
RBC # BLD AUTO: 2.27 M/UL (ref 4.6–6.2)
SODIUM SERPL-SCNC: 128 MMOL/L (ref 136–145)
WBC # BLD AUTO: 12.06 K/UL (ref 3.9–12.7)

## 2022-06-01 PROCEDURE — 99205 OFFICE O/P NEW HI 60 MIN: CPT | Mod: ,,, | Performed by: INTERNAL MEDICINE

## 2022-06-01 PROCEDURE — 36415 COLL VENOUS BLD VENIPUNCTURE: CPT | Mod: NTX | Performed by: NURSE PRACTITIONER

## 2022-06-01 PROCEDURE — 84100 ASSAY OF PHOSPHORUS: CPT | Mod: NTX | Performed by: NURSE PRACTITIONER

## 2022-06-01 PROCEDURE — 80074 ACUTE HEPATITIS PANEL: CPT | Mod: NTX | Performed by: INTERNAL MEDICINE

## 2022-06-01 PROCEDURE — 80053 COMPREHEN METABOLIC PANEL: CPT | Mod: NTX | Performed by: NURSE PRACTITIONER

## 2022-06-01 PROCEDURE — 25000003 PHARM REV CODE 250: Performed by: NURSE PRACTITIONER

## 2022-06-01 PROCEDURE — 96372 THER/PROPH/DIAG INJ SC/IM: CPT | Performed by: NURSE PRACTITIONER

## 2022-06-01 PROCEDURE — G0378 HOSPITAL OBSERVATION PER HR: HCPCS

## 2022-06-01 PROCEDURE — 99226 PR SUBSEQUENT OBSERVATION CARE,LEVEL III: CPT | Mod: GT,,, | Performed by: PHYSICIAN ASSISTANT

## 2022-06-01 PROCEDURE — 85025 COMPLETE CBC W/AUTO DIFF WBC: CPT | Performed by: NURSE PRACTITIONER

## 2022-06-01 PROCEDURE — 99205 PR OFFICE/OUTPT VISIT, NEW, LEVL V, 60-74 MIN: ICD-10-PCS | Mod: ,,, | Performed by: INTERNAL MEDICINE

## 2022-06-01 PROCEDURE — 99226 PR SUBSEQUENT OBSERVATION CARE,LEVEL III: ICD-10-PCS | Mod: GT,,, | Performed by: PHYSICIAN ASSISTANT

## 2022-06-01 PROCEDURE — 63600175 PHARM REV CODE 636 W HCPCS: Mod: NTX | Performed by: NURSE PRACTITIONER

## 2022-06-01 PROCEDURE — 94761 N-INVAS EAR/PLS OXIMETRY MLT: CPT

## 2022-06-01 PROCEDURE — 25000003 PHARM REV CODE 250: Performed by: STUDENT IN AN ORGANIZED HEALTH CARE EDUCATION/TRAINING PROGRAM

## 2022-06-01 PROCEDURE — 83735 ASSAY OF MAGNESIUM: CPT | Performed by: NURSE PRACTITIONER

## 2022-06-01 PROCEDURE — 25000003 PHARM REV CODE 250: Performed by: PHYSICIAN ASSISTANT

## 2022-06-01 RX ORDER — ACETAMINOPHEN 500 MG
1000 TABLET ORAL EVERY 6 HOURS PRN
Status: DISCONTINUED | OUTPATIENT
Start: 2022-06-01 | End: 2022-06-03 | Stop reason: HOSPADM

## 2022-06-01 RX ADMIN — AMIODARONE HYDROCHLORIDE 200 MG: 200 TABLET ORAL at 08:06

## 2022-06-01 RX ADMIN — HEPARIN SODIUM 5000 UNITS: 5000 INJECTION INTRAVENOUS; SUBCUTANEOUS at 05:06

## 2022-06-01 RX ADMIN — FLUTICASONE PROPIONATE 50 MCG: 50 SPRAY, METERED NASAL at 08:06

## 2022-06-01 RX ADMIN — DOCUSATE SODIUM 100 MG: 100 CAPSULE ORAL at 08:06

## 2022-06-01 RX ADMIN — ATORVASTATIN CALCIUM 20 MG: 20 TABLET, FILM COATED ORAL at 08:06

## 2022-06-01 RX ADMIN — FERROUS SULFATE TAB 325 MG (65 MG ELEMENTAL FE) 1 EACH: 325 (65 FE) TAB at 08:06

## 2022-06-01 RX ADMIN — CALCIUM CARBONATE (ANTACID) CHEW TAB 500 MG 500 MG: 500 CHEW TAB at 08:06

## 2022-06-01 RX ADMIN — ACETAMINOPHEN 1000 MG: 500 TABLET ORAL at 07:06

## 2022-06-01 RX ADMIN — PANTOPRAZOLE SODIUM 40 MG: 40 TABLET, DELAYED RELEASE ORAL at 08:06

## 2022-06-01 NOTE — PROGRESS NOTES
[facility] - Transplant Stepdown  Heart Transplant  Progress Note    Patient Name: John Javier Jr.  MRN: [id number]  Admission Date: 5/30/2022  Hospital Length of Stay: 1 days  Attending Physician: Lizzy Cavanaugh MD  Primary Care Provider: Primary Doctor No  Principal Problem:Acute on chronic combined systolic and diastolic heart failure, NYHA class 4    Subjective:     Interval History: Liver US yesterday with findings consistent with cirrhosis. IR removed 4.8 L of serous fluid with paracentesis yesterday. CTS evaluated patient yesterday, not a candidate for OHTx or LVAD in setting of cirrhosis. With end stage heart failure and liver disease, palliative care consulted to discuss GOC/hospice.     Continuous Infusions:  Scheduled Meds:   amiodarone  200 mg Oral Daily    atorvastatin  20 mg Oral QHS    docusate sodium  100 mg Oral BID    ferrous sulfate  1 tablet Oral BID    fluticasone propionate  1 spray Each Nostril Daily    heparin (porcine)  5,000 Units Subcutaneous Q8H    pantoprazole  40 mg Oral Daily     PRN Meds:calcium carbonate, dextrose 10%, dextrose 10%, sodium chloride 0.9%    Review of patient's allergies indicates:   Allergen Reactions    Penicillins Anaphylaxis     Pt stated his mother said it will kill him- always inform to take ampicillin    Penicillin     Sitagliptin Other (See Comments)     Pancreatitis & liver disease     Objective:     Vital Signs (Most Recent):  Temp: 98.4 °F (36.9 °C) (05/31/22 2000)  Pulse: 80 (06/01/22 1102)  Resp: 16 (06/01/22 0800)  BP: (!) 102/55 (06/01/22 0800)  SpO2: 97 % (06/01/22 0800) Vital Signs (24h Range):  Temp:  [98.1 °F (36.7 °C)-98.4 °F (36.9 °C)] 98.4 °F (36.9 °C)  Pulse:  [78-89] 80  Resp:  [13-16] 16  SpO2:  [94 %-98 %] 97 %  BP: (102-109)/(55-74) 102/55     Patient Vitals for the past 72 hrs (Last 3 readings):   Weight   06/01/22 0530 54.6 kg (120 lb 5.9 oz)   05/31/22 0615 60.6 kg (133 lb 9.6 oz)   05/30/22 1415 60.6 kg (133 lb 9.6 oz)        Body mass index is 20.66 kg/m².      Intake/Output Summary (Last 24 hours) at 6/1/2022 1250  Last data filed at 6/1/2022 0530  Gross per 24 hour   Intake 480 ml   Output 5100 ml   Net -4620 ml         Hemodynamic Parameters:       Telemetry: SR with LBBB     Physical Exam  Constitutional:       Comments: Temporal wasting   HENT:      Head: Normocephalic and atraumatic.   Eyes:      Conjunctiva/sclera: Conjunctivae normal.      Pupils: Pupils are equal, round, and reactive to light.   Neck:      Comments: Neck veins do not appear elevated  Cardiovascular:      Rate and Rhythm: Normal rate and regular rhythm.   Pulmonary:      Effort: Pulmonary effort is normal.      Breath sounds: Normal breath sounds.   Abdominal:      General: Bowel sounds are normal.      Comments: + ascites   Musculoskeletal:         General: Normal range of motion.      Cervical back: Normal range of motion and neck supple.   Skin:     General: Skin is warm and dry.      Capillary Refill: Capillary refill takes 2 to 3 seconds.   Neurological:      General: No focal deficit present.      Mental Status: He is alert and oriented to person, place, and time.   Psychiatric:         Mood and Affect: Mood normal.         Behavior: Behavior normal.         Thought Content: Thought content normal.         Judgment: Judgment normal.       Significant Labs:  CBC:  Recent Labs   Lab 05/30/22 2012 05/31/22  0634 06/01/22  0642   WBC 10.43 10.82 12.06   RBC 2.27* 2.27* 2.27*   HGB 7.8* 7.8* 7.9*   HCT 22.2* 23.0* 22.2*    163 154   MCV 98 101* 98   MCH 34.4* 34.4* 34.8*   MCHC 35.1 33.9 35.6       BNP:  Recent Labs   Lab 05/26/22  0138 05/28/22  2041 05/30/22 2012   .7* 373* 369*       CMP:  Recent Labs   Lab 05/30/22 2012 05/31/22  0634 06/01/22  0642   * 155* 135*   CALCIUM 7.8* 7.7* 7.7*   ALBUMIN 2.1* 2.0* 2.0*   PROT 5.5* 5.3* 5.2*   * 128* 128*   K 3.5 3.7 3.3*   CO2 19* 20* 20*   CL 98 97 98   BUN 25* 25* 26*    CREATININE 1.8* 1.9* 1.6*   ALKPHOS 102 100 95   ALT 51* 50* 50*   AST 84* 82* 76*   BILITOT 2.0* 1.7* 2.2*        Coagulation:   Recent Labs   Lab 05/26/22  0138 05/26/22  0605 05/30/22 2012   INR 5.57* 4.67* 1.6*       LDH:  No results for input(s): LDH in the last 72 hours.  Microbiology:  Microbiology Results (last 7 days)       Procedure Component Value Units Date/Time    (rule out SBP) Aerobic culture [[id number]] Collected: 05/31/22 1510    Order Status: Completed Specimen: Ascites Updated: 06/01/22 0644     Aerobic Bacterial Culture No growth    Narrative:      To rule out SBP order labs: Aerobic culture [[id number]],  Culture, Anaerobic [[id number]], Gram stain [[id number]], Albumin  [DKA289], Protein [BRD638], LDH [IVR799], WBC \T\ Dff  [[id number]].    (rule out SBP) Gram stain [[id number]] Collected: 05/31/22 1510    Order Status: Completed Specimen: Ascites Updated: 05/31/22 2146     Gram Stain Result Rare WBC's      No organisms seen    Narrative:      To rule out SBP order labs: Aerobic culture [[id number]],  Culture, Anaerobic [[id number]], Gram stain [[id number]], Albumin  [FYG821], Protein [YOA170], LDH [UKN223], WBC \T\ Dff  [YLY1700].    (rule out SBP) Culture, Anaerobic [[id number]] Collected: 05/31/22 1510    Order Status: Sent Specimen: Ascites Updated: 05/31/22 1904            I have reviewed all pertinent labs within the past 24 hours.    Estimated Creatinine Clearance: 35.5 mL/min (A) (based on SCr of 1.6 mg/dL (H)).    Diagnostic Results:  I have reviewed and interpreted all pertinent imaging results/findings within the past 24 hours.    Assessment and Plan:     No notes on file    * Acute on chronic combined systolic and diastolic heart failure, NYHA class 4  -DCM out of proportion to CAD  -Declined at our center in the past for advanced options 2/2 medical stability, but has had significant decline in activity intolerance over the past 4 months and several admits for ADHF  -Intolerant to any GDMT with hypotension    -TTE done 5/30: LVEDD 5, LVEF 25%, diastolic dysfunction, RV normal, all valves normal, IVC 3, + ascites  -Pathway started on admit, but given likely cirrhosis, small LV and psychosocial issues likely not a candidate for advanced options. CTS evaluated patient yesterday, not a candidate for advanced options with cirrhosis (confirmed on liver US).   -With end stage heart failure and liver disease, palliative care consulted for GOC/hospice discussion       Cirrhosis  -Records reflect dx of cirrhosis but he has never been biopsied nor had esophageal variceal bleed  -Liver US done here 5/30 shows cirrhotic liver morphology with sequela of portal hypertension  -Paracentesis 5/31 with 4.8 L removed  -Outpatient follow up with hepatology     Ascites  -See Cirrhosis     Stage 3a chronic kidney disease  -sCr 1.8 on admit, baseline closer to 1.5    PAF (paroxysmal atrial fibrillation)  -Currently in SR  -Has not taken Eliquis for months. Will continue holding it for paracentesis  -Continue Amiodarone    Type 2 diabetes mellitus, without long-term current use of insulin  -SSI    CAD (coronary artery disease)  -50-60%% LCx, OM disease on [facility] 2/29/20  -Not on ASA possibly 2/2 past use of Eliquis, which patient reports was starting for SUSIE. He has not taken it in months, however  -Continue statin      Anila Gardner PA-C  Heart Transplant  [facility] - Transplant Stepdown

## 2022-06-01 NOTE — ASSESSMENT & PLAN NOTE
-Records reflect dx of cirrhosis but he has never been biopsied nor had esophageal variceal bleed  -Liver US done here 5/30 shows cirrhotic liver morphology with sequela of portal hypertension  -Paracentesis 5/31 with 4.8 L removed  -Outpatient follow up with hepatology

## 2022-06-01 NOTE — H&P
Inpatient radiology H&P    John Javier Jr. is a 66 y.o. male with hx of liver and end stage heart failure not a candidate for OHTx or LVAD in setting of cirrhosis. Refractory ascites s/p para 5/31 draining 4.5 L. Palliative care consulted to discuss GOC/hospice. IR consulted for peritoneal pleurx catheter.    Past Medical History:   Diagnosis Date    Anticoagulant long-term use     CHF (congestive heart failure)     Chronic combined systolic and diastolic heart failure 03/01/2020    Cirrhosis 5/31/2022    Coronary artery disease     Diabetes mellitus     Digestive disorder     Diverticulitis     Encounter for blood transfusion     Hypertension     Renal disorder     Sleep apnea     Suspected Covid-19 Virus Infection; test negative 03/14/2020    Unspecified cirrhosis of liver      Past Surgical History:   Procedure Laterality Date    COLECTOMY      COLONOSCOPY N/A 5/11/2022    Procedure: COLON;  Surgeon: Dk Jacobson MD;  Location: [facility] ENDOSCOPY;  Service: Gastroenterology;  Laterality: N/A;    ESOPHAGOGASTRODUODENOSCOPY N/A 5/10/2022    Procedure: EGD (ESOPHAGOGASTRODUODENOSCOPY);  Surgeon: Dk Jacobson MD;  Location: [facility] ENDOSCOPY;  Service: Gastroenterology;  Laterality: N/A;    INTRALUMINAL GASTROINTESTINAL TRACT IMAGING VIA CAPSULE N/A 5/11/2022    Procedure: IMAGING PROCEDURE, GI TRACT, INTRALUMINAL, VIA CAPSULE;  Surgeon: Dk Jacobson MD;  Location: [facility] ENDOSCOPY;  Service: Gastroenterology;  Laterality: N/A;  done in recovery    RIGHT HEART CATHETERIZATION Right 10/8/2020    Procedure: INSERTION, CATHETER, RIGHT HEART;  Surgeon: Adilson Darden Jr., MD;  Location: [facility] CATH LAB;  Service: Cardiology;  Laterality: Right;    RIGHT HEART CATHETERIZATION Right 2/3/2021    Procedure: INSERTION, CATHETER, RIGHT HEART;  Surgeon: Adilson Darden Jr., MD;  Location: [facility] CATH LAB;  Service: Cardiology;  Laterality: Right;    RIGHT  HEART CATHETERIZATION Right 6/22/2021    Procedure: INSERTION, CATHETER, RIGHT HEART;  Surgeon: Yuki Delgado MD;  Location: [facility] CATH LAB;  Service: Cardiology;  Laterality: Right;       Discussed with primary team, Dr. Sow.    Imaging reviewed with Radiology staff, Dr. Agarwal.     Procedure: peritoneal pleurx catheter    Scheduled Meds:    amiodarone  200 mg Oral Daily    atorvastatin  20 mg Oral QHS    docusate sodium  100 mg Oral BID    ferrous sulfate  1 tablet Oral BID    fluticasone propionate  1 spray Each Nostril Daily    heparin (porcine)  5,000 Units Subcutaneous Q8H    pantoprazole  40 mg Oral Daily     Continuous Infusions:   PRN Meds:calcium carbonate, dextrose 10%, dextrose 10%, sodium chloride 0.9%    Allergies:   Review of patient's allergies indicates:   Allergen Reactions    Penicillins Anaphylaxis     Pt stated his mother said it will kill him- always inform to take ampicillin    Penicillin     Sitagliptin Other (See Comments)     Pancreatitis & liver disease       Labs:  Recent Labs   Lab 05/30/22 2012   INR 1.6*       Recent Labs   Lab 06/01/22  0642   WBC 12.06   HGB 7.9*   HCT 22.2*   MCV 98         Recent Labs   Lab 06/01/22  0642   *   *   K 3.3*   CL 98   CO2 20*   BUN 26*   CREATININE 1.6*   CALCIUM 7.7*   MG 1.8   ALT 50*   AST 76*   ALBUMIN 2.0*   BILITOT 2.2*         Vitals (Most Recent):  Temp: 97.7 °F (36.5 °C) (06/01/22 1252)  Pulse: 82 (06/01/22 1252)  Resp: 16 (06/01/22 0800)  BP: (!) 101/51 (06/01/22 1252)  SpO2: 97 % (06/01/22 1252)    Plan:   1. NPO after midnight.  2. Hold anticoagulants 1 dose of heparin before procedure.  3. Will tantiverly scheduled for 6/3/2022.    Haleigh Ulrich MD  Radiology Resident PGY- 2  [facility]

## 2022-06-01 NOTE — PLAN OF CARE
Pt. AAOx4, VSS, spo2> 94% on room air. NRS on telemetry monitor. Urinal at bedside and within reach. No complaints of pain/N/V this shift, Prn tums given for indigestion. Total UOP in flowsheets. Bed in low locked position, call light within reach, pt instructed to call for assistance needed, pt verbalized understanding, remains free from falls this shift. WCTM.

## 2022-06-01 NOTE — LETTER
2022    RE: John Javier    MRN [id number]    57          To Whom It May Concern:    Mr. John Javier Jr. is a 66 y.o. year-old male patient at [facility] and was seen for consideration for advanced heart failure options, cardiac transplantation and/or LVAD placement.  He has a diagnosis of dilated cardiomyopathy.  He is impaired in his exercise tolerance with a NYHA Functional Class III, and an ejection fraction of 25%.  He is currently admitted to the hospital for IV diuresis.    We feel that his functional impairment and lack of advanced options make his quality of life poor.  We feel that his only option at this time, after thorough review of all of his findings, is cardiac transplantation or LVAD placement.  Therefore, we request your review of this case and approval for a cardiac transplant/LVAD evaluation.    If we can be of any further assistance, please do not hesitate to contact us at [phone].    Sincerely,    Lizzy Cavanaugh MD  Section Head, Cardiomyopathy & Heart Transplantation  Medical Director, Mechanical Circulatory Support Program      [address] [place] [address] - [address] [place] [address]  [phone]

## 2022-06-01 NOTE — ASSESSMENT & PLAN NOTE
-DCM out of proportion to CAD  -Declined at our center in the past for advanced options 2/2 medical stability, but has had significant decline in activity intolerance over the past 4 months and several admits for ADHF  -Intolerant to any GDMT with hypotension   -TTE done 5/30: LVEDD 5, LVEF 25%, diastolic dysfunction, RV normal, all valves normal, IVC 3, + ascites  -Pathway started on admit, but given likely cirrhosis, small LV and psychosocial issues likely not a candidate for advanced options. CTS evaluated patient yesterday, not a candidate for advanced options with cirrhosis (confirmed on liver US).   -With end stage heart failure and liver disease, palliative care consulted for GOC/hospice discussion

## 2022-06-01 NOTE — SUBJECTIVE & OBJECTIVE
Interval History: Liver US yesterday with findings consistent with cirrhosis. IR removed 4.8 L of serous fluid with paracentesis yesterday. CTS evaluated patient yesterday, not a candidate for OHTx or LVAD in setting of cirrhosis. With end stage heart failure and liver disease, palliative care consulted to discuss GOC/hospice.     Continuous Infusions:  Scheduled Meds:   amiodarone  200 mg Oral Daily    atorvastatin  20 mg Oral QHS    docusate sodium  100 mg Oral BID    ferrous sulfate  1 tablet Oral BID    fluticasone propionate  1 spray Each Nostril Daily    heparin (porcine)  5,000 Units Subcutaneous Q8H    pantoprazole  40 mg Oral Daily     PRN Meds:calcium carbonate, dextrose 10%, dextrose 10%, sodium chloride 0.9%    Review of patient's allergies indicates:   Allergen Reactions    Penicillins Anaphylaxis     Pt stated his mother said it will kill him- always inform to take ampicillin    Penicillin     Sitagliptin Other (See Comments)     Pancreatitis & liver disease     Objective:     Vital Signs (Most Recent):  Temp: 98.4 °F (36.9 °C) (05/31/22 2000)  Pulse: 80 (06/01/22 1102)  Resp: 16 (06/01/22 0800)  BP: (!) 102/55 (06/01/22 0800)  SpO2: 97 % (06/01/22 0800) Vital Signs (24h Range):  Temp:  [98.1 °F (36.7 °C)-98.4 °F (36.9 °C)] 98.4 °F (36.9 °C)  Pulse:  [78-89] 80  Resp:  [13-16] 16  SpO2:  [94 %-98 %] 97 %  BP: (102-109)/(55-74) 102/55     Patient Vitals for the past 72 hrs (Last 3 readings):   Weight   06/01/22 0530 54.6 kg (120 lb 5.9 oz)   05/31/22 0615 60.6 kg (133 lb 9.6 oz)   05/30/22 1415 60.6 kg (133 lb 9.6 oz)       Body mass index is 20.66 kg/m².      Intake/Output Summary (Last 24 hours) at 6/1/2022 1250  Last data filed at 6/1/2022 0530  Gross per 24 hour   Intake 480 ml   Output 5100 ml   Net -4620 ml         Hemodynamic Parameters:       Telemetry: SR with LBBB     Physical Exam  Constitutional:       Comments: Temporal wasting   HENT:      Head: Normocephalic and atraumatic.   Eyes:       Conjunctiva/sclera: Conjunctivae normal.      Pupils: Pupils are equal, round, and reactive to light.   Neck:      Comments: Neck veins do not appear elevated  Cardiovascular:      Rate and Rhythm: Normal rate and regular rhythm.   Pulmonary:      Effort: Pulmonary effort is normal.      Breath sounds: Normal breath sounds.   Abdominal:      General: Bowel sounds are normal.      Comments: + ascites   Musculoskeletal:         General: Normal range of motion.      Cervical back: Normal range of motion and neck supple.   Skin:     General: Skin is warm and dry.      Capillary Refill: Capillary refill takes 2 to 3 seconds.   Neurological:      General: No focal deficit present.      Mental Status: He is alert and oriented to person, place, and time.   Psychiatric:         Mood and Affect: Mood normal.         Behavior: Behavior normal.         Thought Content: Thought content normal.         Judgment: Judgment normal.       Significant Labs:  CBC:  Recent Labs   Lab 05/30/22 2012 05/31/22  0634 06/01/22  0642   WBC 10.43 10.82 12.06   RBC 2.27* 2.27* 2.27*   HGB 7.8* 7.8* 7.9*   HCT 22.2* 23.0* 22.2*    163 154   MCV 98 101* 98   MCH 34.4* 34.4* 34.8*   MCHC 35.1 33.9 35.6       BNP:  Recent Labs   Lab 05/26/22 0138 05/28/22 2041 05/30/22 2012   .7* 373* 369*       CMP:  Recent Labs   Lab 05/30/22 2012 05/31/22  0634 06/01/22  0642   * 155* 135*   CALCIUM 7.8* 7.7* 7.7*   ALBUMIN 2.1* 2.0* 2.0*   PROT 5.5* 5.3* 5.2*   * 128* 128*   K 3.5 3.7 3.3*   CO2 19* 20* 20*   CL 98 97 98   BUN 25* 25* 26*   CREATININE 1.8* 1.9* 1.6*   ALKPHOS 102 100 95   ALT 51* 50* 50*   AST 84* 82* 76*   BILITOT 2.0* 1.7* 2.2*        Coagulation:   Recent Labs   Lab 05/26/22 0138 05/26/22 0605 05/30/22 2012   INR 5.57* 4.67* 1.6*       LDH:  No results for input(s): LDH in the last 72 hours.  Microbiology:  Microbiology Results (last 7 days)       Procedure Component Value Units Date/Time    (rule out SBP)  Aerobic culture [[id number]] Collected: 05/31/22 1510    Order Status: Completed Specimen: Ascites Updated: 06/01/22 0644     Aerobic Bacterial Culture No growth    Narrative:      To rule out SBP order labs: Aerobic culture [[id number]],  Culture, Anaerobic [[id number]], Gram stain [[id number]], Albumin  [XQO824], Protein [VNJ797], LDH [JJH253], WBC \T\ Dff  [[id number]].    (rule out SBP) Gram stain [[id number]] Collected: 05/31/22 1510    Order Status: Completed Specimen: Ascites Updated: 05/31/22 2146     Gram Stain Result Rare WBC's      No organisms seen    Narrative:      To rule out SBP order labs: Aerobic culture [[id number]],  Culture, Anaerobic [[id number]], Gram stain [LEL488], Albumin  [UCC214], Protein [AMC613], LDH [VZO917], WBC \T\ Dff  [[id number]].    (rule out SBP) Culture, Anaerobic [[id number]] Collected: 05/31/22 1510    Order Status: Sent Specimen: Ascites Updated: 05/31/22 1904            I have reviewed all pertinent labs within the past 24 hours.    Estimated Creatinine Clearance: 35.5 mL/min (A) (based on SCr of 1.6 mg/dL (H)).    Diagnostic Results:  I have reviewed and interpreted all pertinent imaging results/findings within the past 24 hours.

## 2022-06-01 NOTE — CONSULTS
Please see H&P.    Haleigh Ulrich MD  Radiology Resident PGY- 2  [facility]   Pager: [phone]

## 2022-06-01 NOTE — ASSESSMENT & PLAN NOTE
-50-60%% LCx, OM disease on [facility] 2/29/20  -Not on ASA possibly 2/2 past use of Eliquis, which patient reports was starting for SUSIE. He has not taken it in months, however  -Continue statin

## 2022-06-01 NOTE — PROGRESS NOTES
OBS Update  Pallative Care to meet with family and patient to discuss Goals     Patient's son is attempting to find transportation to the hospital currently per MARISOL [facility]

## 2022-06-01 NOTE — PROGRESS NOTES
Update    Pt presents in room, aaox4 with neutral affect. Pt lying on side with knees bent up. Pt reports he is dealing with information provided by medical team regarding poor prognosis. Pt states team is talking to pt about meeting with Palliative Care and possibly going home on hospice. Pt reports his brother will be coming today, once he is able to rent a car. Pt gives permission for brother, or sister in [place], to be contacted. Pt declines referral to  services. Pt reports coping appropriately and denies further needs, questions, concerns at this time and none indicated. Providing ongoing psychosocial and counseling support, education, resources, assistance and discharge planning as indicated. Following and available.

## 2022-06-02 LAB
ABO + RH BLD: NORMAL
ALBUMIN SERPL BCP-MCNC: 2 G/DL (ref 3.5–5.2)
ALP SERPL-CCNC: 94 U/L (ref 55–135)
ALT SERPL W/O P-5'-P-CCNC: 53 U/L (ref 10–44)
ANION GAP SERPL CALC-SCNC: 7 MMOL/L (ref 8–16)
AST SERPL-CCNC: 84 U/L (ref 10–40)
BASOPHILS # BLD AUTO: 0.01 K/UL (ref 0–0.2)
BASOPHILS NFR BLD: 0.1 % (ref 0–1.9)
BILIRUB SERPL-MCNC: 2.2 MG/DL (ref 0.1–1)
BLD GP AB SCN CELLS X3 SERPL QL: NORMAL
BUN SERPL-MCNC: 23 MG/DL (ref 8–23)
CALCIUM SERPL-MCNC: 7.7 MG/DL (ref 8.7–10.5)
CHLORIDE SERPL-SCNC: 97 MMOL/L (ref 95–110)
CO2 SERPL-SCNC: 23 MMOL/L (ref 23–29)
CREAT SERPL-MCNC: 1.5 MG/DL (ref 0.5–1.4)
DIFFERENTIAL METHOD: ABNORMAL
EOSINOPHIL # BLD AUTO: 0.1 K/UL (ref 0–0.5)
EOSINOPHIL NFR BLD: 0.6 % (ref 0–8)
ERYTHROCYTE [DISTWIDTH] IN BLOOD BY AUTOMATED COUNT: 15.3 % (ref 11.5–14.5)
EST. GFR  (AFRICAN AMERICAN): 55.7 ML/MIN/1.73 M^2
EST. GFR  (NON AFRICAN AMERICAN): 48.2 ML/MIN/1.73 M^2
GLUCOSE SERPL-MCNC: 87 MG/DL (ref 70–110)
HAV IGM SERPL QL IA: NEGATIVE
HBV CORE IGM SERPL QL IA: NEGATIVE
HBV SURFACE AG SERPL QL IA: NEGATIVE
HCT VFR BLD AUTO: 24.2 % (ref 40–54)
HCV AB SERPL QL IA: NEGATIVE
HGB BLD-MCNC: 8.8 G/DL (ref 14–18)
IMM GRANULOCYTES # BLD AUTO: 0.05 K/UL (ref 0–0.04)
IMM GRANULOCYTES NFR BLD AUTO: 0.4 % (ref 0–0.5)
LYMPHOCYTES # BLD AUTO: 1.4 K/UL (ref 1–4.8)
LYMPHOCYTES NFR BLD: 12.7 % (ref 18–48)
MAGNESIUM SERPL-MCNC: 1.7 MG/DL (ref 1.6–2.6)
MCH RBC QN AUTO: 34.4 PG (ref 27–31)
MCHC RBC AUTO-ENTMCNC: 36.4 G/DL (ref 32–36)
MCV RBC AUTO: 95 FL (ref 82–98)
MONOCYTES # BLD AUTO: 0.7 K/UL (ref 0.3–1)
MONOCYTES NFR BLD: 6.6 % (ref 4–15)
NEUTROPHILS # BLD AUTO: 8.9 K/UL (ref 1.8–7.7)
NEUTROPHILS NFR BLD: 79.6 % (ref 38–73)
NRBC BLD-RTO: 0 /100 WBC
PHOSPHATE SERPL-MCNC: 2.3 MG/DL (ref 2.7–4.5)
PLATELET # BLD AUTO: 139 K/UL (ref 150–450)
PMV BLD AUTO: 11.7 FL (ref 9.2–12.9)
POCT GLUCOSE: 194 MG/DL (ref 70–110)
POTASSIUM SERPL-SCNC: 3.2 MMOL/L (ref 3.5–5.1)
PROT SERPL-MCNC: 5.3 G/DL (ref 6–8.4)
RBC # BLD AUTO: 2.56 M/UL (ref 4.6–6.2)
SODIUM SERPL-SCNC: 127 MMOL/L (ref 136–145)
WBC # BLD AUTO: 11.15 K/UL (ref 3.9–12.7)

## 2022-06-02 PROCEDURE — 97530 THERAPEUTIC ACTIVITIES: CPT | Mod: CQ,NTX

## 2022-06-02 PROCEDURE — 97116 GAIT TRAINING THERAPY: CPT | Mod: CQ

## 2022-06-02 PROCEDURE — 80053 COMPREHEN METABOLIC PANEL: CPT | Mod: NTX | Performed by: NURSE PRACTITIONER

## 2022-06-02 PROCEDURE — 25000003 PHARM REV CODE 250: Mod: NTX | Performed by: NURSE PRACTITIONER

## 2022-06-02 PROCEDURE — 99226 PR SUBSEQUENT OBSERVATION CARE,LEVEL III: ICD-10-PCS | Mod: GT,,, | Performed by: PHYSICIAN ASSISTANT

## 2022-06-02 PROCEDURE — 63600175 PHARM REV CODE 636 W HCPCS: Mod: NTX | Performed by: NURSE PRACTITIONER

## 2022-06-02 PROCEDURE — 86901 BLOOD TYPING SEROLOGIC RH(D): CPT | Performed by: INTERNAL MEDICINE

## 2022-06-02 PROCEDURE — 99226 PR SUBSEQUENT OBSERVATION CARE,LEVEL III: CPT | Mod: GT,,, | Performed by: PHYSICIAN ASSISTANT

## 2022-06-02 PROCEDURE — 99215 PR OFFICE/OUTPT VISIT, EST, LEVL V, 40-54 MIN: ICD-10-PCS | Mod: ,,, | Performed by: INTERNAL MEDICINE

## 2022-06-02 PROCEDURE — 99215 OFFICE O/P EST HI 40 MIN: CPT | Mod: ,,, | Performed by: INTERNAL MEDICINE

## 2022-06-02 PROCEDURE — 96372 THER/PROPH/DIAG INJ SC/IM: CPT | Performed by: NURSE PRACTITIONER

## 2022-06-02 PROCEDURE — 25000003 PHARM REV CODE 250: Performed by: PHYSICIAN ASSISTANT

## 2022-06-02 PROCEDURE — 84100 ASSAY OF PHOSPHORUS: CPT | Performed by: NURSE PRACTITIONER

## 2022-06-02 PROCEDURE — 36415 COLL VENOUS BLD VENIPUNCTURE: CPT | Mod: NTX | Performed by: NURSE PRACTITIONER

## 2022-06-02 PROCEDURE — 85025 COMPLETE CBC W/AUTO DIFF WBC: CPT | Mod: NTX | Performed by: NURSE PRACTITIONER

## 2022-06-02 PROCEDURE — 83735 ASSAY OF MAGNESIUM: CPT | Mod: NTX | Performed by: NURSE PRACTITIONER

## 2022-06-02 PROCEDURE — G0378 HOSPITAL OBSERVATION PER HR: HCPCS

## 2022-06-02 RX ORDER — POTASSIUM CHLORIDE 20 MEQ/1
40 TABLET, EXTENDED RELEASE ORAL EVERY 4 HOURS
Status: COMPLETED | OUTPATIENT
Start: 2022-06-02 | End: 2022-06-02

## 2022-06-02 RX ORDER — SIMETHICONE 80 MG
80 TABLET,CHEWABLE ORAL 3 TIMES DAILY PRN
Qty: 30 TABLET | Refills: 0 | Status: SHIPPED | OUTPATIENT
Start: 2022-06-02 | End: 2022-06-16

## 2022-06-02 RX ORDER — POTASSIUM CHLORIDE 20 MEQ/1
40 TABLET, EXTENDED RELEASE ORAL EVERY 4 HOURS
Status: DISCONTINUED | OUTPATIENT
Start: 2022-06-02 | End: 2022-06-02

## 2022-06-02 RX ADMIN — FLUTICASONE PROPIONATE 50 MCG: 50 SPRAY, METERED NASAL at 09:06

## 2022-06-02 RX ADMIN — FERROUS SULFATE TAB 325 MG (65 MG ELEMENTAL FE) 1 EACH: 325 (65 FE) TAB at 08:06

## 2022-06-02 RX ADMIN — DOCUSATE SODIUM 100 MG: 100 CAPSULE ORAL at 09:06

## 2022-06-02 RX ADMIN — POTASSIUM CHLORIDE 40 MEQ: 1500 TABLET, EXTENDED RELEASE ORAL at 09:06

## 2022-06-02 RX ADMIN — HEPARIN SODIUM 5000 UNITS: 5000 INJECTION INTRAVENOUS; SUBCUTANEOUS at 08:06

## 2022-06-02 RX ADMIN — AMIODARONE HYDROCHLORIDE 200 MG: 200 TABLET ORAL at 09:06

## 2022-06-02 RX ADMIN — FERROUS SULFATE TAB 325 MG (65 MG ELEMENTAL FE) 1 EACH: 325 (65 FE) TAB at 12:06

## 2022-06-02 RX ADMIN — HEPARIN SODIUM 5000 UNITS: 5000 INJECTION INTRAVENOUS; SUBCUTANEOUS at 03:06

## 2022-06-02 RX ADMIN — DOCUSATE SODIUM 100 MG: 100 CAPSULE ORAL at 08:06

## 2022-06-02 RX ADMIN — POTASSIUM CHLORIDE 40 MEQ: 1500 TABLET, EXTENDED RELEASE ORAL at 12:06

## 2022-06-02 RX ADMIN — ATORVASTATIN CALCIUM 20 MG: 20 TABLET, FILM COATED ORAL at 08:06

## 2022-06-02 RX ADMIN — PANTOPRAZOLE SODIUM 40 MG: 40 TABLET, DELAYED RELEASE ORAL at 09:06

## 2022-06-02 NOTE — PT/OT/SLP PROGRESS
Physical Therapy Treatment    Patient Name:  John Javier Jr.   MRN:  [id number]    Recommendations:     Discharge Recommendations:  home health PT, home health OT   Discharge Equipment Recommendations: none   Barriers to discharge: Decreased caregiver support    Assessment:     John Javier Jr. is a 66 y.o. male admitted with a medical diagnosis of Acute on chronic combined systolic and diastolic heart failure, NYHA class 4.  He presents with the following impairments/functional limitations:   (weakness; impaired endurance; impaired self care skills; impaired functional mobilty; gait instability; impaired balance; impaired cardiopulmonary response to activity; decreased safety awareness; edema; decreased coordination) . Pt was motivated and cooperative with treatment session. Pt Progressing with PT Intervention. Pt with one episode of LOB with Katiuska to recover. Pt would continue to benefit from skilled PT to address overall functional mobility, goals , and to return to functional baseline.  Goals remain appropriate.      Rehab Prognosis: Good; patient would benefit from acute skilled PT services to address these deficits and reach maximum level of function.    Recent Surgery: Procedure(s) (LRB):  INSERTION, CATHETER, RIGHT HEART (Right)      Plan:     During this hospitalization, patient to be seen 3 x/week to address the identified rehab impairments via gait training, therapeutic activities, therapeutic exercises, neuromuscular re-education and progress toward the following goals:    · Plan of Care Expires:  06/29/22    Subjective     Chief Complaint: I just have some wekaness  Patient/Family Comments/goals: I am finally able to eat something  Pain/Comfort:  · Pain Rating 1: 0/10      Objective:     Communicated with RN  prior to session.  Patient found up in chair with telemetry upon PT entry to room.     General Precautions: Standard, fall   Orthopedic Precautions:N/A   Braces: N/A  Respiratory Status: Room  air     Functional Mobility:  · Transfers:     ? Sit to Stand:  stand by assistance with 4 wheeled walker,  · Gait: 250 ft. with SBA using rollator,Mask donned prior to ambulation outside of room. Pt with one LOB with Katiuska to recover when pt ambulated towards stairs without rollator  ? demo'd decreased step length, decreased toe-floor clearance, impaired weight-shifting ability, and mild instability  ? Cues provided for self-pacing, safety awareness, and symptom assessment     · Stairs:  Pt ascended/descended 5 stair(s) with No Assistive Device with right handrail with Contact Guard Assistance.       AM-PAC 6 CLICK MOBILITY  Turning over in bed (including adjusting bedclothes, sheets and blankets)?: 4  Sitting down on and standing up from a chair with arms (e.g., wheelchair, bedside commode, etc.): 3  Moving from lying on back to sitting on the side of the bed?: 3  Moving to and from a bed to a chair (including a wheelchair)?: 3  Need to walk in hospital room?: 3  Climbing 3-5 steps with a railing?: 3  Basic Mobility Total Score: 19       Therapeutic Activities and Exercises:   Therapist provided instruction and educated of  patient on progress, safety,d/c,PT POC,   proper body mechanics, energy conservation, and fall prevention strategies during tasks listed above, on the effects of prolonged immobility and the importance of performing OOB activity and exercises to promote healing and reduce recovery time      Updated white board with appropriate PT mobility information for medical team notification    Pt encouraged to ambulate in hallways 3x/day with nursing or family assistance to improve endurance.     Pt safe to ambulate in hallway with RN or PCT assistance  Pt issued and instructed to perform  Supine,standing and seated HEP 2-3 times daily, with verabal understanding    Call nursing/pct to transfer to chair/use bathroom. Pt stated understanding      Bedside table in front of patient and area set up for  function, convenience, and safety. RN aware of patient's mobility needs and status. Questions/concerns addressed within PTA scope of practice; patient  with no further questions. Time was provided for active listening, discussion of health disposition, and discussion of safe discharge. Pt?verbalized?agreement .    Patient left up in chair with all lines intact, call button in reach and nsg notified..    GOALS:   Multidisciplinary Problems     Physical Therapy Goals        Problem: Physical Therapy    Goal Priority Disciplines Outcome Goal Variances Interventions   Physical Therapy Goal     PT, PT/OT Ongoing, Progressing     Description: Goals to be met by: 6/10/2022     Patient will increase functional independence with mobility by performin. Supine to sit with Sevier  2. Sit to stand transfer with Modified Se[place]  3. Gait  x 400 feet with Modified Sevier using LRAD as needed or without AD.   4. Ascend/descend 3 stair with bilateral Handrails Stand-by Assistance.  5. Lower extremity exercise program x15 reps, with supervision, in order to increase LE strength and (I) with functional mobility.                       Time Tracking:     PT Received On: 22  PT Start Time: 1416     PT Stop Time: 1440  PT Total Time (min): 24 min     Billable Minutes: Gait Training 15 and Therapeutic Activity 9    Treatment Type: Treatment  PT/PTA: PTA     PTA Visit Number: 1     2022

## 2022-06-02 NOTE — ASSESSMENT & PLAN NOTE
-Records reflect dx of cirrhosis but he has never been biopsied nor had esophageal variceal bleed  -Liver US done here 5/30 shows cirrhotic liver morphology with sequela of portal hypertension  -Paracentesis 5/31 with 4.8 L removed  -Plan for palliative PleurX placement with IR tomorrow

## 2022-06-02 NOTE — HPI
66-year-old male with advanced heart failure and cirrhosis requiring paracenteses that are increasing in frequency presented with increasing abdominal distension and leg swelling. Patient was admitted for work-up for advanced options. Patient was deemed not to be a candidate for advanced options. Hospice was recommended. Palliative was consulted to assist with GOC

## 2022-06-02 NOTE — PROGRESS NOTES
[facility] - Transplant Stepdown  Heart Transplant  Progress Note    Patient Name: John Javier Jr.  MRN: [id number]  Admission Date: 5/30/2022  Hospital Length of Stay: 1 days  Attending Physician: Lizzy Cavanaugh MD  Primary Care Provider: Primary Doctor No  Principal Problem:Acute on chronic combined systolic and diastolic heart failure, NYHA class 4    Subjective:     Interval History: NAEON. No complaints. Palliative Care consulted, pt and pt's family would like to move forward with home hospice. Code status changed to DNR/DNI. Plan for palliative PleurX placement with IR tomorrow.     Continuous Infusions:  Scheduled Meds:   amiodarone  200 mg Oral Daily    atorvastatin  20 mg Oral QHS    docusate sodium  100 mg Oral BID    ferrous sulfate  1 tablet Oral BID    fluticasone propionate  1 spray Each Nostril Daily    heparin (porcine)  5,000 Units Subcutaneous Q8H    pantoprazole  40 mg Oral Daily     PRN Meds:acetaminophen, calcium carbonate, dextrose 10%, dextrose 10%, sodium chloride 0.9%    Review of patient's allergies indicates:   Allergen Reactions    Penicillins Anaphylaxis     Pt stated his mother said it will kill him- always inform to take ampicillin    Penicillin     Sitagliptin Other (See Comments)     Pancreatitis & liver disease     Objective:     Vital Signs (Most Recent):  Temp: 97.6 °F (36.4 °C) (06/02/22 1227)  Pulse: 79 (06/02/22 1227)  Resp: 20 (06/02/22 1227)  BP: (!) 121/57 (06/02/22 1227)  SpO2: 95 % (06/02/22 1227) Vital Signs (24h Range):  Temp:  [97.6 °F (36.4 °C)-98.1 °F (36.7 °C)] 97.6 °F (36.4 °C)  Pulse:  [71-83] 79  Resp:  [17-20] 20  SpO2:  [95 %-99 %] 95 %  BP: ()/(55-79) 121/57     Patient Vitals for the past 72 hrs (Last 3 readings):   Weight   06/02/22 0500 54.4 kg (119 lb 14.9 oz)   06/01/22 0530 54.6 kg (120 lb 5.9 oz)   05/31/22 0615 60.6 kg (133 lb 9.6 oz)       Body mass index is 20.59 kg/m².    No intake or output data in the 24 hours ending 06/02/22  1415      Hemodynamic Parameters:       Telemetry: SR with LBBB     Physical Exam  Constitutional:       Comments: Temporal wasting   HENT:      Head: Normocephalic and atraumatic.   Eyes:      Conjunctiva/sclera: Conjunctivae normal.      Pupils: Pupils are equal, round, and reactive to light.   Neck:      Comments: Neck veins do not appear elevated  Cardiovascular:      Rate and Rhythm: Normal rate and regular rhythm.   Pulmonary:      Effort: Pulmonary effort is normal.      Breath sounds: Normal breath sounds.   Abdominal:      General: Bowel sounds are normal.      Comments: + ascites   Musculoskeletal:         General: Normal range of motion.      Cervical back: Normal range of motion and neck supple.   Skin:     General: Skin is warm and dry.      Capillary Refill: Capillary refill takes 2 to 3 seconds.   Neurological:      General: No focal deficit present.      Mental Status: He is alert and oriented to person, place, and time.   Psychiatric:         Mood and Affect: Mood normal.         Behavior: Behavior normal.         Thought Content: Thought content normal.         Judgment: Judgment normal.       Significant Labs:  CBC:  Recent Labs   Lab 05/31/22  0634 06/01/22  0642 06/02/22  0625   WBC 10.82 12.06 11.15   RBC 2.27* 2.27* 2.56*   HGB 7.8* 7.9* 8.8*   HCT 23.0* 22.2* 24.2*    154 139*   * 98 95   MCH 34.4* 34.8* 34.4*   MCHC 33.9 35.6 36.4*       BNP:  Recent Labs   Lab 05/28/22 2041 05/30/22 2012   * 369*       CMP:  Recent Labs   Lab 05/31/22  0634 06/01/22  0642 06/02/22  0625   * 135* 87   CALCIUM 7.7* 7.7* 7.7*   ALBUMIN 2.0* 2.0* 2.0*   PROT 5.3* 5.2* 5.3*   * 128* 127*   K 3.7 3.3* 3.2*   CO2 20* 20* 23   CL 97 98 97   BUN 25* 26* 23   CREATININE 1.9* 1.6* 1.5*   ALKPHOS 100 95 94   ALT 50* 50* 53*   AST 82* 76* 84*   BILITOT 1.7* 2.2* 2.2*        Coagulation:   Recent Labs   Lab 05/30/22 2012   INR 1.6*       LDH:  No results for input(s): LDH in the last  72 hours.  Microbiology:  Microbiology Results (last 7 days)       Procedure Component Value Units Date/Time    (rule out SBP) Culture, Anaerobic [[id number]] Collected: 05/31/22 1510    Order Status: Completed Specimen: Ascites Updated: 06/02/22 1336     Anaerobic Culture Culture in progress    Narrative:      To rule out SBP order labs: Aerobic culture [[id number]],  Culture, Anaerobic [AUQ307], Gram stain [GQA663], Albumin  [CVT184], Protein [MIT328], LDH [EPT488], WBC \T\ Dff  [[id number]].    (rule out SBP) Aerobic culture [[id number]] Collected: 05/31/22 1510    Order Status: Completed Specimen: Ascites Updated: 06/02/22 1249     Aerobic Bacterial Culture No growth    Narrative:      To rule out SBP order labs: Aerobic culture [[id number]],  Culture, Anaerobic [HAE476], Gram stain [BHH485], Albumin  [DUM177], Protein [PGX771], LDH [SNT103], WBC \T\ Dff  [[id number]].    (rule out SBP) Gram stain [[id number]] Collected: 05/31/22 1510    Order Status: Completed Specimen: Ascites Updated: 05/31/22 2146     Gram Stain Result Rare WBC's      No organisms seen    Narrative:      To rule out SBP order labs: Aerobic culture [[id number]],  Culture, Anaerobic [PLD385], Gram stain [AIU396], Albumin  [EBT988], Protein [DNI320], LDH [YPR558], WBC \T\ Dff  [[id number]].            I have reviewed all pertinent labs within the past 24 hours.    Estimated Creatinine Clearance: 37.8 mL/min (A) (based on SCr of 1.5 mg/dL (H)).    Diagnostic Results:  I have reviewed and interpreted all pertinent imaging results/findings within the past 24 hours.    Assessment and Plan:     No notes on file    * Acute on chronic combined systolic and diastolic heart failure, NYHA class 4  -DCM out of proportion to CAD  -Declined at our center in the past for advanced options 2/2 medical stability, but has had significant decline in activity intolerance over the past 4 months and several admits for ADHF  -Intolerant to any GDMT with hypotension   -TTE done 5/30: LVEDD  5, LVEF 25%, diastolic dysfunction, RV normal, all valves normal, IVC 3, + ascites  -Pathway started on admit, but given likely cirrhosis, small LV and psychosocial issues likely not a candidate for advanced options. CTS evaluated patient yesterday, not a candidate for advanced options with cirrhosis (confirmed on liver US).   -With end stage heart failure and liver disease, palliative care consulted for GOC/hospice discussion. Pt and pt's family would like to move forward with home hospice. Code status changed to DNR/DNI       Cirrhosis  -Records reflect dx of cirrhosis but he has never been biopsied nor had esophageal variceal bleed  -Liver US done here 5/30 shows cirrhotic liver morphology with sequela of portal hypertension  -Paracentesis 5/31 with 4.8 L removed  -Plan for palliative PleurX placement with IR tomorrow     Ascites  -See Cirrhosis     Stage 3a chronic kidney disease  -sCr 1.8 on admit, baseline closer to 1.5    PAF (paroxysmal atrial fibrillation)  -Currently in SR  -Has not taken Eliquis for months. Will continue holding it for paracentesis  -Continue Amiodarone    Type 2 diabetes mellitus, without long-term current use of insulin  -SSI    CAD (coronary artery disease)  -50-60%% LCx, OM disease on [facility] 2/29/20  -Not on ASA possibly 2/2 past use of Eliquis, which patient reports was starting for SUSIE. He has not taken it in months, however  -Continue statin      Anila Gardner PA-C  Heart Transplant  [facility] - Transplant Stepdown

## 2022-06-02 NOTE — PLAN OF CARE
Advance Care Planning   [facility] - Transplant Stepdown  Palliative Care       Patient Name: John Javier Jr.  MRN: [id number]  Admission Date: 5/30/2022  Hospital Length of Stay: 1 days  Code Status: Full Code   Attending Provider: Lizzy Cavanaugh MD  Palliative Care Provider: Dr. Piedra  Primary Care Physician: Primary Doctor No  Principal Problem:Acute on chronic combined systolic and diastolic heart failure, NYHA class 4     Visit to bedside with Pal care MD. Pt alone in room. Pt stated that he feels at peace with plan to go home with hospice. Pt stated that he is moving to his daughter's home with plans of moving his trailer on her lot as well. Pt appreciative of help and the knowledge that hospice will care for him as well as look out for his family. Support provided to pt.    CHELITA spoke with primary CHELITA Jenkins regarding pt's discharge plan with hospice. CHELITA Jenkins to contact pt's daughter for further follow up.    Carmencita Sim, TOANW, ACHP-CHELITA

## 2022-06-02 NOTE — PROGRESS NOTES
Pt. Complaining of consistent pinching pain from pacemaker, states it is not shocking him, just painful. HTS notified, orders to give tylenol, HTS to come to bedside and assess pt. WCTM.

## 2022-06-02 NOTE — SUBJECTIVE & OBJECTIVE
Interval History: NAEON. No complaints. Palliative Care consulted, pt and pt's family would like to move forward with home hospice. Code status changed to DNR/DNI. Plan for palliative PleurX placement with IR tomorrow.     Continuous Infusions:  Scheduled Meds:   amiodarone  200 mg Oral Daily    atorvastatin  20 mg Oral QHS    docusate sodium  100 mg Oral BID    ferrous sulfate  1 tablet Oral BID    fluticasone propionate  1 spray Each Nostril Daily    heparin (porcine)  5,000 Units Subcutaneous Q8H    pantoprazole  40 mg Oral Daily     PRN Meds:acetaminophen, calcium carbonate, dextrose 10%, dextrose 10%, sodium chloride 0.9%    Review of patient's allergies indicates:   Allergen Reactions    Penicillins Anaphylaxis     Pt stated his mother said it will kill him- always inform to take ampicillin    Penicillin     Sitagliptin Other (See Comments)     Pancreatitis & liver disease     Objective:     Vital Signs (Most Recent):  Temp: 97.6 °F (36.4 °C) (06/02/22 1227)  Pulse: 79 (06/02/22 1227)  Resp: 20 (06/02/22 1227)  BP: (!) 121/57 (06/02/22 1227)  SpO2: 95 % (06/02/22 1227) Vital Signs (24h Range):  Temp:  [97.6 °F (36.4 °C)-98.1 °F (36.7 °C)] 97.6 °F (36.4 °C)  Pulse:  [71-83] 79  Resp:  [17-20] 20  SpO2:  [95 %-99 %] 95 %  BP: ()/(55-79) 121/57     Patient Vitals for the past 72 hrs (Last 3 readings):   Weight   06/02/22 0500 54.4 kg (119 lb 14.9 oz)   06/01/22 0530 54.6 kg (120 lb 5.9 oz)   05/31/22 0615 60.6 kg (133 lb 9.6 oz)       Body mass index is 20.59 kg/m².    No intake or output data in the 24 hours ending 06/02/22 1415      Hemodynamic Parameters:       Telemetry: SR with LBBB     Physical Exam  Constitutional:       Comments: Temporal wasting   HENT:      Head: Normocephalic and atraumatic.   Eyes:      Conjunctiva/sclera: Conjunctivae normal.      Pupils: Pupils are equal, round, and reactive to light.   Neck:      Comments: Neck veins do not appear elevated  Cardiovascular:      Rate and  Rhythm: Normal rate and regular rhythm.   Pulmonary:      Effort: Pulmonary effort is normal.      Breath sounds: Normal breath sounds.   Abdominal:      General: Bowel sounds are normal.      Comments: + ascites   Musculoskeletal:         General: Normal range of motion.      Cervical back: Normal range of motion and neck supple.   Skin:     General: Skin is warm and dry.      Capillary Refill: Capillary refill takes 2 to 3 seconds.   Neurological:      General: No focal deficit present.      Mental Status: He is alert and oriented to person, place, and time.   Psychiatric:         Mood and Affect: Mood normal.         Behavior: Behavior normal.         Thought Content: Thought content normal.         Judgment: Judgment normal.       Significant Labs:  CBC:  Recent Labs   Lab 05/31/22  0634 06/01/22  0642 06/02/22  0625   WBC 10.82 12.06 11.15   RBC 2.27* 2.27* 2.56*   HGB 7.8* 7.9* 8.8*   HCT 23.0* 22.2* 24.2*    154 139*   * 98 95   MCH 34.4* 34.8* 34.4*   MCHC 33.9 35.6 36.4*       BNP:  Recent Labs   Lab 05/28/22 2041 05/30/22 2012   * 369*       CMP:  Recent Labs   Lab 05/31/22  0634 06/01/22  0642 06/02/22  0625   * 135* 87   CALCIUM 7.7* 7.7* 7.7*   ALBUMIN 2.0* 2.0* 2.0*   PROT 5.3* 5.2* 5.3*   * 128* 127*   K 3.7 3.3* 3.2*   CO2 20* 20* 23   CL 97 98 97   BUN 25* 26* 23   CREATININE 1.9* 1.6* 1.5*   ALKPHOS 100 95 94   ALT 50* 50* 53*   AST 82* 76* 84*   BILITOT 1.7* 2.2* 2.2*        Coagulation:   Recent Labs   Lab 05/30/22 2012   INR 1.6*       LDH:  No results for input(s): LDH in the last 72 hours.  Microbiology:  Microbiology Results (last 7 days)       Procedure Component Value Units Date/Time    (rule out SBP) Culture, Anaerobic [[id number]] Collected: 05/31/22 1510    Order Status: Completed Specimen: Ascites Updated: 06/02/22 1336     Anaerobic Culture Culture in progress    Narrative:      To rule out SBP order labs: Aerobic culture [[id number]],  Culture,  Anaerobic [MYN960], Gram stain [[id number]], Albumin  [PSI310], Protein [ZDH550], LDH [ZSC682], WBC \T\ Dff  [[id number]].    (rule out SBP) Aerobic culture [[id number]] Collected: 05/31/22 1510    Order Status: Completed Specimen: Ascites Updated: 06/02/22 1249     Aerobic Bacterial Culture No growth    Narrative:      To rule out SBP order labs: Aerobic culture [CNX113],  Culture, Anaerobic [TAS371], Gram stain [DSL968], Albumin  [VCS232], Protein [HEY356], LDH [XVT718], WBC \T\ Dff  [[id number]].    (rule out SBP) Gram stain [[id number]] Collected: 05/31/22 1510    Order Status: Completed Specimen: Ascites Updated: 05/31/22 2146     Gram Stain Result Rare WBC's      No organisms seen    Narrative:      To rule out SBP order labs: Aerobic culture [PLE731],  Culture, Anaerobic [ZOI610], Gram stain [AGE581], Albumin  [ZSR278], Protein [DUP733], LDH [AXG598], WBC \T\ Dff  [[id number]].            I have reviewed all pertinent labs within the past 24 hours.    Estimated Creatinine Clearance: 37.8 mL/min (A) (based on SCr of 1.5 mg/dL (H)).    Diagnostic Results:  I have reviewed and interpreted all pertinent imaging results/findings within the past 24 hours.

## 2022-06-02 NOTE — SUBJECTIVE & OBJECTIVE
Past Medical History:   Diagnosis Date    Anticoagulant long-term use     CHF (congestive heart failure)     Chronic combined systolic and diastolic heart failure 03/01/2020    Cirrhosis 5/31/2022    Coronary artery disease     Diabetes mellitus     Digestive disorder     Diverticulitis     Encounter for blood transfusion     Hypertension     Renal disorder     Sleep apnea     Suspected Covid-19 Virus Infection; test negative 03/14/2020    Unspecified cirrhosis of liver        Past Surgical History:   Procedure Laterality Date    COLECTOMY      COLONOSCOPY N/A 5/11/2022    Procedure: COLON;  Surgeon: Dk Jacobson MD;  Location: [facility] ENDOSCOPY;  Service: Gastroenterology;  Laterality: N/A;    ESOPHAGOGASTRODUODENOSCOPY N/A 5/10/2022    Procedure: EGD (ESOPHAGOGASTRODUODENOSCOPY);  Surgeon: Dk Jacobson MD;  Location: [facility] ENDOSCOPY;  Service: Gastroenterology;  Laterality: N/A;    INTRALUMINAL GASTROINTESTINAL TRACT IMAGING VIA CAPSULE N/A 5/11/2022    Procedure: IMAGING PROCEDURE, GI TRACT, INTRALUMINAL, VIA CAPSULE;  Surgeon: Dk Jacobson MD;  Location: [facility] ENDOSCOPY;  Service: Gastroenterology;  Laterality: N/A;  done in recovery    RIGHT HEART CATHETERIZATION Right 10/8/2020    Procedure: INSERTION, CATHETER, RIGHT HEART;  Surgeon: Adilson Darden Jr., MD;  Location: [facility] CATH LAB;  Service: Cardiology;  Laterality: Right;    RIGHT HEART CATHETERIZATION Right 2/3/2021    Procedure: INSERTION, CATHETER, RIGHT HEART;  Surgeon: Adilson Darden Jr., MD;  Location: [facility] CATH LAB;  Service: Cardiology;  Laterality: Right;    RIGHT HEART CATHETERIZATION Right 6/22/2021    Procedure: INSERTION, CATHETER, RIGHT HEART;  Surgeon: Yuki Delgado MD;  Location: [facility] CATH LAB;  Service: Cardiology;  Laterality: Right;       Review of patient's allergies indicates:   Allergen Reactions    Penicillins Anaphylaxis     Pt stated his mother said it will kill him-  always inform to take ampicillin    Penicillin     Sitagliptin Other (See Comments)     Pancreatitis & liver disease       Medications:  Continuous Infusions:  Scheduled Meds:   amiodarone  200 mg Oral Daily    atorvastatin  20 mg Oral QHS    docusate sodium  100 mg Oral BID    ferrous sulfate  1 tablet Oral BID    fluticasone propionate  1 spray Each Nostril Daily    heparin (porcine)  5,000 Units Subcutaneous Q8H    pantoprazole  40 mg Oral Daily     PRN Meds:acetaminophen, calcium carbonate, dextrose 10%, dextrose 10%, sodium chloride 0.9%    Family History       Problem Relation (Age of Onset)    Diabetes Mother    Heart disease Father    Hypertension Mother, Father          Tobacco Use    Smoking status: Never Smoker    Smokeless tobacco: Never Used   Substance and Sexual Activity    Alcohol use: Never    Drug use: Never    Sexual activity: Not on file       Review of Systems   Constitutional:  Positive for activity change, appetite change and fatigue.   HENT: Negative.     Eyes: Negative.    Respiratory: Negative.     Cardiovascular: Negative.    Gastrointestinal:  Positive for abdominal distention.   Endocrine: Negative.    Musculoskeletal: Negative.    Neurological:  Positive for weakness.   Psychiatric/Behavioral: Negative.     Objective:     Vital Signs (Most Recent):  Temp: 98.1 °F (36.7 °C) (06/01/22 1811)  Pulse: 77 (06/01/22 1900)  Resp: 17 (06/01/22 1338)  BP: (!) 99/55 (06/01/22 1811)  SpO2: 97 % (06/01/22 1811)   Vital Signs (24h Range):  Temp:  [97.7 °F (36.5 °C)-98.4 °F (36.9 °C)] 98.1 °F (36.7 °C)  Pulse:  [71-85] 77  Resp:  [13-17] 17  SpO2:  [94 %-98 %] 97 %  BP: ()/(51-74) 99/55     Weight: 54.6 kg (120 lb 5.9 oz)  Body mass index is 20.66 kg/m².    Physical Exam  Vitals and nursing note reviewed.   Constitutional:       General: He is not in acute distress.  HENT:      Head: Normocephalic.      Right Ear: External ear normal.      Left Ear: External ear normal.      Nose: Nose normal.       Mouth/Throat:      Mouth: Mucous membranes are moist.   Cardiovascular:      Rate and Rhythm: Normal rate.   Pulmonary:      Effort: Pulmonary effort is normal. No respiratory distress.   Abdominal:      General: There is distension.   Musculoskeletal:         General: Normal range of motion.      Cervical back: Normal range of motion.   Neurological:      Mental Status: He is alert.      Motor: Weakness present.   Psychiatric:         Mood and Affect: Mood normal.         Thought Content: Thought content normal.       Review of Symptoms      Symptom Assessment (ESAS 0-10 Scale)  Pain:  0  Dyspnea:  0  Anxiety:  0  Nausea:  0  Depression:  4  Anorexia:  5  Fatigue:  5  Insomnia:  0  Restlessness:  0  Agitation:  0     CAM / Delirium:  Negative  Constipation:  Negative  Diarrhea:  Negative    Bowel Management Plan (BMP):  No      Modified Flavia Scale:  0.5    Performance Status:  50    Living Arrangements:  Lives alone    Psychosocial/Cultural: Has a daughter. Close with brother. Enjoys fishing and spending time with grandchildren     Spiritual:  F - Suzy and Belief:  Baptism      Advance Care Planning   Advance Directives:   Living Will: No    LaPOST: No    Do Not Resuscitate Status: No    Medical Power of : No      Decision Making:  Patient answered questions       Significant Labs: All pertinent labs within the past 24 hours have been reviewed.  CBC:   Recent Labs   Lab 06/01/22 0642   WBC 12.06   HGB 7.9*   HCT 22.2*   MCV 98        BMP:  Recent Labs   Lab 06/01/22 0642   *   *   K 3.3*   CL 98   CO2 20*   BUN 26*   CREATININE 1.6*   CALCIUM 7.7*   MG 1.8     LFT:  Lab Results   Component Value Date    AST 76 (H) 06/01/2022    ALKPHOS 95 06/01/2022    BILITOT 2.2 (H) 06/01/2022     Albumin:   Albumin   Date Value Ref Range Status   06/01/2022 2.0 (L) 3.5 - 5.2 g/dL Final     Protein:   Total Protein   Date Value Ref Range Status   06/01/2022 5.2 (L) 6.0 - 8.4 g/dL Final      Lactic acid:   Lab Results   Component Value Date    LACTATE 1.6 03/14/2020    LACTATE 1.3 03/01/2020       Significant Imaging: I have reviewed all pertinent imaging results/findings within the past 24 hours.

## 2022-06-02 NOTE — PLAN OF CARE
Pt. AAOx4, VSS, spo2> 94% on room air. NRS on telemetry monitor. Urinal at bedside and within reach. No complaints of pain/N/V this shift. Total UOP in flowsheets. NPO since midnight for pleurex placement today. Bed in low locked position, call light within reach, pt instructed to call for assistance needed, pt verbalized understanding, remains free from falls this shift. WCTM.

## 2022-06-02 NOTE — CONSULTS
[facility] - Transplant Stepdown  Palliative Medicine  Consult Note    Patient Name: John Javier Jr.  MRN: [id number]  Admission Date: 5/30/2022  Hospital Length of Stay: 1 days  Code Status: Full Code   Attending Provider: Lizzy Cavanaugh MD  Consulting Provider: Estefani Piedra MD  Primary Care Physician: Primary Doctor No  Principal Problem:Acute on chronic combined systolic and diastolic heart failure, NYHA class 4    Patient information was obtained from patient and primary team.      Inpatient consult to Palliative Care  Consult performed by: Estefani Piedra MD  Consult ordered by: Lizzy Cavanaugh MD        Assessment/Plan:     Palliative care encounter  66 year old male with advanced heart failure and cirrhosis who is not a candidate for advanced options. Primary team recommending hospice     GOC/ACP  -Met with patient at bedside. Introduced palliative medicine team. Discussed bad news that patient received this morning. Provided support. Patient wants to be able to go fishing and spend time with his grandchildren. Explained how hospice will allow patient to hopefully do these things and live well for however long he has left. Provided hospice education. Discussed benefits of pleurx catheter in his belly to although for sierra at home for comfort. Patient in agreement with plan. He is worried about his family and how they will take the news. Explained that hospice will be there for his family too     -Plan for pleurx placement and then home with hospice. Appreciate CM/SW setting this up.      Discussed with primary team         Thank you for your consult. I will follow-up with patient. Please contact us if you have any additional questions.    Subjective:     HPI:   66-year-old male with advanced heart failure and cirrhosis requiring paracenteses that are increasing in frequency presented with increasing abdominal distension and leg swelling. Patient was admitted for work-up for advanced options. Patient  was deemed not to be a candidate for advanced options. Hospice was recommended. Palliative was consulted to assist with [facility]      Hospital Course:  No notes on file        Past Medical History:   Diagnosis Date    Anticoagulant long-term use     CHF (congestive heart failure)     Chronic combined systolic and diastolic heart failure 03/01/2020    Cirrhosis 5/31/2022    Coronary artery disease     Diabetes mellitus     Digestive disorder     Diverticulitis     Encounter for blood transfusion     Hypertension     Renal disorder     Sleep apnea     Suspected Covid-19 Virus Infection; test negative 03/14/2020    Unspecified cirrhosis of liver        Past Surgical History:   Procedure Laterality Date    COLECTOMY      COLONOSCOPY N/A 5/11/2022    Procedure: COLON;  Surgeon: Dk Jacobson MD;  Location: [facility] ENDOSCOPY;  Service: Gastroenterology;  Laterality: N/A;    ESOPHAGOGASTRODUODENOSCOPY N/A 5/10/2022    Procedure: EGD (ESOPHAGOGASTRODUODENOSCOPY);  Surgeon: Dk Jacobson MD;  Location: [facility] ENDOSCOPY;  Service: Gastroenterology;  Laterality: N/A;    INTRALUMINAL GASTROINTESTINAL TRACT IMAGING VIA CAPSULE N/A 5/11/2022    Procedure: IMAGING PROCEDURE, GI TRACT, INTRALUMINAL, VIA CAPSULE;  Surgeon: Dk Jacobson MD;  Location: [facility] ENDOSCOPY;  Service: Gastroenterology;  Laterality: N/A;  done in recovery    RIGHT HEART CATHETERIZATION Right 10/8/2020    Procedure: INSERTION, CATHETER, RIGHT HEART;  Surgeon: Adilson Darden Jr., MD;  Location: [facility] CATH LAB;  Service: Cardiology;  Laterality: Right;    RIGHT HEART CATHETERIZATION Right 2/3/2021    Procedure: INSERTION, CATHETER, RIGHT HEART;  Surgeon: Adilson Darden Jr., MD;  Location: [facility] CATH LAB;  Service: Cardiology;  Laterality: Right;    RIGHT HEART CATHETERIZATION Right 6/22/2021    Procedure: INSERTION, CATHETER, RIGHT HEART;  Surgeon: Yuki Delgado MD;  Location: [facility] CATH LAB;   Service: Cardiology;  Laterality: Right;       Review of patient's allergies indicates:   Allergen Reactions    Penicillins Anaphylaxis     Pt stated his mother said it will kill him- always inform to take ampicillin    Penicillin     Sitagliptin Other (See Comments)     Pancreatitis & liver disease       Medications:  Continuous Infusions:  Scheduled Meds:   amiodarone  200 mg Oral Daily    atorvastatin  20 mg Oral QHS    docusate sodium  100 mg Oral BID    ferrous sulfate  1 tablet Oral BID    fluticasone propionate  1 spray Each Nostril Daily    heparin (porcine)  5,000 Units Subcutaneous Q8H    pantoprazole  40 mg Oral Daily     PRN Meds:acetaminophen, calcium carbonate, dextrose 10%, dextrose 10%, sodium chloride 0.9%    Family History       Problem Relation (Age of Onset)    Diabetes Mother    Heart disease Father    Hypertension Mother, Father          Tobacco Use    Smoking status: Never Smoker    Smokeless tobacco: Never Used   Substance and Sexual Activity    Alcohol use: Never    Drug use: Never    Sexual activity: Not on file       Review of Systems   Constitutional:  Positive for activity change, appetite change and fatigue.   HENT: Negative.     Eyes: Negative.    Respiratory: Negative.     Cardiovascular: Negative.    Gastrointestinal:  Positive for abdominal distention.   Endocrine: Negative.    Musculoskeletal: Negative.    Neurological:  Positive for weakness.   Psychiatric/Behavioral: Negative.     Objective:     Vital Signs (Most Recent):  Temp: 98.1 °F (36.7 °C) (06/01/22 1811)  Pulse: 77 (06/01/22 1900)  Resp: 17 (06/01/22 1338)  BP: (!) 99/55 (06/01/22 1811)  SpO2: 97 % (06/01/22 1811)   Vital Signs (24h Range):  Temp:  [97.7 °F (36.5 °C)-98.4 °F (36.9 °C)] 98.1 °F (36.7 °C)  Pulse:  [71-85] 77  Resp:  [13-17] 17  SpO2:  [94 %-98 %] 97 %  BP: ()/(51-74) 99/55     Weight: 54.6 kg (120 lb 5.9 oz)  Body mass index is 20.66 kg/m².    Physical Exam  Vitals and nursing note  reviewed.   Constitutional:       General: He is not in acute distress.  HENT:      Head: Normocephalic.      Right Ear: External ear normal.      Left Ear: External ear normal.      Nose: Nose normal.      Mouth/Throat:      Mouth: Mucous membranes are moist.   Cardiovascular:      Rate and Rhythm: Normal rate.   Pulmonary:      Effort: Pulmonary effort is normal. No respiratory distress.   Abdominal:      General: There is distension.   Musculoskeletal:         General: Normal range of motion.      Cervical back: Normal range of motion.   Neurological:      Mental Status: He is alert.      Motor: Weakness present.   Psychiatric:         Mood and Affect: Mood normal.         Thought Content: Thought content normal.       Review of Symptoms      Symptom Assessment (ESAS 0-10 Scale)  Pain:  0  Dyspnea:  0  Anxiety:  0  Nausea:  0  Depression:  4  Anorexia:  5  Fatigue:  5  Insomnia:  0  Restlessness:  0  Agitation:  0     CAM / Delirium:  Negative  Constipation:  Negative  Diarrhea:  Negative    Bowel Management Plan (BMP):  No      Modified Flavia Scale:  0.5    Performance Status:  50    Living Arrangements:  Lives alone    Psychosocial/Cultural: Has a daughter. Close with brother. Enjoys fishing and spending time with grandchildren     Spiritual:  F - Suzy and Belief:  Christian      Advance Care Planning   Advance Directives:   Living Will: No    LaPOST: No    Do Not Resuscitate Status: No    Medical Power of : No      Decision Making:  Patient answered questions       Significant Labs: All pertinent labs within the past 24 hours have been reviewed.  CBC:   Recent Labs   Lab 06/01/22  0642   WBC 12.06   HGB 7.9*   HCT 22.2*   MCV 98        BMP:  Recent Labs   Lab 06/01/22  0642   *   *   K 3.3*   CL 98   CO2 20*   BUN 26*   CREATININE 1.6*   CALCIUM 7.7*   MG 1.8     LFT:  Lab Results   Component Value Date    AST 76 (H) 06/01/2022    ALKPHOS 95 06/01/2022    BILITOT 2.2 (H) 06/01/2022      Albumin:   Albumin   Date Value Ref Range Status   06/01/2022 2.0 (L) 3.5 - 5.2 g/dL Final     Protein:   Total Protein   Date Value Ref Range Status   06/01/2022 5.2 (L) 6.0 - 8.4 g/dL Final     Lactic acid:   Lab Results   Component Value Date    LACTATE 1.6 03/14/2020    LACTATE 1.3 03/01/2020       Significant Imaging: I have reviewed all pertinent imaging results/findings within the past 24 hours.          > 50% of 75 min visit spent in chart review, face to face discussion of goals of care,  symptom assessment, coordination of care and emotional support.    Estefani Piedra MD  Palliative Medicine  [facility] - Transplant Stepdown

## 2022-06-02 NOTE — ASSESSMENT & PLAN NOTE
-DCM out of proportion to CAD  -Declined at our center in the past for advanced options 2/2 medical stability, but has had significant decline in activity intolerance over the past 4 months and several admits for ADHF  -Intolerant to any GDMT with hypotension   -TTE done 5/30: LVEDD 5, LVEF 25%, diastolic dysfunction, RV normal, all valves normal, IVC 3, + ascites  -Pathway started on admit, but given likely cirrhosis, small LV and psychosocial issues likely not a candidate for advanced options. CTS evaluated patient yesterday, not a candidate for advanced options with cirrhosis (confirmed on liver US).   -With end stage heart failure and liver disease, palliative care consulted for GOC/hospice discussion. Pt and pt's family would like to move forward with home hospice. Code status changed to DNR/DNI

## 2022-06-02 NOTE — ASSESSMENT & PLAN NOTE
66 year old male with advanced heart failure and cirrhosis who is not a candidate for advanced options. Primary team recommending hospice     Code status: DNR. Patient accepting of a peaceful and natural death       GOC/ACP  6/2/22  -Followed up with patient. He says he feels better and more accepting overall about his situation. He wants to focus on spending as much good time as possible with his family. He still worries about how his family will be able to cope after he is gone but finds comfort in knowing that hospice will continue to support his family even after he dies. Provided support   Patient is accepting of a peaceful and natural death when it is his time. Code status changed to DNR     -Plans for home with hospice following pleurx placement. Appreciate SW setting this up       6/1/22  -Met with patient at bedside. Introduced palliative medicine team. Discussed bad news that patient received this morning. Provided support. Patient wants to be able to go fishing and spend time with his grandchildren. Explained how hospice will allow patient to hopefully do these things and live well for however long he has left. Provided hospice education. Discussed benefits of pleurx catheter in his belly to although for sierra at home for comfort. Patient in agreement with plan. He is worried about his family and how they will take the news. Explained that hospice will be there for his family too         Discussed with primary team

## 2022-06-02 NOTE — PLAN OF CARE
[facility]  Department of Hospital Medicine  [address] [place] [address]  [phone] after hours  [phone] fax    HOSPICE  ORDERS    06/02/2022    Admit to Hospice:  Home Service     Diagnoses:   Active Hospital Problems    Diagnosis  POA    *Acute on chronic combined systolic and diastolic heart failure, NYHA class 4 [I50.43]  Unknown    Palliative care encounter [Z51.5]  Not Applicable    Ascites [R18.8]  Unknown    Cirrhosis [K74.60]  Unknown    Stage 3a chronic kidney disease [N18.31]  Yes    Dilated cardiomyopathy out of proportion to CAD [I42.0]  Yes    PAF (paroxysmal atrial fibrillation) [I48.0]  Yes    VT (ventricular tachycardia) [I47.2]  Yes    CAD (coronary artery disease) [I25.10]  Yes     CAD (50-60% LCx, OM disease on [facility] 2/29/20),       Type 2 diabetes mellitus, without long-term current use of insulin [E11.9]  Yes      Resolved Hospital Problems   No resolved problems to display.       Hospice Qualifying Diagnoses:        Patient has a life expectancy < 6 months due to:  1) Primary Hospice Diagnosis:  End stage heart failure and end stage liver disease/cirrhosis   2) Comorbid Conditions Contributing to Decline: Atrial Fibrillation, CAD    Vital Signs: Routine per Hospice Protocol.    Code Status: DNR/DNI    Allergies:   Review of patient's allergies indicates:   Allergen Reactions    Penicillins Anaphylaxis     Pt stated his mother said it will kill him- always inform to take ampicillin    Penicillin     Sitagliptin Other (See Comments)     Pancreatitis & liver disease       Diet: Regular     Activities: As tolerated    Nursing: Per Hospice Routine.           Other Miscellaneous Care:   - Drain Pleur-X drain every week and/or PRN rapid weight gain/abdominal distension. Maximum 5 L drained at each session    Medications:        Medication List      CONTINUE taking these medications    albuterol 90 mcg/actuation inhaler  Commonly  known as: PROVENTIL/VENTOLIN HFA  INHALE 2 PUFFS BY MOUTH FOUR TIMES DAILY AS NEEDED FOR WHEEZING OR SHORTNESS OF BREATH     amiodarone 200 MG Tab  Commonly known as: PACERONE  Take 1 tablet (200 mg total) by mouth once daily.     atorvastatin 20 MG tablet  Commonly known as: LIPITOR  Take 1 tablet (20 mg total) by mouth every evening.     docusate sodium 100 MG capsule  Commonly known as: COLACE  Take 1 capsule (100 mg total) by mouth 2 (two) times daily.     ferrous sulfate 324 mg (65 mg iron) Tbec  Take 1 tablet (324 mg total) by mouth 2 (two) times daily.     fluticasone propionate 50 mcg/actuation nasal spray  Commonly known as: FLONASE     nitroGLYCERIN 0.4 MG SL tablet  Commonly known as: NITROSTAT  SMARTSI Tablet(s) Sublingual As Needed     pantoprazole 40 MG tablet  Commonly known as: PROTONIX  Take 1 tablet (40 mg total) by mouth once daily.     simethicone 80 MG chewable tablet  Commonly known as: MYLICON  Take 1 tablet (80 mg total) by mouth 3 (three) times daily as needed for Flatulence.        STOP taking these medications    apixaban 5 mg Tab  Commonly known as: ELIQUIS     metoprolol succinate 100 MG 24 hr tablet  Commonly known as: TOPROL-XL            Future Orders:  Hospice Medical Director may dictate new orders for comfortable care measures & sign death certificate.        _________________________________  Anila Gardner PA-C  2022

## 2022-06-02 NOTE — PROGRESS NOTES
Update    Pt presents in room aaox4 with open affect. Pt reports he feels better emotionally than he did yesterday. Pt voices understanding of plan to stay overnight tonight, hopefully get PleurX drain tomorrow morning and then discharge home with hospice. Alternatively, pt may have to wait until Monday to get drain, in which case SW made  reservation for Friday through Sunday night (check out Monday). Pt denies preference for hospice and states he believes he will go to the home of his dgtr Farhana in [facility]. Pt gives SW permission to phone dgtr. When SW spoke to Farhana, she states she and her children will go to pt's home for duration of time he is on hospice due to his home is bigger than her home and there is no room for a hospital bed at her home.     SW made referral to [facility] due to they can provide care to pt at  and at home.     Pt reports coping well and denies further needs, questions, concerns at this time and none indicated. Providing ongoing psychosocial and counseling support, education, resources, assistance and discharge planning as indicated. Following and available.

## 2022-06-02 NOTE — PLAN OF CARE
Pt is afebrile. Hand washing per nursing guidelines.  Patient acknowledges understanding with pain scale.  Non slip footwear in place. Bed lowered. Rails up x 2. Call bell in easy reach.  Patient is independent. Pt encouraged to turn frequently.      Plan for PleurX cath tomorrow if unable to obtain any fluid, pt will dc to [facility] for the weekend and PleurX to be placed Monday at outpatient. Family at bedside. Pt ambulated with rolling walker. Decreased appetite today, pt only wanted chicken broth for lunch, will attempt solid food for dinner.

## 2022-06-02 NOTE — ASSESSMENT & PLAN NOTE
-50-60%% LCx, OM disease on [facility] 2/29/20  -Not on ASA possibly 2/2 past use of Eliquis, which patient reports was starting for SUSIE. He has not taken it in months, however  -Continue statin

## 2022-06-02 NOTE — SUBJECTIVE & OBJECTIVE
Interval Hx: Patient with no complaints this morning besides wanting to eat if he is not going for pleurx. Pt reports that by the time is family arrived yesterday no one from the primary team was available to meet with them.     Past Medical History:   Diagnosis Date    Anticoagulant long-term use     CHF (congestive heart failure)     Chronic combined systolic and diastolic heart failure 03/01/2020    Cirrhosis 5/31/2022    Coronary artery disease     Diabetes mellitus     Digestive disorder     Diverticulitis     Encounter for blood transfusion     Hypertension     Renal disorder     Sleep apnea     Suspected Covid-19 Virus Infection; test negative 03/14/2020    Unspecified cirrhosis of liver        Past Surgical History:   Procedure Laterality Date    COLECTOMY      COLONOSCOPY N/A 5/11/2022    Procedure: COLON;  Surgeon: Dk Jacobson MD;  Location: [facility] ENDOSCOPY;  Service: Gastroenterology;  Laterality: N/A;    ESOPHAGOGASTRODUODENOSCOPY N/A 5/10/2022    Procedure: EGD (ESOPHAGOGASTRODUODENOSCOPY);  Surgeon: Dk Jacobson MD;  Location: [facility] ENDOSCOPY;  Service: Gastroenterology;  Laterality: N/A;    INTRALUMINAL GASTROINTESTINAL TRACT IMAGING VIA CAPSULE N/A 5/11/2022    Procedure: IMAGING PROCEDURE, GI TRACT, INTRALUMINAL, VIA CAPSULE;  Surgeon: Dk Jacobson MD;  Location: [facility] ENDOSCOPY;  Service: Gastroenterology;  Laterality: N/A;  done in recovery    RIGHT HEART CATHETERIZATION Right 10/8/2020    Procedure: INSERTION, CATHETER, RIGHT HEART;  Surgeon: Adilson Darden Jr., MD;  Location: [facility] CATH LAB;  Service: Cardiology;  Laterality: Right;    RIGHT HEART CATHETERIZATION Right 2/3/2021    Procedure: INSERTION, CATHETER, RIGHT HEART;  Surgeon: Adilson Darden Jr., MD;  Location: [facility] CATH LAB;  Service: Cardiology;  Laterality: Right;    RIGHT HEART CATHETERIZATION Right 6/22/2021    Procedure: INSERTION, CATHETER, RIGHT HEART;  Surgeon: Yuki SINGLETON  MD Sandy;  Location: [facility] CATH LAB;  Service: Cardiology;  Laterality: Right;       Review of patient's allergies indicates:   Allergen Reactions    Penicillins Anaphylaxis     Pt stated his mother said it will kill him- always inform to take ampicillin    Penicillin     Sitagliptin Other (See Comments)     Pancreatitis & liver disease       Medications:  Continuous Infusions:  Scheduled Meds:   amiodarone  200 mg Oral Daily    atorvastatin  20 mg Oral QHS    docusate sodium  100 mg Oral BID    ferrous sulfate  1 tablet Oral BID    fluticasone propionate  1 spray Each Nostril Daily    heparin (porcine)  5,000 Units Subcutaneous Q8H    pantoprazole  40 mg Oral Daily     PRN Meds:acetaminophen, calcium carbonate, dextrose 10%, dextrose 10%, sodium chloride 0.9%    Family History       Problem Relation (Age of Onset)    Diabetes Mother    Heart disease Father    Hypertension Mother, Father          Tobacco Use    Smoking status: Never Smoker    Smokeless tobacco: Never Used   Substance and Sexual Activity    Alcohol use: Never    Drug use: Never    Sexual activity: Not on file       Review of Systems   Constitutional:  Positive for activity change, appetite change and fatigue.   HENT: Negative.     Eyes: Negative.    Respiratory: Negative.     Cardiovascular: Negative.    Gastrointestinal:  Positive for abdominal distention.   Endocrine: Negative.    Musculoskeletal: Negative.    Neurological:  Positive for weakness.   Psychiatric/Behavioral: Negative.     Objective:     Vital Signs (Most Recent):  Temp: 97.6 °F (36.4 °C) (06/02/22 1227)  Pulse: 79 (06/02/22 1227)  Resp: 20 (06/02/22 1227)  BP: (!) 121/57 (06/02/22 1227)  SpO2: 95 % (06/02/22 1227)   Vital Signs (24h Range):  Temp:  [97.6 °F (36.4 °C)-98.1 °F (36.7 °C)] 97.6 °F (36.4 °C)  Pulse:  [77-83] 79  Resp:  [17-20] 20  SpO2:  [95 %-99 %] 95 %  BP: ()/(55-79) 121/57     Weight: 54.4 kg (119 lb 14.9 oz)  Body mass index is 20.59 kg/m².    Physical  Exam  Vitals and nursing note reviewed.   Constitutional:       General: He is not in acute distress.  HENT:      Head: Normocephalic.      Right Ear: External ear normal.      Left Ear: External ear normal.      Nose: Nose normal.      Mouth/Throat:      Mouth: Mucous membranes are moist.   Cardiovascular:      Rate and Rhythm: Normal rate.   Pulmonary:      Effort: Pulmonary effort is normal. No respiratory distress.   Abdominal:      General: There is distension.   Musculoskeletal:         General: Normal range of motion.      Cervical back: Normal range of motion.   Neurological:      Mental Status: He is alert.      Motor: Weakness present.   Psychiatric:         Mood and Affect: Mood normal.         Thought Content: Thought content normal.       Review of Symptoms      Symptom Assessment (ESAS 0-10 Scale)  Pain:  0  Dyspnea:  0  Anxiety:  0  Nausea:  0  Depression:  0  Anorexia:  0  Fatigue:  7  Insomnia:  0  Restlessness:  0  Agitation:  0     CAM / Delirium:  Negative  Constipation:  Negative  Diarrhea:  Negative    Bowel Management Plan (BMP):  No      Modified Flavia Scale:  0.5    Performance Status:  50    Living Arrangements:  Lives alone    Psychosocial/Cultural: Has a daughter. Close with brother. Enjoys fishing and spending time with grandchildren     Spiritual:  F - Suzy and Belief:  Baptism      Advance Care Planning   Advance Directives:   Living Will: No    LaPOST: No    Do Not Resuscitate Status: Yes    Medical Power of : No      Decision Making:  Patient answered questions       Significant Labs: All pertinent labs within the past 24 hours have been reviewed.  CBC:   Recent Labs   Lab 06/02/22  0625   WBC 11.15   HGB 8.8*   HCT 24.2*   MCV 95   *       BMP:  Recent Labs   Lab 06/02/22  0625   GLU 87   *   K 3.2*   CL 97   CO2 23   BUN 23   CREATININE 1.5*   CALCIUM 7.7*   MG 1.7       LFT:  Lab Results   Component Value Date    AST 84 (H) 06/02/2022    ALKPHOS 94 06/02/2022     BILITOT 2.2 (H) 06/02/2022     Albumin:   Albumin   Date Value Ref Range Status   06/02/2022 2.0 (L) 3.5 - 5.2 g/dL Final     Protein:   Total Protein   Date Value Ref Range Status   06/02/2022 5.3 (L) 6.0 - 8.4 g/dL Final     Lactic acid:   Lab Results   Component Value Date    LACTATE 1.6 03/14/2020    LACTATE 1.3 03/01/2020       Significant Imaging: I have reviewed all pertinent imaging results/findings within the past 24 hours.

## 2022-06-02 NOTE — PROGRESS NOTES
[facility] - Transplant Stepdown  Palliative Medicine  Progress Note    Patient Name: John Javier Jr.  MRN: [id number]  Admission Date: 5/30/2022  Hospital Length of Stay: 1 days  Code Status: DNR   Attending Provider: Lizzy Cavanaugh MD  Consulting Provider: Estefani Piedra MD  Primary Care Physician: Primary Doctor No  Principal Problem:Acute on chronic combined systolic and diastolic heart failure, NYHA class 4    Patient information was obtained from patient and primary team.      Assessment/Plan:     Palliative care encounter  66 year old male with advanced heart failure and cirrhosis who is not a candidate for advanced options. Primary team recommending hospice     Code status: DNR. Patient accepting of a peaceful and natural death       GOC/ACP  6/2/22  -Followed up with patient. He says he feels better and more accepting overall about his situation. He wants to focus on spending as much good time as possible with his family. He still worries about how his family will be able to cope after he is gone but finds comfort in knowing that hospice will continue to support his family even after he dies. Provided support   Patient is accepting of a peaceful and natural death when it is his time. Code status changed to DNR     -Plans for home with hospice following pleurx placement. Appreciate SW setting this up       6/1/22  -Met with patient at bedside. Introduced palliative medicine team. Discussed bad news that patient received this morning. Provided support. Patient wants to be able to go fishing and spend time with his grandchildren. Explained how hospice will allow patient to hopefully do these things and live well for however long he has left. Provided hospice education. Discussed benefits of pleurx catheter in his belly to although for sierra at home for comfort. Patient in agreement with plan. He is worried about his family and how they will take the news. Explained that hospice will be there for his family  too         Discussed with primary team         I will sign off. Please contact us if you have any additional questions.    Subjective:     Chief Complaint: No chief complaint on file.      HPI:   66-year-old male with advanced heart failure and cirrhosis requiring paracenteses that are increasing in frequency presented with increasing abdominal distension and leg swelling. Patient was admitted for work-up for advanced options. Patient was deemed not to be a candidate for advanced options. Hospice was recommended. Palliative was consulted to assist with [facility]      Hospital Course:  No notes on file    Interval Hx: Patient with no complaints this morning besides wanting to eat if he is not going for pleurx. Pt reports that by the time is family arrived yesterday no one from the primary team was available to meet with them.     Past Medical History:   Diagnosis Date    Anticoagulant long-term use     CHF (congestive heart failure)     Chronic combined systolic and diastolic heart failure 03/01/2020    Cirrhosis 5/31/2022    Coronary artery disease     Diabetes mellitus     Digestive disorder     Diverticulitis     Encounter for blood transfusion     Hypertension     Renal disorder     Sleep apnea     Suspected Covid-19 Virus Infection; test negative 03/14/2020    Unspecified cirrhosis of liver        Past Surgical History:   Procedure Laterality Date    COLECTOMY      COLONOSCOPY N/A 5/11/2022    Procedure: COLON;  Surgeon: Dk Jacobson MD;  Location: [facility] ENDOSCOPY;  Service: Gastroenterology;  Laterality: N/A;    ESOPHAGOGASTRODUODENOSCOPY N/A 5/10/2022    Procedure: EGD (ESOPHAGOGASTRODUODENOSCOPY);  Surgeon: Dk Jacobson MD;  Location: [facility] ENDOSCOPY;  Service: Gastroenterology;  Laterality: N/A;    INTRALUMINAL GASTROINTESTINAL TRACT IMAGING VIA CAPSULE N/A 5/11/2022    Procedure: IMAGING PROCEDURE, GI TRACT, INTRALUMINAL, VIA CAPSULE;  Surgeon: Dk  MD Indira;  Location: [facility] ENDOSCOPY;  Service: Gastroenterology;  Laterality: N/A;  done in recovery    RIGHT HEART CATHETERIZATION Right 10/8/2020    Procedure: INSERTION, CATHETER, RIGHT HEART;  Surgeon: Adilson Darden Jr., MD;  Location: [facility] CATH LAB;  Service: Cardiology;  Laterality: Right;    RIGHT HEART CATHETERIZATION Right 2/3/2021    Procedure: INSERTION, CATHETER, RIGHT HEART;  Surgeon: Adilson Darden Jr., MD;  Location: [facility] CATH LAB;  Service: Cardiology;  Laterality: Right;    RIGHT HEART CATHETERIZATION Right 6/22/2021    Procedure: INSERTION, CATHETER, RIGHT HEART;  Surgeon: Yuki Delgado MD;  Location: [facility] CATH LAB;  Service: Cardiology;  Laterality: Right;       Review of patient's allergies indicates:   Allergen Reactions    Penicillins Anaphylaxis     Pt stated his mother said it will kill him- always inform to take ampicillin    Penicillin     Sitagliptin Other (See Comments)     Pancreatitis & liver disease       Medications:  Continuous Infusions:  Scheduled Meds:   amiodarone  200 mg Oral Daily    atorvastatin  20 mg Oral QHS    docusate sodium  100 mg Oral BID    ferrous sulfate  1 tablet Oral BID    fluticasone propionate  1 spray Each Nostril Daily    heparin (porcine)  5,000 Units Subcutaneous Q8H    pantoprazole  40 mg Oral Daily     PRN Meds:acetaminophen, calcium carbonate, dextrose 10%, dextrose 10%, sodium chloride 0.9%    Family History       Problem Relation (Age of Onset)    Diabetes Mother    Heart disease Father    Hypertension Mother, Father          Tobacco Use    Smoking status: Never Smoker    Smokeless tobacco: Never Used   Substance and Sexual Activity    Alcohol use: Never    Drug use: Never    Sexual activity: Not on file       Review of Systems   Constitutional:  Positive for activity change, appetite change and fatigue.   HENT: Negative.     Eyes: Negative.    Respiratory: Negative.     Cardiovascular: Negative.     Gastrointestinal:  Positive for abdominal distention.   Endocrine: Negative.    Musculoskeletal: Negative.    Neurological:  Positive for weakness.   Psychiatric/Behavioral: Negative.     Objective:     Vital Signs (Most Recent):  Temp: 97.6 °F (36.4 °C) (06/02/22 1227)  Pulse: 79 (06/02/22 1227)  Resp: 20 (06/02/22 1227)  BP: (!) 121/57 (06/02/22 1227)  SpO2: 95 % (06/02/22 1227)   Vital Signs (24h Range):  Temp:  [97.6 °F (36.4 °C)-98.1 °F (36.7 °C)] 97.6 °F (36.4 °C)  Pulse:  [77-83] 79  Resp:  [17-20] 20  SpO2:  [95 %-99 %] 95 %  BP: ()/(55-79) 121/57     Weight: 54.4 kg (119 lb 14.9 oz)  Body mass index is 20.59 kg/m².    Physical Exam  Vitals and nursing note reviewed.   Constitutional:       General: He is not in acute distress.  HENT:      Head: Normocephalic.      Right Ear: External ear normal.      Left Ear: External ear normal.      Nose: Nose normal.      Mouth/Throat:      Mouth: Mucous membranes are moist.   Cardiovascular:      Rate and Rhythm: Normal rate.   Pulmonary:      Effort: Pulmonary effort is normal. No respiratory distress.   Abdominal:      General: There is distension.   Musculoskeletal:         General: Normal range of motion.      Cervical back: Normal range of motion.   Neurological:      Mental Status: He is alert.      Motor: Weakness present.   Psychiatric:         Mood and Affect: Mood normal.         Thought Content: Thought content normal.       Review of Symptoms      Symptom Assessment (ESAS 0-10 Scale)  Pain:  0  Dyspnea:  0  Anxiety:  0  Nausea:  0  Depression:  0  Anorexia:  0  Fatigue:  7  Insomnia:  0  Restlessness:  0  Agitation:  0     CAM / Delirium:  Negative  Constipation:  Negative  Diarrhea:  Negative    Bowel Management Plan (BMP):  No      Modified Flavia Scale:  0.5    Performance Status:  50    Living Arrangements:  Lives alone    Psychosocial/Cultural: Has a daughter. Close with brother. Enjoys fishing and spending time with grandchildren      Spiritual:  F - Suzy and Belief:  Spiritism      Advance Care Planning   Advance Directives:   Living Will: No    LaPOST: No    Do Not Resuscitate Status: Yes    Medical Power of : No      Decision Making:  Patient answered questions       Significant Labs: All pertinent labs within the past 24 hours have been reviewed.  CBC:   Recent Labs   Lab 06/02/22 0625   WBC 11.15   HGB 8.8*   HCT 24.2*   MCV 95   *       BMP:  Recent Labs   Lab 06/02/22 0625   GLU 87   *   K 3.2*   CL 97   CO2 23   BUN 23   CREATININE 1.5*   CALCIUM 7.7*   MG 1.7       LFT:  Lab Results   Component Value Date    AST 84 (H) 06/02/2022    ALKPHOS 94 06/02/2022    BILITOT 2.2 (H) 06/02/2022     Albumin:   Albumin   Date Value Ref Range Status   06/02/2022 2.0 (L) 3.5 - 5.2 g/dL Final     Protein:   Total Protein   Date Value Ref Range Status   06/02/2022 5.3 (L) 6.0 - 8.4 g/dL Final     Lactic acid:   Lab Results   Component Value Date    LACTATE 1.6 03/14/2020    LACTATE 1.3 03/01/2020       Significant Imaging: I have reviewed all pertinent imaging results/findings within the past 24 hours.          Estefani Piedra MD  Palliative Medicine  [facility] - Transplant Stepdown      > 50% of 35  min visit spent in chart review, face to face discussion of goals of care,  symptom assessment, coordination of care and emotional support

## 2022-06-02 NOTE — ASSESSMENT & PLAN NOTE
66 year old male with advanced heart failure and cirrhosis who is not a candidate for advanced options. Primary team recommending hospice     GOC/ACP  -Met with patient at bedside. Introduced palliative medicine team. Discussed bad news that patient received this morning. Provided support. Patient wants to be able to go fishing and spend time with his grandchildren. Explained how hospice will allow patient to hopefully do these things and live well for however long he has left. Provided hospice education. Discussed benefits of pleurx catheter in his belly to although for sierra at home for comfort. Patient in agreement with plan. He is worried about his family and how they will take the news. Explained that hospice will be there for his family too     -Plan for pleurx placement and then home with hospice. Appreciate CM/SW setting this up.      Discussed with primary team

## 2022-06-03 VITALS
OXYGEN SATURATION: 97 % | HEART RATE: 78 BPM | BODY MASS INDEX: 20.47 KG/M2 | TEMPERATURE: 98 F | DIASTOLIC BLOOD PRESSURE: 70 MMHG | SYSTOLIC BLOOD PRESSURE: 104 MMHG | RESPIRATION RATE: 13 BRPM | HEIGHT: 64 IN | WEIGHT: 119.94 LBS

## 2022-06-03 LAB
ALBUMIN SERPL BCP-MCNC: 2.2 G/DL (ref 3.5–5.2)
ALP SERPL-CCNC: 104 U/L (ref 55–135)
ALT SERPL W/O P-5'-P-CCNC: 64 U/L (ref 10–44)
ANION GAP SERPL CALC-SCNC: 8 MMOL/L (ref 8–16)
AST SERPL-CCNC: 101 U/L (ref 10–40)
BACTERIA SPEC AEROBE CULT: NO GROWTH
BASOPHILS # BLD AUTO: 0.01 K/UL (ref 0–0.2)
BASOPHILS NFR BLD: 0.1 % (ref 0–1.9)
BILIRUB SERPL-MCNC: 2.3 MG/DL (ref 0.1–1)
BUN SERPL-MCNC: 23 MG/DL (ref 8–23)
CALCIUM SERPL-MCNC: 8.3 MG/DL (ref 8.7–10.5)
CHLORIDE SERPL-SCNC: 101 MMOL/L (ref 95–110)
CO2 SERPL-SCNC: 21 MMOL/L (ref 23–29)
CREAT SERPL-MCNC: 1.6 MG/DL (ref 0.5–1.4)
DIFFERENTIAL METHOD: ABNORMAL
EOSINOPHIL # BLD AUTO: 0.1 K/UL (ref 0–0.5)
EOSINOPHIL NFR BLD: 0.7 % (ref 0–8)
ERYTHROCYTE [DISTWIDTH] IN BLOOD BY AUTOMATED COUNT: 15.5 % (ref 11.5–14.5)
EST. GFR  (AFRICAN AMERICAN): 51.5 ML/MIN/1.73 M^2
EST. GFR  (NON AFRICAN AMERICAN): 44.5 ML/MIN/1.73 M^2
GLUCOSE SERPL-MCNC: 91 MG/DL (ref 70–110)
HCT VFR BLD AUTO: 25 % (ref 40–54)
HGB BLD-MCNC: 9 G/DL (ref 14–18)
IMM GRANULOCYTES # BLD AUTO: 0.06 K/UL (ref 0–0.04)
IMM GRANULOCYTES NFR BLD AUTO: 0.5 % (ref 0–0.5)
LYMPHOCYTES # BLD AUTO: 1.3 K/UL (ref 1–4.8)
LYMPHOCYTES NFR BLD: 11.1 % (ref 18–48)
MAGNESIUM SERPL-MCNC: 1.9 MG/DL (ref 1.6–2.6)
MCH RBC QN AUTO: 34.2 PG (ref 27–31)
MCHC RBC AUTO-ENTMCNC: 36 G/DL (ref 32–36)
MCV RBC AUTO: 95 FL (ref 82–98)
MONOCYTES # BLD AUTO: 0.8 K/UL (ref 0.3–1)
MONOCYTES NFR BLD: 7 % (ref 4–15)
NEUTROPHILS # BLD AUTO: 9.2 K/UL (ref 1.8–7.7)
NEUTROPHILS NFR BLD: 80.6 % (ref 38–73)
NRBC BLD-RTO: 0 /100 WBC
PHOSPHATE SERPL-MCNC: 1.8 MG/DL (ref 2.7–4.5)
PLATELET # BLD AUTO: 153 K/UL (ref 150–450)
PMV BLD AUTO: 12 FL (ref 9.2–12.9)
POTASSIUM SERPL-SCNC: 4.5 MMOL/L (ref 3.5–5.1)
PROT SERPL-MCNC: 5.9 G/DL (ref 6–8.4)
RBC # BLD AUTO: 2.63 M/UL (ref 4.6–6.2)
SODIUM SERPL-SCNC: 130 MMOL/L (ref 136–145)
WBC # BLD AUTO: 11.37 K/UL (ref 3.9–12.7)

## 2022-06-03 PROCEDURE — 99225 PR SUBSEQUENT OBSERVATION CARE,LEVEL II: CPT | Mod: GT,,, | Performed by: PHYSICIAN ASSISTANT

## 2022-06-03 PROCEDURE — 94761 N-INVAS EAR/PLS OXIMETRY MLT: CPT

## 2022-06-03 PROCEDURE — 80053 COMPREHEN METABOLIC PANEL: CPT | Performed by: NURSE PRACTITIONER

## 2022-06-03 PROCEDURE — 25000003 PHARM REV CODE 250: Mod: NTX | Performed by: STUDENT IN AN ORGANIZED HEALTH CARE EDUCATION/TRAINING PROGRAM

## 2022-06-03 PROCEDURE — 63600175 PHARM REV CODE 636 W HCPCS: Mod: NTX | Performed by: STUDENT IN AN ORGANIZED HEALTH CARE EDUCATION/TRAINING PROGRAM

## 2022-06-03 PROCEDURE — 99225 PR SUBSEQUENT OBSERVATION CARE,LEVEL II: ICD-10-PCS | Mod: GT,,, | Performed by: PHYSICIAN ASSISTANT

## 2022-06-03 PROCEDURE — 36415 COLL VENOUS BLD VENIPUNCTURE: CPT | Mod: NTX | Performed by: NURSE PRACTITIONER

## 2022-06-03 PROCEDURE — 25000003 PHARM REV CODE 250: Mod: NTX | Performed by: NURSE PRACTITIONER

## 2022-06-03 PROCEDURE — G0378 HOSPITAL OBSERVATION PER HR: HCPCS | Mod: NTX

## 2022-06-03 PROCEDURE — 83735 ASSAY OF MAGNESIUM: CPT | Mod: NTX | Performed by: NURSE PRACTITIONER

## 2022-06-03 PROCEDURE — 84100 ASSAY OF PHOSPHORUS: CPT | Performed by: NURSE PRACTITIONER

## 2022-06-03 PROCEDURE — 85025 COMPLETE CBC W/AUTO DIFF WBC: CPT | Performed by: NURSE PRACTITIONER

## 2022-06-03 RX ORDER — FENTANYL CITRATE 50 UG/ML
INJECTION, SOLUTION INTRAMUSCULAR; INTRAVENOUS CODE/TRAUMA/SEDATION MEDICATION
Status: COMPLETED | OUTPATIENT
Start: 2022-06-03 | End: 2022-06-03

## 2022-06-03 RX ORDER — MIDAZOLAM HYDROCHLORIDE 1 MG/ML
INJECTION INTRAMUSCULAR; INTRAVENOUS CODE/TRAUMA/SEDATION MEDICATION
Status: COMPLETED | OUTPATIENT
Start: 2022-06-03 | End: 2022-06-03

## 2022-06-03 RX ORDER — LIDOCAINE HYDROCHLORIDE 10 MG/ML
INJECTION INFILTRATION; PERINEURAL CODE/TRAUMA/SEDATION MEDICATION
Status: COMPLETED | OUTPATIENT
Start: 2022-06-03 | End: 2022-06-03

## 2022-06-03 RX ADMIN — FERROUS SULFATE TAB 325 MG (65 MG ELEMENTAL FE) 1 EACH: 325 (65 FE) TAB at 08:06

## 2022-06-03 RX ADMIN — PANTOPRAZOLE SODIUM 40 MG: 40 TABLET, DELAYED RELEASE ORAL at 08:06

## 2022-06-03 RX ADMIN — MIDAZOLAM HYDROCHLORIDE 1 MG: 1 INJECTION, SOLUTION INTRAMUSCULAR; INTRAVENOUS at 09:06

## 2022-06-03 RX ADMIN — FENTANYL CITRATE 50 MCG: 50 INJECTION, SOLUTION INTRAMUSCULAR; INTRAVENOUS at 09:06

## 2022-06-03 RX ADMIN — DOCUSATE SODIUM 100 MG: 100 CAPSULE ORAL at 08:06

## 2022-06-03 RX ADMIN — FENTANYL CITRATE 25 MCG: 50 INJECTION, SOLUTION INTRAMUSCULAR; INTRAVENOUS at 09:06

## 2022-06-03 RX ADMIN — MIDAZOLAM HYDROCHLORIDE 0.5 MG: 1 INJECTION, SOLUTION INTRAMUSCULAR; INTRAVENOUS at 09:06

## 2022-06-03 RX ADMIN — VANCOMYCIN HYDROCHLORIDE 1000 MG: 1 INJECTION, POWDER, LYOPHILIZED, FOR SOLUTION INTRAVENOUS at 09:06

## 2022-06-03 RX ADMIN — LIDOCAINE HYDROCHLORIDE 5 ML: 10 INJECTION, SOLUTION INFILTRATION; PERINEURAL at 09:06

## 2022-06-03 RX ADMIN — AMIODARONE HYDROCHLORIDE 200 MG: 200 TABLET ORAL at 08:06

## 2022-06-03 RX ADMIN — FLUTICASONE PROPIONATE 50 MCG: 50 SPRAY, METERED NASAL at 08:06

## 2022-06-03 NOTE — HPI
65 yo BM presented with cardiogenic shock and transferred to [facility] 2/29/20 with impella.  He had cath documented CAD (50-60% LCx, OM disease on [facility] 2/29).  Course complicated by bacteremia and renal dysfunction.  Compliance concerns so in March 2020 his management was to move to CHF section.  He was started on Entresto and successfully up-titrated to  mg BID.  He RHC Oct 2020 demonstrating:  Right Heart Pressures 10/8/2020  RA: 9/ 6/ 5 RV: 50/ 7 PA: 37/ 18/ 24 PWP: 25/ 22/ 16 .   Cardiac output was 4.48  by Ronni. Cardiac index is 2.53 L/min/m2.   O2 Sat: PA 68%.   Pulmonary vascular resistance: 143. Systemic vascular resistance: 1659.   /80 (98); sat 98%      After RHC recs:  Increase hydralazine and isosorbide to target doses.  Continue Entresto  mg BID.  Emphasize to patient the importance of not using lasix routinely as this is resulting in pre-renal azotemia.  He has no excess fluid on exam or this test today.      He saws Dr. Cavanaugh Nov 2020 and doing well so no changes made.  At visit with me January 2021 I spoke with patient and brother by phone to review his condition and concern that he was worsening.  An evaluation for advanced options was initiated.  He was subsequently declined for medical stability as not requiring readmission for CHF and RHC done on 2/4/2021 with RA 8, wedge 26, CI 2.6.  He saw Dr. Delgado in March 2021 with stable symptoms so no changes made.  He returned May 2021 reporting NYHA class II-III symptoms. Dr. Delgado added dapagliflozin 20 mg daily.  There are notes re: med expenses and trouble getting meds.  He saw Dr. Delgado throughout 2021 and other than requiring admission for pancreatitis doing well.  In 2022 started having problems with ascites required paracentesis, low BP requiring reduction in meds and progressive weakness.  He comes today for f/u visit and this is first time that I have seen him since January 2021.  He is in wheelchair, weak and debilitated.  Tells me  "of paracentesis twice in past month including one last week, 2 hosp stays past month--last 5/8/22-5/15/22 and not able to tolerate meds.  The records report dx of cirrhosis but he says never biopsied and mention of esoph variceal bleed.     At this time weak and SOB with very little activity.  Abd swells along with extr though all "OK now" just discharged.     Reviewed plan with cousin after visit with patient's permission and he was present for this as well    "

## 2022-06-03 NOTE — PLAN OF CARE
Patient tolerated abdmonimal pleurX drain placement well. VSS throughout. Patient to recover x 1 hour in MPU bay 6. Report called to primary nurse Anastasia. 2800ml clear yellow ascites drained.

## 2022-06-03 NOTE — PROGRESS NOTES
Discharge complete.  Discharge instructions given to and reviewed with pt and family.  No questions verbalized.  PIV removed, tip intact.  VSS prior to discharge.  Belongings packed. Transport requested.  Pt seen leaving unit in wheelchair with family and transport services.

## 2022-06-03 NOTE — PT/OT/SLP PROGRESS
Occupational Therapy      Patient Name:  John Javier    MRN:  [id number]    Patient not seen today secondary to Pt CORETTA in IR this am and discharging home this pm.    6/3/2022

## 2022-06-03 NOTE — H&P
Inpatient Radiology Pre-procedure Note    History of Present Illness:  John Javier Jr. is a 66 y.o. male with hx of liver and end stage heart failure not a candidate for OHTx or LVAD in setting of cirrhosis. Refractory ascites s/p para 5/31 draining 4.5 L. Palliative care consulted to discuss GOC/hospice. IR consulted for peritoneal pleurx catheter.    Admission H&P reviewed.    Past Medical History:   Diagnosis Date    Anticoagulant long-term use     CHF (congestive heart failure)     Chronic combined systolic and diastolic heart failure 03/01/2020    Cirrhosis 5/31/2022    Coronary artery disease     Diabetes mellitus     Digestive disorder     Diverticulitis     Encounter for blood transfusion     Hypertension     Renal disorder     Sleep apnea     Suspected Covid-19 Virus Infection; test negative 03/14/2020    Unspecified cirrhosis of liver      Past Surgical History:   Procedure Laterality Date    COLECTOMY      COLONOSCOPY N/A 5/11/2022    Procedure: COLON;  Surgeon: Dk Jacobson MD;  Location: [facility] ENDOSCOPY;  Service: Gastroenterology;  Laterality: N/A;    ESOPHAGOGASTRODUODENOSCOPY N/A 5/10/2022    Procedure: EGD (ESOPHAGOGASTRODUODENOSCOPY);  Surgeon: Dk Jacobson MD;  Location: [facility] ENDOSCOPY;  Service: Gastroenterology;  Laterality: N/A;    INTRALUMINAL GASTROINTESTINAL TRACT IMAGING VIA CAPSULE N/A 5/11/2022    Procedure: IMAGING PROCEDURE, GI TRACT, INTRALUMINAL, VIA CAPSULE;  Surgeon: Dk Jacobson MD;  Location: [facility] ENDOSCOPY;  Service: Gastroenterology;  Laterality: N/A;  done in recovery    RIGHT HEART CATHETERIZATION Right 10/8/2020    Procedure: INSERTION, CATHETER, RIGHT HEART;  Surgeon: Adilson Darden Jr., MD;  Location: [facility] CATH LAB;  Service: Cardiology;  Laterality: Right;    RIGHT HEART CATHETERIZATION Right 2/3/2021    Procedure: INSERTION, CATHETER, RIGHT HEART;  Surgeon: Adilson Darden Jr., MD;   Location: [facility] CATH LAB;  Service: Cardiology;  Laterality: Right;    RIGHT HEART CATHETERIZATION Right 2021    Procedure: INSERTION, CATHETER, RIGHT HEART;  Surgeon: Yuik Delgado MD;  Location: [facility] CATH LAB;  Service: Cardiology;  Laterality: Right;       Review of Systems:   As documented in primary team H&P    Home Meds:   Prior to Admission medications    Medication Sig Start Date End Date Taking? Authorizing Provider   albuterol (PROVENTIL/VENTOLIN HFA) 90 mcg/actuation inhaler INHALE 2 PUFFS BY MOUTH FOUR TIMES DAILY AS NEEDED FOR WHEEZING OR SHORTNESS OF BREATH 21   Historical Provider   amiodarone (PACERONE) 200 MG Tab Take 1 tablet (200 mg total) by mouth once daily. 22  Panchito Bhandari MD   atorvastatin (LIPITOR) 20 MG tablet Take 1 tablet (20 mg total) by mouth every evening. 20  Adlison Darden Jr., MD   docusate sodium (COLACE) 100 MG capsule Take 1 capsule (100 mg total) by mouth 2 (two) times daily. 5/15/22 6/15/22  Panchito Bhandari MD   ferrous sulfate 324 mg (65 mg iron) TbEC Take 1 tablet (324 mg total) by mouth 2 (two) times daily. 5/15/22 6/15/22  Panchito Bhandari MD   fluticasone propionate (FLONASE) 50 mcg/actuation nasal spray  20   Historical Provider   nitroGLYCERIN (NITROSTAT) 0.4 MG SL tablet SMARTSI Tablet(s) Sublingual As Needed 3/3/22   Yuki Delgado MD   pantoprazole (PROTONIX) 40 MG tablet Take 1 tablet (40 mg total) by mouth once daily. 3/3/22 3/3/23  Yuki Delgado MD   simethicone (MYLICON) 80 MG chewable tablet Chew and swallow 1 tablet (80 mg total) by mouth 3 (three) times daily as needed for Flatulence. 22  Anila Gardner PA-C   metoprolol succinate (TOPROL-XL) 100 MG 24 hr tablet Take 1 tablet (100 mg total) by mouth once daily. 3/3/22 6/2/22  Yuki Delgado MD     Scheduled Meds:    amiodarone  200 mg Oral Daily    atorvastatin  20 mg Oral QHS    docusate sodium  100 mg Oral BID    ferrous sulfate   1 tablet Oral BID    fluticasone propionate  1 spray Each Nostril Daily    heparin (porcine)  5,000 Units Subcutaneous Q8H    pantoprazole  40 mg Oral Daily     Continuous Infusions:   PRN Meds:acetaminophen, calcium carbonate, dextrose 10%, dextrose 10%, sodium chloride 0.9%  Anticoagulants/Antiplatelets: Heparin    Allergies:   Review of patient's allergies indicates:   Allergen Reactions    Penicillins Anaphylaxis     Pt stated his mother said it will kill him- always inform to take ampicillin    Penicillin     Sitagliptin Other (See Comments)     Pancreatitis & liver disease     Sedation Hx: have not been any systemic reactions    Labs:  Recent Labs   Lab 05/30/22 2012   INR 1.6*       Recent Labs   Lab 06/02/22 0625   WBC 11.15   HGB 8.8*   HCT 24.2*   MCV 95   *      Recent Labs   Lab 06/02/22 0625   GLU 87   *   K 3.2*   CL 97   CO2 23   BUN 23   CREATININE 1.5*   CALCIUM 7.7*   MG 1.7   ALT 53*   AST 84*   ALBUMIN 2.0*   BILITOT 2.2*         Vitals:  Temp: 97.7 °F (36.5 °C) (06/02/22 2015)  Pulse: 84 (06/03/22 0400)  Resp: 15 (06/03/22 0400)  BP: 110/74 (06/03/22 0400)  SpO2: 98 % (06/03/22 0400)     Physical Exam:  ASA: III  Mallampati: III    General: no acute distress  Mental Status: alert and oriented to person, place and time  HEENT: normocephalic, atraumatic  Chest: unlabored breathing  Heart: regular heart rate  Abdomen: nondistended  Extremity: moves all extremities      Plan:  Sedation Plan: up to moderate.  Patient will undergo peritoneal pleurx catheter.          Haleigh Ulrich MD  Radiology Resident PGY- 2  [facility]

## 2022-06-03 NOTE — ASSESSMENT & PLAN NOTE
-DCM out of proportion to CAD  -Declined at our center in the past for advanced options 2/2 medical stability, but has had significant decline in activity intolerance over the past 4 months and several admits for ADHF  -Intolerant to any GDMT with hypotension   -TTE done 5/30: LVEDD 5, LVEF 25%, diastolic dysfunction, RV normal, all valves normal, IVC 3, + ascites  -Pathway started on admit, but given likely cirrhosis, small LV and psychosocial issues likely not a candidate for advanced options. CTS evaluated patient yesterday, not a candidate for advanced options with cirrhosis (confirmed on liver US).   -With end stage heart failure and liver disease, palliative care consulted for GOC/hospice discussion. Pt and pt's family would like to move forward with home hospice. Code status changed to DNR/DNI   -PleurX placement with IR today

## 2022-06-03 NOTE — ASSESSMENT & PLAN NOTE
"  Your surgery is scheduled for _Tuesday June 7, 2022_.    Call [phone] between 2 p.m. and 5 p.m. on   _Monday to find out your arrival time for the day of your surgery.      Please report to SAME DAY SURGERY UNIT on the 2nd FLOOR at _______ a.m.  Use front door entrance. The doors open at 07:00 am.      If you need WHEELCHAIR assistance please call  [phone] from your cell phone or "[phone]"  from the  hospital courtesy phone located to the right after you enter the hospital lobby.      INSTRUCTIONS IMPORTANT!!!  ¨ Do not eat  after 12 midnight-. OK to brush teeth, no   gum, candy or mints!    MAY DRINK WATER UNTIL HOSPITAL ARRIVAL TIME          _x___  Return to Hospital Lab on _Monday 6-6-22 between 7 am and 2 pm_for additional blood test.    _x___  Prep instructions:  SHOWER     _x___  Please shower using Hibiclens soap the night before AND  the morning of your surgery/procedure. Do not use Hibiclens on your face or genitals         x      If your surgery is around your belly button (Navel) be sure to wash inside your belly button also. Rinse hibiclens off completely.  __x__  No shaving of procedural area at least 4-5 days before surgery due to increased risk of skin irritation and/or possible infection.  __x__  Do not wear makeup, including mascara. WEARING EYE MAKEUP MAY  LEAD TO SERIOUS EYE INJURY during surgery.  __x__  No powder, lotions or creams to your body.  __x__  You may wear only deodorant on the day of surgery.  __x__  Please remove all jewelry, including piercings and leave at home.  __x__  No money or valuables needed. Please leave at home.  You may bring your cell phone.  __x__  If going home the same day, arrange for a ride home. You will not be able to   drive if Anesthesia was used.  __x__  Wear loose fitting clothing. Allow for dressings, bandages.  __x__  Stop Aspirin, Ibuprofen, Motrin and Aleve at least 3-5 days before surgery, unless otherwise instructed by your doctor, or the nurse.        x   " -Records reflect dx of cirrhosis but he has never been biopsied nor had esophageal variceal bleed  -Liver US done here 5/30 shows cirrhotic liver morphology with sequela of portal hypertension  -Paracentesis 5/31 with 4.8 L removed  -Plan for palliative PleurX placement with IR today      You MAY use Tylenol/acetaminophen until day of surgery.  __x__  Call MD for temperature above 101 degrees.        __x__ Stop taking any Fish Oil supplement or                   Vitamin E at least 5 days prior to surgery.          I have read or had read and explained to me, and understand the above information.  Additional comments or instructions:Please call   [phone] if you have any questions regarding the instructions above.

## 2022-06-03 NOTE — HOSPITAL COURSE
Admitted from [facility] for ADHF and evaluation for advanced surgical options (OHTx/LVAD). Advanced options pathway initiated, but was quickly discontinued after CTS declined as surgical candidate with liver US showing cirrhosis. Paracentesis performed 5/31 with 4.8 L serous fluid removed. Palliative Care consulted, patient and patient's family decided to move forward with organizing home hospice. Code status changed to DNR/DNI. Palliative PleurX placed prior to discharge (6/3/22).

## 2022-06-03 NOTE — PROGRESS NOTES
Pt transported via stretcher down to IR with transport.  VSS prior to leaving unit.  Family at bedside. No issues or concerns.  Will monitor for pts return to unit.

## 2022-06-03 NOTE — PLAN OF CARE
DNR on Comfort Care  VSS,   NSR on telemetry  Room air  AAOX4  PIVX1  Pt up w/ assist x1 Uses rolling walker   Pt on 1.5 L FR  Minimal UO see flowsheet  Safety/fall precautions maintained  Bed locked in lowest position  Call light within reach verbal understanding to call PRN

## 2022-06-03 NOTE — PROCEDURES
"  Pre Op Diagnosis: Recurrent Ascites  Post Op Diagnosis: Same    Procedure: Pleurx placement    Procedure performed by: Stefano    Written Informed Consent Obtained: Yes  Specimen Removed: YES 2800cc of ascites  Estimated Blood Loss: Minimal    Findings:   Successful placement of abdominal pleurx.     Patient tolerated procedure well.    Mino Agarwal MD ([place])  Interventional Radiology  [phone]        "

## 2022-06-03 NOTE — DISCHARGE SUMMARY
[facility] - Transplant Stepdown  Heart Transplant  Discharge Summary      Patient Name: John Javier Jr.  MRN: [id number]  Admission Date: 5/30/2022  Hospital Length of Stay: 1 days  Discharge Date and Time: 06/03/2022 12:12 PM  Attending Physician: Lizzy Cavanaugh MD   Discharging Provider: Anila Gardner PA-C  Primary Care Provider: Primary Doctor No     HPI:   65 yo BM presented with cardiogenic shock and transferred to [facility] 2/29/20 with impella.  He had cath documented CAD (50-60% LCx, OM disease on [facility] 2/29).  Course complicated by bacteremia and renal dysfunction.  Compliance concerns so in March 2020 his management was to move to CHF section.  He was started on Entresto and successfully up-titrated to  mg BID.  He RHC Oct 2020 demonstrating:  Right Heart Pressures 10/8/2020  RA: 9/ 6/ 5 RV: 50/ 7 PA: 37/ 18/ 24 PWP: 25/ 22/ 16 .   Cardiac output was 4.48  by Ronni. Cardiac index is 2.53 L/min/m2.   O2 Sat: PA 68%.   Pulmonary vascular resistance: 143. Systemic vascular resistance: 1659.   /80 (98); sat 98%      After RHC recs:  Increase hydralazine and isosorbide to target doses.  Continue Entresto  mg BID.  Emphasize to patient the importance of not using lasix routinely as this is resulting in pre-renal azotemia.  He has no excess fluid on exam or this test today.      He saws Dr. Cavanaugh Nov 2020 and doing well so no changes made.  At visit with me January 2021 I spoke with patient and brother by phone to review his condition and concern that he was worsening.  An evaluation for advanced options was initiated.  He was subsequently declined for medical stability as not requiring readmission for CHF and RHC done on 2/4/2021 with RA 8, wedge 26, CI 2.6.  He saw Dr. Delgado in March 2021 with stable symptoms so no changes made.  He returned May 2021 reporting NYHA class II-III symptoms. Dr. Delgado added dapagliflozin 20 mg daily.  There are notes re: med expenses and trouble getting meds.  He  "saw Dr. Delgado throughout 2021 and other than requiring admission for pancreatitis doing well.  In 2022 started having problems with ascites required paracentesis, low BP requiring reduction in meds and progressive weakness.  He comes today for f/u visit and this is first time that I have seen him since January 2021.  He is in wheelchair, weak and debilitated.  Tells me of paracentesis twice in past month including one last week, 2 hosp stays past month--last 5/8/22-5/15/22 and not able to tolerate meds.  The records report dx of cirrhosis but he says never biopsied and mention of esoph variceal bleed.     At this time weak and SOB with very little activity.  Abd swells along with extr though all "OK now" just discharged.     Reviewed plan with cousin after visit with patient's permission and he was present for this as well        Procedure(s) (LRB):  INSERTION, CATHETER, RIGHT HEART (Right)     Hospital Course: Admitted from [facility] clinic for ADHF and evaluation for advanced surgical options (OHTx/LVAD). Advanced options pathway initiated, but was quickly discontinued after CTS declined as surgical candidate with liver US showing cirrhosis. Paracentesis performed 5/31 with 4.8 L serous fluid removed. Palliative Care consulted, patient and patient's family decided to move forward with organizing home hospice. Code status changed to DNR/DNI. Palliative PleurX placed prior to discharge (6/3/22).       Goals of Care Treatment Preferences:  Code Status: DNR      Consults (From admission, onward)        Status Ordering Provider     Inpatient consult to Interventional Radiology  Once        Provider:  (Not yet assigned)    Completed OLIVER RAJAN     Inpatient consult to Palliative Care  Once        Provider:  (Not yet assigned)    Completed CLIF ROSENTHAL     Inpatient consult to Cardiothoracic Surgery  Once        Provider:  (Not yet assigned)    Completed CHANA DIA     Inpatient consult to Midline team  Once     "    Provider:  (Not yet assigned)    CLIF Lowe          Significant Diagnostic Studies: Labs:   BMP:   Recent Labs   Lab 06/02/22  0625 06/03/22  0652   GLU 87 91   * 130*   K 3.2* 4.5   CL 97 101   CO2 23 21*   BUN 23 23   CREATININE 1.5* 1.6*   CALCIUM 7.7* 8.3*   MG 1.7 1.9       Pending Diagnostic Studies:     Procedure Component Value Units Date/Time    Cytology, Fluid/Wash/Brush [[id number]] Collected: 05/31/22 1511    Order Status: Sent Lab Status: In process Updated: 05/31/22 1900    Specimen: Body Fluid         Final Active Diagnoses:    Diagnosis Date Noted POA    PRINCIPAL PROBLEM:  Acute on chronic combined systolic and diastolic heart failure, NYHA class 4 [I50.43] 03/01/2020 Unknown    Palliative care encounter [Z51.5] 06/01/2022 Not Applicable    Ascites [R18.8] 05/31/2022 Unknown    Cirrhosis [K74.60] 05/31/2022 Unknown    Stage 3a chronic kidney disease [N18.31] 01/10/2021 Yes    Dilated cardiomyopathy out of proportion to CAD [I42.0] 03/27/2020 Yes    PAF (paroxysmal atrial fibrillation) [I48.0] 03/03/2020 Yes    VT (ventricular tachycardia) [I47.2] 03/02/2020 Yes    CAD (coronary artery disease) [I25.10] 03/01/2020 Yes    Type 2 diabetes mellitus, without long-term current use of insulin [E11.9] 03/01/2020 Yes      Problems Resolved During this Admission:      Discharged Condition: stable    Disposition: [place]Health [place] [facility]    Follow Up:    Patient Instructions:      Activity as tolerated     Medications:  Reconciled Home Medications:      Medication List      CONTINUE taking these medications    albuterol 90 mcg/actuation inhaler  Commonly known as: PROVENTIL/VENTOLIN HFA  INHALE 2 PUFFS BY MOUTH FOUR TIMES DAILY AS NEEDED FOR WHEEZING OR SHORTNESS OF BREATH     amiodarone 200 MG Tab  Commonly known as: PACERONE  Take 1 tablet (200 mg total) by mouth once daily.     atorvastatin 20 MG tablet  Commonly known as: LIPITOR  Take 1 tablet (20 mg total) by mouth every  evening.     docusate sodium 100 MG capsule  Commonly known as: COLACE  Take 1 capsule (100 mg total) by mouth 2 (two) times daily.     ferrous sulfate 324 mg (65 mg iron) Tbec  Take 1 tablet (324 mg total) by mouth 2 (two) times daily.     fluticasone propionate 50 mcg/actuation nasal spray  Commonly known as: FLONASE     nitroGLYCERIN 0.4 MG SL tablet  Commonly known as: NITROSTAT  SMARTSI Tablet(s) Sublingual As Needed     pantoprazole 40 MG tablet  Commonly known as: PROTONIX  Take 1 tablet (40 mg total) by mouth once daily.     simethicone 80 MG chewable tablet  Commonly known as: MYLICON  Chew and swallow 1 tablet (80 mg total) by mouth 3 (three) times daily as needed for Flatulence.        STOP taking these medications    apixaban 5 mg Tab  Commonly known as: ELIQUIS     metoprolol succinate 100 MG 24 hr tablet  Commonly known as: TOPROL-XL            Anila Gardner PA-C  Heart Transplant  [facility] - Transplant Stepdown

## 2022-06-03 NOTE — SUBJECTIVE & OBJECTIVE
Interval History: NAEON. No complaints. PleurX placement with IR and home with hospice today.     Continuous Infusions:  Scheduled Meds:   amiodarone  200 mg Oral Daily    atorvastatin  20 mg Oral QHS    docusate sodium  100 mg Oral BID    ferrous sulfate  1 tablet Oral BID    fluticasone propionate  1 spray Each Nostril Daily    heparin (porcine)  5,000 Units Subcutaneous Q8H    pantoprazole  40 mg Oral Daily     PRN Meds:acetaminophen, calcium carbonate, dextrose 10%, dextrose 10%, sodium chloride 0.9%    Review of patient's allergies indicates:   Allergen Reactions    Penicillins Anaphylaxis     Pt stated his mother said it will kill him- always inform to take ampicillin    Penicillin     Sitagliptin Other (See Comments)     Pancreatitis & liver disease     Objective:     Vital Signs (Most Recent):  Temp: 97.7 °F (36.5 °C) (06/03/22 0830)  Pulse: 80 (06/03/22 0830)  Resp: 15 (06/03/22 0830)  BP: 117/79 (06/03/22 0830)  SpO2: 97 % (06/03/22 0830) Vital Signs (24h Range):  Temp:  [97.6 °F (36.4 °C)-97.9 °F (36.6 °C)] 97.7 °F (36.5 °C)  Pulse:  [79-87] 80  Resp:  [14-20] 15  SpO2:  [95 %-99 %] 97 %  BP: (101-121)/(56-81) 117/79     Patient Vitals for the past 72 hrs (Last 3 readings):   Weight   06/02/22 0500 54.4 kg (119 lb 14.9 oz)   06/01/22 0530 54.6 kg (120 lb 5.9 oz)       Body mass index is 20.59 kg/m².      Intake/Output Summary (Last 24 hours) at 6/3/2022 0853  Last data filed at 6/2/2022 1200  Gross per 24 hour   Intake 450 ml   Output --   Net 450 ml         Hemodynamic Parameters:       Telemetry: SR with LBBB     Physical Exam  Constitutional:       Comments: Temporal wasting   HENT:      Head: Normocephalic and atraumatic.   Eyes:      Conjunctiva/sclera: Conjunctivae normal.      Pupils: Pupils are equal, round, and reactive to light.   Neck:      Comments: Neck veins do not appear elevated  Cardiovascular:      Rate and Rhythm: Normal rate and regular rhythm.   Pulmonary:      Effort: Pulmonary  effort is normal.      Breath sounds: Normal breath sounds.   Abdominal:      General: Bowel sounds are normal.      Comments: + ascites   Musculoskeletal:         General: Normal range of motion.      Cervical back: Normal range of motion and neck supple.   Skin:     General: Skin is warm and dry.      Capillary Refill: Capillary refill takes 2 to 3 seconds.   Neurological:      General: No focal deficit present.      Mental Status: He is alert and oriented to person, place, and time.   Psychiatric:         Mood and Affect: Mood normal.         Behavior: Behavior normal.         Thought Content: Thought content normal.         Judgment: Judgment normal.       Significant Labs:  CBC:  Recent Labs   Lab 06/01/22  0642 06/02/22  0625 06/03/22  0652   WBC 12.06 11.15 11.37   RBC 2.27* 2.56* 2.63*   HGB 7.9* 8.8* 9.0*   HCT 22.2* 24.2* 25.0*    139* 153   MCV 98 95 95   MCH 34.8* 34.4* 34.2*   MCHC 35.6 36.4* 36.0       BNP:  Recent Labs   Lab 05/28/22 2041 05/30/22 2012   * 369*       CMP:  Recent Labs   Lab 06/01/22  0642 06/02/22  0625 06/03/22  0652   * 87 91   CALCIUM 7.7* 7.7* 8.3*   ALBUMIN 2.0* 2.0* 2.2*   PROT 5.2* 5.3* 5.9*   * 127* 130*   K 3.3* 3.2* 4.5   CO2 20* 23 21*   CL 98 97 101   BUN 26* 23 23   CREATININE 1.6* 1.5* 1.6*   ALKPHOS 95 94 104   ALT 50* 53* 64*   AST 76* 84* 101*   BILITOT 2.2* 2.2* 2.3*        Coagulation:   Recent Labs   Lab 05/30/22 2012   INR 1.6*       LDH:  No results for input(s): LDH in the last 72 hours.  Microbiology:  Microbiology Results (last 7 days)       Procedure Component Value Units Date/Time    (rule out SBP) Culture, Anaerobic [[id number]] Collected: 05/31/22 1510    Order Status: Completed Specimen: Ascites Updated: 06/02/22 1336     Anaerobic Culture Culture in progress    Narrative:      To rule out SBP order labs: Aerobic culture [QTW691],  Culture, Anaerobic [MLI666], Gram stain [RBM515], Albumin  [BBV528], Protein [HZP971], LDH  [CLD570], WBC \T\ Dff  [RGN6325].    (rule out SBP) Aerobic culture [[id number]] Collected: 05/31/22 1510    Order Status: Completed Specimen: Ascites Updated: 06/02/22 1249     Aerobic Bacterial Culture No growth    Narrative:      To rule out SBP order labs: Aerobic culture [YIZ205],  Culture, Anaerobic [TUR943], Gram stain [WUD446], Albumin  [OPK189], Protein [YDW559], LDH [MIP083], WBC \T\ Dff  [EAH7740].    (rule out SBP) Gram stain [[id number]] Collected: 05/31/22 1510    Order Status: Completed Specimen: Ascites Updated: 05/31/22 2146     Gram Stain Result Rare WBC's      No organisms seen    Narrative:      To rule out SBP order labs: Aerobic culture [[id number]],  Culture, Anaerobic [CRL823], Gram stain [NTD986], Albumin  [CJE003], Protein [CDZ035], LDH [GMN813], WBC \T\ Dff  [YNL0114].            I have reviewed all pertinent labs within the past 24 hours.    Estimated Creatinine Clearance: 35.4 mL/min (A) (based on SCr of 1.6 mg/dL (H)).    Diagnostic Results:  I have reviewed and interpreted all pertinent imaging results/findings within the past 24 hours.

## 2022-06-03 NOTE — PLAN OF CARE
Pt arrived to  for abdominal pleurX drain placement. Pt oriented to unit and staff. Plan of care reviewed with patient. Comfort measures utilized. Pt safely transferred from stretcher to procedural table. Safety strap applied, positioner pillows utilized to minimize pressure points. Blankets applied. Patient placed on continuous monitoring. RN to remain at bedside. Education accepted. Consents reviewed. See flow sheets for monitoring, medication administration, and updates.

## 2022-06-03 NOTE — ASSESSMENT & PLAN NOTE
-50-60%% LCx, OM disease on [facility] 2/29/20  -Not on ASA possibly 2/2 past use of Eliquis, which patient reports was starting for SUSIE. He has not taken it in months, however  -Continue statin

## 2022-06-03 NOTE — PROGRESS NOTES
Discharge    Pt out of room for PleurX placement. Pt's dgtr and richard present in room. Family presents aaox4 with pleasant affect. Dgtr states pt will discharge to her home today on hospice with [facility]. Informed [facility] that pt will be going to dgtr's and confirmed they have the address. Pt's dgtr will transport pt. Per dgtr, pt coping well and denies further needs, questions, concerns at this time and none indicated. Providing psychosocial and counseling support, education, resources, assistance and discharge planning as indicated. SW remains available.

## 2022-06-03 NOTE — PROGRESS NOTES
[facility] - Transplant Stepdown  Heart Transplant  Progress Note    Patient Name: John Javier Jr.  MRN: [id number]  Admission Date: 5/30/2022  Hospital Length of Stay: 1 days  Attending Physician: Lizzy Cavanaugh MD  Primary Care Provider: Primary Doctor No  Principal Problem:Acute on chronic combined systolic and diastolic heart failure, NYHA class 4    Subjective:     Interval History: NAEON. No complaints. PleurX placement with IR and home with hospice today.     Continuous Infusions:  Scheduled Meds:   amiodarone  200 mg Oral Daily    atorvastatin  20 mg Oral QHS    docusate sodium  100 mg Oral BID    ferrous sulfate  1 tablet Oral BID    fluticasone propionate  1 spray Each Nostril Daily    heparin (porcine)  5,000 Units Subcutaneous Q8H    pantoprazole  40 mg Oral Daily     PRN Meds:acetaminophen, calcium carbonate, dextrose 10%, dextrose 10%, sodium chloride 0.9%    Review of patient's allergies indicates:   Allergen Reactions    Penicillins Anaphylaxis     Pt stated his mother said it will kill him- always inform to take ampicillin    Penicillin     Sitagliptin Other (See Comments)     Pancreatitis & liver disease     Objective:     Vital Signs (Most Recent):  Temp: 97.7 °F (36.5 °C) (06/03/22 0830)  Pulse: 80 (06/03/22 0830)  Resp: 15 (06/03/22 0830)  BP: 117/79 (06/03/22 0830)  SpO2: 97 % (06/03/22 0830) Vital Signs (24h Range):  Temp:  [97.6 °F (36.4 °C)-97.9 °F (36.6 °C)] 97.7 °F (36.5 °C)  Pulse:  [79-87] 80  Resp:  [14-20] 15  SpO2:  [95 %-99 %] 97 %  BP: (101-121)/(56-81) 117/79     Patient Vitals for the past 72 hrs (Last 3 readings):   Weight   06/02/22 0500 54.4 kg (119 lb 14.9 oz)   06/01/22 0530 54.6 kg (120 lb 5.9 oz)       Body mass index is 20.59 kg/m².      Intake/Output Summary (Last 24 hours) at 6/3/2022 0853  Last data filed at 6/2/2022 1200  Gross per 24 hour   Intake 450 ml   Output --   Net 450 ml         Hemodynamic Parameters:       Telemetry: SR with LBBB     Physical  Exam  Constitutional:       Comments: Temporal wasting   HENT:      Head: Normocephalic and atraumatic.   Eyes:      Conjunctiva/sclera: Conjunctivae normal.      Pupils: Pupils are equal, round, and reactive to light.   Neck:      Comments: Neck veins do not appear elevated  Cardiovascular:      Rate and Rhythm: Normal rate and regular rhythm.   Pulmonary:      Effort: Pulmonary effort is normal.      Breath sounds: Normal breath sounds.   Abdominal:      General: Bowel sounds are normal.      Comments: + ascites   Musculoskeletal:         General: Normal range of motion.      Cervical back: Normal range of motion and neck supple.   Skin:     General: Skin is warm and dry.      Capillary Refill: Capillary refill takes 2 to 3 seconds.   Neurological:      General: No focal deficit present.      Mental Status: He is alert and oriented to person, place, and time.   Psychiatric:         Mood and Affect: Mood normal.         Behavior: Behavior normal.         Thought Content: Thought content normal.         Judgment: Judgment normal.       Significant Labs:  CBC:  Recent Labs   Lab 06/01/22  0642 06/02/22  0625 06/03/22  0652   WBC 12.06 11.15 11.37   RBC 2.27* 2.56* 2.63*   HGB 7.9* 8.8* 9.0*   HCT 22.2* 24.2* 25.0*    139* 153   MCV 98 95 95   MCH 34.8* 34.4* 34.2*   MCHC 35.6 36.4* 36.0       BNP:  Recent Labs   Lab 05/28/22 2041 05/30/22 2012   * 369*       CMP:  Recent Labs   Lab 06/01/22  0642 06/02/22  0625 06/03/22  0652   * 87 91   CALCIUM 7.7* 7.7* 8.3*   ALBUMIN 2.0* 2.0* 2.2*   PROT 5.2* 5.3* 5.9*   * 127* 130*   K 3.3* 3.2* 4.5   CO2 20* 23 21*   CL 98 97 101   BUN 26* 23 23   CREATININE 1.6* 1.5* 1.6*   ALKPHOS 95 94 104   ALT 50* 53* 64*   AST 76* 84* 101*   BILITOT 2.2* 2.2* 2.3*        Coagulation:   Recent Labs   Lab 05/30/22 2012   INR 1.6*       LDH:  No results for input(s): LDH in the last 72 hours.  Microbiology:  Microbiology Results (last 7 days)       Procedure  Component Value Units Date/Time    (rule out SBP) Culture, Anaerobic [[id number]] Collected: 05/31/22 1510    Order Status: Completed Specimen: Ascites Updated: 06/02/22 1336     Anaerobic Culture Culture in progress    Narrative:      To rule out SBP order labs: Aerobic culture [[id number]],  Culture, Anaerobic [[id number]], Gram stain [IXA089], Albumin  [ZGJ810], Protein [IAH175], LDH [CCW805], WBC \T\ Dff  [USG3330].    (rule out SBP) Aerobic culture [[id number]] Collected: 05/31/22 1510    Order Status: Completed Specimen: Ascites Updated: 06/02/22 1249     Aerobic Bacterial Culture No growth    Narrative:      To rule out SBP order labs: Aerobic culture [[id number]],  Culture, Anaerobic [XWA593], Gram stain [NXY899], Albumin  [ZOS916], Protein [OUN343], LDH [EYP501], WBC \T\ Dff  [TJY2018].    (rule out SBP) Gram stain [[id number]] Collected: 05/31/22 1510    Order Status: Completed Specimen: Ascites Updated: 05/31/22 2146     Gram Stain Result Rare WBC's      No organisms seen    Narrative:      To rule out SBP order labs: Aerobic culture [EYJ777],  Culture, Anaerobic [CFM135], Gram stain [BQN157], Albumin  [PNJ400], Protein [HKN876], LDH [JOK771], WBC \T\ Dff  [PAZ8823].            I have reviewed all pertinent labs within the past 24 hours.    Estimated Creatinine Clearance: 35.4 mL/min (A) (based on SCr of 1.6 mg/dL (H)).    Diagnostic Results:  I have reviewed and interpreted all pertinent imaging results/findings within the past 24 hours.    Assessment and Plan:     No notes on file    * Acute on chronic combined systolic and diastolic heart failure, NYHA class 4  -DCM out of proportion to CAD  -Declined at our center in the past for advanced options 2/2 medical stability, but has had significant decline in activity intolerance over the past 4 months and several admits for ADHF  -Intolerant to any GDMT with hypotension   -TTE done 5/30: LVEDD 5, LVEF 25%, diastolic dysfunction, RV normal, all valves normal, IVC 3, +  ascites  -Pathway started on admit, but given likely cirrhosis, small LV and psychosocial issues likely not a candidate for advanced options. CTS evaluated patient yesterday, not a candidate for advanced options with cirrhosis (confirmed on liver US).   -With end stage heart failure and liver disease, palliative care consulted for GOC/hospice discussion. Pt and pt's family would like to move forward with home hospice. Code status changed to DNR/DNI   -PleurX placement with IR today       Cirrhosis  -Records reflect dx of cirrhosis but he has never been biopsied nor had esophageal variceal bleed  -Liver US done here 5/30 shows cirrhotic liver morphology with sequela of portal hypertension  -Paracentesis 5/31 with 4.8 L removed  -Plan for palliative PleurX placement with IR today    Ascites  -See Cirrhosis     Stage 3a chronic kidney disease  -sCr 1.8 on admit, baseline closer to 1.5    PAF (paroxysmal atrial fibrillation)  -Currently in SR  -Has not taken Eliquis for months. Will continue holding it for paracentesis  -Continue Amiodarone    Type 2 diabetes mellitus, without long-term current use of insulin  -SSI    CAD (coronary artery disease)  -50-60%% LCx, OM disease on [facility] 2/29/20  -Not on ASA possibly 2/2 past use of Eliquis, which patient reports was starting for SUSIE. He has not taken it in months, however  -Continue statin        Anila Gardner PA-C  Heart Transplant  [facility] - Transplant [facility]down

## 2022-06-06 LAB — BACTERIA SPEC ANAEROBE CULT: NORMAL

## 2022-06-08 LAB
FINAL PATHOLOGIC DIAGNOSIS: NORMAL
Lab: NORMAL
MICROSCOPIC EXAM: NORMAL

## 2023-02-17 NOTE — PROGRESS NOTES
Admit Note     Spoke with pt via phone in order to assess needs given isolation precautions. Patient is a 64 y.o.  male, admitted for suspected Covid-19 Virus infection, acute decompensated heart failure, type 2 diabetes,CAD and ANGEL.    Patient admitted to [facility] on 3/14/2020 .  At this time, patient sounds  alert and oriented x 4, calm and communicative.  At this time, patients caregiver is not currently in attendance.     Household/Family Systems     Patient resides with patient's daughter at       [address] [place] [address].        Possible discharge address (brother's home)   [address] [place] [address]      Support system includes daughter, brother and sister.     Patient does not have dependents that are need of being cared for.   Pt gave social workers permission to contact any of his family members when needed.      Patients primary caregiver is self with family in attendance.     Pt's cell:  [phone] (pt's cell phone is at home)   Farhana ( daughter) [phone]  Diego (pt's brother) [phone]  Traci (sister, lives in [place]) [phone]    During admission, patient's caregiver plans to stay at home.  Confirmed patient and patients caregivers do have access to reliable transportation.  Pt reports his daughter and brother drive.  Pt reports he does drive,however his brother may not want him to drive for the time being.     Cognitive Status/Learning     Patient reports reading ability as college and states patient does not have difficulty with reading, writing, seeing, comprehension and learning. Pt reports some difficulty with hearing and short term memory.    Patient reports patient learns best by multiple ways.     Needed: No.   Highest education level: Attended College/Technical School    Vocation/Disability   .  Working for Income: yes  If yes, working activity level: Working Full Time  Patient reports due to multiple illnesses and missed work he is not  Pharmacy Note - Extended Infusion Beta-Lactam Adjustment    Piperacillin/Tazobactam  3375 mg Q12h  for treatment of Intra-abdominal Infection. Per [address] Extended Infusion Beta-Lactam Policy, piperacillin/tazobactam will be changed to 3375mg Q8h extended infusion    Estimated Creatinine Clearance: Estimated Creatinine Clearance: 49 mL/min (based on SCr of 1.2 mg/dL). BMI: Body mass index is 27.8 kg/m². Please call with any questions.     Thank you,    Maryjo Sanchez, [facility] sure if he will return to work. Pt reports he has 6-8 months of short term disability and two years of long term disability.  The pt reports he was planning on retiring 2020.       Adherence     Patient reports a medium level of adherence to patients health care regimen. Pt reports trouble with affording medications.  Pt reports at discharge he thought he had all of his medication but was missing his blood pressure medication and insulin.    Adherence counseling and education provided. Patient verbalizes understanding.    Substance Use    Patient reports the following substance usage.    Tobacco: none, patient denies any use.  Alcohol: none, patient denies any use.  Illicit Drugs/Non-prescribed Medications: none, patient denies any use.  Patient states clear understanding of the potential impact of substance use.  Substance abstinence/cessation counseling, education and resources provided and reviewed.     Services Utilizing/ADLS    Infusion Service: Prior to admission, patient utilizing? no  Home Health: Prior to admission, patient utilizing? yes, [facility] [phone], fax [phone]. Pt reports he was seen by  nurse last Saturday.   DME: Prior to admission, yes, [facility]   Pulmonary/Cardiac Rehab: Prior to admission, no  Dialysis:  Prior to admission, no  Transplant Specialty Pharmacy:  Prior to admission, no.    Prior to admission, patient reports patient was independent with ADLS and was driving.  Patient reports patient is not able to care for self at this time due to compromised medical condition (as documented in medical record) and physical weakness..  Patient indicates a willingness to care for self once medically cleared to do so.    Insurance/Medications    Insured by   Payor/Plan Subscr  Sex Relation Sub. Ins. ID Effective Group Num   1. BLUE CROSS BL* GIFTY LAWSON 1955 Male  [id number]                                    [address] [id number]      Primary  Insurance (for UNOS reporting): Private Insurance  Secondary Insurance (for UNOS reporting): None    Patient reports patient is able to obtain and afford medications at this time and at time of discharge.  Pt reports he changed pharmacies and his medication costs are lower and his family is now helping with copay costs when needed.     Living Will/Healthcare Power of     Patient states patient does not have a LW and/or HCPA. Pt would like paperwork for same.   provided education regarding LW and HCPA and the completion of forms.    Coping/Mental Health    Patient is coping adequately with the aid of  family members.   Patient denies mental health difficulties.   Pt reports he was coping adequately at home with the support of his family.  Pt reports concerns about not having his cell phone, wallet or any other personal information. Given the current limited allowed visitors pt is not sure how he can get same.      Discharge Planning    At time of discharge, patient plans to return to his home or his brother's home under the care of self and family.   Patients family will transport patient.  Per rounds today, expected discharge date has not been medically determined at this time. Patient and patients caregiver  verbalize understanding and are involved in treatment planning and discharge process.  General support provided.     Additional Concerns    Patient is being followed for needs, education, resources, information, emotional support, supportive counseling, and for supportive and skilled discharge plan of care.  providing ongoing psychosocial support, education, resources and d/c planning as needed.  SW remains available. Patient verbalizes understanding and agreement with information reviewed, social work availability, and how to access available resources as needed.

## 2024-11-25 NOTE — ASSESSMENT & PLAN NOTE
-Continue current vent setting.  -Will plan for SBT once impella removed/bedrest complete.    Head, normocephalic, atraumatic, Face, Face within normal limits

## (undated) DEVICE — DRESSING QUIKCLOT 1.5X1.5FT

## (undated) DEVICE — DRESSING TRANS 4X4 TEGADERM

## (undated) DEVICE — SEE MEDLINE ITEM 156894

## (undated) DEVICE — KIT PROBE COVER WITH GEL

## (undated) DEVICE — TUBING O2 FEMALE CONN 13FT

## (undated) DEVICE — GUIDEWIRE EMERALD 150CM PTFE

## (undated) DEVICE — COVER BAND BAG 40 X 40

## (undated) DEVICE — PILLCAM SB3 EX EXTENDED

## (undated) DEVICE — KIT MICROINTRODUCE MINI 5X10CM

## (undated) DEVICE — VISIPAQUE 320 200ML +PK

## (undated) DEVICE — CATH SWAN GANZ STND 7FR

## (undated) DEVICE — SPIKE CONTRAST CONTROLLER

## (undated) DEVICE — SHEATH INTRODUCER 7FR 11CM

## (undated) DEVICE — DEVICE PERCLOSE SUT CLSR 6FR

## (undated) DEVICE — KIT CUSTOM MANIFOLD

## (undated) DEVICE — KIT SURGICAL COLON .25 1.1OZ

## (undated) DEVICE — COLLECTION SPECIMEN NEPTUNE

## (undated) DEVICE — PROTECTION STATION PLUS

## (undated) DEVICE — TIP SUCTION YANKAUER

## (undated) DEVICE — SOL IRRI STRL WATER 1000ML

## (undated) DEVICE — KIT CANIST SUCTION 1200CC

## (undated) DEVICE — ADAPTER DUAL NSL LUER M-M 7FT

## (undated) DEVICE — BITE BLOCK ADULT

## (undated) DEVICE — UNDERPAD DISPOSABLE 30X30IN